# Patient Record
Sex: FEMALE | Race: WHITE | NOT HISPANIC OR LATINO | Employment: OTHER | URBAN - METROPOLITAN AREA
[De-identification: names, ages, dates, MRNs, and addresses within clinical notes are randomized per-mention and may not be internally consistent; named-entity substitution may affect disease eponyms.]

---

## 2017-10-03 ENCOUNTER — HISTORICAL (OUTPATIENT)
Dept: CARDIOLOGY | Facility: HOSPITAL | Age: 72
End: 2017-10-03

## 2017-10-31 ENCOUNTER — HISTORICAL (OUTPATIENT)
Dept: CARDIOLOGY | Facility: HOSPITAL | Age: 72
End: 2017-10-31

## 2017-11-16 ENCOUNTER — HISTORICAL (OUTPATIENT)
Dept: CARDIOLOGY | Facility: HOSPITAL | Age: 72
End: 2017-11-16

## 2017-11-30 ENCOUNTER — HISTORICAL (OUTPATIENT)
Dept: CARDIOLOGY | Facility: HOSPITAL | Age: 72
End: 2017-11-30

## 2018-12-05 LAB
INR PPP: 2.2
PROTHROMBIN TIME: 26.4 S

## 2018-12-10 ENCOUNTER — HISTORICAL (OUTPATIENT)
Dept: ADMINISTRATIVE | Facility: HOSPITAL | Age: 73
End: 2018-12-10

## 2018-12-10 LAB
ABS NEUT (OLG): 2.3 X10(3)/MCL (ref 2.1–9.2)
ALBUMIN SERPL-MCNC: 2.7 GM/DL (ref 3.4–5)
ALBUMIN SERPL-MCNC: 3 GM/DL (ref 3.4–5)
ALBUMIN/GLOB SERPL: 0.9 {RATIO}
ALBUMIN/GLOB SERPL: 0.9 {RATIO}
ALP SERPL-CCNC: 85 UNIT/L (ref 38–126)
ALP SERPL-CCNC: 94 UNIT/L (ref 38–126)
ALT SERPL-CCNC: 32 UNIT/L (ref 12–78)
ALT SERPL-CCNC: 34 UNIT/L (ref 12–78)
APPEARANCE, UA: CLEAR
AST SERPL-CCNC: 45 UNIT/L (ref 15–37)
AST SERPL-CCNC: 49 UNIT/L (ref 15–37)
BACTERIA SPEC CULT: ABNORMAL /HPF
BASOPHILS # BLD AUTO: 0 X10(3)/MCL (ref 0–0.2)
BASOPHILS NFR BLD AUTO: 1 %
BILIRUB SERPL-MCNC: 1.2 MG/DL (ref 0.2–1)
BILIRUB SERPL-MCNC: 1.3 MG/DL (ref 0.2–1)
BILIRUB UR QL STRIP: NEGATIVE
BILIRUBIN DIRECT+TOT PNL SERPL-MCNC: 0.3 MG/DL (ref 0–0.2)
BILIRUBIN DIRECT+TOT PNL SERPL-MCNC: 0.4 MG/DL (ref 0–0.2)
BILIRUBIN DIRECT+TOT PNL SERPL-MCNC: 0.9 MG/DL (ref 0–0.8)
BILIRUBIN DIRECT+TOT PNL SERPL-MCNC: 0.9 MG/DL (ref 0–0.8)
BUN SERPL-MCNC: 12 MG/DL (ref 7–18)
BUN SERPL-MCNC: 12 MG/DL (ref 7–18)
CALCIUM SERPL-MCNC: 8.4 MG/DL (ref 8.5–10.1)
CALCIUM SERPL-MCNC: 8.7 MG/DL (ref 8.5–10.1)
CHLORIDE SERPL-SCNC: 109 MMOL/L (ref 98–107)
CHLORIDE SERPL-SCNC: 110 MMOL/L (ref 98–107)
CO2 SERPL-SCNC: 26 MMOL/L (ref 21–32)
CO2 SERPL-SCNC: 27 MMOL/L (ref 21–32)
COLOR UR: ABNORMAL
CREAT SERPL-MCNC: 0.73 MG/DL (ref 0.55–1.02)
CREAT SERPL-MCNC: 0.77 MG/DL (ref 0.55–1.02)
EOSINOPHIL # BLD AUTO: 0.1 X10(3)/MCL (ref 0–0.9)
EOSINOPHIL NFR BLD AUTO: 2 %
ERYTHROCYTE [DISTWIDTH] IN BLOOD BY AUTOMATED COUNT: 15.8 % (ref 11.5–17)
GLOBULIN SER-MCNC: 3.1 GM/DL (ref 2.4–3.5)
GLOBULIN SER-MCNC: 3.2 GM/DL (ref 2.4–3.5)
GLUCOSE (UA): NEGATIVE
GLUCOSE SERPL-MCNC: 92 MG/DL (ref 74–106)
GLUCOSE SERPL-MCNC: 95 MG/DL (ref 74–106)
HCT VFR BLD AUTO: 39.2 % (ref 37–47)
HGB BLD-MCNC: 12.5 GM/DL (ref 12–16)
HGB UR QL STRIP: NEGATIVE
INR PPP: 2.3 (ref 0–1.3)
KETONES UR QL STRIP: ABNORMAL
LEUKOCYTE ESTERASE UR QL STRIP: ABNORMAL
LYMPHOCYTES # BLD AUTO: 2.5 X10(3)/MCL (ref 0.6–4.6)
LYMPHOCYTES NFR BLD AUTO: 46 %
MAGNESIUM SERPL-MCNC: 1.6 MG/DL (ref 1.8–2.4)
MAGNESIUM SERPL-MCNC: 1.6 MG/DL (ref 1.8–2.4)
MCH RBC QN AUTO: 30.1 PG (ref 27–31)
MCHC RBC AUTO-ENTMCNC: 31.9 GM/DL (ref 33–36)
MCV RBC AUTO: 94.5 FL (ref 80–94)
MONOCYTES # BLD AUTO: 0.5 X10(3)/MCL (ref 0.1–1.3)
MONOCYTES NFR BLD AUTO: 9 %
NEUTROPHILS # BLD AUTO: 2.3 X10(3)/MCL (ref 2.1–9.2)
NEUTROPHILS NFR BLD AUTO: 42 %
NITRITE UR QL STRIP: NEGATIVE
PH UR STRIP: 6 [PH] (ref 5–9)
PLATELET # BLD AUTO: 90 X10(3)/MCL (ref 130–400)
PMV BLD AUTO: 12.5 FL (ref 9.4–12.4)
POTASSIUM SERPL-SCNC: 2.8 MMOL/L (ref 3.5–5.1)
POTASSIUM SERPL-SCNC: 3.2 MMOL/L (ref 3.5–5.1)
PROT SERPL-MCNC: 5.8 GM/DL (ref 6.4–8.2)
PROT SERPL-MCNC: 6.2 GM/DL (ref 6.4–8.2)
PROT UR QL STRIP: NEGATIVE
PROTHROMBIN TIME: 25.4 SECOND(S) (ref 12.2–14.7)
RBC # BLD AUTO: 4.15 X10(6)/MCL (ref 4.2–5.4)
RBC #/AREA URNS HPF: ABNORMAL /[HPF]
SODIUM SERPL-SCNC: 145 MMOL/L (ref 136–145)
SODIUM SERPL-SCNC: 146 MMOL/L (ref 136–145)
SP GR UR STRIP: 1.02 (ref 1–1.03)
SQUAMOUS EPITHELIAL, UA: ABNORMAL
TROPONIN I SERPL-MCNC: <0.02 NG/ML (ref 0.02–0.49)
UROBILINOGEN UR STRIP-ACNC: 1
WBC # SPEC AUTO: 5.4 X10(3)/MCL (ref 4.5–11.5)
WBC #/AREA URNS HPF: ABNORMAL /[HPF]

## 2018-12-11 LAB
ABS NEUT (OLG): 1.83 X10(3)/MCL (ref 2.1–9.2)
ALBUMIN SERPL-MCNC: 2.2 GM/DL (ref 3.4–5)
ALBUMIN/GLOB SERPL: 0.8 RATIO (ref 1.1–2)
ALP SERPL-CCNC: 75 UNIT/L (ref 38–126)
ALT SERPL-CCNC: 29 UNIT/L (ref 12–78)
AST SERPL-CCNC: 41 UNIT/L (ref 15–37)
BASOPHILS # BLD AUTO: 0 X10(3)/MCL (ref 0–0.2)
BASOPHILS NFR BLD AUTO: 0 %
BILIRUB SERPL-MCNC: 0.8 MG/DL (ref 0.2–1)
BILIRUBIN DIRECT+TOT PNL SERPL-MCNC: 0.3 MG/DL (ref 0–0.5)
BILIRUBIN DIRECT+TOT PNL SERPL-MCNC: 0.5 MG/DL (ref 0–0.8)
BUN SERPL-MCNC: 10 MG/DL (ref 7–18)
CALCIUM SERPL-MCNC: 7.7 MG/DL (ref 8.5–10.1)
CHLORIDE SERPL-SCNC: 116 MMOL/L (ref 98–107)
CO2 SERPL-SCNC: 21 MMOL/L (ref 21–32)
CREAT SERPL-MCNC: 0.52 MG/DL (ref 0.55–1.02)
EOSINOPHIL # BLD AUTO: 0.1 X10(3)/MCL (ref 0–0.9)
EOSINOPHIL NFR BLD AUTO: 1 %
ERYTHROCYTE [DISTWIDTH] IN BLOOD BY AUTOMATED COUNT: 15.6 % (ref 11.5–17)
GLOBULIN SER-MCNC: 2.8 GM/DL (ref 2.4–3.5)
GLUCOSE SERPL-MCNC: 71 MG/DL (ref 74–106)
HCT VFR BLD AUTO: 32.9 % (ref 37–47)
HGB BLD-MCNC: 10.5 GM/DL (ref 12–16)
LYMPHOCYTES # BLD AUTO: 1.8 X10(3)/MCL (ref 0.6–4.6)
LYMPHOCYTES NFR BLD AUTO: 43 %
MAGNESIUM SERPL-MCNC: 2 MG/DL (ref 1.8–2.4)
MCH RBC QN AUTO: 30.6 PG (ref 27–31)
MCHC RBC AUTO-ENTMCNC: 31.9 GM/DL (ref 33–36)
MCV RBC AUTO: 95.9 FL (ref 80–94)
MONOCYTES # BLD AUTO: 0.5 X10(3)/MCL (ref 0.1–1.3)
MONOCYTES NFR BLD AUTO: 11 %
NEUTROPHILS # BLD AUTO: 1.83 X10(3)/MCL (ref 2.1–9.2)
NEUTROPHILS NFR BLD AUTO: 44 %
PLATELET # BLD AUTO: 101 X10(3)/MCL (ref 130–400)
PMV BLD AUTO: 11.4 FL (ref 9.4–12.4)
POTASSIUM SERPL-SCNC: 3.1 MMOL/L (ref 3.5–5.1)
PROT SERPL-MCNC: 5 GM/DL (ref 6.4–8.2)
RBC # BLD AUTO: 3.43 X10(6)/MCL (ref 4.2–5.4)
SODIUM SERPL-SCNC: 150 MMOL/L (ref 136–145)
TSH SERPL-ACNC: 0.97 MIU/L (ref 0.36–3.74)
WBC # SPEC AUTO: 4.2 X10(3)/MCL (ref 4.5–11.5)

## 2018-12-12 LAB
ABS NEUT (OLG): 1.51 X10(3)/MCL (ref 2.1–9.2)
ALBUMIN SERPL-MCNC: 2.6 GM/DL (ref 3.4–5)
ALBUMIN/GLOB SERPL: 0.8 {RATIO}
ALP SERPL-CCNC: 86 UNIT/L (ref 38–126)
ALT SERPL-CCNC: 32 UNIT/L (ref 12–78)
AST SERPL-CCNC: 49 UNIT/L (ref 15–37)
BASOPHILS # BLD AUTO: 0 X10(3)/MCL (ref 0–0.2)
BASOPHILS NFR BLD AUTO: 0 %
BILIRUB SERPL-MCNC: 1 MG/DL (ref 0.2–1)
BILIRUBIN DIRECT+TOT PNL SERPL-MCNC: 0.2 MG/DL (ref 0–0.2)
BILIRUBIN DIRECT+TOT PNL SERPL-MCNC: 0.8 MG/DL (ref 0–0.8)
BUN SERPL-MCNC: 5 MG/DL (ref 7–18)
CALCIUM SERPL-MCNC: 8.5 MG/DL (ref 8.5–10.1)
CHLORIDE SERPL-SCNC: 110 MMOL/L (ref 98–107)
CO2 SERPL-SCNC: 23 MMOL/L (ref 21–32)
CREAT SERPL-MCNC: 0.67 MG/DL (ref 0.55–1.02)
EOSINOPHIL # BLD AUTO: 0.1 X10(3)/MCL (ref 0–0.9)
EOSINOPHIL NFR BLD AUTO: 2 %
ERYTHROCYTE [DISTWIDTH] IN BLOOD BY AUTOMATED COUNT: 15.1 % (ref 11.5–17)
FINAL CULTURE: NORMAL
GLOBULIN SER-MCNC: 3.1 GM/DL (ref 2.4–3.5)
GLUCOSE SERPL-MCNC: 105 MG/DL (ref 74–106)
HCT VFR BLD AUTO: 38 % (ref 37–47)
HGB BLD-MCNC: 12.1 GM/DL (ref 12–16)
LYMPHOCYTES # BLD AUTO: 2.3 X10(3)/MCL (ref 0.6–4.6)
LYMPHOCYTES NFR BLD AUTO: 54 %
MAGNESIUM SERPL-MCNC: 1.8 MG/DL (ref 1.8–2.4)
MCH RBC QN AUTO: 30.6 PG (ref 27–31)
MCHC RBC AUTO-ENTMCNC: 31.8 GM/DL (ref 33–36)
MCV RBC AUTO: 96 FL (ref 80–94)
MONOCYTES # BLD AUTO: 0.4 X10(3)/MCL (ref 0.1–1.3)
MONOCYTES NFR BLD AUTO: 8 %
NEUTROPHILS # BLD AUTO: 1.51 X10(3)/MCL (ref 2.1–9.2)
NEUTROPHILS NFR BLD AUTO: 36 %
PHOSPHATE SERPL-MCNC: 1.6 MG/DL (ref 2.5–4.9)
PLATELET # BLD AUTO: 90 X10(3)/MCL (ref 130–400)
PMV BLD AUTO: 10.8 FL (ref 9.4–12.4)
POTASSIUM SERPL-SCNC: 3.7 MMOL/L (ref 3.5–5.1)
PROT SERPL-MCNC: 5.7 GM/DL (ref 6.4–8.2)
RBC # BLD AUTO: 3.96 X10(6)/MCL (ref 4.2–5.4)
SODIUM SERPL-SCNC: 143 MMOL/L (ref 136–145)
WBC # SPEC AUTO: 4.2 X10(3)/MCL (ref 4.5–11.5)

## 2018-12-13 LAB
BUN SERPL-MCNC: 4 MG/DL (ref 7–18)
CALCIUM SERPL-MCNC: 8.1 MG/DL (ref 8.5–10.1)
CHLORIDE SERPL-SCNC: 109 MMOL/L (ref 98–107)
CO2 SERPL-SCNC: 26 MMOL/L (ref 21–32)
CREAT SERPL-MCNC: 0.61 MG/DL (ref 0.55–1.02)
CREAT/UREA NIT SERPL: 6.6
GLUCOSE SERPL-MCNC: 115 MG/DL (ref 74–106)
MAGNESIUM SERPL-MCNC: 1.8 MG/DL (ref 1.8–2.4)
PHOSPHATE SERPL-MCNC: 2.6 MG/DL (ref 2.5–4.9)
POTASSIUM SERPL-SCNC: 4.3 MMOL/L (ref 3.5–5.1)
SODIUM SERPL-SCNC: 143 MMOL/L (ref 136–145)
TRIGL SERPL-MCNC: 80 MG/DL (ref 30–150)

## 2018-12-15 ENCOUNTER — HISTORICAL (OUTPATIENT)
Dept: ADMINISTRATIVE | Facility: HOSPITAL | Age: 73
End: 2018-12-15

## 2018-12-15 LAB
ABS NEUT (OLG): 3.51 X10(3)/MCL (ref 2.1–9.2)
ALBUMIN SERPL-MCNC: 2.3 GM/DL (ref 3.4–5)
ALBUMIN/GLOB SERPL: 0.7 {RATIO}
ALP SERPL-CCNC: 83 UNIT/L (ref 38–126)
ALT SERPL-CCNC: 28 UNIT/L (ref 12–78)
AST SERPL-CCNC: 41 UNIT/L (ref 15–37)
BASOPHILS # BLD AUTO: 0 X10(3)/MCL (ref 0–0.2)
BASOPHILS NFR BLD AUTO: 1 %
BILIRUB SERPL-MCNC: 1.1 MG/DL (ref 0.2–1)
BILIRUBIN DIRECT+TOT PNL SERPL-MCNC: 0.2 MG/DL (ref 0–0.5)
BILIRUBIN DIRECT+TOT PNL SERPL-MCNC: 0.9 MG/DL (ref 0–0.8)
BUN SERPL-MCNC: 17 MG/DL (ref 7–18)
CALCIUM SERPL-MCNC: 7.8 MG/DL (ref 8.5–10.1)
CHLORIDE SERPL-SCNC: 107 MMOL/L (ref 98–107)
CO2 SERPL-SCNC: 27 MMOL/L (ref 21–32)
CREAT SERPL-MCNC: 0.6 MG/DL (ref 0.55–1.02)
EOSINOPHIL # BLD AUTO: 0.3 X10(3)/MCL (ref 0–0.9)
EOSINOPHIL NFR BLD AUTO: 5 %
ERYTHROCYTE [DISTWIDTH] IN BLOOD BY AUTOMATED COUNT: 15.8 % (ref 11.5–17)
GLOBULIN SER-MCNC: 3.2 GM/DL (ref 2.4–3.5)
GLUCOSE SERPL-MCNC: 106 MG/DL (ref 74–106)
HCT VFR BLD AUTO: 37.3 % (ref 37–47)
HGB BLD-MCNC: 11.6 GM/DL (ref 12–16)
LYMPHOCYTES # BLD AUTO: 2.2 X10(3)/MCL (ref 0.6–4.6)
LYMPHOCYTES NFR BLD AUTO: 33 %
MAGNESIUM SERPL-MCNC: 2 MG/DL (ref 1.8–2.4)
MCH RBC QN AUTO: 30.3 PG (ref 27–31)
MCHC RBC AUTO-ENTMCNC: 31.1 GM/DL (ref 33–36)
MCV RBC AUTO: 97.4 FL (ref 80–94)
MONOCYTES # BLD AUTO: 0.7 X10(3)/MCL (ref 0.1–1.3)
MONOCYTES NFR BLD AUTO: 10 %
NEUTROPHILS # BLD AUTO: 3.51 X10(3)/MCL (ref 2.1–9.2)
NEUTROPHILS NFR BLD AUTO: 52 %
PHOSPHATE SERPL-MCNC: 2.5 MG/DL (ref 2.5–4.9)
PLATELET # BLD AUTO: 98 X10(3)/MCL (ref 130–400)
PMV BLD AUTO: 12.1 FL (ref 9.4–12.4)
POTASSIUM SERPL-SCNC: 4.2 MMOL/L (ref 3.5–5.1)
PROT SERPL-MCNC: 5.5 GM/DL (ref 6.4–8.2)
RBC # BLD AUTO: 3.83 X10(6)/MCL (ref 4.2–5.4)
SODIUM SERPL-SCNC: 139 MMOL/L (ref 136–145)
TRIGL SERPL-MCNC: 91 MG/DL (ref 30–150)
WBC # SPEC AUTO: 6.8 X10(3)/MCL (ref 4.5–11.5)

## 2018-12-18 ENCOUNTER — HISTORICAL (OUTPATIENT)
Dept: ADMINISTRATIVE | Facility: HOSPITAL | Age: 73
End: 2018-12-18

## 2018-12-18 LAB
ABS NEUT (OLG): 2.72 X10(3)/MCL (ref 2.1–9.2)
ABS NEUT (OLG): 3.51 X10(3)/MCL (ref 2.1–9.2)
ALBUMIN SERPL-MCNC: 2.1 GM/DL (ref 3.4–5)
ALBUMIN/GLOB SERPL: 0.6 RATIO (ref 1.1–2)
ALP SERPL-CCNC: 90 UNIT/L (ref 38–126)
ALT SERPL-CCNC: 25 UNIT/L (ref 12–78)
AST SERPL-CCNC: 36 UNIT/L (ref 15–37)
BASOPHILS # BLD AUTO: 0 X10(3)/MCL (ref 0–0.2)
BASOPHILS # BLD AUTO: 0 X10(3)/MCL (ref 0–0.2)
BASOPHILS NFR BLD AUTO: 1 %
BASOPHILS NFR BLD AUTO: 1 %
BILIRUB SERPL-MCNC: 0.9 MG/DL (ref 0.2–1)
BILIRUBIN DIRECT+TOT PNL SERPL-MCNC: 0.4 MG/DL (ref 0–0.5)
BILIRUBIN DIRECT+TOT PNL SERPL-MCNC: 0.5 MG/DL (ref 0–0.8)
BUN SERPL-MCNC: 18 MG/DL (ref 7–18)
CALCIUM SERPL-MCNC: 7.8 MG/DL (ref 8.5–10.1)
CHLORIDE SERPL-SCNC: 107 MMOL/L (ref 98–107)
CO2 SERPL-SCNC: 25 MMOL/L (ref 21–32)
CREAT SERPL-MCNC: 0.58 MG/DL (ref 0.55–1.02)
EOSINOPHIL # BLD AUTO: 0.3 X10(3)/MCL (ref 0–0.9)
EOSINOPHIL # BLD AUTO: 0.3 X10(3)/MCL (ref 0–0.9)
EOSINOPHIL NFR BLD AUTO: 4 %
EOSINOPHIL NFR BLD AUTO: 5 %
ERYTHROCYTE [DISTWIDTH] IN BLOOD BY AUTOMATED COUNT: 15.7 % (ref 11.5–17)
ERYTHROCYTE [DISTWIDTH] IN BLOOD BY AUTOMATED COUNT: 16.6 % (ref 11.5–17)
GLOBULIN SER-MCNC: 3.5 GM/DL (ref 2.4–3.5)
GLUCOSE SERPL-MCNC: 84 MG/DL (ref 74–106)
HCT VFR BLD AUTO: 36.7 % (ref 37–47)
HCT VFR BLD AUTO: 40.9 % (ref 37–47)
HGB BLD-MCNC: 11.5 GM/DL (ref 12–16)
HGB BLD-MCNC: 13.5 GM/DL (ref 12–16)
INR PPP: 1.2 (ref 0–1.3)
LYMPHOCYTES # BLD AUTO: 2.3 X10(3)/MCL (ref 0.6–4.6)
LYMPHOCYTES # BLD AUTO: 2.3 X10(3)/MCL (ref 0.6–4.6)
LYMPHOCYTES NFR BLD AUTO: 33 %
LYMPHOCYTES NFR BLD AUTO: 39 %
MAGNESIUM SERPL-MCNC: 2 MG/DL (ref 1.8–2.4)
MCH RBC QN AUTO: 30.3 PG (ref 27–31)
MCH RBC QN AUTO: 31.2 PG (ref 27–31)
MCHC RBC AUTO-ENTMCNC: 31.3 GM/DL (ref 33–36)
MCHC RBC AUTO-ENTMCNC: 33 GM/DL (ref 33–36)
MCV RBC AUTO: 94.5 FL (ref 80–94)
MCV RBC AUTO: 96.8 FL (ref 80–94)
MONOCYTES # BLD AUTO: 0.6 X10(3)/MCL (ref 0.1–1.3)
MONOCYTES # BLD AUTO: 0.8 X10(3)/MCL (ref 0.1–1.3)
MONOCYTES NFR BLD AUTO: 10 %
MONOCYTES NFR BLD AUTO: 11 %
NEUTROPHILS # BLD AUTO: 2.72 X10(3)/MCL (ref 2.1–9.2)
NEUTROPHILS # BLD AUTO: 3.51 X10(3)/MCL (ref 2.1–9.2)
NEUTROPHILS NFR BLD AUTO: 46 %
NEUTROPHILS NFR BLD AUTO: 50 %
PHOSPHATE SERPL-MCNC: 3 MG/DL (ref 2.5–4.9)
PLATELET # BLD AUTO: 76 X10(3)/MCL (ref 130–400)
PLATELET # BLD AUTO: 91 X10(3)/MCL (ref 130–400)
PMV BLD AUTO: 11.9 FL (ref 9.4–12.4)
PMV BLD AUTO: ABNORMAL FL (ref 7.4–10.4)
POTASSIUM SERPL-SCNC: 4 MMOL/L (ref 3.5–5.1)
PROT SERPL-MCNC: 5.6 GM/DL (ref 6.4–8.2)
PROTHROMBIN TIME: 15.6 SECOND(S) (ref 12.2–14.7)
RBC # BLD AUTO: 3.79 X10(6)/MCL (ref 4.2–5.4)
RBC # BLD AUTO: 4.33 X10(6)/MCL (ref 4.2–5.4)
SODIUM SERPL-SCNC: 138 MMOL/L (ref 136–145)
TRIGL SERPL-MCNC: 61 MG/DL (ref 30–150)
WBC # SPEC AUTO: 6 X10(3)/MCL (ref 4.5–11.5)
WBC # SPEC AUTO: 7 X10(3)/MCL (ref 4.5–11.5)

## 2018-12-24 ENCOUNTER — HISTORICAL (OUTPATIENT)
Dept: ADMINISTRATIVE | Facility: HOSPITAL | Age: 73
End: 2018-12-24

## 2018-12-24 LAB
ABS NEUT (OLG): 2.54 X10(3)/MCL (ref 2.1–9.2)
ALBUMIN SERPL-MCNC: 2 GM/DL (ref 3.4–5)
ALBUMIN/GLOB SERPL: 0.6 RATIO (ref 1.1–2)
ALP SERPL-CCNC: 92 UNIT/L (ref 38–126)
ALT SERPL-CCNC: 24 UNIT/L (ref 12–78)
AST SERPL-CCNC: 40 UNIT/L (ref 15–37)
BASOPHILS # BLD AUTO: 0 X10(3)/MCL (ref 0–0.2)
BASOPHILS NFR BLD AUTO: 1 %
BILIRUB SERPL-MCNC: 0.7 MG/DL (ref 0.2–1)
BILIRUBIN DIRECT+TOT PNL SERPL-MCNC: 0.3 MG/DL (ref 0–0.5)
BILIRUBIN DIRECT+TOT PNL SERPL-MCNC: 0.4 MG/DL (ref 0–0.8)
BUN SERPL-MCNC: 16 MG/DL (ref 7–18)
CALCIUM SERPL-MCNC: 7.5 MG/DL (ref 8.5–10.1)
CHLORIDE SERPL-SCNC: 110 MMOL/L (ref 98–107)
CO2 SERPL-SCNC: 25 MMOL/L (ref 21–32)
CREAT SERPL-MCNC: 0.41 MG/DL (ref 0.55–1.02)
EOSINOPHIL # BLD AUTO: 0.2 X10(3)/MCL (ref 0–0.9)
EOSINOPHIL NFR BLD AUTO: 4 %
ERYTHROCYTE [DISTWIDTH] IN BLOOD BY AUTOMATED COUNT: 16 % (ref 11.5–17)
GLOBULIN SER-MCNC: 3.5 GM/DL (ref 2.4–3.5)
GLUCOSE SERPL-MCNC: 86 MG/DL (ref 74–106)
HCT VFR BLD AUTO: 34.5 % (ref 37–47)
HGB BLD-MCNC: 11.1 GM/DL (ref 12–16)
INR PPP: 1.3 (ref 0–1.3)
LYMPHOCYTES # BLD AUTO: 1.7 X10(3)/MCL (ref 0.6–4.6)
LYMPHOCYTES NFR BLD AUTO: 34 %
MAGNESIUM SERPL-MCNC: 1.9 MG/DL (ref 1.8–2.4)
MCH RBC QN AUTO: 30.9 PG (ref 27–31)
MCHC RBC AUTO-ENTMCNC: 32.2 GM/DL (ref 33–36)
MCV RBC AUTO: 96.1 FL (ref 80–94)
MONOCYTES # BLD AUTO: 0.5 X10(3)/MCL (ref 0.1–1.3)
MONOCYTES NFR BLD AUTO: 10 %
NEUTROPHILS # BLD AUTO: 2.54 X10(3)/MCL (ref 2.1–9.2)
NEUTROPHILS NFR BLD AUTO: 51 %
PHOSPHATE SERPL-MCNC: 3 MG/DL (ref 2.5–4.9)
PLATELET # BLD AUTO: 109 X10(3)/MCL (ref 130–400)
PMV BLD AUTO: 11.9 FL (ref 9.4–12.4)
POTASSIUM SERPL-SCNC: 3.8 MMOL/L (ref 3.5–5.1)
PROT SERPL-MCNC: 5.5 GM/DL (ref 6.4–8.2)
PROTHROMBIN TIME: 16.8 SECOND(S) (ref 12.2–14.7)
RBC # BLD AUTO: 3.59 X10(6)/MCL (ref 4.2–5.4)
SODIUM SERPL-SCNC: 142 MMOL/L (ref 136–145)
TRIGL SERPL-MCNC: 70 MG/DL (ref 30–150)
WBC # SPEC AUTO: 5 X10(3)/MCL (ref 4.5–11.5)

## 2018-12-31 ENCOUNTER — HISTORICAL (OUTPATIENT)
Dept: ADMINISTRATIVE | Facility: HOSPITAL | Age: 73
End: 2018-12-31

## 2018-12-31 LAB
ABS NEUT (OLG): 3.33 X10(3)/MCL (ref 2.1–9.2)
ALBUMIN SERPL-MCNC: 2.2 GM/DL (ref 3.4–5)
ALBUMIN/GLOB SERPL: 0.6 RATIO (ref 1.1–2)
ALP SERPL-CCNC: 115 UNIT/L (ref 38–126)
ALT SERPL-CCNC: 28 UNIT/L (ref 12–78)
AST SERPL-CCNC: 44 UNIT/L (ref 15–37)
BASOPHILS # BLD AUTO: 0 X10(3)/MCL (ref 0–0.2)
BASOPHILS NFR BLD AUTO: 0 %
BILIRUB SERPL-MCNC: 1.4 MG/DL (ref 0.2–1)
BILIRUBIN DIRECT+TOT PNL SERPL-MCNC: 0.6 MG/DL (ref 0–0.5)
BILIRUBIN DIRECT+TOT PNL SERPL-MCNC: 0.8 MG/DL (ref 0–0.8)
BUN SERPL-MCNC: 16 MG/DL (ref 7–18)
CALCIUM SERPL-MCNC: 7.9 MG/DL (ref 8.5–10.1)
CHLORIDE SERPL-SCNC: 109 MMOL/L (ref 98–107)
CO2 SERPL-SCNC: 21 MMOL/L (ref 21–32)
CREAT SERPL-MCNC: 0.52 MG/DL (ref 0.55–1.02)
EOSINOPHIL # BLD AUTO: 0.2 X10(3)/MCL (ref 0–0.9)
EOSINOPHIL NFR BLD AUTO: 4 %
ERYTHROCYTE [DISTWIDTH] IN BLOOD BY AUTOMATED COUNT: 15.9 % (ref 11.5–17)
FERRITIN SERPL-MCNC: 193.5 NG/ML (ref 8–388)
FOLATE SERPL-MCNC: 37.8 NG/ML (ref 3.1–17.5)
GLOBULIN SER-MCNC: 3.7 GM/DL (ref 2.4–3.5)
GLUCOSE SERPL-MCNC: 71 MG/DL (ref 74–106)
HCT VFR BLD AUTO: 32.2 % (ref 37–47)
HGB BLD-MCNC: 10.1 GM/DL (ref 12–16)
INR PPP: 1.8 (ref 0–1.3)
IRON SATN MFR SERPL: 14.4 % (ref 20–50)
IRON SERPL-MCNC: 33 MCG/DL (ref 50–175)
LYMPHOCYTES # BLD AUTO: 1.6 X10(3)/MCL (ref 0.6–4.6)
LYMPHOCYTES NFR BLD AUTO: 28 %
MAGNESIUM SERPL-MCNC: 1.9 MG/DL (ref 1.8–2.4)
MCH RBC QN AUTO: 30.5 PG (ref 27–31)
MCHC RBC AUTO-ENTMCNC: 31.4 GM/DL (ref 33–36)
MCV RBC AUTO: 97.3 FL (ref 80–94)
MONOCYTES # BLD AUTO: 0.6 X10(3)/MCL (ref 0.1–1.3)
MONOCYTES NFR BLD AUTO: 10 %
NEUTROPHILS # BLD AUTO: 3.33 X10(3)/MCL (ref 2.1–9.2)
NEUTROPHILS NFR BLD AUTO: 58 %
PHOSPHATE SERPL-MCNC: 3.2 MG/DL (ref 2.5–4.9)
PLATELET # BLD AUTO: 127 X10(3)/MCL (ref 130–400)
PMV BLD AUTO: 12.2 FL (ref 9.4–12.4)
POTASSIUM SERPL-SCNC: 3.9 MMOL/L (ref 3.5–5.1)
PROT SERPL-MCNC: 5.9 GM/DL (ref 6.4–8.2)
PROTHROMBIN TIME: 21.3 SECOND(S) (ref 12.2–14.7)
RBC # BLD AUTO: 3.31 X10(6)/MCL (ref 4.2–5.4)
SODIUM SERPL-SCNC: 138 MMOL/L (ref 136–145)
TIBC SERPL-MCNC: 229 MCG/DL (ref 250–450)
TRANSFERRIN SERPL-MCNC: 171 MG/DL (ref 200–360)
TRIGL SERPL-MCNC: 77 MG/DL (ref 30–150)
VIT B12 SERPL-MCNC: 802 PG/ML (ref 193–986)
WBC # SPEC AUTO: 5.8 X10(3)/MCL (ref 4.5–11.5)

## 2019-01-07 ENCOUNTER — HISTORICAL (OUTPATIENT)
Dept: ADMINISTRATIVE | Facility: HOSPITAL | Age: 74
End: 2019-01-07

## 2019-01-07 LAB
ABS NEUT (OLG): 3.1 X10(3)/MCL (ref 2.1–9.2)
ALBUMIN SERPL-MCNC: 2.3 GM/DL (ref 3.4–5)
ALBUMIN/GLOB SERPL: 0.5 RATIO (ref 1.1–2)
ALP SERPL-CCNC: 114 UNIT/L (ref 38–126)
ALT SERPL-CCNC: 31 UNIT/L (ref 12–78)
AST SERPL-CCNC: 52 UNIT/L (ref 15–37)
BASOPHILS # BLD AUTO: 0 X10(3)/MCL (ref 0–0.2)
BASOPHILS NFR BLD AUTO: 0 %
BILIRUB SERPL-MCNC: 0.7 MG/DL (ref 0.2–1)
BILIRUBIN DIRECT+TOT PNL SERPL-MCNC: 0.3 MG/DL (ref 0–0.2)
BILIRUBIN DIRECT+TOT PNL SERPL-MCNC: 0.4 MG/DL (ref 0–0.8)
BUN SERPL-MCNC: 18 MG/DL (ref 7–18)
CALCIUM SERPL-MCNC: 7.4 MG/DL (ref 8.5–10.1)
CHLORIDE SERPL-SCNC: 107 MMOL/L (ref 98–107)
CO2 SERPL-SCNC: 25 MMOL/L (ref 21–32)
CREAT SERPL-MCNC: 0.25 MG/DL (ref 0.55–1.02)
EOSINOPHIL # BLD AUTO: 0.4 X10(3)/MCL (ref 0–0.9)
EOSINOPHIL NFR BLD AUTO: 7 %
ERYTHROCYTE [DISTWIDTH] IN BLOOD BY AUTOMATED COUNT: 15.6 % (ref 11.5–17)
GLOBULIN SER-MCNC: 5 GM/DL (ref 2.4–3.5)
GLUCOSE SERPL-MCNC: 99 MG/DL (ref 74–106)
HCT VFR BLD AUTO: 35.1 % (ref 37–47)
HGB BLD-MCNC: 11 GM/DL (ref 12–16)
LYMPHOCYTES # BLD AUTO: 1.8 X10(3)/MCL (ref 0.6–4.6)
LYMPHOCYTES NFR BLD AUTO: 31 %
MAGNESIUM SERPL-MCNC: 1.7 MG/DL (ref 1.8–2.4)
MCH RBC QN AUTO: 30.6 PG (ref 27–31)
MCHC RBC AUTO-ENTMCNC: 31.3 GM/DL (ref 33–36)
MCV RBC AUTO: 97.8 FL (ref 80–94)
MONOCYTES # BLD AUTO: 0.5 X10(3)/MCL (ref 0.1–1.3)
MONOCYTES NFR BLD AUTO: 9 %
NEUTROPHILS # BLD AUTO: 3.1 X10(3)/MCL (ref 2.1–9.2)
NEUTROPHILS NFR BLD AUTO: 52 %
PHOSPHATE SERPL-MCNC: 3 MG/DL (ref 2.5–4.9)
PLATELET # BLD AUTO: 176 X10(3)/MCL (ref 130–400)
PMV BLD AUTO: 12.8 FL (ref 9.4–12.4)
POTASSIUM SERPL-SCNC: 4.1 MMOL/L (ref 3.5–5.1)
PROT SERPL-MCNC: 7.3 GM/DL (ref 6.4–8.2)
RBC # BLD AUTO: 3.59 X10(6)/MCL (ref 4.2–5.4)
SODIUM SERPL-SCNC: 139 MMOL/L (ref 136–145)
TRIGL SERPL-MCNC: 92 MG/DL (ref 30–150)
WBC # SPEC AUTO: 5.9 X10(3)/MCL (ref 4.5–11.5)

## 2019-01-14 ENCOUNTER — HISTORICAL (OUTPATIENT)
Dept: ADMINISTRATIVE | Facility: HOSPITAL | Age: 74
End: 2019-01-14

## 2019-01-14 LAB
ABS NEUT (OLG): 2.87 X10(3)/MCL (ref 2.1–9.2)
ALBUMIN SERPL-MCNC: 2.3 GM/DL (ref 3.4–5)
ALBUMIN/GLOB SERPL: 0.6 {RATIO}
ALP SERPL-CCNC: 90 UNIT/L (ref 38–126)
ALT SERPL-CCNC: 27 UNIT/L (ref 12–78)
AST SERPL-CCNC: 43 UNIT/L (ref 15–37)
BASOPHILS # BLD AUTO: 0 X10(3)/MCL (ref 0–0.2)
BASOPHILS NFR BLD AUTO: 1 %
BILIRUB SERPL-MCNC: 0.5 MG/DL (ref 0.2–1)
BILIRUBIN DIRECT+TOT PNL SERPL-MCNC: 0.2 MG/DL (ref 0–0.8)
BILIRUBIN DIRECT+TOT PNL SERPL-MCNC: 0.3 MG/DL (ref 0–0.2)
BUN SERPL-MCNC: 21 MG/DL (ref 7–18)
CALCIUM SERPL-MCNC: 8.2 MG/DL (ref 8.5–10.1)
CHLORIDE SERPL-SCNC: 108 MMOL/L (ref 98–107)
CO2 SERPL-SCNC: 25 MMOL/L (ref 21–32)
CREAT SERPL-MCNC: 0.41 MG/DL (ref 0.55–1.02)
DEPRECATED CALCIDIOL+CALCIFEROL SERPL-MC: 77 NG/ML (ref 30–80)
EOSINOPHIL # BLD AUTO: 0.4 X10(3)/MCL (ref 0–0.9)
EOSINOPHIL NFR BLD AUTO: 6 %
ERYTHROCYTE [DISTWIDTH] IN BLOOD BY AUTOMATED COUNT: 15.4 % (ref 11.5–17)
GLOBULIN SER-MCNC: 4 GM/DL (ref 2.4–3.5)
GLUCOSE SERPL-MCNC: 92 MG/DL (ref 74–106)
HCT VFR BLD AUTO: 33.1 % (ref 37–47)
HGB BLD-MCNC: 10.4 GM/DL (ref 12–16)
LYMPHOCYTES # BLD AUTO: 2.1 X10(3)/MCL (ref 0.6–4.6)
LYMPHOCYTES NFR BLD AUTO: 35 %
MAGNESIUM SERPL-MCNC: 1.8 MG/DL (ref 1.8–2.4)
MCH RBC QN AUTO: 30.6 PG (ref 27–31)
MCHC RBC AUTO-ENTMCNC: 31.4 GM/DL (ref 33–36)
MCV RBC AUTO: 97.4 FL (ref 80–94)
MONOCYTES # BLD AUTO: 0.6 X10(3)/MCL (ref 0.1–1.3)
MONOCYTES NFR BLD AUTO: 10 %
NEUTROPHILS # BLD AUTO: 2.87 X10(3)/MCL (ref 2.1–9.2)
NEUTROPHILS NFR BLD AUTO: 48 %
PHOSPHATE SERPL-MCNC: 3.3 MG/DL (ref 2.5–4.9)
PLATELET # BLD AUTO: 115 X10(3)/MCL (ref 130–400)
PMV BLD AUTO: 13.4 FL (ref 9.4–12.4)
POTASSIUM SERPL-SCNC: 4.3 MMOL/L (ref 3.5–5.1)
PROT SERPL-MCNC: 6.3 GM/DL (ref 6.4–8.2)
RBC # BLD AUTO: 3.4 X10(6)/MCL (ref 4.2–5.4)
SODIUM SERPL-SCNC: 140 MMOL/L (ref 136–145)
TRIGL SERPL-MCNC: 67 MG/DL (ref 30–150)
WBC # SPEC AUTO: 6 X10(3)/MCL (ref 4.5–11.5)

## 2019-03-21 ENCOUNTER — HISTORICAL (OUTPATIENT)
Dept: ADMINISTRATIVE | Facility: HOSPITAL | Age: 74
End: 2019-03-21

## 2019-06-24 ENCOUNTER — HISTORICAL (OUTPATIENT)
Dept: LAB | Facility: HOSPITAL | Age: 74
End: 2019-06-24

## 2019-06-28 ENCOUNTER — HISTORICAL (OUTPATIENT)
Dept: RADIOLOGY | Facility: HOSPITAL | Age: 74
End: 2019-06-28

## 2019-07-08 ENCOUNTER — HISTORICAL (OUTPATIENT)
Dept: LAB | Facility: HOSPITAL | Age: 74
End: 2019-07-08

## 2019-07-15 ENCOUNTER — HISTORICAL (OUTPATIENT)
Dept: LAB | Facility: HOSPITAL | Age: 74
End: 2019-07-15

## 2019-07-22 ENCOUNTER — HISTORICAL (OUTPATIENT)
Dept: LAB | Facility: HOSPITAL | Age: 74
End: 2019-07-22

## 2019-07-29 ENCOUNTER — HISTORICAL (OUTPATIENT)
Dept: LAB | Facility: HOSPITAL | Age: 74
End: 2019-07-29

## 2019-08-05 ENCOUNTER — HISTORICAL (OUTPATIENT)
Dept: LAB | Facility: HOSPITAL | Age: 74
End: 2019-08-05

## 2019-08-19 ENCOUNTER — HISTORICAL (OUTPATIENT)
Dept: LAB | Facility: HOSPITAL | Age: 74
End: 2019-08-19

## 2019-09-03 ENCOUNTER — HISTORICAL (OUTPATIENT)
Dept: LAB | Facility: HOSPITAL | Age: 74
End: 2019-09-03

## 2019-09-16 ENCOUNTER — HISTORICAL (OUTPATIENT)
Dept: LAB | Facility: HOSPITAL | Age: 74
End: 2019-09-16

## 2020-02-14 ENCOUNTER — HISTORICAL (OUTPATIENT)
Dept: ADMINISTRATIVE | Facility: HOSPITAL | Age: 75
End: 2020-02-14

## 2020-05-20 ENCOUNTER — HISTORICAL (OUTPATIENT)
Dept: ADMINISTRATIVE | Facility: HOSPITAL | Age: 75
End: 2020-05-20

## 2020-07-20 ENCOUNTER — HISTORICAL (OUTPATIENT)
Dept: ADMINISTRATIVE | Facility: HOSPITAL | Age: 75
End: 2020-07-20

## 2020-11-29 ENCOUNTER — HISTORICAL (OUTPATIENT)
Dept: ADMINISTRATIVE | Facility: HOSPITAL | Age: 75
End: 2020-11-29

## 2020-11-29 LAB — FINAL CULTURE: NORMAL

## 2020-12-03 LAB
FINAL CULTURE: NORMAL
FINAL CULTURE: NORMAL

## 2020-12-04 LAB — FINAL CULTURE: NORMAL

## 2020-12-31 ENCOUNTER — HISTORICAL (OUTPATIENT)
Dept: ADMINISTRATIVE | Facility: HOSPITAL | Age: 75
End: 2020-12-31

## 2020-12-31 LAB — GRAM STN SPEC: NORMAL

## 2021-01-03 LAB — FINAL CULTURE: NORMAL

## 2021-01-04 LAB
FINAL CULTURE: NORMAL
FINAL CULTURE: NORMAL

## 2021-01-05 LAB — FINAL CULTURE: NORMAL

## 2021-02-01 LAB — FINAL CULTURE: NORMAL

## 2021-03-01 ENCOUNTER — HISTORICAL (OUTPATIENT)
Dept: ADMINISTRATIVE | Facility: HOSPITAL | Age: 76
End: 2021-03-01

## 2021-04-16 ENCOUNTER — HISTORICAL (OUTPATIENT)
Dept: SURGERY | Facility: HOSPITAL | Age: 76
End: 2021-04-16

## 2021-06-24 ENCOUNTER — HISTORICAL (OUTPATIENT)
Dept: ADMINISTRATIVE | Facility: HOSPITAL | Age: 76
End: 2021-06-24

## 2021-06-26 ENCOUNTER — HISTORICAL (OUTPATIENT)
Dept: ADMINISTRATIVE | Facility: HOSPITAL | Age: 76
End: 2021-06-26

## 2021-07-01 LAB
FINAL CULTURE: NORMAL
FINAL CULTURE: NORMAL

## 2021-07-13 ENCOUNTER — HISTORICAL (OUTPATIENT)
Dept: ADMINISTRATIVE | Facility: HOSPITAL | Age: 76
End: 2021-07-13

## 2021-07-13 LAB
ABS NEUT (OLG): 2.47 X10(3)/MCL (ref 2.1–9.2)
ABS NEUT (OLG): 2.51 X10(3)/MCL (ref 2.1–9.2)
ALBUMIN SERPL-MCNC: 3.2 GM/DL (ref 3.4–4.8)
ALBUMIN/GLOB SERPL: 0.8 RATIO (ref 1.1–2)
ALP SERPL-CCNC: 75 UNIT/L (ref 40–150)
ALT SERPL-CCNC: 31 UNIT/L (ref 0–55)
AST SERPL-CCNC: 43 UNIT/L (ref 5–34)
BASOPHILS # BLD AUTO: 0 X10(3)/MCL (ref 0–0.2)
BASOPHILS # BLD AUTO: 0 X10(3)/MCL (ref 0–0.2)
BASOPHILS NFR BLD AUTO: 0 %
BASOPHILS NFR BLD AUTO: 1 %
BILIRUB SERPL-MCNC: 0.6 MG/DL
BILIRUBIN DIRECT+TOT PNL SERPL-MCNC: 0.2 MG/DL (ref 0–0.5)
BILIRUBIN DIRECT+TOT PNL SERPL-MCNC: 0.4 MG/DL (ref 0–0.8)
BUN SERPL-MCNC: 20.8 MG/DL (ref 9.8–20.1)
CALCIUM SERPL-MCNC: 9.2 MG/DL (ref 8.4–10.2)
CHLORIDE SERPL-SCNC: 106 MMOL/L (ref 98–107)
CO2 SERPL-SCNC: 23 MMOL/L (ref 23–31)
CREAT SERPL-MCNC: 0.8 MG/DL (ref 0.55–1.02)
DEPRECATED CALCIDIOL+CALCIFEROL SERPL-MC: 27.1 NG/ML (ref 30–80)
DEPRECATED CALCIDIOL+CALCIFEROL SERPL-MC: 30.4 NG/ML (ref 30–80)
EOSINOPHIL # BLD AUTO: 0.2 X10(3)/MCL (ref 0–0.9)
EOSINOPHIL # BLD AUTO: 0.2 X10(3)/MCL (ref 0–0.9)
EOSINOPHIL NFR BLD AUTO: 3 %
EOSINOPHIL NFR BLD AUTO: 3 %
ERYTHROCYTE [DISTWIDTH] IN BLOOD BY AUTOMATED COUNT: 15.3 % (ref 11.5–17)
ERYTHROCYTE [DISTWIDTH] IN BLOOD BY AUTOMATED COUNT: 15.5 % (ref 11.5–17)
FOLATE SERPL-MCNC: 20 NG/ML (ref 7–31.4)
GLOBULIN SER-MCNC: 4.1 GM/DL (ref 2.4–3.5)
GLUCOSE SERPL-MCNC: 111 MG/DL (ref 82–115)
HCT VFR BLD AUTO: 38.2 % (ref 37–47)
HCT VFR BLD AUTO: 38.4 % (ref 37–47)
HGB BLD-MCNC: 11.9 GM/DL (ref 12–16)
HGB BLD-MCNC: 12.2 GM/DL (ref 12–16)
IRON SATN MFR SERPL: 26 % (ref 20–50)
IRON SATN MFR SERPL: 31 % (ref 20–50)
IRON SERPL-MCNC: 110 UG/DL (ref 50–170)
IRON SERPL-MCNC: 90 UG/DL (ref 50–170)
LYMPHOCYTES # BLD AUTO: 2.4 X10(3)/MCL (ref 0.6–4.6)
LYMPHOCYTES # BLD AUTO: 2.8 X10(3)/MCL (ref 0.6–4.6)
LYMPHOCYTES NFR BLD AUTO: 43 %
LYMPHOCYTES NFR BLD AUTO: 47 %
MAGNESIUM SERPL-MCNC: 2.2 MG/DL (ref 1.6–2.6)
MCH RBC QN AUTO: 27.5 PG (ref 27–31)
MCH RBC QN AUTO: 27.5 PG (ref 27–31)
MCHC RBC AUTO-ENTMCNC: 31.2 GM/DL (ref 33–36)
MCHC RBC AUTO-ENTMCNC: 31.8 GM/DL (ref 33–36)
MCV RBC AUTO: 86.7 FL (ref 80–94)
MCV RBC AUTO: 88.2 FL (ref 80–94)
MONOCYTES # BLD AUTO: 0.4 X10(3)/MCL (ref 0.1–1.3)
MONOCYTES # BLD AUTO: 0.5 X10(3)/MCL (ref 0.1–1.3)
MONOCYTES NFR BLD AUTO: 7 %
MONOCYTES NFR BLD AUTO: 8 %
NEUTROPHILS # BLD AUTO: 2.47 X10(3)/MCL (ref 2.1–9.2)
NEUTROPHILS # BLD AUTO: 2.51 X10(3)/MCL (ref 2.1–9.2)
NEUTROPHILS NFR BLD AUTO: 41 %
NEUTROPHILS NFR BLD AUTO: 45 %
PHOSPHATE SERPL-MCNC: 4.2 MG/DL (ref 2.3–4.7)
PLATELET # BLD AUTO: 172 X10(3)/MCL (ref 130–400)
PLATELET # BLD AUTO: 174 X10(3)/MCL (ref 130–400)
PMV BLD AUTO: 10.8 FL (ref 9.4–12.4)
PMV BLD AUTO: 11.3 FL (ref 9.4–12.4)
POTASSIUM SERPL-SCNC: 5.3 MMOL/L (ref 3.5–5.1)
PREALB SERPL-MCNC: 17.1 MG/DL (ref 14–37)
PREALB SERPL-MCNC: 17.9 MG/DL (ref 14–37)
PROT SERPL-MCNC: 7.3 GM/DL (ref 5.8–7.6)
RBC # BLD AUTO: 4.33 X10(6)/MCL (ref 4.2–5.4)
RBC # BLD AUTO: 4.43 X10(6)/MCL (ref 4.2–5.4)
SODIUM SERPL-SCNC: 139 MMOL/L (ref 136–145)
TIBC SERPL-MCNC: 245 UG/DL (ref 70–310)
TIBC SERPL-MCNC: 258 UG/DL (ref 70–310)
TIBC SERPL-MCNC: 348 UG/DL (ref 250–450)
TIBC SERPL-MCNC: 355 UG/DL (ref 250–450)
TRANSFERRIN SERPL-MCNC: 312 MG/DL (ref 173–360)
TRANSFERRIN SERPL-MCNC: 326 MG/DL (ref 173–360)
TRIGL SERPL-MCNC: 76 MG/DL (ref 37–140)
VIT B12 SERPL-MCNC: 691 PG/ML (ref 213–816)
VIT B12 SERPL-MCNC: 731 PG/ML (ref 213–816)
WBC # SPEC AUTO: 5.5 X10(3)/MCL (ref 4.5–11.5)
WBC # SPEC AUTO: 6 X10(3)/MCL (ref 4.5–11.5)

## 2021-07-26 ENCOUNTER — HISTORICAL (OUTPATIENT)
Dept: ADMINISTRATIVE | Facility: HOSPITAL | Age: 76
End: 2021-07-26

## 2021-07-26 LAB
ABS NEUT (OLG): 2.44 X10(3)/MCL (ref 2.1–9.2)
ALBUMIN SERPL-MCNC: 3 GM/DL (ref 3.4–4.8)
ALBUMIN/GLOB SERPL: 0.9 RATIO (ref 1.1–2)
ALP SERPL-CCNC: 72 UNIT/L (ref 40–150)
ALT SERPL-CCNC: 29 UNIT/L (ref 0–55)
AST SERPL-CCNC: 30 UNIT/L (ref 5–34)
BASOPHILS # BLD AUTO: 0 X10(3)/MCL (ref 0–0.2)
BASOPHILS NFR BLD AUTO: 1 %
BILIRUB SERPL-MCNC: 0.5 MG/DL
BILIRUBIN DIRECT+TOT PNL SERPL-MCNC: 0.2 MG/DL (ref 0–0.5)
BILIRUBIN DIRECT+TOT PNL SERPL-MCNC: 0.3 MG/DL (ref 0–0.8)
BUN SERPL-MCNC: 19.7 MG/DL (ref 9.8–20.1)
CALCIUM SERPL-MCNC: 8.5 MG/DL (ref 8.4–10.2)
CHLORIDE SERPL-SCNC: 106 MMOL/L (ref 98–107)
CO2 SERPL-SCNC: 25 MMOL/L (ref 23–31)
CREAT SERPL-MCNC: 0.63 MG/DL (ref 0.55–1.02)
EOSINOPHIL # BLD AUTO: 0.2 X10(3)/MCL (ref 0–0.9)
EOSINOPHIL NFR BLD AUTO: 3 %
ERYTHROCYTE [DISTWIDTH] IN BLOOD BY AUTOMATED COUNT: 15.7 % (ref 11.5–17)
GLOBULIN SER-MCNC: 3.5 GM/DL (ref 2.4–3.5)
GLUCOSE SERPL-MCNC: 124 MG/DL (ref 82–115)
HCT VFR BLD AUTO: 37.4 % (ref 37–47)
HGB BLD-MCNC: 11.7 GM/DL (ref 12–16)
INR PPP: 2.7 (ref 0–1.3)
LYMPHOCYTES # BLD AUTO: 2 X10(3)/MCL (ref 0.6–4.6)
LYMPHOCYTES NFR BLD AUTO: 39 %
MAGNESIUM SERPL-MCNC: 2.1 MG/DL (ref 1.6–2.6)
MCH RBC QN AUTO: 27.8 PG (ref 27–31)
MCHC RBC AUTO-ENTMCNC: 31.3 GM/DL (ref 33–36)
MCV RBC AUTO: 88.8 FL (ref 80–94)
MONOCYTES # BLD AUTO: 0.4 X10(3)/MCL (ref 0.1–1.3)
MONOCYTES NFR BLD AUTO: 8 %
NEUTROPHILS # BLD AUTO: 2.44 X10(3)/MCL (ref 2.1–9.2)
NEUTROPHILS NFR BLD AUTO: 49 %
PHOSPHATE SERPL-MCNC: 4.1 MG/DL (ref 2.3–4.7)
PLATELET # BLD AUTO: 136 X10(3)/MCL (ref 130–400)
PMV BLD AUTO: 10.8 FL (ref 9.4–12.4)
POTASSIUM SERPL-SCNC: 4 MMOL/L (ref 3.5–5.1)
PROT SERPL-MCNC: 6.5 GM/DL (ref 5.8–7.6)
PROTHROMBIN TIME: 27.9 SECOND(S) (ref 12.5–14.5)
RBC # BLD AUTO: 4.21 X10(6)/MCL (ref 4.2–5.4)
SODIUM SERPL-SCNC: 140 MMOL/L (ref 136–145)
TRIGL SERPL-MCNC: 58 MG/DL (ref 37–140)
WBC # SPEC AUTO: 5 X10(3)/MCL (ref 4.5–11.5)

## 2021-08-16 ENCOUNTER — HISTORICAL (OUTPATIENT)
Dept: ADMINISTRATIVE | Facility: HOSPITAL | Age: 76
End: 2021-08-16

## 2021-08-16 LAB
ABS NEUT (OLG): 2.3 X10(3)/MCL (ref 2.1–9.2)
ALBUMIN SERPL-MCNC: 3.4 GM/DL (ref 3.4–4.8)
ALBUMIN/GLOB SERPL: 1.1 RATIO (ref 1.1–2)
ALP SERPL-CCNC: 74 UNIT/L (ref 40–150)
ALT SERPL-CCNC: 30 UNIT/L (ref 0–55)
AST SERPL-CCNC: 35 UNIT/L (ref 5–34)
BASOPHILS # BLD AUTO: 0 X10(3)/MCL (ref 0–0.2)
BASOPHILS NFR BLD AUTO: 0 %
BILIRUB SERPL-MCNC: 0.6 MG/DL
BILIRUBIN DIRECT+TOT PNL SERPL-MCNC: 0.3 MG/DL (ref 0–0.5)
BILIRUBIN DIRECT+TOT PNL SERPL-MCNC: 0.3 MG/DL (ref 0–0.8)
BUN SERPL-MCNC: 20.6 MG/DL (ref 9.8–20.1)
CALCIUM SERPL-MCNC: 8.8 MG/DL (ref 8.4–10.2)
CHLORIDE SERPL-SCNC: 107 MMOL/L (ref 98–107)
CO2 SERPL-SCNC: 23 MMOL/L (ref 23–31)
CREAT SERPL-MCNC: 0.63 MG/DL (ref 0.55–1.02)
EOSINOPHIL # BLD AUTO: 0.2 X10(3)/MCL (ref 0–0.9)
EOSINOPHIL NFR BLD AUTO: 4 %
ERYTHROCYTE [DISTWIDTH] IN BLOOD BY AUTOMATED COUNT: 15.5 % (ref 11.5–17)
GLOBULIN SER-MCNC: 3.1 GM/DL (ref 2.4–3.5)
GLUCOSE SERPL-MCNC: 76 MG/DL (ref 82–115)
HCT VFR BLD AUTO: 37 % (ref 37–47)
HGB BLD-MCNC: 11.7 GM/DL (ref 12–16)
INR PPP: 1.9 (ref 0–1.3)
LYMPHOCYTES # BLD AUTO: 2.1 X10(3)/MCL (ref 0.6–4.6)
LYMPHOCYTES NFR BLD AUTO: 40 %
MAGNESIUM SERPL-MCNC: 1.9 MG/DL (ref 1.6–2.6)
MCH RBC QN AUTO: 28.3 PG (ref 27–31)
MCHC RBC AUTO-ENTMCNC: 31.6 GM/DL (ref 33–36)
MCV RBC AUTO: 89.4 FL (ref 80–94)
MONOCYTES # BLD AUTO: 0.5 X10(3)/MCL (ref 0.1–1.3)
MONOCYTES NFR BLD AUTO: 10 %
NEUTROPHILS # BLD AUTO: 2.3 X10(3)/MCL (ref 2.1–9.2)
NEUTROPHILS NFR BLD AUTO: 45 %
PHOSPHATE SERPL-MCNC: 4.2 MG/DL (ref 2.3–4.7)
PLATELET # BLD AUTO: 141 X10(3)/MCL (ref 130–400)
PMV BLD AUTO: 11.3 FL (ref 9.4–12.4)
POTASSIUM SERPL-SCNC: 4.3 MMOL/L (ref 3.5–5.1)
PROT SERPL-MCNC: 6.5 GM/DL (ref 5.8–7.6)
PROTHROMBIN TIME: 21.3 SECOND(S) (ref 12.5–14.5)
RBC # BLD AUTO: 4.14 X10(6)/MCL (ref 4.2–5.4)
SODIUM SERPL-SCNC: 139 MMOL/L (ref 136–145)
TRIGL SERPL-MCNC: 50 MG/DL (ref 37–140)
WBC # SPEC AUTO: 5.1 X10(3)/MCL (ref 4.5–11.5)

## 2021-09-06 ENCOUNTER — HISTORICAL (OUTPATIENT)
Dept: ADMINISTRATIVE | Facility: HOSPITAL | Age: 76
End: 2021-09-06

## 2021-09-06 LAB
ABS NEUT (OLG): 2.13 X10(3)/MCL (ref 2.1–9.2)
ALBUMIN SERPL-MCNC: 3.4 GM/DL (ref 3.4–4.8)
ALBUMIN/GLOB SERPL: 1.1 RATIO (ref 1.1–2)
ALP SERPL-CCNC: 73 UNIT/L (ref 40–150)
ALT SERPL-CCNC: 29 UNIT/L (ref 0–55)
AST SERPL-CCNC: 30 UNIT/L (ref 5–34)
BASOPHILS # BLD AUTO: 0 X10(3)/MCL (ref 0–0.2)
BASOPHILS NFR BLD AUTO: 0 %
BILIRUB SERPL-MCNC: 0.5 MG/DL
BILIRUBIN DIRECT+TOT PNL SERPL-MCNC: 0.2 MG/DL (ref 0–0.5)
BILIRUBIN DIRECT+TOT PNL SERPL-MCNC: 0.3 MG/DL (ref 0–0.8)
BUN SERPL-MCNC: 21 MG/DL (ref 9.8–20.1)
CALCIUM SERPL-MCNC: 9 MG/DL (ref 8.4–10.2)
CHLORIDE SERPL-SCNC: 109 MMOL/L (ref 98–107)
CO2 SERPL-SCNC: 25 MMOL/L (ref 23–31)
CREAT SERPL-MCNC: 0.64 MG/DL (ref 0.55–1.02)
EOSINOPHIL # BLD AUTO: 0.2 X10(3)/MCL (ref 0–0.9)
EOSINOPHIL NFR BLD AUTO: 4 %
ERYTHROCYTE [DISTWIDTH] IN BLOOD BY AUTOMATED COUNT: 14.8 % (ref 11.5–17)
GLOBULIN SER-MCNC: 3.2 GM/DL (ref 2.4–3.5)
GLUCOSE SERPL-MCNC: 123 MG/DL (ref 82–115)
HCT VFR BLD AUTO: 36.5 % (ref 37–47)
HGB BLD-MCNC: 11.7 GM/DL (ref 12–16)
INR PPP: 2.8 (ref 0–1.3)
LYMPHOCYTES # BLD AUTO: 2.1 X10(3)/MCL (ref 0.6–4.6)
LYMPHOCYTES NFR BLD AUTO: 42 %
MAGNESIUM SERPL-MCNC: 2 MG/DL (ref 1.6–2.6)
MCH RBC QN AUTO: 28.9 PG (ref 27–31)
MCHC RBC AUTO-ENTMCNC: 32.1 GM/DL (ref 33–36)
MCV RBC AUTO: 90.1 FL (ref 80–94)
MONOCYTES # BLD AUTO: 0.4 X10(3)/MCL (ref 0.1–1.3)
MONOCYTES NFR BLD AUTO: 9 %
NEUTROPHILS # BLD AUTO: 2.13 X10(3)/MCL (ref 2.1–9.2)
NEUTROPHILS NFR BLD AUTO: 44 %
PHOSPHATE SERPL-MCNC: 3.9 MG/DL (ref 2.3–4.7)
PLATELET # BLD AUTO: 147 X10(3)/MCL (ref 130–400)
PMV BLD AUTO: 11.1 FL (ref 9.4–12.4)
POTASSIUM SERPL-SCNC: 4.4 MMOL/L (ref 3.5–5.1)
PROT SERPL-MCNC: 6.6 GM/DL (ref 5.8–7.6)
PROTHROMBIN TIME: 28.7 SECOND(S) (ref 12.5–14.5)
RBC # BLD AUTO: 4.05 X10(6)/MCL (ref 4.2–5.4)
SODIUM SERPL-SCNC: 143 MMOL/L (ref 136–145)
TRIGL SERPL-MCNC: 57 MG/DL (ref 37–140)
WBC # SPEC AUTO: 4.8 X10(3)/MCL (ref 4.5–11.5)

## 2021-09-27 ENCOUNTER — HISTORICAL (OUTPATIENT)
Dept: ADMINISTRATIVE | Facility: HOSPITAL | Age: 76
End: 2021-09-27

## 2021-09-27 LAB
ABS NEUT (OLG): 2.52 X10(3)/MCL (ref 2.1–9.2)
ALBUMIN SERPL-MCNC: 3.5 GM/DL (ref 3.4–4.8)
ALBUMIN/GLOB SERPL: 1.2 RATIO (ref 1.1–2)
ALP SERPL-CCNC: 75 UNIT/L (ref 40–150)
ALT SERPL-CCNC: 31 UNIT/L (ref 0–55)
AST SERPL-CCNC: 34 UNIT/L (ref 5–34)
BASOPHILS # BLD AUTO: 0 X10(3)/MCL (ref 0–0.2)
BASOPHILS NFR BLD AUTO: 0 %
BILIRUB SERPL-MCNC: 0.7 MG/DL
BILIRUBIN DIRECT+TOT PNL SERPL-MCNC: 0.3 MG/DL (ref 0–0.5)
BILIRUBIN DIRECT+TOT PNL SERPL-MCNC: 0.4 MG/DL (ref 0–0.8)
BUN SERPL-MCNC: 20.4 MG/DL (ref 9.8–20.1)
CALCIUM SERPL-MCNC: 9 MG/DL (ref 8.4–10.2)
CHLORIDE SERPL-SCNC: 105 MMOL/L (ref 98–107)
CO2 SERPL-SCNC: 24 MMOL/L (ref 23–31)
CREAT SERPL-MCNC: 0.65 MG/DL (ref 0.55–1.02)
EOSINOPHIL # BLD AUTO: 0.2 X10(3)/MCL (ref 0–0.9)
EOSINOPHIL NFR BLD AUTO: 3 %
ERYTHROCYTE [DISTWIDTH] IN BLOOD BY AUTOMATED COUNT: 14.6 % (ref 11.5–17)
GLOBULIN SER-MCNC: 3 GM/DL (ref 2.4–3.5)
GLUCOSE SERPL-MCNC: 133 MG/DL (ref 82–115)
HCT VFR BLD AUTO: 37.2 % (ref 37–47)
HGB BLD-MCNC: 11.7 GM/DL (ref 12–16)
LYMPHOCYTES # BLD AUTO: 2 X10(3)/MCL (ref 0.6–4.6)
LYMPHOCYTES NFR BLD AUTO: 39 %
MAGNESIUM SERPL-MCNC: 2.1 MG/DL (ref 1.6–2.6)
MCH RBC QN AUTO: 28.7 PG (ref 27–31)
MCHC RBC AUTO-ENTMCNC: 31.5 GM/DL (ref 33–36)
MCV RBC AUTO: 91.4 FL (ref 80–94)
MONOCYTES # BLD AUTO: 0.4 X10(3)/MCL (ref 0.1–1.3)
MONOCYTES NFR BLD AUTO: 7 %
NEUTROPHILS # BLD AUTO: 2.52 X10(3)/MCL (ref 2.1–9.2)
NEUTROPHILS NFR BLD AUTO: 50 %
PHOSPHATE SERPL-MCNC: 3.6 MG/DL (ref 2.3–4.7)
PLATELET # BLD AUTO: 153 X10(3)/MCL (ref 130–400)
PMV BLD AUTO: 11.4 FL (ref 9.4–12.4)
POTASSIUM SERPL-SCNC: 4.6 MMOL/L (ref 3.5–5.1)
PREALB SERPL-MCNC: 19.2 MG/DL (ref 14–37)
PROT SERPL-MCNC: 6.5 GM/DL (ref 5.8–7.6)
RBC # BLD AUTO: 4.07 X10(6)/MCL (ref 4.2–5.4)
SODIUM SERPL-SCNC: 141 MMOL/L (ref 136–145)
TRIGL SERPL-MCNC: 60 MG/DL (ref 37–140)
WBC # SPEC AUTO: 5.1 X10(3)/MCL (ref 4.5–11.5)

## 2021-10-11 ENCOUNTER — HISTORICAL (OUTPATIENT)
Dept: ADMINISTRATIVE | Facility: HOSPITAL | Age: 76
End: 2021-10-11

## 2021-10-11 LAB
ABS NEUT (OLG): 2.15 X10(3)/MCL (ref 2.1–9.2)
ALBUMIN SERPL-MCNC: 3.4 GM/DL (ref 3.4–4.8)
ALBUMIN/GLOB SERPL: 1.2 RATIO (ref 1.1–2)
ALP SERPL-CCNC: 71 UNIT/L (ref 40–150)
ALT SERPL-CCNC: 29 UNIT/L (ref 0–55)
AST SERPL-CCNC: 29 UNIT/L (ref 5–34)
BASOPHILS # BLD AUTO: 0 X10(3)/MCL (ref 0–0.2)
BASOPHILS NFR BLD AUTO: 1 %
BILIRUB SERPL-MCNC: 0.5 MG/DL
BILIRUBIN DIRECT+TOT PNL SERPL-MCNC: 0.2 MG/DL (ref 0–0.5)
BILIRUBIN DIRECT+TOT PNL SERPL-MCNC: 0.3 MG/DL (ref 0–0.8)
BUN SERPL-MCNC: 19.2 MG/DL (ref 9.8–20.1)
CALCIUM SERPL-MCNC: 8.7 MG/DL (ref 8.4–10.2)
CHLORIDE SERPL-SCNC: 107 MMOL/L (ref 98–107)
CO2 SERPL-SCNC: 27 MMOL/L (ref 23–31)
CREAT SERPL-MCNC: 0.64 MG/DL (ref 0.55–1.02)
EOSINOPHIL # BLD AUTO: 0.2 X10(3)/MCL (ref 0–0.9)
EOSINOPHIL NFR BLD AUTO: 4 %
ERYTHROCYTE [DISTWIDTH] IN BLOOD BY AUTOMATED COUNT: 14.2 % (ref 11.5–17)
GLOBULIN SER-MCNC: 2.9 GM/DL (ref 2.4–3.5)
GLUCOSE SERPL-MCNC: 98 MG/DL (ref 82–115)
HCT VFR BLD AUTO: 37.8 % (ref 37–47)
HGB BLD-MCNC: 11.8 GM/DL (ref 12–16)
INR PPP: 2.5 (ref 0–1.3)
LYMPHOCYTES # BLD AUTO: 2.2 X10(3)/MCL (ref 0.6–4.6)
LYMPHOCYTES NFR BLD AUTO: 44 %
MAGNESIUM SERPL-MCNC: 2 MG/DL (ref 1.6–2.6)
MCH RBC QN AUTO: 28.7 PG (ref 27–31)
MCHC RBC AUTO-ENTMCNC: 31.2 GM/DL (ref 33–36)
MCV RBC AUTO: 92 FL (ref 80–94)
MONOCYTES # BLD AUTO: 0.4 X10(3)/MCL (ref 0.1–1.3)
MONOCYTES NFR BLD AUTO: 9 %
NEUTROPHILS # BLD AUTO: 2.15 X10(3)/MCL (ref 2.1–9.2)
NEUTROPHILS NFR BLD AUTO: 43 %
PHOSPHATE SERPL-MCNC: 3.5 MG/DL (ref 2.3–4.7)
PLATELET # BLD AUTO: 142 X10(3)/MCL (ref 130–400)
PMV BLD AUTO: 10.5 FL (ref 9.4–12.4)
POTASSIUM SERPL-SCNC: 4.4 MMOL/L (ref 3.5–5.1)
PREALB SERPL-MCNC: 18.7 MG/DL (ref 14–37)
PROT SERPL-MCNC: 6.3 GM/DL (ref 5.8–7.6)
PROTHROMBIN TIME: 26.7 SECOND(S) (ref 12.5–14.5)
RBC # BLD AUTO: 4.11 X10(6)/MCL (ref 4.2–5.4)
SODIUM SERPL-SCNC: 138 MMOL/L (ref 136–145)
TRIGL SERPL-MCNC: 69 MG/DL (ref 37–140)
WBC # SPEC AUTO: 5 X10(3)/MCL (ref 4.5–11.5)

## 2021-11-01 ENCOUNTER — HISTORICAL (OUTPATIENT)
Dept: ADMINISTRATIVE | Facility: HOSPITAL | Age: 76
End: 2021-11-01

## 2021-11-01 LAB
ABS NEUT (OLG): 2.34 X10(3)/MCL (ref 2.1–9.2)
ALBUMIN SERPL-MCNC: 3.4 GM/DL (ref 3.4–4.8)
ALBUMIN/GLOB SERPL: 1.1 RATIO (ref 1.1–2)
ALP SERPL-CCNC: 68 UNIT/L (ref 40–150)
ALT SERPL-CCNC: 29 UNIT/L (ref 0–55)
AST SERPL-CCNC: 31 UNIT/L (ref 5–34)
BASOPHILS # BLD AUTO: 0 X10(3)/MCL (ref 0–0.2)
BASOPHILS NFR BLD AUTO: 1 %
BILIRUB SERPL-MCNC: 0.5 MG/DL
BILIRUBIN DIRECT+TOT PNL SERPL-MCNC: 0.2 MG/DL (ref 0–0.5)
BILIRUBIN DIRECT+TOT PNL SERPL-MCNC: 0.3 MG/DL (ref 0–0.8)
BUN SERPL-MCNC: 21.9 MG/DL (ref 9.8–20.1)
CALCIUM SERPL-MCNC: 8.9 MG/DL (ref 8.7–10.5)
CHLORIDE SERPL-SCNC: 105 MMOL/L (ref 98–107)
CO2 SERPL-SCNC: 26 MMOL/L (ref 23–31)
CREAT SERPL-MCNC: 0.68 MG/DL (ref 0.55–1.02)
EOSINOPHIL # BLD AUTO: 0.2 X10(3)/MCL (ref 0–0.9)
EOSINOPHIL NFR BLD AUTO: 4 %
ERYTHROCYTE [DISTWIDTH] IN BLOOD BY AUTOMATED COUNT: 14.1 % (ref 11.5–17)
GLOBULIN SER-MCNC: 3 GM/DL (ref 2.4–3.5)
GLUCOSE SERPL-MCNC: 99 MG/DL (ref 82–115)
HCT VFR BLD AUTO: 37.2 % (ref 37–47)
HGB BLD-MCNC: 11.9 GM/DL (ref 12–16)
LYMPHOCYTES # BLD AUTO: 2.2 X10(3)/MCL (ref 0.6–4.6)
LYMPHOCYTES NFR BLD AUTO: 42 %
MAGNESIUM SERPL-MCNC: 2.1 MG/DL (ref 1.6–2.6)
MCH RBC QN AUTO: 29.2 PG (ref 27–31)
MCHC RBC AUTO-ENTMCNC: 32 GM/DL (ref 33–36)
MCV RBC AUTO: 91.4 FL (ref 80–94)
MONOCYTES # BLD AUTO: 0.4 X10(3)/MCL (ref 0.1–1.3)
MONOCYTES NFR BLD AUTO: 8 %
NEUTROPHILS # BLD AUTO: 2.34 X10(3)/MCL (ref 2.1–9.2)
NEUTROPHILS NFR BLD AUTO: 46 %
PHOSPHATE SERPL-MCNC: 4.4 MG/DL (ref 2.3–4.7)
PLATELET # BLD AUTO: 143 X10(3)/MCL (ref 130–400)
PMV BLD AUTO: 10.6 FL (ref 9.4–12.4)
POTASSIUM SERPL-SCNC: 4.7 MMOL/L (ref 3.5–5.1)
PROT SERPL-MCNC: 6.4 GM/DL (ref 5.8–7.6)
RBC # BLD AUTO: 4.07 X10(6)/MCL (ref 4.2–5.4)
SODIUM SERPL-SCNC: 141 MMOL/L (ref 136–145)
TRIGL SERPL-MCNC: 55 MG/DL (ref 37–140)
WBC # SPEC AUTO: 5.1 X10(3)/MCL (ref 4.5–11.5)

## 2021-11-22 ENCOUNTER — HISTORICAL (OUTPATIENT)
Dept: ADMINISTRATIVE | Facility: HOSPITAL | Age: 76
End: 2021-11-22

## 2021-11-22 LAB
ABS NEUT (OLG): 1.91 X10(3)/MCL (ref 2.1–9.2)
ALBUMIN SERPL-MCNC: 3.4 GM/DL (ref 3.4–4.8)
ALBUMIN/GLOB SERPL: 1.1 RATIO (ref 1.1–2)
ALP SERPL-CCNC: 73 UNIT/L (ref 40–150)
ALT SERPL-CCNC: 31 UNIT/L (ref 0–55)
AST SERPL-CCNC: 33 UNIT/L (ref 5–34)
BASOPHILS # BLD AUTO: 0 X10(3)/MCL (ref 0–0.2)
BASOPHILS NFR BLD AUTO: 1 %
BILIRUB SERPL-MCNC: 0.5 MG/DL
BILIRUBIN DIRECT+TOT PNL SERPL-MCNC: 0.2 MG/DL (ref 0–0.5)
BILIRUBIN DIRECT+TOT PNL SERPL-MCNC: 0.3 MG/DL (ref 0–0.8)
BUN SERPL-MCNC: 19 MG/DL (ref 9.8–20.1)
CALCIUM SERPL-MCNC: 8.9 MG/DL (ref 8.7–10.5)
CHLORIDE SERPL-SCNC: 106 MMOL/L (ref 98–107)
CO2 SERPL-SCNC: 27 MMOL/L (ref 23–31)
CREAT SERPL-MCNC: 0.66 MG/DL (ref 0.55–1.02)
EOSINOPHIL # BLD AUTO: 0.2 X10(3)/MCL (ref 0–0.9)
EOSINOPHIL NFR BLD AUTO: 4 %
ERYTHROCYTE [DISTWIDTH] IN BLOOD BY AUTOMATED COUNT: 13.7 % (ref 11.5–17)
GLOBULIN SER-MCNC: 3 GM/DL (ref 2.4–3.5)
GLUCOSE SERPL-MCNC: 94 MG/DL (ref 82–115)
HCT VFR BLD AUTO: 38.3 % (ref 37–47)
HGB BLD-MCNC: 11.9 GM/DL (ref 12–16)
INR PPP: 2.6 (ref 0–1.3)
LYMPHOCYTES # BLD AUTO: 1.8 X10(3)/MCL (ref 0.6–4.6)
LYMPHOCYTES NFR BLD AUTO: 42 %
MAGNESIUM SERPL-MCNC: 1.9 MG/DL (ref 1.6–2.6)
MCH RBC QN AUTO: 28.5 PG (ref 27–31)
MCHC RBC AUTO-ENTMCNC: 31.1 GM/DL (ref 33–36)
MCV RBC AUTO: 91.8 FL (ref 80–94)
MONOCYTES # BLD AUTO: 0.4 X10(3)/MCL (ref 0.1–1.3)
MONOCYTES NFR BLD AUTO: 10 %
NEUTROPHILS # BLD AUTO: 1.91 X10(3)/MCL (ref 2.1–9.2)
NEUTROPHILS NFR BLD AUTO: 43 %
PHOSPHATE SERPL-MCNC: 4.2 MG/DL (ref 2.3–4.7)
PLATELET # BLD AUTO: 148 X10(3)/MCL (ref 130–400)
PMV BLD AUTO: 10.6 FL (ref 9.4–12.4)
POTASSIUM SERPL-SCNC: 4.6 MMOL/L (ref 3.5–5.1)
PREALB SERPL-MCNC: 18.5 MG/DL (ref 14–37)
PROT SERPL-MCNC: 6.4 GM/DL (ref 5.8–7.6)
PROTHROMBIN TIME: 27.2 SECOND(S) (ref 12.5–14.5)
RBC # BLD AUTO: 4.17 X10(6)/MCL (ref 4.2–5.4)
SODIUM SERPL-SCNC: 140 MMOL/L (ref 136–145)
TRIGL SERPL-MCNC: 60 MG/DL (ref 37–140)
WBC # SPEC AUTO: 4.4 X10(3)/MCL (ref 4.5–11.5)

## 2021-12-13 ENCOUNTER — HISTORICAL (OUTPATIENT)
Dept: ADMINISTRATIVE | Facility: HOSPITAL | Age: 76
End: 2021-12-13

## 2021-12-13 LAB
ABS NEUT (OLG): 1.88 X10(3)/MCL (ref 2.1–9.2)
ALBUMIN SERPL-MCNC: 3.3 GM/DL (ref 3.4–4.8)
ALBUMIN/GLOB SERPL: 1.1 RATIO (ref 1.1–2)
ALP SERPL-CCNC: 69 UNIT/L (ref 40–150)
ALT SERPL-CCNC: 27 UNIT/L (ref 0–55)
AST SERPL-CCNC: 30 UNIT/L (ref 5–34)
BASOPHILS # BLD AUTO: 0 X10(3)/MCL (ref 0–0.2)
BASOPHILS NFR BLD AUTO: 1 %
BILIRUB SERPL-MCNC: 0.4 MG/DL
BILIRUBIN DIRECT+TOT PNL SERPL-MCNC: 0.2 MG/DL (ref 0–0.5)
BILIRUBIN DIRECT+TOT PNL SERPL-MCNC: 0.2 MG/DL (ref 0–0.8)
BUN SERPL-MCNC: 18.3 MG/DL (ref 9.8–20.1)
CALCIUM SERPL-MCNC: 8.5 MG/DL (ref 8.7–10.5)
CHLORIDE SERPL-SCNC: 107 MMOL/L (ref 98–107)
CO2 SERPL-SCNC: 29 MMOL/L (ref 23–31)
CREAT SERPL-MCNC: 0.65 MG/DL (ref 0.55–1.02)
EOSINOPHIL # BLD AUTO: 0.2 X10(3)/MCL (ref 0–0.9)
EOSINOPHIL NFR BLD AUTO: 5 %
ERYTHROCYTE [DISTWIDTH] IN BLOOD BY AUTOMATED COUNT: 13.8 % (ref 11.5–17)
GLOBULIN SER-MCNC: 3 GM/DL (ref 2.4–3.5)
GLUCOSE SERPL-MCNC: 99 MG/DL (ref 82–115)
HCT VFR BLD AUTO: 38.5 % (ref 37–47)
HGB BLD-MCNC: 12 GM/DL (ref 12–16)
INR PPP: 2 (ref 0–1.3)
LYMPHOCYTES # BLD AUTO: 1.8 X10(3)/MCL (ref 0.6–4.6)
LYMPHOCYTES NFR BLD AUTO: 42 %
MAGNESIUM SERPL-MCNC: 2 MG/DL (ref 1.6–2.6)
MCH RBC QN AUTO: 28.8 PG (ref 27–31)
MCHC RBC AUTO-ENTMCNC: 31.2 GM/DL (ref 33–36)
MCV RBC AUTO: 92.3 FL (ref 80–94)
MONOCYTES # BLD AUTO: 0.4 X10(3)/MCL (ref 0.1–1.3)
MONOCYTES NFR BLD AUTO: 8 %
NEUTROPHILS # BLD AUTO: 1.88 X10(3)/MCL (ref 2.1–9.2)
NEUTROPHILS NFR BLD AUTO: 44 %
PHOSPHATE SERPL-MCNC: 3.8 MG/DL (ref 2.3–4.7)
PLATELET # BLD AUTO: 135 X10(3)/MCL (ref 130–400)
PMV BLD AUTO: 10.3 FL (ref 9.4–12.4)
POTASSIUM SERPL-SCNC: 4.6 MMOL/L (ref 3.5–5.1)
PREALB SERPL-MCNC: 15.4 MG/DL (ref 14–37)
PROT SERPL-MCNC: 6.3 GM/DL (ref 5.8–7.6)
PROTHROMBIN TIME: 22 SECOND(S) (ref 12.5–14.5)
RBC # BLD AUTO: 4.17 X10(6)/MCL (ref 4.2–5.4)
SODIUM SERPL-SCNC: 142 MMOL/L (ref 136–145)
TRIGL SERPL-MCNC: 55 MG/DL (ref 37–140)
WBC # SPEC AUTO: 4.3 X10(3)/MCL (ref 4.5–11.5)

## 2022-01-03 ENCOUNTER — HISTORICAL (OUTPATIENT)
Dept: ADMINISTRATIVE | Facility: HOSPITAL | Age: 77
End: 2022-01-03

## 2022-01-03 LAB
ABS NEUT (OLG): 1.71 X10(3)/MCL (ref 2.1–9.2)
ALBUMIN SERPL-MCNC: 3.4 GM/DL (ref 3.4–4.8)
ALBUMIN/GLOB SERPL: 1.2 RATIO (ref 1.1–2)
ALP SERPL-CCNC: 69 UNIT/L (ref 40–150)
ALT SERPL-CCNC: 29 UNIT/L (ref 0–55)
AST SERPL-CCNC: 35 UNIT/L (ref 5–34)
BASOPHILS # BLD AUTO: 0 X10(3)/MCL (ref 0–0.2)
BASOPHILS NFR BLD AUTO: 1 %
BILIRUB SERPL-MCNC: 0.5 MG/DL
BILIRUBIN DIRECT+TOT PNL SERPL-MCNC: 0.2 MG/DL (ref 0–0.5)
BILIRUBIN DIRECT+TOT PNL SERPL-MCNC: 0.3 MG/DL (ref 0–0.8)
BUN SERPL-MCNC: 12.7 MG/DL (ref 9.8–20.1)
CALCIUM SERPL-MCNC: 8.5 MG/DL (ref 8.7–10.5)
CHLORIDE SERPL-SCNC: 109 MMOL/L (ref 98–107)
CO2 SERPL-SCNC: 24 MMOL/L (ref 23–31)
CREAT SERPL-MCNC: 0.65 MG/DL (ref 0.55–1.02)
EOSINOPHIL # BLD AUTO: 0.2 X10(3)/MCL (ref 0–0.9)
EOSINOPHIL NFR BLD AUTO: 6 %
ERYTHROCYTE [DISTWIDTH] IN BLOOD BY AUTOMATED COUNT: 13.8 % (ref 11.5–17)
GLOBULIN SER-MCNC: 2.9 GM/DL (ref 2.4–3.5)
GLUCOSE SERPL-MCNC: 97 MG/DL (ref 82–115)
HCT VFR BLD AUTO: 36.4 % (ref 37–47)
HGB BLD-MCNC: 11.8 GM/DL (ref 12–16)
LYMPHOCYTES # BLD AUTO: 1.7 X10(3)/MCL (ref 0.6–4.6)
LYMPHOCYTES NFR BLD AUTO: 42 %
MAGNESIUM SERPL-MCNC: 1.8 MG/DL (ref 1.6–2.6)
MCH RBC QN AUTO: 28.7 PG (ref 27–31)
MCHC RBC AUTO-ENTMCNC: 32.4 GM/DL (ref 33–36)
MCV RBC AUTO: 88.6 FL (ref 80–94)
MONOCYTES # BLD AUTO: 0.4 X10(3)/MCL (ref 0.1–1.3)
MONOCYTES NFR BLD AUTO: 9 %
NEUTROPHILS # BLD AUTO: 1.71 X10(3)/MCL (ref 2.1–9.2)
NEUTROPHILS NFR BLD AUTO: 42 %
PHOSPHATE SERPL-MCNC: 3.2 MG/DL (ref 2.3–4.7)
PLATELET # BLD AUTO: 138 X10(3)/MCL (ref 130–400)
PMV BLD AUTO: 10.8 FL (ref 9.4–12.4)
POTASSIUM SERPL-SCNC: 4.2 MMOL/L (ref 3.5–5.1)
PROT SERPL-MCNC: 6.3 GM/DL (ref 5.8–7.6)
RBC # BLD AUTO: 4.11 X10(6)/MCL (ref 4.2–5.4)
SODIUM SERPL-SCNC: 140 MMOL/L (ref 136–145)
TRIGL SERPL-MCNC: 80 MG/DL (ref 37–140)
WBC # SPEC AUTO: 4.1 X10(3)/MCL (ref 4.5–11.5)

## 2022-01-31 ENCOUNTER — HISTORICAL (OUTPATIENT)
Dept: ADMINISTRATIVE | Facility: HOSPITAL | Age: 77
End: 2022-01-31

## 2022-01-31 LAB — PREALB SERPL-MCNC: 18.2 (ref 14–37)

## 2022-02-07 ENCOUNTER — HISTORICAL (OUTPATIENT)
Dept: ADMINISTRATIVE | Facility: HOSPITAL | Age: 77
End: 2022-02-07

## 2022-02-07 LAB
INR PPP: 1.8 (ref 0–1.3)
PROTHROMBIN TIME: 20.7 S (ref 12.5–14.5)

## 2022-02-14 ENCOUNTER — HISTORICAL (OUTPATIENT)
Dept: ADMINISTRATIVE | Facility: HOSPITAL | Age: 77
End: 2022-02-14

## 2022-02-14 LAB
ABS NEUT (OLG): 1.83 (ref 2.1–9.2)
ALBUMIN SERPL-MCNC: 3.4 G/DL (ref 3.4–4.8)
ALBUMIN/GLOB SERPL: 1.1 {RATIO} (ref 1.1–2)
ALP SERPL-CCNC: 83 U/L (ref 40–150)
ALT SERPL-CCNC: 30 U/L (ref 0–55)
AST SERPL-CCNC: 37 U/L (ref 5–34)
BASOPHILS # BLD AUTO: 0 10*3/UL (ref 0–0.2)
BASOPHILS NFR BLD AUTO: 1 %
BILIRUB SERPL-MCNC: 0.6 MG/DL
BILIRUBIN DIRECT+TOT PNL SERPL-MCNC: 0.3 (ref 0–0.5)
BILIRUBIN DIRECT+TOT PNL SERPL-MCNC: 0.3 (ref 0–0.8)
BUN SERPL-MCNC: 12.8 MG/DL (ref 9.8–20.1)
CALCIUM SERPL-MCNC: 9 MG/DL (ref 8.7–10.5)
CHLORIDE SERPL-SCNC: 102 MMOL/L (ref 98–107)
CO2 SERPL-SCNC: 33 MMOL/L (ref 23–31)
CREAT SERPL-MCNC: 0.6 MG/DL (ref 0.55–1.02)
EOSINOPHIL # BLD AUTO: 0.2 10*3/UL (ref 0–0.9)
EOSINOPHIL NFR BLD AUTO: 4 %
ERYTHROCYTE [DISTWIDTH] IN BLOOD BY AUTOMATED COUNT: 14 % (ref 11.5–17)
GLOBULIN SER-MCNC: 3.1 G/DL (ref 2.4–3.5)
GLUCOSE SERPL-MCNC: 92 MG/DL (ref 82–115)
HCT VFR BLD AUTO: 36.2 % (ref 37–47)
HEMOLYSIS INTERF INDEX SERPL-ACNC: 6
HEMOLYSIS INTERF INDEX SERPL-ACNC: 6
HGB BLD-MCNC: 12.1 G/DL (ref 12–16)
ICTERIC INTERF INDEX SERPL-ACNC: 0
LIPEMIC INTERF INDEX SERPL-ACNC: <0
LYMPHOCYTES # BLD AUTO: 1.8 10*3/UL (ref 0.6–4.6)
LYMPHOCYTES NFR BLD AUTO: 42 %
MAGNESIUM SERPL-MCNC: 2.1 MG/DL (ref 1.6–2.6)
MANUAL DIFF? (OHS): NO
MCH RBC QN AUTO: 29.3 PG (ref 27–31)
MCHC RBC AUTO-ENTMCNC: 33.4 G/DL (ref 33–36)
MCV RBC AUTO: 87.7 FL (ref 80–94)
MONOCYTES # BLD AUTO: 0.4 10*3/UL (ref 0.1–1.3)
MONOCYTES NFR BLD AUTO: 10 %
NEUTROPHILS # BLD AUTO: 1.83 10*3/UL (ref 2.1–9.2)
NEUTROPHILS NFR BLD AUTO: 43 %
PHOSPHATE SERPL-MCNC: 3.2 MG/DL (ref 2.3–4.7)
PLATELET # BLD AUTO: 147 10*3/UL (ref 130–400)
PMV BLD AUTO: 11.2 FL (ref 9.4–12.4)
POTASSIUM SERPL-SCNC: 4.3 MMOL/L (ref 3.5–5.1)
PREALB SERPL-MCNC: 17.8 (ref 14–37)
PROT SERPL-MCNC: 6.5 G/DL (ref 5.8–7.6)
RBC # BLD AUTO: 4.13 10*6/UL (ref 4.2–5.4)
SODIUM SERPL-SCNC: 141 MMOL/L (ref 136–145)
TRIGL SERPL-MCNC: 51 MG/DL (ref 37–140)
WBC # SPEC AUTO: 4.3 10*3/UL (ref 4.5–11.5)

## 2022-02-15 ENCOUNTER — HISTORICAL (OUTPATIENT)
Dept: ENDOSCOPY | Facility: HOSPITAL | Age: 77
End: 2022-02-15

## 2022-02-18 ENCOUNTER — HISTORICAL (OUTPATIENT)
Dept: ADMINISTRATIVE | Facility: HOSPITAL | Age: 77
End: 2022-02-18

## 2022-02-18 LAB
INR PPP: 1.5 (ref 0–1.3)
PROTHROMBIN TIME: 17.5 S (ref 12.5–14.5)

## 2022-02-21 ENCOUNTER — HISTORICAL (OUTPATIENT)
Dept: ADMINISTRATIVE | Facility: HOSPITAL | Age: 77
End: 2022-02-21

## 2022-02-21 LAB
INR PPP: 1.7 (ref 0–1.3)
PROTHROMBIN TIME: 19.3 S (ref 12.5–14.5)

## 2022-02-23 ENCOUNTER — HISTORICAL (OUTPATIENT)
Dept: ADMINISTRATIVE | Facility: HOSPITAL | Age: 77
End: 2022-02-23

## 2022-02-23 LAB
INR PPP: 2.2 (ref 0–1.3)
PROTHROMBIN TIME: 24.4 S (ref 12.5–14.5)

## 2022-03-07 ENCOUNTER — HISTORICAL (OUTPATIENT)
Dept: ADMINISTRATIVE | Facility: HOSPITAL | Age: 77
End: 2022-03-07

## 2022-03-07 LAB
ABS NEUT (OLG): 2.42 (ref 2.1–9.2)
ALBUMIN SERPL-MCNC: 3.1 G/DL (ref 3.4–4.8)
ALBUMIN/GLOB SERPL: 0.9 {RATIO} (ref 1.1–2)
ALP SERPL-CCNC: 79 U/L (ref 40–150)
ALT SERPL-CCNC: 33 U/L (ref 0–55)
AST SERPL-CCNC: 32 U/L (ref 5–34)
BASOPHILS # BLD AUTO: 0 10*3/UL (ref 0–0.2)
BASOPHILS NFR BLD AUTO: 1 %
BILIRUB SERPL-MCNC: 0.5 MG/DL
BILIRUBIN DIRECT+TOT PNL SERPL-MCNC: 0.2 (ref 0–0.5)
BILIRUBIN DIRECT+TOT PNL SERPL-MCNC: 0.3 (ref 0–0.8)
BUN SERPL-MCNC: 18.7 MG/DL (ref 9.8–20.1)
CALCIUM SERPL-MCNC: 8.8 MG/DL (ref 8.7–10.5)
CHLORIDE SERPL-SCNC: 108 MMOL/L (ref 98–107)
CO2 SERPL-SCNC: 25 MMOL/L (ref 23–31)
CREAT SERPL-MCNC: 0.64 MG/DL (ref 0.55–1.02)
EOSINOPHIL # BLD AUTO: 0.2 10*3/UL (ref 0–0.9)
EOSINOPHIL NFR BLD AUTO: 5 %
ERYTHROCYTE [DISTWIDTH] IN BLOOD BY AUTOMATED COUNT: 13.9 % (ref 11.5–17)
GLOBULIN SER-MCNC: 3.4 G/DL (ref 2.4–3.5)
GLUCOSE SERPL-MCNC: 144 MG/DL (ref 82–115)
HCT VFR BLD AUTO: 35.1 % (ref 37–47)
HEMOLYSIS INTERF INDEX SERPL-ACNC: 6
HEMOLYSIS INTERF INDEX SERPL-ACNC: 6
HGB BLD-MCNC: 11.5 G/DL (ref 12–16)
ICTERIC INTERF INDEX SERPL-ACNC: 0
INR PPP: 3.1 (ref 0–1.3)
LIPEMIC INTERF INDEX SERPL-ACNC: <0
LYMPHOCYTES # BLD AUTO: 1.8 10*3/UL (ref 0.6–4.6)
LYMPHOCYTES NFR BLD AUTO: 36 %
MAGNESIUM SERPL-MCNC: 2 MG/DL (ref 1.6–2.6)
MANUAL DIFF? (OHS): NO
MCH RBC QN AUTO: 28.8 PG (ref 27–31)
MCHC RBC AUTO-ENTMCNC: 32.8 G/DL (ref 33–36)
MCV RBC AUTO: 88 FL (ref 80–94)
MONOCYTES # BLD AUTO: 0.5 10*3/UL (ref 0.1–1.3)
MONOCYTES NFR BLD AUTO: 9 %
NEUTROPHILS # BLD AUTO: 2.42 10*3/UL (ref 2.1–9.2)
NEUTROPHILS NFR BLD AUTO: 49 %
PHOSPHATE SERPL-MCNC: 3.2 MG/DL (ref 2.3–4.7)
PLATELET # BLD AUTO: 154 10*3/UL (ref 130–400)
PMV BLD AUTO: 10.4 FL (ref 9.4–12.4)
POTASSIUM SERPL-SCNC: 4.1 MMOL/L (ref 3.5–5.1)
PREALB SERPL-MCNC: 18.5 (ref 14–37)
PROT SERPL-MCNC: 6.5 G/DL (ref 5.8–7.6)
PROTHROMBIN TIME: 31.6 S (ref 12.5–14.5)
RBC # BLD AUTO: 3.99 10*6/UL (ref 4.2–5.4)
SODIUM SERPL-SCNC: 139 MMOL/L (ref 136–145)
TRIGL SERPL-MCNC: 79 MG/DL (ref 37–140)
WBC # SPEC AUTO: 5 10*3/UL (ref 4.5–11.5)

## 2022-03-26 ENCOUNTER — HISTORICAL (OUTPATIENT)
Dept: ADMINISTRATIVE | Facility: HOSPITAL | Age: 77
End: 2022-03-26

## 2022-03-28 ENCOUNTER — HISTORICAL (OUTPATIENT)
Dept: ADMINISTRATIVE | Facility: HOSPITAL | Age: 77
End: 2022-03-28

## 2022-03-28 LAB
ABS NEUT (OLG): 1.16 (ref 2.1–9.2)
ALBUMIN SERPL-MCNC: 3 G/DL (ref 3.4–4.8)
ALBUMIN/GLOB SERPL: 0.9 {RATIO} (ref 1.1–2)
ALP SERPL-CCNC: 84 U/L (ref 40–150)
ALT SERPL-CCNC: 37 U/L (ref 0–55)
AST SERPL-CCNC: 48 U/L (ref 5–34)
BILIRUB SERPL-MCNC: 0.7 MG/DL
BILIRUBIN DIRECT+TOT PNL SERPL-MCNC: 0.3 (ref 0–0.5)
BILIRUBIN DIRECT+TOT PNL SERPL-MCNC: 0.4 (ref 0–0.8)
BUN SERPL-MCNC: 8.5 MG/DL (ref 9.8–20.1)
CALCIUM SERPL-MCNC: 8 MG/DL (ref 8.7–10.5)
CHLORIDE SERPL-SCNC: 111 MMOL/L (ref 98–107)
CO2 SERPL-SCNC: 25 MMOL/L (ref 23–31)
CREAT SERPL-MCNC: 0.66 MG/DL (ref 0.55–1.02)
ERYTHROCYTE [DISTWIDTH] IN BLOOD BY AUTOMATED COUNT: 14.6 % (ref 11.5–17)
GLOBULIN SER-MCNC: 3.2 G/DL (ref 2.4–3.5)
GLUCOSE SERPL-MCNC: 86 MG/DL (ref 82–115)
HCT VFR BLD AUTO: 36.1 % (ref 37–47)
HEMOLYSIS INTERF INDEX SERPL-ACNC: 56
HEMOLYSIS INTERF INDEX SERPL-ACNC: 56
HGB BLD-MCNC: 12 G/DL (ref 12–16)
ICTERIC INTERF INDEX SERPL-ACNC: 1
LIPEMIC INTERF INDEX SERPL-ACNC: 2
MAGNESIUM SERPL-MCNC: 1.9 MG/DL (ref 1.6–2.6)
MANUAL DIFF? (OHS): YES
MCH RBC QN AUTO: 28.9 PG (ref 27–31)
MCHC RBC AUTO-ENTMCNC: 33.2 G/DL (ref 33–36)
MCV RBC AUTO: 87 FL (ref 80–94)
MO BLASTS SUSPECT FLAG (OHS): PRESENT
PHOSPHATE SERPL-MCNC: 2.3 MG/DL (ref 2.3–4.7)
PLATELET # BLD AUTO: 92 10*3/UL (ref 130–400)
PLATELET CLUMPS SUSPECT FLAG (OHS): PRESENT
PLATELETS.RETICULATED NFR BLD AUTO: 1.9 % (ref 0.9–11.2)
PMV BLD AUTO: 10.6 FL (ref 7.4–10.4)
POS ERR1 (OHS): NORMAL
POS ERR2 (OHS): NORMAL
POTASSIUM SERPL-SCNC: 4.1 MMOL/L (ref 3.5–5.1)
PREALB SERPL-MCNC: 11.9 (ref 14–37)
PROT SERPL-MCNC: 6.2 G/DL (ref 5.8–7.6)
RBC # BLD AUTO: 4.15 10*6/UL (ref 4.2–5.4)
SODIUM SERPL-SCNC: 144 MMOL/L (ref 136–145)
TRIGL SERPL-MCNC: 78 MG/DL (ref 37–140)
WBC # SPEC AUTO: 2.6 10*3/UL (ref 4.5–11.5)

## 2022-04-01 ENCOUNTER — HISTORICAL (OUTPATIENT)
Dept: ADMINISTRATIVE | Facility: HOSPITAL | Age: 77
End: 2022-04-01

## 2022-04-01 LAB
INR PPP: 4.4 (ref 0–1.3)
PROTHROMBIN TIME: 41.7 S (ref 12.5–14.5)

## 2022-04-04 ENCOUNTER — HISTORICAL (OUTPATIENT)
Dept: ADMINISTRATIVE | Facility: HOSPITAL | Age: 77
End: 2022-04-04

## 2022-04-04 LAB
INR PPP: 2 (ref 0–1.3)
PROTHROMBIN TIME: 22 S (ref 12.5–14.5)

## 2022-04-06 ENCOUNTER — HISTORICAL (OUTPATIENT)
Dept: ADMINISTRATIVE | Facility: HOSPITAL | Age: 77
End: 2022-04-06

## 2022-04-07 ENCOUNTER — HISTORICAL (OUTPATIENT)
Dept: ADMINISTRATIVE | Facility: HOSPITAL | Age: 77
End: 2022-04-07
Payer: MEDICARE

## 2022-04-18 ENCOUNTER — HISTORICAL (OUTPATIENT)
Dept: ADMINISTRATIVE | Facility: HOSPITAL | Age: 77
End: 2022-04-18
Payer: MEDICARE

## 2022-04-18 LAB
ABS NEUT (OLG): 1.31 (ref 2.1–9.2)
ALBUMIN SERPL-MCNC: 3.3 G/DL (ref 3.4–4.8)
ALBUMIN/GLOB SERPL: 1 {RATIO} (ref 1.1–2)
ALP SERPL-CCNC: 129 U/L (ref 40–150)
ALT SERPL-CCNC: 62 U/L (ref 0–55)
AST SERPL-CCNC: 56 U/L (ref 5–34)
BASOPHILS # BLD AUTO: 0 10*3/UL (ref 0–0.2)
BASOPHILS NFR BLD AUTO: 1 %
BILIRUB SERPL-MCNC: 0.6 MG/DL
BILIRUBIN DIRECT+TOT PNL SERPL-MCNC: 0.3 (ref 0–0.5)
BILIRUBIN DIRECT+TOT PNL SERPL-MCNC: 0.3 (ref 0–0.8)
BUN SERPL-MCNC: 9.4 MG/DL (ref 9.8–20.1)
CALCIUM SERPL-MCNC: 8.8 MG/DL (ref 8.7–10.5)
CHLORIDE SERPL-SCNC: 109 MMOL/L (ref 98–107)
CO2 SERPL-SCNC: 24 MMOL/L (ref 23–31)
CREAT SERPL-MCNC: 0.65 MG/DL (ref 0.55–1.02)
CRP SERPL-MCNC: 0.38 MG/L
EOSINOPHIL # BLD AUTO: 0.2 10*3/UL (ref 0–0.9)
EOSINOPHIL NFR BLD AUTO: 5 %
ERYTHROCYTE [DISTWIDTH] IN BLOOD BY AUTOMATED COUNT: 14.5 % (ref 11.5–17)
ERYTHROCYTE [SEDIMENTATION RATE] IN BLOOD: 13 MM/H (ref 0–20)
GLOBULIN SER-MCNC: 3.2 G/DL (ref 2.4–3.5)
GLUCOSE SERPL-MCNC: 89 MG/DL (ref 82–115)
HCT VFR BLD AUTO: 37.1 % (ref 37–47)
HEMOLYSIS INTERF INDEX SERPL-ACNC: 3
HEMOLYSIS INTERF INDEX SERPL-ACNC: 3
HGB BLD-MCNC: 12.2 G/DL (ref 12–16)
ICTERIC INTERF INDEX SERPL-ACNC: 1
INR PPP: 2 (ref 0–1.3)
LIPEMIC INTERF INDEX SERPL-ACNC: <0
LYMPHOCYTES # BLD AUTO: 2 10*3/UL (ref 0.6–4.6)
LYMPHOCYTES NFR BLD AUTO: 51 %
MAGNESIUM SERPL-MCNC: 1.9 MG/DL (ref 1.6–2.6)
MANUAL DIFF? (OHS): NO
MCH RBC QN AUTO: 29.1 PG (ref 27–31)
MCHC RBC AUTO-ENTMCNC: 32.9 G/DL (ref 33–36)
MCV RBC AUTO: 88.5 FL (ref 80–94)
MONOCYTES # BLD AUTO: 0.4 10*3/UL (ref 0.1–1.3)
MONOCYTES NFR BLD AUTO: 9 %
NEUTROPHILS # BLD AUTO: 1.31 10*3/UL (ref 2.1–9.2)
NEUTROPHILS NFR BLD AUTO: 34 %
PHOSPHATE SERPL-MCNC: 3.3 MG/DL (ref 2.3–4.7)
PLATELET # BLD AUTO: 159 10*3/UL (ref 130–400)
PMV BLD AUTO: 10.9 FL (ref 9.4–12.4)
POS ERR2 (OHS): NORMAL
POTASSIUM SERPL-SCNC: 4.1 MMOL/L (ref 3.5–5.1)
PREALB SERPL-MCNC: 19.7 (ref 14–37)
PROT SERPL-MCNC: 6.5 G/DL (ref 5.8–7.6)
PROTHROMBIN TIME: 22.1 S (ref 12.5–14.5)
RBC # BLD AUTO: 4.19 10*6/UL (ref 4.2–5.4)
SODIUM SERPL-SCNC: 144 MMOL/L (ref 136–145)
TRIGL SERPL-MCNC: 119 MG/DL (ref 37–140)
VANCOMYCIN TROUGH SERPL-MCNC: 21.2 UG/ML (ref 15–20)
WBC # SPEC AUTO: 3.9 10*3/UL (ref 4.5–11.5)

## 2022-04-23 VITALS
SYSTOLIC BLOOD PRESSURE: 120 MMHG | HEIGHT: 64 IN | BODY MASS INDEX: 27.63 KG/M2 | WEIGHT: 161.81 LBS | DIASTOLIC BLOOD PRESSURE: 60 MMHG

## 2022-04-30 NOTE — H&P
Patient:   Laura Acosta            MRN: [id number]            FIN: [id number]               Age:   73 years     Sex:  Female     :  1945   Associated Diagnoses:   Acute hypokalemia; Acute UTI; Diabetes; Encephalopathy; General weakness; Hyperglycemia; Hypoalbuminemia; Hypomagnesemia; Short bowel syndrome; Weakness or fatigue   Author:   Chacorta Sevilla MD      Basic Information   Admit information:  Weakness dehydration hypokalemia short bowel syndrome debility .    Source of history:  Self, Family member.    Present at bedside:  Family member.    Referral source:  Self.    History limitation:  Clinical condition.       Chief Complaint   12/10/2018 13:53 CST     progressively worsening weakness, PEG suppose to be placed for TNP tomororw. altered mental status. denies NV, +diarrhea.        History of Present Illness             The patient presents with A 73 year old female presented with c/o weakness and debility porgressively getting worse has a h/o short bowel syndrome was suppose to get a port placed in am for tpn and was sent for labs inview of her dehydration and hypokalemia with encephalopathy uti admitted to the [facility].        Review of Systems   Has per HPI otherwise all systems reviewed and negative.   Constitutional:  Weakness, Fatigue, Decreased activity.    Eye:  Negative.    Ear/Nose/Mouth/Throat:  Negative.    Respiratory:  Negative.    Cardiovascular:  Negative.    Gastrointestinal:  poor appetite.    Genitourinary:  Negative.    Hematology/Lymphatics:  Negative.    Endocrine:  Negative.    Immunologic:  Malaise.    Musculoskeletal:  Negative.    Integumentary:  Negative.    Neurologic:  Abnormal balance, Confusion.       Health Status   Allergies:    Allergic Reactions (All)  Severity Not Documented  Dilaudid- Itching.  Opium- Itching.,    Allergies (2) Active Reaction  dilaudid itching  opium itching   Current medications:  (Selected)   Inpatient Medications  Ordered  Ceftriaxone 1  gm/D5W 50 mL (Premix): 1 gm, form: Infusion, IV Piggyback, q24hr, Infuse over: 0.5 hr, first dose 12/10/18 19:41:00 CST, STAT  Celexa 20 mg oral tablet: 20 mg, form: Tab, Oral, Daily, first dose 12/11/18 9:00:00 CST  K-Dur 20 oral tablet, extended release: 20 mEq, form: Tab-ER, Oral, BID, first dose 12/10/18 21:00:00 CST  Normal Saline (0.9% NS) IV 1,000 mL: 1,000 mL, 1,000 mL, IV, 999 mL/hr, start date 12/10/18 13:57:00 CST  traZODONE 50 mg oral tablet ( Desyrel ): 50 mg, form: Tab, Oral, Once a day (at bedtime), first dose 12/10/18 21:00:00 CST  Pending Complete  Toradol: 30 mg, form: Injection, IV Push, [id number], Order duration: 4 dose(s), first dose 04/26/16 15:00:00 CDT, stop date 04/27/16 11:59:00 CDT, first dose in recovery room, 126,031  midazolam: 1 mg, form: Injection, IV Push, q5min, Order duration: 2 dose(s), first dose 12/27/16 10:30:00 CST, stop date 12/27/16 10:39:00 CST, 30,025  Prescriptions  Prescribed  Vitamin D 50,000 intl units oral capsule: 50,000 IntUnit = 1 cap(s), Oral, 2x/Wk, # 24 cap(s), 6 Refill(s), Pharmacy: [facility] [place]  Documented Medications  Documented  Celexa 20 mg oral tablet: 20 mg = 1 tab(s), Oral, Daily, # 30 tab(s), 0 Refill(s)  Crestor 10 mg oral tablet: 10 mg = 1 tab(s), Oral, Daily, # 30 tab(s), 0 Refill(s)  HYDROXYZINE NICOLE 25 MG CAP: 25 mg = 1 cap(s)  LISINOPRIL 10 MG TABLET: 10 mg = 1 tab(s), Oral, Daily  TRAZODONE 100 MG TABLET:   TRAZODONE 50 MG TABLET: 50 mg = 1 tab(s), Oral, Once a day (at bedtime)  Vitamin D 1,000 Units Tab: 4 TABS AM, Oral, Daily, # 30 tab(s), 0 Refill(s)  metoprolol tartrate 100 mg oral tab: 100 mg = 1 tab(s), Oral, BID, # 60 tab(s), 10 Refill(s)  tincture of opium 10mg/ml: = 0.6 mL, As Directed, 0 Refill(s)  warfarin 1 mg oral tablet: See Instructions, 1 tab(s) Oral As Directed  QOD, 0 Refill(s),    Medications (5) Active  Scheduled: (4)  cefTRIAXone  1 gm 50 mL, IV Piggyback, q24hr  citalopram 20 mg Tab UD  20 mg 1 tab(s),  Oral, Daily  potassium chloride 20 mEq  ER Tab UD  20 mEq 1 tab(s), Oral, BID  trazodone 50 mg Tab  50 mg 1 tab(s), Oral, Once a day (at bedtime)  Continuous: (1)  sodium chloride 0.9% 1,000 mL  1,000 mL, IV, 999 mL/hr  PRN: (0)   Problem list:    All Problems  Acute URI / SNOMED CT 73225027 / Confirmed  Anticoagulants causing adverse effect in therapeutic use / SNOMED CT 56414PJA-[id number] / Confirmed  Anxiety / SNOMED CT 99167485 / Confirmed  Arthritis / SNOMED CT 7263785 / Confirmed  Weakness / SNOMED CT 34924686 / Confirmed  Breast cancer / SNOMED CT 399908347 / Confirmed  25 yrs ago  Depression / SNOMED CT 28968293 / Confirmed  DVT - Deep vein thrombosis of lower limb / SNOMED CT 7479514380 / Confirmed  4 yrs ago-pt on coumadin  HTN (hypertension) / SNOMED CT [id number] / Confirmed  Problem automatically updated based on documentation on Capillary Refills, Brachial Pulses, Radial Pulses, Femoral Pulses, Dorsalis Pulses, Ulnar pulses, Popliteal Pulses, Postibial Pulses, Edemas, Affect/Behavior, Orientation Assessment, Arterial Line Site.  Hyperlipidemia / SNOMED CT [id number] / Confirmed  Laryngitis / SNOMED CT 26246801 / Confirmed  Malnutrition / SNOMED CT 474250770 / Confirmed  Needs flu shot / SNOMED CT 354952318 / Confirmed  Palpitations / SNOMED CT 891814641 / Confirmed  Post-resection short bowel syndrome / SNOMED CT 2364470264 / Confirmed  had resection 4 yrs ago  Tremor / SNOMED CT 75498410 / Confirmed  Weakness / SNOMED CT 51639188 / Confirmed,    Active Problems (17)  Acute URI   Anticoagulants causing adverse effect in therapeutic use   Anxiety   Arthritis   Breast cancer   Depression   DVT - Deep vein thrombosis of lower limb   HTN (hypertension)   Hyperlipidemia   Laryngitis   Malnutrition   Needs flu shot   Palpitations   Post-resection short bowel syndrome   Tremor   Weakness   Weakness       Histories   Past Medical History:     Active  Hyperlipidemia ([id number])  Resolved  Cataract ([id number]):  Resolved on 8/3/2015 at 69 years.  Comments:  5/2/2015 CDT 13:06 CDT - SYSTEM  Problem automatically updated based on documentation on Capillary Refills, Brachial Pulses, Radial Pulses, Femoral Pulses, Dorsalis Pulses, Ulnar pulses, Popliteal Pulses, Postibial Pulses, Edemas, Affect/Behavior, Orientation Assessment, Arterial Line Site.  Palpitations (145737548):  Resolved on 8/3/2015 at 69 years.  Comments:  5/2/2015 CDT 13:06 CDT - SYSTEM  Problem automatically updated based on documentation on Capillary Refills, Brachial Pulses, Radial Pulses, Femoral Pulses, Dorsalis Pulses, Ulnar pulses, Popliteal Pulses, Postibial Pulses, Edemas, Affect/Behavior, Orientation Assessment, Arterial Line Site.  Arthritis (4385061):  Resolved on 8/3/2015 at 69 years.  Comments:  5/2/2015 CDT 13:06 CDT - SYSTEM  Problem automatically updated based on documentation on Capillary Refills, Brachial Pulses, Radial Pulses, Femoral Pulses, Dorsalis Pulses, Ulnar pulses, Popliteal Pulses, Postibial Pulses, Edemas, Affect/Behavior, Orientation Assessment, Arterial Line Site.  Back injury ([id number]):  Resolved on 8/3/2015 at 69 years.  Comments:  5/2/2015 CDT 13:06 CDT - SYSTEM  Problem automatically updated based on documentation on Capillary Refills, Brachial Pulses, Radial Pulses, Femoral Pulses, Dorsalis Pulses, Ulnar pulses, Popliteal Pulses, Postibial Pulses, Edemas, Affect/Behavior, Orientation Assessment, Arterial Line Site.  Back pain ([id number]):  Resolved on 8/3/2015 at 69 years.  Comments:  5/2/2015 CDT 13:06 CDT - SYSTEM  Problem automatically updated based on documentation on Capillary Refills, Brachial Pulses, Radial Pulses, Femoral Pulses, Dorsalis Pulses, Ulnar pulses, Popliteal Pulses, Postibial Pulses, Edemas, Affect/Behavior, Orientation Assessment, Arterial Line Site.  Meningitis ([id number]):   Resolved on 8/3/2015 at 69 years.  Comments:  5/2/2015 CDT 13:06 CDT - SYSTEM  Problem automatically updated based on documentation on Capillary Refills, Brachial Pulses, Radial Pulses, Femoral Pulses, Dorsalis Pulses, Ulnar pulses, Popliteal Pulses, Postibial Pulses, Edemas, Affect/Behavior, Orientation Assessment, Arterial Line Site.  Anxiety and depression ([id number]):  Resolved on 8/3/2015 at 69 years.  Comments:  5/2/2015 CDT 13:06 CDT - SYSTEM  Problem automatically updated based on documentation on Capillary Refills, Brachial Pulses, Radial Pulses, Femoral Pulses, Dorsalis Pulses, Ulnar pulses, Popliteal Pulses, Postibial Pulses, Edemas, Affect/Behavior, Orientation Assessment, Arterial Line Site.  Breast cancer (421064339):  Resolved on 8/3/2015 at 69 years.  Comments:  8/13/2013 CDT 12:29 CDT - Jeansonne RN, Jamie L.  right    5/2/2015 CDT 13:06 CDT - SYSTEM  Problem automatically updated based on documentation on Capillary Refills, Brachial Pulses, Radial Pulses, Femoral Pulses, Dorsalis Pulses, Ulnar pulses, Popliteal Pulses, Postibial Pulses, Edemas, Affect/Behavior, Orientation Assessment, Arterial Line Site.  Staph infection ([id number]):  Resolved on 8/3/2015 at 69 years.  Comments:  8/13/2013 CDT 12:34 CDT - Jeansonne RN, Jamie L.  under right breast    5/2/2015 CDT 13:06 CDT - SYSTEM  Problem automatically updated based on documentation on Capillary Refills, Brachial Pulses, Radial Pulses, Femoral Pulses, Dorsalis Pulses, Ulnar pulses, Popliteal Pulses, Postibial Pulses, Edemas, Affect/Behavior, Orientation Assessment, Arterial Line Site.    11/1/2016 CDT 13:54 CDT - Eliud VALENZUELA, Jocelyn Martinez  no symptoms in last 2 years  Gallstone pancreatitis (147493354):  Resolved.  Glasses (0648404275):  Resolved.  Hypertension ([id number]):  Resolved.  Irregular heart rhythm ([id number]):  Resolved.  PVD  (peripheral vascular disease) ([id number]):  Resolved.  Able to lie down (360451139):  Resolved.  Activity tolerance (2075171499):  Resolved.  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  Hypercholesterolemia (25946934):  Resolved.  Insomnia (531715140):  Resolved.  Osteoarthritis (2245728473):  Resolved.  Cholelithiasis (745913189):  Resolved.  DVT - Deep vein thrombosis (1944756032):  Resolved.  Diabetes mellitus (025825642):  Resolved.   Family History:    Primary malignant neoplasm of breast  Mother ()  Sister  Osteoarthritis  Mother ()  Sister  Brain aneurysm....  Father ()  Uterine cancer....  Mother ()  Kidney cancer, primary, with metastasis from kidney to other site....  Sister  Acute myocardial infarction.  Father ()  Hypertension.  Father ()  Sister  Brother  Depression.  Mother ()  Hyperlipidemia.  Father ()  Sister  Brother     Procedure history:    46697 - LT CATARACT W/IOL 1 STA PHACO (Left) on 2016 at 71 Years.  Comments:  2016 11:46 - Margaret Leyva  auto-populated from documented surgical case  67749 - RT CATARACT W/IOL 1 STA PHACO (Right) on 2016 at 70 Years.  Comments:  2016 09:37 - Margaret Duran RN  auto-populated from documented surgical case  Hernia Repair Ventral (None) on 2016 at 70 Years.  Comments:  2016 14:17 - Lejeune RN, Doralis Marie  auto-populated from documented surgical case  Exploration Laparotomy (.) on 2015 at 69 Years.  Comments:  2015 10:22 - Alma Aguilar  auto-populated from documented surgical case  Cholecystectomy Laparoscopic (.) on 2015 at 69 Years.  Comments:  2015 14:59 - Brigitte Bruno RN  auto-populated from documented surgical case  Endoscopic Retrograde Cholangiopancreatography on 2015 at 69 Years.  Comments:  2015 11:57 - Gabriela Forde RN  auto-populated from documented surgical  case  Sphincterotomy on 4/25/2015 at 69 Years.  Comments:  4/25/2015 11:57 - Gabriela Forde RN  auto-populated from documented surgical case  Stone Manipulation on 4/25/2015 at 69 Years.  Comments:  4/25/2015 11:57 - Gabriela Forde RN  auto-populated from documented surgical case  Appendectomy ([id number]).  Hysterectomy (341169418).  Colonoscopy (891661217).  EGD.  lump removed from right breast.  right shoulder surgery.  cyst drained.  bowel resection.  Comments:  10/26/2016 11:41 - Ligia Keith RN  2015  peripheral vascular disease surgery.  Comments:  10/26/2016 11:42 - Ligia Keith RN  7/2016-left leg   Social History        Social & Psychosocial Habits    Alcohol  12/25/2012 Risk Assessment: Denies Alcohol Use      Comment: pt gila - 04/22/2016 11:32 Angela Coughlin RN    Employment/School  12/23/2013  Status: Retired    Home/Environment  12/25/2012  Lives with: Alone    Living situation: Home/Independent    Alcohol abuse in household: No    Substance abuse in household: No    Smoker in household: No    Injuries/Abuse/Neglect in household: No    Feels unsafe at home: No    Safe place to go: Yes    Agency(s)/Others notified: No    Family/Friends available to help: Yes    Concern for family members at home: No    Major illness in household: No    Financial concerns: No    Substance Abuse  12/25/2012 Risk Assessment: Denies Substance Abuse    10/26/2016  Use: Never    Tobacco  12/25/2012 Risk Assessment: Denies Tobacco Use      Comment: pt denmyesha - 04/22/2016 11:32 - Angela Slater RN.        Physical Examination   Vital Signs   12/10/2018 19:55 CST     Peripheral Pulse Rate     58 bpm  LOW                             Heart Rate Monitored      59 bpm  LOW                             Respiratory Rate          17 br/min                             SpO2                      99 %                             Oxygen Therapy            Room air                             Systolic Blood Pressure    108 mmHg                             Diastolic Blood Pressure  49 mmHg  LOW                             Mean Arterial Pressure, Cuff              69 mmHg    12/10/2018 18:59 CST     24 HR Intake Totals       200 mL                             24 HR Output Totals       0 mL                             24 HR I&O Balance         200 mL    12/10/2018 18:00 CST     Peripheral Pulse Rate     61 bpm                             Heart Rate Monitored      62 bpm                             Respiratory Rate          18 br/min                             SpO2                      98 %                             Oxygen Therapy            Room air                             Systolic Blood Pressure   130 mmHg                             Diastolic Blood Pressure  56 mmHg  LOW                             Mean Arterial Pressure, Cuff              81 mmHg    12/10/2018 17:30 CST     Peripheral Pulse Rate     64 bpm                             Heart Rate Monitored      63 bpm                             Respiratory Rate          15 br/min                             SpO2                      98 %                             Oxygen Therapy            Room air                             Systolic Blood Pressure   118 mmHg                             Diastolic Blood Pressure  59 mmHg  LOW                             Mean Arterial Pressure, Cuff              79 mmHg    12/10/2018 15:30 CST     Peripheral Pulse Rate     56 bpm  LOW                             Heart Rate Monitored      56 bpm  LOW                             Respiratory Rate          19 br/min                             SpO2                      98 %                             Oxygen Therapy            Room air                             Systolic Blood Pressure   118 mmHg                             Diastolic Blood Pressure  54 mmHg  LOW                             Mean Arterial Pressure, Cuff              75 mmHg    12/10/2018 15:00 CST     Peripheral Pulse Rate     53  bpm  LOW                             Heart Rate Monitored      53 bpm  LOW                             Respiratory Rate          51 br/min  HI                             SpO2                      95 %                             Oxygen Therapy            Room air    12/10/2018 13:53 CST     Temperature Oral          36.7 DegC                             Temperature Oral (calculated)             98.06 DegF                             Peripheral Pulse Rate     53 bpm  LOW                             Respiratory Rate          16 br/min                             SpO2                      99 %                             Oxygen Therapy            Room air                             Systolic Blood Pressure   108 mmHg                             Diastolic Blood Pressure  51 mmHg  LOW        Vital Signs (last 24 hrs)_____  Last Charted___________  Temp Oral     36.7 DegC  (DEC 10 13:53)  Heart Rate Peripheral   L 58bpm  (DEC 10 19:55)  Resp Rate         17 br/min  (DEC 10 19:55)  SBP      108 mmHg  (DEC 10 19:55)  DBP      L 49mmHg  (DEC 10 19:55)  SpO2      99 %  (DEC 10 19:55)   Intake and Output   Fluid Balance Primitives   12/27/2016 7:00 CST      Oral Intake               240 mL    11/1/2016 7:00 CDT       Oral Intake               240 mL        General:  No acute distress, Not alert and oriented.    Eye:  Pupils are equal, round and reactive to light, Normal conjunctiva.    HENT:  Normocephalic, Normal hearing, Oral mucosa is moist.    Neck:  Supple, Non-tender, No carotid bruit.    Respiratory:  Lungs are clear to auscultation, Respirations are non-labored, Breath sounds are equal.    Cardiovascular:  Normal rate, Regular rhythm, No murmur.    Gastrointestinal:  Soft, Non-tender, Non-distended, Normal bowel sounds.    Genitourinary:  No costovertebral angle tenderness.    Lymphatics:  No lymphadenopathy neck, axilla, groin.    Musculoskeletal:  Normal range of motion, Normal strength, No tenderness, No swelling.          Mobility/ gait: Unable to walk.    Integumentary:  Warm, Dry, Intact.    Neurologic:  Alert, Normal sensory, confused weak , Not oriented.    Cognition and Speech:       Orientation: confused .    Psychiatric:  Cooperative, Appropriate mood & affect, Normal judgment.         Thought process: Confused.       Review / Management   Laboratory Results   Today's Lab Results : PowerNote Discrete Results   12/10/2018 17:43 CST     UA Appear                 CLEAR                             UA Color                  DK YELLOW                             UA Spec Grav              1.018                             UA Bili                   Negative                             UA pH                     6.0                             UA Urobilinogen           1.0                             UA Blood                  Negative                             UA Glucose                Negative                             UA Ketones                1+                             UA Protein                Negative                             UA Nitrite                Negative                             UA Leuk Est               Trace                             UA WBC                    NONE SEEN                             UA RBC                    NONE SEEN                             UA Bacteria               4+ /HPF                             UA Squam Epithelial       NONE SEEN    12/10/2018 16:02 CST     Sodium Lvl                146 mmol/L  HI                             Potassium Lvl             2.8 mmol/L  LOW                             Chloride                  110 mmol/L  HI                             CO2                       26.0 mmol/L                             Calcium Lvl               8.4 mg/dL  LOW                             Magnesium Lvl             1.6 mg/dL  LOW                             Glucose Lvl               92 mg/dL                             BUN                       12.0 mg/dL                              Creatinine                0.73 mg/dL                             eGFR-AA                   >60 mL/min/1.73 m2  NA                             eGFR-DELVIS                  >60 mL/min/1.73 m2  NA                             Bili Total                1.3 mg/dL  HI                             Bili Direct               0.40 mg/dL  HI                             Bili Indirect             0.90 mg/dL  HI                             AST                       45 unit/L  HI                             ALT                       32 unit/L                             Alk Phos                  85 unit/L                             Total Protein             5.8 gm/dL  LOW                             Albumin Lvl               2.70 gm/dL  LOW                             Globulin                  3.10 gm/dL                             A/G Ratio                 0.9  NA                             Troponin-I                <0.02 ng/mL    12/10/2018 12:35 CST     WBC                       5.4 x10(3)/mcL                             RBC                       4.15 x10(6)/mcL  LOW                             Hgb                       12.5 gm/dL                             Hct                       39.2 %                             Platelet                  90 x10(3)/mcL  LOW                             MCV                       94.5 fL  HI                             MCH                       30.1 pg                             MCHC                      31.9 gm/dL  LOW                             RDW                       15.8 %                             MPV                       12.5 fL  HI                             Abs Neut                  2.30 x10(3)/mcL                             Neutro Auto               42 %  NA                             Lymph Auto                46 %  NA                             Mono Auto                 9 %  NA                             Eos Auto                  2 %  NA                              Abs Eos                   0.1 x10(3)/mcL                             Basophil Auto             1 %  NA                             Abs Neutro                2.30 x10(3)/mcL                             Abs Lymph                 2.5 x10(3)/mcL                             Abs Mono                  0.5 x10(3)/mcL                             Abs Baso                  0.0 x10(3)/mcL                             PT                        25.4 second(s)  HI                             INR                       2.3  HI                             Sodium Lvl                145 mmol/L                             Potassium Lvl             3.2 mmol/L  LOW                             Chloride                  109 mmol/L  HI                             CO2                       27.0 mmol/L                             Calcium Lvl               8.7 mg/dL                             Magnesium Lvl             1.6 mg/dL  LOW                             Glucose Lvl               95 mg/dL                             BUN                       12.0 mg/dL                             Creatinine                0.77 mg/dL                             eGFR-AA                   >60 mL/min/1.73 m2  NA                             eGFR-DELVIS                  >60 mL/min/1.73 m2  NA                             Bili Total                1.2 mg/dL  HI                             Bili Direct               0.30 mg/dL  HI                             Bili Indirect             0.90 mg/dL  HI                             AST                       49 unit/L  HI                             ALT                       34 unit/L                             Alk Phos                  94 unit/L                             Total Protein             6.2 gm/dL  LOW                             Albumin Lvl               3.00 gm/dL  LOW                             Globulin                  3.20 gm/dL                             A/G Ratio                 0.9  NA         Radiology results   Rad Results (ST)   Accession: [id number]  Order: CT Head W/O Contrast  Report Dt/Tm: 12/10/2018 17:33  Report:   CT HEAD WITHOUT CONTRAST:     HISTORY: CVA;Other (please specify)  weakness     PATIENT RADIATION DOSE: TOTAL DLP(mGycm)  852      As per PQRS measures, all CT scans at this facility used dose  modulation, iterative reconstruction, and/or weight based dose  adjustment when appropriate to reduce radiation dose to as low as  reasonably achievable.     COMPARISON: December 25, 2012          FINDINGS: There are new surgical changes in the calvarium at the left  vertex. There is partial opacification of the right mastoid air cells  similar to prior exam. Mild sphenoid sinusitis appears new. No acute  intracranial hemorrhage or mass effect. No change in ventricular  caliber, no hydrocephalus. Vascular calcifications noted.     IMPRESSION: No acute intracranial finding      Accession: [id number]  Order: XR Chest 2 Views  Report Dt/Tm: 12/10/2018 14:51  Report:   Indication: Chest pain, weakness     Findings: Compared to 4/22/2016. Heart size is normal and lungs are  clear bilaterally. Pulmonary vasculature is normal. Postsurgical  changes following right shoulder arthroplasty are again noted.     IMPRESSION: No evidence of acute disease.       Condition:  Guarded.       Impression and Plan   Diagnosis     Acute hypokalemia (EIU58-KU E87.6).     Acute UTI (FCS06-QB N39.0).     Diabetes (ELT35-UP E11.9).     Encephalopathy (QTN26-VU G93.40).     General weakness (UDO20-DV R53.1).     Hyperglycemia (GMY42-UP R73.9).     Hypoalbuminemia (MLJ93-LW E88.09).     Hypomagnesemia (DNS89-QF E83.42).     Short bowel syndrome (EBK28-XB K91.2).     Weakness or fatigue (PNED [id number]).     Course:  Progressing as expected.    Orders     Encephalopathy/ confusion  - admit to the [facility]sp  - iv fluids  - sec to dehydration and uti    Dehydration/ RF  - IV fluids  - repeat labs in  am    UTI  - IV rocephin   - ucx    Short bowel syndrome  - tpn in am with port placement  - h/o bowel resection    Hypokalemia  - supp K   - recheck in am .     Education and Follow-up:          Counseled: Patient, Family, Regarding diagnosis, Regarding treatment, Regarding medications.         Discharge Planning: Plan to discharge ( To home ), Deconditioning.

## 2022-04-30 NOTE — OP NOTE
Patient:   Laura Acosta            MRN: [id number]            FIN: [id number]               Age:   75 years     Sex:  Female     :  1945   Associated Diagnoses:   None   Author:   Jared Felix MD      Pre-operative Diagnosis: Left renal stone    Post-operative Diagnosis: Same    Procedures Perfromed: Left Extracorporeal shockwave lithotripsy  cysto left ureteral stent removal    Surgeon: Dr. Felix    Anesthesia: Genereal    Indications: Pt  who has a Left  renal stone and has failed spontaneous   passage and has elected to have ESWL. This is a possible staged procedure and may require  ureteroscopy and/or stent placement.    Findings: There was a 15 mm calculus present in the lower pole of the  Left kidney.  Procedure description: After induction of general anesthesia, the patient was placed on the  Dornier lithotripsy table in a supine position. The stones were localized by means of biplaner  fluoroscopy. SWL was then administered to the calculi at the F-2 focal point.    The patients stone was treated with a total of 2500 shocks with the total energy ranging between  2-7. The stone demonstrated good fragmentation at the end of the procedure.   After this her genital were prepped adn drapped in standard fashion. Then a flex cystoscope wasused to grasp and remove the left ureteral stent    The patient  tolerated the procedure well, there were no complications, and pt was sent to the recovery  room in stable condition.    Estimate Blood Loss: None    Drains: None    Total IV Fluids: 500ml    Specimens: None    Complications: None    Condition: Stable    Disposition: Discharge to home. Follow up in 2 weeks with a pre-clinic [facility].

## 2022-04-30 NOTE — DISCHARGE SUMMARY
Patient:   Laura Acosta            MRN: [id number]            FIN: [id number]               Age:   75 years     Sex:  Female     :  1945   Associated Diagnoses:   CLABSI (central line-associated bloodstream infection)   Author:   Juan Jose PORTER, Daniel HATCH      Brief Operative Note   Operative Information   Date/ Time:  2020 12:15:00.     Preoperative Diagnosis: CLABSI (central line-associated bloodstream infection) (LTF14-GZ T80.211A).     Postoperative Diagnosis: CLABSI (central line-associated bloodstream infection) (EWC06-XU T80.211A).     Procedures Performed: Procedure Code   Catheter Removal Vascular Access (Left) on 2020 at 75 Years.  Comments:  2020 10:20 MIC Hernandez RN, Katelynn  auto-populated from documented surgical case.     Indications: CLABSI.     Surgeon: Silvino Stallworth MD.     Assistant: Daniel Ingram MDan Removed: Mediport..     Esimated blood loss: No blood loss.     Description of Procedure/Findings/    Complications: Removal of mediport. Sent tip for culture. See op note for full details..

## 2022-04-30 NOTE — ED PROVIDER NOTES
Patient:   Laura Acosta            MRN: [id number]            FIN: [id number]               Age:   73 years     Sex:  Female     :  1945   Associated Diagnoses:   Hyperglycemia; Acute hypokalemia; Hypomagnesemia; Short bowel syndrome; Diabetes; Acute UTI; General weakness; Hypoalbuminemia; Encephalopathy   Author:   Harjit PORTER, Brad EDEN      Basic Information   Time seen: Date & time 12/10/2018 13:55:00.   History source: Patient, family.   Arrival mode: Private vehicle.   History limitation: None.   Additional information: Patient's physician(s): Alexander PORTER, Neno ROSE, Jose Francisco PORTER, Sammy MADRID, Chief Complaint from Nursing Triage Note : Chief Complaint   12/10/2018 13:53 CST     Chief Complaint           progressively worsening weakness, PEG suppose to be placed for TNP tomororw. altered mental status. denies NV, +diarrhea.  .      History of Present Illness   The patient presents with 74y/o F presents to the ED with weakness that is progressively getting worse within the past couple of weeks. Family reports patient is due to get a PEG tube place on tomorrow for feedings. Melvin fnp-c and     I, Dr. Lombardi, assumed care of this patient at 1509.  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 Patient was having blood work done this morning for surgery tomorrow for port placement for TPN by Dr. Munoz and family was advised to bring patient to the ED due to symptoms present. Family reports that patient was walking, coherent, and eating throughout the day yesterday. Today she became disoriented, weak, and unable to walk. Patient denies CP or abdominal pain when asked. Family reports patient has had chronic diarrhea that has been worsening for 3-4 months. Family denies fever or strong urine.  .  The onset was Today.  The course/duration of symptoms  is constant.  The character of symptoms is generalized.  The degree at present is moderate.  Risk factors consist of hypertension and diabetes mellitus.  Prior episodes: none.  Therapy today: none.  Associated symptoms: No strong urine, chronic diarrhea, denies chest pain, denies abdominal pain and denies fever.        Review of Systems   Constitutional symptoms:  Weakness, No fever,    Skin symptoms:  Negative except as documented in HPI.   Eye symptoms:  Negative except as documented in HPI.   ENMT symptoms:  Negative except as documented in HPI.   Respiratory symptoms:  Negative except as documented in HPI.   Cardiovascular symptoms:  No chest pain,    Gastrointestinal symptoms:  Diarrhea (chronic), No abdominal pain,    Genitourinary symptoms:  No strong urine.   Musculoskeletal symptoms:  Negative except as documented in HPI.   Neurologic symptoms:  Disoriented.      Health Status   Allergies:    Allergic Reactions (Selected)  Severity Not Documented  Dilaudid- Itching.  Opium- Itching..   Medications:  (Selected)   Inpatient Medications  Ordered  Normal Saline (0.9% NS) IV 1,000 mL: 1,000 mL, 1,000 mL, IV, 999 mL/hr, start date 12/10/18 13:57:00 CST  Prescriptions  Prescribed  Vitamin D 50,000 intl units oral capsule: 50,000 IntUnit = 1 cap(s), Oral, 2x/Wk, # 24 cap(s), 6 Refill(s), Pharmacy: [facility] [place], [place]  Documented Medications  Documented  Celexa 20 mg oral tablet: 20 mg = 1 tab(s), Oral, Daily, # 30 tab(s), 0 Refill(s)  Crestor 10 mg oral tablet: 10 mg = 1 tab(s), Oral, Daily, # 30 tab(s), 0 Refill(s)  HYDROXYZINE NICOLE 25 MG CAP: 25 mg = 1 cap(s)  LISINOPRIL 10 MG TABLET: 10 mg = 1 tab(s), Oral, Daily  TRAZODONE 50 MG TABLET: 50 mg = 1 tab(s), Oral, Once a day (at bedtime)  Vitamin D 1,000 Units Tab: 4 TABS AM, Oral, Daily, # 30 tab(s), 0 Refill(s)  metoprolol tartrate 100 mg oral tab: 100 mg = 1 tab(s), Oral, BID, # 60 tab(s), 10 Refill(s)  tincture of opium 10mg/ml: = 0.6 mL, As Directed,  0 Refill(s)  warfarin 1 mg oral tablet: See Instructions, 1 tab(s) Oral As Directed  QOD, 0 Refill(s).      Past Medical/ Family/ Social History   Medical history:    Active  Hyperlipidemia ([id number])  Resolved  Cataract ([id number]):  Resolved on 8/3/2015 at 69 years.  Comments:  5/2/2015 CDT 13:06 CDT - SYSTEM  Problem automatically updated based on documentation on Capillary Refills, Brachial Pulses, Radial Pulses, Femoral Pulses, Dorsalis Pulses, Ulnar pulses, Popliteal Pulses, Postibial Pulses, Edemas, Affect/Behavior, Orientation Assessment, Arterial Line Site.  Palpitations ([id number]):  Resolved on 8/3/2015 at 69 years.  Comments:  5/2/2015 CDT 13:06 CDT - SYSTEM  Problem automatically updated based on documentation on Capillary Refills, Brachial Pulses, Radial Pulses, Femoral Pulses, Dorsalis Pulses, Ulnar pulses, Popliteal Pulses, Postibial Pulses, Edemas, Affect/Behavior, Orientation Assessment, Arterial Line Site.  Arthritis ([id number]):  Resolved on 8/3/2015 at 69 years.  Comments:  5/2/2015 CDT 13:06 CDT - SYSTEM  Problem automatically updated based on documentation on Capillary Refills, Brachial Pulses, Radial Pulses, Femoral Pulses, Dorsalis Pulses, Ulnar pulses, Popliteal Pulses, Postibial Pulses, Edemas, Affect/Behavior, Orientation Assessment, Arterial Line Site.  Back injury ([id number]):  Resolved on 8/3/2015 at 69 years.  Comments:  5/2/2015 CDT 13:06 CDT - SYSTEM  Problem automatically updated based on documentation on Capillary Refills, Brachial Pulses, Radial Pulses, Femoral Pulses, Dorsalis Pulses, Ulnar pulses, Popliteal Pulses, Postibial Pulses, Edemas, Affect/Behavior, Orientation Assessment, Arterial Line Site.  Back pain ([id number]):  Resolved on 8/3/2015 at 69 years.  Comments:  5/2/2015 CDT 13:06 CDT - SYSTEM  Problem automatically updated based on documentation on Capillary Refills, Brachial Pulses, Radial Pulses, Femoral Pulses, Dorsalis Pulses, Ulnar  pulses, Popliteal Pulses, Postibial Pulses, Edemas, Affect/Behavior, Orientation Assessment, Arterial Line Site.  Meningitis ([id number]):  Resolved on 8/3/2015 at 69 years.  Comments:  5/2/2015 CDT 13:06 CDT - SYSTEM  Problem automatically updated based on documentation on Capillary Refills, Brachial Pulses, Radial Pulses, Femoral Pulses, Dorsalis Pulses, Ulnar pulses, Popliteal Pulses, Postibial Pulses, Edemas, Affect/Behavior, Orientation Assessment, Arterial Line Site.  Anxiety and depression ([id number]):  Resolved on 8/3/2015 at 69 years.  Comments:  5/2/2015 CDT 13:06 CDT - SYSTEM  Problem automatically updated based on documentation on Capillary Refills, Brachial Pulses, Radial Pulses, Femoral Pulses, Dorsalis Pulses, Ulnar pulses, Popliteal Pulses, Postibial Pulses, Edemas, Affect/Behavior, Orientation Assessment, Arterial Line Site.  Breast cancer ([id number]):  Resolved on 8/3/2015 at 69 years.  Comments:  8/13/2013 CDT 12:29 CDT - Jeansonne RN, Jamie L.  right    5/2/2015 CDT 13:06 CDT - SYSTEM  Problem automatically updated based on documentation on Capillary Refills, Brachial Pulses, Radial Pulses, Femoral Pulses, Dorsalis Pulses, Ulnar pulses, Popliteal Pulses, Postibial Pulses, Edemas, Affect/Behavior, Orientation Assessment, Arterial Line Site.  Staph infection ([id number]):  Resolved on 8/3/2015 at 69 years.  Comments:  8/13/2013 CDT 12:34 CDT - Jeansonne RN, Jamie L.  under right breast    5/2/2015 CDT 13:06 CDT - SYSTEM  Problem automatically updated based on documentation on Capillary Refills, Brachial Pulses, Radial Pulses, Femoral Pulses, Dorsalis Pulses, Ulnar pulses, Popliteal Pulses, Postibial Pulses, Edemas, Affect/Behavior, Orientation Assessment, Arterial Line Site.    11/1/2016 CDT 13:54 CDT - Jocelyn Kline RN  no symptoms in last 2 years  Gallstone pancreatitis ([id number]):  Resolved.  Glasses  ([id number]):  Resolved.  Hypertension ([id number]):  Resolved.  Irregular heart rhythm ([id number]):  Resolved.  PVD (peripheral vascular disease) ([id number]):  Resolved.  Able to lie down (099601050):  Resolved.  Activity tolerance (6725610296):  Resolved.  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  Hypercholesterolemia (69364486):  Resolved.  Insomnia (027238031):  Resolved.  Osteoarthritis (4658912088):  Resolved.  Cholelithiasis (571339018):  Resolved.  DVT - Deep vein thrombosis (4506439814):  Resolved.  Diabetes mellitus (928499379):  Resolved..   Surgical history:    33017 - LT CATARACT W/IOL 1 STA PHACO (Left) on 12/27/2016 at 71 Years.  Comments:  12/27/2016 11:46 - Margaret Leyva  auto-populated from documented surgical case  65930 - RT CATARACT W/IOL 1 STA PHACO (Right) on 11/1/2016 at 70 Years.  Comments:  11/1/2016 09:37 - Margaret Duran RN  auto-populated from documented surgical case  Hernia Repair Ventral (None) on 4/26/2016 at 70 Years.  Comments:  4/26/2016 14:17 - Lejeune RN, Doralis Marie  auto-populated from documented surgical case  Exploration Laparotomy (.) on 6/22/2015 at 69 Years.  Comments:  6/23/2015 10:22 - Alma Aguilar  auto-populated from documented surgical case  Cholecystectomy Laparoscopic (.) on 5/7/2015 at 69 Years.  Comments:  5/7/2015 14:59 - Brigitte Bruno RN  auto-populated from documented surgical case  Endoscopic Retrograde Cholangiopancreatography on 4/25/2015 at 69 Years.  Comments:  4/25/2015 11:57 - Gabriela Forde RN  auto-populated from documented surgical case  Sphincterotomy on 4/25/2015 at 69 Years.  Comments:  4/25/2015 11:57 - Gabriela Forde RN  auto-populated from documented surgical case  Stone Manipulation on 4/25/2015 at 69 Years.  Comments:  4/25/2015 11:57 - Gabriela Forde RN  auto-populated from documented surgical case  Appendectomy  ([id number]).  Hysterectomy (461646847).  Colonoscopy ([id number]).  EGD.  lump removed from right breast.  right shoulder surgery.  cyst drained.  bowel resection.  Comments:  10/26/2016 11:41 - Ligia Keith RN    peripheral vascular disease surgery.  Comments:  10/26/2016 11:42 - Ligia Keith RN  2016-left leg.   Family history:    Primary malignant neoplasm of breast  Mother ()  Sister  Osteoarthritis  Mother ()  Sister  Brain aneurysm....  Father ()  Uterine cancer....  Mother ()  Kidney cancer, primary, with metastasis from kidney to other site....  Sister  Acute myocardial infarction.  Father ()  Hypertension.  Father ()  Sister  Brother  Depression.  Mother ()  Hyperlipidemia.  Father ()  Sister  Brother  .   Social history:    Social & Psychosocial Habits    Alcohol  2012 Risk Assessment: Denies Alcohol Use      Comment: pt gila - 2016 11:32 - Angela Slater RN    Employment/School  2013  Status: Retired    Home/Environment  2012  Lives with: Alone    Living situation: Home/Independent    Alcohol abuse in household: No    Substance abuse in household: No    Smoker in household: No    Injuries/Abuse/Neglect in household: No    Feels unsafe at home: No    Safe place to go: Yes    Agency(s)/Others notified: No    Family/Friends available to help: Yes    Concern for family members at home: No    Major illness in household: No    Financial concerns: No    Substance Abuse  2012 Risk Assessment: Denies Substance Abuse    10/26/2016  Use: Never    Tobacco  2012 Risk Assessment: Denies Tobacco Use      Comment: pt denmyesha - 2016 11:32 - Angela Slater RN.      Physical Examination               Vital Signs   Vital Signs   12/10/2018 15:30 CST     Peripheral Pulse Rate     56 bpm  LOW                             Heart Rate Monitored      56 bpm  LOW                             Respiratory Rate           19 br/min                             SpO2                      98 %                             Oxygen Therapy            Room air                             Systolic Blood Pressure   118 mmHg                             Diastolic Blood Pressure  54 mmHg  LOW                             Mean Arterial Pressure, Cuff              75 mmHg    12/10/2018 15:00 CST     Peripheral Pulse Rate     53 bpm  LOW                             Heart Rate Monitored      53 bpm  LOW                             Respiratory Rate          51 br/min  HI                             SpO2                      95 %                             Oxygen Therapy            Room air  .   Measurements   12/10/2018 13:53 CST     Weight Dosing             59 kg                             Weight Measured and Calculated in Lbs     130.07 lb                             Height/Length Dosing      165 cm  .   Basic Oxygen Information   12/10/2018 15:30 CST     SpO2                      98 %                             Oxygen Therapy            Room air    12/10/2018 15:00 CST     SpO2                      95 %                             Oxygen Therapy            Room air  .   General:  Alert, no acute distress.    Skin:  Warm, dry, intact.    Head:  Normocephalic, atraumatic.    Eye   Cardiovascular:  Regular rate and rhythm, Normal peripheral perfusion, No edema.    Respiratory:  Lungs are clear to auscultation, respirations are non-labored, breath sounds are equal, Symmetrical chest wall expansion.    Gastrointestinal:  Soft, Nontender, Non distended, Normal bowel sounds, Old, midline abdominal incision, Guarding: Negative, Rebound: Negative.    Musculoskeletal:  No deformity, Severe generalized weakness.    Neurological:  Alert and oriented to person, place, time, and situation, CN II-XII intact, Bilateral arm drift, weak speech .    Psychiatric:  Cooperative, appropriate mood & affect.       Medical Decision Making   Rationale:  Presents with  generalized weakness and altered mental status as suspect this is related to her urinary tract infection.  She has frequent diarrhea and wears diapers which is likely the cause.  She does have protein deficiency low potassium and low magnesium we'll replace potassium and magnesium..   Documents reviewed:  Emergency department nurses' notes.   Orders  Launch Orders   Laboratory:  Troponin-I (Order): Stat collect, 12/10/2018 13:57 CST, Blood, Lab Collect, Print Label By Order Location, 12/10/2018 13:57 CST  POC iSTAT ER Troponin request (Order): Blood, Stat collect, 12/10/2018 13:57 CST by Liana Choudhury Lab Collect, Print Label By Order Location  UA Total a reflex to culture (Order): Stat collect, Urine, 12/10/2018 13:57 CST, Nurse collect, Print Label By Order Location  CMP (Order): Stat collect, 12/10/2018 13:57 CST, Blood, Lab Collect, Print Label By Order Location, 12/10/2018 13:57 CST  CBC w/ Auto Diff (Order): Now collect, 12/10/2018 13:57 CST, Blood, Lab Collect, Print Label By Order Location, 12/10/2018 13:57 CST  Pharmacy:  Normal Saline (0.9% NS) IV 1000 mL (Order): 1,000 mL, 1,000 mL, IV, 999 mL/hr, start date 12/10/2018 13:57 CST  Cardiology:  EKG (Order): 12/10/2018 13:57 CST, Stat, Once, 12/10/2018 13:57 CST, Stretcher, Patient Has IV, Standard Precautions, -1, -1, 12/10/2018 13:57 CST.   Results review:  Lab results : Lab View   12/10/2018 16:02 CST     Sodium Lvl                146 mmol/L  HI                             Potassium Lvl             2.8 mmol/L  LOW                             Chloride                  110 mmol/L  HI                             CO2                       26.0 mmol/L                             Calcium Lvl               8.4 mg/dL  LOW                             Magnesium Lvl             1.6 mg/dL  LOW                             Glucose Lvl               92 mg/dL                             BUN                       12.0 mg/dL                              Creatinine                0.73 mg/dL                             eGFR-AA                   >60 mL/min/1.73 m2  NA                             eGFR-DELVIS                  >60 mL/min/1.73 m2  NA                             Bili Total                1.3 mg/dL  HI                             Bili Direct               0.40 mg/dL  HI                             Bili Indirect             0.90 mg/dL  HI                             AST                       45 unit/L  HI                             ALT                       32 unit/L                             Alk Phos                  85 unit/L                             Total Protein             5.8 gm/dL  LOW                             Albumin Lvl               2.70 gm/dL  LOW                             Globulin                  3.10 gm/dL                             A/G Ratio                 0.9  NA                             Troponin-I                <0.02 ng/mL    12/10/2018 12:35 CST     Sodium Lvl                145 mmol/L                             Potassium Lvl             3.2 mmol/L  LOW                             Chloride                  109 mmol/L  HI                             CO2                       27.0 mmol/L                             Calcium Lvl               8.7 mg/dL                             Magnesium Lvl             1.6 mg/dL  LOW                             Glucose Lvl               95 mg/dL                             BUN                       12.0 mg/dL                             Creatinine                0.77 mg/dL                             eGFR-AA                   >60 mL/min/1.73 m2  NA                             eGFR-DELVIS                  >60 mL/min/1.73 m2  NA                             Bili Total                1.2 mg/dL  HI                             Bili Direct               0.30 mg/dL  HI                             Bili Indirect             0.90 mg/dL  HI                             AST                       49 unit/L  HI                              ALT                       34 unit/L                             Alk Phos                  94 unit/L                             Total Protein             6.2 gm/dL  LOW                             Albumin Lvl               3.00 gm/dL  LOW                             Globulin                  3.20 gm/dL                             A/G Ratio                 0.9  NA                             PT                        25.4 second(s)  HI                             INR                       2.3  HI                             WBC                       5.4 x10(3)/mcL                             RBC                       4.15 x10(6)/mcL  LOW                             Hgb                       12.5 gm/dL                             Hct                       39.2 %                             Platelet                  90 x10(3)/mcL  LOW                             MCV                       94.5 fL  HI                             MCH                       30.1 pg                             MCHC                      31.9 gm/dL  LOW                             RDW                       15.8 %                             MPV                       12.5 fL  HI                             Abs Neut                  2.30 x10(3)/mcL                             Neutro Auto               42 %  NA                             Lymph Auto                46 %  NA                             Mono Auto                 9 %  NA                             Eos Auto                  2 %  NA                             Abs Eos                   0.1 x10(3)/mcL                             Basophil Auto             1 %  NA                             Abs Neutro                2.30 x10(3)/mcL                             Abs Lymph                 2.5 x10(3)/mcL                             Abs Mono                  0.5 x10(3)/mcL                             Abs Baso                  0.0 x10(3)/mcL  .   Radiology results:   Rad Results (ST)  < 12 hrs   Accession: [id number]  Order: CT Head W/O Contrast  Report Dt/Tm: 12/10/2018 17:33  Report:   CT HEAD WITHOUT CONTRAST:     HISTORY: CVA;Other (please specify)  weakness     PATIENT RADIATION DOSE: TOTAL DLP(mGycm)  852      As per PQRS measures, all CT scans at this facility used dose  modulation, iterative reconstruction, and/or weight based dose  adjustment when appropriate to reduce radiation dose to as low as  reasonably achievable.     COMPARISON: December 25, 2012          FINDINGS: There are new surgical changes in the calvarium at the left  vertex. There is partial opacification of the right mastoid air cells  similar to prior exam. Mild sphenoid sinusitis appears new. No acute  intracranial hemorrhage or mass effect. No change in ventricular  caliber, no hydrocephalus. Vascular calcifications noted.     IMPRESSION: No acute intracranial finding      Accession: [id number]  Order: XR Chest 2 Views  Report Dt/Tm: 12/10/2018 14:51  Report:   Indication: Chest pain, weakness     Findings: Compared to 4/22/2016. Heart size is normal and lungs are  clear bilaterally. Pulmonary vasculature is normal. Postsurgical  changes following right shoulder arthroplasty are again noted.     IMPRESSION: No evidence of acute disease.    .      Impression and Plan   Diagnosis   Hyperglycemia (RCX44-PL R73.9)   Hypoalbuminemia (AOC76-LT E88.09)   Encephalopathy (LFU53-ZT G93.40)   Acute hypokalemia (NFT87-AF E87.6)   Hypomagnesemia (QSE31-AF E83.42)   Short bowel syndrome (WKN23-PP K91.2)   Diabetes (VGD71-AS E11.9)   Acute UTI (MZS86-BZ N39.0)   General weakness (NTU67-QE R53.1)      Calls-Consults   -  Scott Richards   Plan   Condition: Improved, Stable.    Disposition: Admit to Inpatient Unit.    Counseled: Patient, Family, Regarding diagnosis, Regarding diagnostic results, Regarding treatment plan, Patient indicated understanding of instructions, Family at bedside understood.     Notes: I, Genna Mora, acted solely as a scribe for and in the presence of Dr. Lombardi who performed the service., I, Dr Lombardi have read note from scribe and I agree with history and physical except as amended by me.  All information was dictated from my history and my examination of patient..

## 2022-04-30 NOTE — DISCHARGE SUMMARY
Patient:   Laura Acosta            MRN: [id number]            FIN: [id number]               Age:   73 years     Sex:  Female     :  1945   Associated Diagnoses:   None   Author:   Paola Vaughan MD      Physical Examination   General:  Alert and oriented, No acute distress.    Eye:  Pupils are equal, round and reactive to light, Normal conjunctiva.    HENT:  Normocephalic, Normal hearing, Oral mucosa is moist.    Neck:  Supple, No carotid bruit.    Respiratory:  Lungs are clear to auscultation, Respirations are non-labored, Breath sounds are equal.    Cardiovascular:  Normal rate, Regular rhythm, No murmur, No gallop.    Gastrointestinal:  Soft, Non-tender, Non-distended, Normal bowel sounds, No organomegaly.       Vital Signs (last 24 hrs)_____  Last Charted___________  Temp Oral     36.8 DegC  (DEC 13 07:29)  Heart Rate Peripheral   92 bpm  (DEC 13 07:29)  Resp Rate         18 br/min  (DEC 13 07:29)  SBP      122 mmHg  (DEC 13 07:29)  DBP      66 mmHg  (DEC 13 07:29)  SpO2      95 %  (DEC 13 07:29)   Lymphatics:  No lymphadenopathy neck, axilla, groin.    Musculoskeletal:  Normal strength, No tenderness, No swelling.    Integumentary:  Warm, Dry, Intact, No pallor, No rash.    Neurologic:  Alert, Oriented, Normal sensory, Normal motor function, No focal deficits.    Psychiatric:  Cooperative, Appropriate mood & affect.    Genitourinary:  No inguinal tenderness.       Hospital Course   Hospital Course   Admitted from:    The patient presents with A 73 year old  female presented with c/o weakness and debility porgressively getting worse has a h/o short bowel syndrome and chronic diarrhea was suppose to get a port placed for tpn but was admitted due to dehydration and hypokalemia with encephalopathy.  UTI was suspected.  Urine cultures were ordered and patient started empirically on Rocephin.  Urine cultures remain negative at 48 hours therefore Rocephin discontinued.  Surgery was consulted  by Dr. Miller who wasn't available till next week.  Dr. Miller recommends not initiate any anticoagulants on till Mediport placed next week on Tuesday, 12/18/2018.  PICC line placed instead and TPN initiated.  Dr. Carroll with GI has been following the patient in outpatient setting.  Patient also has a physician from [place] who follows her in outpatient setting for her short bowel syndrome.  TPN initiated per dietary recommendations.  Blood pressure remains in stable range without any BP medications.  Case findings consulted to help arrange home health and outpatient TPN.  Over the course of her stay she is now clinically improved and hemodynamically stable for discharge to home with home health    Discharge diagnosis:  Acute encephalopathy due to dehydration   Dehydration  Iatrogenic hypernatremia  Short bowel syndrome  Hypokalemia  Hypophosphatemia  Moderate protein calorie malnutrition  Physical deconditioning  Sacral and gluteal redness/right ankle ulcer        Discharge home with home health ([facility])  Diet = cardiac  Activity ad ingrid.  Follow-up with PCP, GI Dr. Carroll and Dr. Miller with surgery ×1 week  Total discharge time = 31 minutes.        Discharge Plan   Discharge Summary Plan   Discharge Status: stable.

## 2022-04-30 NOTE — CONSULTS
Patient:   Laura Lee            MRN: [id number]            FIN: [id number]               Age:   74 years     Sex:  Female     :  1945   Associated Diagnoses:   None   Author:   Kiko PORTER, VitalALEXANDALEXA      Basic Information   Source of history:  Self, Medical record, Nursing.    Referral source:  Deon Billings MD    History limitation:  None.        Infectious disease evaluation and antibiotic management of the patient with Staphylococcus epidermidis positive blood culture and urine culture      History of Present Illness   74-year-old female with past medical history of diabetes type 2, HTN, HLD, PVD, DVT, short gut syndrome from extensive bowel resection, malnutrition, is admitted to hospital, [facility] on 2020 presenting from urgent care where she had been seen with complaints of generalized weakness, fever, sore throat, mild cough and urinary incontinence.   She was extensively evaluated on presentation here and noted to have no fevers and no leukocytosis.  SARS-CoV-2 PCR was negative, influenza was negative.  She had abnormal urinalysis with 2+ leukocyte esterase, 1+ bacteria, positive nitrite, WBC of 31 and RBC of 697 with urine culture positive for 100,000 colonies of methicillin sensitive Staph epidermidis.  Blood culture was also positive for same methicillin sensitive Staphylococcus epidermidis in 1 of 2 sets.  She is noted to be anemic with thrombocytopenia and low albumin.  CT scan of the head showed no acute intracranial process and chest x-ray showed no acute cardiopulmonary disease.  She is currently on antibiotic coverage with vancomycin and Zosyn      Review of Systems   Constitutional:  Weakness, Fatigue.    Eye:  Negative.    Ear/Nose/Mouth/Throat:  Negative.    Respiratory:  Negative.    Cardiovascular:  Negative.    Gastrointestinal:  Negative.    Genitourinary:  Negative.    Hematology/Lymphatics:  Negative.    Endocrine:  Negative.    Musculoskeletal:   Negative.    Integumentary:  Negative.    Neurologic:  Negative.    Psychiatric:  Negative.       Health Status   Allergies:    Allergic Reactions (Selected)  Severity Not Documented  Dilaudid- Itching.  Opium- Itching.   Current medications:  (Selected)   Inpatient Medications  Ordered  Celexa 20 mg oral tablet: 20 mg, form: Tab, Oral, Daily, first dose 11/25/20 9:00:00 CST  Clinimix E Sulfite-Free 4.25% with 5% Dextrose and Electrolytes 1,000 mL + Multivitamin (MVI) addit...: 1,000 mL, 1,000 mL, IV, 70 mL/hr, start date 11/25/20 17:09:00 CST, 1.72, m2  Ferrlecit 125 mg/ mL: 125 mg, form: Injection, IV Piggyback, Daily, Infuse over: 1 hr, first dose 11/26/20 11:42:00 CST, stop date 11/30/20 11:43:00 CST, x 5 days  Intralipid 20% IVPB: 250 mL, 50 gm =, form: Infusion, IV Piggyback, M,W,F, Infuse over: 10 hr, first dose 11/27/20 9:00:00 CST  Sodium Chloride 0.9% intravenous solution 1,000 mL: 1,000 mL, 1,000 mL, IV, 100 mL/hr, start date 11/24/20 21:26:00 CST, 1.72, m2  Zofran: 4 mg, form: Injection, IV Push, q4hr PRN for nausea, first dose 11/24/20 21:26:00 CST  acetaminophen: 1,000 mg, form: Tab, Oral, q6hr PRN for pain, mild, first dose 11/24/20 21:26:00 CST  acetaminophen: 650 mg, form: Liquid, Oral, q6hr PRN for fever, first dose 11/24/20 21:26:00 CST,  > 38.1 degrees Celsius (100.6 degrees Fahrenheit)  lisinopril 10 mg oral tablet: 20 mg, form: Tab, Oral, Daily, first dose 11/28/20 9:00:00 CST  metoprolol tartrate 50 mg oral tab: 100 mg, form: Tab, Oral, BID, first dose 11/27/20 9:00:00 CST  traZODone: 75 mg, form: Tab, Oral, Once a day (at bedtime), first dose 11/25/20 1:44:00 CST, NOW  vancomycin: 1,000 mg, form: Injection, IV Piggyback, q12hr, Infuse over: 100 minute(s), first dose 11/28/20 0:00:00 CST  warfarin: 2 mg, form: Tab, Oral, Daily, first dose 11/25/20 17:00:00 CST  Pending Complete  Toradol: 30 mg, form: Injection, IV Push, [id number], Order duration: 4 dose(s), first dose 04/26/16 15:00:00  CDT, stop date 04/27/16 11:59:00 CDT, first dose in recovery room, 126,031  midazolam: 1 mg, form: Injection, IV Push, q5min, Order duration: 2 dose(s), first dose 12/27/16 10:30:00 CST, stop date 12/27/16 10:39:00 CST, 30,025  Prescriptions  Prescribed  Voltaren 1% topical gel: 1 manuela, TOP, QID, # 100 gm, 0 Refill(s), Pharmacy: [place], [place]  Documented Medications  Documented  Celexa 20 mg oral tablet: 20 mg = 1 tab(s), Oral, Daily, # 30 tab(s), 0 Refill(s)  Claritin 10 mg oral tablet: 10 mg = 1 tab(s), Oral, Daily, # 30 tab(s), 0 Refill(s)  FERATE 27 MG TABLET: 240 mg = 1 tab(s)  Gattex 5 mg subcutaneous kit: 0.05 mg/kg, Subcutaneous, Daily  Prolia: 60 mg, Subcutaneous, q6mo, 0 Refill(s)  WARFARIN SODIUM 1 MG TABLET: See Instructions  atropine-diphenoxylate 0.025 mg-2.5 mg oral tablet: 2 tab(s), Oral, QID  ergocalciferol 50,000 intl units (1.25 mg) oral capsule: 72596 IntUnit = 1 cap(s), Oral, qWeek  lisinopril 10 mg oral tablet: 10 mg = 1 tab(s), Oral, Daily, # 30 tab(s), 0 Refill(s)  metoprolol tartrate 100 mg oral tab: 100 mg = 1 tab(s), Oral, BID, 2 tabs in AM, 1 tabs PM, # 60 tab(s), 0 Refill(s)  traZODONE 50 mg oral tablet ( Desyrel ): 75 mg = 1.5 tab(s), Oral, Once a day (at bedtime)      Histories   Past Medical History:    Resolved  Cataract ([id number]):  Resolved on 8/3/2015 at 69 years.  Comments:  5/2/2015 CDT 13:06 CDT - SYSTEM  Problem automatically updated based on documentation on Capillary Refills, Brachial Pulses, Radial Pulses, Femoral Pulses, Dorsalis Pulses, Ulnar pulses, Popliteal Pulses, Postibial Pulses, Edemas, Affect/Behavior, Orientation Assessment, Arterial Line Site.  Palpitations ([id number]):  Resolved on 8/3/2015 at 69 years.  Comments:  5/2/2015 CDT 13:06 CDT - SYSTEM  Problem automatically updated based on documentation on Capillary Refills, Brachial Pulses, Radial Pulses, Femoral Pulses, Dorsalis Pulses, Ulnar pulses, Popliteal Pulses, Postibial Pulses, Edemas,  Affect/Behavior, Orientation Assessment, Arterial Line Site.  Arthritis ([id number]):  Resolved on 8/3/2015 at 69 years.  Comments:  5/2/2015 CDT 13:06 CDT - SYSTEM  Problem automatically updated based on documentation on Capillary Refills, Brachial Pulses, Radial Pulses, Femoral Pulses, Dorsalis Pulses, Ulnar pulses, Popliteal Pulses, Postibial Pulses, Edemas, Affect/Behavior, Orientation Assessment, Arterial Line Site.  Back injury ([id number]):  Resolved on 8/3/2015 at 69 years.  Comments:  5/2/2015 CDT 13:06 CDT - SYSTEM  Problem automatically updated based on documentation on Capillary Refills, Brachial Pulses, Radial Pulses, Femoral Pulses, Dorsalis Pulses, Ulnar pulses, Popliteal Pulses, Postibial Pulses, Edemas, Affect/Behavior, Orientation Assessment, Arterial Line Site.  Back pain ([id number]):  Resolved on 8/3/2015 at 69 years.  Comments:  5/2/2015 CDT 13:06 CDT - SYSTEM  Problem automatically updated based on documentation on Capillary Refills, Brachial Pulses, Radial Pulses, Femoral Pulses, Dorsalis Pulses, Ulnar pulses, Popliteal Pulses, Postibial Pulses, Edemas, Affect/Behavior, Orientation Assessment, Arterial Line Site.  Meningitis ([id number]):  Resolved on 8/3/2015 at 69 years.  Comments:  5/2/2015 CDT 13:06 CDT - SYSTEM  Problem automatically updated based on documentation on Capillary Refills, Brachial Pulses, Radial Pulses, Femoral Pulses, Dorsalis Pulses, Ulnar pulses, Popliteal Pulses, Postibial Pulses, Edemas, Affect/Behavior, Orientation Assessment, Arterial Line Site.  Anxiety and depression ([id number]):  Resolved on 8/3/2015 at 69 years.  Comments:  5/2/2015 CDT 13:06 CDT - SYSTEM  Problem automatically updated based on documentation on Capillary Refills, Brachial Pulses, Radial Pulses, Femoral Pulses, Dorsalis Pulses, Ulnar pulses, Popliteal Pulses, Postibial Pulses, Edemas, Affect/Behavior, Orientation Assessment, Arterial Line  Site.  Breast cancer ([id number]):  Resolved on 8/3/2015 at 69 years.  Comments:  8/13/2013 CDT 12:29 CDT - Jeansonne RN, Jamie L.  right    5/2/2015 CDT 13:06 CDT - SYSTEM  Problem automatically updated based on documentation on Capillary Refills, Brachial Pulses, Radial Pulses, Femoral Pulses, Dorsalis Pulses, Ulnar pulses, Popliteal Pulses, Postibial Pulses, Edemas, Affect/Behavior, Orientation Assessment, Arterial Line Site.  Hyperlipidemia ([id number]):  Resolved.  Staph infection ([id number]):  Resolved on 8/3/2015 at 69 years.  Comments:  8/13/2013 CDT 12:34 CDT - Jeansonne RN, Jamie L.  under right breast    5/2/2015 CDT 13:06 CDT - SYSTEM  Problem automatically updated based on documentation on Capillary Refills, Brachial Pulses, Radial Pulses, Femoral Pulses, Dorsalis Pulses, Ulnar pulses, Popliteal Pulses, Postibial Pulses, Edemas, Affect/Behavior, Orientation Assessment, Arterial Line Site.    11/1/2016 CDT 13:54 CDT - Eliud VALENZUELA, Jocelyn Martinez  no symptoms in last 2 years  Gallstone pancreatitis ([id number]):  Resolved.  Glasses ([id number]):  Resolved.  Hypertension ([id number]):  Resolved.  Irregular heart rhythm ([id number]):  Resolved.  PVD (peripheral vascular disease) ([id number]):  Resolved.  Able to lie down (858845507):  Resolved.  Activity tolerance (0823064172):  Resolved.  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  Hypercholesterolemia (41719188):  Resolved.  Insomnia (875995181):  Resolved.  Osteoarthritis (1734420133):  Resolved.  Cholelithiasis (837672306):  Resolved.  DVT - Deep vein thrombosis (4286923718):  Resolved.  Diabetes mellitus (904009448):  Resolved.  Needs flu shot (115962172):  Resolved.  Acute URI (91820421):  Resolved.  Laryngitis (72221266):  Resolved.  Electrolyte depletion (70044671):  Resolved.  Leg edema, left (481931593):  Resolved.    Family History:    Diabetes mellitus  Father ()  Primary malignant neoplasm of breast  Mother ()  Sister  Coronary artery bypass grafts x 3  Brother  Osteoarthritis  Mother ()  Sister  Brain aneurysm....  Father ()  Uterine cancer....  Mother ()  Kidney cancer, primary, with metastasis from kidney to other site....  Sister  Acute myocardial infarction.  Father ()  Hypertension.  Father ()  Sister  Brother  Brother  Depression.  Mother ()  Hyperlipidemia.  Father ()  Sister  Brother  Brother     Procedure history:    Mediport Removal on 2020 at 74 Years.  Comments:  2020 12:59 CDT - Lejeune RN, Doralis Marie  auto-populated from documented surgical case  Catheter Insertion Mediport (None) on 2020 at 74 Years.  Comments:  2020 12:59 CDT - Lejeune RN, Doralis Marie  auto-populated from documented surgical case  Colonoscopy (None) on 2019 at 74 Years.  Comments:  2019 9:16 Ashley Arredondo  auto-populated from documented surgical case  Biopsy Gastrointestinal (None) on 2019 at 74 Years.  Comments:  2019 9:16 Ashley Arredondo  auto-populated from documented surgical case  Polypectomy (None) on 2019 at 74 Years.  Comments:  2019 9:16 Ashley Arredondo  auto-populated from documented surgical case  Catheter Insertion Mediport (None) on 2018 at 73 Years.  Comments:  2018 14:06 MIC Carmona RN, Beatrice MALDONADO  auto-populated from documented surgical case  39814 - LT CATARACT W/IOL 1 STA PHACO (Left) on 2016 at 71 Years.  Comments:  2016 11:46 Margaret Aldridge  auto-populated from documented surgical case  65328 - RT CATARACT W/IOL 1 STA PHACO (Right) on 2016 at 70 Years.  Comments:  2016 9:37 Margaret Rai RN  auto-populated from documented surgical case  Hernia Repair Ventral (None) on 2016 at 70 Years.  Comments:  2016 14:17  CDT - Lejeune RN, Monique Mcdaniel  auto-populated from documented surgical case  Exploration Laparotomy (.) on 6/22/2015 at 69 Years.  Comments:  6/23/2015 10:22 Alma Medrano  auto-populated from documented surgical case  Cholecystectomy Laparoscopic (.) on 5/7/2015 at 69 Years.  Comments:  5/7/2015 14:59 Brigitte New RN  auto-populated from documented surgical case  Endoscopic Retrograde Cholangiopancreatography on 4/25/2015 at 69 Years.  Comments:  4/25/2015 11:57 Gabriela Craig RN  auto-populated from documented surgical case  Sphincterotomy on 4/25/2015 at 69 Years.  Comments:  4/25/2015 11:57 Gabriela Craig RN  auto-populated from documented surgical case  Stone Manipulation on 4/25/2015 at 69 Years.  Comments:  4/25/2015 11:57 Gabriela Craig RN  auto-populated from documented surgical case  Appendectomy ([id number]).  Hysterectomy (868423377).  Colonoscopy ([id number]).  EGD.  lump removed from right breast.  right shoulder surgery.  cyst drained.  bowel resection.  Comments:  10/26/2016 11:41 Ligia South RN  2015  peripheral vascular disease surgery.  Comments:  10/26/2016 11:42 Ligia South RN  7/2016-left leg   Social History        Social & Psychosocial Habits    Alcohol  12/25/2012 Risk Assessment: Denies Alcohol Use    12/18/2018  Use: Past    Comment: pt denies - 04/22/2016 11:32 - Angela Slater RN    Employment/School  12/23/2013  Status: Retired    Home/Environment  12/25/2012  Lives with: Alone    Living situation: Home/Independent    Alcohol abuse in household: No    Substance abuse in household: No    Smoker in household: No    Injuries/Abuse/Neglect in household: No    Feels unsafe at home: No    Safe place to go: Yes    Agency(s)/Others notified: No    Family/Friends available to help: Yes    Concern for family members at home: No    Major illness in household: No    Financial concerns: No    Substance Use  12/25/2012 Risk  Assessment: Denies Substance Abuse    10/26/2016  Use: Never    Tobacco  12/25/2012 Risk Assessment: Denies Tobacco Use    12/18/2018  Use: Never smoker    Patient Wants Consult For Cessation Counseling N/A    01/02/2019  Use: Never (less than 100 in l    Patient Wants Consult For Cessation Counseling N/A    02/06/2019  Use: Never (less than 100 in l    Patient Wants Consult For Cessation Counseling N/A    04/24/2019  Use: Never (less than 100 in l    Patient Wants Consult For Cessation Counseling N/A    05/06/2019  Use: Never (less than 100 in l    Patient Wants Consult For Cessation Counseling N/A    07/24/2019  Use: Never (less than 100 in l    Patient Wants Consult For Cessation Counseling N/A    11/06/2019  Use: Never (less than 100 in l    Patient Wants Consult For Cessation Counseling N/A    12/18/2019  Use: Never (less than 100 in l    Patient Wants Consult For Cessation Counseling N/A    02/14/2020  Use: Never (less than 100 in l    Patient Wants Consult For Cessation Counseling N/A    05/15/2020  Use: Never (less than 100 in l    Patient Wants Consult For Cessation Counseling N/A    07/21/2020  Use: Never (less than 100 in l    Patient Wants Consult For Cessation Counseling N/A    07/23/2020  Use: Never (less than 100 in l    Patient Wants Consult For Cessation Counseling N/A    08/05/2020  Use: Never (less than 100 in l    Patient Wants Consult For Cessation Counseling N/A    11/25/2020  Use: Never (less than 100 in l    Patient Wants Consult For Cessation Counseling N/A    Abuse/Neglect  07/24/2019  SHX Any signs of abuse or neglect No    11/06/2019  SHX Any signs of abuse or neglect No    12/18/2019  SHX Any signs of abuse or neglect No    02/14/2020  SHX Any signs of abuse or neglect No    05/15/2020  SHX Any signs of abuse or neglect No    07/21/2020  SHX Any signs of abuse or neglect No    Feels unsafe at home: No    Safe place to go: No    07/23/2020  SHX Any signs of abuse or neglect  No    08/05/2020  SHX Any signs of abuse or neglect No    11/25/2020  SHX Any signs of abuse or neglect No    Spiritual/Cultural  07/23/2020  Rastafarian Preference [employer]  .        Physical Examination      Vital Signs (last 24 hrs)_____  Last Charted___________  Temp Oral     36.6 DegC  (NOV 27 23:32)  Heart Rate Peripheral   65 bpm  (NOV 27 23:32)  Resp Rate         20 br/min  (NOV 27 19:00)  SBP      H 147mmHg  (NOV 27 23:32)  DBP      73 mmHg  (NOV 27 23:32)  SpO2      96 %  (NOV 27 23:32)     General:  Alert and oriented, No acute distress.    Eye:  Pupils are equal, round and reactive to light.    HENT:  Normocephalic.    Neck:  Supple, No jugular venous distention.    Respiratory:  Lungs are clear to auscultation, Respirations are non-labored, Breath sounds are equal, Symmetrical chest wall expansion.    Cardiovascular:  Normal rate, Regular rhythm, No gallop.    Gastrointestinal:  Soft, Non-tender, Non-distended, Normal bowel sounds.    Genitourinary:  No costovertebral angle tenderness.    Musculoskeletal:  No swelling, No deformity.    Integumentary:  No rash, Left chest Mediport site is intact with no erythema, swelling or tenderness.    Cognition and Speech:  Oriented, Speech clear and coherent.    Psychiatric:          Mood and affect: Calm.         Behavior: Cooperative.       Review / Management   Results review:     Labs (Last four charted values)  WBC                  L 2.6 (NOV 26) L 3.8 (NOV 25) L 4.2 (NOV 24)   Hgb                  L 10.1 (NOV 26) L 10.0 (NOV 25) L 11.5 (NOV 24)   Hct                  L 33.5 (NOV 26) L 33.4 (NOV 25) 37.7 (NOV 24)   Plt                  L 120 (NOV 26) L 106 (NOV 25) 136 (NOV 24)   Na                   141 (NOV 26) 140 (NOV 25) 139 (NOV 24)   K                    3.6 (NOV 26) 3.9 (NOV 25) 4.3 (NOV 24)   CO2                  L 22 (NOV 26) L 21 (NOV 25) 25 (NOV 24)   Cl                   H 111 (NOV 26) H 109 (NOV 25) 107 (NOV 24)   Cr                   0.55 (NOV  26) 0.55 (NOV 25) 0.61 (NOV 24)   BUN                  9.8 (NOV 26) 13.5 (NOV 25) 13.9 (NOV 24)   Glucose Random       106 (NOV 26) L 69 (NOV 25) L 81 (NOV 24)   PT                   H 29.1 (NOV 26) H 22.8 (NOV 24)   INR                  H 2.8 (NOV 26) H 2.0 (NOV 24) .          Reason For Exam  Chest Pain    Radiology Report  EXAMINATION  XR Chest 2 Views     INDICATION  Chest Pain     Comparison: 7/23/2020     FINDINGS  A left chest wall port catheter has its tip over the superior vena  cava. There is a rounded calcification in the anterior chest wall,  also likely present on the prior exam. The heart is normal in size.  There is no focal airspace consolidation. There is no pleural effusion  or pneumothorax. There are degenerative changes in the thoracic spine.  There are changes of prior right shoulder arthroplasty.     IMPRESSION  No acute abnormality of the chest.            Impression and Plan   1.  Staphylococcus epidermidis bacteremia  2.  Staphylococcus epidermidis bacteriuria/UTI  3.  Mediport associated bloodstream infection  4.  Diabetes type 2  5.  Anemia  6.  Short gut syndrome  7.  Protein calorie malnutrition    I agree with the management of this patient.  She is without any fevers or leukocytosis though reports subjective fevers and sweats.  The Staph epidermidis bacteremia is likely associated with the Mediport resulting to bacteriuria rather than a primary Staphylococcus epidermidis urinary tract infection especially in light of no urinary symptoms.  We will cover with cefazolin and discontinue vancomycin, get 2D echocardiogram to evaluate for vegetation, follow with a repeat blood culture and with a.m. labs.  Guarded prognosis.  Case is discussed with patient and nursing staff.  I would like to thank the team very much for opportunity to assist in the care of this patient

## 2022-04-30 NOTE — OP NOTE
DATE OF SURGERY:    12/31/2020    SURGEON:  Silvino Stallworth Jr., MD    RESIDENT SURGEON:  Dr. Crow Parada, PGY 4.    PREOPERATIVE DIAGNOSES:    1. Staph bacteremia.  2. Short gut syndrome.    POSTOPERATIVE DIAGNOSES:    1. Staph bacteremia.  2. Short gut syndrome.    PROCEDURE:  Explant of MediPort from the left subclavian vein.    ANESTHESIA:  LMA.    CONDITION:  Stable.    COMPLICATIONS:  None.    ESTIMATED BLOOD LOSS:  5 cc.    SPECIMEN:  Culture of catheter tip for Gram stain, anaerobic and aerobic, and fungal.    FINDINGS:  Had a little erythema at the stick site of the port, but overall no pus, no purulence around the port.    PROCEDURE IN DETAIL:  After appropriate consents were obtained, the patient was brought back to the operative suite, placed in supine position, placed under LMA anesthesia.  Next, the left chest was prepped and draped in the usual sterile fashion.  At this time, a time-out was done to make sure we had the appropriate patient, appropriate operation, appropriate preoperative medications.  Next, the MediPort was palpated and a local lidocaine 1% with epi was used to anesthetize the skin over the MediPort and then a 15 blade was used to make a transverse incision over the MediPort.  This was then carried down to the MediPort with electrocautery Bovie.  Once we were able to visualize it, we used piercing towel clamps to grasp the MediPort and pull it out.  Two sutures were then cut and then the catheter came out very easily and we cut the tip off and sent it off for cultures.  Next, we then held pressure where the insertion site over the subclavian, and as we were doing this, we closed down the catheter tract with a figure-eight 2-0 Vicryl and then closed down the subcu space with 2-0 Vicryls interrupted and then closed the skin incision with 4-0 Vicryl.  At case end, all counts were correct.  Patient tolerated the procedure well, was ultimately transferred back to the floor in  stable condition.        ______________________________  MD EZIO Beauchamp Jr.  DD:  12/31/2020  Time:  09:56AM  DT:  12/31/2020  Time:  10:34AM  Job #:  [id number]

## 2022-04-30 NOTE — DISCHARGE SUMMARY
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      HOSPITAL COURSE   75-year-old female with a history that includes mesenteric thrombosis resulting in extensive small bowel resection and short gut syndrome, currently dependent on TPN, h/o DVT for which she is on chronic anticoagulation with Coumadin, presents with erythema and swelling of her right upper extremity x 1 day.  History also includes admission back in January at which time she was treated for bacteremia from the Mediport infection.  The Mediport was removed during that admission, and she recently had it replaced 2 days ago.  In the meantime she was receiving TPN via RUE PICC line, which was also removed 2 days at the same time new Mediport was placed.  Patient notes she was off Coumadin the perioperative periods and reports there was no bridging anticoagulation.  Evaluation here in ED has noted RUE swollen and erythematous, however, patient afebrile with normal white count.  RUE US though has noted DVT from the right distal subclavian through the brachial veins.  INR noted to be subtherapeutic INR 1.4 patient was started on a heparin infusion and admitted to hospital medicine services.  Blood cultures were drawn, and she was also started on vancomycin.  blood cultures  remained negative.  Arm has improved drastically.  Antibiotics can be stopped.  She is otherwise stable for discharge.  She will continue on Lovenox Bridge until INR >2.0.  She already has home health who perform INRs, will need to monitor closely over next 1-2 weeks, with a repeat in Q2-3 days.  She resume TPN via Mediport on discharge.    STATUS  Improved    DISPOSITION  Discharge to home health    DIET  TPN + oral diet as previous    ACTIVITY  As tolerated    IMMUNIZATIONS  No immunizations recorded for this visit.    FOLLOW-UP  ATTENTION: Discharging Nurse: If patient is discharged back home, please notify her current HH: [facility] at [phone] at the time she is discharged.  LABS   Needs INR Q 2-3 days for next week, or until INR >2.0      DISCHARGE MEDICATION RECONCILIATION  Prescribed  acetaminophen-oxycodone (Percocet 5/325 oral tablet) 1 tab(s), Oral, q6hr, PRN pain  Continue  buspirone (BuSpar 5 mg oral tablet) 10 mg, Oral, BID  citalopram (CeleXA 40 mg oral tablet) 40 mg, Oral, Daily  denosumab (Prolia) 60 mg, Subcutaneous, q6mo  enoxaparin (Lovenox 80 mg/0.8 mL subcutaneous solution) 80 mg, Subcutaneous, BID  ergocalciferol (ergocalciferol 50,000 intl units (1.25 mg) oral capsule) 50,000 IntUnit, Oral  ferrous gluconate (Ferate) 27 mg, Oral, Daily  metoprolol (metoprolol tartrate 100 mg oral tab) 100 mg, Oral, BID  multivitamin with minerals (PreserVision AREDS 2 oral tablet, chewable) 1 tab(s), Chewed, BID  tramadol (Ultram 50 mg oral tablet) 50 mg, Oral, q6hr, PRN for pain  trazodone (trazodone 50 mg oral tablet ( Desyrel )) 75 mg, Oral, Once a day (at bedtime)  warfarin (Coumadin) 2 mg, Oral, Daily  warfarin (warfarin 1 mg oral tablet) 1 mg, Oral, Daily  Discontinue  multivitamin (Daily Multiple Vitamins), Oral, Daily      PHYSICAL EXAM  Temperature 36.6  (08:03)  Systolic Blood Pressure 170  (08:03)  Diastolic Blood Pressure 82  (08:03)  Pulse 67  (08:03)  SpO2 96  (08:03)  Respiratory Rate 18   (08:03)    GENERAL: awake and in no acute distress  LUNGS: Respirations non-labored  CVS: Normal rate  ABD: Soft, non-tender  EXTREMITIES: peripheral pulses 2+  NEURO: AAOx3  PSYCHIATRIC: Cooperative      Time spent on discharge 35 minutes

## 2022-04-30 NOTE — ED PROVIDER NOTES
Patient:   Laura Acosta            MRN: [id number]            FIN: [id number]               Age:   75 years     Sex:  Female     :  1945   Associated Diagnoses:   Hematuria; Obstruction of left ureteropelvic junction (UPJ) due to stone; Acute pyelonephritis; Fever   Author:   Radha Obrien MD      Basic Information   Additional information: Patient's physician(s): PCP  Dr Sammy Felton, Chief Complaint from Nursing Triage Note : Chief Complaint   2020 23:20 CST     Chief Complaint           Onset Monday with hematuria, saw PCP Dr. Curt Felton for same. lab work neg plan to do CT after the holidays. Onset with fever tonight and headache  .      History of Present Illness   The patient presents with hematuria, Ms. Acosta is a 75-year-old female complaining of hematuria which started on Monday, 6 days ago, and had a urine culture at that time showing Proteus mirabilis sensitive to Bactrim and Rocephin.  She was started on Bactrim.  This evening she started feeling bad and running fever so she presents to the emergency room for evaluation.  She states she had an episode of sepsis In November and did not want to risk getting very sick.  She also has a history of mesenteric ischemic with small bowel resection and is currently on Coumadin.  She has no abdominal pain.  She has some pain in the left upper buttock region which she thinks is unrelated.  She's had no nausea, vomiting, and has chronic diarrhea.  .  The onset was 6  days ago.  The course/duration of symptoms is fluctuating in intensity.  Character of hematuria grossly bloody.  The degree at onset was moderate.  The degree at present is moderate.  The exacerbating factor is none.  The relieving factor is none.  Prior episodes: none.  Therapy today: see med list.  Associated symptoms: dysuria and fever.        Review of Systems   Genitourinary symptoms:  Hematuria.             Additional review of systems information: All other systems  reviewed and otherwise negative.      Health Status   Allergies:    Allergic Reactions (Selected)  Severity Not Documented  Dilaudid- Itching.  Opium- Itching.,    Allergies (2) Active Reaction  dilaudid itching  opium itching  .   Medications:  (Selected)   Inpatient Medications  Pending Complete  Toradol: 30 mg, form: Injection, IV Push, [id number], Order duration: 4 dose(s), first dose 04/26/16 15:00:00 CDT, stop date 04/27/16 11:59:00 CDT, first dose in recovery room, 126,031  midazolam: 1 mg, form: Injection, IV Push, q5min, Order duration: 2 dose(s), first dose 12/27/16 10:30:00 CST, stop date 12/27/16 10:39:00 CST, 30,025  Prescriptions  Prescribed  Bactrim  mg-160 mg oral tablet: 1 tab(s), Oral, BID, X 7 day(s), # 14 tab(s), 0 Refill(s), Pharmacy: [facility], 162.5, cm, Height/Length Dosing, 12/21/20 13:39:00 CST, 70.1, kg, Weight Dosing, 12/21/20 13:39:00 CST  Voltaren 1% topical gel: 1 manuela, TOP, QID, # 100 gm, 0 Refill(s), Pharmacy: [facility] - [place], [place]  Documented Medications  Documented  Celexa 20 mg oral tablet: 20 mg = 1 tab(s), Oral, Daily, # 30 tab(s), 0 Refill(s)  FERATE 27 MG TABLET: 240 mg = 1 tab(s)  Gattex 5 mg subcutaneous kit: 0.05 mg/kg, Subcutaneous, Daily  Prolia: 60 mg, Subcutaneous, q6mo, 0 Refill(s)  WARFARIN SODIUM 1 MG TABLET: See Instructions  atropine-diphenoxylate 0.025 mg-2.5 mg oral tablet: 2 tab(s), Oral, QID  ergocalciferol 50,000 intl units (1.25 mg) oral capsule: 07082 IntUnit = 1 cap(s), Oral, qWeek  lisinopril 10 mg oral tablet: 10 mg = 1 tab(s), Oral, Daily, # 30 tab(s), 0 Refill(s)  metoprolol tartrate 100 mg oral tab: 100 mg = 1 tab(s), Oral, BID, 2 tabs in AM, 1 tabs PM, # 60 tab(s), 0 Refill(s)  traZODONE 50 mg oral tablet ( Desyrel ): 75 mg = 1.5 tab(s), Oral, Once a day (at bedtime).      Past Medical/ Family/ Social History   Medical history:    Resolved  Cataract ([id number]):  Resolved on 8/3/2015 at 69 years.  Comments:  5/2/2015 CDT 13:06 CDT -  SYSTEM  Problem automatically updated based on documentation on Capillary Refills, Brachial Pulses, Radial Pulses, Femoral Pulses, Dorsalis Pulses, Ulnar pulses, Popliteal Pulses, Postibial Pulses, Edemas, Affect/Behavior, Orientation Assessment, Arterial Line Site.  Palpitations ([id number]):  Resolved on 8/3/2015 at 69 years.  Comments:  5/2/2015 CDT 13:06 CDT - SYSTEM  Problem automatically updated based on documentation on Capillary Refills, Brachial Pulses, Radial Pulses, Femoral Pulses, Dorsalis Pulses, Ulnar pulses, Popliteal Pulses, Postibial Pulses, Edemas, Affect/Behavior, Orientation Assessment, Arterial Line Site.  Arthritis ([id number]):  Resolved on 8/3/2015 at 69 years.  Comments:  5/2/2015 CDT 13:06 CDT - SYSTEM  Problem automatically updated based on documentation on Capillary Refills, Brachial Pulses, Radial Pulses, Femoral Pulses, Dorsalis Pulses, Ulnar pulses, Popliteal Pulses, Postibial Pulses, Edemas, Affect/Behavior, Orientation Assessment, Arterial Line Site.  Back injury ([id number]):  Resolved on 8/3/2015 at 69 years.  Comments:  5/2/2015 CDT 13:06 CDT - SYSTEM  Problem automatically updated based on documentation on Capillary Refills, Brachial Pulses, Radial Pulses, Femoral Pulses, Dorsalis Pulses, Ulnar pulses, Popliteal Pulses, Postibial Pulses, Edemas, Affect/Behavior, Orientation Assessment, Arterial Line Site.  Back pain ([id number]):  Resolved on 8/3/2015 at 69 years.  Comments:  5/2/2015 CDT 13:06 CDT - SYSTEM  Problem automatically updated based on documentation on Capillary Refills, Brachial Pulses, Radial Pulses, Femoral Pulses, Dorsalis Pulses, Ulnar pulses, Popliteal Pulses, Postibial Pulses, Edemas, Affect/Behavior, Orientation Assessment, Arterial Line Site.  Meningitis ([id number]):  Resolved on 8/3/2015 at 69 years.  Comments:  5/2/2015 CDT 13:06 CDT - SYSTEM  Problem automatically updated based on documentation on Capillary Refills, Brachial Pulses,  Radial Pulses, Femoral Pulses, Dorsalis Pulses, Ulnar pulses, Popliteal Pulses, Postibial Pulses, Edemas, Affect/Behavior, Orientation Assessment, Arterial Line Site.  Anxiety and depression ([id number]):  Resolved on 8/3/2015 at 69 years.  Comments:  5/2/2015 CDT 13:06 CDT - SYSTEM  Problem automatically updated based on documentation on Capillary Refills, Brachial Pulses, Radial Pulses, Femoral Pulses, Dorsalis Pulses, Ulnar pulses, Popliteal Pulses, Postibial Pulses, Edemas, Affect/Behavior, Orientation Assessment, Arterial Line Site.  Breast cancer ([id number]):  Resolved on 8/3/2015 at 69 years.  Comments:  8/13/2013 CDT 12:29 CDT - Jeansonne RN, Jamie L.  right    5/2/2015 CDT 13:06 CDT - SYSTEM  Problem automatically updated based on documentation on Capillary Refills, Brachial Pulses, Radial Pulses, Femoral Pulses, Dorsalis Pulses, Ulnar pulses, Popliteal Pulses, Postibial Pulses, Edemas, Affect/Behavior, Orientation Assessment, Arterial Line Site.  Hyperlipidemia ([id number]):  Resolved.  Staph infection ([id number]):  Resolved on 8/3/2015 at 69 years.  Comments:  8/13/2013 CDT 12:34 CDT - Jeansonne RN, Jamie L.  under right breast    5/2/2015 CDT 13:06 CDT - SYSTEM  Problem automatically updated based on documentation on Capillary Refills, Brachial Pulses, Radial Pulses, Femoral Pulses, Dorsalis Pulses, Ulnar pulses, Popliteal Pulses, Postibial Pulses, Edemas, Affect/Behavior, Orientation Assessment, Arterial Line Site.    11/1/2016 CDT 13:54 CDT - Jocelyn Kline RN  no symptoms in last 2 years  Gallstone pancreatitis (213475065):  Resolved.  Glasses (4356973321):  Resolved.  Hypertension ([id number]):  Resolved.  Irregular heart rhythm ([id number]):  Resolved.  PVD (peripheral vascular disease) ([id number]):  Resolved.  Able to lie down  (272176175):  Resolved.  Activity tolerance (5250262024):  Resolved.  Ischemic disease of gut (482325576):  Resolved.  Hypercholesterolemia (96785145):  Resolved.  Insomnia (756085003):  Resolved.  Osteoarthritis (2136610632):  Resolved.  Cholelithiasis (230760883):  Resolved.  DVT - Deep vein thrombosis (9796811389):  Resolved.  Diabetes mellitus (328747338):  Resolved.  Needs flu shot (053036625):  Resolved.  Acute URI (10125290):  Resolved.  Laryngitis (81292310):  Resolved.  Electrolyte depletion (29206151):  Resolved.  Leg edema, left (123729580):  Resolved., Reviewed as documented in chart.   Surgical history:    Mediport Removal on 7/23/2020 at 74 Years.  Comments:  7/23/2020 12:59 CDT - Lejeune RN, Doralis Marie  auto-populated from documented surgical case  Catheter Insertion Mediport (None) on 7/23/2020 at 74 Years.  Comments:  7/23/2020 12:59 CDT - Lejeune RN, Doralis Marie  auto-populated from documented surgical case  Colonoscopy (None) on 12/18/2019 at 74 Years.  Comments:  12/18/2019 9:16 Ashley Arredondo  auto-populated from documented surgical case  Biopsy Gastrointestinal (None) on 12/18/2019 at 74 Years.  Comments:  12/18/2019 9:16 Ashley Arredondo  auto-populated from documented surgical case  Polypectomy (None) on 12/18/2019 at 74 Years.  Comments:  12/18/2019 9:16 Ashley Arredondo  auto-populated from documented surgical case  Catheter Insertion Mediport (None) on 12/18/2018 at 73 Years.  Comments:  12/18/2018 14:06 MIC Carmona RN, Beatrice MALDONADO  auto-populated from documented surgical case  85448 - LT CATARACT W/IOL 1 STA PHACO (Left) on 12/27/2016 at 71 Years.  Comments:  12/27/2016 11:46 Margaret Aldridge  auto-populated from documented surgical case  36737 - RT CATARACT W/IOL 1 STA PHACO (Right) on 11/1/2016 at 70 Years.  Comments:  11/1/2016 9:37 CDT - Renee VALENZUELA, Margaret GROVES  auto-populated from documented surgical case  Hernia Repair Ventral (None) on 4/26/2016 at 70  Years.  Comments:  2016 14:17 CDT - Lejeune RN, Monique Violeta  auto-populated from documented surgical case  Exploration Laparotomy (.) on 2015 at 69 Years.  Comments:  2015 10:22 Alma Medrano  auto-populated from documented surgical case  Cholecystectomy Laparoscopic (.) on 2015 at 69 Years.  Comments:  2015 14:59 BAR Bruno RN, Brigitte EDEN  auto-populated from documented surgical case  Endoscopic Retrograde Cholangiopancreatography on 2015 at 69 Years.  Comments:  2015 11:57 Gabriela Craig RN  auto-populated from documented surgical case  Sphincterotomy on 2015 at 69 Years.  Comments:  2015 11:57 Gabriela Craig RN  auto-populated from documented surgical case  Stone Manipulation on 2015 at 69 Years.  Comments:  2015 11:57 Gabriela Craig RN  auto-populated from documented surgical case  Appendectomy ([id number]).  Hysterectomy (949966594).  Colonoscopy (326893766).  EGD.  lump removed from right breast.  right shoulder surgery.  cyst drained.  bowel resection.  Comments:  10/26/2016 11:41 Ligia South RN    peripheral vascular disease surgery.  Comments:  10/26/2016 11:42 Ligia South RN  2016-left leg, Reviewed as documented in chart.   Family history:    Diabetes mellitus  Father ()  Primary malignant neoplasm of breast  Mother ()  Sister  Coronary artery bypass grafts x 3  Brother  Osteoarthritis  Mother ()  Sister  Brain aneurysm....  Father ()  Uterine cancer....  Mother ()  Kidney cancer, primary, with metastasis from kidney to other site....  Sister  Acute myocardial infarction.  Father ()  Hypertension.  Father ()  Sister  Brother  Brother  Depression.  Mother ()  Hyperlipidemia.  Father ()  Sister  Brother  Brother  , Reviewed as documented in chart.   Social history: Alcohol use: Denies, Tobacco use: Denies, Drug  use: Denies.   Problem list:    Active Problems (24)  Anticoagulants causing adverse effect in therapeutic use   Anxiety   Arthritis   Breast cancer   Depression   DVT - Deep vein thrombosis of lower limb   H/O dehydration   HTN (hypertension)   Low serum vitamin D   Malnutrition   Malnutrition   Malnutrition   Malnutrition   Obesity   Palpitations   Post-resection short bowel syndrome   Short bowel syndrome   Shoulder pain   Sinusitis   Staphylococcal infection   Tremor   Tremor of both hands   Weakness   Weakness   , per nurse's notes.      Physical Examination               Vital Signs   Vital Signs   12/27/2020 23:20 CST     Temperature Oral          37.3 DegC                             Temperature Oral (calculated)             99.14 DegF                             Peripheral Pulse Rate     67 bpm                             Respiratory Rate          18 br/min                             SpO2                      97 %                             Oxygen Therapy            Room air                             Systolic Blood Pressure   107 mmHg                             Diastolic Blood Pressure  52 mmHg  LOW  .   Measurements   12/27/2020 23:20 CST     Weight Dosing             67.13 kg                             Weight Measured and Calculated in Lbs     147.99 lb                             Weight Estimated          67.13 kg                             Height/Length Dosing      162.56 cm                             Height/Length Estimated   162.56 cm                             Body Mass Index Estimated 25.4 kg/m2  .   Oxygen saturation.   General:  Alert, no acute distress.    Skin:  Warm, dry.    Eye:  Pupils are equal, round and reactive to light.   Cardiovascular:  Regular rate and rhythm.   Respiratory:  Lungs are clear to auscultation, respirations are non-labored.    Gastrointestinal:  Soft, Nontender, Non distended, Normal bowel sounds, No organomegaly, Mass: Negative.    Musculoskeletal:  Ambulatory  without assistance.   Neurological:  Alert and oriented to person, place, time, and situation, No focal neurological deficit observed.    Psychiatric:  Cooperative, appropriate mood & affect.       Medical Decision Making   Documents reviewed:  Emergency department nurses' notes.   Results review:  Lab results : Lab [facility]   12/28/2020 0:21 CST      Est Creat Clearance Ser   59.96 mL/min    12/27/2020 23:50 CST     Sodium Lvl                134 mmol/L  LOW                             Potassium Lvl             4.1 mmol/L                             Chloride                  104 mmol/L                             CO2                       21 mmol/L  LOW                             Calcium Lvl               8.8 mg/dL                             Glucose Lvl               100 mg/dL                             BUN                       18.4 mg/dL                             Creatinine                0.70 mg/dL                             eGFR-AA                   >60  NA                             eGFR-DELVIS                  >60 mL/min/1.73 m2  NA                             Bili Total                0.8 mg/dL                             Bili Direct               0.4 mg/dL                             Bili Indirect             0.40 mg/dL                             AST                       59 unit/L  HI                             ALT                       42 unit/L                             Alk Phos                  88 unit/L                             Total Protein             7.1 gm/dL                             Albumin Lvl               3.3 gm/dL  LOW                             Globulin                  3.8 gm/dL  HI                             A/G Ratio                 0.9 ratio  LOW                             Lactic Acid Lvl           1.2 mmol/L                             PT                        24.0 sec  HI                             INR                       2.1                             PTT                        53.6 sec  HI                             WBC                       7.3 x10(3)/mcL                             RBC                       3.97 x10(6)/mcL  LOW                             Hgb                       11.6 gm/dL  LOW                             Hct                       35.9 %  LOW                             Platelet                  140 x10(3)/mcL                             MCV                       90.4 fL                             MCH                       29.2 pg                             MCHC                      32.3 gm/dL  LOW                             RDW                       15.7 %                             MPV                       9.9 fL                             Abs Neut                  5.20 x10(3)/mcL                             Neutro Auto               71.2 %                             Lymph Auto                19.6 %                             Mono Auto                 8.6 %                             Eos Auto                  0.1 %                             Abs Eos                   0.01 x10(3)/mcL                             Basophil Auto             0.4 %                             Abs Neutro                5.20 x10(3)/mcL                             Abs Lymph                 1.43 x10(3)/mcL                             Abs Mono                  0.63 x10(3)/mcL                             Abs Baso                  0.03 x10(3)/mcL                             NRBC%                     0.0 %                             IG%                       0.100 %                             IG#                       0.0100                             UA Appear                 TURBID                             UA Color                  RED                             UA Spec Grav              1.025                             UA Bili                   1+                             UA pH                     6.0                             UA Urobilinogen           Negative                              UA Blood                  3+                             UA Glucose                Negative                             UA Ketones                Negative                             UA Protein                2+                             UA Nitrite                Negative                             UA Leuk Est               Negative                             UA WBC                    2-5 /HPF                             UA RBC                    TNTC /HPF                             UA Bacteria               None Seen                             UA Squam Epithelial       None Seen /LPF  .   Radiology results:  CT scan was performed of the abdomen and pelvis with IV contrast which shows a 1.5 x 1.1 cm obstructing stone at the left UPJ junction.  There is surrounding fat stranding of the mucosal wall of the renal pelvis.  There is also diffuse mucosal thickening with enhancement of the left ureter.  There also appears to be multiple loops of small bowel with some wall thickening..      Reexamination/ Reevaluation   Vital signs   Basic Oxygen Information   12/27/2020 23:20 CST     SpO2                      97 %                             Oxygen Therapy            Room air        Impression and Plan   Diagnosis   Hematuria (UZY89-JM R31.9)   Obstruction of left ureteropelvic junction (UPJ) due to stone (CLF53-BP N20.1)   Acute pyelonephritis (YAE19-JU N10)   Fever (PIF40-GD R50.9)      Calls-Consults   -  12/28/2020 02:30:00 , Lurdes PORTER, [place], Case discussed with Dr Chatman.  If medically stable, she can be seen as an outtpatient.  However, pt reports fever and malaise concerning for pyelonephritis, and has a history of sepsis.  Therefore, we will admit to the hospitalist service with consult to Urology.  Hold coumadin and treat with antibiotics..    -  12/28/2020 02:54:00 , Hospitalist, Discussed with [place] nurse practitioner and we will admit this patient to Dr. Cuevas with consult to  Dr Chatman.    Plan   Condition: Stable.    Disposition: Admit time  12/28/2020 02:53:00, Admit to Inpatient Unit.    Counseled: Patient, Regarding diagnosis, Regarding treatment plan, Patient indicated understanding of instructions.

## 2022-04-30 NOTE — OP NOTE
Patient:   Laura Acosta            MRN: [id number]            FIN: [id number]               Age:   75 years     Sex:  Female     :  1945   Associated Diagnoses:   None   Author:   Jared Felix MD      PRE-PROCEDURE DIAGNOSIS:  Left renal stone  UTI  Sepsis    POST-PROCEDURE DIAGNOSIS:  same    PROCEDURE:  Cystourethroscopy with placement of Left double-J ureteral stent    SURGEON:  Jared Felix MD    Anelgsia:  General    COMPLICATIONS:  None.    SPECIMENS:  None    EBL:  miminal    DRAINS:  6-Icelandic x 24 cm right double-J ureteral stent.    INDICATIONS:  75 with history who presents for cystourethroscopy and ureteral  stent placement due to Left renal pelvis stone with pyelonephritis and UTI/sepsis.    SUMMARY:  After adequate general anesthetic, he was carefully positioned in  lithotomy. His arms were  tucked at his sides. All pressure points were carefully padded to prevent neuromuscular injury.  The genitalia were prepped and draped sterile fashion. A time-out was performed with two patient identifiers.    Cystoscopy was then performed. The 21 Icelandic sterile, well lubricated, rigid cystoscope was passed from the urethral meatus to within  the bladder and the lower urinary tract was evaluated in it's entirety using 30 and 70 degree  lenses. The 6-Icelandic open-ended ureteral catheter and Glidewire were advanced into the Left ureter and to the renal pelvis under fluoroscopic guidance. We removed the wire and  performed a retrograde pyelogram. We measured the ureter from the UPJ to the UO and it  measured 24 cm. We then replaced the wire and deployed a double-J stent in a standard  fashion with the proximal curl in the renal pelvis and the distal curl in the bladder. The bladder  was drained with the cystoscope and the cystoscope was removed. The patient tolerated the  procedure well, was extubated, and transported to the recovery room in stable condition. The  family was informed of the outcome  following the procedure.    FINDINGS:  PUrulent urine draining from the left ureteral stent.    CONDITION:  stable    Plan:  Will plan for definitive stone management at a later date typically 1-2 weeks once infection is cleared.

## 2022-04-30 NOTE — OP NOTE
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    PROCEDURE:  MediPort catheter and reservoir insertion, left subclavian approach.    PROCEDURE IN DETAIL:  The patient was placed on the operating table in supine position, administered MAC anesthesia.  The patient's chest wall and cervical regions were prepped and draped in the usual sterile fashion.  Using 0.25% Marcaine with epinephrine, an area along the left infraclavicular region was appropriately anesthetized and then percutaneous placement of a guidewire into the subclavian vein and directed into the right atrium was accomplished and confirmed by fluoroscopy to be in position.  We then injected more local to fashion our subcutaneous pocket for the port using a total of 17 cc.  Incision was then made around the guidewire and our port pocket dissected out in appropriate fashion.  A Vortex 6.6-Fijian MediPort catheter and reservoir system was taken, placed into the pocket, and the catheter cut to the appropriate length.  Using the introducer dilator threaded over the wire, the dilator was removed and the catheter threaded through the introducer sheath and left in position above the superior vena cava.  Good retrograde flow of blood was noted, so it was flushed with heparinized saline and adequately accessed with a Mo heparin lock system.  Once this was accomplished, the pocket was irrigated with antibiotic solution.  The catheter was secured to the fascia with a 3-0 silk suture and then the wound was closed, approximating subcutaneous tissues with interrupted 3-0 Vicryl and the skin with a running subcuticular 4-0 Vicryl, completing the procedure.  Steri-Strips were placed.  The patient  tolerated the procedure well, was awakened and returned to the recovery room in stable condition.        ______________________________  MD REGAN Shetty/GERMÁN  DD:  12/18/2018  Time:  02:15PM  DT:  12/18/2018  Time:  02:32PM  Job #:  [id number]

## 2022-05-01 NOTE — HISTORICAL [facility] CERNER
This is a historical note converted from Ingrid. Formatting and pictures may have been removed.  Please reference Cernga for original formatting and attached multimedia. Chief Complaint  Weakness x 2 days with fever; SBP in 80s at Urgent Care  History of Present Illness  Ms. 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 She initially presented to Urgent Care for complaints of weakness with fever over the last 2 days, tmax 101.0; associated with sore throat, mild cough,?and 3 episodes of urinary incontinence since yesterday.?Urgent Care recommended ER evaluation s/t SBP in the 80s.?Denies flank pain, suprapubic pain, dysuria, Denies chills, night sweats, dizziness, CP, SOB, palpitations, near syncope, syncope, falls, or any exposure to COVID contacts, no recent travel. Reports 3 episodes of urinary incontinence because she was unable to get up in time to use the restroom because of weakness. She lives alone, , two daughters live near by; independent in ADLs and does not use an ambulatory device. [facility]  Initial ER VS: /52, HR 53, respirations 18, temporal artery temperature 35.5, and SPO2 99% on RA.? MP unremarkable, lactic acid 0.6, WBCs 4.2, INR 2.0, ABGs unremarkable, UA: W BC 31, 1+ bacteria, 2+ leukocytes, positive nitrites, 697 RBCs, 3+ blood.? Influenza A/B negative, SARS-CoV-2 not detected. CXR and CT head negative for acute concerns.? She was given ceftriaxone 1 g, Tylenol 1 gm PO, and 1L NS while in the ER.? Shes been admitted to the hospitalist services for further evaluation and management of sepsis s/t UTI.  Review of Systems  Except as documented, all other systems reviewed and negative.  Physical  Exam  Vitals & Measurements  T:?35.5? ?C (Temporal Artery)? HR:?58(Peripheral)? HR:?58(Monitored)? RR:?20? BP:?124/52? SpO2:?98%? WT:?67?kg?  General: Appears comfortable, no acute distress.  Cognition and speech:?Oriented, speech clear and coherent.  HEENT:?Normocephalic, normal hearing, oral mucosa is moist.  Neck:?No JVD, trachea at?midline, supple.  Respiratory:?Lungs CTA.?No accessory muscle use. ?Breath sounds are equal.  Cardiovascular:?Regular rhythm. Normal S1/S2.? No pedal edema.  Gastrointestinal:?Normoactive bowel sound, soft and non-tender.  Genitourinary:No suprapubic tenderness; no CVA tenderness.  Integumentary:?Warm, dry, intact.  Musculoskeletal:?Normal strength, no tenderness, no swelling.  Skin:?Warm and dry, no rashes or lesions.  Neuro:?AAOx3, no focal neurological deficit, normal sensory. Follows commands.  Psych:?Cooperative. Appropriate mood and affect.  ?  Assessment:  Sepsis - s/t UTI  UTI with Hematuria, Urgency?- POA  Generalized Weakness  Hypotension - improved; HX: HTN  Sinus Bradycardia  HX: Short Bowel Syndrome/Malnutritions s/t vascular thrombosis 2015 s/p extensive small bowel resection on TPN and teduglutide  HX: DVT 2014 on Coumadin,?PVD, Venous Insufficiency/Varicose Veins, HLD, Anxiety/Depression  ?  Plan:?  - Influenza A/B negative, SARS-CoV-2 not detected  - CXR and CT head negative for acute concerns  - Urine Culture, Blood Cultures x 2?pending  - Rocephin 1gm IV daily - Day #1  - NS @? 100ml/hr; Coumadin diet as tolerated  ---- Patient unsure of her type of TPN; supplied through [employer]  - PT consulted to eval/treat  - REVIEW/RESUME HOME medications when med list obtained/med rec updated  - Repeat labs in am  ?   Source of history: Self. Medical record.?  Present at bedside: None.?  History limitation: None.  Provider information: PCP:?ROSE Felton MD? Cardiologist: [facility]: VADIM Carroll MD  ?  Code status: Full code  DVT prophylaxis: RESUME HOME Coumadin  for INR goal 2.0-3.0  ?  I, IRVING TaylorC, reviewed and discussed the case with Dr. Deb Lucas. ?  ?-----------------------  Deb Lucas MD  ?   I reviewed the NP documentation, treatment plan, and medical decision making; and I had face-to-face time with this patient.??Labs and imaging were reviewed and I agree with history, physical and medical decision making as detailed above.  ?  Physical exam: AOX3, dry skin and oral mucosa, no CVA or suprapubic tenderness, no pedal edema  ?   Assessment & Plan:  ?   Severe secondary UTI  Pancytopenia  ?   Continue IV ceftriaxone  Check B12?folate?and iron profile  Continue IV fluids  INR therapeutic, resume Coumadin  ?   Critical care time:?35 min was provided in order to assess and manage a high probability of imminent or life-threatening deterioration of cardiorespiratory status.  Critical care diagnosis:?Sepsis secondary to UTI?requiring urgent IV fluid resuscitation IV antibiotics   Problem List/Past Medical History  DVT 2014?-?on Coumadin  PVD  Venous Insufficiency/Varicose Veins  Hypertension  Hyperlipidemia ?  Short?Bowel Syndrome/Malnutrition - on TPN?&?teduglutide  --- s/t vascular thrombosis 2015 with extensive small bowel resection (115cm resected)  NIDDM2 - diet controlled  Depression/Anxiety  Fibromyalgia  Arthritis  Osteoarthritis  Procedure/Surgical History  Catheter Insertion Mediport (None) (07/23/2020)  Mediport Removal (07/23/2020)  Colonoscopy (None) (12/18/2019)  Polypectomy (None) (12/18/2019)  Catheter Insertion Mediport (None) (12/18/2018)  Hernia Repair Ventral (None) (04/26/2016)  Other partial resection of small intestine (06/22/2015)  Cholecystectomy Laparoscopic (.) (05/07/2015)  Sphincterotomy (04/25/2015)  Appendectomy  Hysterectomy  lump removed from right breast  peripheral vascular disease surgery  right shoulder surgery   Medications  Inpatient  No active inpatient medications  Home  atropine-diphenoxylate 0.025 mg-2.5 mg oral tablet, 2  tab(s), Oral, QID  Celexa 20 mg oral tablet, 20 mg= 1 tab(s), Oral, Daily  Claritin 10 mg oral tablet, 10 mg= 1 tab(s), Oral, Daily  ergocalciferol 50,000 intl units (1.25 mg) oral capsule, 73386 IntUnit= 1 cap(s), Oral, qWeek  FERATE 27 MG TABLET, 240 mg= 1 tab(s)  Gattex 5 mg subcutaneous kit, 0.05 mg/kg, Subcutaneous, Daily  lisinopril 10 mg oral tablet, 10 mg= 1 tab(s), Oral, Daily  metoprolol tartrate 100 mg oral tab, 100 mg= 1 tab(s), Oral, BID  Prolia, 60 mg, Subcutaneous, q6mo  traZODONE 50 mg oral tablet ( Desyrel ), 50 mg= 1 tab(s), Oral, BID  Voltaren 1% topical gel, 1 manuela, TOP, QID  WARFARIN SODIUM 1 MG TABLET  Allergies  dilaudid?(itching)  opium?(itching)  Social History  Denies EtOH, illicit drug use, or tobacco use.  Family History  Acute myocardial infarction.: Father.  Brain aneurysm....: Father.  Coronary artery bypass grafts x 3: Brother.  Depression.: Mother.  Diabetes mellitus: Father.  Hyperlipidemia.: Father, Sister, Brother and Brother.  Hypertension.: Father, Sister, Brother and Brother.  Kidney cancer, primary, with metastasis from kidney to other site....: Sister.  Osteoarthritis: Mother and Sister.  Primary malignant neoplasm of breast: Mother and Sister.  Uterine cancer....: Mother.  Lab Results  Labs Last 24 Hours?  ?Chemistry? Hematology/Coagulation? Blood Gases?   Sodium Lvl: 139 mmol/L (11/24/20 16:56:00) WBC:?4.2 x10(3)/mcL?Low (11/24/20 16:56:00) Sample ABG: art (11/24/20 18:13:00)   Potassium Lvl: 4.3 mmol/L (11/24/20 16:56:00) RBC:?4.03 x10(6)/mcL?Low (11/24/20 16:56:00) Treatment: ra (11/24/20 18:13:00)   Chloride: 107 mmol/L (11/24/20 16:56:00) Hgb:?11.5 gm/dL?Low (11/24/20 16:56:00) Site: Radial Lt (11/24/20 18:13:00)   CO2: 25 mmol/L (11/24/20 16:56:00) Hct: 37.7 % (11/24/20 16:56:00) pH Art: 7.42 (11/24/20 18:13:00)   Calcium Lvl:?7.9 mg/dL?Low (11/24/20 16:56:00) Platelet: 136 x10(3)/mcL (11/24/20 16:56:00) pCO2 Art: 37 mmHg (11/24/20 18:13:00)   Glucose Lvl:?81 mg/dL?Low  (11/24/20 16:56:00) MCV: 93.5 fL (11/24/20 16:56:00) pO2 Art: 82 mmHg (11/24/20 18:13:00)   BUN: 13.9 mg/dL (11/24/20 16:56:00) MCH: 28.5 pg (11/24/20 16:56:00) HCO3 Art: 24 mmol/L (11/24/20 18:13:00)   Creatinine: 0.61 mg/dL (11/24/20 16:56:00) MCHC:?30.5 gm/dL?Low (11/24/20 16:56:00) CO2 Totl Art: 25.1 mmol/L (11/24/20 18:13:00)   Est Creat Clearance Ser: 69.87 mL/min (11/24/20 17:41:25) RDW: 14.4 % (11/24/20 16:56:00) O2 Sat Art: 96.2 % (11/24/20 18:13:00)   eGFR-AA: >60 (11/24/20 16:56:00) MPV: 10.2 fL (11/24/20 16:56:00) D base: -0.3 (11/24/20 18:13:00)   eGFR-DELVIS: >60 (11/24/20 16:56:00) Abs Neut: 2.3 x10(3)/mcL (11/24/20 16:56:00) THB ABG:?11.4 gm/dL?Low (11/24/20 18:13:00)   Bili Total: 0.7 mg/dL (11/24/20 16:56:00) Neutro Auto: 55 % (11/24/20 16:56:00) CO Hgb: 000.3 % (11/24/20 18:13:00)   Bili Direct: 0.3 mg/dL (11/24/20 16:56:00) Lymph Auto: 32 % (11/24/20 16:56:00) Met Hgb Art:?0.3 %?Low (11/24/20 18:13:00)   Bili Indirect: 0.4 mg/dL (11/24/20 16:56:00) Mono Auto: 12 % (11/24/20 16:56:00) O2 Hgb: 95.2 % (11/24/20 18:13:00)   AST:?42 unit/L?High (11/24/20 16:56:00) Eos Auto: 1 % (11/24/20 16:56:00) CaO2:?15.3 mL/dL?Low (11/24/20 18:13:00)   ALT: 31 unit/L (11/24/20 16:56:00) Abs Eos: 0 x10(3)/mcL (11/24/20 16:56:00) Ionized Calcium:?1.11 mmol/L?Low (11/24/20 18:13:00)   Alk Phos: 78 unit/L (11/24/20 16:56:00) Basophil Auto: 0 % (11/24/20 16:56:00) Sodium RT:?133 mmol/L?Low (11/24/20 18:13:00)   Total Protein: 6.6 gm/dL (11/24/20 16:56:00) Abs Neutro: 2.3 x10(3)/mcL (11/24/20 16:56:00) Potassium RT: 3.7 mmol/L (11/24/20 18:13:00)   Albumin Lvl:?3.1 gm/dL?Low (11/24/20 16:56:00) Abs Lymph: 1.4 x10(3)/mcL (11/24/20 16:56:00) Allens: N/A (11/24/20 18:13:00)   Globulin: 3.5 gm/dL (11/24/20 16:56:00) Abs Mono: 0.5 x10(3)/mcL (11/24/20 16:56:00) Setting 2 ABG: ra (11/24/20 18:13:00)   A/G Ratio:?0.9 ratio?Low (11/24/20 16:56:00) Abs Baso: 0 x10(3)/mcL (11/24/20 16:56:00)    POC BNP iSTAT:?173 pg/mL?High  (11/24/20 19:39:00)     Lactic Acid Lvl: 0.6 mmol/L (11/24/20 18:53:00)     Troponin-I: <0.010 (11/24/20 16:56:00)     POC Troponin: 0.01 ng/mL (11/24/20 17:44:00)     Diagnostic Results  Order:?XR Chest 2 Views  Report Dt/Tm:?11/24/2020 16:54  IMPRESSION  No acute abnormality of the chest.  ?  Order:?CT Head W/O Contrast  Report Dt/Tm:?11/24/2020 16:14  IMPRESSION:?  1. No acute intracranial abnormalities are identified.

## 2022-05-01 NOTE — HISTORICAL [facility] CERNER
This is a historical note converted from Ingrid. Formatting and pictures may have been removed.  Please reference Cernga for original formatting and attached multimedia. Admit and Discharge Dates  Admit Date: 12/28/2020  Discharge Date: 01/06/2021  Physicians  Attending Physician - Mason PORTER, Natan DUNCAN  Admitting Physician - Mason PORTER, Natan DUNCAN  Consulting Physician - Elvira PORTER, Rafa JENKINS  Consulting Physician - Lurdes PORTER, Lamont  Consulting Physician - Anitha PORTER, Ariel ROSE  Consulting Physician - Kiko PORTER, Neha  Primary Care Physician - Jose Francisco PORTER, Curt MADRID  Discharge Diagnosis  Acute pyelonephritis?N10  CLABSI (central line-associated bloodstream infection)?T80.211A  Fever?R50.9  Hematuria?[id number]  Hematuria?R31.9  Moderate protein-calorie malnutrition?E44.0  Obstruction of left ureteropelvic junction (UPJ) due to stone?N20.1,?Right ureteral stone?N20.1  Surgical Procedures  12/31/2020 - [id number] - Catheter Removal Vascular Access  12/29/2020 - [id number] - Cystoscopy w/ Ureteral Stent  Immunizations  No immunizations recorded for this visit.  Hospital Course  75 year old female with a past medical history that includes DVT on Coumadin, PVD, Venous Insufficiency, HTN, HLD, Short Bowel Syndrome/Malnutritions s/t vascular thrombosis 2015 s/p extensive small bowel resection on TPN and teduglutide, NIDDM2, and Depression/Anxiety. The pt was seen by her PCP, Dr. Sammy Felton on Monday 12/21/20 for fever, bloody urine, and HAs; she was placed on Bactrim for a UTI, however her symptoms worsened therefore she presented to [facility] ED & was later transferred to [facility] for Urology services. The pt reports Tmax of 101.3; she has short bowel syndrome with malnutrition and receives cyclic TPN per left CW Mediport every night which is self-administered. CT of the abdomen & pelvis reveals moderate left hydronephrosis with a 1.5 x 1.1 cm obstructing calculus at the  ureteropelvic junction. ?UA reveals 3+ blood, 2+ protein, and 1+ bilirubin. ?Urine culture from 12/21/2020 reveals 25-50,000 colony counts of Proteus ?mirabilis which is sensitive to ceftriaxone. ?Patient had stent placed to the left ureter. ?She will need follow-up with urology 1 to 2 weeks after discharge. ?She had blood cultures positive for staph epidermidis during his hospital stay. ?She previously had positive blood cultures for staph epi and was treated with IV antibiotics outpatient by Dr. Brice. ?Mediport was felt to be source of her previous infection. ?Surgery was consulted Mediport was removed towards hospital stay. ?PICC line has been placed. ID consulted and appreciated recommends continued vancomycin until 1/10/2021.  ?   Patient is stable of discharge planning on IV vancomycin to complete course on 1/10/2021. May resume home feeds as ordered. To follow up with Dr. ROSE Felix in 1- weeks for L ureter stent placement.  ?   Physical Exam  Alert, cooperative, NAD  Neck, supple  S1S2 rrr no m/r/g  CTAB no w/r/r  BS(+), soft, NDNT, no megaly  No CN deficits, DTR 2+, no unilateral weakness  ?  ?   Diagnosis this admission  Methicillin-resistant Staphylococcus epidermidis bacteremia  Mediport associated bloodstream infection  Obstructive uropathy with left hydronephrosisProteus complicated UTI/bacteriuria  ?  ?   Dispo: Home with A  Condition:Stable  42 minutes spent on discharge  ?  ?  ?  Objective  Vitals & Measurements  T:?36.6? ?C (Oral)? TMIN:?36.6? ?C (Oral)? TMAX:?36.7? ?C (Oral)? HR:?59(Peripheral)? RR:?18? BP:?104/52? SpO2:?98%?   Discharge Medication Reconciliation  Prescribed  vancomycin (vancomycin 1.25 g intravenous injection)?1.25 gm, IV, q12hr  Continue  atropine-diphenoxylate (atropine-diphenoxylate 0.025 mg-2.5 mg oral tablet)?2 tab(s), Oral, QID  citalopram (Celexa 20 mg oral tablet)?20 mg, Oral, Daily  denosumab (Prolia)?60 mg, Subcutaneous, q6mo  diclofenac topical (Voltaren 1% topical  gel)?1 manuela, TOP, QID  ergocalciferol (ergocalciferol 50,000 intl units (1.25 mg) oral capsule)?80197 IntUnit, Oral, qWeek  ferrous gluconate (FERATE 27 MG TABLET)?240 mg  lisinopril (lisinopril 10 mg oral tablet)?10 mg, Oral, Daily  metoprolol (metoprolol tartrate 100 mg oral tab)?100 mg, Oral, BID  teduglutide (Gattex 5 mg subcutaneous kit)?0.05 mg/kg, Subcutaneous, Daily  traZODone (traZODONE 50 mg oral tablet ( Desyrel ))?75 mg, Oral, Once a day (at bedtime)  warfarin (WARFARIN SODIUM 1 MG TABLET)?See Instructions  Education and Orders Provided  Discharge - 01/06/21 11:31:00 CST, Nicolas, C/w Vanco until 1/10/2020HHA?  Follow up  ATTENTION: Discharging Nurse: If patient is discharged back home, please notify her current HH: [facility] at [phone] at time of discharge.  [facility]  ????DISCHARGE NURSE: PLEASE CALL [facility] [phone] WHEN PT IS DISCHARGE  Curt Felton  ????Office will call w/follow up appointment  Jared Felix, on 01/25/2021  ?  Dr. ROSE Felix in 1- weeks for L ureter stent placement.  [employer]  No Qualifying Data

## 2022-05-01 NOTE — HISTORICAL [facility]ER
This is a historical note converted from [employer]. Formatting and pictures may have been removed.  Please reference Ingrid for original formatting and attached multimedia. Chief Complaint  Onset Monday with hematuria, saw PCP Dr. Curt Felton for same. lab work neg plan to do CT after the holidays. Onset with fever tonight and headache  Reason for Consultation  Left renal stone; pyelnephritis  History of Present Illness  Pt was admitted for fever  She has never had a stone; no prior  history  She had a fever of 102 last night  no current pain;  Review of Systems  Constitutional:?no fever, fatigue, weakness  Eye:?no vision loss, eye redness, drainage, or pain  ENMT:?no sore throat, ear pain, sinus pain/congestion, nasal congestion/drainage  Respiratory:?no cough, no wheezing, no shortness of breath  Cardiovascular:?no chest pain, no palpitations, no edema  Gastrointestinal:?no nausea, vomiting, or diarrhea. No abdominal pain  Genitourinary:?no dysuria, no urinary frequency or urgency, no hematuria  Hema/Lymph:?no abnormal bruising or bleeding  Endocrine:?no heat or cold intolerance, no excessive thirst or excessive urination  Musculoskeletal:?no muscle or joint pain, no joint swelling  Integumentary:?no skin rash or abnormal lesion  Neurologic: no headache, no dizziness, no weakness or numbness  ?  Physical Exam  Vitals & Measurements  T:?36.9? ?C (Oral)? TMIN:?36.5? ?C (Oral)? TMAX:?37.3? ?C (Oral)? HR:?71(Peripheral)? RR:?18? BP:?126/69? SpO2:?95%? WT:?67.13?kg? BMI:?25.41?  General:?well-developed well-nourished in no acute distress  Eye: PERRLA, EOMI, clear conjunctiva, eyelids normal  HENT:?TMs/ear canals clear, oropharynx without erythema/exudate, oropharynx and nasal mucosal surfaces moist, no maxillary/frontal sinus tenderness to palpation  Neck: full range of motion, no thyromegaly or lymphadenopathy  Respiratory:?clear to auscultation bilaterally  Cardiovascular:?regular rate and rhythm without murmurs,  gallops or rubs  Gastrointestinal:?soft, non-tender, non-distended with normal bowel sounds, without masses to palpation  Genitourinary: no CVA tenderness to palpation  Musculoskeletal:?full range of motion of all extremities/spine without limitation or discomfort  Integumentary: no rashes or skin lesions present  Neurologic: cranial nerves intact, no signs of peripheral neurological deficit, motor/sensory function intact  ?  Assessment/Plan  Acute pyelonephritis?N10  Discussed with patient the CT findings and  She has had fever and CT shows pyelo  ?  Recommend for left ureteral stent.  Fever?R50.9  Hematuria?R31.9  Obstruction of left ureteropelvic junction (UPJ) due to stone?N20.1,?Right ureteral stone?N20.1  Orders:  NPO After Midnight, 12/29/20 0:01:00 CST, CM NPO  Regular Diet, 12/28/20 15:26:00 CST, Start Meal: Next Meal   Problem List/Past Medical History  Ongoing  Anticoagulants causing adverse effect in therapeutic use  Anxiety  Arthritis  Breast cancer  Depression  DVT - Deep vein thrombosis of lower limb  H/O dehydration  Low serum vitamin D  Malnutrition  Malnutrition  Obesity  Palpitations  Post-resection short bowel syndrome  Short bowel syndrome  Shoulder pain  Sinusitis  Staphylococcal infection  Tremor  Tremor of both hands  Weakness  Weakness  Historical  Able to lie down  Activity tolerance  Acute URI  Cholelithiasis  Diabetes mellitus  DVT - Deep vein thrombosis  Electrolyte depletion  Gallstone pancreatitis  Glasses  Hypercholesterolemia  Hyperlipidemia  Hypertension  Insomnia  Irregular heart rhythm  Ischemic disease of gut  Laryngitis  Leg edema, left  Needs flu shot  Osteoarthritis  PVD (peripheral vascular disease)  Procedure/Surgical History  Introduction of Nutritional Substance into Central Vein, Percutaneous Approach (11/24/2020)  Catheter Insertion Mediport (None) (07/23/2020)  Insertion of peripherally inserted central venous access device, with subcutaneous port; age 5 years or older  (07/23/2020)  Insertion of Totally Implantable Vascular Access Device into Chest Subcutaneous Tissue and Fascia, Open Approach (07/23/2020)  Mediport Removal (07/23/2020)  Port, indwelling (implantable) (07/23/2020)  Biopsy Gastrointestinal (None) (12/18/2019)  Colonoscopy (None) (12/18/2019)  Colonoscopy, flexible; with biopsy, single or multiple (12/18/2019)  Excision of Rectum, Via Natural or Artificial Opening Endoscopic, Diagnostic (12/18/2019)  Excision of Transverse Colon, Via Natural or Artificial Opening Endoscopic, Diagnostic (12/18/2019)  Polypectomy (None) (12/18/2019)  Catheter Insertion Mediport (None) (12/18/2018)  Fluoroscopy of Superior Vena Cava, Guidance (12/18/2018)  Insertion of Infusion Device into Superior Vena Cava, Percutaneous Approach (12/18/2018)  Insertion of Totally Implantable Vascular Access Device into Chest Subcutaneous Tissue and Fascia, Percutaneous Approach (12/18/2018)  Insertion of tunneled centrally inserted central venous access device, with subcutaneous port; age 5 years or older (12/18/2018)  Destruction of Right Lens, Percutaneous Approach (07/11/2017)  Discission of secondary membranous cataract (opacified posterior lens capsule and/or anterior hyaloid); laser surgery (eg, YAG laser) (1 or more stages) (07/11/2017)  Destruction of Left Lens, Percutaneous Approach (06/27/2017)  Discission of secondary membranous cataract (opacified posterior lens capsule and/or anterior hyaloid); laser surgery (eg, YAG laser) (1 or more stages) (06/27/2017)  74726 - LT CATARACT W/IOL 1 STA PHACO (Left) (12/27/2016)  Extracapsular cataract removal with insertion of intraocular lens prosthesis (1 stage procedure), manual or mechanical technique (eg, irrigation and aspiration or phacoemulsification) (12/27/2016)  Replacement of Left Lens with Synthetic Substitute, Percutaneous Approach (12/27/2016)  85614 - RT CATARACT W/IOL 1 STA PHACO (Right) (11/01/2016)  Extracapsular cataract removal  with insertion of intraocular lens prosthesis (1 stage procedure), manual or mechanical technique (eg, irrigation and aspiration or phacoemulsification) (11/01/2016)  Replacement of Right Lens with Synthetic Substitute, Percutaneous Approach (11/01/2016)  Hernia Repair Ventral (None) (04/26/2016)  Implantation of mesh or other prosthesis for open incisional or ventral hernia repair or mesh for closure of debridement for necrotizing soft tissue infection (List separately in addition to code for the incisional or ventral hernia repair) (04/26/2016)  Repair initial incisional or ventral hernia; reducible (04/26/2016)  Supplement Abdominal Wall with Synthetic Substitute, Open Approach (04/26/2016)  Central Venous Catheter Placement with Guidance (06/24/2015)  Exploration Laparotomy (.) (06/22/2015)  Other partial resection of small intestine (06/22/2015)  Other small-to-large intestinal anastomosis (06/22/2015)  Systemic arterial pressure monitoring (06/22/2015)  Cholecystectomy Laparoscopic (.) (05/07/2015)  Laparoscopic cholecystectomy (05/07/2015)  Central Venous Catheter Placement with Guidance (05/01/2015)  Endoscopic removal of stone(s) from biliary tract (04/25/2015)  Endoscopic Retrograde Cholangiopancreatography (04/25/2015)  Endoscopic sphincterotomy and papillotomy (04/25/2015)  Sphincterotomy (04/25/2015)  Stone Manipulation (04/25/2015)  HOSPITAL OBSERVATION SERVICE, PER HOUR (09/18/2014)  Biopsy of skull (08/20/2013)  Appendectomy  bowel resection  Colonoscopy  cyst drained  EGD  Hysterectomy  lump removed from right breast  peripheral vascular disease surgery  right shoulder surgery   Medications  Inpatient  Amino Acids 4.25% with 5% Dextrose and Electrolytes (Clinimix E Sulfite-Free) intravenous solution  cefTRIAXone  Celexa 20 mg oral tablet, 20 mg= 1 tab(s), Oral, Daily  Dextrose 50% and Water (50 mL vial/syringe), 12.5 gm= 25 mL, IV Push, Once, PRN  Dextrose 50% and Water (50 mL vial/syringe), 12.5  gm= 25 mL, IV Push, As Directed, PRN  Dextrose 50% and Water (50 mL vial/syringe), 25 gm= 50 mL, IV Push, As Directed, PRN  Dextrose 50% in Water intravenous solution, 12.5 gm= 25 mL, IV Push, As Directed, PRN  fat emulsion, intravenous, 50 gm= 250 mL, IV Piggyback, qMWF  glucagon, 1 mg= 1 EA, IM, q10min, PRN  glucagon, 1 mg= 1 EA, IM, q10min, PRN  insulin glargine, 10 units= 0.1 mL, Subcutaneous, At Bedtime, PRN  insulin lispro, 2-14 units, Subcutaneous, As Directed, PRN  lisinopril 10 mg oral tablet, 10 mg= 1 tab(s), Oral, Daily  metoprolol tartrate 50 mg oral tab, 50 mg= 1 tab(s), Oral, BID  traZODONE 50 mg oral tablet ( Desyrel ), 75 mg= 1.5 tab(s), Oral, Once a day (at bedtime)  Home  atropine-diphenoxylate 0.025 mg-2.5 mg oral tablet, 2 tab(s), Oral, QID,? ?Not taking  Celexa 20 mg oral tablet, 20 mg= 1 tab(s), Oral, Daily  ergocalciferol 50,000 intl units (1.25 mg) oral capsule, 84607 IntUnit= 1 cap(s), Oral, qWeek  FERATE 27 MG TABLET, 240 mg= 1 tab(s)  Gattex 5 mg subcutaneous kit, 0.05 mg/kg, Subcutaneous, Daily  lisinopril 10 mg oral tablet, 10 mg= 1 tab(s), Oral, Daily  metoprolol tartrate 100 mg oral tab, 100 mg= 1 tab(s), Oral, BID  Prolia, 60 mg, Subcutaneous, q6mo  traZODONE 50 mg oral tablet ( Desyrel ), 75 mg= 1.5 tab(s), Oral, Once a day (at bedtime)  Voltaren 1% topical gel, 1 manuela, TOP, QID,? ?Not taking  WARFARIN SODIUM 1 MG TABLET  Allergies  dilaudid?(itching)  opium?(itching)  Social History  Abuse/Neglect  No, 12/28/2020  No, 12/27/2020  No, 12/21/2020  No, 12/10/2020  No, 11/25/2020  No, 08/05/2020  No, 07/23/2020  No, No, No, 07/21/2020  No, 05/15/2020  No, 02/14/2020  No, 12/18/2019  No, 11/06/2019  No, 07/24/2019  Alcohol - Denies Alcohol Use, 12/25/2012  Past, 12/18/2018  Employment/School  Retired, 12/23/2013  Home/Environment  Lives with Alone. Living situation: Home/Independent. Alcohol abuse in household: No. Substance abuse in household: No. Smoker in household: No.  Injuries/Abuse/Neglect in household: No. Feels unsafe at home: No. Safe place to go: Yes. Agency(s)/Others notified: No. Family/Friends available for support: Yes. Concern for family members at home: No. Major illness in household: No. Financial concerns: No., 12/25/2012  Spiritual/Cultural  Amish, 07/23/2020  Substance Use - Denies Substance Abuse, 12/25/2012  Never, 10/26/2016  Tobacco - Denies Tobacco Use, 12/25/2012  Never (less than 100 in lifetime), N/A, 12/28/2020  Never (less than 100 in lifetime), No, 12/27/2020  Never (less than 100 in lifetime), N/A, 12/21/2020  Never (less than 100 in lifetime), N/A, 12/10/2020  Never (less than 100 in lifetime), N/A, 11/25/2020  Never (less than 100 in lifetime), N/A, 08/05/2020  Never (less than 100 in lifetime), N/A, 07/23/2020  Never (less than 100 in lifetime), N/A, 07/21/2020  Never (less than 100 in lifetime), N/A, 05/15/2020  Never (less than 100 in lifetime), N/A, 02/14/2020  Never (less than 100 in lifetime), N/A, 12/18/2019  Never (less than 100 in lifetime), N/A, 11/06/2019  Never (less than 100 in lifetime), N/A, 07/24/2019  Never (less than 100 in lifetime), N/A, 05/06/2019  Never (less than 100 in lifetime), N/A, 04/24/2019  Never (less than 100 in lifetime), N/A, 02/06/2019  Never (less than 100 in lifetime), N/A, 01/02/2019  Never smoker, N/A, 12/18/2018  Family History  Acute myocardial infarction: Negative: Father.  Acute myocardial infarction.: Father.  Brain aneurysm....: Father.  Coronary artery bypass grafts x 3: Brother.  Depression.: Mother.  Diabetes mellitus: Father.  Heart disease......: Negative: Father.  Hyperlipidemia.: Father, Sister, Brother and Brother.  Hypertension.: Father, Sister, Brother and Brother.  Kidney cancer, primary, with metastasis from kidney to other site....: Sister.  Osteoarthritis: Mother and Sister.  Primary malignant neoplasm of breast: Mother and Sister.  Uterine cancer....: Mother.  Immunizations  Vaccine Date  Status   influenza virus vaccine, inactivated 11/05/2020 Given   influenza virus vaccine, inactivated 11/06/2019 Given   influenza virus vaccine, inactivated 10/09/2018 Given   pneumococcal 23-polyvalent vaccine 02/02/2017 Recorded   pneumococcal 13-valent conjugate vaccine 01/27/2015 Recorded

## 2022-05-01 NOTE — HISTORICAL [facility] CERNER
This is a historical note converted from [employer]. Formatting and pictures may have been removed.  Please reference [employer] for original formatting and attached multimedia. Chief Complaint  Hematuria,?fever, headache  History of Present Illness  The pt is a 75 year old female with a past medical history that includes DVT on Coumadin, PVD, Venous Insufficiency, HTN, HLD, Short Bowel Syndrome/Malnutritions s/t vascular thrombosis 2015 s/p extensive small bowel resection on TPN and teduglutide, NIDDM2, and Depression/Anxiety. The pt was seen by her PCP, Dr. Sammy Felton on Monday 12/21/20 for fever, bloody urine, and HAs; she was placed on Bactrim for a UTI, however her symptoms worsened therefore she presented to [facility] ED & was later transferred to [facility] main campus for Urology services. The pt reports Tmax of 101.3;?she has short bowel syndrome with malnutrition and receives cyclic TPN per left CW Mediport every night which is self-administered.?CT of the abdomen & pelvis reveals moderate left hydronephrosis with a 1.5 x 1.1 cm obstructing calculus at the ureteropelvic junction.? UA reveals 3+?blood, 2+ protein, and 1+ bilirubin. ?Urine culture?from 12/21/2020 reveals 25-50,000 colony counts of Proteus? mirabilis which is sensitive to ceftriaxone.  ?  Review of Systems  Except as documented all other systems reviewed & are negative  Physical Exam  Vitals & Measurements  T:?36.7? ?C (Oral)? TMIN:?36.7? ?C (Oral)? TMAX:?37.3? ?C (Oral)? HR:?63(Peripheral)? RR:?17? BP:?127/60? SpO2:?97%? WT:?67.13?kg?  General:?Elderly  female lying in?bed, no acute distress  HEENT:?Normocephalic, atraumatic, sclerae nonicteric  Neck:?supple, no JVD  Chest: BBS with occ. crackles to lower lobes, otherwise CTA  Heart: RRR without murmur, S1, S2  Abdomen: soft, nontender with active bowel sounds  Extremities: No CCE  Integumentary: Intact without rashes or bruises  Neuro: Awake, alert, oriented x 3, speech is clear; follows all  simple commands; Nathen equally spont & command; no neuro deficits  Psychiatric:?Pleasant, cooperative  ?  Assessment/Plan  ?  Acute pyelonephritis  -Consult urologist Dr. Chatman  -Ceftriaxone IVPB daily  -gentle hydration with isotonic crystalloids  ?  moderate left hydronephrosis with a 1.5 x 1.1 cm obstructing calculus at the ureteropelvic junction  -Consult urologist ?Dr. Chatman  ?  ?   Short bowel syndrome with malnutrition / vascular thrombosis 2015 s/p extensive small bowel resection -?receives cyclic TPN? qhs at home  ?   HX: DVT 2014 on Coumadin  ?   PVD, Venous Insufficiency/Varicose Veins  ?   HLD, Anxiety/Depression  ?  ?  ?  ?  PCP: Dr. Sammy Felton  Code status: Full  ?  I, Radha Patton, NP have discussed this case with Dr. Dorantes.  ?  Patient seen/examined personally by me. ?Case discussed with NP. ?75-year-old female with?history of DVT, short bowel syndrome,?anxiety/depression, diabetes mellitus type 2,?chronic TPN therapy transferred from?[facility] for urology evaluation. ?Patient was noted to have?hematuria?and fever. ?CT scan at outside facility showed a?1.5 x 1.1 cm left?ureteral stone with obstruction at the UPJ.? She denies pain at present. ?She has been placed on IV antibiotic therapy.? Nutrition has been consulted for evening TPN therapy.  General:?Female in no acute distress  Respiratory:?clear to auscultation bilaterally  Cardiovascular:?regular rate and rhythm? no edema  Gastrointestinal:?soft, non-tender, non-distended with normal bowel sounds,  Neurologic: cranial nerves intact, no focal deficits,  ?   -Urology evaluation  -Continue IV Rocephin, follow-up?blood cultures, recent urine culture showed evidence of Proteus sensitive to Rocephin  -Continue TPN per nutrition recommendations  -Hold anticoagulation?pending?procedure  -Continue/scale insulin while n.p.o.  -IV fluids, pain control.  -Agree with exam/plan as documented by NP.   Problem List/Past Medical  History  Ongoing  Anticoagulants causing adverse effect in therapeutic use  Anxiety  Arthritis  Breast cancer  Depression  DVT - Deep vein thrombosis of lower limb  H/O dehydration  Low serum vitamin D  Malnutrition  Malnutrition  Obesity  Palpitations  Post-resection short bowel syndrome  Short bowel syndrome  Shoulder pain  Sinusitis  Staphylococcal infection  Tremor  Tremor of both hands  Weakness  Weakness  Historical  Able to lie down  Activity tolerance  Acute URI  Cholelithiasis  Diabetes mellitus  DVT - Deep vein thrombosis  Electrolyte depletion  Gallstone pancreatitis  Glasses  Hypercholesterolemia  Hyperlipidemia  Hypertension  Insomnia  Irregular heart rhythm  Ischemic disease of gut  Laryngitis  Leg edema, left  Needs flu shot  Osteoarthritis  PVD (peripheral vascular disease)  Procedure/Surgical History  Introduction of Nutritional Substance into Central Vein, Percutaneous Approach (11/24/2020)  Catheter Insertion Mediport (None) (07/23/2020)  Insertion of peripherally inserted central venous access device, with subcutaneous port; age 5 years or older (07/23/2020)  Insertion of Totally Implantable Vascular Access Device into Chest Subcutaneous Tissue and Fascia, Open Approach (07/23/2020)  Mediport Removal (07/23/2020)  Port, indwelling (implantable) (07/23/2020)  Biopsy Gastrointestinal (None) (12/18/2019)  Colonoscopy (None) (12/18/2019)  Colonoscopy, flexible; with biopsy, single or multiple (12/18/2019)  Excision of Rectum, Via Natural or Artificial Opening Endoscopic, Diagnostic (12/18/2019)  Excision of Transverse Colon, Via Natural or Artificial Opening Endoscopic, Diagnostic (12/18/2019)  Polypectomy (None) (12/18/2019)  Catheter Insertion Mediport (None) (12/18/2018)  Fluoroscopy of Superior Vena Cava, Guidance (12/18/2018)  Insertion of Infusion Device into Superior Vena Cava, Percutaneous Approach (12/18/2018)  Insertion of Totally Implantable Vascular Access Device into Chest Subcutaneous  Tissue and Fascia, Percutaneous Approach (12/18/2018)  Insertion of tunneled centrally inserted central venous access device, with subcutaneous port; age 5 years or older (12/18/2018)  Destruction of Right Lens, Percutaneous Approach (07/11/2017)  Discission of secondary membranous cataract (opacified posterior lens capsule and/or anterior hyaloid); laser surgery (eg, YAG laser) (1 or more stages) (07/11/2017)  Destruction of Left Lens, Percutaneous Approach (06/27/2017)  Discission of secondary membranous cataract (opacified posterior lens capsule and/or anterior hyaloid); laser surgery (eg, YAG laser) (1 or more stages) (06/27/2017)  42464 - LT CATARACT W/IOL 1 STA PHACO (Left) (12/27/2016)  Extracapsular cataract removal with insertion of intraocular lens prosthesis (1 stage procedure), manual or mechanical technique (eg, irrigation and aspiration or phacoemulsification) (12/27/2016)  Replacement of Left Lens with Synthetic Substitute, Percutaneous Approach (12/27/2016)  53126 - RT CATARACT W/IOL 1 STA PHACO (Right) (11/01/2016)  Extracapsular cataract removal with insertion of intraocular lens prosthesis (1 stage procedure), manual or mechanical technique (eg, irrigation and aspiration or phacoemulsification) (11/01/2016)  Replacement of Right Lens with Synthetic Substitute, Percutaneous Approach (11/01/2016)  Hernia Repair Ventral (None) (04/26/2016)  Implantation of mesh or other prosthesis for open incisional or ventral hernia repair or mesh for closure of debridement for necrotizing soft tissue infection (List separately in addition to code for the incisional or ventral hernia repair) (04/26/2016)  Repair initial incisional or ventral hernia; reducible (04/26/2016)  Supplement Abdominal Wall with Synthetic Substitute, Open Approach (04/26/2016)  Central Venous Catheter Placement with Guidance (06/24/2015)  Exploration Laparotomy (.) (06/22/2015)  Other partial resection of small intestine (06/22/2015)  Other  small-to-large intestinal anastomosis (06/22/2015)  Systemic arterial pressure monitoring (06/22/2015)  Cholecystectomy Laparoscopic (.) (05/07/2015)  Laparoscopic cholecystectomy (05/07/2015)  Central Venous Catheter Placement with Guidance (05/01/2015)  Endoscopic removal of stone(s) from biliary tract (04/25/2015)  Endoscopic Retrograde Cholangiopancreatography (04/25/2015)  Endoscopic sphincterotomy and papillotomy (04/25/2015)  Sphincterotomy (04/25/2015)  Stone Manipulation (04/25/2015)  HOSPITAL OBSERVATION SERVICE, PER HOUR (09/18/2014)  Biopsy of skull (08/20/2013)  Appendectomy  bowel resection  Colonoscopy  cyst drained  EGD  Hysterectomy  lump removed from right breast  peripheral vascular disease surgery  right shoulder surgery   Medications  Inpatient  No active inpatient medications  Home  atropine-diphenoxylate 0.025 mg-2.5 mg oral tablet, 2 tab(s), Oral, QID,? ?Not taking  Bactrim  mg-160 mg oral tablet, 1 tab(s), Oral, BID  Celexa 20 mg oral tablet, 20 mg= 1 tab(s), Oral, Daily  ergocalciferol 50,000 intl units (1.25 mg) oral capsule, 70764 IntUnit= 1 cap(s), Oral, qWeek  FERATE 27 MG TABLET, 240 mg= 1 tab(s)  Gattex 5 mg subcutaneous kit, 0.05 mg/kg, Subcutaneous, Daily  lisinopril 10 mg oral tablet, 10 mg= 1 tab(s), Oral, Daily  metoprolol tartrate 100 mg oral tab, 100 mg= 1 tab(s), Oral, BID  Prolia, 60 mg, Subcutaneous, q6mo  traZODONE 50 mg oral tablet ( Desyrel ), 75 mg= 1.5 tab(s), Oral, Once a day (at bedtime)  Voltaren 1% topical gel, 1 manuela, TOP, QID,? ?Not taking  WARFARIN SODIUM 1 MG TABLET  Allergies  dilaudid?(itching)  opium?(itching)  Social History  Abuse/Neglect  No, 12/28/2020  No, 12/27/2020  No, 12/21/2020  No, 12/10/2020  No, 11/25/2020  No, 08/05/2020  No, 07/23/2020  No, No, No, 07/21/2020  No, 05/15/2020  No, 02/14/2020  No, 12/18/2019  No, 11/06/2019  No, 07/24/2019  Alcohol - Denies Alcohol Use, 12/25/2012  Past, 12/18/2018  Employment/School  Retired,  12/23/2013  Home/Environment  Lives with Alone. Living situation: Home/Independent. Alcohol abuse in household: No. Substance abuse in household: No. Smoker in household: No. Injuries/Abuse/Neglect in household: No. Feels unsafe at home: No. Safe place to go: Yes. Agency(s)/Others notified: No. Family/Friends available for support: Yes. Concern for family members at home: No. Major illness in household: No. Financial concerns: No., 12/25/2012  Spiritual/Cultural  Lutheran, 07/23/2020  Substance Use - Denies Substance Abuse, 12/25/2012  Never, 10/26/2016  Tobacco - Denies Tobacco Use, 12/25/2012  Never (less than 100 in lifetime), N/A, 12/28/2020  Never (less than 100 in lifetime), No, 12/27/2020  Never (less than 100 in lifetime), N/A, 12/21/2020  Never (less than 100 in lifetime), N/A, 12/10/2020  Never (less than 100 in lifetime), N/A, 11/25/2020  Never (less than 100 in lifetime), N/A, 08/05/2020  Never (less than 100 in lifetime), N/A, 07/23/2020  Never (less than 100 in lifetime), N/A, 07/21/2020  Never (less than 100 in lifetime), N/A, 05/15/2020  Never (less than 100 in lifetime), N/A, 02/14/2020  Never (less than 100 in lifetime), N/A, 12/18/2019  Never (less than 100 in lifetime), N/A, 11/06/2019  Never (less than 100 in lifetime), N/A, 07/24/2019  Never (less than 100 in lifetime), N/A, 05/06/2019  Never (less than 100 in lifetime), N/A, 04/24/2019  Never (less than 100 in lifetime), N/A, 02/06/2019  Never (less than 100 in lifetime), N/A, 01/02/2019  Never smoker, N/A, 12/18/2018  Family History  Acute myocardial infarction: Negative: Father.  Acute myocardial infarction.: Father.  Brain aneurysm....: Father.  Coronary artery bypass grafts x 3: Brother.  Depression.: Mother.  Diabetes mellitus: Father.  Heart disease......: Negative: Father.  Hyperlipidemia.: Father, Sister, Brother and Brother.  Hypertension.: Father, Sister, Brother and Brother.  Kidney cancer, primary, with metastasis from kidney to  other site....: Sister.  Osteoarthritis: Mother and Sister.  Primary malignant neoplasm of breast: Mother and Sister.  Uterine cancer....: Mother.  Immunizations  Vaccine Date Status   influenza virus vaccine, inactivated 11/05/2020 Given   influenza virus vaccine, inactivated 11/06/2019 Given   influenza virus vaccine, inactivated 10/09/2018 Given   pneumococcal 23-polyvalent vaccine 02/02/2017 Recorded   pneumococcal 13-valent conjugate vaccine 01/27/2015 Recorded   Lab Results  Labs Last 24 Hours?  ?Chemistry? Hematology/Coagulation?   Sodium Lvl:?134 mmol/L?Low (12/27/20 23:50:00) PT:?24 sec?High (12/27/20 23:50:00)   Potassium Lvl: 4.1 mmol/L (12/27/20 23:50:00) INR: 2.1 (12/27/20 23:50:00)   Chloride: 104 mmol/L (12/27/20 23:50:00) PTT:?53.6 sec?High (12/27/20 23:50:00)   CO2:?21 mmol/L?Low (12/27/20 23:50:00) WBC: 7.3 x10(3)/mcL (12/27/20 23:50:00)   Calcium Lvl: 8.8 mg/dL (12/27/20 23:50:00) RBC:?3.97 x10(6)/mcL?Low (12/27/20 23:50:00)   Glucose Lvl: 100 mg/dL (12/27/20 23:50:00) Hgb:?11.6 gm/dL?Low (12/27/20 23:50:00)   BUN: 18.4 mg/dL (12/27/20 23:50:00) Hct:?35.9 %?Low (12/27/20 23:50:00)   Creatinine: 0.7 mg/dL (12/27/20 23:50:00) Platelet: 140 x10(3)/mcL (12/27/20 23:50:00)   Est Creat Clearance Ser: 59.96 mL/min (12/28/20 00:21:29) MCV: 90.4 fL (12/27/20 23:50:00)   eGFR-AA: >60 (12/27/20 23:50:00) MCH: 29.2 pg (12/27/20 23:50:00)   eGFR-DELVIS: >60 (12/27/20 23:50:00) MCHC:?32.3 gm/dL?Low (12/27/20 23:50:00)   Bili Total: 0.8 mg/dL (12/27/20 23:50:00) RDW: 15.7 % (12/27/20 23:50:00)   Bili Direct: 0.4 mg/dL (12/27/20 23:50:00) MPV: 9.9 fL (12/27/20 23:50:00)   Bili Indirect: 0.4 mg/dL (12/27/20 23:50:00) Abs Neut: 5.2 x10(3)/mcL (12/27/20 23:50:00)   AST:?59 unit/L?High (12/27/20 23:50:00) Neutro Auto: 71.2 % (12/27/20 23:50:00)   ALT: 42 unit/L (12/27/20 23:50:00) Lymph Auto: 19.6 % (12/27/20 23:50:00)   Alk Phos: 88 unit/L (12/27/20 23:50:00) Mono Auto: 8.6 % (12/27/20 23:50:00)   Total Protein: 7.1  gm/dL (12/27/20 23:50:00) Eos Auto: 0.1 % (12/27/20 23:50:00)   Albumin Lvl:?3.3 gm/dL?Low (12/27/20 23:50:00) Abs Eos: 0.01 x10(3)/mcL (12/27/20 23:50:00)   Globulin:?3.8 gm/dL?High (12/27/20 23:50:00) Basophil Auto: 0.4 % (12/27/20 23:50:00)   A/G Ratio:?0.9 ratio?Low (12/27/20 23:50:00) Abs Neutro: 5.2 x10(3)/mcL (12/27/20 23:50:00)   Lactic Acid Lvl: 1.2 mmol/L (12/27/20 23:50:00) Abs Lymph: 1.43 x10(3)/mcL (12/27/20 23:50:00)    Abs Mono: 0.63 x10(3)/mcL (12/27/20 23:50:00)    Abs Baso: 0.03 x10(3)/mcL (12/27/20 23:50:00)    NRBC%: 0.0 (12/27/20 23:50:00)    IG%: 0.1 % (12/27/20 23:50:00)    IG#: 0.01 (12/27/20 23:50:00)   Diagnostic Results  Accession:?[id number]  Order:?CT Abdomen and Pelvis W Contrast  Report Dt/Tm:?12/28/2020 08:25  Report:?  Technique:CT of the abdomen and pelvis was performed with axial images  as well as sagittal and coronal reconstruction images with intravenous  contrast.   ?  Clinical History:BLOOD IN URINE.  ?  Comparison: April 12, 2016  ?  Thorax:There is a 3 x 2.4 cm peripherally calcified hypodense nodule  in the medial aspect of the right breast, as previously described, and  incompletely imaged.  Lungs:There is mild nonspecific dependent change at the lung bases.  Pleura:No effusions or thickening.  ?  Abdomen:  Abdominal Wall:Surgical clips are noted along the midline ventral mid  abdominal wall, likely related to hernia repair. There is a small fat  containing infraumbilical hernia. The prior report mentioned a portion  of the transverse colon within the hernia sac without evidence of  obstruction.  Liver:Unremarkable appearing liver.  Biliary System:No intrahepatic or extrahepatic biliary duct dilatation  is seen.  Gallbladder:Surgical clips are seen in the gallbladder fossa  consistent with prior cholecystectomy.  Pancreas: There were prior small hypodensities within the head of the  pancreas, resembling pseudocysts or necrosis, not appreciated on the  current  examination.  Spleen:Unremarkable appearing spleen.  Adrenals:The adrenal glands appear unremarkable.  Kidneys:The right kidney appears unremarkable with no stones cysts  masses or hydronephrosis. There is moderate left hydronephrosis with a  1.5 x 1.1 cm obstructing calculus at the ureteropelvic junction  (series 2, image 30). There is surrounding fat stranding with mucosal  wall enhancement of the renal pelvis. No evidence of hydroureter.  There is diffuse mucosal thickening with enhancement of the left  ureter, predominantly in its proximal and mid segments, of concern for  pyeloureteritis. There is a 4 mm nonobstructing left renal calculus in  the upper pole.?  Bowel:There is partial right colectomy with ileocolic anastomosis in  the right lower abdomen. No evidence of bowel obstruction.  Esophagus:The visualized distal esophagus appears unremarkable.  Stomach:The stomach appears unremarkable.  Duodenum:Unremarkable appearing duodenum.  Small Bowel:Multiple loops of small bowel appear to demonstrate some  mural enhancement and element of enteritis is not excluded.  Colon:There are multiple colonic diverticula without evidence of  diverticulitis.  Peritoneum:No free air and no ascites.  ?  Pelvis:  Bladder:The bladder is nondistended but appears otherwise  unremarkable.  Phleboliths:Multiple bladder base.  Female:  Uterus:The uterus is not identified. Correlate with surgical history.  Ovaries:No adnexal masses are seen.  ?  Bony structures:  Dorsal Spine:There is spondylosis of the visualized dorsal spine.?  ??  Impression:  ?  1. There is a 3 x 2.4 cm peripherally calcified hypodense nodule in  the medial aspect of the right breast, unchanged.  2. There is moderate left hydronephrosis with a 1.5 x 1.1 cm  obstructing calculus at the ureteropelvic junction (series 2, image  30). There is surrounding fat stranding with mucosal wall enhancement  of the renal pelvis. No evidence of hydroureter. There is  diffuse  mucosal thickening with enhancement of the left ureter, predominantly  in its proximal and mid segments, of concern for pyeloureteritis.  Correlate with clinical and laboratory finding and followup.  3. Details as above.  ?  No significant discrepancy with overnight report.  ?

## 2022-05-01 NOTE — HISTORICAL [facility] CERNER
This is a historical note converted from [employer]. Formatting and pictures may have been removed.  Please reference [employer] for original formatting and attached multimedia. Chief Complaint  c/o right arm pain/swelling started yesterday, had PICC taken out on Thurs. 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 The Mediport was removed during that admission, and she recently had it replaced 2 days ago.? In the meantime she had a right upper extremity PICC line which was removed 2 days ago but has developed erythema and swelling of her right upper extremity symptoms and presented to the ER for further evaluation.? A venous ultrasound was obtained and showed a deep vein thrombosis in the right distal subclavian through brachial veins, and a superficial thrombosis in the right basilic and cephalic vein.? She was afebrile and hemodynamically stable on arrival without systemic signs of an infectious process, laboratory work only noted a subtherapeutic INR.? She was evaluated by surgery in the ER with recommendations for?admission?to hospitalist service for management of venous thrombosis in right upper extremity cellulitis, and well follow along and determine the need for Mediport removal based on blood culture results.  Review of Systems  Except as documented, all other ROS reviewed and negative  Physical Exam  Vitals & Measurements  T:?37.0? ?C (Oral)? HR:?55(Peripheral)? RR:?16? BP:?121/48? SpO2:?99%? WT:?71?kg?  GENERAL: awake, alert, oriented and in no acute distress  HEENT: normocephalic atraumatic?  NECK: supple?  LUNGS: CTA B/L, no rales or rhonchi, moving  air well without resp distress  CVS: Regular rate and rhythm, normal peripheral perfusion  ABD: Soft, non-tender, non-distended, bowel sounds present  EXTREMITIES: no clubbing or cyanosis?erythema on?the flexor surface of right upper extremity, no induration  SKIN: Warm, dry.?  NEURO: alert and oriented, grossly without focal deficits?  PSYCHIATRIC: Cooperative  ?  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   - BCX, vanc  - heparin gtt and hold Coumadin?for now?pending surgical disposition  - Resume home medications as appropriate  - General surgery following to determine need for Mediport removal pending blood culture results  ?   DVT prophylaxis: heparin gtt  CODE STATUS: Full code  PCP:?non-staff  ?   If patient has been admitted under observation status,?it is with my approval and the patient is admitted under my care.  At least 55 minutes have been spent?on above H&P. 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  History of DVT on chronic anticoagulation with Coumadin  Anxiety/depression  Procedure/Surgical History  Catheter Insertion Mediport (None) (06/24/2021)  PICC Line Removal (Right) (06/24/2021)  Port, indwelling (implantable) (06/24/2021)  Cystoscopy w/ Ureteral Stent Removal (Left) (04/16/2021)  Fragmentation in Left Kidney Pelvis, External Approach (04/16/2021)  Lithotripsy, extracorporeal shock wave (04/16/2021)  Fragmentation in Left Kidney Pelvis, External Approach (03/08/2021)  Lithotripsy (Left) (03/08/2021)  Lithotripsy, extracorporeal  shock wave (03/08/2021)  Catheter Removal Vascular Access (Left) (12/31/2020)  Insertion of Infusion Device into Superior Vena Cava, Percutaneous Approach (12/31/2020)  Removal of Infusion Device from Upper Vein, External Approach (12/31/2020)  Removal of Totally Implantable Vascular Access Device from Trunk Subcutaneous Tissue and Fascia, Open Approach (12/31/2020)  Insertion of Infusion Device into Left Basilic Vein, Percutaneous Approach (12/30/2020)  Cystoscopy w/ Ureteral Stent (Left) (12/29/2020)  Dilation of Left Kidney Pelvis with Intraluminal Device, Via Natural or Artificial Opening Endoscopic (12/29/2020)  Introduction of Nutritional Substance into Central Vein, Percutaneous Approach (11/24/2020)  Catheter Insertion Mediport (None) (07/23/2020)  Insertion of peripherally inserted central venous access device, with subcutaneous port; age 5 years or older (07/23/2020)  Insertion of Totally Implantable Vascular Access Device into Chest Subcutaneous Tissue and Fascia, Open Approach (07/23/2020)  Mediport Removal (07/23/2020)  Port, indwelling (implantable) (07/23/2020)  Biopsy Gastrointestinal (None) (12/18/2019)  Colonoscopy (None) (12/18/2019)  Colonoscopy, flexible; with biopsy, single or multiple (12/18/2019)  Excision of Rectum, Via Natural or Artificial Opening Endoscopic, Diagnostic (12/18/2019)  Excision of Transverse Colon, Via Natural or Artificial Opening Endoscopic, Diagnostic (12/18/2019)  Polypectomy (None) (12/18/2019)  Catheter Insertion Mediport (None) (12/18/2018)  Fluoroscopy of Superior Vena Cava, Guidance (12/18/2018)  Insertion of Infusion Device into Superior Vena Cava, Percutaneous Approach (12/18/2018)  Insertion of Totally Implantable Vascular Access Device into Chest Subcutaneous Tissue and Fascia, Percutaneous Approach (12/18/2018)  Insertion of tunneled centrally inserted central venous access device, with subcutaneous port; age 5 years or older (12/18/2018)  Destruction of Right  Lens, Percutaneous Approach (07/11/2017)  Discission of secondary membranous cataract (opacified posterior lens capsule and/or anterior hyaloid); laser surgery (eg, YAG laser) (1 or more stages) (07/11/2017)  Destruction of Left Lens, Percutaneous Approach (06/27/2017)  Discission of secondary membranous cataract (opacified posterior lens capsule and/or anterior hyaloid); laser surgery (eg, YAG laser) (1 or more stages) (06/27/2017)  01671 - LT CATARACT W/IOL 1 STA PHACO (Left) (12/27/2016)  Extracapsular cataract removal with insertion of intraocular lens prosthesis (1 stage procedure), manual or mechanical technique (eg, irrigation and aspiration or phacoemulsification) (12/27/2016)  Replacement of Left Lens with Synthetic Substitute, Percutaneous Approach (12/27/2016)  72994 - RT CATARACT W/IOL 1 STA PHACO (Right) (11/01/2016)  Extracapsular cataract removal with insertion of intraocular lens prosthesis (1 stage procedure), manual or mechanical technique (eg, irrigation and aspiration or phacoemulsification) (11/01/2016)  Replacement of Right Lens with Synthetic Substitute, Percutaneous Approach (11/01/2016)  Hernia Repair Ventral (None) (04/26/2016)  Implantation of mesh or other prosthesis for open incisional or ventral hernia repair or mesh for closure of debridement for necrotizing soft tissue infection (List separately in addition to code for the incisional or ventral hernia repair) (04/26/2016)  Repair initial incisional or ventral hernia; reducible (04/26/2016)  Supplement Abdominal Wall with Synthetic Substitute, Open Approach (04/26/2016)  Central Venous Catheter Placement with Guidance (06/24/2015)  Exploration Laparotomy (.) (06/22/2015)  Other partial resection of small intestine (06/22/2015)  Other small-to-large intestinal anastomosis (06/22/2015)  Systemic arterial pressure monitoring (06/22/2015)  Cholecystectomy Laparoscopic (.) (05/07/2015)  Laparoscopic cholecystectomy (05/07/2015)  Central  Venous Catheter Placement with Guidance (05/01/2015)  Endoscopic removal of stone(s) from biliary tract (04/25/2015)  Endoscopic Retrograde Cholangiopancreatography (04/25/2015)  Endoscopic sphincterotomy and papillotomy (04/25/2015)  Sphincterotomy (04/25/2015)  Stone Manipulation (04/25/2015)  HOSPITAL OBSERVATION SERVICE, PER HOUR (09/18/2014)  Biopsy of skull (08/20/2013)  Appendectomy  bowel resection  Colonoscopy  cyst drained  EGD  Hysterectomy  lump removed from right breast  peripheral vascular disease surgery  right shoulder surgery   Medications  Inpatient  heparin, 5680 units, 80 unit/kg, IV Push, Once  heparin, 5680 units, 80 unit/kg, IV Push, q6hr, PRN  heparin, 2840 units, 40 unit/kg, IV Push, q6hr, PRN  Heparin 25,000 units / D5W 250 mL PREMIX 25,000 units, 79385 units= 250 mL, IV  Toradol, 30 mg= 1 mL, IM, Once  Home  atropine-diphenoxylate 0.025 mg-2.5 mg oral tablet, 2 tab(s), Oral, QID  BuSpar 5 mg oral tablet, 10 mg= 2 tab(s), Oral, BID  CeleXA 40 mg oral tablet, 40 mg= 1 tab(s), Oral, Daily  ergocalciferol 50,000 intl units (1.25 mg) oral capsule, 82289 IntUnit= 1 cap(s), Oral  Ezfe (as elemental iron) 200 mg oral capsule, 200 mg= 1 cap(s), Oral, Every other day  Gattex 5 mg subcutaneous kit, 0.05 mg/kg, Subcutaneous, Daily  metoprolol tartrate 100 mg oral tab, 100 mg= 1 tab(s), Oral, BID  Prolia, 60 mg, Subcutaneous, q6mo  traZODONE 50 mg oral tablet ( Desyrel ), 75 mg= 1.5 tab(s), Oral, Once a day (at bedtime)  Ultram 50 mg oral tablet, 50 mg= 1 tab(s), Oral, q6hr, PRN  warfarin 2 mg oral tablet, 2 mg= 1 tab(s), Oral, As Directed  Allergies  dilaudid?(itching)  opium?(itching)  Social History  Abuse/Neglect  No, 06/24/2021  No, No, 06/20/2021  No, 05/06/2021  No, No, Yes, 04/16/2021  Alcohol - Denies Alcohol Use, 12/25/2012  Past, 06/20/2021  Past, 06/20/2021  Past, 04/15/2021  Employment/School  Retired, 06/20/2021  Retired, 12/23/2013  Exercise  Exercise type: Walking.,  06/20/2021  Home/Environment  Lives with Alone. Living situation: Home/Independent., 06/20/2021  Lives with Alone. Living situation: Home/Independent. Alcohol abuse in household: No. Substance abuse in household: No. Smoker in household: No. Injuries/Abuse/Neglect in household: No. Feels unsafe at home: No. Safe place to go: Yes. Agency(s)/Others notified: No. Family/Friends available for support: Yes. Concern for family members at home: No. Major illness in household: No. Financial concerns: No., 12/25/2012    Never in , 06/20/2021  Nutrition/Health  Regular, Good, 06/20/2021  Sexual  Sexually active: No. Gender Identity Identifies as female., 06/20/2021  Spiritual/Cultural  Taoist, 06/20/2021  Taoist, 07/23/2020  Substance Use - Denies Substance Abuse, 12/25/2012  Never, 06/20/2021  Never, 04/15/2021  Tobacco - Denies Tobacco Use, 12/25/2012  Never (less than 100 in lifetime), N/A, 06/24/2021  Never (less than 100 in lifetime), No, 06/20/2021  Never (less than 100 in lifetime), N/A, 05/06/2021  Never (less than 100 in lifetime), N/A, 04/16/2021  Never (less than 100 in lifetime), N/A, 02/06/2019  Family History  Acute myocardial infarction: Negative: Father.  Acute myocardial infarction.: Father.  Brain aneurysm....: Father.  Coronary artery bypass grafts x 3: Brother.  Depression.: Mother.  Diabetes mellitus: Father.  Heart disease......: Negative: Father.  Hyperlipidemia.: Father, Sister, Brother and Brother.  Hypertension.: Father, Sister, Brother and Brother.  Kidney cancer, primary, with metastasis from kidney to other site....: Sister.  Osteoarthritis: Mother and Sister.  Primary malignant neoplasm of breast: Mother and Sister.  Uterine cancer....: Mother.  Immunizations  Vaccine Date Status   influenza virus vaccine, inactivated 11/05/2020 Given   influenza virus vaccine, inactivated 11/06/2019 Given   influenza virus vaccine, inactivated 10/09/2018 Given   pneumococcal 23-polyvalent  vaccine 02/02/2017 Recorded   pneumococcal 13-valent conjugate vaccine 01/27/2015 Recorded   Lab Results  Labs Last 24 Hours?  ?Chemistry? Hematology/Coagulation?   Sodium Lvl: 139 mmol/L (06/26/21 15:51:00) PT:?16.3 second(s)?High (06/26/21 17:41:00)   Potassium Lvl: 4.2 mmol/L (06/26/21 15:51:00) INR:?1.4?High (06/26/21 17:41:00)   Chloride: 104 mmol/L (06/26/21 15:51:00) PTT:?37.5 second(s)?High (06/26/21 17:41:00)   CO2: 27 mmol/L (06/26/21 15:51:00) WBC: 7 x10(3)/mcL (06/26/21 15:51:00)   Calcium Lvl: 8.9 mg/dL (06/26/21 15:51:00) RBC: 4.34 x10(6)/mcL (06/26/21 15:51:00)   Glucose Lvl: 92 mg/dL (06/26/21 15:51:00) Hgb: 12 gm/dL (06/26/21 15:51:00)   BUN: 17 mg/dL (06/26/21 15:51:00) Hct: 37.8 % (06/26/21 15:51:00)   Creatinine: 0.65 mg/dL (06/26/21 15:51:00) Platelet:?126 x10(3)/mcL?Low (06/26/21 15:51:00)   Est Creat Clearance Ser: 64.58 mL/min (06/26/21 16:59:43) MCV: 87.1 fL (06/26/21 15:51:00)   eGFR-AA: >60 (06/26/21 15:51:00) MCH: 27.6 pg (06/26/21 15:51:00)   eGFR-DELVIS: >60 (06/26/21 15:51:00) MCHC:?31.7 gm/dL?Low (06/26/21 15:51:00)   Bili Total: 0.7 mg/dL (06/26/21 15:51:00) RDW: 16.1 % (06/26/21 15:51:00)   Bili Direct: 0.3 mg/dL (06/26/21 15:51:00) MPV: 10.6 fL (06/26/21 15:51:00)   Bili Indirect: 0.4 mg/dL (06/26/21 15:51:00) Abs Neut: 3.45 x10(3)/mcL (06/26/21 15:51:00)   AST: 26 unit/L (06/26/21 15:51:00) Neutro Auto: 49 % (06/26/21 15:51:00)   ALT: 22 unit/L (06/26/21 15:51:00) Lymph Auto: 37 % (06/26/21 15:51:00)   Alk Phos: 79 unit/L (06/26/21 15:51:00) Mono Auto: 10 % (06/26/21 15:51:00)   Total Protein: 6.8 gm/dL (06/26/21 15:51:00) Eos Auto: 3 % (06/26/21 15:51:00)   Albumin Lvl:?3.1 gm/dL?Low (06/26/21 15:51:00) Abs Eos: 0.2 x10(3)/mcL (06/26/21 15:51:00)   Globulin:?3.7 gm/dL?High (06/26/21 15:51:00) Basophil Auto: 0 % (06/26/21 15:51:00)   A/G Ratio:?0.8 ratio?Low (06/26/21 15:51:00) Abs Neutro: 3.45 x10(3)/mcL (06/26/21 15:51:00)   Lactic Acid Lvl: 0.9 mmol/L (06/26/21 16:27:00) Abs  Lymph: 2.6 x10(3)/mcL (06/26/21 15:51:00)    Abs Mono: 0.7 x10(3)/mcL (06/26/21 15:51:00)    Abs Baso: 0 x10(3)/mcL (06/26/21 15:51:00)   ?  Diagnostic Results  Accession:?[id number]  Order:?XR Humerus Right 2 Views  Report Dt/Tm:?06/26/2021 16:50  Report:?  XR Humerus Right 2 Views  ?  HISTORY: Inflammation  ?  COMPARISON:?  None.  ?  FINDINGS:  Diffuse demineralization limits fine osseous detail.  ?  Status post right shoulder arthroplasty. No radiographic evidence of  hardware loosening or failure. No prior studies available for direct  comparison.  ?  No acute displaced fractures or dislocations.  Moderately acromioclavicular degenerative changes.  No blastic or lytic lesions.  Soft tissues within normal limits.  Partially visualized right lung clear.  ?  IMPRESSION:  ?  No acute osseous abnormality.?  ?  ?  Accession:?[id number]  Order:?XR Chest 1 View  Report Dt/Tm:?06/26/2021 16:48  Report:?  PORTABLE CHEST X-RAY, ONE FRONTAL VIEW  ?  HISTORY: Other (please specify)  ?  COMPARISON:?  6/24/2021  ?  FINDINGS:  Patient is rotated to the right.  Right chest plate and port tip projects over the SVC.  ?  No focal consolidations, pleural effusions or pneumothoraces.  Enlarged cardiac silhouette without overt decompensation. Aortic knob  calcifications.  No acute bony pathology.  Partially visualized right arthroplasty hardware.  Right breast peripherally calcified lesion is unchanged.  Otherwise soft tissues within normal limits.  ?  IMPRESSION:  ?  No acute thoracic abnormality.  ?  ?

## 2022-05-01 NOTE — HISTORICAL [facility]ER
This is a historical note converted from [employer]. Formatting and pictures may have been removed.  Please reference [employer] for original formatting and attached multimedia. Chief Complaint  c/o right arm pain/swelling started yesterday, had PICC taken out on Thurs. denies fever  Reason for Consultation  Evaluate mediport removal  History of Present Illness  Patient is a 75 year old female with PMH of short gut, TPN dependent.? January of this year patient with noted infection of mediport and underwent surgery for removal.? Since that time patient been using PICC line for TPN transfusions.? 6/24 patient underwent mediport replacement with removal of PICC.? Since that time patient with worsening swelling, pain and erythema of right arm from previous PICC site and tracking down to forearm.? Also noted chord like firmness in forearm.? Additionally patient been having milder symptoms around recently place mediport.? Some redness and soreness in this area. Additionally patient with history of DVTs and takes coumadin for management.? Medication held for a week prior to recent surgery and resumed yesterday.? Patient otherwise without complaints, denies SOB or chest pain.? No subjective fevers or chills. Patient underwent RUE US and was noted to have DVT.  Review of Systems  Constitutional - no wt loss no fever  Eye - no vision changes no vision loss no dry eye  ENMT - no rhinorrhea, no post nasal drip  Respiratory - no shortness of breath, no difficulty breathing, no paroxysmal nocturnal dyspnea  Cardiovascular - no chest pain, no palpitations  Gastrointestinal - no nausea, no vomiting, no melena, no bright red blood per rectum  Genitourinary - no dysuria, no hematuria  Hema/Lymph - +DVT of LE  Musculoskeletal - only pain is L knee  Integumentary - swelling, pain and erythema of right arm  Neurologic - no syncope, no headache, no focal neurologic deficits  Physical Exam  Vitals & Measurements  T:?37.0? ?C (Oral)?  HR:?53(Peripheral)? RR:?16? BP:?141/58? SpO2:?99%? WT:?71?kg?  General_NAD  Eye_EOMI  HENT_Normocephalic  Neck_Normal ROM  Respiratory_no increased work of breathing on room air.  Cardiovascular_RRR  Gastrointestinal_soft, non-tender, non-distended  Musculoskeletal_normal ROM  Integumentary_warm and dry  Neurologic_No gross motor or sensory deficits  Assessment/Plan  75 year old female with history of short gut and TPN dependence presents with redness, pain and swelling of RUE following recent PICC removal and mediport placement.  ?  -No acute surgical intervention at this time.? Recommend medicine evaluation for management of infection and DVT.  -Given symptoms worse around PICC site and mediport changes may be normal post operative changes would hold off urgent removal of mediport given patients need for long term IV access.  -Will follow up blood cultures, if negative may not need mediport removed and can be managed with IV abx  -Will continue to monitor for signs and symptoms of local infection at mediport site.  -Recommend anti-coagulation with heparin given possible need for surgery and reversibility.  ?  Tashi Felton MD  [employer] General Surgery PGY-2  ?   Agree with above  ?   Dr. Stallworth   Problem List/Past Medical History  Ongoing  Anticoagulants causing adverse effect in therapeutic use  Anxiety  Arthritis  Breast cancer  Depression  DVT - Deep vein thrombosis of lower limb  H/O dehydration  History of insertion of stent into ureter  Kidney stone  Low serum vitamin D  Malnutrition  Malnutrition  Palpitations  Post-resection short bowel syndrome  Short bowel syndrome  Shoulder pain  Sinusitis  Staphylococcal infection  UTI - Urinary tract infection  Weakness  Historical  Able to lie down  Activity tolerance  Acute URI  Cholelithiasis  Diabetes mellitus  DVT - Deep vein thrombosis  Electrolyte depletion  Gallstone  pancreatitis  Glasses  Hypercholesterolemia  Hyperlipidemia  Hypertension  Insomnia  Irregular heart rhythm  Ischemic disease of gut  Laryngitis  Leg edema, left  Needs flu shot  Obesity  Osteoarthritis  PVD (peripheral vascular disease)  Tremor  Tremor of both hands  Weakness  Procedure/Surgical History  Catheter Insertion Mediport (None) (06/24/2021)  Fluoroscopy of Right Subclavian Vein using Low Osmolar Contrast, Guidance (06/24/2021)  Insertion of Infusion Device into Right Subclavian Vein, Percutaneous Approach (06/24/2021)  Insertion of tunneled centrally inserted central venous access device, with subcutaneous port; age 5 years or older (06/24/2021)  PICC Line Removal (Right) (06/24/2021)  Port, indwelling (implantable) (06/24/2021)  Cystoscopy w/ Ureteral Stent Removal (Left) (04/16/2021)  Cystourethroscopy, with removal of foreign body, calculus, or ureteral stent from urethra or bladder (separate procedure); simple (04/16/2021)  Fragmentation in Left Kidney Pelvis, External Approach (04/16/2021)  Lithotripsy (Left) (04/16/2021)  Lithotripsy, extracorporeal shock wave (04/16/2021)  Removal of Intraluminal Device from Ureter, Via Natural or Artificial Opening Endoscopic (04/16/2021)  Fragmentation in Left Kidney Pelvis, External Approach (03/08/2021)  Lithotripsy (Left) (03/08/2021)  Lithotripsy, extracorporeal shock wave (03/08/2021)  Catheter Removal Vascular Access (Left) (12/31/2020)  Insertion of Infusion Device into Superior Vena Cava, Percutaneous Approach (12/31/2020)  Removal of Infusion Device from Upper Vein, External Approach (12/31/2020)  Removal of Totally Implantable Vascular Access Device from Trunk Subcutaneous Tissue and Fascia, Open Approach (12/31/2020)  Insertion of Infusion Device into Left Basilic Vein, Percutaneous Approach (12/30/2020)  Cystoscopy w/ Ureteral Stent (Left) (12/29/2020)  Dilation of Left Kidney Pelvis with Intraluminal Device, Via Natural or Artificial Opening  Endoscopic (12/29/2020)  Introduction of Nutritional Substance into Central Vein, Percutaneous Approach (11/24/2020)  Catheter Insertion Mediport (None) (07/23/2020)  Insertion of peripherally inserted central venous access device, with subcutaneous port; age 5 years or older (07/23/2020)  Insertion of Totally Implantable Vascular Access Device into Chest Subcutaneous Tissue and Fascia, Open Approach (07/23/2020)  Mediport Removal (07/23/2020)  Port, indwelling (implantable) (07/23/2020)  Biopsy Gastrointestinal (None) (12/18/2019)  Colonoscopy (None) (12/18/2019)  Colonoscopy, flexible; with biopsy, single or multiple (12/18/2019)  Excision of Rectum, Via Natural or Artificial Opening Endoscopic, Diagnostic (12/18/2019)  Excision of Transverse Colon, Via Natural or Artificial Opening Endoscopic, Diagnostic (12/18/2019)  Polypectomy (None) (12/18/2019)  Catheter Insertion Mediport (None) (12/18/2018)  Fluoroscopy of Superior Vena Cava, Guidance (12/18/2018)  Insertion of Infusion Device into Superior Vena Cava, Percutaneous Approach (12/18/2018)  Insertion of Totally Implantable Vascular Access Device into Chest Subcutaneous Tissue and Fascia, Percutaneous Approach (12/18/2018)  Insertion of tunneled centrally inserted central venous access device, with subcutaneous port; age 5 years or older (12/18/2018)  Destruction of Right Lens, Percutaneous Approach (07/11/2017)  Discission of secondary membranous cataract (opacified posterior lens capsule and/or anterior hyaloid); laser surgery (eg, YAG laser) (1 or more stages) (07/11/2017)  Destruction of Left Lens, Percutaneous Approach (06/27/2017)  Discission of secondary membranous cataract (opacified posterior lens capsule and/or anterior hyaloid); laser surgery (eg, YAG laser) (1 or more stages) (06/27/2017)  73502 - LT CATARACT W/IOL 1 STA PHACO (Left) (12/27/2016)  Extracapsular cataract removal with insertion of intraocular lens prosthesis (1 stage procedure),  manual or mechanical technique (eg, irrigation and aspiration or phacoemulsification) (12/27/2016)  Replacement of Left Lens with Synthetic Substitute, Percutaneous Approach (12/27/2016)  74742 - RT CATARACT W/IOL 1 STA PHACO (Right) (11/01/2016)  Extracapsular cataract removal with insertion of intraocular lens prosthesis (1 stage procedure), manual or mechanical technique (eg, irrigation and aspiration or phacoemulsification) (11/01/2016)  Replacement of Right Lens with Synthetic Substitute, Percutaneous Approach (11/01/2016)  Hernia Repair Ventral (None) (04/26/2016)  Implantation of mesh or other prosthesis for open incisional or ventral hernia repair or mesh for closure of debridement for necrotizing soft tissue infection (List separately in addition to code for the incisional or ventral hernia repair) (04/26/2016)  Repair initial incisional or ventral hernia; reducible (04/26/2016)  Supplement Abdominal Wall with Synthetic Substitute, Open Approach (04/26/2016)  Central Venous Catheter Placement with Guidance (06/24/2015)  Exploration Laparotomy (.) (06/22/2015)  Other partial resection of small intestine (06/22/2015)  Other small-to-large intestinal anastomosis (06/22/2015)  Systemic arterial pressure monitoring (06/22/2015)  Cholecystectomy Laparoscopic (.) (05/07/2015)  Laparoscopic cholecystectomy (05/07/2015)  Central Venous Catheter Placement with Guidance (05/01/2015)  Endoscopic removal of stone(s) from biliary tract (04/25/2015)  Endoscopic Retrograde Cholangiopancreatography (04/25/2015)  Endoscopic sphincterotomy and papillotomy (04/25/2015)  Sphincterotomy (04/25/2015)  Stone Manipulation (04/25/2015)  HOSPITAL OBSERVATION SERVICE, PER HOUR (09/18/2014)  Biopsy of skull (08/20/2013)  Appendectomy  bowel resection  Colonoscopy  cyst drained  EGD  Hysterectomy  lump removed from right breast  peripheral vascular disease surgery  right shoulder surgery   Medications  Inpatient  Toradol, 30 mg= 1 mL,  IM, Once  Home  atropine-diphenoxylate 0.025 mg-2.5 mg oral tablet, 2 tab(s), Oral, QID  BuSpar 5 mg oral tablet, 10 mg= 2 tab(s), Oral, BID  CeleXA 40 mg oral tablet, 40 mg= 1 tab(s), Oral, Daily  ergocalciferol 50,000 intl units (1.25 mg) oral capsule, 06460 IntUnit= 1 cap(s), Oral  Ezfe (as elemental iron) 200 mg oral capsule, 200 mg= 1 cap(s), Oral, Every other day  Gattex 5 mg subcutaneous kit, 0.05 mg/kg, Subcutaneous, Daily  metoprolol tartrate 100 mg oral tab, 100 mg= 1 tab(s), Oral, BID  Prolia, 60 mg, Subcutaneous, q6mo  traZODONE 50 mg oral tablet ( Desyrel ), 75 mg= 1.5 tab(s), Oral, Once a day (at bedtime)  Ultram 50 mg oral tablet, 50 mg= 1 tab(s), Oral, q6hr, PRN  warfarin 2 mg oral tablet, 2 mg= 1 tab(s), Oral, As Directed  Allergies  dilaudid?(itching)  opium?(itching)  Social History  Abuse/Neglect  No, 06/24/2021  No, No, 06/20/2021  No, 05/06/2021  No, No, Yes, 04/16/2021  Alcohol - Denies Alcohol Use, 12/25/2012  Past, 06/20/2021  Past, 06/20/2021  Past, 04/15/2021  Employment/School  Retired, 06/20/2021  Retired, 12/23/2013  Exercise  Exercise type: Walking., 06/20/2021  Home/Environment  Lives with Alone. Living situation: Home/Independent., 06/20/2021  Lives with Alone. Living situation: Home/Independent. Alcohol abuse in household: No. Substance abuse in household: No. Smoker in household: No. Injuries/Abuse/Neglect in household: No. Feels unsafe at home: No. Safe place to go: Yes. Agency(s)/Others notified: No. Family/Friends available for support: Yes. Concern for family members at home: No. Major illness in household: No. Financial concerns: No., 12/25/2012    Never in , 06/20/2021  Nutrition/Health  Regular, Good, 06/20/2021  Sexual  Sexually active: No. Gender Identity Identifies as female., 06/20/2021  Spiritual/Cultural  Anglican, 06/20/2021  Anglican, 07/23/2020  Substance Use - Denies Substance Abuse, 12/25/2012  Never, 06/20/2021  Never, 04/15/2021  Tobacco - Denies  Tobacco Use, 12/25/2012  Never (less than 100 in lifetime), N/A, 06/24/2021  Never (less than 100 in lifetime), No, 06/20/2021  Never (less than 100 in lifetime), N/A, 05/06/2021  Never (less than 100 in lifetime), N/A, 04/16/2021  Never (less than 100 in lifetime), N/A, 02/06/2019  Family History  Acute myocardial infarction: Negative: Father.  Acute myocardial infarction.: Father.  Brain aneurysm....: Father.  Coronary artery bypass grafts x 3: Brother.  Depression.: Mother.  Diabetes mellitus: Father.  Heart disease......: Negative: Father.  Hyperlipidemia.: Father, Sister, Brother and Brother.  Hypertension.: Father, Sister, Brother and Brother.  Kidney cancer, primary, with metastasis from kidney to other site....: Sister.  Osteoarthritis: Mother and Sister.  Primary malignant neoplasm of breast: Mother and Sister.  Uterine cancer....: Mother.  Immunizations  Vaccine Date Status   influenza virus vaccine, inactivated 11/05/2020 Given   influenza virus vaccine, inactivated 11/06/2019 Given   influenza virus vaccine, inactivated 10/09/2018 Given   pneumococcal 23-polyvalent vaccine 02/02/2017 Recorded   pneumococcal 13-valent conjugate vaccine 01/27/2015 Recorded

## 2022-05-01 NOTE — HISTORICAL [facility] CERNER
This is a historical note converted from [employer]. Formatting and pictures may have been removed.  Please reference [employer] for original formatting and attached multimedia. Chief Complaint  Onset Monday with hematuria, saw PCP Dr. Curt Felton for same. lab work neg plan to do CT after the holidays. 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 She was found to have positive blood cultures with staph epidermidis in late november and again had positive blood cultures on 12/27 (staph). She was admitted for an obstructing L kidney stone and underwent cystoscopy with stent placement on 12/29. She denies fevers, chills, nausea, or vomiting.  ?  PMH: DVT (on coumadin), SMA thrombosis, HTN, Breast Cancer  Meds: see med rec; on coumadin at home.  All: Dilaudid  Surg Hx: Ex lap with SBR, R lumpectomy, Cholecystectomy, Hysterectomy, Appendectomy, R Shoulder replacement.  Review of Systems  A 12 point review of systems was obtained and is negative except as above.  Physical Exam  Vitals & Measurements  T:?36.5? ?C (Oral)? TMIN:?36.5? ?C (Oral)? TMAX:?36.7? ?C (Oral)? HR:?66(Peripheral)? RR:?18? BP:?118/62? SpO2:?95%?  General:?well-developed well-nourished in no acute distress  Eye: PERRLA, EOMI, clear conjunctiva, eyelids normal  Neck: full range of motion, no thyromegaly or lymphadenopathy. L subclavian mediport without erythema or induration.  Respiratory:?clear to auscultation bilaterally  Cardiovascular:?regular rate and rhythm  Gastrointestinal:?soft, non-tender, non-distended with normal bowel sounds, without masses to palpation  Musculoskeletal:?full range of motion of all extremities/spine without limitation or discomfort  Integumentary: no rashes or skin lesions  present  Neurologic: cranial nerves intact, no signs of peripheral neurological deficit, motor/sensory function intact  ?   Labs: White count 4.2, Hg 10.3  Assessment/Plan  76 yo F who is TPN dependent with short gut syndrome recently developed CLABSI. Multiple blood cultures positive with staph. Primary team holding coumadin.  ?  - INR this am 1.4 will recheck in am.  - OR 12/31 for L subclavian mediport removal.  - Consents obtained at bedside. NPO at midnight.  - Patient will need new access for TPN after mediport removal, currently with midline in L arm  - Continue IV antibiotics per primary.  ?  Daniel Ingram MD  General Surgery HO[facility]   Problem List/Past Medical History  Ongoing  Anticoagulants causing adverse effect in therapeutic use  Anxiety  Arthritis  Breast cancer  Depression  DVT - Deep vein thrombosis of lower limb  H/O dehydration  Low serum vitamin D  Malnutrition  Malnutrition  Obesity  Palpitations  Post-resection short bowel syndrome  Short bowel syndrome  Shoulder pain  Sinusitis  Staphylococcal infection  Tremor  Tremor of both hands  Weakness  Weakness  Historical  Able to lie down  Activity tolerance  Acute URI  Cholelithiasis  Diabetes mellitus  DVT - Deep vein thrombosis  Electrolyte depletion  Gallstone pancreatitis  Glasses  Hypercholesterolemia  Hyperlipidemia  Hypertension  Insomnia  Irregular heart rhythm  Ischemic disease of gut  Laryngitis  Leg edema, left  Needs flu shot  Osteoarthritis  PVD (peripheral vascular disease)  Procedure/Surgical History  Cystoscopy w/ Ureteral Stent (Left) (12/29/2020)  Introduction of Nutritional Substance into Central Vein, Percutaneous Approach (11/24/2020)  Catheter Insertion Mediport (None) (07/23/2020)  Insertion of peripherally inserted central venous access device, with subcutaneous port; age 5 years or older (07/23/2020)  Insertion of Totally Implantable Vascular Access Device into Chest Subcutaneous Tissue and Fascia, Open Approach  (07/23/2020)  Mediport Removal (07/23/2020)  Port, indwelling (implantable) (07/23/2020)  Biopsy Gastrointestinal (None) (12/18/2019)  Colonoscopy (None) (12/18/2019)  Colonoscopy, flexible; with biopsy, single or multiple (12/18/2019)  Excision of Rectum, Via Natural or Artificial Opening Endoscopic, Diagnostic (12/18/2019)  Excision of Transverse Colon, Via Natural or Artificial Opening Endoscopic, Diagnostic (12/18/2019)  Polypectomy (None) (12/18/2019)  Catheter Insertion Mediport (None) (12/18/2018)  Fluoroscopy of Superior Vena Cava, Guidance (12/18/2018)  Insertion of Infusion Device into Superior Vena Cava, Percutaneous Approach (12/18/2018)  Insertion of Totally Implantable Vascular Access Device into Chest Subcutaneous Tissue and Fascia, Percutaneous Approach (12/18/2018)  Insertion of tunneled centrally inserted central venous access device, with subcutaneous port; age 5 years or older (12/18/2018)  Destruction of Right Lens, Percutaneous Approach (07/11/2017)  Discission of secondary membranous cataract (opacified posterior lens capsule and/or anterior hyaloid); laser surgery (eg, YAG laser) (1 or more stages) (07/11/2017)  Destruction of Left Lens, Percutaneous Approach (06/27/2017)  Discission of secondary membranous cataract (opacified posterior lens capsule and/or anterior hyaloid); laser surgery (eg, YAG laser) (1 or more stages) (06/27/2017)  98228 - LT CATARACT W/IOL 1 STA PHACO (Left) (12/27/2016)  Extracapsular cataract removal with insertion of intraocular lens prosthesis (1 stage procedure), manual or mechanical technique (eg, irrigation and aspiration or phacoemulsification) (12/27/2016)  Replacement of Left Lens with Synthetic Substitute, Percutaneous Approach (12/27/2016)  78521 - RT CATARACT W/IOL 1 STA PHACO (Right) (11/01/2016)  Extracapsular cataract removal with insertion of intraocular lens prosthesis (1 stage procedure), manual or mechanical technique (eg, irrigation and aspiration  or phacoemulsification) (11/01/2016)  Replacement of Right Lens with Synthetic Substitute, Percutaneous Approach (11/01/2016)  Hernia Repair Ventral (None) (04/26/2016)  Implantation of mesh or other prosthesis for open incisional or ventral hernia repair or mesh for closure of debridement for necrotizing soft tissue infection (List separately in addition to code for the incisional or ventral hernia repair) (04/26/2016)  Repair initial incisional or ventral hernia; reducible (04/26/2016)  Supplement Abdominal Wall with Synthetic Substitute, Open Approach (04/26/2016)  Central Venous Catheter Placement with Guidance (06/24/2015)  Exploration Laparotomy (.) (06/22/2015)  Other partial resection of small intestine (06/22/2015)  Other small-to-large intestinal anastomosis (06/22/2015)  Systemic arterial pressure monitoring (06/22/2015)  Cholecystectomy Laparoscopic (.) (05/07/2015)  Laparoscopic cholecystectomy (05/07/2015)  Central Venous Catheter Placement with Guidance (05/01/2015)  Endoscopic removal of stone(s) from biliary tract (04/25/2015)  Endoscopic Retrograde Cholangiopancreatography (04/25/2015)  Endoscopic sphincterotomy and papillotomy (04/25/2015)  Sphincterotomy (04/25/2015)  Stone Manipulation (04/25/2015)  HOSPITAL OBSERVATION SERVICE, PER HOUR (09/18/2014)  Biopsy of skull (08/20/2013)  Appendectomy  bowel resection  Colonoscopy  cyst drained  EGD  Hysterectomy  lump removed from right breast  peripheral vascular disease surgery  right shoulder surgery   Medications  Inpatient  Amino Acids 4.25% with 5% Dextrose and Electrolytes (Clinimix E Sulfite-Free) intravenous solution  cefTRIAXone  Celexa 20 mg oral tablet, 20 mg= 1 tab(s), Oral, Daily  Dextrose 70% in Water intravenous solution 307 mL + sterile water 707 mL + Amino Acids 15% (Aminosy  fat emulsion, intravenous, 50 gm= 250 mL, IV Piggyback, qMWF  insulin glargine, 10 units= 0.1 mL, Subcutaneous, At Bedtime, PRN  lisinopril 10 mg oral tablet, 10  mg= 1 tab(s), Oral, Daily  metoprolol tartrate 50 mg oral tab, 50 mg= 1 tab(s), Oral, BID  Normal Saline Flush 0.9% injectable solution, 10 mL, IV Push, As Directed, PRN  Normal Saline Flush 0.9% injectable solution, 20 mL, IV Push, As Directed, PRN  Normal Saline Flush 0.9% injectable solution, 10 mL, IV Push, q12hr  traZODONE 50 mg oral tablet ( Desyrel ), 75 mg= 1.5 tab(s), Oral, Once a day (at bedtime)  Tylenol, 650 mg= 2 tab(s), Oral, q4hr, PRN  vancomycin  Home  atropine-diphenoxylate 0.025 mg-2.5 mg oral tablet, 2 tab(s), Oral, QID,? ?Not taking  Celexa 20 mg oral tablet, 20 mg= 1 tab(s), Oral, Daily  ergocalciferol 50,000 intl units (1.25 mg) oral capsule, 50412 IntUnit= 1 cap(s), Oral, qWeek  FERATE 27 MG TABLET, 240 mg= 1 tab(s)  Gattex 5 mg subcutaneous kit, 0.05 mg/kg, Subcutaneous, Daily  lisinopril 10 mg oral tablet, 10 mg= 1 tab(s), Oral, Daily  metoprolol tartrate 100 mg oral tab, 100 mg= 1 tab(s), Oral, BID  Prolia, 60 mg, Subcutaneous, q6mo  traZODONE 50 mg oral tablet ( Desyrel ), 75 mg= 1.5 tab(s), Oral, Once a day (at bedtime)  Voltaren 1% topical gel, 1 manuela, TOP, QID,? ?Not taking  WARFARIN SODIUM 1 MG TABLET  Allergies  dilaudid?(itching)  opium?(itching)  Social History  Abuse/Neglect  No, 12/28/2020  No, 12/27/2020  No, 12/21/2020  No, 12/10/2020  No, 11/25/2020  No, 08/05/2020  No, 07/23/2020  No, No, No, 07/21/2020  No, 05/15/2020  No, 02/14/2020  No, 12/18/2019  No, 11/06/2019  No, 07/24/2019  Alcohol - Denies Alcohol Use, 12/25/2012  Past, 12/18/2018  Employment/School  Retired, 12/23/2013  Home/Environment  Lives with Alone. Living situation: Home/Independent. Alcohol abuse in household: No. Substance abuse in household: No. Smoker in household: No. Injuries/Abuse/Neglect in household: No. Feels unsafe at home: No. Safe place to go: Yes. Agency(s)/Others notified: No. Family/Friends available for support: Yes. Concern for family members at home: No. Major illness in household: No.  Financial concerns: No., 12/25/2012  Spiritual/Cultural  Restorationism, 07/23/2020  Substance Use - Denies Substance Abuse, 12/25/2012  Never, 10/26/2016  Tobacco - Denies Tobacco Use, 12/25/2012  Never (less than 100 in lifetime), N/A, 12/28/2020  Never (less than 100 in lifetime), No, 12/27/2020  Never (less than 100 in lifetime), N/A, 12/21/2020  Never (less than 100 in lifetime), N/A, 12/10/2020  Never (less than 100 in lifetime), N/A, 11/25/2020  Never (less than 100 in lifetime), N/A, 08/05/2020  Never (less than 100 in lifetime), N/A, 07/23/2020  Never (less than 100 in lifetime), N/A, 07/21/2020  Never (less than 100 in lifetime), N/A, 05/15/2020  Never (less than 100 in lifetime), N/A, 02/14/2020  Never (less than 100 in lifetime), N/A, 12/18/2019  Never (less than 100 in lifetime), N/A, 11/06/2019  Never (less than 100 in lifetime), N/A, 07/24/2019  Never (less than 100 in lifetime), N/A, 05/06/2019  Never (less than 100 in lifetime), N/A, 04/24/2019  Never (less than 100 in lifetime), N/A, 02/06/2019  Never (less than 100 in lifetime), N/A, 01/02/2019  Never smoker, N/A, 12/18/2018  Family History  Acute myocardial infarction: Negative: Father.  Acute myocardial infarction.: Father.  Brain aneurysm....: Father.  Coronary artery bypass grafts x 3: Brother.  Depression.: Mother.  Diabetes mellitus: Father.  Heart disease......: Negative: Father.  Hyperlipidemia.: Father, Sister, Brother and Brother.  Hypertension.: Father, Sister, Brother and Brother.  Kidney cancer, primary, with metastasis from kidney to other site....: Sister.  Osteoarthritis: Mother and Sister.  Primary malignant neoplasm of breast: Mother and Sister.  Uterine cancer....: Mother.  Immunizations  Vaccine Date Status   influenza virus vaccine, inactivated 11/05/2020 Given   influenza virus vaccine, inactivated 11/06/2019 Given   influenza virus vaccine, inactivated 10/09/2018 Given   pneumococcal 23-polyvalent vaccine 02/02/2017 Recorded    pneumococcal 13-valent conjugate vaccine 01/27/2015 Recorded       Agree with above assessment and plan. Patient seen and evaluated with team.  Port removal today. Will need new long term access at some point once infectious situation cleared. Recommend midline or PICC for interim time.

## 2022-05-01 NOTE — HISTORICAL [facility] CERNER
This is a historical note converted from [employer]. Formatting and pictures may have been removed.  Please reference [employer] for original formatting and attached multimedia. Admit and Discharge Dates  Admit Date: 11/25/2020  Discharge Date: 12/01/2020  Physicians  Attending Physician -Manuelito PORTER, Deon LEON  Admitting Physician - Lance PORTER, Deb JACOBSON  Consulting Physician - Kiko PORTER, Vitalis  Primary Care Physician - Jose Francisco PORTER, Curt MADRID  Discharge Diagnosis  UTI (poa and asymptomatic) with sepsis? - staph epidermidis >100,000 colonies  blood cx 1/2 staph epidermidis from [facility]?-ancef to complete?14 days of tx ?from?negative blood cultures  echo neg for vegetations/has DD w Pef  Mediport, hx of  ?htn- controlled. continue metoprolol?,? lisinopril  Short Bowel Syndrome On TPN  moderate protein calorie Malnutrition  s/t vascular thrombosis 2015 s/p extensive small bowel resection on TPN and teduglutide  ?HX: DVT 2014 on Coumadin, PVD, Venous Insufficiency/Varicose Veins, HLD, Anxiety/Depression  deconditioning/PVD  ?  ?  ?  Surgical Procedures  No procedures recorded for this visit.  Immunizations  No immunizations recorded for this visit.  Admission Information  Ms. 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 She initially presented to Urgent Care for complaints of weakness with fever over the last 2 days, t-max 101.0; associated with sore throat, mild cough, and 3 episodes of urinary incontinence since yesterday. Urgent Care recommended ER evaluation s/t SBP in the 80s. Denies flank pain, suprapubic pain, dysuria, Denies chills, night sweats, dizziness, CP, SOB, palpitations, near syncope, syncope, falls, or any exposure to COVID contacts, no recent travel. Reports 3 episodes of urinary  incontinence because she was unable to get up in time to use the restroom because of weakness. She lives alone, , two daughters live near by; independent in ADLs and does not use an ambulatory device. She receives HH through [facility] and her TPN through [facility].  ?Initial ER VS: /52, HR 53, respirations 18, temporal artery temperature 35.5, and SPO2 99% on RA. ?MP unremarkable, lactic acid 0.6, WBCs 4.2, INR 2.0, ABGs unremarkable, UA: W BC 31, 1+ bacteria, 2+ leukocytes, positive nitrites, 697 RBCs, 3+ blood. ?Influenza A/B negative, SARS-CoV-2 not detected. CXR and CT head negative for acute concerns. ?She was given ceftriaxone 1 g, Tylenol 1 gm PO, and 1L NS while in the ER. ?Shes been admitted to the hospitalist services for further evaluation and management of sepsis s/t UTI.Started on Ceftriaxone while awaiting C&S.?  ?  ?  Hospital Course  Pts blood culures 1/2 positve for staph epidermidis, mediport. ID consulted , seen by dr damon . Vancomycin discontinued and pt started on ancef. In attempts to save mediport pt will complete 14 days of ancef.? Echocardiofram showed Pef with DD. Blood cultures repeated and negative at 24 hours. Pt feeling well and voices no complaints. Pt will continue iv abxs ancef?with home health,  consulted  Patient already gets?TPN with [facility] and has?[facility] care for home health.??Pt will be discharge with follow up with infectious disease Dr damon and 1ry MD dr romy jaime for further management. Needs further eval to determine if mediport needs to be removed.  ?  ?  ?  ?  ?  ?  ?  Significant Findings  + b/c from mediport- staph epidermidis  Time Spent on discharge  Discharge summary greater than 35 minutes  Objective  Physical Exam  Alert, cooperative, NAD  ?Neck, supple  ?S1S2 rrr no m/r/g? + mediport  ?CTAB no w/r/r  ?BS(+), soft, NDNT, no organomegaly  ?No CN deficits, DTR 2+, no unilateral weakness  ?NO CVA tenderness [1]  Patient  Discharge Condition  stable  Discharge Disposition  Home with [facility] with iv ancef to complete 14 days of tx   Discharge Medication Reconciliation  Continue  atropine-diphenoxylate (atropine-diphenoxylate 0.025 mg-2.5 mg oral tablet)?2 tab(s), Oral, QID  citalopram (Celexa 20 mg oral tablet)?20 mg, Oral, Daily  denosumab (Prolia)?60 mg, Subcutaneous, q6mo  diclofenac topical (Voltaren 1% topical gel)?1 manuela, TOP, QID  ergocalciferol (ergocalciferol 50,000 intl units (1.25 mg) oral capsule)?13684 IntUnit, Oral, qWeek  ferrous gluconate (FERATE 27 MG TABLET)?240 mg  lisinopril (lisinopril 10 mg oral tablet)?10 mg, Oral, Daily  metoprolol (metoprolol tartrate 100 mg oral tab)?100 mg, Oral, BID  teduglutide (Gattex 5 mg subcutaneous kit)?0.05 mg/kg, Subcutaneous, Daily  traZODone (traZODONE 50 mg oral tablet ( Desyrel ))?75 mg, Oral, Once a day (at bedtime)  warfarin (WARFARIN SODIUM 1 MG TABLET)?See Instructions  Discontinue  loratadine (Claritin 10 mg oral tablet)?10 mg, Oral, Daily  Education and Orders Provided  Sepsis, Adult  Urinary Tract Infection, Adult  Discharge - 12/01/20 11:55:00 CST, Home?  Discharge - 12/01/20 12:00:00 CST, Home, [facility] /bioscript?  Follow up  [facility]  Neha Hoover, within 2 days, on  ????We will call w/follow up appointment-LEFT MSG MARKED IN BOOK  Curt Felton, on 12/10/2020  Car Seat Challenge  No Qualifying Data     [1]?Progress/SOAP Note; Deon Billings MD 11/30/2020 16:10 CST

## 2022-05-09 ENCOUNTER — LAB REQUISITION (OUTPATIENT)
Dept: LAB | Facility: HOSPITAL | Age: 77
End: 2022-05-09
Payer: MEDICARE

## 2022-05-09 DIAGNOSIS — E46 UNSPECIFIED PROTEIN-CALORIE MALNUTRITION: ICD-10-CM

## 2022-05-09 DIAGNOSIS — E86.0 DEHYDRATION: ICD-10-CM

## 2022-05-09 DIAGNOSIS — K91.2 POSTSURGICAL MALABSORPTION, NOT ELSEWHERE CLASSIFIED: ICD-10-CM

## 2022-05-09 LAB
ALBUMIN SERPL-MCNC: 3.5 GM/DL (ref 3.4–4.8)
ALBUMIN/GLOB SERPL: 1.1 RATIO (ref 1.1–2)
ALP SERPL-CCNC: 93 UNIT/L (ref 40–150)
ALT SERPL-CCNC: 40 UNIT/L (ref 0–55)
AST SERPL-CCNC: 47 UNIT/L (ref 5–34)
BASOPHILS # BLD AUTO: 0.03 X10(3)/MCL (ref 0–0.2)
BASOPHILS NFR BLD AUTO: 0.6 %
BILIRUBIN DIRECT+TOT PNL SERPL-MCNC: 0.4 MG/DL (ref 0–0.5)
BILIRUBIN DIRECT+TOT PNL SERPL-MCNC: 0.4 MG/DL (ref 0–0.8)
BILIRUBIN DIRECT+TOT PNL SERPL-MCNC: 0.8 MG/DL
BUN SERPL-MCNC: 10.5 MG/DL (ref 9.8–20.1)
CALCIUM SERPL-MCNC: 8.9 MG/DL (ref 8.4–10.2)
CHLORIDE SERPL-SCNC: 108 MMOL/L (ref 98–107)
CO2 SERPL-SCNC: 28 MMOL/L (ref 23–31)
CREAT SERPL-MCNC: 0.66 MG/DL (ref 0.55–1.02)
EOSINOPHIL # BLD AUTO: 0.29 X10(3)/MCL (ref 0–0.9)
EOSINOPHIL NFR BLD AUTO: 5.6 %
ERYTHROCYTE [DISTWIDTH] IN BLOOD BY AUTOMATED COUNT: 14.6 % (ref 11.5–17)
ERYTHROCYTE [SEDIMENTATION RATE] IN BLOOD: 23 MM/HR (ref 0–20)
GLOBULIN SER-MCNC: 3.1 GM/DL (ref 2.4–3.5)
GLUCOSE SERPL-MCNC: 103 MG/DL (ref 82–115)
HCT VFR BLD AUTO: 39.7 % (ref 37–47)
HGB BLD-MCNC: 12.7 GM/DL (ref 12–16)
IMM GRANULOCYTES # BLD AUTO: 0.01 X10(3)/MCL (ref 0–0.02)
IMM GRANULOCYTES NFR BLD AUTO: 0.2 % (ref 0–0.43)
LYMPHOCYTES # BLD AUTO: 1.97 X10(3)/MCL (ref 0.6–4.6)
LYMPHOCYTES NFR BLD AUTO: 38.3 %
MAGNESIUM SERPL-MCNC: 2 MG/DL (ref 1.6–2.6)
MCH RBC QN AUTO: 28.8 PG (ref 27–31)
MCHC RBC AUTO-ENTMCNC: 32 MG/DL (ref 33–36)
MCV RBC AUTO: 90 FL (ref 80–94)
MONOCYTES # BLD AUTO: 0.38 X10(3)/MCL (ref 0.1–1.3)
MONOCYTES NFR BLD AUTO: 7.4 %
NEUTROPHILS # BLD AUTO: 2.5 X10(3)/MCL (ref 2.1–9.2)
NEUTROPHILS NFR BLD AUTO: 47.9 %
NRBC BLD AUTO-RTO: 0 %
PHOSPHATE SERPL-MCNC: 2.7 MG/DL (ref 2.3–4.7)
PLATELET # BLD AUTO: 134 X10(3)/MCL (ref 130–400)
PMV BLD AUTO: 10.7 FL (ref 9.4–12.4)
POTASSIUM SERPL-SCNC: 4.2 MMOL/L (ref 3.5–5.1)
PROT SERPL-MCNC: 6.6 GM/DL (ref 5.8–7.6)
RBC # BLD AUTO: 4.41 X10(6)/MCL (ref 4.2–5.4)
SODIUM SERPL-SCNC: 143 MMOL/L (ref 136–145)
TRIGL SERPL-MCNC: 79 MG/DL (ref 37–140)
VANCOMYCIN TROUGH SERPL-MCNC: <1.4 UG/ML (ref 15–20)
WBC # SPEC AUTO: 5.2 X10(3)/MCL (ref 4.5–11.5)

## 2022-05-09 PROCEDURE — 80053 COMPREHEN METABOLIC PANEL: CPT | Performed by: EMERGENCY MEDICINE

## 2022-05-09 PROCEDURE — 85025 COMPLETE CBC W/AUTO DIFF WBC: CPT | Performed by: EMERGENCY MEDICINE

## 2022-05-09 PROCEDURE — 86140 C-REACTIVE PROTEIN: CPT | Performed by: EMERGENCY MEDICINE

## 2022-05-09 PROCEDURE — 85651 RBC SED RATE NONAUTOMATED: CPT | Performed by: EMERGENCY MEDICINE

## 2022-05-09 PROCEDURE — 83735 ASSAY OF MAGNESIUM: CPT | Performed by: EMERGENCY MEDICINE

## 2022-05-09 PROCEDURE — 80202 ASSAY OF VANCOMYCIN: CPT | Performed by: EMERGENCY MEDICINE

## 2022-05-09 PROCEDURE — 84100 ASSAY OF PHOSPHORUS: CPT | Performed by: EMERGENCY MEDICINE

## 2022-05-09 PROCEDURE — 84478 ASSAY OF TRIGLYCERIDES: CPT | Performed by: EMERGENCY MEDICINE

## 2022-05-11 LAB — CRP SERPL-MCNC: 2.9 MG/L

## 2022-05-14 NOTE — H&P
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 She has been maintained on Gattex with good results keep around the hospital and she is on chronic TPN has had 1 line infections had had her port changed multiple times secondary to it being clotted also is here today for colonoscopy surveillance because of the Gattex she always has baseline diarrhea was recently denied the Gattex for unknown reasoning. We will check back again and see what type      Health Status   Allergies:    Allergic Reactions (Selected)  Severity Not Documented  Dilaudid- Itching.  Opium- Itching.,    Allergies (2) Active Reaction  dilaudid itching  opium itching     Current medications:  (Selected)   Inpatient Medications  Pending Complete  Toradol: 30 mg, form: Injection, IV Push, [id number], Order duration: 4 dose(s), first dose 16 15:00:00 CDT, stop date 16 11:59:00 CDT, first dose in recovery room, 126,031  midazolam: 1 mg, form: Injection, IV Push, q5min, Order duration: 2 dose(s), first dose 16 10:30:00 CST, stop date 16 10:39:00 CST, 30,025  Documented Medications  Documented  CeleXA 40 mg oral tablet: 40 mg = 1 tab(s), Oral, Daily, # 30 tab(s), 0 Refill(s)  Ferate: 27 mg, Oral, Daily, 0 Refill(s)  Gattex 5 mg subcutaneous kit: 0.05 mg/kg, Subcutaneous, Daily  PreserVision AREDS 2 oral tablet, chewable: 1 tab(s), Chewed, BID, with meals, # 60 tab(s), 0 Refill(s)  Prolia: 60 mg, Subcutaneous, q6mo, 0 Refill(s)  atropine-diphenoxylate 0.025 mg-2.5 mg oral tablet: 2 tab(s), Oral, QID  busPIRone 10 mg oral tablet: 10 mg = 1 tab(s), Oral, BID  cholestyramine 4 g/9 g  oral powder for reconstitution: 4 gm =, Oral, BID  ergocalciferol 50,000 intl units (1.25 mg) oral capsule: 50,000 IntUnit = 1 cap(s), Oral, Twice weekly  metoprolol tartrate 100 mg oral tab: 100 mg = 1 tab(s), Oral, BID, 1 tab in AM , 2 tabs PM, # 60 tab(s), 0 Refill(s)  traZODONE 50 mg oral tablet ( Desyrel ): 75 mg = 1.5 tab(s), Oral, Once a day (at bedtime)  warfarin 5 mg oral tablet: 5 mg = 1 tab(s), Oral, Daily   Problem list:    All Problems  Anticoagulants causing adverse effect in therapeutic use / SNOMED CT [id number] / Confirmed  Anxiety / SNOMED CT 94552257 / Confirmed  Arthritis / SNOMED CT 7019271 / Confirmed  Breast cancer / SNOMED CT 852012504 / Confirmed  25 yrs ago  Low serum vitamin D / SNOMED CT 307416642 / Confirmed  Depression / SNOMED CT 47096489 / Confirmed  DVT - Deep vein thrombosis of lower limb / SNOMED CT 0456028190 / Confirmed  4 yrs ago-pt on coumadin  H/O dehydration / SNOMED CT 196426322 / Confirmed  History of insertion of stent into ureter / SNOMED CT 2047455603 / Confirmed  HTN (hypertension) / SNOMED CT [id number] / Confirmed  Problem automatically updated based on documentation on Capillary Refills, Brachial Pulses, Radial Pulses, Femoral Pulses, Dorsalis Pulses, Ulnar pulses, Popliteal Pulses, Postibial Pulses, Edemas, Affect/Behavior, Orientation Assessment, Arterial Line Site.  Kidney stone / SNOMED CT 295487627 / Confirmed  Malnutrition / SNOMED CT 961936513 / Confirmed  Malnutrition / SNOMED CT 525326215 / Confirmed  Malnutrition / SNOMED CT 623357789 / Confirmed  Malnutrition / SNOMED CT 666692858 / Confirmed  Malnutrition / SNOMED CT 250529297 / Confirmed  Palpitations / SNOMED CT 803315470 / Confirmed  Post-resection short bowel syndrome / SNOMED CT 8674881021 / Confirmed  had resection 4 yrs ago  Short bowel syndrome / SNOMED CT 93026316 / Confirmed  Shoulder pain / SNOMED CT 85116489 / Confirmed  Sinusitis / SNOMED CT  34841186 / Confirmed  Staphylococcal infection / SNOMED CT 24976984 / Confirmed  2011  most recent 12/2020  Malnutrition / SNOMED CT 768469700 / Confirmed  receives TPN at home daily- per PICC line  UTI - Urinary tract infection / SNOMED CT 3087617576 / Confirmed  was in hospital in Dec 2020 due to UTI/sepsis  Weakness / SNOMED CT 35126920 / Confirmed,    Active Problems (25)  Anticoagulants causing adverse effect in therapeutic use   Anxiety   Arthritis   Breast cancer   Depression   DVT - Deep vein thrombosis of lower limb   H/O dehydration   History of insertion of stent into ureter   HTN (hypertension)   Kidney stone   Low serum vitamin D   Malnutrition   Malnutrition   Malnutrition   Malnutrition   Malnutrition   Malnutrition   Palpitations   Post-resection short bowel syndrome   Short bowel syndrome   Shoulder pain   Sinusitis   Staphylococcal infection   UTI - Urinary tract infection   Weakness         Histories   Past Medical History:    Resolved  Cataract (200470922):  Resolved on 8/3/2015 at 69 years.  Comments:  5/2/2015 CDT 13:06 CDT - SYSTEM  Problem automatically updated based on documentation on Capillary Refills, Brachial Pulses, Radial Pulses, Femoral Pulses, Dorsalis Pulses, Ulnar pulses, Popliteal Pulses, Postibial Pulses, Edemas, Affect/Behavior, Orientation Assessment, Arterial Line Site.  Palpitations (291002573):  Resolved on 8/3/2015 at 69 years.  Comments:  5/2/2015 CDT 13:06 CDT - SYSTEM  Problem automatically updated based on documentation on Capillary Refills, Brachial Pulses, Radial Pulses, Femoral Pulses, Dorsalis Pulses, Ulnar pulses, Popliteal Pulses, Postibial Pulses, Edemas, Affect/Behavior, Orientation Assessment, Arterial Line Site.  Arthritis (2154383):  Resolved on 8/3/2015 at 69 years.  Comments:  5/2/2015 CDT 13:06 CDT - SYSTEM  Problem automatically updated based on documentation on Capillary Refills, Brachial Pulses, Radial Pulses, Femoral Pulses, Dorsalis Pulses, Ulnar  pulses, Popliteal Pulses, Postibial Pulses, Edemas, Affect/Behavior, Orientation Assessment, Arterial Line Site.  Back injury ([id number]):  Resolved on 8/3/2015 at 69 years.  Comments:  5/2/2015 CDT 13:06 CDT - SYSTEM  Problem automatically updated based on documentation on Capillary Refills, Brachial Pulses, Radial Pulses, Femoral Pulses, Dorsalis Pulses, Ulnar pulses, Popliteal Pulses, Postibial Pulses, Edemas, Affect/Behavior, Orientation Assessment, Arterial Line Site.  Back pain (0093379088):  Resolved on 8/3/2015 at 69 years.  Comments:  5/2/2015 CDT 13:06 CDT - SYSTEM  Problem automatically updated based on documentation on Capillary Refills, Brachial Pulses, Radial Pulses, Femoral Pulses, Dorsalis Pulses, Ulnar pulses, Popliteal Pulses, Postibial Pulses, Edemas, Affect/Behavior, Orientation Assessment, Arterial Line Site.  Meningitis ([id number]):  Resolved on 8/3/2015 at 69 years.  Comments:  5/2/2015 CDT 13:06 CDT - SYSTEM  Problem automatically updated based on documentation on Capillary Refills, Brachial Pulses, Radial Pulses, Femoral Pulses, Dorsalis Pulses, Ulnar pulses, Popliteal Pulses, Postibial Pulses, Edemas, Affect/Behavior, Orientation Assessment, Arterial Line Site.  Anxiety and depression ([id number]):  Resolved on 8/3/2015 at 69 years.  Comments:  5/2/2015 CDT 13:06 CDT - SYSTEM  Problem automatically updated based on documentation on Capillary Refills, Brachial Pulses, Radial Pulses, Femoral Pulses, Dorsalis Pulses, Ulnar pulses, Popliteal Pulses, Postibial Pulses, Edemas, Affect/Behavior, Orientation Assessment, Arterial Line Site.  Breast cancer (446246600):  Resolved on 8/3/2015 at 69 years.  Comments:  8/13/2013 CDT 12:29 CDT - Jeansonne RN, Jasbir garcia    5/2/2015 CDT 13:06 CDT - SYSTEM  Problem automatically updated based on documentation on Capillary Refills, Brachial Pulses, Radial Pulses, Femoral Pulses, Dorsalis Pulses, Ulnar  pulses, Popliteal Pulses, Postibial Pulses, Edemas, Affect/Behavior, Orientation Assessment, Arterial Line Site.  Hyperlipidemia ([id number]):  Resolved.  Staph infection ([id number]):  Resolved on 8/3/2015 at 69 years.  Comments:  2013 CDT 12:34 CDT - Jeansonne RN, Jasbir MALDONADO  under right breast    2015 CDT 13:06 CDT - SYSTEM  Problem automatically updated based on documentation on Capillary Refills, Brachial Pulses, Radial Pulses, Femoral Pulses, Dorsalis Pulses, Ulnar pulses, Popliteal Pulses, Postibial Pulses, Edemas, Affect/Behavior, Orientation Assessment, Arterial Line Site.    2016 CDT 13:54 CDT - Eliud VALENZUELA, Jocleyn Martinez  no symptoms in last 2 years  Gallstone pancreatitis (955334331):  Resolved.  Glasses (1301432649):  Resolved.  Hypertension ([id number]):  Resolved.  Irregular heart rhythm ([id number]):  Resolved.  PVD (peripheral vascular disease) ([id number]):  Resolved.  Able to lie down (997489984):  Resolved.  Activity tolerance (6831443282):  Resolved.  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  Hypercholesterolemia (34502437):  Resolved.  Insomnia (324958052):  Resolved.  Osteoarthritis (2270317679):  Resolved.  Cholelithiasis (594809354):  Resolved.  DVT - Deep vein thrombosis (3584878337):  Resolved.  Diabetes mellitus (907123652):  Resolved.  Weakness (53225103):  Resolved.  Needs flu shot (941196906):  Resolved.  Acute URI (09825431):  Resolved.  Laryngitis (61895090):  Resolved.  Tremor (09505993):  Resolved.  Electrolyte depletion (76633893):  Resolved.  Tremor of both hands (349609119):  Resolved.  Obesity (5633762588):  Resolved.  Leg edema, left (601856666):  Resolved.   Family History:    Diabetes mellitus  Father ()  Primary malignant neoplasm of breast  Mother ()  Sister  Coronary artery bypass grafts x  3  Brother  Osteoarthritis  Mother ()  Sister  Brain aneurysm....  Father ()  Uterine cancer....  Mother ()  Kidney cancer, primary, with metastasis from kidney to other site....  Sister  Acute myocardial infarction.  Father ()  Hypertension.  Father ()  Sister  Brother  Brother  Depression.  Mother ()  Hyperlipidemia.  Father ()  Sister  Brother  Brother     Procedure history:    Catheter Insertion Mediport (None) on 2021 at 75 Years.  Comments:  2021 11:00 Shirlene Evangelista RN  auto-populated from documented surgical case  PICC Line Removal (Right) on 2021 at 75 Years.  Comments:  2021 11:00 Shirlene Evangelista RN  auto-populated from documented surgical case  Lithotripsy (Left) on 2021 at 75 Years.  Comments:  2021 14:28 Beatrice Miner RN  auto-populated from documented surgical case  Cystoscopy w/ Ureteral Stent Removal (Left) on 2021 at 75 Years.  Comments:  2021 14:28 Beatrice Miner RN  auto-populated from documented surgical case  Lithotripsy (Left) on 3/8/2021 at 75 Years.  Comments:  3/8/2021 13:45 CST - Lejeune RN, Doralis Marie  auto-populated from documented surgical case  Catheter Removal Vascular Access (Left) on 2020 at 75 Years.  Comments:  2020 10:20 Katelynn Chavez RN  auto-populated from documented surgical case  Cystoscopy w/ Ureteral Stent (Left) on 2020 at 75 Years.  Comments:  2020 10:10 Katelynn Chavez RN  auto-populated from documented surgical case  Mediport Removal on 2020 at 74 Years.  Comments:  2020 12:59 CDT - Lejeune RN, Doralis Marie  auto-populated from documented surgical case  Catheter Insertion Mediport (None) on 2020 at 74 Years.  Comments:  2020 12:59 CDT - Lejeune RN, Doralis Marie  auto-populated from documented surgical case  Colonoscopy (None) on 2019 at 74 Years.  Comments:  2019 9:16 CST  Ashley Bailey  auto-populated from documented surgical case  Biopsy Gastrointestinal (None) on 12/18/2019 at 74 Years.  Comments:  12/18/2019 9:16 Ashley Arredondo  auto-populated from documented surgical case  Polypectomy (None) on 12/18/2019 at 74 Years.  Comments:  12/18/2019 9:16 Ashley Arredondo  auto-populated from documented surgical case  Catheter Insertion Mediport (None) on 12/18/2018 at 73 Years.  Comments:  12/18/2018 14:06 Beatrice Knapp RN  auto-populated from documented surgical case  76708 - LT CATARACT W/IOL 1 STA PHACO (Left) on 12/27/2016 at 71 Years.  Comments:  12/27/2016 11:46 Margaret Aldridge  auto-populated from documented surgical case  18085 - RT CATARACT W/IOL 1 STA PHACO (Right) on 11/1/2016 at 70 Years.  Comments:  11/1/2016 9:37 Margaret Rai RN  auto-populated from documented surgical case  Hernia Repair Ventral (None) on 4/26/2016 at 70 Years.  Comments:  4/26/2016 14:17 CDT - Lejeune RN, Doralis Marie  auto-populated from documented surgical case  Exploration Laparotomy (.) on 6/22/2015 at 69 Years.  Comments:  6/23/2015 10:22 Alma Medrano  auto-populated from documented surgical case  Cholecystectomy Laparoscopic (.) on 5/7/2015 at 69 Years.  Comments:  5/7/2015 14:59 Brigitte New RN  auto-populated from documented surgical case  Endoscopic Retrograde Cholangiopancreatography on 4/25/2015 at 69 Years.  Comments:  4/25/2015 11:57 Gabriela Craig RN  auto-populated from documented surgical case  Sphincterotomy on 4/25/2015 at 69 Years.  Comments:  4/25/2015 11:57 Gabriela Craig RN  auto-populated from documented surgical case  Stone Manipulation on 4/25/2015 at 69 Years.  Comments:  4/25/2015 11:57 Gabriela Craig RN  auto-populated from documented surgical case  Appendectomy ([id number]).  Hysterectomy (872322759).  Colonoscopy (086712593).  EGD.  lump removed from right breast.  right  shoulder surgery.  cyst drained.  bowel resection.  Comments:  10/26/2016 11:41 Ligia South RN  2015  peripheral vascular disease surgery.  Comments:  10/26/2016 11:42 Ligia South RN  7/2016-left leg   Social History        Social & Psychosocial Habits    Alcohol  12/25/2012 Risk Assessment: Denies Alcohol Use    04/15/2021  Use: Past    06/20/2021  Use: Past    06/20/2021  Use: Past    06/27/2021  Use: Never    Employment/School  12/23/2013  Status: Retired    06/20/2021  Status: Retired    Exercise  06/20/2021  Exercise type: Walking    Home/Environment  12/25/2012  Lives with: Alone    Living situation: Home/Independent    Alcohol abuse in household: No    Substance abuse in household: No    Smoker in household: No    Injuries/Abuse/Neglect in household: No    Feels unsafe at home: No    Safe place to go: Yes    Agency(s)/Others notified: No    Family/Friends available to help: Yes    Concern for family members at home: No    Major illness in household: No    Financial concerns: No    06/20/2021  Lives with: Alone    Living situation: Home/Independent    Nutrition/Health  06/20/2021  Home Diet Regular    Appetite Good    Sexual  06/20/2021  Sexually active: No    What is your current gender identity? (Check all that apply) Identifies as female    Substance Use  12/25/2012 Risk Assessment: Denies Substance Abuse    04/15/2021  Use: Never    06/20/2021  Use: Never    Tobacco  12/25/2012 Risk Assessment: Denies Tobacco Use    02/06/2019  Use: Never (less than 100 in l    Patient Wants Consult For Cessation Counseling N/A    04/16/2021  Use: Never (less than 100 in l    Patient Wants Consult For Cessation Counseling N/A    05/06/2021  Use: Never (less than 100 in l    Patient Wants Consult For Cessation Counseling N/A    06/20/2021  Use: Never (less than 100 in l    Patient Wants Consult For Cessation Counseling No    06/24/2021  Use: Never (less than 100 in l    Patient Wants Consult For  Cessation Counseling N/A    06/27/2021  Use: Never (less than 100 in l    Patient Wants Consult For Cessation Counseling No    07/12/2021  Use: Never (less than 100 in l    Patient Wants Consult For Cessation Counseling No    09/07/2021  Use: Never (less than 100 in l    Patient Wants Consult For Cessation Counseling No    12/10/2021  Use: Never (less than 100 in l    Patient Wants Consult For Cessation Counseling No    02/14/2022  Use: Never (less than 100 in l    Patient Wants Consult For Cessation Counseling N/A    02/15/2022  Use: Never (less than 100 in l    Patient Wants Consult For Cessation Counseling N/A    Abuse/Neglect  04/16/2021  SHX Any signs of abuse or neglect No    Feels unsafe at home: No    Safe place to go: Yes    05/06/2021  SHX Any signs of abuse or neglect No    06/20/2021  SHX Any signs of abuse or neglect No    Feels unsafe at home: No    06/24/2021  SHX Any signs of abuse or neglect No    06/27/2021  SHX Any signs of abuse or neglect No    07/12/2021  SHX Any signs of abuse or neglect No    09/07/2021  SHX Any signs of abuse or neglect No    12/10/2021  SHX Any signs of abuse or neglect No    02/15/2022  SHX Any signs of abuse or neglect No    Spiritual/Cultural  07/23/2020  Moravian Preference Mandaen    06/20/2021  Moravian Preference Mandaen      06/20/2021  Branch of  Never in   .        Physical Examination      Vital Signs (last 24 hrs)_____  Last Charted___________  Resp Rate         15 br/min  (FEB 15 07:34)  SBP      122 mmHg  (FEB 15 07:34)  DBP      L 54mmHg  (FEB 15 07:34)  SpO2      99 %  (FEB 15 07:34)  Weight      71.68 kg  (FEB 15 07:26)  Height      163 cm  (FEB 15 07:26)  BMI      26.98  (FEB 15 07:26)     Measurements from flowsheet : Measurements   2/15/2022 7:26 CST       Weight Dosing             71.68 kg                             Weight Measured           71.68 kg                             Weight Measured and Calculated in Lbs     158.03  lb                             Height/Length Dosing      163 cm                             Height/Length Measured    163 cm                             Body Mass Index Measured  26.98 kg/m2        Health Maintenance      Health Maintenance     Pending (in the next year)        OverDue           ADL Screening due  12/18/19  and every 1  year(s)           Aspirin Therapy for CVD Prevention due  05/14/21  and every 1  year(s)           Medicare Annual Wellness Exam due  05/15/21  and every 1  year(s)           Bone Density Screening due  07/03/21  and every 2  year(s)           Cognitive Screening due  01/02/22  and every 1  year(s)           Fall Risk Assessment due  01/02/22  and every 1  year(s)        Due            Tetanus Vaccine due  02/15/22  and every 10  year(s)           Zoster Vaccine due  02/15/22  Unknown Frequency        Due In Future            Influenza Vaccine not due until  10/01/22  and every 1  day(s)           Blood Pressure Screening not due until  12/10/22  and every 1  year(s)           Body Mass Index Check not due until  12/10/22  and every 1  year(s)           Hypertension Management-Blood Pressure not due until  12/10/22  and every 1  year(s)           Obesity Screening not due until  01/01/23  and every 1  year(s)           Advance Directive not due until  01/02/23  and every 1  year(s)           Functional Assessment not due until  01/02/23  and every 1  year(s)           Hypertension Management-BMP not due until  02/14/23  and every 1  year(s)     Satisfied (in the past 1 year)        Satisfied            Advance Directive on  02/15/22.  Satisfied by Yessenia Morrow           Blood Pressure Screening on  02/15/22.  Satisfied by Yessenia Morrow           Body Mass Index Check on  02/15/22.  Satisfied by Yessenia Morrow           Diabetes Screening on  02/14/22.  Satisfied by Tamika Martin           Functional Assessment on  02/15/22.  Satisfied by Yessenia Morrow  Linda           Hypertension Management-Blood Pressure on  02/15/22.  Satisfied by Yessenia Morrow           Influenza Vaccine on  12/10/21.  Satisfied by eTmi Yost CMA           Lipid Screening on  02/14/22.  Satisfied by Tamika Martin           Obesity Screening on  02/15/22.  Satisfied by Yessenia Morrow          Review / Management   Results review:     No qualifying data available.

## 2022-05-30 ENCOUNTER — LAB REQUISITION (OUTPATIENT)
Dept: LAB | Facility: HOSPITAL | Age: 77
End: 2022-05-30
Payer: MEDICARE

## 2022-05-30 DIAGNOSIS — K91.2 POSTSURGICAL MALABSORPTION, NOT ELSEWHERE CLASSIFIED: ICD-10-CM

## 2022-05-30 DIAGNOSIS — E86.0 DEHYDRATION: ICD-10-CM

## 2022-05-30 DIAGNOSIS — E46 UNSPECIFIED PROTEIN-CALORIE MALNUTRITION: ICD-10-CM

## 2022-05-30 LAB
ALBUMIN SERPL-MCNC: 3.5 GM/DL (ref 3.4–4.8)
ALBUMIN/GLOB SERPL: 1.1 RATIO (ref 1.1–2)
ALP SERPL-CCNC: 84 UNIT/L (ref 40–150)
ALT SERPL-CCNC: 26 UNIT/L (ref 0–55)
AST SERPL-CCNC: 33 UNIT/L (ref 5–34)
BASOPHILS # BLD AUTO: 0.04 X10(3)/MCL (ref 0–0.2)
BASOPHILS NFR BLD AUTO: 0.8 %
BILIRUBIN DIRECT+TOT PNL SERPL-MCNC: 1 MG/DL
BUN SERPL-MCNC: 11.7 MG/DL (ref 9.8–20.1)
CALCIUM SERPL-MCNC: 9 MG/DL (ref 8.4–10.2)
CHLORIDE SERPL-SCNC: 108 MMOL/L (ref 98–107)
CO2 SERPL-SCNC: 26 MMOL/L (ref 23–31)
CREAT SERPL-MCNC: 0.66 MG/DL (ref 0.55–1.02)
CRP SERPL-MCNC: 6.6 MG/L
EOSINOPHIL # BLD AUTO: 0.7 X10(3)/MCL (ref 0–0.9)
EOSINOPHIL NFR BLD AUTO: 14.6 %
ERYTHROCYTE [DISTWIDTH] IN BLOOD BY AUTOMATED COUNT: 14.2 % (ref 11.5–17)
ERYTHROCYTE [SEDIMENTATION RATE] IN BLOOD: 10 MM/HR (ref 0–20)
GLOBULIN SER-MCNC: 3.1 GM/DL (ref 2.4–3.5)
GLUCOSE SERPL-MCNC: 133 MG/DL (ref 82–115)
HCT VFR BLD AUTO: 40.1 % (ref 37–47)
HGB BLD-MCNC: 12.8 GM/DL (ref 12–16)
IMM GRANULOCYTES # BLD AUTO: 0 X10(3)/MCL (ref 0–0.02)
IMM GRANULOCYTES NFR BLD AUTO: 0 % (ref 0–0.43)
LYMPHOCYTES # BLD AUTO: 1.93 X10(3)/MCL (ref 0.6–4.6)
LYMPHOCYTES NFR BLD AUTO: 40.2 %
MAGNESIUM SERPL-MCNC: 1.8 MG/DL (ref 1.6–2.6)
MCH RBC QN AUTO: 28.7 PG (ref 27–31)
MCHC RBC AUTO-ENTMCNC: 31.9 MG/DL (ref 33–36)
MCV RBC AUTO: 89.9 FL (ref 80–94)
MONOCYTES # BLD AUTO: 0.35 X10(3)/MCL (ref 0.1–1.3)
MONOCYTES NFR BLD AUTO: 7.3 %
NEUTROPHILS # BLD AUTO: 1.8 X10(3)/MCL (ref 2.1–9.2)
NEUTROPHILS NFR BLD AUTO: 37.1 %
NRBC BLD AUTO-RTO: 0 %
PHOSPHATE SERPL-MCNC: 3.3 MG/DL (ref 2.3–4.7)
PLATELET # BLD AUTO: 131 X10(3)/MCL (ref 130–400)
PMV BLD AUTO: 10.3 FL (ref 9.4–12.4)
POTASSIUM SERPL-SCNC: 3.7 MMOL/L (ref 3.5–5.1)
PROT SERPL-MCNC: 6.6 GM/DL (ref 5.8–7.6)
RBC # BLD AUTO: 4.46 X10(6)/MCL (ref 4.2–5.4)
SODIUM SERPL-SCNC: 141 MMOL/L (ref 136–145)
TRIGL SERPL-MCNC: 80 MG/DL (ref 37–140)
WBC # SPEC AUTO: 4.8 X10(3)/MCL (ref 4.5–11.5)

## 2022-05-30 PROCEDURE — 83735 ASSAY OF MAGNESIUM: CPT | Performed by: INTERNAL MEDICINE

## 2022-05-30 PROCEDURE — 86140 C-REACTIVE PROTEIN: CPT | Performed by: INTERNAL MEDICINE

## 2022-05-30 PROCEDURE — 80053 COMPREHEN METABOLIC PANEL: CPT | Performed by: INTERNAL MEDICINE

## 2022-05-30 PROCEDURE — 85025 COMPLETE CBC W/AUTO DIFF WBC: CPT | Performed by: INTERNAL MEDICINE

## 2022-05-30 PROCEDURE — 84100 ASSAY OF PHOSPHORUS: CPT | Performed by: INTERNAL MEDICINE

## 2022-05-30 PROCEDURE — 84478 ASSAY OF TRIGLYCERIDES: CPT | Performed by: INTERNAL MEDICINE

## 2022-05-30 PROCEDURE — 85651 RBC SED RATE NONAUTOMATED: CPT | Performed by: INTERNAL MEDICINE

## 2022-06-06 ENCOUNTER — LAB REQUISITION (OUTPATIENT)
Dept: LAB | Facility: HOSPITAL | Age: 77
End: 2022-06-06
Payer: MEDICARE

## 2022-06-06 DIAGNOSIS — K91.2 POSTSURGICAL MALABSORPTION, NOT ELSEWHERE CLASSIFIED: ICD-10-CM

## 2022-06-06 LAB
BASOPHILS # BLD AUTO: 0.03 X10(3)/MCL (ref 0–0.2)
BASOPHILS NFR BLD AUTO: 0.7 %
EOSINOPHIL # BLD AUTO: 0.35 X10(3)/MCL (ref 0–0.9)
EOSINOPHIL NFR BLD AUTO: 8 %
ERYTHROCYTE [DISTWIDTH] IN BLOOD BY AUTOMATED COUNT: 14.1 % (ref 11.5–17)
HCT VFR BLD AUTO: 39.5 % (ref 37–47)
HGB BLD-MCNC: 12.7 GM/DL (ref 12–16)
IMM GRANULOCYTES # BLD AUTO: 0 X10(3)/MCL (ref 0–0.02)
IMM GRANULOCYTES NFR BLD AUTO: 0 % (ref 0–0.43)
LYMPHOCYTES # BLD AUTO: 2.07 X10(3)/MCL (ref 0.6–4.6)
LYMPHOCYTES NFR BLD AUTO: 47.5 %
MAGNESIUM SERPL-MCNC: 2 MG/DL (ref 1.6–2.6)
MCH RBC QN AUTO: 29.2 PG (ref 27–31)
MCHC RBC AUTO-ENTMCNC: 32.2 MG/DL (ref 33–36)
MCV RBC AUTO: 90.8 FL (ref 80–94)
MONOCYTES # BLD AUTO: 0.33 X10(3)/MCL (ref 0.1–1.3)
MONOCYTES NFR BLD AUTO: 7.6 %
NEUTROPHILS # BLD AUTO: 1.6 X10(3)/MCL (ref 2.1–9.2)
NEUTROPHILS NFR BLD AUTO: 36.2 %
NRBC BLD AUTO-RTO: 0 %
PHOSPHATE SERPL-MCNC: 3.3 MG/DL (ref 2.3–4.7)
PLATELET # BLD AUTO: 130 X10(3)/MCL (ref 130–400)
PMV BLD AUTO: 10.7 FL (ref 9.4–12.4)
RBC # BLD AUTO: 4.35 X10(6)/MCL (ref 4.2–5.4)
TRIGL SERPL-MCNC: 127 MG/DL (ref 37–140)
WBC # SPEC AUTO: 4.4 X10(3)/MCL (ref 4.5–11.5)

## 2022-06-06 PROCEDURE — 85025 COMPLETE CBC W/AUTO DIFF WBC: CPT | Performed by: INTERNAL MEDICINE

## 2022-06-06 PROCEDURE — 84100 ASSAY OF PHOSPHORUS: CPT | Performed by: INTERNAL MEDICINE

## 2022-06-06 PROCEDURE — 84478 ASSAY OF TRIGLYCERIDES: CPT | Performed by: INTERNAL MEDICINE

## 2022-06-06 PROCEDURE — 83735 ASSAY OF MAGNESIUM: CPT | Performed by: INTERNAL MEDICINE

## 2022-06-13 ENCOUNTER — LAB REQUISITION (OUTPATIENT)
Dept: LAB | Facility: HOSPITAL | Age: 77
End: 2022-06-13
Payer: MEDICARE

## 2022-06-13 DIAGNOSIS — K91.2 POSTSURGICAL MALABSORPTION, NOT ELSEWHERE CLASSIFIED: ICD-10-CM

## 2022-06-13 LAB
BASOPHILS # BLD AUTO: 0.02 X10(3)/MCL (ref 0–0.2)
BASOPHILS NFR BLD AUTO: 0.4 %
EOSINOPHIL # BLD AUTO: 0.06 X10(3)/MCL (ref 0–0.9)
EOSINOPHIL NFR BLD AUTO: 1.2 %
ERYTHROCYTE [DISTWIDTH] IN BLOOD BY AUTOMATED COUNT: 14.2 % (ref 11.5–17)
HCT VFR BLD AUTO: 38.2 % (ref 37–47)
HGB BLD-MCNC: 12.2 GM/DL (ref 12–16)
IMM GRANULOCYTES # BLD AUTO: 0.02 X10(3)/MCL (ref 0–0.02)
IMM GRANULOCYTES NFR BLD AUTO: 0.4 % (ref 0–0.43)
LYMPHOCYTES # BLD AUTO: 1.19 X10(3)/MCL (ref 0.6–4.6)
LYMPHOCYTES NFR BLD AUTO: 24.6 %
MAGNESIUM SERPL-MCNC: 2.1 MG/DL (ref 1.6–2.6)
MCH RBC QN AUTO: 29 PG (ref 27–31)
MCHC RBC AUTO-ENTMCNC: 31.9 MG/DL (ref 33–36)
MCV RBC AUTO: 91 FL (ref 80–94)
MONOCYTES # BLD AUTO: 0.63 X10(3)/MCL (ref 0.1–1.3)
MONOCYTES NFR BLD AUTO: 13 %
NEUTROPHILS # BLD AUTO: 2.9 X10(3)/MCL (ref 2.1–9.2)
NEUTROPHILS NFR BLD AUTO: 60.4 %
NRBC BLD AUTO-RTO: 0 %
PHOSPHATE SERPL-MCNC: 2.2 MG/DL (ref 2.3–4.7)
PLATELET # BLD AUTO: 110 X10(3)/MCL (ref 130–400)
PMV BLD AUTO: 10.7 FL (ref 9.4–12.4)
RBC # BLD AUTO: 4.2 X10(6)/MCL (ref 4.2–5.4)
TRIGL SERPL-MCNC: 55 MG/DL (ref 37–140)
WBC # SPEC AUTO: 4.8 X10(3)/MCL (ref 4.5–11.5)

## 2022-06-13 PROCEDURE — 84100 ASSAY OF PHOSPHORUS: CPT | Performed by: INTERNAL MEDICINE

## 2022-06-13 PROCEDURE — 84478 ASSAY OF TRIGLYCERIDES: CPT | Performed by: INTERNAL MEDICINE

## 2022-06-13 PROCEDURE — 83735 ASSAY OF MAGNESIUM: CPT | Performed by: INTERNAL MEDICINE

## 2022-06-13 PROCEDURE — 85025 COMPLETE CBC W/AUTO DIFF WBC: CPT | Performed by: INTERNAL MEDICINE

## 2022-06-20 ENCOUNTER — LAB REQUISITION (OUTPATIENT)
Dept: LAB | Facility: HOSPITAL | Age: 77
End: 2022-06-20
Payer: MEDICARE

## 2022-06-20 DIAGNOSIS — K91.2 POSTSURGICAL MALABSORPTION, NOT ELSEWHERE CLASSIFIED: ICD-10-CM

## 2022-06-20 LAB
BASOPHILS # BLD AUTO: 0.01 X10(3)/MCL (ref 0–0.2)
BASOPHILS NFR BLD AUTO: 0.3 %
EOSINOPHIL # BLD AUTO: 0.27 X10(3)/MCL (ref 0–0.9)
EOSINOPHIL NFR BLD AUTO: 7.2 %
ERYTHROCYTE [DISTWIDTH] IN BLOOD BY AUTOMATED COUNT: 14.1 % (ref 11.5–17)
HCT VFR BLD AUTO: 38.3 % (ref 37–47)
HGB BLD-MCNC: 12.2 GM/DL (ref 12–16)
IMM GRANULOCYTES # BLD AUTO: 0 X10(3)/MCL (ref 0–0.02)
IMM GRANULOCYTES NFR BLD AUTO: 0 % (ref 0–0.43)
LYMPHOCYTES # BLD AUTO: 1.81 X10(3)/MCL (ref 0.6–4.6)
LYMPHOCYTES NFR BLD AUTO: 48.3 %
MAGNESIUM SERPL-MCNC: 1.9 MG/DL (ref 1.6–2.6)
MCH RBC QN AUTO: 29 PG (ref 27–31)
MCHC RBC AUTO-ENTMCNC: 31.9 MG/DL (ref 33–36)
MCV RBC AUTO: 91.2 FL (ref 80–94)
MONOCYTES # BLD AUTO: 0.31 X10(3)/MCL (ref 0.1–1.3)
MONOCYTES NFR BLD AUTO: 8.3 %
NEUTROPHILS # BLD AUTO: 1.4 X10(3)/MCL (ref 2.1–9.2)
NEUTROPHILS NFR BLD AUTO: 35.9 %
NRBC BLD AUTO-RTO: 0 %
PHOSPHATE SERPL-MCNC: 3.2 MG/DL (ref 2.3–4.7)
PLATELET # BLD AUTO: 134 X10(3)/MCL (ref 130–400)
PMV BLD AUTO: 10.3 FL (ref 9.4–12.4)
RBC # BLD AUTO: 4.2 X10(6)/MCL (ref 4.2–5.4)
TRIGL SERPL-MCNC: 161 MG/DL (ref 37–140)
WBC # SPEC AUTO: 3.8 X10(3)/MCL (ref 4.5–11.5)

## 2022-06-20 PROCEDURE — 85025 COMPLETE CBC W/AUTO DIFF WBC: CPT | Performed by: INTERNAL MEDICINE

## 2022-06-20 PROCEDURE — 83735 ASSAY OF MAGNESIUM: CPT | Performed by: INTERNAL MEDICINE

## 2022-06-20 PROCEDURE — 84478 ASSAY OF TRIGLYCERIDES: CPT | Performed by: INTERNAL MEDICINE

## 2022-06-20 PROCEDURE — 84100 ASSAY OF PHOSPHORUS: CPT | Performed by: INTERNAL MEDICINE

## 2022-06-27 ENCOUNTER — LAB REQUISITION (OUTPATIENT)
Dept: LAB | Facility: HOSPITAL | Age: 77
End: 2022-06-27
Payer: MEDICARE

## 2022-06-27 DIAGNOSIS — K91.2 POSTSURGICAL MALABSORPTION, NOT ELSEWHERE CLASSIFIED: ICD-10-CM

## 2022-06-27 LAB
BASOPHILS # BLD AUTO: 0.03 X10(3)/MCL (ref 0–0.2)
BASOPHILS NFR BLD AUTO: 0.7 %
EOSINOPHIL # BLD AUTO: 0.21 X10(3)/MCL (ref 0–0.9)
EOSINOPHIL NFR BLD AUTO: 4.6 %
ERYTHROCYTE [DISTWIDTH] IN BLOOD BY AUTOMATED COUNT: 13.9 % (ref 11.5–17)
HCT VFR BLD AUTO: 39.1 % (ref 37–47)
HGB BLD-MCNC: 12.6 GM/DL (ref 12–16)
IMM GRANULOCYTES # BLD AUTO: 0 X10(3)/MCL (ref 0–0.02)
IMM GRANULOCYTES NFR BLD AUTO: 0 % (ref 0–0.43)
LYMPHOCYTES # BLD AUTO: 1.86 X10(3)/MCL (ref 0.6–4.6)
LYMPHOCYTES NFR BLD AUTO: 40.3 %
MAGNESIUM SERPL-MCNC: 2 MG/DL (ref 1.6–2.6)
MCH RBC QN AUTO: 29.4 PG (ref 27–31)
MCHC RBC AUTO-ENTMCNC: 32.2 MG/DL (ref 33–36)
MCV RBC AUTO: 91.1 FL (ref 80–94)
MONOCYTES # BLD AUTO: 0.46 X10(3)/MCL (ref 0.1–1.3)
MONOCYTES NFR BLD AUTO: 10 %
NEUTROPHILS # BLD AUTO: 2.1 X10(3)/MCL (ref 2.1–9.2)
NEUTROPHILS NFR BLD AUTO: 44.4 %
NRBC BLD AUTO-RTO: 0 %
PHOSPHATE SERPL-MCNC: 3.5 MG/DL (ref 2.3–4.7)
PLATELET # BLD AUTO: 187 X10(3)/MCL (ref 130–400)
PMV BLD AUTO: 10.2 FL (ref 9.4–12.4)
RBC # BLD AUTO: 4.29 X10(6)/MCL (ref 4.2–5.4)
TRIGL SERPL-MCNC: 88 MG/DL (ref 37–140)
WBC # SPEC AUTO: 4.6 X10(3)/MCL (ref 4.5–11.5)

## 2022-06-27 PROCEDURE — 84100 ASSAY OF PHOSPHORUS: CPT | Performed by: INTERNAL MEDICINE

## 2022-06-27 PROCEDURE — 83735 ASSAY OF MAGNESIUM: CPT | Performed by: INTERNAL MEDICINE

## 2022-06-27 PROCEDURE — 85025 COMPLETE CBC W/AUTO DIFF WBC: CPT | Performed by: INTERNAL MEDICINE

## 2022-06-27 PROCEDURE — 84478 ASSAY OF TRIGLYCERIDES: CPT | Performed by: INTERNAL MEDICINE

## 2022-07-03 PROBLEM — F41.9 ANXIETY: Status: ACTIVE | Noted: 2022-07-03

## 2022-07-03 PROBLEM — R00.2 PALPITATIONS: Status: ACTIVE | Noted: 2022-07-03

## 2022-07-03 PROBLEM — F32.A DEPRESSIVE DISORDER: Status: ACTIVE | Noted: 2022-07-03

## 2022-07-03 PROBLEM — M19.90 ARTHRITIS: Status: ACTIVE | Noted: 2022-07-03

## 2022-07-03 PROBLEM — N20.0 CALCULUS OF KIDNEY: Status: ACTIVE | Noted: 2022-07-03

## 2022-07-03 PROBLEM — R79.89 LOW VITAMIN D LEVEL: Status: ACTIVE | Noted: 2022-07-03

## 2022-07-03 PROBLEM — B95.8 STAPHYLOCOCCUS INFECTION: Status: ACTIVE | Noted: 2022-07-03

## 2022-07-03 PROBLEM — I10 HYPERTENSION: Status: ACTIVE | Noted: 2022-07-03

## 2022-07-03 PROBLEM — K90.829 ACQUIRED SHORT BOWEL SYNDROME: Status: ACTIVE | Noted: 2022-07-03

## 2022-07-03 PROBLEM — C50.919 MALIGNANT NEOPLASM OF BREAST: Status: ACTIVE | Noted: 2022-07-03

## 2022-07-03 PROBLEM — I82.409 DEEP VEIN THROMBOSIS (DVT) OF LOWER EXTREMITY: Status: ACTIVE | Noted: 2022-07-03

## 2022-07-03 PROBLEM — E63.9 UNDERNUTRITION: Status: ACTIVE | Noted: 2022-07-03

## 2022-07-03 PROBLEM — Z23 IMMUNIZATION DUE: Status: ACTIVE | Noted: 2022-07-03

## 2022-07-04 ENCOUNTER — LAB REQUISITION (OUTPATIENT)
Dept: LAB | Facility: HOSPITAL | Age: 77
End: 2022-07-04
Payer: MEDICARE

## 2022-07-04 DIAGNOSIS — E86.0 DEHYDRATION: ICD-10-CM

## 2022-07-04 DIAGNOSIS — E46 UNSPECIFIED PROTEIN-CALORIE MALNUTRITION: ICD-10-CM

## 2022-07-04 DIAGNOSIS — K91.2 POSTSURGICAL MALABSORPTION, NOT ELSEWHERE CLASSIFIED: ICD-10-CM

## 2022-07-04 LAB
BASOPHILS # BLD AUTO: 0.02 X10(3)/MCL (ref 0–0.2)
BASOPHILS NFR BLD AUTO: 0.5 %
DEPRECATED CALCIDIOL+CALCIFEROL SERPL-MC: 21.2 NG/ML (ref 30–80)
EOSINOPHIL # BLD AUTO: 0.18 X10(3)/MCL (ref 0–0.9)
EOSINOPHIL NFR BLD AUTO: 4.4 %
ERYTHROCYTE [DISTWIDTH] IN BLOOD BY AUTOMATED COUNT: 14.1 % (ref 11.5–17)
HCT VFR BLD AUTO: 39.5 % (ref 37–47)
HGB BLD-MCNC: 12.6 GM/DL (ref 12–16)
IMM GRANULOCYTES # BLD AUTO: 0.01 X10(3)/MCL (ref 0–0.04)
IMM GRANULOCYTES NFR BLD AUTO: 0.2 %
LYMPHOCYTES # BLD AUTO: 1.89 X10(3)/MCL (ref 0.6–4.6)
LYMPHOCYTES NFR BLD AUTO: 46.2 %
MAGNESIUM SERPL-MCNC: 1.8 MG/DL (ref 1.6–2.6)
MCH RBC QN AUTO: 29 PG (ref 27–31)
MCHC RBC AUTO-ENTMCNC: 31.9 MG/DL (ref 33–36)
MCV RBC AUTO: 90.8 FL (ref 80–94)
MONOCYTES # BLD AUTO: 0.38 X10(3)/MCL (ref 0.1–1.3)
MONOCYTES NFR BLD AUTO: 9.3 %
NEUTROPHILS # BLD AUTO: 1.6 X10(3)/MCL (ref 2.1–9.2)
NEUTROPHILS NFR BLD AUTO: 39.4 %
NRBC BLD AUTO-RTO: 0 %
PHOSPHATE SERPL-MCNC: 3.7 MG/DL (ref 2.3–4.7)
PLATELET # BLD AUTO: 139 X10(3)/MCL (ref 130–400)
PMV BLD AUTO: 10.6 FL (ref 7.4–10.4)
RBC # BLD AUTO: 4.35 X10(6)/MCL (ref 4.2–5.4)
TRIGL SERPL-MCNC: 89 MG/DL (ref 37–140)
VIT B12 SERPL-MCNC: 855 PG/ML (ref 213–816)
WBC # SPEC AUTO: 4.1 X10(3)/MCL (ref 4.5–11.5)

## 2022-07-04 PROCEDURE — 84478 ASSAY OF TRIGLYCERIDES: CPT | Performed by: INTERNAL MEDICINE

## 2022-07-04 PROCEDURE — 82607 VITAMIN B-12: CPT | Performed by: INTERNAL MEDICINE

## 2022-07-04 PROCEDURE — 82306 VITAMIN D 25 HYDROXY: CPT | Performed by: INTERNAL MEDICINE

## 2022-07-04 PROCEDURE — 84446 ASSAY OF VITAMIN E: CPT | Performed by: INTERNAL MEDICINE

## 2022-07-04 PROCEDURE — 85025 COMPLETE CBC W/AUTO DIFF WBC: CPT | Performed by: INTERNAL MEDICINE

## 2022-07-04 PROCEDURE — 84100 ASSAY OF PHOSPHORUS: CPT | Performed by: INTERNAL MEDICINE

## 2022-07-04 PROCEDURE — 83735 ASSAY OF MAGNESIUM: CPT | Performed by: INTERNAL MEDICINE

## 2022-07-04 PROCEDURE — 84590 ASSAY OF VITAMIN A: CPT | Performed by: INTERNAL MEDICINE

## 2022-07-07 LAB
A-TOCOPHEROL VIT E SERPL-MCNC: 12.5 MG/L (ref 5.5–17)
VIT A SERPL-MCNC: 34.9 MCG/DL (ref 32.5–78)

## 2022-07-11 ENCOUNTER — LAB REQUISITION (OUTPATIENT)
Dept: LAB | Facility: HOSPITAL | Age: 77
End: 2022-07-11
Payer: MEDICARE

## 2022-07-11 DIAGNOSIS — K91.2 POSTSURGICAL MALABSORPTION, NOT ELSEWHERE CLASSIFIED: ICD-10-CM

## 2022-07-11 LAB
BASOPHILS # BLD AUTO: 0.02 X10(3)/MCL (ref 0–0.2)
BASOPHILS NFR BLD AUTO: 0.4 %
EOSINOPHIL # BLD AUTO: 0.25 X10(3)/MCL (ref 0–0.9)
EOSINOPHIL NFR BLD AUTO: 5.4 %
ERYTHROCYTE [DISTWIDTH] IN BLOOD BY AUTOMATED COUNT: 14.2 % (ref 11.5–17)
HCT VFR BLD AUTO: 36.5 % (ref 37–47)
HGB BLD-MCNC: 11.4 GM/DL (ref 12–16)
IMM GRANULOCYTES # BLD AUTO: 0.02 X10(3)/MCL (ref 0–0.04)
IMM GRANULOCYTES NFR BLD AUTO: 0.4 %
LYMPHOCYTES # BLD AUTO: 1.59 X10(3)/MCL (ref 0.6–4.6)
LYMPHOCYTES NFR BLD AUTO: 34.4 %
MAGNESIUM SERPL-MCNC: 2 MG/DL (ref 1.6–2.6)
MCH RBC QN AUTO: 28.9 PG (ref 27–31)
MCHC RBC AUTO-ENTMCNC: 31.2 MG/DL (ref 33–36)
MCV RBC AUTO: 92.6 FL (ref 80–94)
MONOCYTES # BLD AUTO: 0.49 X10(3)/MCL (ref 0.1–1.3)
MONOCYTES NFR BLD AUTO: 10.6 %
NEUTROPHILS # BLD AUTO: 2.3 X10(3)/MCL (ref 2.1–9.2)
NEUTROPHILS NFR BLD AUTO: 48.8 %
NRBC BLD AUTO-RTO: 0 %
PHOSPHATE SERPL-MCNC: 3 MG/DL (ref 2.3–4.7)
PLATELET # BLD AUTO: 126 X10(3)/MCL (ref 130–400)
PMV BLD AUTO: 10.8 FL (ref 7.4–10.4)
RBC # BLD AUTO: 3.94 X10(6)/MCL (ref 4.2–5.4)
TRIGL SERPL-MCNC: 70 MG/DL (ref 37–140)
WBC # SPEC AUTO: 4.6 X10(3)/MCL (ref 4.5–11.5)

## 2022-07-11 PROCEDURE — 84100 ASSAY OF PHOSPHORUS: CPT | Performed by: INTERNAL MEDICINE

## 2022-07-11 PROCEDURE — 85025 COMPLETE CBC W/AUTO DIFF WBC: CPT | Performed by: INTERNAL MEDICINE

## 2022-07-11 PROCEDURE — 83735 ASSAY OF MAGNESIUM: CPT | Performed by: INTERNAL MEDICINE

## 2022-07-11 PROCEDURE — 84478 ASSAY OF TRIGLYCERIDES: CPT | Performed by: INTERNAL MEDICINE

## 2022-07-25 ENCOUNTER — HOSPITAL ENCOUNTER (OUTPATIENT)
Dept: RADIOLOGY | Facility: HOSPITAL | Age: 77
Discharge: HOME | End: 2022-07-25
Attending: INTERNAL MEDICINE
Payer: MEDICARE

## 2022-07-25 DIAGNOSIS — M81.0 OSTEOPOROSIS: ICD-10-CM

## 2022-07-25 PROCEDURE — 77080 DXA BONE DENSITY AXIAL: CPT | Mod: 26,,, | Performed by: STUDENT IN AN ORGANIZED HEALTH CARE EDUCATION/TRAINING PROGRAM

## 2022-07-25 PROCEDURE — 77080 DXA BONE DENSITY AXIAL: CPT | Mod: TC

## 2022-07-25 PROCEDURE — 77080 DEXA BONE DENSITY SPINE HIP: ICD-10-PCS | Mod: 26,,, | Performed by: STUDENT IN AN ORGANIZED HEALTH CARE EDUCATION/TRAINING PROGRAM

## 2022-08-01 ENCOUNTER — LAB REQUISITION (OUTPATIENT)
Dept: LAB | Facility: HOSPITAL | Age: 77
End: 2022-08-01
Payer: MEDICARE

## 2022-08-01 DIAGNOSIS — K92.1 MELENA: ICD-10-CM

## 2022-08-01 LAB
BASOPHILS # BLD AUTO: 0.03 X10(3)/MCL (ref 0–0.2)
BASOPHILS NFR BLD AUTO: 0.7 %
EOSINOPHIL # BLD AUTO: 0.17 X10(3)/MCL (ref 0–0.9)
EOSINOPHIL NFR BLD AUTO: 3.9 %
ERYTHROCYTE [DISTWIDTH] IN BLOOD BY AUTOMATED COUNT: 14 % (ref 11.5–17)
HCT VFR BLD AUTO: 39.7 % (ref 37–47)
HGB BLD-MCNC: 12.5 GM/DL (ref 12–16)
IMM GRANULOCYTES # BLD AUTO: 0.01 X10(3)/MCL (ref 0–0.04)
IMM GRANULOCYTES NFR BLD AUTO: 0.2 %
LYMPHOCYTES # BLD AUTO: 1.83 X10(3)/MCL (ref 0.6–4.6)
LYMPHOCYTES NFR BLD AUTO: 42 %
MAGNESIUM SERPL-MCNC: 2 MG/DL (ref 1.6–2.6)
MCH RBC QN AUTO: 29.3 PG (ref 27–31)
MCHC RBC AUTO-ENTMCNC: 31.5 MG/DL (ref 33–36)
MCV RBC AUTO: 93.2 FL (ref 80–94)
MONOCYTES # BLD AUTO: 0.45 X10(3)/MCL (ref 0.1–1.3)
MONOCYTES NFR BLD AUTO: 10.3 %
NEUTROPHILS # BLD AUTO: 1.9 X10(3)/MCL (ref 2.1–9.2)
NEUTROPHILS NFR BLD AUTO: 42.9 %
NRBC BLD AUTO-RTO: 0 %
PHOSPHATE SERPL-MCNC: 3.5 MG/DL (ref 2.3–4.7)
PLATELET # BLD AUTO: 149 X10(3)/MCL (ref 130–400)
PMV BLD AUTO: 10.9 FL (ref 7.4–10.4)
RBC # BLD AUTO: 4.26 X10(6)/MCL (ref 4.2–5.4)
TRIGL SERPL-MCNC: 82 MG/DL (ref 37–140)
WBC # SPEC AUTO: 4.4 X10(3)/MCL (ref 4.5–11.5)

## 2022-08-01 PROCEDURE — 84478 ASSAY OF TRIGLYCERIDES: CPT | Performed by: INTERNAL MEDICINE

## 2022-08-01 PROCEDURE — 83735 ASSAY OF MAGNESIUM: CPT | Performed by: INTERNAL MEDICINE

## 2022-08-01 PROCEDURE — 84100 ASSAY OF PHOSPHORUS: CPT | Performed by: INTERNAL MEDICINE

## 2022-08-01 PROCEDURE — 85025 COMPLETE CBC W/AUTO DIFF WBC: CPT | Performed by: INTERNAL MEDICINE

## 2022-08-08 ENCOUNTER — LAB REQUISITION (OUTPATIENT)
Dept: LAB | Facility: HOSPITAL | Age: 77
End: 2022-08-08
Payer: MEDICARE

## 2022-08-08 DIAGNOSIS — I87.2 VENOUS INSUFFICIENCY (CHRONIC) (PERIPHERAL): ICD-10-CM

## 2022-08-08 DIAGNOSIS — Z79.01 LONG TERM (CURRENT) USE OF ANTICOAGULANTS: ICD-10-CM

## 2022-08-08 LAB
INR BLD: 2.04 (ref 0–1.3)
PROTHROMBIN TIME: 22.7 SECONDS (ref 12.5–14.5)

## 2022-08-08 PROCEDURE — 85610 PROTHROMBIN TIME: CPT | Performed by: INTERNAL MEDICINE

## 2022-08-11 ENCOUNTER — DOCUMENT SCAN (OUTPATIENT)
Dept: HOME HEALTH SERVICES | Facility: HOSPITAL | Age: 77
End: 2022-08-11
Payer: MEDICARE

## 2022-08-13 ENCOUNTER — DOCUMENT SCAN (OUTPATIENT)
Dept: HOME HEALTH SERVICES | Facility: HOSPITAL | Age: 77
End: 2022-08-13
Payer: MEDICARE

## 2022-08-15 ENCOUNTER — DOCUMENT SCAN (OUTPATIENT)
Dept: HOME HEALTH SERVICES | Facility: HOSPITAL | Age: 77
End: 2022-08-15
Payer: MEDICARE

## 2022-08-18 ENCOUNTER — DOCUMENT SCAN (OUTPATIENT)
Dept: HOME HEALTH SERVICES | Facility: HOSPITAL | Age: 77
End: 2022-08-18
Payer: MEDICARE

## 2022-08-22 ENCOUNTER — LAB REQUISITION (OUTPATIENT)
Dept: LAB | Facility: HOSPITAL | Age: 77
End: 2022-08-22
Payer: MEDICARE

## 2022-08-22 DIAGNOSIS — K91.2 POSTSURGICAL MALABSORPTION, NOT ELSEWHERE CLASSIFIED: ICD-10-CM

## 2022-08-22 DIAGNOSIS — E86.0 DEHYDRATION: ICD-10-CM

## 2022-08-22 DIAGNOSIS — E46 UNSPECIFIED PROTEIN-CALORIE MALNUTRITION: ICD-10-CM

## 2022-08-22 LAB
ALBUMIN SERPL-MCNC: 3.5 GM/DL (ref 3.4–4.8)
ALBUMIN/GLOB SERPL: 1.1 RATIO (ref 1.1–2)
ALP SERPL-CCNC: 86 UNIT/L (ref 40–150)
ALT SERPL-CCNC: 27 UNIT/L (ref 0–55)
AST SERPL-CCNC: 30 UNIT/L (ref 5–34)
BASOPHILS # BLD AUTO: 0.02 X10(3)/MCL (ref 0–0.2)
BASOPHILS NFR BLD AUTO: 0.4 %
BILIRUBIN DIRECT+TOT PNL SERPL-MCNC: 0.6 MG/DL
BUN SERPL-MCNC: 17.9 MG/DL (ref 9.8–20.1)
CALCIUM SERPL-MCNC: 9.2 MG/DL (ref 8.4–10.2)
CHLORIDE SERPL-SCNC: 103 MMOL/L (ref 98–107)
CO2 SERPL-SCNC: 24 MMOL/L (ref 23–31)
CREAT SERPL-MCNC: 0.64 MG/DL (ref 0.55–1.02)
EOSINOPHIL # BLD AUTO: 0.15 X10(3)/MCL (ref 0–0.9)
EOSINOPHIL NFR BLD AUTO: 2.8 %
ERYTHROCYTE [DISTWIDTH] IN BLOOD BY AUTOMATED COUNT: 13.5 % (ref 11.5–17)
GFR SERPLBLD CREATININE-BSD FMLA CKD-EPI: >60 MLS/MIN/1.73/M2
GLOBULIN SER-MCNC: 3.1 GM/DL (ref 2.4–3.5)
GLUCOSE SERPL-MCNC: 98 MG/DL (ref 82–115)
HCT VFR BLD AUTO: 37.8 % (ref 37–47)
HGB BLD-MCNC: 12 GM/DL (ref 12–16)
IMM GRANULOCYTES # BLD AUTO: 0.01 X10(3)/MCL (ref 0–0.04)
IMM GRANULOCYTES NFR BLD AUTO: 0.2 %
LYMPHOCYTES # BLD AUTO: 2.28 X10(3)/MCL (ref 0.6–4.6)
LYMPHOCYTES NFR BLD AUTO: 43.1 %
MAGNESIUM SERPL-MCNC: 2 MG/DL (ref 1.6–2.6)
MCH RBC QN AUTO: 28.7 PG (ref 27–31)
MCHC RBC AUTO-ENTMCNC: 31.7 MG/DL (ref 33–36)
MCV RBC AUTO: 90.4 FL (ref 80–94)
MONOCYTES # BLD AUTO: 0.47 X10(3)/MCL (ref 0.1–1.3)
MONOCYTES NFR BLD AUTO: 8.9 %
NEUTROPHILS # BLD AUTO: 2.4 X10(3)/MCL (ref 2.1–9.2)
NEUTROPHILS NFR BLD AUTO: 44.6 %
NRBC BLD AUTO-RTO: 0 %
PHOSPHATE SERPL-MCNC: 3.7 MG/DL (ref 2.3–4.7)
PLATELET # BLD AUTO: 150 X10(3)/MCL (ref 130–400)
PMV BLD AUTO: 10.8 FL (ref 7.4–10.4)
POTASSIUM SERPL-SCNC: 4.6 MMOL/L (ref 3.5–5.1)
PROT SERPL-MCNC: 6.6 GM/DL (ref 5.8–7.6)
RBC # BLD AUTO: 4.18 X10(6)/MCL (ref 4.2–5.4)
SODIUM SERPL-SCNC: 136 MMOL/L (ref 136–145)
TRIGL SERPL-MCNC: 57 MG/DL (ref 37–140)
WBC # SPEC AUTO: 5.3 X10(3)/MCL (ref 4.5–11.5)

## 2022-08-22 PROCEDURE — 84478 ASSAY OF TRIGLYCERIDES: CPT | Performed by: INTERNAL MEDICINE

## 2022-08-22 PROCEDURE — 84100 ASSAY OF PHOSPHORUS: CPT | Performed by: INTERNAL MEDICINE

## 2022-08-22 PROCEDURE — 85025 COMPLETE CBC W/AUTO DIFF WBC: CPT | Performed by: INTERNAL MEDICINE

## 2022-08-22 PROCEDURE — 83735 ASSAY OF MAGNESIUM: CPT | Performed by: INTERNAL MEDICINE

## 2022-08-22 PROCEDURE — 80053 COMPREHEN METABOLIC PANEL: CPT | Performed by: INTERNAL MEDICINE

## 2022-08-24 ENCOUNTER — DOCUMENT SCAN (OUTPATIENT)
Dept: HOME HEALTH SERVICES | Facility: HOSPITAL | Age: 77
End: 2022-08-24
Payer: MEDICARE

## 2022-08-25 ENCOUNTER — DOCUMENT SCAN (OUTPATIENT)
Dept: HOME HEALTH SERVICES | Facility: HOSPITAL | Age: 77
End: 2022-08-25
Payer: MEDICARE

## 2022-08-31 ENCOUNTER — DOCUMENT SCAN (OUTPATIENT)
Dept: HOME HEALTH SERVICES | Facility: HOSPITAL | Age: 77
End: 2022-08-31
Payer: MEDICARE

## 2022-09-12 ENCOUNTER — LAB REQUISITION (OUTPATIENT)
Dept: LAB | Facility: HOSPITAL | Age: 77
End: 2022-09-12
Payer: MEDICARE

## 2022-09-12 DIAGNOSIS — E46 UNSPECIFIED PROTEIN-CALORIE MALNUTRITION: ICD-10-CM

## 2022-09-12 DIAGNOSIS — E86.0 DEHYDRATION: ICD-10-CM

## 2022-09-12 DIAGNOSIS — K91.2 POSTSURGICAL MALABSORPTION, NOT ELSEWHERE CLASSIFIED: ICD-10-CM

## 2022-09-12 LAB
BASOPHILS # BLD AUTO: 0.04 X10(3)/MCL (ref 0–0.2)
BASOPHILS NFR BLD AUTO: 0.8 %
EOSINOPHIL # BLD AUTO: 0.21 X10(3)/MCL (ref 0–0.9)
EOSINOPHIL NFR BLD AUTO: 4.2 %
ERYTHROCYTE [DISTWIDTH] IN BLOOD BY AUTOMATED COUNT: 14 % (ref 11.5–17)
HCT VFR BLD AUTO: 38 % (ref 37–47)
HGB BLD-MCNC: 12.1 GM/DL (ref 12–16)
IMM GRANULOCYTES # BLD AUTO: 0.01 X10(3)/MCL (ref 0–0.04)
IMM GRANULOCYTES NFR BLD AUTO: 0.2 %
LYMPHOCYTES # BLD AUTO: 1.92 X10(3)/MCL (ref 0.6–4.6)
LYMPHOCYTES NFR BLD AUTO: 37.9 %
MAGNESIUM SERPL-MCNC: 2 MG/DL (ref 1.6–2.6)
MCH RBC QN AUTO: 29 PG (ref 27–31)
MCHC RBC AUTO-ENTMCNC: 31.8 MG/DL (ref 33–36)
MCV RBC AUTO: 91.1 FL (ref 80–94)
MONOCYTES # BLD AUTO: 0.41 X10(3)/MCL (ref 0.1–1.3)
MONOCYTES NFR BLD AUTO: 8.1 %
NEUTROPHILS # BLD AUTO: 2.5 X10(3)/MCL (ref 2.1–9.2)
NEUTROPHILS NFR BLD AUTO: 48.8 %
NRBC BLD AUTO-RTO: 0 %
PHOSPHATE SERPL-MCNC: 3.3 MG/DL (ref 2.3–4.7)
PLATELET # BLD AUTO: 165 X10(3)/MCL (ref 130–400)
PMV BLD AUTO: 10.5 FL (ref 7.4–10.4)
RBC # BLD AUTO: 4.17 X10(6)/MCL (ref 4.2–5.4)
TRIGL SERPL-MCNC: 106 MG/DL (ref 37–140)
WBC # SPEC AUTO: 5.1 X10(3)/MCL (ref 4.5–11.5)

## 2022-09-12 PROCEDURE — 83735 ASSAY OF MAGNESIUM: CPT | Performed by: INTERNAL MEDICINE

## 2022-09-12 PROCEDURE — 84478 ASSAY OF TRIGLYCERIDES: CPT | Performed by: INTERNAL MEDICINE

## 2022-09-12 PROCEDURE — 84100 ASSAY OF PHOSPHORUS: CPT | Performed by: INTERNAL MEDICINE

## 2022-09-12 PROCEDURE — 85025 COMPLETE CBC W/AUTO DIFF WBC: CPT | Performed by: INTERNAL MEDICINE

## 2022-09-15 ENCOUNTER — HISTORICAL (OUTPATIENT)
Dept: ADMINISTRATIVE | Facility: HOSPITAL | Age: 77
End: 2022-09-15
Payer: MEDICARE

## 2022-09-16 ENCOUNTER — DOCUMENT SCAN (OUTPATIENT)
Dept: HOME HEALTH SERVICES | Facility: HOSPITAL | Age: 77
End: 2022-09-16
Payer: MEDICARE

## 2022-09-26 ENCOUNTER — DOCUMENT SCAN (OUTPATIENT)
Dept: HOME HEALTH SERVICES | Facility: HOSPITAL | Age: 77
End: 2022-09-26
Payer: MEDICARE

## 2022-09-26 NOTE — ED PROVIDER NOTES
Patient:   Laura Lee            MRN: [id number]            FIN: [id number]               Age:   74 years     Sex:  Female     :  1945   Associated Diagnoses:   None   Author:   Demetria Still MD      I had face to face time with the patient. I performed a history and physical exam and discussed plan of care with NP Gian  Pt is a 73 yo F on TPN with mediport in place presenting from urgent care for evaluation.  She was hypotensive at the urgent care, had been experiencing some uri symptoms along iwth fatigue, malaise, nausea.  labs notable for uti, leukopenia, was hypotensive, not acidotic. hypotension was fluid responsive.  cultures obtained from peripheral site and mediport, started on iv rocephin for uti and will be admitted for urosepsis.   chronically ill appearing pleasant female who is aaox3  normocephalic, atraumatic  slightly bradycardic, regular rate  lungs clear to auscultation bilaterally  abdomen soft and nontender, no cva tenderness to percussion bilaterally  no le edema  chest wall on left with mediport in place, no tenderness or surrounding skin changes      Addendum      Teaching-Supervisory Addendum-Brief   I participated in the following activities of this patients care: the medical history, the physical exam, medical decision making.   I personally performed: supervision of the patient's care, the medical history, the physical exam, the medical decision making.   Evaluation and management service: I agree with the evaluation and management decisions made in this patient's care.   Results interpretation: I agree with the study interpretation in this patient's care.   The case was discussed with.    Area H Indication Text: Tumors in this location are included in Area H (eyelids, eyebrows, nose, lips, chin, ear, pre-auricular, post-auricular, temple, genitalia, hands, feet, ankles and areola).  Tissue conservation is critical in these anatomic locations.

## 2022-10-03 ENCOUNTER — LAB REQUISITION (OUTPATIENT)
Dept: LAB | Facility: HOSPITAL | Age: 77
End: 2022-10-03
Payer: MEDICARE

## 2022-10-03 DIAGNOSIS — K91.2 POSTSURGICAL MALABSORPTION, NOT ELSEWHERE CLASSIFIED: ICD-10-CM

## 2022-10-03 DIAGNOSIS — E46 UNSPECIFIED PROTEIN-CALORIE MALNUTRITION: ICD-10-CM

## 2022-10-03 DIAGNOSIS — E86.0 DEHYDRATION: ICD-10-CM

## 2022-10-03 LAB
ALBUMIN SERPL-MCNC: 3.7 GM/DL (ref 3.4–4.8)
ALBUMIN/GLOB SERPL: 1.2 RATIO (ref 1.1–2)
ALP SERPL-CCNC: 64 UNIT/L (ref 40–150)
ALT SERPL-CCNC: 33 UNIT/L (ref 0–55)
AST SERPL-CCNC: 37 UNIT/L (ref 5–34)
BASOPHILS # BLD AUTO: 0.02 X10(3)/MCL (ref 0–0.2)
BASOPHILS NFR BLD AUTO: 0.5 %
BILIRUBIN DIRECT+TOT PNL SERPL-MCNC: 0.8 MG/DL
BUN SERPL-MCNC: 13.1 MG/DL (ref 9.8–20.1)
CALCIUM SERPL-MCNC: 9.4 MG/DL (ref 8.4–10.2)
CHLORIDE SERPL-SCNC: 107 MMOL/L (ref 98–107)
CO2 SERPL-SCNC: 28 MMOL/L (ref 23–31)
CREAT SERPL-MCNC: 0.71 MG/DL (ref 0.55–1.02)
EOSINOPHIL # BLD AUTO: 0.16 X10(3)/MCL (ref 0–0.9)
EOSINOPHIL NFR BLD AUTO: 4.1 %
ERYTHROCYTE [DISTWIDTH] IN BLOOD BY AUTOMATED COUNT: 13.9 % (ref 11.5–17)
GFR SERPLBLD CREATININE-BSD FMLA CKD-EPI: >60 MLS/MIN/1.73/M2
GLOBULIN SER-MCNC: 3 GM/DL (ref 2.4–3.5)
GLUCOSE SERPL-MCNC: 133 MG/DL (ref 82–115)
HCT VFR BLD AUTO: 40.1 % (ref 37–47)
HGB BLD-MCNC: 12.7 GM/DL (ref 12–16)
IMM GRANULOCYTES # BLD AUTO: 0.01 X10(3)/MCL (ref 0–0.04)
IMM GRANULOCYTES NFR BLD AUTO: 0.3 %
LYMPHOCYTES # BLD AUTO: 1.61 X10(3)/MCL (ref 0.6–4.6)
LYMPHOCYTES NFR BLD AUTO: 41.2 %
MAGNESIUM SERPL-MCNC: 1.7 MG/DL (ref 1.6–2.6)
MCH RBC QN AUTO: 29.3 PG (ref 27–31)
MCHC RBC AUTO-ENTMCNC: 31.7 MG/DL (ref 33–36)
MCV RBC AUTO: 92.6 FL (ref 80–94)
MONOCYTES # BLD AUTO: 0.37 X10(3)/MCL (ref 0.1–1.3)
MONOCYTES NFR BLD AUTO: 9.5 %
NEUTROPHILS # BLD AUTO: 1.7 X10(3)/MCL (ref 2.1–9.2)
NEUTROPHILS NFR BLD AUTO: 44.4 %
NRBC BLD AUTO-RTO: 0 %
PHOSPHATE SERPL-MCNC: 4.4 MG/DL (ref 2.3–4.7)
PLATELET # BLD AUTO: 157 X10(3)/MCL (ref 130–400)
PMV BLD AUTO: 11.2 FL (ref 7.4–10.4)
POTASSIUM SERPL-SCNC: 4.2 MMOL/L (ref 3.5–5.1)
PROT SERPL-MCNC: 6.7 GM/DL (ref 5.8–7.6)
RBC # BLD AUTO: 4.33 X10(6)/MCL (ref 4.2–5.4)
SODIUM SERPL-SCNC: 142 MMOL/L (ref 136–145)
TRIGL SERPL-MCNC: 71 MG/DL (ref 37–140)
WBC # SPEC AUTO: 3.9 X10(3)/MCL (ref 4.5–11.5)

## 2022-10-03 PROCEDURE — 84100 ASSAY OF PHOSPHORUS: CPT | Performed by: INTERNAL MEDICINE

## 2022-10-03 PROCEDURE — 80053 COMPREHEN METABOLIC PANEL: CPT | Performed by: INTERNAL MEDICINE

## 2022-10-03 PROCEDURE — 85025 COMPLETE CBC W/AUTO DIFF WBC: CPT | Performed by: INTERNAL MEDICINE

## 2022-10-03 PROCEDURE — 83735 ASSAY OF MAGNESIUM: CPT | Performed by: INTERNAL MEDICINE

## 2022-10-03 PROCEDURE — 84478 ASSAY OF TRIGLYCERIDES: CPT | Performed by: INTERNAL MEDICINE

## 2022-10-05 ENCOUNTER — DOCUMENT SCAN (OUTPATIENT)
Dept: HOME HEALTH SERVICES | Facility: HOSPITAL | Age: 77
End: 2022-10-05
Payer: MEDICARE

## 2022-10-07 ENCOUNTER — DOCUMENT SCAN (OUTPATIENT)
Dept: HOME HEALTH SERVICES | Facility: HOSPITAL | Age: 77
End: 2022-10-07
Payer: MEDICARE

## 2022-10-12 ENCOUNTER — DOCUMENT SCAN (OUTPATIENT)
Dept: HOME HEALTH SERVICES | Facility: HOSPITAL | Age: 77
End: 2022-10-12
Payer: MEDICARE

## 2022-10-24 ENCOUNTER — LAB REQUISITION (OUTPATIENT)
Dept: LAB | Facility: HOSPITAL | Age: 77
End: 2022-10-24
Payer: MEDICARE

## 2022-10-24 DIAGNOSIS — K91.2 POSTSURGICAL MALABSORPTION, NOT ELSEWHERE CLASSIFIED: ICD-10-CM

## 2022-10-24 LAB
ALBUMIN SERPL-MCNC: 3.7 GM/DL (ref 3.4–4.8)
ALBUMIN/GLOB SERPL: 1.2 RATIO (ref 1.1–2)
ALP SERPL-CCNC: 64 UNIT/L (ref 40–150)
ALT SERPL-CCNC: 27 UNIT/L (ref 0–55)
AST SERPL-CCNC: 32 UNIT/L (ref 5–34)
BASOPHILS # BLD AUTO: 0.03 X10(3)/MCL (ref 0–0.2)
BASOPHILS NFR BLD AUTO: 0.6 %
BILIRUBIN DIRECT+TOT PNL SERPL-MCNC: 0.6 MG/DL
BUN SERPL-MCNC: 19.3 MG/DL (ref 9.8–20.1)
CALCIUM SERPL-MCNC: 9.4 MG/DL (ref 8.4–10.2)
CHLORIDE SERPL-SCNC: 105 MMOL/L (ref 98–107)
CO2 SERPL-SCNC: 25 MMOL/L (ref 23–31)
CREAT SERPL-MCNC: 0.68 MG/DL (ref 0.55–1.02)
EOSINOPHIL # BLD AUTO: 0.16 X10(3)/MCL (ref 0–0.9)
EOSINOPHIL NFR BLD AUTO: 3 %
ERYTHROCYTE [DISTWIDTH] IN BLOOD BY AUTOMATED COUNT: 13.6 % (ref 11.5–17)
GFR SERPLBLD CREATININE-BSD FMLA CKD-EPI: >60 MLS/MIN/1.73/M2
GLOBULIN SER-MCNC: 3 GM/DL (ref 2.4–3.5)
GLUCOSE SERPL-MCNC: 87 MG/DL (ref 82–115)
HCT VFR BLD AUTO: 39.9 % (ref 37–47)
HGB BLD-MCNC: 12.8 GM/DL (ref 12–16)
IMM GRANULOCYTES # BLD AUTO: 0.01 X10(3)/MCL (ref 0–0.04)
IMM GRANULOCYTES NFR BLD AUTO: 0.2 %
LYMPHOCYTES # BLD AUTO: 2.3 X10(3)/MCL (ref 0.6–4.6)
LYMPHOCYTES NFR BLD AUTO: 42.7 %
MAGNESIUM SERPL-MCNC: 2 MG/DL (ref 1.6–2.6)
MCH RBC QN AUTO: 29 PG (ref 27–31)
MCHC RBC AUTO-ENTMCNC: 32.1 MG/DL (ref 33–36)
MCV RBC AUTO: 90.3 FL (ref 80–94)
MONOCYTES # BLD AUTO: 0.5 X10(3)/MCL (ref 0.1–1.3)
MONOCYTES NFR BLD AUTO: 9.3 %
NEUTROPHILS # BLD AUTO: 2.4 X10(3)/MCL (ref 2.1–9.2)
NEUTROPHILS NFR BLD AUTO: 44.2 %
NRBC BLD AUTO-RTO: 0 %
PHOSPHATE SERPL-MCNC: 3.7 MG/DL (ref 2.3–4.7)
PLATELET # BLD AUTO: 176 X10(3)/MCL (ref 130–400)
PMV BLD AUTO: 10.4 FL (ref 7.4–10.4)
POTASSIUM SERPL-SCNC: 4.7 MMOL/L (ref 3.5–5.1)
PROT SERPL-MCNC: 6.7 GM/DL (ref 5.8–7.6)
RBC # BLD AUTO: 4.42 X10(6)/MCL (ref 4.2–5.4)
SODIUM SERPL-SCNC: 140 MMOL/L (ref 136–145)
TRIGL SERPL-MCNC: 68 MG/DL (ref 37–140)
WBC # SPEC AUTO: 5.4 X10(3)/MCL (ref 4.5–11.5)

## 2022-10-24 PROCEDURE — 80053 COMPREHEN METABOLIC PANEL: CPT | Performed by: INTERNAL MEDICINE

## 2022-10-24 PROCEDURE — 84478 ASSAY OF TRIGLYCERIDES: CPT | Performed by: INTERNAL MEDICINE

## 2022-10-24 PROCEDURE — 85025 COMPLETE CBC W/AUTO DIFF WBC: CPT | Performed by: INTERNAL MEDICINE

## 2022-10-24 PROCEDURE — 84100 ASSAY OF PHOSPHORUS: CPT | Performed by: INTERNAL MEDICINE

## 2022-10-24 PROCEDURE — 83735 ASSAY OF MAGNESIUM: CPT | Performed by: INTERNAL MEDICINE

## 2022-10-25 ENCOUNTER — DOCUMENT SCAN (OUTPATIENT)
Dept: HOME HEALTH SERVICES | Facility: HOSPITAL | Age: 77
End: 2022-10-25
Payer: MEDICARE

## 2022-11-01 PROBLEM — R79.89 LOW VITAMIN D LEVEL: Status: RESOLVED | Noted: 2022-07-03 | Resolved: 2022-11-01

## 2022-11-01 PROBLEM — D64.9 ANEMIA: Status: ACTIVE | Noted: 2022-11-01

## 2022-11-01 PROBLEM — E55.9 VITAMIN D DEFICIENCY: Status: ACTIVE | Noted: 2022-11-01

## 2022-11-21 ENCOUNTER — LAB REQUISITION (OUTPATIENT)
Dept: LAB | Facility: HOSPITAL | Age: 77
End: 2022-11-21
Payer: MEDICARE

## 2022-11-21 DIAGNOSIS — K91.2 POSTSURGICAL MALABSORPTION, NOT ELSEWHERE CLASSIFIED: ICD-10-CM

## 2022-11-21 DIAGNOSIS — E86.0 DEHYDRATION: ICD-10-CM

## 2022-11-21 DIAGNOSIS — E46 UNSPECIFIED PROTEIN-CALORIE MALNUTRITION: ICD-10-CM

## 2022-11-21 LAB
ALBUMIN SERPL-MCNC: 3.6 GM/DL (ref 3.4–4.8)
ALBUMIN/GLOB SERPL: 1.2 RATIO (ref 1.1–2)
ALP SERPL-CCNC: 65 UNIT/L (ref 40–150)
ALT SERPL-CCNC: 25 UNIT/L (ref 0–55)
AST SERPL-CCNC: 28 UNIT/L (ref 5–34)
BASOPHILS # BLD AUTO: 0.04 X10(3)/MCL (ref 0–0.2)
BASOPHILS NFR BLD AUTO: 0.6 %
BILIRUBIN DIRECT+TOT PNL SERPL-MCNC: 0.6 MG/DL
BUN SERPL-MCNC: 19.2 MG/DL (ref 9.8–20.1)
CALCIUM SERPL-MCNC: 9.2 MG/DL (ref 8.4–10.2)
CHLORIDE SERPL-SCNC: 103 MMOL/L (ref 98–107)
CO2 SERPL-SCNC: 27 MMOL/L (ref 23–31)
CREAT SERPL-MCNC: 0.74 MG/DL (ref 0.55–1.02)
EOSINOPHIL # BLD AUTO: 0.16 X10(3)/MCL (ref 0–0.9)
EOSINOPHIL NFR BLD AUTO: 2.5 %
ERYTHROCYTE [DISTWIDTH] IN BLOOD BY AUTOMATED COUNT: 13.4 % (ref 11.5–17)
GFR SERPLBLD CREATININE-BSD FMLA CKD-EPI: >60 MLS/MIN/1.73/M2
GLOBULIN SER-MCNC: 3 GM/DL (ref 2.4–3.5)
GLUCOSE SERPL-MCNC: 113 MG/DL (ref 82–115)
HCT VFR BLD AUTO: 40.3 % (ref 37–47)
HGB BLD-MCNC: 12.9 GM/DL (ref 12–16)
IMM GRANULOCYTES # BLD AUTO: 0.01 X10(3)/MCL (ref 0–0.04)
IMM GRANULOCYTES NFR BLD AUTO: 0.2 %
LYMPHOCYTES # BLD AUTO: 2.61 X10(3)/MCL (ref 0.6–4.6)
LYMPHOCYTES NFR BLD AUTO: 41.3 %
MAGNESIUM SERPL-MCNC: 1.9 MG/DL (ref 1.6–2.6)
MCH RBC QN AUTO: 29.1 PG (ref 27–31)
MCHC RBC AUTO-ENTMCNC: 32 MG/DL (ref 33–36)
MCV RBC AUTO: 90.8 FL (ref 80–94)
MONOCYTES # BLD AUTO: 0.57 X10(3)/MCL (ref 0.1–1.3)
MONOCYTES NFR BLD AUTO: 9 %
NEUTROPHILS # BLD AUTO: 2.9 X10(3)/MCL (ref 2.1–9.2)
NEUTROPHILS NFR BLD AUTO: 46.4 %
NRBC BLD AUTO-RTO: 0 %
PHOSPHATE SERPL-MCNC: 4.2 MG/DL (ref 2.3–4.7)
PLATELET # BLD AUTO: 165 X10(3)/MCL (ref 130–400)
PMV BLD AUTO: 10.4 FL (ref 7.4–10.4)
POTASSIUM SERPL-SCNC: 4.3 MMOL/L (ref 3.5–5.1)
PROT SERPL-MCNC: 6.6 GM/DL (ref 5.8–7.6)
RBC # BLD AUTO: 4.44 X10(6)/MCL (ref 4.2–5.4)
SODIUM SERPL-SCNC: 138 MMOL/L (ref 136–145)
TRIGL SERPL-MCNC: 62 MG/DL (ref 37–140)
WBC # SPEC AUTO: 6.3 X10(3)/MCL (ref 4.5–11.5)

## 2022-11-21 PROCEDURE — 84478 ASSAY OF TRIGLYCERIDES: CPT | Performed by: INTERNAL MEDICINE

## 2022-11-21 PROCEDURE — 84100 ASSAY OF PHOSPHORUS: CPT | Performed by: INTERNAL MEDICINE

## 2022-11-21 PROCEDURE — 85025 COMPLETE CBC W/AUTO DIFF WBC: CPT | Performed by: INTERNAL MEDICINE

## 2022-11-21 PROCEDURE — 80053 COMPREHEN METABOLIC PANEL: CPT | Performed by: INTERNAL MEDICINE

## 2022-11-21 PROCEDURE — 83735 ASSAY OF MAGNESIUM: CPT | Performed by: INTERNAL MEDICINE

## 2022-11-25 ENCOUNTER — DOCUMENT SCAN (OUTPATIENT)
Dept: HOME HEALTH SERVICES | Facility: HOSPITAL | Age: 77
End: 2022-11-25
Payer: MEDICARE

## 2022-11-28 ENCOUNTER — LAB REQUISITION (OUTPATIENT)
Dept: LAB | Facility: HOSPITAL | Age: 77
End: 2022-11-28
Payer: MEDICARE

## 2022-11-28 DIAGNOSIS — E86.0 DEHYDRATION: ICD-10-CM

## 2022-11-28 DIAGNOSIS — E11.51 TYPE 2 DIABETES MELLITUS WITH DIABETIC PERIPHERAL ANGIOPATHY WITHOUT GANGRENE: ICD-10-CM

## 2022-11-28 DIAGNOSIS — K91.2 POSTSURGICAL MALABSORPTION, NOT ELSEWHERE CLASSIFIED: ICD-10-CM

## 2022-11-28 DIAGNOSIS — E46 UNSPECIFIED PROTEIN-CALORIE MALNUTRITION: ICD-10-CM

## 2022-11-28 LAB
DEPRECATED CALCIDIOL+CALCIFEROL SERPL-MC: 28.4 NG/ML (ref 30–80)
FERRITIN SERPL-MCNC: 33.51 NG/ML (ref 4.63–204)
INR BLD: 2.47 (ref 0–1.3)
IRON SATN MFR SERPL: 19 % (ref 20–50)
IRON SERPL-MCNC: 56 UG/DL (ref 50–170)
MAGNESIUM SERPL-MCNC: 1.8 MG/DL (ref 1.6–2.6)
PROTHROMBIN TIME: 26.3 SECONDS (ref 12.5–14.5)
TIBC SERPL-MCNC: 243 UG/DL (ref 70–310)
TIBC SERPL-MCNC: 299 UG/DL (ref 250–450)
TRANSFERRIN SERPL-MCNC: 259 MG/DL (ref 173–360)
VIT B12 SERPL-MCNC: 529 PG/ML (ref 213–816)

## 2022-11-28 PROCEDURE — 85610 PROTHROMBIN TIME: CPT | Performed by: INTERNAL MEDICINE

## 2022-11-28 PROCEDURE — 83735 ASSAY OF MAGNESIUM: CPT | Performed by: INTERNAL MEDICINE

## 2022-11-28 PROCEDURE — 82306 VITAMIN D 25 HYDROXY: CPT | Performed by: INTERNAL MEDICINE

## 2022-11-28 PROCEDURE — 82728 ASSAY OF FERRITIN: CPT | Performed by: INTERNAL MEDICINE

## 2022-11-28 PROCEDURE — 82607 VITAMIN B-12: CPT | Performed by: INTERNAL MEDICINE

## 2022-11-28 PROCEDURE — 84630 ASSAY OF ZINC: CPT | Performed by: INTERNAL MEDICINE

## 2022-11-28 PROCEDURE — 83540 ASSAY OF IRON: CPT | Performed by: INTERNAL MEDICINE

## 2022-11-28 PROCEDURE — 82495 ASSAY OF CHROMIUM: CPT | Performed by: INTERNAL MEDICINE

## 2022-11-28 PROCEDURE — 83785 ASSAY OF MANGANESE: CPT | Performed by: INTERNAL MEDICINE

## 2022-11-28 PROCEDURE — 84255 ASSAY OF SELENIUM: CPT | Performed by: INTERNAL MEDICINE

## 2022-11-30 LAB
CR SERPL-MCNC: 1.1 NG/ML
MAYO GENERIC ORDERABLE RESULT: NORMAL
ZINC SERPL-MCNC: 69 MCG/DL (ref 60–106)

## 2022-12-05 ENCOUNTER — EXTERNAL HOME HEALTH (OUTPATIENT)
Dept: HOME HEALTH SERVICES | Facility: HOSPITAL | Age: 77
End: 2022-12-05
Payer: MEDICARE

## 2022-12-06 LAB — MAYO GENERIC ORDERABLE RESULT: NORMAL

## 2022-12-19 ENCOUNTER — LAB REQUISITION (OUTPATIENT)
Dept: LAB | Facility: HOSPITAL | Age: 77
End: 2022-12-19
Payer: MEDICARE

## 2022-12-19 DIAGNOSIS — E46 UNSPECIFIED PROTEIN-CALORIE MALNUTRITION: ICD-10-CM

## 2022-12-19 DIAGNOSIS — Z79.899 OTHER LONG TERM (CURRENT) DRUG THERAPY: ICD-10-CM

## 2022-12-19 DIAGNOSIS — K91.2 POSTSURGICAL MALABSORPTION, NOT ELSEWHERE CLASSIFIED: ICD-10-CM

## 2022-12-19 LAB
ALBUMIN SERPL-MCNC: 3.7 G/DL (ref 3.4–4.8)
ALBUMIN/GLOB SERPL: 1.3 RATIO (ref 1.1–2)
ALP SERPL-CCNC: 66 UNIT/L (ref 40–150)
ALT SERPL-CCNC: 32 UNIT/L (ref 0–55)
AST SERPL-CCNC: 37 UNIT/L (ref 5–34)
BASOPHILS # BLD AUTO: 0.03 X10(3)/MCL (ref 0–0.2)
BASOPHILS NFR BLD AUTO: 0.6 %
BILIRUBIN DIRECT+TOT PNL SERPL-MCNC: 0.5 MG/DL
BUN SERPL-MCNC: 17.4 MG/DL (ref 9.8–20.1)
CALCIUM SERPL-MCNC: 9.6 MG/DL (ref 8.4–10.2)
CHLORIDE SERPL-SCNC: 105 MMOL/L (ref 98–107)
CO2 SERPL-SCNC: 26 MMOL/L (ref 23–31)
CREAT SERPL-MCNC: 0.69 MG/DL (ref 0.55–1.02)
EOSINOPHIL # BLD AUTO: 0.05 X10(3)/MCL (ref 0–0.9)
EOSINOPHIL NFR BLD AUTO: 0.9 %
ERYTHROCYTE [DISTWIDTH] IN BLOOD BY AUTOMATED COUNT: 13.8 % (ref 11–14.5)
GFR SERPLBLD CREATININE-BSD FMLA CKD-EPI: >60 MLS/MIN/1.73/M2
GLOBULIN SER-MCNC: 2.8 GM/DL (ref 2.4–3.5)
GLUCOSE SERPL-MCNC: 95 MG/DL (ref 82–115)
HCT VFR BLD AUTO: 40 % (ref 37–47)
HGB BLD-MCNC: 12.8 GM/DL (ref 12–16)
IMM GRANULOCYTES # BLD AUTO: 0.01 X10(3)/MCL (ref 0–0.04)
IMM GRANULOCYTES NFR BLD AUTO: 0.2 %
INR BLD: 2.26 (ref 0–1.3)
LYMPHOCYTES # BLD AUTO: 1.98 X10(3)/MCL (ref 0.6–4.6)
LYMPHOCYTES NFR BLD AUTO: 36.7 %
MAGNESIUM SERPL-MCNC: 1.9 MG/DL (ref 1.6–2.6)
MCH RBC QN AUTO: 29.1 PG
MCHC RBC AUTO-ENTMCNC: 32 MG/DL (ref 33–36)
MCV RBC AUTO: 90.9 FL (ref 80–94)
MONOCYTES # BLD AUTO: 0.45 X10(3)/MCL (ref 0.1–1.3)
MONOCYTES NFR BLD AUTO: 8.3 %
NEUTROPHILS # BLD AUTO: 2.88 X10(3)/MCL (ref 2.1–9.2)
NEUTROPHILS NFR BLD AUTO: 53.3 %
NRBC BLD AUTO-RTO: 0 % (ref 0–1)
PHOSPHATE SERPL-MCNC: 4 MG/DL (ref 2.3–4.7)
PLATELET # BLD AUTO: 156 X10(3)/MCL (ref 140–371)
PMV BLD AUTO: 10.6 FL (ref 9.4–12.4)
POTASSIUM SERPL-SCNC: 4.4 MMOL/L (ref 3.5–5.1)
PROT SERPL-MCNC: 6.5 GM/DL (ref 5.8–7.6)
PROTHROMBIN TIME: 24.5 SECONDS (ref 12.5–14.5)
RBC # BLD AUTO: 4.4 X10(6)/MCL (ref 4.2–5.4)
SODIUM SERPL-SCNC: 139 MMOL/L (ref 136–145)
TRIGL SERPL-MCNC: 70 MG/DL (ref 37–140)
WBC # SPEC AUTO: 5.4 X10(3)/MCL (ref 4.5–11.5)

## 2022-12-19 PROCEDURE — 85025 COMPLETE CBC W/AUTO DIFF WBC: CPT | Performed by: INTERNAL MEDICINE

## 2022-12-19 PROCEDURE — 83735 ASSAY OF MAGNESIUM: CPT | Performed by: INTERNAL MEDICINE

## 2022-12-19 PROCEDURE — 80053 COMPREHEN METABOLIC PANEL: CPT | Performed by: INTERNAL MEDICINE

## 2022-12-19 PROCEDURE — 85610 PROTHROMBIN TIME: CPT | Performed by: INTERNAL MEDICINE

## 2022-12-19 PROCEDURE — 84100 ASSAY OF PHOSPHORUS: CPT | Performed by: INTERNAL MEDICINE

## 2022-12-19 PROCEDURE — 84478 ASSAY OF TRIGLYCERIDES: CPT | Performed by: INTERNAL MEDICINE

## 2022-12-31 ENCOUNTER — OFFICE VISIT (OUTPATIENT)
Dept: URGENT CARE | Facility: CLINIC | Age: 77
End: 2022-12-31
Payer: MEDICARE

## 2022-12-31 VITALS
OXYGEN SATURATION: 98 % | BODY MASS INDEX: 28.88 KG/M2 | TEMPERATURE: 98 F | WEIGHT: 163 LBS | DIASTOLIC BLOOD PRESSURE: 74 MMHG | SYSTOLIC BLOOD PRESSURE: 143 MMHG | HEART RATE: 69 BPM | RESPIRATION RATE: 18 BRPM | HEIGHT: 63 IN

## 2022-12-31 DIAGNOSIS — B00.1 COLD SORE: Primary | ICD-10-CM

## 2022-12-31 PROCEDURE — 99213 PR OFFICE/OUTPT VISIT, EST, LEVL III, 20-29 MIN: ICD-10-PCS | Mod: ,,, | Performed by: FAMILY MEDICINE

## 2022-12-31 PROCEDURE — 99213 OFFICE O/P EST LOW 20 MIN: CPT | Mod: ,,, | Performed by: FAMILY MEDICINE

## 2022-12-31 RX ORDER — VALACYCLOVIR HYDROCHLORIDE 1 G/1
1000 TABLET, FILM COATED ORAL 3 TIMES DAILY
Qty: 21 TABLET | Refills: 0 | Status: SHIPPED | OUTPATIENT
Start: 2022-12-31 | End: 2022-12-31

## 2022-12-31 RX ORDER — VALACYCLOVIR HYDROCHLORIDE 1 G/1
1000 TABLET, FILM COATED ORAL 3 TIMES DAILY
Qty: 21 TABLET | Refills: 0 | Status: SHIPPED | OUTPATIENT
Start: 2022-12-31 | End: 2023-03-02

## 2022-12-31 NOTE — PROGRESS NOTES
"Subjective:       Patient ID: Laura Acosta is a 77 y.o. female.    Vitals:  height is 5' 3" (1.6 m) and weight is 73.9 kg (163 lb). Her temperature is 98.1 °F (36.7 °C). Her blood pressure is 143/74 (abnormal) and her pulse is 69. Her respiration is 18 and oxygen saturation is 98%.     Chief Complaint: No chief complaint on file.    77-year-old female presents to clinic with cold sore symptoms.  Began 2 days ago.  Patient states she has a new boyfriend.  States she kissed him while he had an open cold sore on his lip.  Fairly confident this is where she contracted it from.  Otherwise patient has no history of cold sores  .  States the lesions or all along the lower left lip and inside of the mouth as well.  Daughter is present and states she is immune compromised    Constitution: Negative.   HENT:  Positive for mouth sores.    Cardiovascular: Negative.    Eyes: Negative.    Respiratory: Negative.     Gastrointestinal: Negative.    Genitourinary: Negative.    Musculoskeletal: Negative.    Skin: Negative.    Allergic/Immunologic: Negative.    Neurological: Negative.    Hematologic/Lymphatic: Negative.      Objective:      Physical Exam   Constitutional: She is oriented to person, place, and time.  Non-toxic appearance. She does not appear ill. No distress.   HENT:   Head: Normocephalic and atraumatic.   Mouth/Throat: No posterior oropharyngeal erythema (there are vesicular lesions on the lower lip and orally under the tongue in along the gums).   Abdominal: Normal appearance.   Neurological: She is alert and oriented to person, place, and time. No cranial nerve deficit.   Skin: Skin is not diaphoretic.   Psychiatric: Her behavior is normal. Mood, judgment and thought content normal.   Nursing note and vitals reviewed.         Previous History      Review of patient's allergies indicates:   Allergen Reactions    Hydromorphone Itching     Other reaction(s): itching    Opium      Other reaction(s): itching  has itching " with larger doses. Smaller doses do not cause a reaction.       Past Medical History:   Diagnosis Date    Acute URI     Anxiety and depression     Back pain     Breast cancer     Cholelithiasis     DM (diabetes mellitus)     DVT (deep venous thrombosis)     Edema, lower extremity     Gallstone pancreatitis     HTN (hypertension)     Hypercholesterolemia     Insomnia     Irregular heart beat     Ischemic disease of gut     Laryngitis     Meningitis, unspecified     Mixed hyperlipidemia     OA (osteoarthritis)     Obesity, unspecified     PVD (peripheral vascular disease)     Rheumatoid arthritis, unspecified     Staph infection     infected mediport -- on doxycycline daily for prevention    Tremor     Unspecified cataract      Current Outpatient Medications   Medication Instructions    busPIRone (BUSPAR) 10 mg, Oral, 2 times daily    CeleXA 40 mg, Oral, Daily    cholestyramine (QUESTRAN) 4 gram packet 4 g, Oral    diphenoxylate-atropine 2.5-0.025 mg (LOMOTIL) 2.5-0.025 mg per tablet TAKE TWO TABLETS BY MOUTH FOUR TIMES DAILY MAX OF 20MG/ DAY    doxycycline 50 mg, Oral, 2 times daily    GATTEX 30-VIAL 5 mg Kit No dose, route, or frequency recorded.    L.acidoph,plant-B.animal,long 2 billion cell Cap Oral    metoprolol tartrate (LOPRESSOR) 100 MG tablet No dose, route, or frequency recorded.    traZODone (DESYREL) 75 mg, Oral, Nightly    valACYclovir (VALTREX) 1,000 mg, Oral, 3 times daily    VITAMIN D2 1,250 mcg (50,000 unit) capsule TAKE ONE CAPSULE BY MOUTH three times PER WEEK    warfarin (COUMADIN) 5 MG tablet No dose, route, or frequency recorded.     Past Surgical History:   Procedure Laterality Date    APPENDECTOMY      BOWEL RESECTION      BREAST LUMPECTOMY Right     CHOLECYSTECTOMY  05/2015    COLONOSCOPY  02/2022    repeat 3 years    CYST REMOVAL      CYSTOSCOPY W/ STONE MANIPULATION      CYSTOSCOPY W/ URETERAL STENT PLACEMENT  12/2020    CYSTOSCOPY W/ URETERAL STENT REMOVAL  04/2021    ENDOSCOPIC RETROGRADE  "CHOLANGIOPANCREATOGRAPHY W/ SPHINCTEROTOMY AND STONE REMOVAL  04/2015    ESOPHAGOGASTRODUODENOSCOPY      GI Biopsy  02/15/2022    GI Biopsy  12/2018    HYSTERECTOMY      LAPAROTOMY, EXPLORATORY  06/2015    LITHOTRIPSY Left 04/2021    LITHOTRIPSY Left 03/2021    MEDIPORT INSERTION, SINGLE  06/2021    MEDIPORT INSERTION, SINGLE  07/2020    MEDIPORT INSERTION, SINGLE  12/2018    MEDIPORT REMOVAL  07/2020    PHACOEMULSIFICATION OF CATARACT Left 12/2016    PHACOEMULSIFICATION OF CATARACT Right 11/2016    PICC line Removal Right 06/2021    POLYPECTOMY  02/2022    PVD surgery      REMOVAL OF CATHETER  12/2020    SHOULDER SURGERY Right     SPHINCTEROTOMY OF URETHRA      VENTRAL HERNIA REPAIR  04/2016     Family History   Problem Relation Age of Onset    Pneumonia Mother     Depression Mother     Osteoarthritis Mother     Breast cancer Mother     Uterine cancer Mother     Heart attack Father     Aneurysm Father         brain    Diabetes Father     Hyperlipidemia Father     Hypertension Father     Hyperlipidemia Sister     Hypertension Sister     Kidney cancer Sister     Osteoarthritis Sister     Breast cancer Sister     Hyperlipidemia Brother     Hypertension Brother     Coronary artery disease Brother         CABG x3    Hyperlipidemia Brother     Hypertension Brother        Social History     Tobacco Use    Smoking status: Never    Smokeless tobacco: Never   Substance Use Topics    Alcohol use: Not Currently    Drug use: Never        Physical Exam      Vital Signs Reviewed   BP (!) 143/74   Pulse 69   Temp 98.1 °F (36.7 °C)   Resp 18   Ht 5' 3" (1.6 m)   Wt 73.9 kg (163 lb)   SpO2 98%   BMI 28.87 kg/m²        Procedures    Procedures     Labs     Results for orders placed or performed in visit on 12/19/22   Protime-INR   Result Value Ref Range    PT 24.5 (H) 12.5 - 14.5 seconds    INR 2.26 (H) 0.00 - 1.30   Phosphorus   Result Value Ref Range    Phosphorus Level 4.0 2.3 - 4.7 mg/dL   Triglycerides   Result Value Ref " Range    Triglyceride 70 37 - 140 mg/dL   Magnesium   Result Value Ref Range    Magnesium Level 1.90 1.60 - 2.60 mg/dL   Comprehensive Metabolic Panel   Result Value Ref Range    Sodium Level 139 136 - 145 mmol/L    Potassium Level 4.4 3.5 - 5.1 mmol/L    Chloride 105 98 - 107 mmol/L    Carbon Dioxide 26 23 - 31 mmol/L    Glucose Level 95 82 - 115 mg/dL    Blood Urea Nitrogen 17.4 9.8 - 20.1 mg/dL    Creatinine 0.69 0.55 - 1.02 mg/dL    Calcium Level Total 9.6 8.4 - 10.2 mg/dL    Protein Total 6.5 5.8 - 7.6 gm/dL    Albumin Level 3.7 3.4 - 4.8 g/dL    Globulin 2.8 2.4 - 3.5 gm/dL    Albumin/Globulin Ratio 1.3 1.1 - 2.0 ratio    Bilirubin Total 0.5 <=1.5 mg/dL    Alkaline Phosphatase 66 40 - 150 unit/L    Alanine Aminotransferase 32 0 - 55 unit/L    Aspartate Aminotransferase 37 (H) 5 - 34 unit/L    eGFR >60 mls/min/1.73/m2   CBC with Differential   Result Value Ref Range    WBC 5.4 4.5 - 11.5 x10(3)/mcL    RBC 4.40 4.20 - 5.40 x10(6)/mcL    Hgb 12.8 12.0 - 16.0 gm/dL    Hct 40.0 37.0 - 47.0 %    MCV 90.9 80.0 - 94.0 fL    MCH 29.1 pg    MCHC 32.0 (L) 33.0 - 36.0 mg/dL    RDW 13.8 11.0 - 14.5 %    Platelet 156 140 - 371 x10(3)/mcL    MPV 10.6 9.4 - 12.4 fL    Neut % 53.3 %    Lymph % 36.7 %    Mono % 8.3 %    Eos % 0.9 %    Basophil % 0.6 %    Lymph # 1.98 0.6 - 4.6 x10(3)/mcL    Neut # 2.88 2.1 - 9.2 x10(3)/mcL    Mono # 0.45 0.1 - 1.3 x10(3)/mcL    Eos # 0.05 0 - 0.9 x10(3)/mcL    Baso # 0.03 0 - 0.2 x10(3)/mcL    IG# 0.01 0 - 0.04 x10(3)/mcL    IG% 0.2 %    NRBC% 0.0 0 - 1 %       Assessment:       1. Cold sore          Plan:       Medications sent to pharmacy.  Do not share any food cups drinks or utensils with anyone.  Would recommend avoiding temperature hot foods and liquids, acidic foods and liquids, and spicy foods.    Cold sore    Other orders  -     valACYclovir (VALTREX) 1000 MG tablet; Take 1 tablet (1,000 mg total) by mouth 3 (three) times daily. for 7 days  Dispense: 21 tablet; Refill: 0

## 2022-12-31 NOTE — PATIENT INSTRUCTIONS
Medications sent to pharmacy.  Do not share any food cups drinks or utensils with anyone.  Would recommend avoiding temperature hot foods and liquids, acidic foods and liquids, and spicy foods.

## 2023-01-09 ENCOUNTER — LAB REQUISITION (OUTPATIENT)
Dept: LAB | Facility: HOSPITAL | Age: 78
End: 2023-01-09
Payer: MEDICARE

## 2023-01-09 DIAGNOSIS — E86.0 DEHYDRATION: ICD-10-CM

## 2023-01-09 DIAGNOSIS — Z79.01 LONG TERM (CURRENT) USE OF ANTICOAGULANTS: ICD-10-CM

## 2023-01-09 DIAGNOSIS — K91.2 POSTSURGICAL MALABSORPTION, NOT ELSEWHERE CLASSIFIED: ICD-10-CM

## 2023-01-09 LAB
ALBUMIN SERPL-MCNC: 3.3 G/DL (ref 3.4–4.8)
ALBUMIN/GLOB SERPL: 1.2 RATIO (ref 1.1–2)
ALP SERPL-CCNC: 68 UNIT/L (ref 40–150)
ALT SERPL-CCNC: 27 UNIT/L (ref 0–55)
AST SERPL-CCNC: 36 UNIT/L (ref 5–34)
BASOPHILS # BLD AUTO: 0.03 X10(3)/MCL (ref 0–0.2)
BASOPHILS NFR BLD AUTO: 0.4 %
BILIRUBIN DIRECT+TOT PNL SERPL-MCNC: 0.6 MG/DL
BUN SERPL-MCNC: 12 MG/DL (ref 9.8–20.1)
CALCIUM SERPL-MCNC: 8.8 MG/DL (ref 8.4–10.2)
CHLORIDE SERPL-SCNC: 106 MMOL/L (ref 98–107)
CO2 SERPL-SCNC: 22 MMOL/L (ref 23–31)
CREAT SERPL-MCNC: 0.71 MG/DL (ref 0.55–1.02)
EOSINOPHIL # BLD AUTO: 0.09 X10(3)/MCL (ref 0–0.9)
EOSINOPHIL NFR BLD AUTO: 1.3 %
ERYTHROCYTE [DISTWIDTH] IN BLOOD BY AUTOMATED COUNT: 14.6 % (ref 11.5–17)
GFR SERPLBLD CREATININE-BSD FMLA CKD-EPI: >60 MLS/MIN/1.73/M2
GLOBULIN SER-MCNC: 2.8 GM/DL (ref 2.4–3.5)
GLUCOSE SERPL-MCNC: 108 MG/DL (ref 82–115)
HCT VFR BLD AUTO: 37.8 % (ref 37–47)
HGB BLD-MCNC: 12.1 GM/DL (ref 12–16)
IMM GRANULOCYTES # BLD AUTO: 0.02 X10(3)/MCL (ref 0–0.04)
IMM GRANULOCYTES NFR BLD AUTO: 0.3 %
INR BLD: 2.1 (ref 0–1.3)
LYMPHOCYTES # BLD AUTO: 2.66 X10(3)/MCL (ref 0.6–4.6)
LYMPHOCYTES NFR BLD AUTO: 39.1 %
MAGNESIUM SERPL-MCNC: 1.7 MG/DL (ref 1.6–2.6)
MCH RBC QN AUTO: 28.9 PG
MCHC RBC AUTO-ENTMCNC: 32 MG/DL (ref 33–36)
MCV RBC AUTO: 90.2 FL (ref 80–94)
MONOCYTES # BLD AUTO: 0.54 X10(3)/MCL (ref 0.1–1.3)
MONOCYTES NFR BLD AUTO: 7.9 %
NEUTROPHILS # BLD AUTO: 3.46 X10(3)/MCL (ref 2.1–9.2)
NEUTROPHILS NFR BLD AUTO: 51 %
NRBC BLD AUTO-RTO: 0 %
PHOSPHATE SERPL-MCNC: 3.7 MG/DL (ref 2.3–4.7)
PLATELET # BLD AUTO: 197 X10(3)/MCL (ref 130–400)
PMV BLD AUTO: 10.7 FL (ref 7.4–10.4)
POTASSIUM SERPL-SCNC: 3.7 MMOL/L (ref 3.5–5.1)
PROT SERPL-MCNC: 6.1 GM/DL (ref 5.8–7.6)
PROTHROMBIN TIME: 23.2 SECONDS (ref 12.5–14.5)
RBC # BLD AUTO: 4.19 X10(6)/MCL (ref 4.2–5.4)
SODIUM SERPL-SCNC: 138 MMOL/L (ref 136–145)
TRIGL SERPL-MCNC: 80 MG/DL (ref 37–140)
WBC # SPEC AUTO: 6.8 X10(3)/MCL (ref 4.5–11.5)

## 2023-01-09 PROCEDURE — 84478 ASSAY OF TRIGLYCERIDES: CPT | Performed by: INTERNAL MEDICINE

## 2023-01-09 PROCEDURE — 80053 COMPREHEN METABOLIC PANEL: CPT | Performed by: INTERNAL MEDICINE

## 2023-01-09 PROCEDURE — 85610 PROTHROMBIN TIME: CPT | Performed by: INTERNAL MEDICINE

## 2023-01-09 PROCEDURE — 85025 COMPLETE CBC W/AUTO DIFF WBC: CPT | Performed by: INTERNAL MEDICINE

## 2023-01-09 PROCEDURE — 83735 ASSAY OF MAGNESIUM: CPT | Performed by: INTERNAL MEDICINE

## 2023-01-09 PROCEDURE — 84100 ASSAY OF PHOSPHORUS: CPT | Performed by: INTERNAL MEDICINE

## 2023-01-18 ENCOUNTER — LAB REQUISITION (OUTPATIENT)
Dept: LAB | Facility: HOSPITAL | Age: 78
End: 2023-01-18
Payer: MEDICARE

## 2023-01-18 DIAGNOSIS — K91.2 POSTSURGICAL MALABSORPTION, NOT ELSEWHERE CLASSIFIED: ICD-10-CM

## 2023-01-18 DIAGNOSIS — E46 UNSPECIFIED PROTEIN-CALORIE MALNUTRITION: ICD-10-CM

## 2023-01-18 DIAGNOSIS — E86.0 DEHYDRATION: ICD-10-CM

## 2023-01-18 LAB
ALBUMIN SERPL-MCNC: 3.1 G/DL (ref 3.4–4.8)
ALBUMIN/GLOB SERPL: 1 RATIO (ref 1.1–2)
ALP SERPL-CCNC: 65 UNIT/L (ref 40–150)
ALT SERPL-CCNC: 29 UNIT/L (ref 0–55)
AST SERPL-CCNC: 33 UNIT/L (ref 5–34)
BASOPHILS # BLD AUTO: 0.03 X10(3)/MCL (ref 0–0.2)
BASOPHILS NFR BLD AUTO: 0.6 %
BILIRUBIN DIRECT+TOT PNL SERPL-MCNC: 0.6 MG/DL
BUN SERPL-MCNC: 14.1 MG/DL (ref 9.8–20.1)
CALCIUM SERPL-MCNC: 9.5 MG/DL (ref 8.4–10.2)
CHLORIDE SERPL-SCNC: 105 MMOL/L (ref 98–107)
CO2 SERPL-SCNC: 25 MMOL/L (ref 23–31)
CREAT SERPL-MCNC: 0.73 MG/DL (ref 0.55–1.02)
CRP SERPL-MCNC: 36.9 MG/L
EOSINOPHIL # BLD AUTO: 0.15 X10(3)/MCL (ref 0–0.9)
EOSINOPHIL NFR BLD AUTO: 3 %
ERYTHROCYTE [DISTWIDTH] IN BLOOD BY AUTOMATED COUNT: 15.7 % (ref 11.5–17)
ERYTHROCYTE [SEDIMENTATION RATE] IN BLOOD: 29 MM/HR (ref 0–20)
GFR SERPLBLD CREATININE-BSD FMLA CKD-EPI: >60 MLS/MIN/1.73/M2
GLOBULIN SER-MCNC: 3 GM/DL (ref 2.4–3.5)
GLUCOSE SERPL-MCNC: 146 MG/DL (ref 82–115)
HCT VFR BLD AUTO: 38.8 % (ref 37–47)
HGB BLD-MCNC: 12.1 GM/DL (ref 12–16)
IMM GRANULOCYTES # BLD AUTO: 0.01 X10(3)/MCL (ref 0–0.04)
IMM GRANULOCYTES NFR BLD AUTO: 0.2 %
LYMPHOCYTES # BLD AUTO: 1.85 X10(3)/MCL (ref 0.6–4.6)
LYMPHOCYTES NFR BLD AUTO: 37.6 %
MCH RBC QN AUTO: 28.8 PG
MCHC RBC AUTO-ENTMCNC: 31.2 MG/DL (ref 33–36)
MCV RBC AUTO: 92.4 FL (ref 80–94)
MONOCYTES # BLD AUTO: 0.45 X10(3)/MCL (ref 0.1–1.3)
MONOCYTES NFR BLD AUTO: 9.1 %
NEUTROPHILS # BLD AUTO: 2.43 X10(3)/MCL (ref 2.1–9.2)
NEUTROPHILS NFR BLD AUTO: 49.5 %
NRBC BLD AUTO-RTO: 0 %
PLATELET # BLD AUTO: 180 X10(3)/MCL (ref 130–400)
PMV BLD AUTO: 11.4 FL (ref 7.4–10.4)
POTASSIUM SERPL-SCNC: 4 MMOL/L (ref 3.5–5.1)
PROT SERPL-MCNC: 6.1 GM/DL (ref 5.8–7.6)
RBC # BLD AUTO: 4.2 X10(6)/MCL (ref 4.2–5.4)
SODIUM SERPL-SCNC: 139 MMOL/L (ref 136–145)
WBC # SPEC AUTO: 4.9 X10(3)/MCL (ref 4.5–11.5)

## 2023-01-18 PROCEDURE — 86140 C-REACTIVE PROTEIN: CPT | Performed by: INTERNAL MEDICINE

## 2023-01-18 PROCEDURE — 85651 RBC SED RATE NONAUTOMATED: CPT | Performed by: INTERNAL MEDICINE

## 2023-01-18 PROCEDURE — 80053 COMPREHEN METABOLIC PANEL: CPT | Performed by: INTERNAL MEDICINE

## 2023-01-18 PROCEDURE — 85025 COMPLETE CBC W/AUTO DIFF WBC: CPT | Performed by: INTERNAL MEDICINE

## 2023-01-26 ENCOUNTER — DOCUMENT SCAN (OUTPATIENT)
Dept: HOME HEALTH SERVICES | Facility: HOSPITAL | Age: 78
End: 2023-01-26
Payer: MEDICARE

## 2023-02-06 ENCOUNTER — LAB REQUISITION (OUTPATIENT)
Dept: LAB | Facility: HOSPITAL | Age: 78
End: 2023-02-06
Payer: MEDICARE

## 2023-02-06 DIAGNOSIS — K91.2 POSTSURGICAL MALABSORPTION, NOT ELSEWHERE CLASSIFIED: ICD-10-CM

## 2023-02-06 LAB
ALBUMIN SERPL-MCNC: 3.2 G/DL (ref 3.4–4.8)
ALBUMIN/GLOB SERPL: 1.1 RATIO (ref 1.1–2)
ALP SERPL-CCNC: 71 UNIT/L (ref 40–150)
ALT SERPL-CCNC: 28 UNIT/L (ref 0–55)
AST SERPL-CCNC: 31 UNIT/L (ref 5–34)
BASOPHILS # BLD AUTO: 0.02 X10(3)/MCL (ref 0–0.2)
BASOPHILS NFR BLD AUTO: 0.3 %
BILIRUBIN DIRECT+TOT PNL SERPL-MCNC: 0.6 MG/DL
BUN SERPL-MCNC: 15.7 MG/DL (ref 9.8–20.1)
CALCIUM SERPL-MCNC: 8.6 MG/DL (ref 8.4–10.2)
CHLORIDE SERPL-SCNC: 109 MMOL/L (ref 98–107)
CO2 SERPL-SCNC: 24 MMOL/L (ref 23–31)
CREAT SERPL-MCNC: 0.66 MG/DL (ref 0.55–1.02)
EOSINOPHIL # BLD AUTO: 0.11 X10(3)/MCL (ref 0–0.9)
EOSINOPHIL NFR BLD AUTO: 1.7 %
ERYTHROCYTE [DISTWIDTH] IN BLOOD BY AUTOMATED COUNT: 15.6 % (ref 11.5–17)
GFR SERPLBLD CREATININE-BSD FMLA CKD-EPI: >60 MLS/MIN/1.73/M2
GLOBULIN SER-MCNC: 2.9 GM/DL (ref 2.4–3.5)
GLUCOSE SERPL-MCNC: 108 MG/DL (ref 82–115)
HCT VFR BLD AUTO: 37.1 % (ref 37–47)
HGB BLD-MCNC: 11.7 GM/DL (ref 12–16)
IMM GRANULOCYTES # BLD AUTO: 0.01 X10(3)/MCL (ref 0–0.04)
IMM GRANULOCYTES NFR BLD AUTO: 0.2 %
INR BLD: 1.81 (ref 0–1.3)
LYMPHOCYTES # BLD AUTO: 2.02 X10(3)/MCL (ref 0.6–4.6)
LYMPHOCYTES NFR BLD AUTO: 32 %
MAGNESIUM SERPL-MCNC: 2 MG/DL (ref 1.6–2.6)
MCH RBC QN AUTO: 29.3 PG
MCHC RBC AUTO-ENTMCNC: 31.5 MG/DL (ref 33–36)
MCV RBC AUTO: 93 FL (ref 80–94)
MONOCYTES # BLD AUTO: 0.56 X10(3)/MCL (ref 0.1–1.3)
MONOCYTES NFR BLD AUTO: 8.9 %
NEUTROPHILS # BLD AUTO: 3.59 X10(3)/MCL (ref 2.1–9.2)
NEUTROPHILS NFR BLD AUTO: 56.9 %
NRBC BLD AUTO-RTO: 0 %
PHOSPHATE SERPL-MCNC: 2.6 MG/DL (ref 2.3–4.7)
PLATELET # BLD AUTO: 135 X10(3)/MCL (ref 130–400)
PMV BLD AUTO: 10.5 FL (ref 7.4–10.4)
POTASSIUM SERPL-SCNC: 3.9 MMOL/L (ref 3.5–5.1)
PROT SERPL-MCNC: 6.1 GM/DL (ref 5.8–7.6)
PROTHROMBIN TIME: 20.7 SECONDS (ref 12.5–14.5)
RBC # BLD AUTO: 3.99 X10(6)/MCL (ref 4.2–5.4)
SODIUM SERPL-SCNC: 141 MMOL/L (ref 136–145)
TRIGL SERPL-MCNC: 76 MG/DL (ref 37–140)
WBC # SPEC AUTO: 6.3 X10(3)/MCL (ref 4.5–11.5)

## 2023-02-06 PROCEDURE — 85610 PROTHROMBIN TIME: CPT | Performed by: INTERNAL MEDICINE

## 2023-02-06 PROCEDURE — 84100 ASSAY OF PHOSPHORUS: CPT | Performed by: INTERNAL MEDICINE

## 2023-02-06 PROCEDURE — 83735 ASSAY OF MAGNESIUM: CPT | Performed by: INTERNAL MEDICINE

## 2023-02-06 PROCEDURE — 80053 COMPREHEN METABOLIC PANEL: CPT | Performed by: INTERNAL MEDICINE

## 2023-02-06 PROCEDURE — 84478 ASSAY OF TRIGLYCERIDES: CPT | Performed by: INTERNAL MEDICINE

## 2023-02-06 PROCEDURE — 85025 COMPLETE CBC W/AUTO DIFF WBC: CPT | Performed by: INTERNAL MEDICINE

## 2023-02-27 ENCOUNTER — DOCUMENT SCAN (OUTPATIENT)
Dept: HOME HEALTH SERVICES | Facility: HOSPITAL | Age: 78
End: 2023-02-27
Payer: MEDICARE

## 2023-02-27 ENCOUNTER — LAB REQUISITION (OUTPATIENT)
Dept: LAB | Facility: HOSPITAL | Age: 78
End: 2023-02-27
Payer: MEDICARE

## 2023-02-27 DIAGNOSIS — K91.2 POSTSURGICAL MALABSORPTION, NOT ELSEWHERE CLASSIFIED: ICD-10-CM

## 2023-02-27 DIAGNOSIS — E46 UNSPECIFIED PROTEIN-CALORIE MALNUTRITION: ICD-10-CM

## 2023-02-27 LAB
ALBUMIN SERPL-MCNC: 3.5 G/DL (ref 3.4–4.8)
ALBUMIN/GLOB SERPL: 1.3 RATIO (ref 1.1–2)
ALP SERPL-CCNC: 78 UNIT/L (ref 40–150)
ALT SERPL-CCNC: 36 UNIT/L (ref 0–55)
AST SERPL-CCNC: 39 UNIT/L (ref 5–34)
BASOPHILS # BLD AUTO: 0.01 X10(3)/MCL (ref 0–0.2)
BASOPHILS NFR BLD AUTO: 0.2 %
BILIRUBIN DIRECT+TOT PNL SERPL-MCNC: 0.6 MG/DL
BUN SERPL-MCNC: 14.5 MG/DL (ref 9.8–20.1)
CALCIUM SERPL-MCNC: 8.5 MG/DL (ref 8.4–10.2)
CHLORIDE SERPL-SCNC: 110 MMOL/L (ref 98–107)
CO2 SERPL-SCNC: 25 MMOL/L (ref 23–31)
CREAT SERPL-MCNC: 0.63 MG/DL (ref 0.55–1.02)
EOSINOPHIL # BLD AUTO: 0.12 X10(3)/MCL (ref 0–0.9)
EOSINOPHIL NFR BLD AUTO: 2.6 %
ERYTHROCYTE [DISTWIDTH] IN BLOOD BY AUTOMATED COUNT: 15.3 % (ref 11.5–17)
GFR SERPLBLD CREATININE-BSD FMLA CKD-EPI: >60 MLS/MIN/1.73/M2
GLOBULIN SER-MCNC: 2.8 GM/DL (ref 2.4–3.5)
GLUCOSE SERPL-MCNC: 66 MG/DL (ref 82–115)
HCT VFR BLD AUTO: 37.3 % (ref 37–47)
HGB BLD-MCNC: 11.9 G/DL (ref 12–16)
IMM GRANULOCYTES # BLD AUTO: 0.01 X10(3)/MCL (ref 0–0.04)
IMM GRANULOCYTES NFR BLD AUTO: 0.2 %
LYMPHOCYTES # BLD AUTO: 1.85 X10(3)/MCL (ref 0.6–4.6)
LYMPHOCYTES NFR BLD AUTO: 39.5 %
MAGNESIUM SERPL-MCNC: 2 MG/DL (ref 1.6–2.6)
MCH RBC QN AUTO: 29 PG
MCHC RBC AUTO-ENTMCNC: 31.9 G/DL (ref 33–36)
MCV RBC AUTO: 91 FL (ref 80–94)
MONOCYTES # BLD AUTO: 0.5 X10(3)/MCL (ref 0.1–1.3)
MONOCYTES NFR BLD AUTO: 10.7 %
NEUTROPHILS # BLD AUTO: 2.19 X10(3)/MCL (ref 2.1–9.2)
NEUTROPHILS NFR BLD AUTO: 46.8 %
NRBC BLD AUTO-RTO: 0 %
PHOSPHATE SERPL-MCNC: 2.6 MG/DL (ref 2.3–4.7)
PLATELET # BLD AUTO: 160 X10(3)/MCL (ref 130–400)
PMV BLD AUTO: 10.9 FL (ref 7.4–10.4)
POTASSIUM SERPL-SCNC: 4 MMOL/L (ref 3.5–5.1)
PROT SERPL-MCNC: 6.3 GM/DL (ref 5.8–7.6)
RBC # BLD AUTO: 4.1 X10(6)/MCL (ref 4.2–5.4)
SODIUM SERPL-SCNC: 141 MMOL/L (ref 136–145)
TRIGL SERPL-MCNC: 77 MG/DL (ref 37–140)
WBC # SPEC AUTO: 4.7 X10(3)/MCL (ref 4.5–11.5)

## 2023-02-27 PROCEDURE — 80053 COMPREHEN METABOLIC PANEL: CPT | Performed by: INTERNAL MEDICINE

## 2023-02-27 PROCEDURE — 84478 ASSAY OF TRIGLYCERIDES: CPT | Performed by: INTERNAL MEDICINE

## 2023-02-27 PROCEDURE — 85025 COMPLETE CBC W/AUTO DIFF WBC: CPT | Performed by: INTERNAL MEDICINE

## 2023-02-27 PROCEDURE — 83735 ASSAY OF MAGNESIUM: CPT | Performed by: INTERNAL MEDICINE

## 2023-02-27 PROCEDURE — 84100 ASSAY OF PHOSPHORUS: CPT | Performed by: INTERNAL MEDICINE

## 2023-02-27 PROCEDURE — 83520 IMMUNOASSAY QUANT NOS NONAB: CPT | Performed by: INTERNAL MEDICINE

## 2023-03-01 PROBLEM — Z00.00 ENCOUNTER FOR WELLNESS EXAMINATION IN ADULT: Status: ACTIVE | Noted: 2023-03-01

## 2023-03-01 LAB — TSH RECEP AB SER-ACNC: <1.1 IU/L (ref 0–1.75)

## 2023-03-02 PROBLEM — M81.0 OSTEOPOROSIS: Status: ACTIVE | Noted: 2023-03-02

## 2023-03-16 PROBLEM — Z87.442 HISTORY OF KIDNEY STONES: Status: ACTIVE | Noted: 2023-03-16

## 2023-03-16 PROBLEM — R31.9 HEMATURIA: Status: ACTIVE | Noted: 2023-03-16

## 2023-03-16 PROCEDURE — 87088 URINE BACTERIA CULTURE: CPT | Performed by: NURSE PRACTITIONER

## 2023-03-20 ENCOUNTER — LAB REQUISITION (OUTPATIENT)
Dept: LAB | Facility: HOSPITAL | Age: 78
End: 2023-03-20
Payer: MEDICARE

## 2023-03-20 DIAGNOSIS — K91.2 POSTSURGICAL MALABSORPTION, NOT ELSEWHERE CLASSIFIED: ICD-10-CM

## 2023-03-20 DIAGNOSIS — Z79.01 LONG TERM (CURRENT) USE OF ANTICOAGULANTS: ICD-10-CM

## 2023-03-20 DIAGNOSIS — E46 UNSPECIFIED PROTEIN-CALORIE MALNUTRITION: ICD-10-CM

## 2023-03-20 LAB
ALBUMIN SERPL-MCNC: 3.5 G/DL (ref 3.4–4.8)
ALBUMIN/GLOB SERPL: 1.3 RATIO (ref 1.1–2)
ALP SERPL-CCNC: 86 UNIT/L (ref 40–150)
ALT SERPL-CCNC: 32 UNIT/L (ref 0–55)
AST SERPL-CCNC: 38 UNIT/L (ref 5–34)
BASOPHILS # BLD AUTO: 0.04 X10(3)/MCL (ref 0–0.2)
BASOPHILS NFR BLD AUTO: 0.9 %
BILIRUBIN DIRECT+TOT PNL SERPL-MCNC: 0.5 MG/DL
BUN SERPL-MCNC: 19.5 MG/DL (ref 9.8–20.1)
CALCIUM SERPL-MCNC: 8.7 MG/DL (ref 8.4–10.2)
CHLORIDE SERPL-SCNC: 108 MMOL/L (ref 98–107)
CO2 SERPL-SCNC: 25 MMOL/L (ref 23–31)
CREAT SERPL-MCNC: 0.69 MG/DL (ref 0.55–1.02)
EOSINOPHIL # BLD AUTO: 0.19 X10(3)/MCL (ref 0–0.9)
EOSINOPHIL NFR BLD AUTO: 4.1 %
ERYTHROCYTE [DISTWIDTH] IN BLOOD BY AUTOMATED COUNT: 15.4 % (ref 11.5–17)
GFR SERPLBLD CREATININE-BSD FMLA CKD-EPI: >60 MLS/MIN/1.73/M2
GLOBULIN SER-MCNC: 2.8 GM/DL (ref 2.4–3.5)
GLUCOSE SERPL-MCNC: 86 MG/DL (ref 82–115)
HCT VFR BLD AUTO: 39 % (ref 37–47)
HGB BLD-MCNC: 12.5 G/DL (ref 12–16)
IMM GRANULOCYTES # BLD AUTO: 0.01 X10(3)/MCL (ref 0–0.04)
IMM GRANULOCYTES NFR BLD AUTO: 0.2 %
INR BLD: 4.1 (ref 0–1.3)
LYMPHOCYTES # BLD AUTO: 2.14 X10(3)/MCL (ref 0.6–4.6)
LYMPHOCYTES NFR BLD AUTO: 46.4 %
MAGNESIUM SERPL-MCNC: 1.9 MG/DL (ref 1.6–2.6)
MCH RBC QN AUTO: 29.4 PG
MCHC RBC AUTO-ENTMCNC: 32.1 G/DL (ref 33–36)
MCV RBC AUTO: 91.8 FL (ref 80–94)
MONOCYTES # BLD AUTO: 0.48 X10(3)/MCL (ref 0.1–1.3)
MONOCYTES NFR BLD AUTO: 10.4 %
NEUTROPHILS # BLD AUTO: 1.75 X10(3)/MCL (ref 2.1–9.2)
NEUTROPHILS NFR BLD AUTO: 38 %
NRBC BLD AUTO-RTO: 0 %
PHOSPHATE SERPL-MCNC: 3 MG/DL (ref 2.3–4.7)
PLATELET # BLD AUTO: 159 X10(3)/MCL (ref 130–400)
PMV BLD AUTO: 10.6 FL (ref 7.4–10.4)
POTASSIUM SERPL-SCNC: 4.8 MMOL/L (ref 3.5–5.1)
PROT SERPL-MCNC: 6.3 GM/DL (ref 5.8–7.6)
PROTHROMBIN TIME: 38.8 SECONDS (ref 12.5–14.5)
RBC # BLD AUTO: 4.25 X10(6)/MCL (ref 4.2–5.4)
SODIUM SERPL-SCNC: 139 MMOL/L (ref 136–145)
TRIGL SERPL-MCNC: 59 MG/DL (ref 37–140)
WBC # SPEC AUTO: 4.6 X10(3)/MCL (ref 4.5–11.5)

## 2023-03-20 PROCEDURE — 80053 COMPREHEN METABOLIC PANEL: CPT | Performed by: INTERNAL MEDICINE

## 2023-03-20 PROCEDURE — 84100 ASSAY OF PHOSPHORUS: CPT | Performed by: INTERNAL MEDICINE

## 2023-03-20 PROCEDURE — 85610 PROTHROMBIN TIME: CPT | Performed by: INTERNAL MEDICINE

## 2023-03-20 PROCEDURE — 83735 ASSAY OF MAGNESIUM: CPT | Performed by: INTERNAL MEDICINE

## 2023-03-20 PROCEDURE — 85025 COMPLETE CBC W/AUTO DIFF WBC: CPT | Performed by: INTERNAL MEDICINE

## 2023-03-20 PROCEDURE — 84478 ASSAY OF TRIGLYCERIDES: CPT | Performed by: INTERNAL MEDICINE

## 2023-04-10 ENCOUNTER — LAB REQUISITION (OUTPATIENT)
Dept: LAB | Facility: HOSPITAL | Age: 78
End: 2023-04-10
Payer: MEDICARE

## 2023-04-10 DIAGNOSIS — K91.2 POSTSURGICAL MALABSORPTION, NOT ELSEWHERE CLASSIFIED: ICD-10-CM

## 2023-04-10 DIAGNOSIS — E46 UNSPECIFIED PROTEIN-CALORIE MALNUTRITION: ICD-10-CM

## 2023-04-10 LAB
ALBUMIN SERPL-MCNC: 3.5 G/DL (ref 3.4–4.8)
ALBUMIN/GLOB SERPL: 1.2 RATIO (ref 1.1–2)
ALP SERPL-CCNC: 71 UNIT/L (ref 40–150)
ALT SERPL-CCNC: 30 UNIT/L (ref 0–55)
AST SERPL-CCNC: 35 UNIT/L (ref 5–34)
BASOPHILS # BLD AUTO: 0.03 X10(3)/MCL (ref 0–0.2)
BASOPHILS NFR BLD AUTO: 0.7 %
BILIRUBIN DIRECT+TOT PNL SERPL-MCNC: 0.6 MG/DL
BUN SERPL-MCNC: 20.3 MG/DL (ref 9.8–20.1)
CALCIUM SERPL-MCNC: 8.7 MG/DL (ref 8.4–10.2)
CHLORIDE SERPL-SCNC: 108 MMOL/L (ref 98–107)
CO2 SERPL-SCNC: 26 MMOL/L (ref 23–31)
CREAT SERPL-MCNC: 0.67 MG/DL (ref 0.55–1.02)
EOSINOPHIL # BLD AUTO: 0.21 X10(3)/MCL (ref 0–0.9)
EOSINOPHIL NFR BLD AUTO: 4.6 %
ERYTHROCYTE [DISTWIDTH] IN BLOOD BY AUTOMATED COUNT: 14.2 % (ref 11.5–17)
GFR SERPLBLD CREATININE-BSD FMLA CKD-EPI: >60 MLS/MIN/1.73/M2
GLOBULIN SER-MCNC: 3 GM/DL (ref 2.4–3.5)
GLUCOSE SERPL-MCNC: 79 MG/DL (ref 82–115)
HCT VFR BLD AUTO: 37.5 % (ref 37–47)
HGB BLD-MCNC: 12.1 G/DL (ref 12–16)
IMM GRANULOCYTES # BLD AUTO: 0 X10(3)/MCL (ref 0–0.04)
IMM GRANULOCYTES NFR BLD AUTO: 0 %
LYMPHOCYTES # BLD AUTO: 2.03 X10(3)/MCL (ref 0.6–4.6)
LYMPHOCYTES NFR BLD AUTO: 44.5 %
MAGNESIUM SERPL-MCNC: 1.9 MG/DL (ref 1.6–2.6)
MCH RBC QN AUTO: 29.4 PG (ref 27–31)
MCHC RBC AUTO-ENTMCNC: 32.3 G/DL (ref 33–36)
MCV RBC AUTO: 91 FL (ref 80–94)
MONOCYTES # BLD AUTO: 0.5 X10(3)/MCL (ref 0.1–1.3)
MONOCYTES NFR BLD AUTO: 11 %
NEUTROPHILS # BLD AUTO: 1.79 X10(3)/MCL (ref 2.1–9.2)
NEUTROPHILS NFR BLD AUTO: 39.2 %
NRBC BLD AUTO-RTO: 0 %
PHOSPHATE SERPL-MCNC: 3.5 MG/DL (ref 2.3–4.7)
PLATELET # BLD AUTO: 143 X10(3)/MCL (ref 130–400)
PMV BLD AUTO: 10.6 FL (ref 7.4–10.4)
POTASSIUM SERPL-SCNC: 4.5 MMOL/L (ref 3.5–5.1)
PROT SERPL-MCNC: 6.5 GM/DL (ref 5.8–7.6)
RBC # BLD AUTO: 4.12 X10(6)/MCL (ref 4.2–5.4)
SODIUM SERPL-SCNC: 140 MMOL/L (ref 136–145)
TRIGL SERPL-MCNC: 81 MG/DL (ref 37–140)
WBC # SPEC AUTO: 4.6 X10(3)/MCL (ref 4.5–11.5)

## 2023-04-10 PROCEDURE — 84478 ASSAY OF TRIGLYCERIDES: CPT | Performed by: INTERNAL MEDICINE

## 2023-04-10 PROCEDURE — 85025 COMPLETE CBC W/AUTO DIFF WBC: CPT | Performed by: INTERNAL MEDICINE

## 2023-04-10 PROCEDURE — 84100 ASSAY OF PHOSPHORUS: CPT | Performed by: INTERNAL MEDICINE

## 2023-04-10 PROCEDURE — 83735 ASSAY OF MAGNESIUM: CPT | Performed by: INTERNAL MEDICINE

## 2023-04-10 PROCEDURE — 80053 COMPREHEN METABOLIC PANEL: CPT | Performed by: INTERNAL MEDICINE

## 2023-04-24 ENCOUNTER — HOSPITAL ENCOUNTER (OUTPATIENT)
Dept: RADIOLOGY | Facility: HOSPITAL | Age: 78
Discharge: HOME | End: 2023-04-24
Attending: UROLOGY
Payer: MEDICARE

## 2023-04-24 DIAGNOSIS — Z01.818 OTHER SPECIFIED PRE-OPERATIVE EXAMINATION: ICD-10-CM

## 2023-04-24 DIAGNOSIS — Z01.818 OTHER SPECIFIED PRE-OPERATIVE EXAMINATION: Primary | ICD-10-CM

## 2023-04-24 PROCEDURE — 71046 X-RAY EXAM CHEST 2 VIEWS: CPT | Mod: TC

## 2023-04-25 NOTE — DISCHARGE INSTRUCTIONS
Patient Education       Cystoscopy Discharge Instructions        What care is needed at home?   Take a warm bath or use a warm wet washcloth over the opening to the urethra. This will help to ease any pain. Do this as needed.  Drink 6 to 8 glasses of water a day and 3 to 4 glasses in the first few hours after the procedure to flush out your bladder and reduce irritation.  You may see some blood in your urine for a few days. This is normal.  Empty your bladder as soon as you feel the need to. Don't delay going to the bathroom. It stretches and weakens the bladder.    What follow-up care is needed?   Be sure to keep your follow up visit.  If you had a biopsy, talk with your doctor about the results.    Will physical activity be limited?   Talk to your doctor about when you may go back to your normal activities like work, driving, or sex.    What problems could happen?   Bleeding  Infection  Injury to the bladder and urethra  Discomfort in the urethra area  Burning sensation for a short time  Upset stomach    When do I need to call the doctor?   Signs of infection. These include a fever of 100.4°F (38°C) or higher, chills, pain with passing urine.  Pain that does not go away even with drugs or that lasts longer than 2 days  Too much blood in your urine  Passing large dime-sized clots  Cloudy urine  Little or no urine or not able to pass urine  Abdominal pain and nausea     Patient Education       Lithotripsy for Kidney Stones Discharge Instructions   About this topic   Laser lithotripsy is a procedure to break up kidney stones without cutting the skin. The stone is broken down into tiny sand-like pieces and flushed out of the urinary tract.  The urinary tract is made up of the kidney, ureters, bladder, and urethra. The kidneys make urine and it drains down into tubes called ureters. These ureters are connected to the bladder. The bladder then squeezes out the urine and it exits the body through the urethra.  Sometimes,  salts and minerals in your urine build up and form stones. The stones are hard and can get stuck on their way out of the body. Some stones are too large and block the flow of urine. Others cause bleeding and pain. They may damage the kidney. These stones need a procedure like laser lithotripsy to break them up.         What care is needed at home?   This is what you will need to know:  Strain your urine by using a filter. This will hold the stone pieces. Save the pieces for your doctor in a zip lock bag. Bring the collected stones on your next visit. Pieces of the kidney stone may pass in the urine for a few days and cause mild pain. Your doctor may give you medication for the pain. Take the medications as ordered by your doctor. You may take over the counter Tylenol for pain.  Drink 8 to 10 glasses of water each day. This will help flush the broken kidney stones out. This will keep your hydrated.   What follow-up care is needed?   Be sure to keep your follow up appointment.  If you have a stent, your doctor may want to take it out or may teach you how to take it out.  Will physical activity be limited?   You may have to limit your activity for a while. Talk to your doctor about the right amount of activity for you.  What changes to diet are needed?   Talk to your doctor or dietitian about your personal diet plan to prevent more stones. Ask if there are foods you should avoid.  What problems could happen?   Pain while pieces of the kidney stone pass  Pain or irritation from the stent, especially when urinating  Blocked urine flow if stone fragments are too big to pass  Kidney or ureter injury  High blood pressure  Fevers or urinary tract infection  Blood in the urine  Not able to pass urine  Bruising  Discomfort in the back or belly  What can be done to prevent this health problem?   Prevent or treat urinary tract infections.  Drink lots of water during the day and evening. When you have less fluid in your body,  urine becomes concentrated. This increases your chance of kidney stones.  Follow the diet plan your dietitian gives you to prevent kidney stones.  Limit foods or drugs that may cause kidney stones.  When do I need to call the doctor?   Signs of infection. These include a fever of 100.4°F (38°C) or higher, chills, pain or burning with passing urine.  Very bad pain in your back or side that will not go away  Throwing up  Urine smells bad, looks cloudy, or has blood in it  No urine for more than 6 hours  Very bad pain in your chest, shoulder, or belly  More swelling of your ankles, legs, and hands or tightness with your shoes or rings

## 2023-04-26 RX ORDER — MIRTAZAPINE 15 MG/1
15 TABLET, FILM COATED ORAL NIGHTLY
COMMUNITY

## 2023-04-26 RX ORDER — DIPHENOXYLATE HYDROCHLORIDE AND ATROPINE SULFATE 2.5; .025 MG/1; MG/1
2 TABLET ORAL 2 TIMES DAILY
COMMUNITY

## 2023-05-01 ENCOUNTER — ANESTHESIA EVENT (OUTPATIENT)
Dept: SURGERY | Facility: HOSPITAL | Age: 78
End: 2023-05-01
Payer: MEDICARE

## 2023-05-02 ENCOUNTER — ANESTHESIA (OUTPATIENT)
Dept: SURGERY | Facility: HOSPITAL | Age: 78
End: 2023-05-02
Payer: MEDICARE

## 2023-05-02 ENCOUNTER — HOSPITAL ENCOUNTER (OUTPATIENT)
Facility: HOSPITAL | Age: 78
Discharge: HOME | End: 2023-05-02
Attending: UROLOGY | Admitting: UROLOGY
Payer: MEDICARE

## 2023-05-02 DIAGNOSIS — R31.0 GROSS HEMATURIA: ICD-10-CM

## 2023-05-02 DIAGNOSIS — N20.0 URIC ACID NEPHROLITHIASIS: ICD-10-CM

## 2023-05-02 DIAGNOSIS — N20.0 CALCULUS OF KIDNEY: Primary | ICD-10-CM

## 2023-05-02 LAB
CTP QC/QA: YES
INR PPP: 1.1 (ref 2–3)
PROTHROMBIN TIME, POC: 13.5 (ref 2–3)

## 2023-05-02 PROCEDURE — D9220A PRA ANESTHESIA: Mod: CRNA,,, | Performed by: NURSE ANESTHETIST, CERTIFIED REGISTERED

## 2023-05-02 PROCEDURE — 71000016 HC POSTOP RECOV ADDL HR: Performed by: UROLOGY

## 2023-05-02 PROCEDURE — D9220A PRA ANESTHESIA: ICD-10-PCS | Mod: ANES,,, | Performed by: ANESTHESIOLOGY

## 2023-05-02 PROCEDURE — 25000003 PHARM REV CODE 250: Performed by: NURSE ANESTHETIST, CERTIFIED REGISTERED

## 2023-05-02 PROCEDURE — 37000009 HC ANESTHESIA EA ADD 15 MINS: Performed by: UROLOGY

## 2023-05-02 PROCEDURE — C1894 INTRO/SHEATH, NON-LASER: HCPCS | Performed by: UROLOGY

## 2023-05-02 PROCEDURE — 37000008 HC ANESTHESIA 1ST 15 MINUTES: Performed by: UROLOGY

## 2023-05-02 PROCEDURE — D9220A PRA ANESTHESIA: Mod: ANES,,, | Performed by: ANESTHESIOLOGY

## 2023-05-02 PROCEDURE — 27201423 OPTIME MED/SURG SUP & DEVICES STERILE SUPPLY: Performed by: UROLOGY

## 2023-05-02 PROCEDURE — C1769 GUIDE WIRE: HCPCS | Performed by: UROLOGY

## 2023-05-02 PROCEDURE — 36000707: Performed by: UROLOGY

## 2023-05-02 PROCEDURE — C1758 CATHETER, URETERAL: HCPCS | Performed by: UROLOGY

## 2023-05-02 PROCEDURE — 36000706: Performed by: UROLOGY

## 2023-05-02 PROCEDURE — 63600175 PHARM REV CODE 636 W HCPCS: Performed by: NURSE ANESTHETIST, CERTIFIED REGISTERED

## 2023-05-02 PROCEDURE — 63600175 PHARM REV CODE 636 W HCPCS: Performed by: ANESTHESIOLOGY

## 2023-05-02 PROCEDURE — C2617 STENT, NON-COR, TEM W/O DEL: HCPCS | Performed by: UROLOGY

## 2023-05-02 PROCEDURE — 88300 SURGICAL PATH GROSS: CPT | Performed by: UROLOGY

## 2023-05-02 PROCEDURE — 25000003 PHARM REV CODE 250: Performed by: ANESTHESIOLOGY

## 2023-05-02 PROCEDURE — D9220A PRA ANESTHESIA: ICD-10-PCS | Mod: CRNA,,, | Performed by: NURSE ANESTHETIST, CERTIFIED REGISTERED

## 2023-05-02 PROCEDURE — 71000015 HC POSTOP RECOV 1ST HR: Performed by: UROLOGY

## 2023-05-02 PROCEDURE — 71000033 HC RECOVERY, INTIAL HOUR: Performed by: UROLOGY

## 2023-05-02 DEVICE — STENT URT DBL PGTL FLX AQUA 6F: Type: IMPLANTABLE DEVICE | Site: URETER | Status: FUNCTIONAL

## 2023-05-02 RX ORDER — OXYBUTYNIN CHLORIDE 10 MG/1
10 TABLET, EXTENDED RELEASE ORAL DAILY PRN
COMMUNITY
Start: 2023-04-24 | End: 2023-09-05

## 2023-05-02 RX ORDER — MEPERIDINE HYDROCHLORIDE 25 MG/ML
12.5 INJECTION INTRAMUSCULAR; INTRAVENOUS; SUBCUTANEOUS EVERY 10 MIN PRN
Status: DISCONTINUED | OUTPATIENT
Start: 2023-05-02 | End: 2023-05-02 | Stop reason: HOSPADM

## 2023-05-02 RX ORDER — PROPOFOL 10 MG/ML
VIAL (ML) INTRAVENOUS
Status: DISCONTINUED | OUTPATIENT
Start: 2023-05-02 | End: 2023-05-02

## 2023-05-02 RX ORDER — ONDANSETRON 2 MG/ML
4 INJECTION INTRAMUSCULAR; INTRAVENOUS DAILY PRN
Status: DISCONTINUED | OUTPATIENT
Start: 2023-05-02 | End: 2023-05-02 | Stop reason: HOSPADM

## 2023-05-02 RX ORDER — ENOXAPARIN SODIUM 100 MG/ML
INJECTION SUBCUTANEOUS
COMMUNITY
Start: 2023-04-24 | End: 2023-09-05

## 2023-05-02 RX ORDER — ACETAMINOPHEN 10 MG/ML
1000 INJECTION, SOLUTION INTRAVENOUS ONCE
Status: COMPLETED | OUTPATIENT
Start: 2023-05-02 | End: 2023-05-02

## 2023-05-02 RX ORDER — IPRATROPIUM BROMIDE AND ALBUTEROL SULFATE 2.5; .5 MG/3ML; MG/3ML
3 SOLUTION RESPIRATORY (INHALATION)
Status: DISCONTINUED | OUTPATIENT
Start: 2023-05-02 | End: 2023-05-02 | Stop reason: HOSPADM

## 2023-05-02 RX ORDER — CELECOXIB 200 MG/1
200 CAPSULE ORAL ONCE
Status: COMPLETED | OUTPATIENT
Start: 2023-05-02 | End: 2023-05-02

## 2023-05-02 RX ORDER — ONDANSETRON 4 MG/1
8 TABLET, ORALLY DISINTEGRATING ORAL EVERY 6 HOURS PRN
Status: DISCONTINUED | OUTPATIENT
Start: 2023-05-02 | End: 2023-05-02 | Stop reason: HOSPADM

## 2023-05-02 RX ORDER — CEFAZOLIN 2 G/1
INJECTION, POWDER, FOR SOLUTION INTRAMUSCULAR; INTRAVENOUS
Status: DISCONTINUED
Start: 2023-05-02 | End: 2023-05-02 | Stop reason: HOSPADM

## 2023-05-02 RX ORDER — POVIDONE-IODINE 5 %
SOLUTION, NON-ORAL OPHTHALMIC (EYE)
Status: DISCONTINUED | OUTPATIENT
Start: 2023-05-02 | End: 2023-05-02 | Stop reason: HOSPADM

## 2023-05-02 RX ORDER — MIDAZOLAM HYDROCHLORIDE 1 MG/ML
INJECTION INTRAMUSCULAR; INTRAVENOUS
Status: DISCONTINUED
Start: 2023-05-02 | End: 2023-05-02 | Stop reason: HOSPADM

## 2023-05-02 RX ORDER — ONDANSETRON HYDROCHLORIDE 2 MG/ML
INJECTION, SOLUTION INTRAMUSCULAR; INTRAVENOUS
Status: DISCONTINUED | OUTPATIENT
Start: 2023-05-02 | End: 2023-05-02

## 2023-05-02 RX ORDER — CEFAZOLIN SODIUM 2 G/50ML
2 SOLUTION INTRAVENOUS
Status: DISCONTINUED | OUTPATIENT
Start: 2023-05-02 | End: 2023-05-02 | Stop reason: HOSPADM

## 2023-05-02 RX ORDER — LORAZEPAM 2 MG/ML
0.25 INJECTION INTRAMUSCULAR ONCE AS NEEDED
Status: DISCONTINUED | OUTPATIENT
Start: 2023-05-02 | End: 2023-05-02 | Stop reason: HOSPADM

## 2023-05-02 RX ORDER — MIDAZOLAM HYDROCHLORIDE 1 MG/ML
0.5 INJECTION INTRAMUSCULAR; INTRAVENOUS ONCE AS NEEDED
Status: DISCONTINUED | OUTPATIENT
Start: 2023-05-02 | End: 2023-05-02 | Stop reason: HOSPADM

## 2023-05-02 RX ORDER — CIPROFLOXACIN 500 MG/1
500 TABLET ORAL 2 TIMES DAILY
COMMUNITY
Start: 2023-04-24 | End: 2023-09-05

## 2023-05-02 RX ORDER — LIDOCAINE HYDROCHLORIDE 10 MG/ML
1 INJECTION, SOLUTION EPIDURAL; INFILTRATION; INTRACAUDAL; PERINEURAL ONCE
Status: DISCONTINUED | OUTPATIENT
Start: 2023-05-02 | End: 2023-05-02 | Stop reason: HOSPADM

## 2023-05-02 RX ORDER — PROCHLORPERAZINE EDISYLATE 5 MG/ML
5 INJECTION INTRAMUSCULAR; INTRAVENOUS EVERY 30 MIN PRN
Status: DISCONTINUED | OUTPATIENT
Start: 2023-05-02 | End: 2023-05-02 | Stop reason: HOSPADM

## 2023-05-02 RX ORDER — SODIUM CHLORIDE, SODIUM GLUCONATE, SODIUM ACETATE, POTASSIUM CHLORIDE AND MAGNESIUM CHLORIDE 30; 37; 368; 526; 502 MG/100ML; MG/100ML; MG/100ML; MG/100ML; MG/100ML
INJECTION, SOLUTION INTRAVENOUS CONTINUOUS
Status: DISCONTINUED | OUTPATIENT
Start: 2023-05-02 | End: 2023-05-02 | Stop reason: HOSPADM

## 2023-05-02 RX ORDER — LIDOCAINE HYDROCHLORIDE 10 MG/ML
INJECTION, SOLUTION EPIDURAL; INFILTRATION; INTRACAUDAL; PERINEURAL
Status: DISCONTINUED | OUTPATIENT
Start: 2023-05-02 | End: 2023-05-02

## 2023-05-02 RX ORDER — MORPHINE SULFATE 4 MG/ML
2 INJECTION, SOLUTION INTRAMUSCULAR; INTRAVENOUS EVERY 5 MIN PRN
Status: DISCONTINUED | OUTPATIENT
Start: 2023-05-02 | End: 2023-05-02 | Stop reason: HOSPADM

## 2023-05-02 RX ADMIN — SODIUM CHLORIDE, POTASSIUM CHLORIDE, SODIUM LACTATE AND CALCIUM CHLORIDE: 600; 310; 30; 20 INJECTION, SOLUTION INTRAVENOUS at 12:05

## 2023-05-02 RX ADMIN — CELECOXIB 200 MG: 200 CAPSULE ORAL at 10:05

## 2023-05-02 RX ADMIN — ONDANSETRON 4 MG: 2 INJECTION INTRAMUSCULAR; INTRAVENOUS at 01:05

## 2023-05-02 RX ADMIN — CEFAZOLIN 2 G: 1 INJECTION, POWDER, FOR SOLUTION INTRAMUSCULAR; INTRAVENOUS at 01:05

## 2023-05-02 RX ADMIN — ACETAMINOPHEN 1000 MG: 10 INJECTION, SOLUTION INTRAVENOUS at 10:05

## 2023-05-02 RX ADMIN — PROPOFOL 100 MG: 10 INJECTION, EMULSION INTRAVENOUS at 12:05

## 2023-05-02 RX ADMIN — LIDOCAINE HYDROCHLORIDE 20 MG: 10 INJECTION, SOLUTION EPIDURAL; INFILTRATION; INTRACAUDAL; PERINEURAL at 12:05

## 2023-05-02 NOTE — TRANSFER OF CARE
Anesthesia Transfer of Care Note    Patient: Laura TORRES Use    Procedure(s) Performed: Procedure(s) (LRB):  CYSTOSCOPY, WITH URETERAL STENT INSERTION Left (Left)    Patient location: PACU    Anesthesia Type: general    Transport from OR: Transported from OR on room air with adequate spontaneous ventilation    Post pain: adequate analgesia    Post assessment: no apparent anesthetic complications and tolerated procedure well    Post vital signs: stable    Level of consciousness: sedated    Nausea/Vomiting: no nausea/vomiting    Complications: none    Transfer of care protocol was followed

## 2023-05-02 NOTE — ANESTHESIA POSTPROCEDURE EVALUATION
Anesthesia Post Evaluation    Patient: Laura Acosta    Procedure(s) Performed: Procedure(s) (LRB):  CYSTOSCOPY, WITH URETERAL STENT INSERTION Left (Left)    Final Anesthesia Type: general      Patient location during evaluation: floor  Patient participation: Yes- Able to Participate  Level of consciousness: awake and alert  Post-procedure vital signs: reviewed and stable  Pain management: adequate  Airway patency: patent    PONV status at discharge: No PONV  Anesthetic complications: no      Cardiovascular status: blood pressure returned to baseline  Respiratory status: spontaneous ventilation and room air  Hydration status: euvolemic  Follow-up not needed.          Vitals Value Taken Time   /67 05/02/23 1542   Temp 36.7 °C (98.1 °F) 05/02/23 1450   Pulse 47 05/02/23 1542   Resp 15 05/02/23 1450   SpO2 96 % 05/02/23 1542   Vitals shown include unvalidated device data.      Event Time   Out of Recovery 14:54:00         Pain/Chuy Score: Pain Rating Prior to Med Admin: 0 (5/2/2023 10:10 AM)  Chuy Score: 10 (5/2/2023  3:00 PM)

## 2023-05-02 NOTE — INTERVAL H&P NOTE
The patient has been examined and the H&P has been reviewed:    I concur with the findings and no changes have occurred since H&P was written.    Surgery risks, benefits and alternative options discussed and understood by patient/family.          Active Hospital Problems    Diagnosis  POA    *Calculus of kidney [N20.0]  Yes      Resolved Hospital Problems   No resolved problems to display.

## 2023-05-02 NOTE — ANESTHESIA PREPROCEDURE EVALUATION
05/02/2023  Laura D Use is a 77 y.o., female with history of DVT/abdominal venous thrombosis requiring bowel resection previously on Coumadin presents as an outpatient for cysto ureteroscopy with ureteral stent placement (nephrolithiasis).  Patient has been off Coumadin for 6 days and bridged with Lovenox.  INR today was 1.1 .  Patient notes pruritus with Dilaudid but has tolerated other narcotics without difficulty.    Transthoracic echo April 2022   EF 60% with grade 1 diastolic dysfunction  Mild TR   RVSP less than 35      Vitals - 1 value per visit 3/16/2023 4/26/2023 5/2/2023   SYSTOLIC 130 - 127   DIASTOLIC 60 - 65   Pulse 70 - 50   Temp 98.1 - 98.1   Resp - - -   SPO2 - - 99   Weight (lb) 155 148 -   Weight (kg) 70.308 67.132 -   Height 63 64 -   BMI (Calculated) 27.5 25.4 -   VISIT REPORT - - -         Lab Results   Component Value Date    WBC 5.2 04/24/2023    HGB 12.6 04/24/2023    HCT 40.9 04/24/2023    MCV 94.9 (H) 04/24/2023     04/24/2023     CMP  Sodium Level   Date Value Ref Range Status   04/24/2023 141 136 - 145 mmol/L Final     Potassium Level   Date Value Ref Range Status   04/24/2023 4.5 3.5 - 5.1 mmol/L Final     Carbon Dioxide   Date Value Ref Range Status   04/24/2023 29 23 - 31 mmol/L Final     Blood Urea Nitrogen   Date Value Ref Range Status   04/24/2023 17.6 9.8 - 20.1 mg/dL Final     Creatinine   Date Value Ref Range Status   04/24/2023 0.70 0.55 - 1.02 mg/dL Final     Calcium Level Total   Date Value Ref Range Status   04/24/2023 8.8 8.4 - 10.2 mg/dL Final     Albumin Level   Date Value Ref Range Status   04/10/2023 3.5 3.4 - 4.8 g/dL Final     Bilirubin Total   Date Value Ref Range Status   04/10/2023 0.6 <=1.5 mg/dL Final     Alkaline Phosphatase   Date Value Ref Range Status   04/10/2023 71 40 - 150 unit/L Final     Aspartate Aminotransferase   Date Value Ref Range  Status   04/10/2023 35 (H) 5 - 34 unit/L Final     Alanine Aminotransferase   Date Value Ref Range Status   04/10/2023 30 0 - 55 unit/L Final     Estimated GFR-Non    Date Value Ref Range Status   07/25/2022 >60 mls/min/1.73/m2 Final      Lab Results   Component Value Date    INR 4.10 (H) 03/20/2023    INR 1.81 (H) 02/06/2023    INR 2.10 (H) 01/09/2023    PROTIME 38.8 (H) 03/20/2023    PROTIME 20.7 (H) 02/06/2023    PROTIME 23.2 (H) 01/09/2023         Pre-op Assessment    I have reviewed the Patient Summary Reports.    I have reviewed the NPO Status.   I have reviewed the Medications.     Review of Systems  Anesthesia Hx:   Denies Personal Hx of Anesthesia complications.   Social:  Non-Smoker    Hematology/Oncology:        Current/Recent Cancer. Breast   Cardiovascular:   Hypertension  Functional Capacity 3 METS  Deep Venous Thrombosis (DVT)    Renal/:   renal calculi        Physical Exam  General: Well nourished, Cooperative, Alert and Oriented    Airway:  Mallampati: II   Mouth Opening: Normal  TM Distance: Normal  Tongue: Normal  Neck ROM: Normal ROM    Dental:  Dentures    Chest/Lungs:  Clear to auscultation, Normal Respiratory Rate    Heart:  Rate: Normal  Rhythm: Regular Rhythm        Anesthesia Plan  Type of Anesthesia, risks & benefits discussed:    Anesthesia Type: Gen Supraglottic Airway  Intra-op Monitoring Plan: Standard ASA Monitors  Post Op Pain Control Plan: multimodal analgesia and IV/PO Opioids PRN  Induction:  IV  Airway Plan: Direct  Informed Consent: Informed consent signed with the Patient and all parties understand the risks and agree with anesthesia plan.  All questions answered.   ASA Score: 3  Day of Surgery Review of History & Physical: H&P Update referred to the surgeon/provider.    Ready For Surgery From Anesthesia Perspective.     .

## 2023-05-02 NOTE — DISCHARGE SUMMARY
[facility] Surgical - Periop Services  Discharge Note  Short Stay    Procedure(s) (LRB):  CYSTOSCOPY, WITH URETERAL STENT INSERTION Left (Left)      OUTCOME: Patient tolerated treatment/procedure well without complication and is now ready for discharge.    DISPOSITION: Home or Self Care    FINAL DIAGNOSIS:  Calculus of kidney    FOLLOWUP: In clinic    DISCHARGE INSTRUCTIONS:    Discharge Procedure Orders   Diet Adult Regular     Lifting restrictions     Notify your health care provider if you experience any of the following:  temperature >100.4     Notify your health care provider if you experience any of the following:  persistent nausea and vomiting or diarrhea     Notify your health care provider if you experience any of the following:  severe uncontrolled pain        TIME SPENT ON DISCHARGE: 15 minutes

## 2023-05-02 NOTE — OP NOTE
SURGEON:  Jared Felix MD     PREOPERATIVE DIAGNOSIS(ES):  left calculus.    POSTOPERATIVE DIAGNOSIS(ES):  left calculus.    PROCEDURE(S)/OPERATION(S) PERFORMED:  1. left ureteroscopy with Holmium laser lithotripsy.  2. left double-J ureteral stent placement.    ANESTHESIA:  General.    FLUIDS ADMINISTERED:  1000 mL crystalloid.    ESTIMATED BLOOD LOSS:  5 mL.    SPECIMENS:  left renal stone sent for stone analysis.    FINDINGS:  Large 2cm left renal pelvis stone which was laser to dust and small fragments.    COMPLICATIONS:  None.    DRAINS:  6-[place] x 24 cm left double-J ureteral stent.    SUMMARY:  After informed consent was obtained, the patient was brought to the operating room and placed  in the supine position. After adequate general anesthetic, the patient was positioned in dorsal  lithotomy and genitalia prepped and draped in a sterile manner. A time-out was performed with  the patient identified using 2 identifiers and the procedure site verified. The 21-[place] rigid  cystoscope was advanced into the bladder and the bladder was drained. The bladder was  thoroughly examined with 30 and 70-degree lenses. The ureteral orifices were in the normal  position. The indwelling double-J stent was visualized and brought outside the urethral meatus  with a grasper. We advanced an angle-tip Glidewire up the lumen of the left ureter  and renal pelvis under fluoroscopic guidance. We then removed the stent.       Next we passed a 12/14 [place] Cook Flexor ureteral access sheath over the working wire  and into the ureter under fluoroscopic guidance. It passed easily to the level of the upper ureter. The inner dilator and working wire were then removed.    We then advanced a digital flexible ureteroscope into the access sheath and examined  the ureter, renal pelvis, and calyces. We visualized stone in the renal pelvis. We used a 273 micron Holmium laser fiber to fragment the stone.    Total laser energy utilized was 31  Jimy. The stone was large 2cm and fragmented into small fragments  and dust.   We injected 5 mL of contrast to opacify the renal pelvis, and  then removed the ureteroscope and access sheath. The ureter was carefully examined as we  removed the scope and there was no evidence of injury, perforation, or retained calculus.    We elected to leave an indwelling double-J stent to prevent obstruction due to edema or spasm.  The cystoscope was replaced into the bladder and the 6-[place] open-ended ureteral catheter  and Glidewire were advanced back into the ureter. We measured the ureter from the UPJ to the  UO and it measured 24 cm. We then deployed a double-J stent in a standard fashion with the  proximal curl in the renal pelvis and the distal curl in the bladder.  The bladder was drained with the cystoscope and the cystoscope was removed. The  patient tolerated the procedure well, was extubated, and transported to the recovery room in  stable condition. The family was informed of the outcome following the procedure.    Plan:  KUB in 1-2 weeks  Plan for 2nd look Left URS    05/02/2023  2:57 PM

## 2023-05-02 NOTE — ANESTHESIA PROCEDURE NOTES
Intubation    Date/Time: 5/2/2023 12:57 PM  Performed by: Gali Sullivan CRNA  Authorized by: Toni Singh Jr., MD     Intubation:     Induction:  Intravenous    Intubated:  Postinduction    Mask Ventilation:  Easy mask    Attempts:  1    Attempted By:  CRNA    Difficult Airway Encountered?: No      Airway Device:  Supraglottic airway/LMA    Airway Device Size:  3.0    Style/Cuff Inflation:  Cuffed (inflated to minimal occlusive pressure)    Inflation Amount (mL):  18    Placement Verified By:  Capnometry    Complicating Factors:  None    Findings Post-Intubation:  BS equal bilateral

## 2023-05-02 NOTE — CARE UPDATE
Received patient from the OR-she is awake upon arrival,sabillon,denied c/o, respirations full-regular-deep-clear,hob up 30 degrees.

## 2023-05-03 VITALS
HEART RATE: 47 BPM | OXYGEN SATURATION: 96 % | BODY MASS INDEX: 26.49 KG/M2 | SYSTOLIC BLOOD PRESSURE: 137 MMHG | DIASTOLIC BLOOD PRESSURE: 67 MMHG | TEMPERATURE: 98 F | RESPIRATION RATE: 18 BRPM | WEIGHT: 155.19 LBS | HEIGHT: 64 IN

## 2023-05-03 DIAGNOSIS — Z80.3 FAMILY HISTORY OF BREAST CANCER: ICD-10-CM

## 2023-05-03 DIAGNOSIS — C50.011 MALIGNANT NEOPLASM OF NIPPLE OF RIGHT BREAST IN FEMALE, UNSPECIFIED ESTROGEN RECEPTOR STATUS: Primary | ICD-10-CM

## 2023-05-05 ENCOUNTER — LAB REQUISITION (OUTPATIENT)
Dept: LAB | Facility: HOSPITAL | Age: 78
End: 2023-05-05
Payer: MEDICARE

## 2023-05-05 DIAGNOSIS — K91.2 POSTSURGICAL MALABSORPTION, NOT ELSEWHERE CLASSIFIED: ICD-10-CM

## 2023-05-05 DIAGNOSIS — Z51.81 ENCOUNTER FOR THERAPEUTIC DRUG LEVEL MONITORING: ICD-10-CM

## 2023-05-05 LAB
INR BLD: 1.05 (ref 0–1.3)
PROTHROMBIN TIME: 13.6 SECONDS (ref 12.5–14.5)

## 2023-05-05 PROCEDURE — 85610 PROTHROMBIN TIME: CPT | Performed by: INTERNAL MEDICINE

## 2023-05-08 ENCOUNTER — LAB REQUISITION (OUTPATIENT)
Dept: LAB | Facility: HOSPITAL | Age: 78
End: 2023-05-08
Payer: MEDICARE

## 2023-05-08 DIAGNOSIS — K91.2 POSTSURGICAL MALABSORPTION, NOT ELSEWHERE CLASSIFIED: ICD-10-CM

## 2023-05-08 DIAGNOSIS — E46 UNSPECIFIED PROTEIN-CALORIE MALNUTRITION: ICD-10-CM

## 2023-05-08 LAB
ALBUMIN SERPL-MCNC: 3.3 G/DL (ref 3.4–4.8)
ALBUMIN/GLOB SERPL: 1 RATIO (ref 1.1–2)
ALP SERPL-CCNC: 81 UNIT/L (ref 40–150)
ALT SERPL-CCNC: 25 UNIT/L (ref 0–55)
AST SERPL-CCNC: 37 UNIT/L (ref 5–34)
BASOPHILS # BLD AUTO: 0.02 X10(3)/MCL
BASOPHILS NFR BLD AUTO: 0.5 %
BILIRUBIN DIRECT+TOT PNL SERPL-MCNC: 0.5 MG/DL
BUN SERPL-MCNC: 14.8 MG/DL (ref 9.8–20.1)
CALCIUM SERPL-MCNC: 8.9 MG/DL (ref 8.4–10.2)
CHLORIDE SERPL-SCNC: 112 MMOL/L (ref 98–107)
CO2 SERPL-SCNC: 24 MMOL/L (ref 23–31)
CREAT SERPL-MCNC: 0.68 MG/DL (ref 0.55–1.02)
EOSINOPHIL # BLD AUTO: 0.25 X10(3)/MCL (ref 0–0.9)
EOSINOPHIL NFR BLD AUTO: 5.9 %
ERYTHROCYTE [DISTWIDTH] IN BLOOD BY AUTOMATED COUNT: 14.2 % (ref 11.5–17)
GFR SERPLBLD CREATININE-BSD FMLA CKD-EPI: >60 MLS/MIN/1.73/M2
GLOBULIN SER-MCNC: 3.2 GM/DL (ref 2.4–3.5)
GLUCOSE SERPL-MCNC: 74 MG/DL (ref 82–115)
HCT VFR BLD AUTO: 38.3 % (ref 37–47)
HGB BLD-MCNC: 12.4 G/DL (ref 12–16)
IMM GRANULOCYTES # BLD AUTO: 0.01 X10(3)/MCL (ref 0–0.04)
IMM GRANULOCYTES NFR BLD AUTO: 0.2 %
LYMPHOCYTES # BLD AUTO: 2.22 X10(3)/MCL (ref 0.6–4.6)
LYMPHOCYTES NFR BLD AUTO: 52.2 %
MAGNESIUM SERPL-MCNC: 1.9 MG/DL (ref 1.6–2.6)
MCH RBC QN AUTO: 29.5 PG (ref 27–31)
MCHC RBC AUTO-ENTMCNC: 32.4 G/DL (ref 33–36)
MCV RBC AUTO: 91 FL (ref 80–94)
MONOCYTES # BLD AUTO: 0.6 X10(3)/MCL (ref 0.1–1.3)
MONOCYTES NFR BLD AUTO: 14.1 %
NEUTROPHILS # BLD AUTO: 1.15 X10(3)/MCL (ref 2.1–9.2)
NEUTROPHILS NFR BLD AUTO: 27.1 %
NRBC BLD AUTO-RTO: 0 %
PHOSPHATE SERPL-MCNC: 3.9 MG/DL (ref 2.3–4.7)
PLATELET # BLD AUTO: 127 X10(3)/MCL (ref 130–400)
PMV BLD AUTO: 10.5 FL (ref 7.4–10.4)
POTASSIUM SERPL-SCNC: 4.3 MMOL/L (ref 3.5–5.1)
PROT SERPL-MCNC: 6.5 GM/DL (ref 5.8–7.6)
RBC # BLD AUTO: 4.21 X10(6)/MCL (ref 4.2–5.4)
SODIUM SERPL-SCNC: 144 MMOL/L (ref 136–145)
TRIGL SERPL-MCNC: 92 MG/DL (ref 37–140)
WBC # SPEC AUTO: 4.25 X10(3)/MCL (ref 4.5–11.5)

## 2023-05-08 PROCEDURE — 83735 ASSAY OF MAGNESIUM: CPT | Performed by: INTERNAL MEDICINE

## 2023-05-08 PROCEDURE — 85025 COMPLETE CBC W/AUTO DIFF WBC: CPT | Performed by: INTERNAL MEDICINE

## 2023-05-08 PROCEDURE — 84100 ASSAY OF PHOSPHORUS: CPT | Performed by: INTERNAL MEDICINE

## 2023-05-08 PROCEDURE — 84478 ASSAY OF TRIGLYCERIDES: CPT | Performed by: INTERNAL MEDICINE

## 2023-05-08 PROCEDURE — 80053 COMPREHEN METABOLIC PANEL: CPT | Performed by: INTERNAL MEDICINE

## 2023-05-11 LAB — PSYCHE PATHOLOGY RESULT: NORMAL

## 2023-05-22 ENCOUNTER — LAB REQUISITION (OUTPATIENT)
Dept: LAB | Facility: HOSPITAL | Age: 78
End: 2023-05-22
Payer: MEDICARE

## 2023-05-22 DIAGNOSIS — K71.50: ICD-10-CM

## 2023-05-22 DIAGNOSIS — K91.2 POSTSURGICAL MALABSORPTION, NOT ELSEWHERE CLASSIFIED: ICD-10-CM

## 2023-05-22 DIAGNOSIS — K74.60 UNSPECIFIED CIRRHOSIS OF LIVER: ICD-10-CM

## 2023-05-22 LAB
ABS NEUT CALC (OHS): 2.26 X10(3)/MCL (ref 2.1–9.2)
ALBUMIN SERPL-MCNC: 3.5 G/DL (ref 3.4–4.8)
ALBUMIN/GLOB SERPL: 1.2 RATIO (ref 1.1–2)
ALP SERPL-CCNC: 83 UNIT/L (ref 40–150)
ALT SERPL-CCNC: 31 UNIT/L (ref 0–55)
AST SERPL-CCNC: 38 UNIT/L (ref 5–34)
BASOPHILS # BLD AUTO: 0.03 X10(3)/MCL
BASOPHILS NFR BLD AUTO: 0.6 %
BASOPHILS NFR BLD MANUAL: 0.05 X10(3)/MCL (ref 0–0.2)
BASOPHILS NFR BLD MANUAL: 1 % (ref 0–2)
BILIRUBIN DIRECT+TOT PNL SERPL-MCNC: 0.5 MG/DL
BUN SERPL-MCNC: 24.2 MG/DL (ref 9.8–20.1)
CALCIUM SERPL-MCNC: 8.7 MG/DL (ref 8.4–10.2)
CHLORIDE SERPL-SCNC: 106 MMOL/L (ref 98–107)
CK SERPL-CCNC: 64 U/L (ref 29–168)
CO2 SERPL-SCNC: 25 MMOL/L (ref 23–31)
CREAT SERPL-MCNC: 0.68 MG/DL (ref 0.55–1.02)
EOSINOPHIL # BLD AUTO: 0.23 X10(3)/MCL (ref 0–0.9)
EOSINOPHIL NFR BLD AUTO: 4.6 %
EOSINOPHIL NFR BLD MANUAL: 0.25 X10(3)/MCL (ref 0–0.9)
EOSINOPHIL NFR BLD MANUAL: 5 % (ref 0–8)
ERYTHROCYTE [DISTWIDTH] IN BLOOD BY AUTOMATED COUNT: 13.8 % (ref 11.5–17)
FERRITIN SERPL-MCNC: 44.54 NG/ML (ref 4.63–204)
GFR SERPLBLD CREATININE-BSD FMLA CKD-EPI: >60 MLS/MIN/1.73/M2
GLOBULIN SER-MCNC: 3 GM/DL (ref 2.4–3.5)
GLUCOSE SERPL-MCNC: 81 MG/DL (ref 82–115)
HCT VFR BLD AUTO: 38.3 % (ref 37–47)
HGB BLD-MCNC: 12.3 G/DL (ref 12–16)
IGA SERPL-MCNC: 402 MG/DL (ref 69–517)
IMM GRANULOCYTES # BLD AUTO: 0.01 X10(3)/MCL (ref 0–0.04)
IMM GRANULOCYTES NFR BLD AUTO: 0.2 %
INR BLD: 2.42 (ref 0–1.3)
IRON SATN MFR SERPL: 21 % (ref 20–50)
IRON SERPL-MCNC: 69 UG/DL (ref 50–170)
LYMPHOCYTES # BLD AUTO: 2.56 X10(3)/MCL (ref 0.6–4.6)
LYMPHOCYTES NFR BLD AUTO: 51 %
LYMPHOCYTES NFR BLD MANUAL: 1.91 X10(3)/MCL
LYMPHOCYTES NFR BLD MANUAL: 38 % (ref 13–40)
MCH RBC QN AUTO: 28.8 PG (ref 27–31)
MCHC RBC AUTO-ENTMCNC: 32.1 G/DL (ref 33–36)
MCV RBC AUTO: 89.7 FL (ref 80–94)
MONOCYTES # BLD AUTO: 0.63 X10(3)/MCL (ref 0.1–1.3)
MONOCYTES NFR BLD AUTO: 12.5 %
MONOCYTES NFR BLD MANUAL: 0.5 X10(3)/MCL (ref 0.1–1.3)
MONOCYTES NFR BLD MANUAL: 10 % (ref 2–11)
NEUTROPHILS # BLD AUTO: 1.56 X10(3)/MCL (ref 2.1–9.2)
NEUTROPHILS NFR BLD AUTO: 31.1 %
NEUTROPHILS NFR BLD MANUAL: 45 % (ref 47–80)
NRBC BLD AUTO-RTO: 0 %
PLATELET # BLD AUTO: 152 X10(3)/MCL (ref 130–400)
PLATELET # BLD EST: ADEQUATE 10*3/UL
PMV BLD AUTO: 10.7 FL (ref 7.4–10.4)
POTASSIUM SERPL-SCNC: 4.3 MMOL/L (ref 3.5–5.1)
PROT SERPL-MCNC: 6.5 GM/DL (ref 5.8–7.6)
PROTHROMBIN TIME: 25.8 SECONDS (ref 12.5–14.5)
RBC # BLD AUTO: 4.27 X10(6)/MCL (ref 4.2–5.4)
RBC MORPH BLD: NORMAL
SODIUM SERPL-SCNC: 138 MMOL/L (ref 136–145)
TIBC SERPL-MCNC: 264 UG/DL (ref 70–310)
TIBC SERPL-MCNC: 333 UG/DL (ref 250–450)
TRANSFERRIN SERPL-MCNC: 316 MG/DL (ref 173–360)
TSH SERPL-ACNC: 0.79 UIU/ML (ref 0.35–4.94)
WBC # SPEC AUTO: 5.02 X10(3)/MCL (ref 4.5–11.5)

## 2023-05-22 PROCEDURE — 82728 ASSAY OF FERRITIN: CPT | Performed by: INTERNAL MEDICINE

## 2023-05-22 PROCEDURE — 86381 MITOCHONDRIAL ANTIBODY EACH: CPT | Performed by: INTERNAL MEDICINE

## 2023-05-22 PROCEDURE — 80361 OPIATES 1 OR MORE: CPT

## 2023-05-22 PROCEDURE — 85610 PROTHROMBIN TIME: CPT | Performed by: INTERNAL MEDICINE

## 2023-05-22 PROCEDURE — 86704 HEP B CORE ANTIBODY TOTAL: CPT | Performed by: INTERNAL MEDICINE

## 2023-05-22 PROCEDURE — 82390 ASSAY OF CERULOPLASMIN: CPT | Mod: 90 | Performed by: INTERNAL MEDICINE

## 2023-05-22 PROCEDURE — 83516 IMMUNOASSAY NONANTIBODY: CPT | Performed by: INTERNAL MEDICINE

## 2023-05-22 PROCEDURE — 82104 ALPHA-1-ANTITRYPSIN PHENO: CPT | Mod: 90 | Performed by: INTERNAL MEDICINE

## 2023-05-22 PROCEDURE — 82784 ASSAY IGA/IGD/IGG/IGM EACH: CPT | Performed by: INTERNAL MEDICINE

## 2023-05-22 PROCEDURE — 87340 HEPATITIS B SURFACE AG IA: CPT | Performed by: INTERNAL MEDICINE

## 2023-05-22 PROCEDURE — 84443 ASSAY THYROID STIM HORMONE: CPT | Performed by: INTERNAL MEDICINE

## 2023-05-22 PROCEDURE — 86709 HEPATITIS A IGM ANTIBODY: CPT | Performed by: INTERNAL MEDICINE

## 2023-05-22 PROCEDURE — 83520 IMMUNOASSAY QUANT NOS NONAB: CPT | Mod: 90 | Performed by: INTERNAL MEDICINE

## 2023-05-22 PROCEDURE — 85027 COMPLETE CBC AUTOMATED: CPT | Performed by: INTERNAL MEDICINE

## 2023-05-22 PROCEDURE — 82550 ASSAY OF CK (CPK): CPT | Performed by: INTERNAL MEDICINE

## 2023-05-22 PROCEDURE — 86706 HEP B SURFACE ANTIBODY: CPT | Performed by: INTERNAL MEDICINE

## 2023-05-22 PROCEDURE — 86709 HEPATITIS A IGM ANTIBODY: CPT | Mod: 91 | Performed by: INTERNAL MEDICINE

## 2023-05-22 PROCEDURE — 86258 DGP ANTIBODY EACH IG CLASS: CPT | Performed by: INTERNAL MEDICINE

## 2023-05-22 PROCEDURE — 80053 COMPREHEN METABOLIC PANEL: CPT | Performed by: INTERNAL MEDICINE

## 2023-05-22 PROCEDURE — 86803 HEPATITIS C AB TEST: CPT | Performed by: INTERNAL MEDICINE

## 2023-05-22 PROCEDURE — 86225 DNA ANTIBODY NATIVE: CPT | Performed by: INTERNAL MEDICINE

## 2023-05-22 PROCEDURE — 86708 HEPATITIS A ANTIBODY: CPT | Performed by: INTERNAL MEDICINE

## 2023-05-22 PROCEDURE — 83550 IRON BINDING TEST: CPT | Performed by: INTERNAL MEDICINE

## 2023-05-22 PROCEDURE — 82103 ALPHA-1-ANTITRYPSIN TOTAL: CPT | Mod: 90 | Performed by: INTERNAL MEDICINE

## 2023-05-22 PROCEDURE — 80365 DRUG SCREENING OXYCODONE: CPT | Performed by: INTERNAL MEDICINE

## 2023-05-22 PROCEDURE — 86015 ACTIN ANTIBODY EACH: CPT | Mod: 90 | Performed by: INTERNAL MEDICINE

## 2023-05-23 LAB
ANTINUCLEAR ANTIBODY SCREEN (OHS): NEGATIVE
DSDNA AB QUANT (OHS): 1.4 IU/ML
ELIA CELIKEY IGA (TTG IGA) QUANTITATIVE: 0.9 U/ML
ELIA GLIADINDP IGA QUANTITATIVE: 3.3 U/ML
HAV AB SER QL IA: NONREACTIVE
HAV IGM SERPL QL IA: NONREACTIVE
HBV CORE AB SERPL QL IA: NONREACTIVE
HBV CORE IGM SERPL QL IA: NONREACTIVE
HBV SURFACE AB SER-ACNC: 0.06 MIU/ML
HBV SURFACE AB SERPL IA-ACNC: NONREACTIVE M[IU]/ML
HBV SURFACE AG SERPL QL IA: NONREACTIVE
HCV AB SERPL QL IA: NONREACTIVE

## 2023-05-24 LAB
CERULOPLASMIN SERPL-MCNC: 26.7 MG/DL (ref 20–51)
MITOCHONDRIA M2 AB SER IA-ACNC: <0.1 U

## 2023-05-25 LAB
MAYO GENERIC ORDERABLE RESULT: NORMAL
SMOOTH MUSCLE AB SER QL IF: NEGATIVE

## 2023-05-26 LAB
A1AT PHENOTYP SERPL-IMP: NORMAL BANDS
A1AT SERPL NEPH-MCNC: 172 MG/DL (ref 100–190)

## 2023-05-27 LAB — MAYO GENERIC ORDERABLE RESULT: NORMAL

## 2023-05-31 LAB — SOLUBLE LIVER AB SER IA-ACNC: <20.1 U

## 2023-06-05 ENCOUNTER — LAB REQUISITION (OUTPATIENT)
Dept: LAB | Facility: HOSPITAL | Age: 78
End: 2023-06-05
Payer: MEDICARE

## 2023-06-05 DIAGNOSIS — K91.2 POSTSURGICAL MALABSORPTION, NOT ELSEWHERE CLASSIFIED: ICD-10-CM

## 2023-06-05 DIAGNOSIS — E86.0 DEHYDRATION: ICD-10-CM

## 2023-06-05 DIAGNOSIS — E46 UNSPECIFIED PROTEIN-CALORIE MALNUTRITION: ICD-10-CM

## 2023-06-05 PROBLEM — Z00.00 ENCOUNTER FOR WELLNESS EXAMINATION IN ADULT: Status: RESOLVED | Noted: 2023-03-01 | Resolved: 2023-06-05

## 2023-06-05 LAB
ALBUMIN SERPL-MCNC: 3.6 G/DL (ref 3.4–4.8)
ALBUMIN/GLOB SERPL: 1.1 RATIO (ref 1.1–2)
ALP SERPL-CCNC: 96 UNIT/L (ref 40–150)
ALT SERPL-CCNC: 32 UNIT/L (ref 0–55)
AST SERPL-CCNC: 42 UNIT/L (ref 5–34)
BASOPHILS # BLD AUTO: 0.03 X10(3)/MCL
BASOPHILS NFR BLD AUTO: 0.3 %
BILIRUBIN DIRECT+TOT PNL SERPL-MCNC: 0.8 MG/DL
BUN SERPL-MCNC: 28.4 MG/DL (ref 9.8–20.1)
CALCIUM SERPL-MCNC: 8.9 MG/DL (ref 8.4–10.2)
CHLORIDE SERPL-SCNC: 104 MMOL/L (ref 98–107)
CO2 SERPL-SCNC: 26 MMOL/L (ref 23–31)
CREAT SERPL-MCNC: 0.81 MG/DL (ref 0.55–1.02)
EOSINOPHIL # BLD AUTO: 0.08 X10(3)/MCL (ref 0–0.9)
EOSINOPHIL NFR BLD AUTO: 0.9 %
ERYTHROCYTE [DISTWIDTH] IN BLOOD BY AUTOMATED COUNT: 14.2 % (ref 11.5–17)
GFR SERPLBLD CREATININE-BSD FMLA CKD-EPI: >60 MLS/MIN/1.73/M2
GLOBULIN SER-MCNC: 3.2 GM/DL (ref 2.4–3.5)
GLUCOSE SERPL-MCNC: 122 MG/DL (ref 82–115)
HCT VFR BLD AUTO: 40 % (ref 37–47)
HGB BLD-MCNC: 13 G/DL (ref 12–16)
IMM GRANULOCYTES # BLD AUTO: 0.03 X10(3)/MCL (ref 0–0.04)
IMM GRANULOCYTES NFR BLD AUTO: 0.3 %
INR BLD: 2.49 (ref 0–1.3)
LYMPHOCYTES # BLD AUTO: 1.56 X10(3)/MCL (ref 0.6–4.6)
LYMPHOCYTES NFR BLD AUTO: 17.7 %
MAGNESIUM SERPL-MCNC: 2.2 MG/DL (ref 1.6–2.6)
MCH RBC QN AUTO: 28.9 PG (ref 27–31)
MCHC RBC AUTO-ENTMCNC: 32.5 G/DL (ref 33–36)
MCV RBC AUTO: 88.9 FL (ref 80–94)
MONOCYTES # BLD AUTO: 0.64 X10(3)/MCL (ref 0.1–1.3)
MONOCYTES NFR BLD AUTO: 7.3 %
NEUTROPHILS # BLD AUTO: 6.45 X10(3)/MCL (ref 2.1–9.2)
NEUTROPHILS NFR BLD AUTO: 73.5 %
NRBC BLD AUTO-RTO: 0 %
PHOSPHATE SERPL-MCNC: 5.1 MG/DL (ref 2.3–4.7)
PLATELET # BLD AUTO: 149 X10(3)/MCL (ref 130–400)
PMV BLD AUTO: 10.6 FL (ref 7.4–10.4)
POTASSIUM SERPL-SCNC: 4 MMOL/L (ref 3.5–5.1)
PROT SERPL-MCNC: 6.8 GM/DL (ref 5.8–7.6)
PROTHROMBIN TIME: 26.6 SECONDS (ref 12.5–14.5)
RBC # BLD AUTO: 4.5 X10(6)/MCL (ref 4.2–5.4)
SODIUM SERPL-SCNC: 142 MMOL/L (ref 136–145)
TRIGL SERPL-MCNC: 73 MG/DL (ref 37–140)
WBC # SPEC AUTO: 8.79 X10(3)/MCL (ref 4.5–11.5)

## 2023-06-05 PROCEDURE — 84100 ASSAY OF PHOSPHORUS: CPT | Performed by: INTERNAL MEDICINE

## 2023-06-05 PROCEDURE — 80053 COMPREHEN METABOLIC PANEL: CPT | Performed by: INTERNAL MEDICINE

## 2023-06-05 PROCEDURE — 83735 ASSAY OF MAGNESIUM: CPT | Performed by: INTERNAL MEDICINE

## 2023-06-05 PROCEDURE — 85610 PROTHROMBIN TIME: CPT | Performed by: INTERNAL MEDICINE

## 2023-06-05 PROCEDURE — 84478 ASSAY OF TRIGLYCERIDES: CPT | Performed by: INTERNAL MEDICINE

## 2023-06-05 PROCEDURE — 85025 COMPLETE CBC W/AUTO DIFF WBC: CPT | Performed by: INTERNAL MEDICINE

## 2023-06-19 ENCOUNTER — ANESTHESIA EVENT (OUTPATIENT)
Dept: ENDOSCOPY | Facility: HOSPITAL | Age: 78
End: 2023-06-19
Payer: MEDICARE

## 2023-06-19 RX ORDER — SODIUM CHLORIDE, SODIUM GLUCONATE, SODIUM ACETATE, POTASSIUM CHLORIDE AND MAGNESIUM CHLORIDE 30; 37; 368; 526; 502 MG/100ML; MG/100ML; MG/100ML; MG/100ML; MG/100ML
INJECTION, SOLUTION INTRAVENOUS CONTINUOUS
Status: CANCELLED | OUTPATIENT
Start: 2023-06-19 | End: 2023-07-19

## 2023-06-19 RX ORDER — SODIUM CHLORIDE, SODIUM LACTATE, POTASSIUM CHLORIDE, CALCIUM CHLORIDE 600; 310; 30; 20 MG/100ML; MG/100ML; MG/100ML; MG/100ML
INJECTION, SOLUTION INTRAVENOUS CONTINUOUS
Status: CANCELLED | OUTPATIENT
Start: 2023-06-19

## 2023-06-19 NOTE — ANESTHESIA PREPROCEDURE EVALUATION
06/19/2023  Laura D Use is a 77 y.o., female with ----------------------------  Acute URI  Anxiety and depression  Back pain  Breast cancer  Cholelithiasis  Contaminated small bowel syndrome  DVT (deep venous thrombosis)  Edema, lower extremity  Gallstone pancreatitis  HTN (hypertension)  Hypercholesterolemia  Insomnia  Irregular heart beat  Ischemic disease of gut  Laryngitis  Meningitis, unspecified  OA (osteoarthritis)  PVD (peripheral vascular disease)  Rheumatoid arthritis, unspecified  Staph infection      Comment:  infected mediport -- on doxycycline daily for prevention  Tremor  Unspecified cataract    And ----------------------------  Appendectomy  Bowel resection  Breast lumpectomy  Cholecystectomy  Colonoscopy      Comment:  repeat 3 years  Cyst removal      Comment:  breast  Cystoscopy w/ stone manipulation  Cystoscopy w/ ureteral stent placement  Cystoscopy w/ ureteral stent removal  Cystoureteroscopy, with holmium laser lithotripsy of ureteral   calculus and stent insertion      Comment:  Procedure: CYSTOSCOPY, WITH URETERAL STENT INSERTION                Left;  Surgeon: Jared Felix MD;  Location: [facility];                Service: Urology;  Laterality: Left;  Endoscopic retrograde cholangiopancreatography w/ sphincterotomy and   stone removal  Esophagogastroduodenoscopy  Gi biopsy  Gi biopsy  Hysterectomy  Laparotomy, exploratory  Lithotripsy  Lithotripsy  Mediport insertion, single  Mediport insertion, single  Mediport insertion, single  Mediport removal  Phacoemulsification of cataract  Phacoemulsification of cataract  Picc line removal  Polypectomy  Pvd surgery  Removal of catheter  Shoulder surgery      Comment:  shoulder replacement  Sphincterotomy of urethra  Ventral hernia repair    Presents for EGD.  Previous anesthetic cysto with LMA 3 in May 2023  Pt has elevated liver enzymes and  cirrhosis from unknown cause - no previous EGD       Pre-op Assessment    I have reviewed the NPO Status.      Review of Systems      Physical Exam  General: Well nourished, Cooperative, Alert and Oriented  Pleasant, good historian   Airway:  Mallampati: II   Mouth Opening: Normal  TM Distance: Normal  Tongue: Normal  Neck ROM: Normal ROM    Dental:  Dentures  Top to be removed  Chest/Lungs:  Clear to auscultation, Normal Respiratory Rate    Heart:  Rate: Normal  Rhythm: Regular Rhythm        Anesthesia Plan  Type of Anesthesia, risks & benefits discussed:    Anesthesia Type: Gen Natural Airway  Intra-op Monitoring Plan: Standard ASA Monitors  Post Op Pain Control Plan: IV/PO Opioids PRN  Induction:  IV  Airway Plan: Direct  Informed Consent: Informed consent signed with the Patient and all parties understand the risks and agree with anesthesia plan.  All questions answered. Patient consented to blood products? No  ASA Score: 3  Day of Surgery Review of History & Physical: H&P Update referred to the surgeon/provider.  Anesthesia Plan Notes: Nasal cannula vs facemask supplemental oxygenation   For patients with TATYANA/obesity, may consider SuperNoval Nasal CPAP      Ready For Surgery From Anesthesia Perspective.     .

## 2023-06-20 ENCOUNTER — ANESTHESIA (OUTPATIENT)
Dept: ENDOSCOPY | Facility: HOSPITAL | Age: 78
End: 2023-06-20
Payer: MEDICARE

## 2023-06-20 ENCOUNTER — HOSPITAL ENCOUNTER (OUTPATIENT)
Facility: HOSPITAL | Age: 78
Discharge: HOME | End: 2023-06-20
Attending: INTERNAL MEDICINE | Admitting: INTERNAL MEDICINE
Payer: MEDICARE

## 2023-06-20 VITALS
DIASTOLIC BLOOD PRESSURE: 62 MMHG | SYSTOLIC BLOOD PRESSURE: 106 MMHG | WEIGHT: 153 LBS | OXYGEN SATURATION: 96 % | HEART RATE: 58 BPM | HEIGHT: 63 IN | TEMPERATURE: 97 F | BODY MASS INDEX: 27.11 KG/M2 | RESPIRATION RATE: 20 BRPM

## 2023-06-20 DIAGNOSIS — K74.60 HEPATIC CIRRHOSIS, UNSPECIFIED HEPATIC CIRRHOSIS TYPE, UNSPECIFIED WHETHER ASCITES PRESENT: ICD-10-CM

## 2023-06-20 DIAGNOSIS — R74.8 ELEVATED LIVER ENZYMES: ICD-10-CM

## 2023-06-20 PROCEDURE — 63600175 PHARM REV CODE 636 W HCPCS: Performed by: NURSE ANESTHETIST, CERTIFIED REGISTERED

## 2023-06-20 PROCEDURE — 37000009 HC ANESTHESIA EA ADD 15 MINS: Performed by: INTERNAL MEDICINE

## 2023-06-20 PROCEDURE — 25000003 PHARM REV CODE 250: Performed by: INTERNAL MEDICINE

## 2023-06-20 PROCEDURE — 43239 EGD BIOPSY SINGLE/MULTIPLE: CPT | Performed by: INTERNAL MEDICINE

## 2023-06-20 PROCEDURE — D9220A PRA ANESTHESIA: Mod: ANES,,, | Performed by: ANESTHESIOLOGY

## 2023-06-20 PROCEDURE — D9220A PRA ANESTHESIA: ICD-10-PCS | Mod: CRNA,,, | Performed by: NURSE ANESTHETIST, CERTIFIED REGISTERED

## 2023-06-20 PROCEDURE — D9220A PRA ANESTHESIA: ICD-10-PCS | Mod: ANES,,, | Performed by: ANESTHESIOLOGY

## 2023-06-20 PROCEDURE — 37000008 HC ANESTHESIA 1ST 15 MINUTES: Performed by: INTERNAL MEDICINE

## 2023-06-20 PROCEDURE — 27201423 OPTIME MED/SURG SUP & DEVICES STERILE SUPPLY: Performed by: INTERNAL MEDICINE

## 2023-06-20 PROCEDURE — 88313 SPECIAL STAINS GROUP 2: CPT

## 2023-06-20 PROCEDURE — 88305 TISSUE EXAM BY PATHOLOGIST: CPT | Performed by: INTERNAL MEDICINE

## 2023-06-20 PROCEDURE — 88312 SPECIAL STAINS GROUP 1: CPT | Mod: 59

## 2023-06-20 PROCEDURE — D9220A PRA ANESTHESIA: Mod: CRNA,,, | Performed by: NURSE ANESTHETIST, CERTIFIED REGISTERED

## 2023-06-20 PROCEDURE — 25000003 PHARM REV CODE 250: Performed by: NURSE ANESTHETIST, CERTIFIED REGISTERED

## 2023-06-20 RX ORDER — PROPOFOL 10 MG/ML
VIAL (ML) INTRAVENOUS
Status: DISCONTINUED | OUTPATIENT
Start: 2023-06-20 | End: 2023-06-20

## 2023-06-20 RX ORDER — GLYCOPYRROLATE 0.2 MG/ML
INJECTION INTRAMUSCULAR; INTRAVENOUS
Status: DISCONTINUED | OUTPATIENT
Start: 2023-06-20 | End: 2023-06-20

## 2023-06-20 RX ORDER — SODIUM CHLORIDE, SODIUM GLUCONATE, SODIUM ACETATE, POTASSIUM CHLORIDE AND MAGNESIUM CHLORIDE 30; 37; 368; 526; 502 MG/100ML; MG/100ML; MG/100ML; MG/100ML; MG/100ML
INJECTION, SOLUTION INTRAVENOUS CONTINUOUS
Status: DISCONTINUED | OUTPATIENT
Start: 2023-06-20 | End: 2023-06-20 | Stop reason: HOSPADM

## 2023-06-20 RX ORDER — PROPOFOL 10 MG/ML
VIAL (ML) INTRAVENOUS
Status: COMPLETED
Start: 2023-06-20 | End: 2023-06-20

## 2023-06-20 RX ORDER — LIDOCAINE HYDROCHLORIDE 20 MG/ML
INJECTION, SOLUTION EPIDURAL; INFILTRATION; INTRACAUDAL; PERINEURAL
Status: DISCONTINUED
Start: 2023-06-20 | End: 2023-06-20 | Stop reason: HOSPADM

## 2023-06-20 RX ORDER — GLYCOPYRROLATE 0.2 MG/ML
INJECTION INTRAMUSCULAR; INTRAVENOUS
Status: DISCONTINUED
Start: 2023-06-20 | End: 2023-06-20 | Stop reason: HOSPADM

## 2023-06-20 RX ORDER — ONDANSETRON 2 MG/ML
4 INJECTION INTRAMUSCULAR; INTRAVENOUS DAILY PRN
Status: DISCONTINUED | OUTPATIENT
Start: 2023-06-20 | End: 2023-06-20 | Stop reason: HOSPADM

## 2023-06-20 RX ORDER — LIDOCAINE HYDROCHLORIDE 20 MG/ML
INJECTION INTRAVENOUS
Status: DISCONTINUED | OUTPATIENT
Start: 2023-06-20 | End: 2023-06-20

## 2023-06-20 RX ADMIN — PROPOFOL 50 MG: 10 INJECTION, EMULSION INTRAVENOUS at 07:06

## 2023-06-20 RX ADMIN — PROPOFOL 20 MG: 10 INJECTION, EMULSION INTRAVENOUS at 07:06

## 2023-06-20 RX ADMIN — PROPOFOL 10 MG: 10 INJECTION, EMULSION INTRAVENOUS at 07:06

## 2023-06-20 RX ADMIN — GLYCOPYRROLATE 0.1 MG: 0.2 INJECTION INTRAMUSCULAR; INTRAVENOUS at 07:06

## 2023-06-20 RX ADMIN — SODIUM CHLORIDE, SODIUM GLUCONATE, SODIUM ACETATE, POTASSIUM CHLORIDE AND MAGNESIUM CHLORIDE: 526; 502; 368; 37; 30 INJECTION, SOLUTION INTRAVENOUS at 06:06

## 2023-06-20 RX ADMIN — SODIUM CHLORIDE, SODIUM GLUCONATE, SODIUM ACETATE, POTASSIUM CHLORIDE AND MAGNESIUM CHLORIDE: 526; 502; 368; 37; 30 INJECTION, SOLUTION INTRAVENOUS at 07:06

## 2023-06-20 RX ADMIN — LIDOCAINE HYDROCHLORIDE 100 MG: 20 INJECTION INTRAVENOUS at 07:06

## 2023-06-20 NOTE — ANESTHESIA POSTPROCEDURE EVALUATION
Anesthesia Post Evaluation    Patient: Laura Acosta    Procedure(s) Performed: Procedure(s) (LRB):  EGD (N/A)    Final Anesthesia Type: general      Patient location during evaluation: PACU  Patient participation: Yes- Able to Participate  Level of consciousness: awake and alert  Post-procedure vital signs: reviewed and stable  Pain management: adequate  Airway patency: patent    PONV status at discharge: No PONV  Anesthetic complications: no      Cardiovascular status: hemodynamically stable  Respiratory status: unassisted  Hydration status: euvolemic  Follow-up not needed.          Vitals Value Taken Time   /62 06/20/23 0733   Temp 36 °C (96.8 °F) 06/20/23 0723   Pulse 58 06/20/23 0733   Resp 20 06/20/23 0733   SpO2 96 % 06/20/23 0733         No case tracking events are documented in the log.      Pain/Chuy Score: Chuy Score: 10 (6/20/2023  7:33 AM)

## 2023-06-20 NOTE — TRANSFER OF CARE
"Anesthesia Transfer of Care Note    Patient: Laura Acosta    Procedure(s) Performed: Procedure(s) (LRB):  EGD (N/A)    Patient location: GI    Anesthesia Type: general    Transport from OR: Transported from OR on room air with adequate spontaneous ventilation    Post pain: adequate analgesia    Post assessment: no apparent anesthetic complications and tolerated procedure well    Post vital signs: stable    Level of consciousness: awake and alert    Nausea/Vomiting: no nausea/vomiting    Complications: none    Transfer of care protocol was followed      Last vitals:   Visit Vitals  /62 (BP Location: Left arm, Patient Position: Lying)   Pulse (!) 58   Resp 17   Ht 5' 3" (1.6 m)   Wt 69.4 kg (153 lb)   SpO2 97%   BMI 27.10 kg/m²     "

## 2023-06-20 NOTE — PROVATION PATIENT INSTRUCTIONS
Discharge Summary/Instructions after an Endoscopic Procedure  Patient Name: Laura Acosta  Patient MRN: [id number]  Patient YOB: 1945 Tuesday, June 20, 2023  Aashish Carroll MD  Dear patient,  As a result of recent federal legislation (The Federal Cures Act), you may   receive lab or pathology results from your procedure in your [facility]   account before your physician is able to contact you. Your physician or   their representative will relay the results to you with their   recommendations at their soonest availability.  Thank you,  RESTRICTIONS:  During your procedure today, you received medications for sedation.  These   medications may affect your judgment, balance and coordination.  Therefore,   for 24 hours, you have the following restrictions:   - DO NOT drive a car, operate machinery, make legal/financial decisions,   sign important papers or drink alcohol.    ACTIVITY:  Today: no heavy lifting, straining or running due to procedural   sedation/anesthesia.  The following day: return to full activity including work.  DIET:  Eat and drink normally unless instructed otherwise.     TREATMENT FOR COMMON SIDE EFFECTS:  - Mild abdominal pain, nausea, belching, bloating or excessive gas:  rest,   eat lightly and use a heating pad.  - Sore Throat: treat with throat lozenges and/or gargle with warm salt   water.  - Because air was used during the procedure, expelling large amounts of air   from your rectum or belching is normal.  - If a bowel prep was taken, you may not have a bowel movement for 1-3 days.    This is normal.  SYMPTOMS TO WATCH FOR AND REPORT TO YOUR PHYSICIAN:  1. Abdominal pain or bloating, other than gas cramps.  2. Chest pain.  3. Back pain.  4. Signs of infection such as: chills or fever occurring within 24 hours   after the procedure.  5. Rectal bleeding, which would show as bright red, maroon, or black stools.   (A tablespoon of blood from the rectum is not serious, especially if    hemorrhoids are present.)  6. Vomiting.  7. Weakness or dizziness.  GO DIRECTLY TO THE NEAREST EMERGENCY ROOM IF YOU HAVE ANY OF THE FOLLOWING:      Difficulty breathing              Chills and/or fever over 101 F   Persistent vomiting and/or vomiting blood   Severe abdominal pain   Severe chest pain   Black, tarry stools   Bleeding- more than one tablespoon   Any other symptom or condition that you feel may need urgent attention  Your doctor recommends these additional instructions:  If any biopsies were taken, your doctors clinic will contact you in 1 to 2   weeks with any results.  Follow up pathology  Repeat egd in three years unless pathology of the duodenal biopsies dictate   something different  - Discharge patient to home (ambulatory).  For questions, problems or results please call your physician - Aashish Carroll MD at Work:  [phone].  [facility] [place] EMERGENCY ROOM PHONE NUMBER: [phone]  IF A COMPLICATION OR EMERGENCY SITUATION ARISES AND YOU ARE UNABLE TO REACH   YOUR PHYSICIAN - GO DIRECTLY TO THE EMERGENCY ROOM.  MD Aashish Romero MD  6/20/2023 7:30:52 AM  This report has been verified and signed electronically.  Dear patient,  As a result of recent federal legislation (The Federal Cures Act), you may   receive lab or pathology results from your procedure in your [facility]   account before your physician is able to contact you. Your physician or   their representative will relay the results to you with their   recommendations at their soonest availability.  Thank you,  PROVATION

## 2023-06-20 NOTE — H&P
Gastroenterology     CC:  77-year-old female whom I follow for short gut syndrome was found to have cirrhosis on imaging this was confirmed with fibrous scan of unknown etiology.  She does require TPN for short gut syndrome    HPI 77 y.o. female here for variceal screening    Past Medical History:   Diagnosis Date    Acute URI     Anxiety and depression     Back pain     Breast cancer     Cholelithiasis     Contaminated small bowel syndrome     DVT (deep venous thrombosis)     Edema, lower extremity     Gallstone pancreatitis     HTN (hypertension)     Hypercholesterolemia     Insomnia     Irregular heart beat     Ischemic disease of gut     Laryngitis     Malabsorption     Meningitis, unspecified     OA (osteoarthritis)     PVD (peripheral vascular disease)     Rheumatoid arthritis, unspecified     Staph infection     infected mediport -- on doxycycline daily for prevention    Tremor     Unspecified cataract     Venous insufficiency        Past Surgical History:   Procedure Laterality Date    APPENDECTOMY      BOWEL RESECTION      BREAST LUMPECTOMY Right     CHOLECYSTECTOMY  05/2015    COLONOSCOPY  02/2022    repeat 3 years    CYST REMOVAL Right     breast    CYSTOSCOPY W/ STONE MANIPULATION      CYSTOSCOPY W/ URETERAL STENT PLACEMENT  12/2020    CYSTOSCOPY W/ URETERAL STENT REMOVAL  04/2021    CYSTOURETEROSCOPY, WITH HOLMIUM LASER LITHOTRIPSY OF URETERAL CALCULUS AND STENT INSERTION Left 5/2/2023    Procedure: CYSTOSCOPY, WITH URETERAL STENT INSERTION Left;  Surgeon: Jared Felix MD;  Location: [facility];  Service: Urology;  Laterality: Left;    ENDOSCOPIC RETROGRADE CHOLANGIOPANCREATOGRAPHY W/ SPHINCTEROTOMY AND STONE REMOVAL  04/2015    ESOPHAGOGASTRODUODENOSCOPY      GI Biopsy  02/15/2022    GI Biopsy  12/2018    HYSTERECTOMY      LAPAROTOMY, EXPLORATORY  06/2015    LITHOTRIPSY Left 04/2021    LITHOTRIPSY Left 03/2021    MEDIPORT INSERTION, SINGLE  06/2021    MEDIPORT INSERTION, SINGLE  07/2020    MEDIPORT  "INSERTION, SINGLE  12/2018    MEDIPORT REMOVAL  07/2020    PHACOEMULSIFICATION OF CATARACT Left 12/2016    PHACOEMULSIFICATION OF CATARACT Right 11/2016    PICC line Removal Right 06/2021    POLYPECTOMY  02/2022    PVD surgery      REMOVAL OF CATHETER  12/2020    SHOULDER SURGERY Right     shoulder replacement    SPHINCTEROTOMY OF URETHRA      VENTRAL HERNIA REPAIR  04/2016       Social History     Tobacco Use    Smoking status: Never    Smokeless tobacco: Never   Substance Use Topics    Alcohol use: Not Currently    Drug use: Never       Family History   Problem Relation Age of Onset    Pneumonia Mother     Depression Mother     Osteoarthritis Mother     Breast cancer Mother     Uterine cancer Mother     Heart attack Father     Aneurysm Father         brain    Diabetes Father     Hyperlipidemia Father     Hypertension Father     Hyperlipidemia Sister     Hypertension Sister     Kidney cancer Sister     Osteoarthritis Sister     Breast cancer Sister     Hyperlipidemia Brother     Hypertension Brother     Coronary artery disease Brother         CABG x3    Hyperlipidemia Brother     Hypertension Brother        Review of Systems  General ROS: negative for - chills, fever or weight loss  Psychological ROS: negative for - hallucination, depression or suicidal ideation  Ophthalmic ROS: negative for - blurry vision, photophobia or eye pain  ENT ROS: negative for - epistaxis, sore throat or rhinorrhea  Respiratory ROS: no cough, shortness of breath, or wheezing  Cardiovascular ROS: no chest pain or dyspnea on exertion  Gastrointestinal ROS: nl  Genito-Urinary ROS: no dysuria, trouble voiding, or hematuria  Musculoskeletal ROS: negative for - gait disturbance or muscular weakness  Neurological ROS: no syncope or seizures; no ataxia  Dermatological ROS: negative for pruritis, rash and jaundice    Physical Examination  /60   Pulse (!) 54   Resp 12   Ht 5' 3" (1.6 m)   Wt 69.4 kg (153 lb)   SpO2 97%   BMI 27.10 " kg/m²   General appearance: alert, cooperative, no distress  HENT: Normocephalic, atraumatic, neck symmetrical, no nasal discharge   Eyes: conjunctivae/corneas clear, PERRL, EOM's intact  Lungs: clear to auscultation bilaterally, no dullness to percussion bilaterally  Heart: regular rate and rhythm without rub; no displacement of the PMI   Abdomen: soft  Extremities: extremities symmetric; no clubbing, cyanosis, or edema  Integument: Skin color, texture, turgor normal; no rashes; hair distrubution normal  Neurologic: Alert and oriented X 3, normal strength, normal coordination and gait  Psychiatric: no pressured speech; normal affect; no evidence of impaired cognition     Labs:  none    Imaging:     I have personally reviewed these images    Assessment:   cirrhosis    Plan:  Egd for variceal screening

## 2023-06-20 NOTE — DISCHARGE SUMMARY
[facility]  Discharge Note  Short Stay    Procedure(s) (LRB):  EGD (N/A)      OUTCOME: Patient tolerated treatment/procedure well without complication and is now ready for discharge.    DISPOSITION: Home or Self Care    FINAL DIAGNOSIS:  <principal problem not specified>    FOLLOWUP: In clinic    DISCHARGE INSTRUCTIONS:  No discharge procedures on file.     TIME SPENT ON DISCHARGE: 33 minutes

## 2023-06-21 LAB — PSYCHE PATHOLOGY RESULT: NORMAL

## 2023-06-23 ENCOUNTER — LAB REQUISITION (OUTPATIENT)
Dept: LAB | Facility: HOSPITAL | Age: 78
End: 2023-06-23
Payer: MEDICARE

## 2023-06-23 DIAGNOSIS — Z86.718 PERSONAL HISTORY OF OTHER VENOUS THROMBOSIS AND EMBOLISM: ICD-10-CM

## 2023-06-23 DIAGNOSIS — Z86.73 PERSONAL HISTORY OF TRANSIENT ISCHEMIC ATTACK (TIA), AND CEREBRAL INFARCTION WITHOUT RESIDUAL DEFICITS: ICD-10-CM

## 2023-06-23 DIAGNOSIS — Z79.01 LONG TERM (CURRENT) USE OF ANTICOAGULANTS: ICD-10-CM

## 2023-06-23 LAB
INR BLD: 1.22 (ref 0–1.3)
PROTHROMBIN TIME: 15.2 SECONDS (ref 12.5–14.5)

## 2023-06-23 PROCEDURE — 85610 PROTHROMBIN TIME: CPT | Performed by: INTERNAL MEDICINE

## 2023-07-03 ENCOUNTER — LAB REQUISITION (OUTPATIENT)
Dept: LAB | Facility: HOSPITAL | Age: 78
End: 2023-07-03
Payer: MEDICARE

## 2023-07-03 DIAGNOSIS — E46 UNSPECIFIED PROTEIN-CALORIE MALNUTRITION: ICD-10-CM

## 2023-07-03 DIAGNOSIS — K91.2 POSTSURGICAL MALABSORPTION, NOT ELSEWHERE CLASSIFIED: ICD-10-CM

## 2023-07-03 LAB
ALBUMIN SERPL-MCNC: 3.5 G/DL (ref 3.4–4.8)
ALBUMIN/GLOB SERPL: 1.1 RATIO (ref 1.1–2)
ALP SERPL-CCNC: 99 UNIT/L (ref 40–150)
ALT SERPL-CCNC: 34 UNIT/L (ref 0–55)
AST SERPL-CCNC: 36 UNIT/L (ref 5–34)
BASOPHILS # BLD AUTO: 0.02 X10(3)/MCL
BASOPHILS NFR BLD AUTO: 0.4 %
BILIRUBIN DIRECT+TOT PNL SERPL-MCNC: 0.6 MG/DL
BUN SERPL-MCNC: 19.6 MG/DL (ref 9.8–20.1)
CALCIUM SERPL-MCNC: 8.6 MG/DL (ref 8.4–10.2)
CHLORIDE SERPL-SCNC: 110 MMOL/L (ref 98–107)
CO2 SERPL-SCNC: 25 MMOL/L (ref 23–31)
CREAT SERPL-MCNC: 0.72 MG/DL (ref 0.55–1.02)
EOSINOPHIL # BLD AUTO: 0.18 X10(3)/MCL (ref 0–0.9)
EOSINOPHIL NFR BLD AUTO: 3.8 %
ERYTHROCYTE [DISTWIDTH] IN BLOOD BY AUTOMATED COUNT: 14.3 % (ref 11.5–17)
GFR SERPLBLD CREATININE-BSD FMLA CKD-EPI: >60 MLS/MIN/1.73/M2
GLOBULIN SER-MCNC: 3.1 GM/DL (ref 2.4–3.5)
GLUCOSE SERPL-MCNC: 103 MG/DL (ref 82–115)
HCT VFR BLD AUTO: 37.7 % (ref 37–47)
HGB BLD-MCNC: 12.9 G/DL (ref 12–16)
IMM GRANULOCYTES # BLD AUTO: 0.01 X10(3)/MCL (ref 0–0.04)
IMM GRANULOCYTES NFR BLD AUTO: 0.2 %
LYMPHOCYTES # BLD AUTO: 2.56 X10(3)/MCL (ref 0.6–4.6)
LYMPHOCYTES NFR BLD AUTO: 54.4 %
MAGNESIUM SERPL-MCNC: 1.9 MG/DL (ref 1.6–2.6)
MCH RBC QN AUTO: 30.5 PG (ref 27–31)
MCHC RBC AUTO-ENTMCNC: 34.2 G/DL (ref 33–36)
MCV RBC AUTO: 89.1 FL (ref 80–94)
MONOCYTES # BLD AUTO: 0.44 X10(3)/MCL (ref 0.1–1.3)
MONOCYTES NFR BLD AUTO: 9.3 %
NEUTROPHILS # BLD AUTO: 1.5 X10(3)/MCL (ref 2.1–9.2)
NEUTROPHILS NFR BLD AUTO: 31.9 %
NRBC BLD AUTO-RTO: 0 %
PHOSPHATE SERPL-MCNC: 3.6 MG/DL (ref 2.3–4.7)
PLATELET # BLD AUTO: 168 X10(3)/MCL (ref 130–400)
PMV BLD AUTO: 10.7 FL (ref 7.4–10.4)
POTASSIUM SERPL-SCNC: 4.1 MMOL/L (ref 3.5–5.1)
PROT SERPL-MCNC: 6.6 GM/DL (ref 5.8–7.6)
RBC # BLD AUTO: 4.23 X10(6)/MCL (ref 4.2–5.4)
SODIUM SERPL-SCNC: 144 MMOL/L (ref 136–145)
TRIGL SERPL-MCNC: 86 MG/DL (ref 37–140)
WBC # SPEC AUTO: 4.71 X10(3)/MCL (ref 4.5–11.5)

## 2023-07-03 PROCEDURE — 84100 ASSAY OF PHOSPHORUS: CPT | Performed by: INTERNAL MEDICINE

## 2023-07-03 PROCEDURE — 85025 COMPLETE CBC W/AUTO DIFF WBC: CPT | Performed by: INTERNAL MEDICINE

## 2023-07-03 PROCEDURE — 83735 ASSAY OF MAGNESIUM: CPT | Performed by: INTERNAL MEDICINE

## 2023-07-03 PROCEDURE — 84478 ASSAY OF TRIGLYCERIDES: CPT | Performed by: INTERNAL MEDICINE

## 2023-07-03 PROCEDURE — 80053 COMPREHEN METABOLIC PANEL: CPT | Performed by: INTERNAL MEDICINE

## 2023-07-17 ENCOUNTER — LAB REQUISITION (OUTPATIENT)
Dept: LAB | Facility: HOSPITAL | Age: 78
End: 2023-07-17
Payer: MEDICARE

## 2023-07-17 DIAGNOSIS — K91.2 POSTSURGICAL MALABSORPTION, NOT ELSEWHERE CLASSIFIED: ICD-10-CM

## 2023-07-17 LAB
ALBUMIN SERPL-MCNC: 3.6 G/DL (ref 3.4–4.8)
ALBUMIN/GLOB SERPL: 1.1 RATIO (ref 1.1–2)
ALP SERPL-CCNC: 93 UNIT/L (ref 40–150)
ALT SERPL-CCNC: 29 UNIT/L (ref 0–55)
AST SERPL-CCNC: 33 UNIT/L (ref 5–34)
BASOPHILS # BLD AUTO: 0.02 X10(3)/MCL
BASOPHILS NFR BLD AUTO: 0.4 %
BILIRUBIN DIRECT+TOT PNL SERPL-MCNC: 0.6 MG/DL
BUN SERPL-MCNC: 23.3 MG/DL (ref 9.8–20.1)
CALCIUM SERPL-MCNC: 8.8 MG/DL (ref 8.4–10.2)
CHLORIDE SERPL-SCNC: 106 MMOL/L (ref 98–107)
CO2 SERPL-SCNC: 24 MMOL/L (ref 23–31)
CREAT SERPL-MCNC: 0.71 MG/DL (ref 0.55–1.02)
CRP SERPL-MCNC: 5.5 MG/L
EOSINOPHIL # BLD AUTO: 0.21 X10(3)/MCL (ref 0–0.9)
EOSINOPHIL NFR BLD AUTO: 4.7 %
ERYTHROCYTE [DISTWIDTH] IN BLOOD BY AUTOMATED COUNT: 14.2 % (ref 11.5–17)
ERYTHROCYTE [SEDIMENTATION RATE] IN BLOOD: 24 MM/HR (ref 0–20)
GFR SERPLBLD CREATININE-BSD FMLA CKD-EPI: >60 MLS/MIN/1.73/M2
GLOBULIN SER-MCNC: 3.3 GM/DL (ref 2.4–3.5)
GLUCOSE SERPL-MCNC: 99 MG/DL (ref 82–115)
HCT VFR BLD AUTO: 37.9 % (ref 37–47)
HGB BLD-MCNC: 12.6 G/DL (ref 12–16)
IMM GRANULOCYTES # BLD AUTO: 0.01 X10(3)/MCL (ref 0–0.04)
IMM GRANULOCYTES NFR BLD AUTO: 0.2 %
LYMPHOCYTES # BLD AUTO: 2.18 X10(3)/MCL (ref 0.6–4.6)
LYMPHOCYTES NFR BLD AUTO: 48.8 %
MCH RBC QN AUTO: 29.2 PG (ref 27–31)
MCHC RBC AUTO-ENTMCNC: 33.2 G/DL (ref 33–36)
MCV RBC AUTO: 87.9 FL (ref 80–94)
MONOCYTES # BLD AUTO: 0.46 X10(3)/MCL (ref 0.1–1.3)
MONOCYTES NFR BLD AUTO: 10.3 %
NEUTROPHILS # BLD AUTO: 1.59 X10(3)/MCL (ref 2.1–9.2)
NEUTROPHILS NFR BLD AUTO: 35.6 %
NRBC BLD AUTO-RTO: 0 %
PLATELET # BLD AUTO: 149 X10(3)/MCL (ref 130–400)
PMV BLD AUTO: 10.3 FL (ref 7.4–10.4)
POTASSIUM SERPL-SCNC: 4 MMOL/L (ref 3.5–5.1)
PROT SERPL-MCNC: 6.9 GM/DL (ref 5.8–7.6)
RBC # BLD AUTO: 4.31 X10(6)/MCL (ref 4.2–5.4)
SODIUM SERPL-SCNC: 141 MMOL/L (ref 136–145)
WBC # SPEC AUTO: 4.47 X10(3)/MCL (ref 4.5–11.5)

## 2023-07-17 PROCEDURE — 85025 COMPLETE CBC W/AUTO DIFF WBC: CPT | Performed by: INTERNAL MEDICINE

## 2023-07-17 PROCEDURE — 80053 COMPREHEN METABOLIC PANEL: CPT | Performed by: INTERNAL MEDICINE

## 2023-07-17 PROCEDURE — 86140 C-REACTIVE PROTEIN: CPT | Performed by: INTERNAL MEDICINE

## 2023-07-17 PROCEDURE — 85652 RBC SED RATE AUTOMATED: CPT | Performed by: INTERNAL MEDICINE

## 2023-07-24 ENCOUNTER — LAB REQUISITION (OUTPATIENT)
Dept: LAB | Facility: HOSPITAL | Age: 78
End: 2023-07-24
Payer: MEDICARE

## 2023-07-24 DIAGNOSIS — K91.2 POSTSURGICAL MALABSORPTION, NOT ELSEWHERE CLASSIFIED: ICD-10-CM

## 2023-07-24 LAB
ALBUMIN SERPL-MCNC: 3.4 G/DL (ref 3.4–4.8)
ALBUMIN/GLOB SERPL: 1.1 RATIO (ref 1.1–2)
ALP SERPL-CCNC: 90 UNIT/L (ref 40–150)
ALT SERPL-CCNC: 29 UNIT/L (ref 0–55)
AST SERPL-CCNC: 35 UNIT/L (ref 5–34)
BASOPHILS # BLD AUTO: 0.03 X10(3)/MCL
BASOPHILS NFR BLD AUTO: 0.6 %
BILIRUBIN DIRECT+TOT PNL SERPL-MCNC: 0.6 MG/DL
BUN SERPL-MCNC: 26.3 MG/DL (ref 9.8–20.1)
CALCIUM SERPL-MCNC: 8.7 MG/DL (ref 8.4–10.2)
CHLORIDE SERPL-SCNC: 112 MMOL/L (ref 98–107)
CO2 SERPL-SCNC: 25 MMOL/L (ref 23–31)
CREAT SERPL-MCNC: 0.74 MG/DL (ref 0.55–1.02)
EOSINOPHIL # BLD AUTO: 0.19 X10(3)/MCL (ref 0–0.9)
EOSINOPHIL NFR BLD AUTO: 3.7 %
ERYTHROCYTE [DISTWIDTH] IN BLOOD BY AUTOMATED COUNT: 14.6 % (ref 11.5–17)
GFR SERPLBLD CREATININE-BSD FMLA CKD-EPI: >60 MLS/MIN/1.73/M2
GLOBULIN SER-MCNC: 3.1 GM/DL (ref 2.4–3.5)
GLUCOSE SERPL-MCNC: 95 MG/DL (ref 82–115)
HCT VFR BLD AUTO: 38.7 % (ref 37–47)
HGB BLD-MCNC: 12.5 G/DL (ref 12–16)
IMM GRANULOCYTES # BLD AUTO: 0.01 X10(3)/MCL (ref 0–0.04)
IMM GRANULOCYTES NFR BLD AUTO: 0.2 %
LYMPHOCYTES # BLD AUTO: 2.59 X10(3)/MCL (ref 0.6–4.6)
LYMPHOCYTES NFR BLD AUTO: 50.6 %
MAGNESIUM SERPL-MCNC: 1.9 MG/DL (ref 1.6–2.6)
MCH RBC QN AUTO: 29.1 PG (ref 27–31)
MCHC RBC AUTO-ENTMCNC: 32.3 G/DL (ref 33–36)
MCV RBC AUTO: 90 FL (ref 80–94)
MONOCYTES # BLD AUTO: 0.53 X10(3)/MCL (ref 0.1–1.3)
MONOCYTES NFR BLD AUTO: 10.4 %
NEUTROPHILS # BLD AUTO: 1.77 X10(3)/MCL (ref 2.1–9.2)
NEUTROPHILS NFR BLD AUTO: 34.5 %
NRBC BLD AUTO-RTO: 0 %
PHOSPHATE SERPL-MCNC: 3.8 MG/DL (ref 2.3–4.7)
PLATELET # BLD AUTO: 160 X10(3)/MCL (ref 130–400)
PMV BLD AUTO: 10.2 FL (ref 7.4–10.4)
POTASSIUM SERPL-SCNC: 4.3 MMOL/L (ref 3.5–5.1)
PROT SERPL-MCNC: 6.5 GM/DL (ref 5.8–7.6)
RBC # BLD AUTO: 4.3 X10(6)/MCL (ref 4.2–5.4)
SODIUM SERPL-SCNC: 146 MMOL/L (ref 136–145)
TRIGL SERPL-MCNC: 70 MG/DL (ref 37–140)
WBC # SPEC AUTO: 5.12 X10(3)/MCL (ref 4.5–11.5)

## 2023-07-24 PROCEDURE — 85025 COMPLETE CBC W/AUTO DIFF WBC: CPT | Performed by: INTERNAL MEDICINE

## 2023-07-24 PROCEDURE — 80053 COMPREHEN METABOLIC PANEL: CPT | Performed by: INTERNAL MEDICINE

## 2023-07-24 PROCEDURE — 83735 ASSAY OF MAGNESIUM: CPT | Performed by: INTERNAL MEDICINE

## 2023-07-24 PROCEDURE — 84478 ASSAY OF TRIGLYCERIDES: CPT | Performed by: INTERNAL MEDICINE

## 2023-07-24 PROCEDURE — 84100 ASSAY OF PHOSPHORUS: CPT | Performed by: INTERNAL MEDICINE

## 2023-08-14 ENCOUNTER — LAB REQUISITION (OUTPATIENT)
Dept: LAB | Facility: HOSPITAL | Age: 78
End: 2023-08-14
Payer: MEDICARE

## 2023-08-14 DIAGNOSIS — K91.2 POSTSURGICAL MALABSORPTION, NOT ELSEWHERE CLASSIFIED: ICD-10-CM

## 2023-08-14 DIAGNOSIS — Z86.718 PERSONAL HISTORY OF OTHER VENOUS THROMBOSIS AND EMBOLISM: ICD-10-CM

## 2023-08-14 LAB
ALBUMIN SERPL-MCNC: 3.5 G/DL (ref 3.4–4.8)
ALBUMIN/GLOB SERPL: 1.2 RATIO (ref 1.1–2)
ALP SERPL-CCNC: 80 UNIT/L (ref 40–150)
ALT SERPL-CCNC: 30 UNIT/L (ref 0–55)
AST SERPL-CCNC: 35 UNIT/L (ref 5–34)
BASOPHILS # BLD AUTO: 0.03 X10(3)/MCL
BASOPHILS NFR BLD AUTO: 0.6 %
BILIRUB SERPL-MCNC: 0.6 MG/DL
BUN SERPL-MCNC: 16.6 MG/DL (ref 9.8–20.1)
CALCIUM SERPL-MCNC: 8.7 MG/DL (ref 8.4–10.2)
CHLORIDE SERPL-SCNC: 111 MMOL/L (ref 98–107)
CO2 SERPL-SCNC: 22 MMOL/L (ref 23–31)
CREAT SERPL-MCNC: 0.74 MG/DL (ref 0.55–1.02)
EOSINOPHIL # BLD AUTO: 0.17 X10(3)/MCL (ref 0–0.9)
EOSINOPHIL NFR BLD AUTO: 3.3 %
ERYTHROCYTE [DISTWIDTH] IN BLOOD BY AUTOMATED COUNT: 14.8 % (ref 11.5–17)
GFR SERPLBLD CREATININE-BSD FMLA CKD-EPI: >60 MLS/MIN/1.73/M2
GLOBULIN SER-MCNC: 3 GM/DL (ref 2.4–3.5)
GLUCOSE SERPL-MCNC: 95 MG/DL (ref 82–115)
HCT VFR BLD AUTO: 37 % (ref 37–47)
HGB BLD-MCNC: 11.9 G/DL (ref 12–16)
IMM GRANULOCYTES # BLD AUTO: 0.01 X10(3)/MCL (ref 0–0.04)
IMM GRANULOCYTES NFR BLD AUTO: 0.2 %
INR PPP: 2.1
LYMPHOCYTES # BLD AUTO: 2.43 X10(3)/MCL (ref 0.6–4.6)
LYMPHOCYTES NFR BLD AUTO: 47.5 %
MAGNESIUM SERPL-MCNC: 1.8 MG/DL (ref 1.6–2.6)
MCH RBC QN AUTO: 28.6 PG (ref 27–31)
MCHC RBC AUTO-ENTMCNC: 32.2 G/DL (ref 33–36)
MCV RBC AUTO: 88.9 FL (ref 80–94)
MONOCYTES # BLD AUTO: 0.54 X10(3)/MCL (ref 0.1–1.3)
MONOCYTES NFR BLD AUTO: 10.5 %
NEUTROPHILS # BLD AUTO: 1.94 X10(3)/MCL (ref 2.1–9.2)
NEUTROPHILS NFR BLD AUTO: 37.9 %
NRBC BLD AUTO-RTO: 0 %
PHOSPHATE SERPL-MCNC: 4.3 MG/DL (ref 2.3–4.7)
PLATELET # BLD AUTO: 141 X10(3)/MCL (ref 130–400)
PMV BLD AUTO: 10.5 FL (ref 7.4–10.4)
POTASSIUM SERPL-SCNC: 4.3 MMOL/L (ref 3.5–5.1)
PROT SERPL-MCNC: 6.5 GM/DL (ref 5.8–7.6)
PROTHROMBIN TIME: 23.5 SECONDS (ref 12.5–14.5)
RBC # BLD AUTO: 4.16 X10(6)/MCL (ref 4.2–5.4)
SODIUM SERPL-SCNC: 141 MMOL/L (ref 136–145)
TRIGL SERPL-MCNC: 94 MG/DL (ref 37–140)
WBC # SPEC AUTO: 5.12 X10(3)/MCL (ref 4.5–11.5)

## 2023-08-14 PROCEDURE — 80053 COMPREHEN METABOLIC PANEL: CPT | Performed by: INTERNAL MEDICINE

## 2023-08-14 PROCEDURE — 84478 ASSAY OF TRIGLYCERIDES: CPT | Performed by: INTERNAL MEDICINE

## 2023-08-14 PROCEDURE — 85025 COMPLETE CBC W/AUTO DIFF WBC: CPT | Performed by: INTERNAL MEDICINE

## 2023-08-14 PROCEDURE — 83735 ASSAY OF MAGNESIUM: CPT | Performed by: INTERNAL MEDICINE

## 2023-08-14 PROCEDURE — 85610 PROTHROMBIN TIME: CPT | Performed by: INTERNAL MEDICINE

## 2023-08-14 PROCEDURE — 84100 ASSAY OF PHOSPHORUS: CPT | Performed by: INTERNAL MEDICINE

## 2023-09-01 ENCOUNTER — DOCUMENT SCAN (OUTPATIENT)
Dept: HOME HEALTH SERVICES | Facility: HOSPITAL | Age: 78
End: 2023-09-01
Payer: MEDICARE

## 2023-09-03 PROBLEM — N32.81 OVERACTIVE BLADDER: Status: ACTIVE | Noted: 2023-09-03

## 2023-09-05 ENCOUNTER — LAB REQUISITION (OUTPATIENT)
Dept: LAB | Facility: HOSPITAL | Age: 78
End: 2023-09-05
Payer: MEDICARE

## 2023-09-05 DIAGNOSIS — K91.2 POSTSURGICAL MALABSORPTION, NOT ELSEWHERE CLASSIFIED: ICD-10-CM

## 2023-09-05 DIAGNOSIS — I87.2 VENOUS INSUFFICIENCY (CHRONIC) (PERIPHERAL): ICD-10-CM

## 2023-09-05 PROBLEM — K74.60 HEPATIC CIRRHOSIS: Status: ACTIVE | Noted: 2023-09-05

## 2023-09-05 LAB
ALBUMIN SERPL-MCNC: 3.6 G/DL (ref 3.4–4.8)
ALBUMIN/GLOB SERPL: 1.1 RATIO (ref 1.1–2)
ALP SERPL-CCNC: 78 UNIT/L (ref 40–150)
ALT SERPL-CCNC: 39 UNIT/L (ref 0–55)
AST SERPL-CCNC: 39 UNIT/L (ref 5–34)
BASOPHILS # BLD AUTO: 0.04 X10(3)/MCL
BASOPHILS NFR BLD AUTO: 0.7 %
BILIRUB SERPL-MCNC: 0.6 MG/DL
BUN SERPL-MCNC: 25.3 MG/DL (ref 9.8–20.1)
CALCIUM SERPL-MCNC: 9.2 MG/DL (ref 8.4–10.2)
CHLORIDE SERPL-SCNC: 106 MMOL/L (ref 98–107)
CO2 SERPL-SCNC: 24 MMOL/L (ref 23–31)
CREAT SERPL-MCNC: 0.67 MG/DL (ref 0.55–1.02)
EOSINOPHIL # BLD AUTO: 0.2 X10(3)/MCL (ref 0–0.9)
EOSINOPHIL NFR BLD AUTO: 3.6 %
ERYTHROCYTE [DISTWIDTH] IN BLOOD BY AUTOMATED COUNT: 13.8 % (ref 11.5–17)
GFR SERPLBLD CREATININE-BSD FMLA CKD-EPI: >60 MLS/MIN/1.73/M2
GLOBULIN SER-MCNC: 3.4 GM/DL (ref 2.4–3.5)
GLUCOSE SERPL-MCNC: 115 MG/DL (ref 82–115)
HCT VFR BLD AUTO: 41 % (ref 37–47)
HGB BLD-MCNC: 13.1 G/DL (ref 12–16)
IMM GRANULOCYTES # BLD AUTO: 0.01 X10(3)/MCL (ref 0–0.04)
IMM GRANULOCYTES NFR BLD AUTO: 0.2 %
INR PPP: 2.5
LYMPHOCYTES # BLD AUTO: 2.04 X10(3)/MCL (ref 0.6–4.6)
LYMPHOCYTES NFR BLD AUTO: 36.7 %
MAGNESIUM SERPL-MCNC: 1.9 MG/DL (ref 1.6–2.6)
MCH RBC QN AUTO: 28.8 PG (ref 27–31)
MCHC RBC AUTO-ENTMCNC: 32 G/DL (ref 33–36)
MCV RBC AUTO: 90.1 FL (ref 80–94)
MONOCYTES # BLD AUTO: 0.57 X10(3)/MCL (ref 0.1–1.3)
MONOCYTES NFR BLD AUTO: 10.3 %
NEUTROPHILS # BLD AUTO: 2.7 X10(3)/MCL (ref 2.1–9.2)
NEUTROPHILS NFR BLD AUTO: 48.5 %
NRBC BLD AUTO-RTO: 0 %
PHOSPHATE SERPL-MCNC: 4.8 MG/DL (ref 2.3–4.7)
PLATELET # BLD AUTO: 175 X10(3)/MCL (ref 130–400)
PMV BLD AUTO: 10.2 FL (ref 7.4–10.4)
POTASSIUM SERPL-SCNC: 4.5 MMOL/L (ref 3.5–5.1)
PROT SERPL-MCNC: 7 GM/DL (ref 5.8–7.6)
PROTHROMBIN TIME: 26.5 SECONDS (ref 12.5–14.5)
RBC # BLD AUTO: 4.55 X10(6)/MCL (ref 4.2–5.4)
SODIUM SERPL-SCNC: 138 MMOL/L (ref 136–145)
TRIGL SERPL-MCNC: 70 MG/DL (ref 37–140)
WBC # SPEC AUTO: 5.56 X10(3)/MCL (ref 4.5–11.5)

## 2023-09-05 PROCEDURE — 80053 COMPREHEN METABOLIC PANEL: CPT | Performed by: INTERNAL MEDICINE

## 2023-09-05 PROCEDURE — 84100 ASSAY OF PHOSPHORUS: CPT | Performed by: INTERNAL MEDICINE

## 2023-09-05 PROCEDURE — 83735 ASSAY OF MAGNESIUM: CPT | Performed by: INTERNAL MEDICINE

## 2023-09-05 PROCEDURE — 85025 COMPLETE CBC W/AUTO DIFF WBC: CPT | Performed by: INTERNAL MEDICINE

## 2023-09-05 PROCEDURE — 85610 PROTHROMBIN TIME: CPT | Performed by: INTERNAL MEDICINE

## 2023-09-05 PROCEDURE — 84478 ASSAY OF TRIGLYCERIDES: CPT | Performed by: INTERNAL MEDICINE

## 2023-09-22 ENCOUNTER — LAB REQUISITION (OUTPATIENT)
Dept: LAB | Facility: HOSPITAL | Age: 78
End: 2023-09-22
Payer: MEDICARE

## 2023-09-22 DIAGNOSIS — I87.2 VENOUS INSUFFICIENCY (CHRONIC) (PERIPHERAL): ICD-10-CM

## 2023-09-22 DIAGNOSIS — K91.2 POSTSURGICAL MALABSORPTION, NOT ELSEWHERE CLASSIFIED: ICD-10-CM

## 2023-09-22 PROCEDURE — 82495 ASSAY OF CHROMIUM: CPT | Performed by: INTERNAL MEDICINE

## 2023-09-22 PROCEDURE — 84630 ASSAY OF ZINC: CPT | Performed by: INTERNAL MEDICINE

## 2023-09-22 PROCEDURE — 82525 ASSAY OF COPPER: CPT | Performed by: INTERNAL MEDICINE

## 2023-09-22 PROCEDURE — 82725 ASSAY OF BLOOD FATTY ACIDS: CPT | Performed by: INTERNAL MEDICINE

## 2023-09-25 ENCOUNTER — LAB REQUISITION (OUTPATIENT)
Dept: LAB | Facility: HOSPITAL | Age: 78
End: 2023-09-25
Payer: MEDICARE

## 2023-09-25 DIAGNOSIS — K91.2 POSTSURGICAL MALABSORPTION, NOT ELSEWHERE CLASSIFIED: ICD-10-CM

## 2023-09-25 DIAGNOSIS — I87.2 VENOUS INSUFFICIENCY (CHRONIC) (PERIPHERAL): ICD-10-CM

## 2023-09-25 LAB
DEPRECATED CALCIDIOL+CALCIFEROL SERPL-MC: 19.9 NG/ML (ref 30–80)
VIT B12 SERPL-MCNC: 614 PG/ML (ref 213–816)

## 2023-09-25 PROCEDURE — 84590 ASSAY OF VITAMIN A: CPT | Performed by: INTERNAL MEDICINE

## 2023-09-25 PROCEDURE — 82525 ASSAY OF COPPER: CPT | Performed by: INTERNAL MEDICINE

## 2023-09-25 PROCEDURE — 83921 ORGANIC ACID SINGLE QUANT: CPT | Performed by: INTERNAL MEDICINE

## 2023-09-25 PROCEDURE — 82306 VITAMIN D 25 HYDROXY: CPT | Performed by: INTERNAL MEDICINE

## 2023-09-25 PROCEDURE — 84446 ASSAY OF VITAMIN E: CPT | Performed by: INTERNAL MEDICINE

## 2023-09-25 PROCEDURE — 82607 VITAMIN B-12: CPT | Performed by: INTERNAL MEDICINE

## 2023-09-25 PROCEDURE — 82495 ASSAY OF CHROMIUM: CPT | Performed by: INTERNAL MEDICINE

## 2023-09-26 ENCOUNTER — DOCUMENT SCAN (OUTPATIENT)
Dept: HOME HEALTH SERVICES | Facility: HOSPITAL | Age: 78
End: 2023-09-26
Payer: MEDICARE

## 2023-09-27 LAB
CR SERPL-MCNC: 1.3 NG/ML
MAYO GENERIC ORDERABLE RESULT: ABNORMAL

## 2023-09-28 LAB
A-TOCOPHEROL VIT E SERPL-MCNC: 9.6 MG/L (ref 5.5–17)
COPPER SERPL-MCNC: 111 MCG/DL (ref 77–206)

## 2023-09-29 ENCOUNTER — LAB VISIT (OUTPATIENT)
Dept: LAB | Facility: HOSPITAL | Age: 78
End: 2023-09-29
Attending: INTERNAL MEDICINE
Payer: MEDICARE

## 2023-09-29 DIAGNOSIS — K91.2 HYPOGLYCEMIA FOLLOWING GASTROINTESTINAL SURGERY: Primary | ICD-10-CM

## 2023-09-29 DIAGNOSIS — K74.60 HEPATIC CIRRHOSIS, UNSPECIFIED HEPATIC CIRRHOSIS TYPE, UNSPECIFIED WHETHER ASCITES PRESENT: ICD-10-CM

## 2023-09-29 LAB
ALBUMIN SERPL-MCNC: 3.5 G/DL (ref 3.4–4.8)
ALBUMIN/GLOB SERPL: 1.1 RATIO (ref 1.1–2)
ALP SERPL-CCNC: 70 UNIT/L (ref 40–150)
ALT SERPL-CCNC: 34 UNIT/L (ref 0–55)
AST SERPL-CCNC: 37 UNIT/L (ref 5–34)
BASOPHILS # BLD AUTO: 0.03 X10(3)/MCL
BASOPHILS NFR BLD AUTO: 0.6 %
BILIRUB SERPL-MCNC: 0.5 MG/DL
BUN SERPL-MCNC: 20.5 MG/DL (ref 9.8–20.1)
CALCIUM SERPL-MCNC: 8.9 MG/DL (ref 8.4–10.2)
CHLORIDE SERPL-SCNC: 110 MMOL/L (ref 98–107)
CO2 SERPL-SCNC: 23 MMOL/L (ref 23–31)
CREAT SERPL-MCNC: 0.64 MG/DL (ref 0.55–1.02)
EOSINOPHIL # BLD AUTO: 0.25 X10(3)/MCL (ref 0–0.9)
EOSINOPHIL NFR BLD AUTO: 5 %
ERYTHROCYTE [DISTWIDTH] IN BLOOD BY AUTOMATED COUNT: 14.2 % (ref 11.5–17)
GFR SERPLBLD CREATININE-BSD FMLA CKD-EPI: >60 MLS/MIN/1.73/M2
GLOBULIN SER-MCNC: 3.2 GM/DL (ref 2.4–3.5)
GLUCOSE SERPL-MCNC: 90 MG/DL (ref 82–115)
HCT VFR BLD AUTO: 38.8 % (ref 37–47)
HGB BLD-MCNC: 12.2 G/DL (ref 12–16)
IMM GRANULOCYTES # BLD AUTO: 0.01 X10(3)/MCL (ref 0–0.04)
IMM GRANULOCYTES NFR BLD AUTO: 0.2 %
INR PPP: 2.2
LYMPHOCYTES # BLD AUTO: 1.66 X10(3)/MCL (ref 0.6–4.6)
LYMPHOCYTES NFR BLD AUTO: 32.9 %
MAGNESIUM SERPL-MCNC: 2.2 MG/DL (ref 1.6–2.6)
MCH RBC QN AUTO: 28.8 PG (ref 27–31)
MCHC RBC AUTO-ENTMCNC: 31.4 G/DL (ref 33–36)
MCV RBC AUTO: 91.5 FL (ref 80–94)
METHYLMALONATE SERPL-SCNC: 0.93 NMOL/ML
MONOCYTES # BLD AUTO: 0.55 X10(3)/MCL (ref 0.1–1.3)
MONOCYTES NFR BLD AUTO: 10.9 %
NEUTROPHILS # BLD AUTO: 2.55 X10(3)/MCL (ref 2.1–9.2)
NEUTROPHILS NFR BLD AUTO: 50.4 %
NRBC BLD AUTO-RTO: 0 %
PHOSPHATE SERPL-MCNC: 3.3 MG/DL (ref 2.3–4.7)
PLATELET # BLD AUTO: 154 X10(3)/MCL (ref 130–400)
PMV BLD AUTO: 9.8 FL (ref 7.4–10.4)
POTASSIUM SERPL-SCNC: 4.7 MMOL/L (ref 3.5–5.1)
PROT SERPL-MCNC: 6.7 GM/DL (ref 5.8–7.6)
PROTHROMBIN TIME: 23.9 SECONDS (ref 12.5–14.5)
RBC # BLD AUTO: 4.24 X10(6)/MCL (ref 4.2–5.4)
SODIUM SERPL-SCNC: 139 MMOL/L (ref 136–145)
TRIGL SERPL-MCNC: 88 MG/DL (ref 37–140)
VIT A SERPL-MCNC: 33.2 MCG/DL (ref 32.5–78)
WBC # SPEC AUTO: 5.05 X10(3)/MCL (ref 4.5–11.5)

## 2023-09-29 PROCEDURE — 85610 PROTHROMBIN TIME: CPT

## 2023-09-29 PROCEDURE — 84100 ASSAY OF PHOSPHORUS: CPT

## 2023-09-29 PROCEDURE — 36415 COLL VENOUS BLD VENIPUNCTURE: CPT

## 2023-09-29 PROCEDURE — 85025 COMPLETE CBC W/AUTO DIFF WBC: CPT

## 2023-09-29 PROCEDURE — 80053 COMPREHEN METABOLIC PANEL: CPT

## 2023-09-29 PROCEDURE — 83735 ASSAY OF MAGNESIUM: CPT

## 2023-09-29 PROCEDURE — 84478 ASSAY OF TRIGLYCERIDES: CPT

## 2023-10-09 ENCOUNTER — DOCUMENT SCAN (OUTPATIENT)
Dept: HOME HEALTH SERVICES | Facility: HOSPITAL | Age: 78
End: 2023-10-09
Payer: MEDICARE

## 2023-10-23 ENCOUNTER — LAB REQUISITION (OUTPATIENT)
Dept: LAB | Facility: HOSPITAL | Age: 78
End: 2023-10-23
Payer: MEDICARE

## 2023-10-23 DIAGNOSIS — I87.2 VENOUS INSUFFICIENCY (CHRONIC) (PERIPHERAL): ICD-10-CM

## 2023-10-23 DIAGNOSIS — K91.2 POSTSURGICAL MALABSORPTION, NOT ELSEWHERE CLASSIFIED: ICD-10-CM

## 2023-10-23 LAB
ALBUMIN SERPL-MCNC: 3.5 G/DL (ref 3.4–4.8)
ALBUMIN/GLOB SERPL: 1.1 RATIO (ref 1.1–2)
ALP SERPL-CCNC: 65 UNIT/L (ref 40–150)
ALT SERPL-CCNC: 30 UNIT/L (ref 0–55)
AST SERPL-CCNC: 32 UNIT/L (ref 5–34)
BASOPHILS # BLD AUTO: 0.03 X10(3)/MCL
BASOPHILS NFR BLD AUTO: 0.6 %
BILIRUB SERPL-MCNC: 0.6 MG/DL
BUN SERPL-MCNC: 22.9 MG/DL (ref 9.8–20.1)
CALCIUM SERPL-MCNC: 8.8 MG/DL (ref 8.4–10.2)
CHLORIDE SERPL-SCNC: 110 MMOL/L (ref 98–107)
CO2 SERPL-SCNC: 24 MMOL/L (ref 23–31)
CREAT SERPL-MCNC: 0.74 MG/DL (ref 0.55–1.02)
EOSINOPHIL # BLD AUTO: 0.22 X10(3)/MCL (ref 0–0.9)
EOSINOPHIL NFR BLD AUTO: 4.3 %
ERYTHROCYTE [DISTWIDTH] IN BLOOD BY AUTOMATED COUNT: 14.3 % (ref 11.5–17)
GFR SERPLBLD CREATININE-BSD FMLA CKD-EPI: >60 MLS/MIN/1.73/M2
GLOBULIN SER-MCNC: 3.1 GM/DL (ref 2.4–3.5)
GLUCOSE SERPL-MCNC: 114 MG/DL (ref 82–115)
HCT VFR BLD AUTO: 37 % (ref 37–47)
HGB BLD-MCNC: 11.8 G/DL (ref 12–16)
IMM GRANULOCYTES # BLD AUTO: 0.01 X10(3)/MCL (ref 0–0.04)
IMM GRANULOCYTES NFR BLD AUTO: 0.2 %
LYMPHOCYTES # BLD AUTO: 2.15 X10(3)/MCL (ref 0.6–4.6)
LYMPHOCYTES NFR BLD AUTO: 42.2 %
MAGNESIUM SERPL-MCNC: 1.9 MG/DL (ref 1.6–2.6)
MCH RBC QN AUTO: 28.9 PG (ref 27–31)
MCHC RBC AUTO-ENTMCNC: 31.9 G/DL (ref 33–36)
MCV RBC AUTO: 90.5 FL (ref 80–94)
MONOCYTES # BLD AUTO: 0.5 X10(3)/MCL (ref 0.1–1.3)
MONOCYTES NFR BLD AUTO: 9.8 %
NEUTROPHILS # BLD AUTO: 2.19 X10(3)/MCL (ref 2.1–9.2)
NEUTROPHILS NFR BLD AUTO: 42.9 %
NRBC BLD AUTO-RTO: 0 %
PHOSPHATE SERPL-MCNC: 3.9 MG/DL (ref 2.3–4.7)
PLATELET # BLD AUTO: 138 X10(3)/MCL (ref 130–400)
PMV BLD AUTO: 10.8 FL (ref 7.4–10.4)
POTASSIUM SERPL-SCNC: 4.3 MMOL/L (ref 3.5–5.1)
PROT SERPL-MCNC: 6.6 GM/DL (ref 5.8–7.6)
RBC # BLD AUTO: 4.09 X10(6)/MCL (ref 4.2–5.4)
SODIUM SERPL-SCNC: 142 MMOL/L (ref 136–145)
TRIGL SERPL-MCNC: 90 MG/DL (ref 37–140)
WBC # SPEC AUTO: 5.1 X10(3)/MCL (ref 4.5–11.5)

## 2023-10-23 PROCEDURE — 85025 COMPLETE CBC W/AUTO DIFF WBC: CPT | Performed by: INTERNAL MEDICINE

## 2023-10-23 PROCEDURE — 83735 ASSAY OF MAGNESIUM: CPT | Performed by: INTERNAL MEDICINE

## 2023-10-23 PROCEDURE — 80053 COMPREHEN METABOLIC PANEL: CPT | Performed by: INTERNAL MEDICINE

## 2023-10-23 PROCEDURE — 84100 ASSAY OF PHOSPHORUS: CPT | Performed by: INTERNAL MEDICINE

## 2023-10-23 PROCEDURE — 84478 ASSAY OF TRIGLYCERIDES: CPT | Performed by: INTERNAL MEDICINE

## 2023-11-01 ENCOUNTER — EXTERNAL HOME HEALTH (OUTPATIENT)
Dept: HOME HEALTH SERVICES | Facility: HOSPITAL | Age: 78
End: 2023-11-01
Payer: MEDICARE

## 2023-11-27 ENCOUNTER — LAB REQUISITION (OUTPATIENT)
Dept: LAB | Facility: HOSPITAL | Age: 78
End: 2023-11-27
Payer: MEDICARE

## 2023-11-27 DIAGNOSIS — E46 UNSPECIFIED PROTEIN-CALORIE MALNUTRITION: ICD-10-CM

## 2023-11-27 DIAGNOSIS — K91.2 POSTSURGICAL MALABSORPTION, NOT ELSEWHERE CLASSIFIED: ICD-10-CM

## 2023-11-27 LAB
ALBUMIN SERPL-MCNC: 3.4 G/DL (ref 3.4–4.8)
ALBUMIN/GLOB SERPL: 1.1 RATIO (ref 1.1–2)
ALP SERPL-CCNC: 67 UNIT/L (ref 40–150)
ALT SERPL-CCNC: 31 UNIT/L (ref 0–55)
AST SERPL-CCNC: 36 UNIT/L (ref 5–34)
BASOPHILS # BLD AUTO: 0.03 X10(3)/MCL
BASOPHILS NFR BLD AUTO: 0.5 %
BILIRUB SERPL-MCNC: 0.5 MG/DL
BUN SERPL-MCNC: 20.9 MG/DL (ref 9.8–20.1)
CALCIUM SERPL-MCNC: 8.6 MG/DL (ref 8.4–10.2)
CHLORIDE SERPL-SCNC: 109 MMOL/L (ref 98–107)
CO2 SERPL-SCNC: 25 MMOL/L (ref 23–31)
CREAT SERPL-MCNC: 0.68 MG/DL (ref 0.55–1.02)
EOSINOPHIL # BLD AUTO: 0.15 X10(3)/MCL (ref 0–0.9)
EOSINOPHIL NFR BLD AUTO: 2.6 %
ERYTHROCYTE [DISTWIDTH] IN BLOOD BY AUTOMATED COUNT: 14.2 % (ref 11.5–17)
GFR SERPLBLD CREATININE-BSD FMLA CKD-EPI: >60 MLS/MIN/1.73/M2
GLOBULIN SER-MCNC: 3.1 GM/DL (ref 2.4–3.5)
GLUCOSE SERPL-MCNC: 85 MG/DL (ref 82–115)
HCT VFR BLD AUTO: 38.4 % (ref 37–47)
HGB BLD-MCNC: 12.3 G/DL (ref 12–16)
IMM GRANULOCYTES # BLD AUTO: 0.01 X10(3)/MCL (ref 0–0.04)
IMM GRANULOCYTES NFR BLD AUTO: 0.2 %
LYMPHOCYTES # BLD AUTO: 2.42 X10(3)/MCL (ref 0.6–4.6)
LYMPHOCYTES NFR BLD AUTO: 41.9 %
MAGNESIUM SERPL-MCNC: 1.9 MG/DL (ref 1.6–2.6)
MCH RBC QN AUTO: 29 PG (ref 27–31)
MCHC RBC AUTO-ENTMCNC: 32 G/DL (ref 33–36)
MCV RBC AUTO: 90.6 FL (ref 80–94)
MONOCYTES # BLD AUTO: 0.46 X10(3)/MCL (ref 0.1–1.3)
MONOCYTES NFR BLD AUTO: 8 %
NEUTROPHILS # BLD AUTO: 2.7 X10(3)/MCL (ref 2.1–9.2)
NEUTROPHILS NFR BLD AUTO: 46.8 %
NRBC BLD AUTO-RTO: 0 %
PHOSPHATE SERPL-MCNC: 3.4 MG/DL (ref 2.3–4.7)
PLATELET # BLD AUTO: 163 X10(3)/MCL (ref 130–400)
PMV BLD AUTO: 10.2 FL (ref 7.4–10.4)
POTASSIUM SERPL-SCNC: 4.6 MMOL/L (ref 3.5–5.1)
PROT SERPL-MCNC: 6.5 GM/DL (ref 5.8–7.6)
RBC # BLD AUTO: 4.24 X10(6)/MCL (ref 4.2–5.4)
SODIUM SERPL-SCNC: 142 MMOL/L (ref 136–145)
TRIGL SERPL-MCNC: 88 MG/DL (ref 37–140)
WBC # SPEC AUTO: 5.77 X10(3)/MCL (ref 4.5–11.5)

## 2023-11-27 PROCEDURE — 80053 COMPREHEN METABOLIC PANEL: CPT | Performed by: INTERNAL MEDICINE

## 2023-11-27 PROCEDURE — 84100 ASSAY OF PHOSPHORUS: CPT | Performed by: INTERNAL MEDICINE

## 2023-11-27 PROCEDURE — 85025 COMPLETE CBC W/AUTO DIFF WBC: CPT | Performed by: INTERNAL MEDICINE

## 2023-11-27 PROCEDURE — 84478 ASSAY OF TRIGLYCERIDES: CPT | Performed by: INTERNAL MEDICINE

## 2023-11-27 PROCEDURE — 83735 ASSAY OF MAGNESIUM: CPT | Performed by: INTERNAL MEDICINE

## 2023-12-24 ENCOUNTER — EXTERNAL HOME HEALTH (OUTPATIENT)
Dept: HOME HEALTH SERVICES | Facility: HOSPITAL | Age: 78
End: 2023-12-24
Payer: MEDICARE

## 2023-12-26 ENCOUNTER — LAB REQUISITION (OUTPATIENT)
Dept: LAB | Facility: HOSPITAL | Age: 78
End: 2023-12-26
Payer: MEDICARE

## 2023-12-26 DIAGNOSIS — E46 UNSPECIFIED PROTEIN-CALORIE MALNUTRITION: ICD-10-CM

## 2023-12-26 DIAGNOSIS — K91.2 POSTSURGICAL MALABSORPTION, NOT ELSEWHERE CLASSIFIED: ICD-10-CM

## 2023-12-26 LAB
ALBUMIN SERPL-MCNC: 3.3 G/DL (ref 3.4–4.8)
ALBUMIN/GLOB SERPL: 1 RATIO (ref 1.1–2)
ALP SERPL-CCNC: 102 UNIT/L (ref 40–150)
ALT SERPL-CCNC: 41 UNIT/L (ref 0–55)
AST SERPL-CCNC: 39 UNIT/L (ref 5–34)
BASOPHILS # BLD AUTO: 0.03 X10(3)/MCL
BASOPHILS NFR BLD AUTO: 0.4 %
BILIRUB SERPL-MCNC: 0.7 MG/DL
BUN SERPL-MCNC: 17.9 MG/DL (ref 9.8–20.1)
CALCIUM SERPL-MCNC: 8.7 MG/DL (ref 8.4–10.2)
CHLORIDE SERPL-SCNC: 107 MMOL/L (ref 98–107)
CO2 SERPL-SCNC: 24 MMOL/L (ref 23–31)
CREAT SERPL-MCNC: 0.66 MG/DL (ref 0.55–1.02)
EOSINOPHIL # BLD AUTO: 0.09 X10(3)/MCL (ref 0–0.9)
EOSINOPHIL NFR BLD AUTO: 1.1 %
ERYTHROCYTE [DISTWIDTH] IN BLOOD BY AUTOMATED COUNT: 14 % (ref 11.5–17)
GFR SERPLBLD CREATININE-BSD FMLA CKD-EPI: >60 MLS/MIN/1.73/M2
GLOBULIN SER-MCNC: 3.4 GM/DL (ref 2.4–3.5)
GLUCOSE SERPL-MCNC: 73 MG/DL (ref 82–115)
HCT VFR BLD AUTO: 39.1 % (ref 37–47)
HGB BLD-MCNC: 12.3 G/DL (ref 12–16)
IMM GRANULOCYTES # BLD AUTO: 0.03 X10(3)/MCL (ref 0–0.04)
IMM GRANULOCYTES NFR BLD AUTO: 0.4 %
LYMPHOCYTES # BLD AUTO: 1.68 X10(3)/MCL (ref 0.6–4.6)
LYMPHOCYTES NFR BLD AUTO: 20.1 %
MAGNESIUM SERPL-MCNC: 2.1 MG/DL (ref 1.6–2.6)
MCH RBC QN AUTO: 28.5 PG (ref 27–31)
MCHC RBC AUTO-ENTMCNC: 31.5 G/DL (ref 33–36)
MCV RBC AUTO: 90.7 FL (ref 80–94)
MONOCYTES # BLD AUTO: 0.67 X10(3)/MCL (ref 0.1–1.3)
MONOCYTES NFR BLD AUTO: 8 %
NEUTROPHILS # BLD AUTO: 5.85 X10(3)/MCL (ref 2.1–9.2)
NEUTROPHILS NFR BLD AUTO: 70 %
NRBC BLD AUTO-RTO: 0 %
PHOSPHATE SERPL-MCNC: 3.4 MG/DL (ref 2.3–4.7)
PLATELET # BLD AUTO: 165 X10(3)/MCL (ref 130–400)
PMV BLD AUTO: 9.7 FL (ref 7.4–10.4)
POTASSIUM SERPL-SCNC: 3.7 MMOL/L (ref 3.5–5.1)
PROT SERPL-MCNC: 6.7 GM/DL (ref 5.8–7.6)
RBC # BLD AUTO: 4.31 X10(6)/MCL (ref 4.2–5.4)
SODIUM SERPL-SCNC: 139 MMOL/L (ref 136–145)
TRIGL SERPL-MCNC: 88 MG/DL (ref 37–140)
WBC # SPEC AUTO: 8.35 X10(3)/MCL (ref 4.5–11.5)

## 2023-12-26 PROCEDURE — 84100 ASSAY OF PHOSPHORUS: CPT | Performed by: INTERNAL MEDICINE

## 2023-12-26 PROCEDURE — 80053 COMPREHEN METABOLIC PANEL: CPT | Performed by: INTERNAL MEDICINE

## 2023-12-26 PROCEDURE — 84478 ASSAY OF TRIGLYCERIDES: CPT | Performed by: INTERNAL MEDICINE

## 2023-12-26 PROCEDURE — 83735 ASSAY OF MAGNESIUM: CPT | Performed by: INTERNAL MEDICINE

## 2023-12-26 PROCEDURE — 85025 COMPLETE CBC W/AUTO DIFF WBC: CPT | Performed by: INTERNAL MEDICINE

## 2024-01-16 ENCOUNTER — LAB REQUISITION (OUTPATIENT)
Dept: LAB | Facility: HOSPITAL | Age: 79
End: 2024-01-16
Payer: MEDICARE

## 2024-01-16 DIAGNOSIS — K91.2 POSTSURGICAL MALABSORPTION, NOT ELSEWHERE CLASSIFIED: ICD-10-CM

## 2024-01-16 LAB — DEPRECATED CALCIDIOL+CALCIFEROL SERPL-MC: 26.2 NG/ML (ref 30–80)

## 2024-01-16 PROCEDURE — 82306 VITAMIN D 25 HYDROXY: CPT | Performed by: INTERNAL MEDICINE

## 2024-01-29 ENCOUNTER — LAB REQUISITION (OUTPATIENT)
Dept: LAB | Facility: HOSPITAL | Age: 79
End: 2024-01-29
Payer: MEDICARE

## 2024-01-29 DIAGNOSIS — K91.2 POSTSURGICAL MALABSORPTION, NOT ELSEWHERE CLASSIFIED: ICD-10-CM

## 2024-01-29 LAB
ALBUMIN SERPL-MCNC: 3 G/DL (ref 3.4–4.8)
ALBUMIN/GLOB SERPL: 0.9 RATIO (ref 1.1–2)
ALP SERPL-CCNC: 82 UNIT/L (ref 40–150)
ALT SERPL-CCNC: 25 UNIT/L (ref 0–55)
AST SERPL-CCNC: 30 UNIT/L (ref 5–34)
BASOPHILS # BLD AUTO: 0.02 X10(3)/MCL
BASOPHILS NFR BLD AUTO: 0.2 %
BILIRUB SERPL-MCNC: 0.7 MG/DL
BUN SERPL-MCNC: 21.3 MG/DL (ref 9.8–20.1)
CALCIUM SERPL-MCNC: 8.6 MG/DL (ref 8.4–10.2)
CHLORIDE SERPL-SCNC: 105 MMOL/L (ref 98–107)
CO2 SERPL-SCNC: 29 MMOL/L (ref 23–31)
CREAT SERPL-MCNC: 0.74 MG/DL (ref 0.55–1.02)
EOSINOPHIL # BLD AUTO: 0.08 X10(3)/MCL (ref 0–0.9)
EOSINOPHIL NFR BLD AUTO: 0.9 %
ERYTHROCYTE [DISTWIDTH] IN BLOOD BY AUTOMATED COUNT: 14.2 % (ref 11.5–17)
GFR SERPLBLD CREATININE-BSD FMLA CKD-EPI: >60 MLS/MIN/1.73/M2
GLOBULIN SER-MCNC: 3.3 GM/DL (ref 2.4–3.5)
GLUCOSE SERPL-MCNC: 137 MG/DL (ref 82–115)
HCT VFR BLD AUTO: 35.5 % (ref 37–47)
HGB BLD-MCNC: 11.4 G/DL (ref 12–16)
IMM GRANULOCYTES # BLD AUTO: 0.02 X10(3)/MCL (ref 0–0.04)
IMM GRANULOCYTES NFR BLD AUTO: 0.2 %
LYMPHOCYTES # BLD AUTO: 1.37 X10(3)/MCL (ref 0.6–4.6)
LYMPHOCYTES NFR BLD AUTO: 16.1 %
MAGNESIUM SERPL-MCNC: 2.2 MG/DL (ref 1.6–2.6)
MCH RBC QN AUTO: 28.1 PG (ref 27–31)
MCHC RBC AUTO-ENTMCNC: 32.1 G/DL (ref 33–36)
MCV RBC AUTO: 87.4 FL (ref 80–94)
MONOCYTES # BLD AUTO: 0.55 X10(3)/MCL (ref 0.1–1.3)
MONOCYTES NFR BLD AUTO: 6.5 %
NEUTROPHILS # BLD AUTO: 6.47 X10(3)/MCL (ref 2.1–9.2)
NEUTROPHILS NFR BLD AUTO: 76.1 %
NRBC BLD AUTO-RTO: 0 %
PHOSPHATE SERPL-MCNC: 3.2 MG/DL (ref 2.3–4.7)
PLATELET # BLD AUTO: 137 X10(3)/MCL (ref 130–400)
PMV BLD AUTO: 9.9 FL (ref 7.4–10.4)
POTASSIUM SERPL-SCNC: 3.8 MMOL/L (ref 3.5–5.1)
PROT SERPL-MCNC: 6.3 GM/DL (ref 5.8–7.6)
RBC # BLD AUTO: 4.06 X10(6)/MCL (ref 4.2–5.4)
SODIUM SERPL-SCNC: 141 MMOL/L (ref 136–145)
TRIGL SERPL-MCNC: 61 MG/DL (ref 37–140)
WBC # SPEC AUTO: 8.51 X10(3)/MCL (ref 4.5–11.5)

## 2024-01-29 PROCEDURE — 83735 ASSAY OF MAGNESIUM: CPT

## 2024-01-29 PROCEDURE — 85025 COMPLETE CBC W/AUTO DIFF WBC: CPT

## 2024-01-29 PROCEDURE — 84100 ASSAY OF PHOSPHORUS: CPT

## 2024-01-29 PROCEDURE — 80053 COMPREHEN METABOLIC PANEL: CPT

## 2024-01-29 PROCEDURE — 84478 ASSAY OF TRIGLYCERIDES: CPT

## 2024-02-02 ENCOUNTER — HOSPITAL ENCOUNTER (EMERGENCY)
Facility: HOSPITAL | Age: 79
Discharge: HOME | End: 2024-02-02
Attending: EMERGENCY MEDICINE
Payer: MEDICARE

## 2024-02-02 VITALS
BODY MASS INDEX: 27.31 KG/M2 | OXYGEN SATURATION: 96 % | HEART RATE: 68 BPM | RESPIRATION RATE: 19 BRPM | DIASTOLIC BLOOD PRESSURE: 64 MMHG | TEMPERATURE: 98 F | HEIGHT: 64 IN | WEIGHT: 160 LBS | SYSTOLIC BLOOD PRESSURE: 129 MMHG

## 2024-02-02 DIAGNOSIS — J06.9 VIRAL URI: ICD-10-CM

## 2024-02-02 DIAGNOSIS — R50.9 FEVER, UNSPECIFIED FEVER CAUSE: Primary | ICD-10-CM

## 2024-02-02 LAB
ALBUMIN SERPL-MCNC: 3.4 G/DL (ref 3.4–4.8)
ALBUMIN/GLOB SERPL: 0.9 RATIO (ref 1.1–2)
ALP SERPL-CCNC: 92 UNIT/L (ref 40–150)
ALT SERPL-CCNC: 25 UNIT/L (ref 0–55)
APPEARANCE UR: CLEAR
AST SERPL-CCNC: 30 UNIT/L (ref 5–34)
B PERT.PT PRMT NPH QL NAA+NON-PROBE: NOT DETECTED
BACTERIA #/AREA URNS AUTO: ABNORMAL /HPF
BASOPHILS # BLD AUTO: 0.03 X10(3)/MCL
BASOPHILS NFR BLD AUTO: 0.4 %
BILIRUB SERPL-MCNC: 0.8 MG/DL
BILIRUB UR QL STRIP.AUTO: NEGATIVE
BUN SERPL-MCNC: 20.3 MG/DL (ref 9.8–20.1)
C PNEUM DNA NPH QL NAA+NON-PROBE: NOT DETECTED
CALCIUM SERPL-MCNC: 9.3 MG/DL (ref 8.4–10.2)
CHLORIDE SERPL-SCNC: 106 MMOL/L (ref 98–107)
CO2 SERPL-SCNC: 27 MMOL/L (ref 23–31)
COLOR UR AUTO: YELLOW
CREAT SERPL-MCNC: 0.75 MG/DL (ref 0.55–1.02)
EOSINOPHIL # BLD AUTO: 0.09 X10(3)/MCL (ref 0–0.9)
EOSINOPHIL NFR BLD AUTO: 1.2 %
ERYTHROCYTE [DISTWIDTH] IN BLOOD BY AUTOMATED COUNT: 14.3 % (ref 11.5–17)
FLUAV AG UPPER RESP QL IA.RAPID: NOT DETECTED
FLUBV AG UPPER RESP QL IA.RAPID: NOT DETECTED
GFR SERPLBLD CREATININE-BSD FMLA CKD-EPI: >60 MLS/MIN/1.73/M2
GLOBULIN SER-MCNC: 3.9 GM/DL (ref 2.4–3.5)
GLUCOSE SERPL-MCNC: 89 MG/DL (ref 82–115)
GLUCOSE UR QL STRIP.AUTO: NORMAL
HADV DNA NPH QL NAA+NON-PROBE: NOT DETECTED
HCOV 229E RNA NPH QL NAA+NON-PROBE: NOT DETECTED
HCOV HKU1 RNA NPH QL NAA+NON-PROBE: NOT DETECTED
HCOV NL63 RNA NPH QL NAA+NON-PROBE: NOT DETECTED
HCOV OC43 RNA NPH QL NAA+NON-PROBE: NOT DETECTED
HCT VFR BLD AUTO: 38.6 % (ref 37–47)
HGB BLD-MCNC: 12.6 G/DL (ref 12–16)
HMPV RNA NPH QL NAA+NON-PROBE: NOT DETECTED
HPIV1 RNA NPH QL NAA+NON-PROBE: NOT DETECTED
HPIV2 RNA NPH QL NAA+NON-PROBE: NOT DETECTED
HPIV3 RNA NPH QL NAA+NON-PROBE: NOT DETECTED
HPIV4 RNA NPH QL NAA+NON-PROBE: NOT DETECTED
HYALINE CASTS #/AREA URNS LPF: ABNORMAL /LPF
IMM GRANULOCYTES # BLD AUTO: 0.02 X10(3)/MCL (ref 0–0.04)
IMM GRANULOCYTES NFR BLD AUTO: 0.3 %
INR PPP: 1.7
KETONES UR QL STRIP.AUTO: NEGATIVE
LACTATE SERPL-SCNC: 0.8 MMOL/L (ref 0.5–2.2)
LEUKOCYTE ESTERASE UR QL STRIP.AUTO: 25
LYMPHOCYTES # BLD AUTO: 1.75 X10(3)/MCL (ref 0.6–4.6)
LYMPHOCYTES NFR BLD AUTO: 23.1 %
M PNEUMO DNA NPH QL NAA+NON-PROBE: NOT DETECTED
MCH RBC QN AUTO: 28.4 PG (ref 27–31)
MCHC RBC AUTO-ENTMCNC: 32.6 G/DL (ref 33–36)
MCV RBC AUTO: 87.1 FL (ref 80–94)
MONOCYTES # BLD AUTO: 0.56 X10(3)/MCL (ref 0.1–1.3)
MONOCYTES NFR BLD AUTO: 7.4 %
MUCOUS THREADS URNS QL MICRO: ABNORMAL /LPF
NEUTROPHILS # BLD AUTO: 5.13 X10(3)/MCL (ref 2.1–9.2)
NEUTROPHILS NFR BLD AUTO: 67.6 %
NITRITE UR QL STRIP.AUTO: NEGATIVE
NRBC BLD AUTO-RTO: 0 %
PH UR STRIP.AUTO: 6 [PH]
PLATELET # BLD AUTO: 163 X10(3)/MCL (ref 130–400)
PMV BLD AUTO: 9.3 FL (ref 7.4–10.4)
POTASSIUM SERPL-SCNC: 3.6 MMOL/L (ref 3.5–5.1)
PROT SERPL-MCNC: 7.3 GM/DL (ref 5.8–7.6)
PROT UR QL STRIP.AUTO: NEGATIVE
PROTHROMBIN TIME: 19.4 SECONDS (ref 12.5–14.5)
RBC # BLD AUTO: 4.43 X10(6)/MCL (ref 4.2–5.4)
RBC #/AREA URNS AUTO: ABNORMAL /HPF
RBC UR QL AUTO: NEGATIVE
RSV RNA NPH QL NAA+NON-PROBE: NOT DETECTED
RV+EV RNA NPH QL NAA+NON-PROBE: NOT DETECTED
SARS-COV-2 RNA RESP QL NAA+PROBE: NOT DETECTED
SODIUM SERPL-SCNC: 140 MMOL/L (ref 136–145)
SP GR UR STRIP.AUTO: 1.02 (ref 1–1.03)
SQUAMOUS #/AREA URNS LPF: ABNORMAL /HPF
UROBILINOGEN UR STRIP-ACNC: NORMAL
WBC # SPEC AUTO: 7.58 X10(3)/MCL (ref 4.5–11.5)
WBC #/AREA URNS AUTO: ABNORMAL /HPF

## 2024-02-02 PROCEDURE — 85025 COMPLETE CBC W/AUTO DIFF WBC: CPT | Performed by: NURSE PRACTITIONER

## 2024-02-02 PROCEDURE — 85610 PROTHROMBIN TIME: CPT | Performed by: EMERGENCY MEDICINE

## 2024-02-02 PROCEDURE — 83605 ASSAY OF LACTIC ACID: CPT | Performed by: EMERGENCY MEDICINE

## 2024-02-02 PROCEDURE — 99285 EMERGENCY DEPT VISIT HI MDM: CPT | Mod: 25

## 2024-02-02 PROCEDURE — 80053 COMPREHEN METABOLIC PANEL: CPT | Performed by: NURSE PRACTITIONER

## 2024-02-02 PROCEDURE — 87798 DETECT AGENT NOS DNA AMP: CPT | Mod: 59 | Performed by: EMERGENCY MEDICINE

## 2024-02-02 PROCEDURE — 0240U COVID/FLU A&B PCR: CPT | Performed by: NURSE PRACTITIONER

## 2024-02-02 PROCEDURE — 96361 HYDRATE IV INFUSION ADD-ON: CPT

## 2024-02-02 PROCEDURE — 25000003 PHARM REV CODE 250: Performed by: EMERGENCY MEDICINE

## 2024-02-02 PROCEDURE — 96374 THER/PROPH/DIAG INJ IV PUSH: CPT

## 2024-02-02 PROCEDURE — 81001 URINALYSIS AUTO W/SCOPE: CPT | Performed by: NURSE PRACTITIONER

## 2024-02-02 PROCEDURE — 63600175 PHARM REV CODE 636 W HCPCS: Performed by: EMERGENCY MEDICINE

## 2024-02-02 PROCEDURE — 87040 BLOOD CULTURE FOR BACTERIA: CPT | Performed by: EMERGENCY MEDICINE

## 2024-02-02 RX ORDER — ACETAMINOPHEN 10 MG/ML
1000 INJECTION, SOLUTION INTRAVENOUS ONCE
Status: COMPLETED | OUTPATIENT
Start: 2024-02-02 | End: 2024-02-02

## 2024-02-02 RX ADMIN — SODIUM CHLORIDE 1000 ML: 9 INJECTION, SOLUTION INTRAVENOUS at 04:02

## 2024-02-02 RX ADMIN — ACETAMINOPHEN 1000 MG: 10 INJECTION, SOLUTION INTRAVENOUS at 04:02

## 2024-02-02 NOTE — FIRST PROVIDER EVALUATION
Medical screening examination initiated.  I have conducted a focused provider triage encounter, findings are as follows:    Brief history of present illness:  Patient states fever and chills x 4-5 days.     There were no vitals filed for this visit.    Pertinent physical exam:  Awake, alert, ambulatory    Brief workup plan:  Labs    Preliminary workup initiated; this workup will be continued and followed by the physician or advanced practice provider that is assigned to the patient when roomed.

## 2024-02-02 NOTE — ED PROVIDER NOTES
Encounter Date: 2/2/2024    SCRIBE #1 NOTE: I, Yefri Toro, am scribing for, and in the presence of,  Dr. Still. I have scribed the following portions of the note - Other sections scribed: HPI, ROS, Physical Exam, MDM, Attending.       History     Chief Complaint   Patient presents with    Fever     Fever since Monday. C/o generalized weakness, dry cough, headache, and fatigue. Went to walk in clinic for swabs and a UA and everything came back negative. Max temp of 101.      77 y/o female with history of HTN, DVT, PVD, RA and cirrhosis presents to ED for fever onset 4 days ago with intermittent chills.  Pt also complains of frontal HA, fatigue and an episode of vomiting 4 days ago.  She is on TPN via MediPort since 2019 following bowel resection and has had sepsis from MediPort infection, so she is now on doxycycline daily.  She states that with her previous infections, she was more confused.  She states that her Home Health nurse has been unable to draw blood from her MediPort for the past month, so she has been trying to get in touch with Dr. Welsh, who placed the MediPort.  Her last BM was today, and it was normal for her.  She has been sleeping more than her usual.  She does not have history of headaches.  Pt denies cough, photophobia, skin changes, decreased appetite, abdominal pain, dysuria, rash or pain at MediPort site.    The history is provided by the patient.     Review of patient's allergies indicates:   Allergen Reactions    Hydromorphone Itching     Other reaction(s): itching    Opium      Other reaction(s): itching  has itching with larger doses. Smaller doses do not cause a reaction.     Past Medical History:   Diagnosis Date    Acute URI     Anxiety and depression     Back pain     Breast cancer     Cholelithiasis     Contaminated small bowel syndrome     DVT (deep venous thrombosis)     Edema, lower extremity     Gallstone pancreatitis     HTN (hypertension)     Hypercholesterolemia      Insomnia     Irregular heart beat     Ischemic disease of gut     Laryngitis     Malabsorption     Meningitis, unspecified     OA (osteoarthritis)     PVD (peripheral vascular disease)     Rheumatoid arthritis, unspecified     Staph infection     infected mediport -- on doxycycline daily for prevention    Tremor     Unspecified cataract     Unspecified cirrhosis of liver 06/20/2023    Dr Carroll    Venous insufficiency      Past Surgical History:   Procedure Laterality Date    APPENDECTOMY      BOWEL RESECTION      BREAST LUMPECTOMY Right     CHOLECYSTECTOMY  05/2015    COLONOSCOPY  02/2022    repeat 3 years    CYST REMOVAL Right     breast    CYSTOSCOPY W/ STONE MANIPULATION      CYSTOSCOPY W/ URETERAL STENT PLACEMENT  12/2020    CYSTOSCOPY W/ URETERAL STENT REMOVAL  04/2021    CYSTOURETEROSCOPY, WITH HOLMIUM LASER LITHOTRIPSY OF URETERAL CALCULUS AND STENT INSERTION Left 05/02/2023    Procedure: CYSTOSCOPY, WITH URETERAL STENT INSERTION Left;  Surgeon: Jared Felix MD;  Location: [facility] OR;  Service: Urology;  Laterality: Left;    EGD, WITH CLOSED BIOPSY N/A 6/20/2023    Procedure: EGD;  Surgeon: Aashish Carroll MD;  Location: [facility] ENDOSCOPY;  Service: Gastroenterology;  Laterality: N/A;    ENDOSCOPIC RETROGRADE CHOLANGIOPANCREATOGRAPHY W/ SPHINCTEROTOMY AND STONE REMOVAL  04/2015    ESOPHAGOGASTRODUODENOSCOPY  06/20/2023    Dr Carroll - no signs esophageal varicies --repeat 3 yrs    GI Biopsy  02/15/2022    GI Biopsy  12/2018    HYSTERECTOMY      LAPAROTOMY, EXPLORATORY  06/2015    LITHOTRIPSY Left 04/2021    LITHOTRIPSY Left 03/2021    MEDIPORT INSERTION, SINGLE  06/2021    MEDIPORT INSERTION, SINGLE  07/2020    MEDIPORT INSERTION, SINGLE  12/2018    MEDIPORT REMOVAL  07/2020    PHACOEMULSIFICATION OF CATARACT Left 12/2016    PHACOEMULSIFICATION OF CATARACT Right 11/2016    PICC line Removal Right 06/2021    POLYPECTOMY  02/2022    PVD surgery      REMOVAL OF CATHETER  12/2020    SHOULDER  SURGERY Right     shoulder replacement    SPHINCTEROTOMY OF URETHRA      VENTRAL HERNIA REPAIR  04/2016     Family History   Problem Relation Age of Onset    Pneumonia Mother     Depression Mother     Osteoarthritis Mother     Breast cancer Mother     Uterine cancer Mother     Heart attack Father     Aneurysm Father         brain    Diabetes Father     Hyperlipidemia Father     Hypertension Father     Hyperlipidemia Sister     Hypertension Sister     Kidney cancer Sister     Osteoarthritis Sister     Breast cancer Sister     Hyperlipidemia Brother     Hypertension Brother     Coronary artery disease Brother         CABG x3    Hyperlipidemia Brother     Hypertension Brother      Social History     Tobacco Use    Smoking status: Never    Smokeless tobacco: Never   Substance Use Topics    Alcohol use: Not Currently    Drug use: Never     Review of Systems   Constitutional:  Positive for activity change, chills (intermittent), fatigue and fever. Negative for appetite change.   Eyes:  Negative for photophobia.   Respiratory:  Negative for cough.    Gastrointestinal:  Positive for vomiting. Negative for abdominal pain.   Skin:  Negative for color change and rash.   Neurological:  Positive for headaches (frontal).       Physical Exam     Initial Vitals [02/02/24 1504]   BP Pulse Resp Temp SpO2   132/79 70 19 97.4 °F (36.3 °C) 98 %      MAP       --         Physical Exam    Nursing note and vitals reviewed.  Constitutional: No distress.   HENT:   Head: Normocephalic and atraumatic.   Eyes: Conjunctivae and EOM are normal. Pupils are equal, round, and reactive to light.   Neck: Trachea normal. Neck supple.   Normal range of motion.  Cardiovascular:  Normal rate and regular rhythm.           No murmur heard.  Pulmonary/Chest: Breath sounds normal. No respiratory distress. She exhibits no tenderness.   Abdominal: Abdomen is soft. Bowel sounds are normal. There is no abdominal tenderness.   Musculoskeletal:         General:  Normal range of motion.      Cervical back: Normal range of motion and neck supple.      Lumbar back: Normal. Normal range of motion.     Neurological: She is alert and oriented to person, place, and time. She has normal strength. No cranial nerve deficit or sensory deficit.   Skin: Skin is warm and dry.   Psychiatric: She has a normal mood and affect.         ED Course   Procedures  Labs Reviewed   COMPREHENSIVE METABOLIC PANEL - Abnormal; Notable for the following components:       Result Value    Blood Urea Nitrogen 20.3 (*)     Globulin 3.9 (*)     Albumin/Globulin Ratio 0.9 (*)     All other components within normal limits   URINALYSIS, REFLEX TO URINE CULTURE - Abnormal; Notable for the following components:    Leukocyte Esterase, UA 25 (*)     WBC, UA 6-10 (*)     Mucous, UA Trace (*)     Hyaline Casts, UA 3-5 (*)     All other components within normal limits   CBC WITH DIFFERENTIAL - Abnormal; Notable for the following components:    MCHC 32.6 (*)     All other components within normal limits   PROTIME-INR - Abnormal; Notable for the following components:    PT 19.4 (*)     INR 1.7 (*)     All other components within normal limits   COVID/FLU A&B PCR - Normal    Narrative:     The Xpert Xpress SARS-CoV-2/FLU/RSV plus is a rapid, multiplexed real-time PCR test intended for the simultaneous qualitative detection and differentiation of SARS-CoV-2, Influenza A, Influenza B, and respiratory syncytial virus (RSV) viral RNA in either nasopharyngeal swab or nasal swab specimens.         LACTIC ACID, PLASMA - Normal   RESPIRATORY PANEL - Normal    Narrative:     The BioFire Respiratory Panel 2.1 (RP2.1) is a PCR-based multiplexed nucleic acid test intended for use with the BioFire® 2.0 for simultaneous qualitative detection and identification of multiple respiratory viral and bacterial nucleic acids in nasopharyngeal swabs (NPS) obtained from individuals suspected of respiratory tract infections.   BLOOD CULTURE [facility]    BLOOD CULTURE [facility]   CBC W/ AUTO DIFFERENTIAL    Narrative:     The following orders were created for panel order CBC Auto Differential.  Procedure                               Abnormality         Status                     ---------                               -----------         ------                     CBC with Differential[[id number]]       Abnormal            Final result                 Please view results for these tests on the individual orders.          Imaging Results              CT Head Without Contrast (Final result)  Result time 02/02/24 16:47:41      Final result by Adria Dooley MD (02/02/24 16:47:41)                   Impression:      No acute intracranial process identified.      Electronically signed by: Adria Dooley  Date:    02/02/2024  Time:    16:47               Narrative:    EXAMINATION:  CT HEAD WITHOUT CONTRAST    CLINICAL HISTORY:  Headache, new or worsening (Age >= 50y);    TECHNIQUE:  CT images of the head without IV contrast. Axial, coronal and sagittal images reviewed. Dose length product 976 mGycm. Automatic exposure control, adjustment of mA/kV or iterative reconstruction technique used to limit radiation dose.    COMPARISON:  CT 11/24/2020    FINDINGS:  Extra-axial spaces/ventricular system: Normal for age.    Intracranial hemorrhage: None identified.    Cerebral parenchyma: No acute large vessel territory infarct or mass effect identified. Mild chronic small vessel ischemic change.    Vascular system: No hyperdense vessel appreciated.    Cerebellum: Normal.    Sella: Normal.    Included paranasal sinuses and mastoid air cells: Well-aerated.    Visualized orbits: Prior bilateral lens surgery.    Scalp/Calvarium: No depressed skull fracture.  Prior left posterior craniotomy.                                       X-Ray Chest PA And Lateral (Final result)  Result time 02/02/24 15:25:30      Final result by Isaac Richardson MD (02/02/24 15:25:30)                    Impression:      No acute cardiopulmonary process identified.      Electronically signed by: Isaac Richardson  Date:    02/02/2024  Time:    15:25               Narrative:    EXAMINATION:  XR CHEST PA AND LATERAL    CLINICAL HISTORY:  Fever;    TECHNIQUE:  Two views    COMPARISON:  November 1, 2023.    FINDINGS:  Cardiopericardial silhouette is within normal limits.  Right lower chest rim of calcification is with similar appearance.  MediPort terminates within the superior vena cava.  Obscured hilar and the mediastinal structures.  No acute dense focal or segmental consolidation, congestion, pleural effusion or pneumothorax.  Right shoulder arthroplasty.                                    X-Rays:   Independently Interpreted Readings:   Chest X-Ray: Normal heart size.  No infiltrates.  No acute abnormalities.     Medications   sodium chloride 0.9% bolus 1,000 mL 1,000 mL (0 mLs Intravenous Stopped 2/2/24 1711)   acetaminophen 1,000 mg/100 mL (10 mg/mL) injection 1,000 mg (0 mg Intravenous Stopped 2/2/24 1627)     Medical Decision Making  Differential diagnoses include, but are not limited to:  Viral URI, UTI, sepsis, bacteremia, pneumonia  CBC, CMP, INR, lactic acid, urinalysis, respiratory panel, flu/COVID, blood cultures, chest x-ray ordered and reviewed  All labs were negative.  She does not have a leukocytosis or lactic acidosis no chest x-ray evidence of pneumonia in his not septic.  Obtain blood cultures due to history however at this time she was not meet criteria to be admitted    Problems Addressed:  Fever, unspecified fever cause: acute illness or injury that poses a threat to life or bodily functions  Viral URI: acute illness or injury that poses a threat to life or bodily functions    Amount and/or Complexity of Data Reviewed  External Data Reviewed: notes.  Labs: ordered.     Details: Outpatient visits with Infectious Disease  Radiology: ordered and independent interpretation performed.    Risk  OTC  drugs.  Prescription drug management.            Scribe Attestation:   Scribe #1: I performed the above scribed service and the documentation accurately describes the services I performed. I attest to the accuracy of the note.  Comments: Attending:   Physician Attestation Statement for Scribe #1: I, Demetria Still MD, personally performed the services described in this documentation. All medical record entries made by the scribe were at my direction and in my presence.  I have reviewed the chart and agree that the record reflects my personal performance and is accurate and complete.        Attending Attestation:           Physician Attestation for Scribe:  Physician Attestation Statement for Scribe #1: I, reviewed documentation, as scribed by Yefri Toro in my presence, and it is both accurate and complete.             ED Course as of 02/02/24 1852 Fri Feb 02, 2024 1726 I have updated rhythm on her current results.  She does not meet any sepsis criteria and does not have any evidence of an infection however it do still suspect a viral URI.  Will obtain cultures to be safe however at this time she does not require admission [BS]      ED Course User Index  [BS] Demetria Still MD                           Clinical Impression:  Final diagnoses:  [R50.9] Fever, unspecified fever cause (Primary)  [J06.9] Viral URI          ED Disposition Condition    Discharge Stable          ED Prescriptions    None       Follow-up Information       Follow up With Specialties Details Why Contact Info    Curt Felton MD Family Medicine Schedule an appointment as soon as possible for a visit   [address] [facility] [address]  [phone]      [facility] - Emergency Dept Emergency Medicine  As needed, If symptoms worsen [address] [facility] [id number] [phone]             Demetria Still MD  02/02/24 1852

## 2024-02-03 NOTE — DISCHARGE INSTRUCTIONS
Patient discharged with prescriptions and instructions.   While you do not have any evidence of sepsis or any definite diagnosis I suspect a viral infection.  Continue to treat your symptoms and you will be notified of your blood cultures grow anything.  Return to the ED as needed for any worsening otherwise he can follow up with your primary care doctor

## 2024-02-07 LAB
BACTERIA BLD CULT: NORMAL
BACTERIA BLD CULT: NORMAL

## 2024-02-14 PROCEDURE — 87086 URINE CULTURE/COLONY COUNT: CPT | Performed by: FAMILY MEDICINE

## 2024-02-19 ENCOUNTER — HOSPITAL ENCOUNTER (EMERGENCY)
Facility: HOSPITAL | Age: 79
Discharge: HOME | End: 2024-02-19
Attending: STUDENT IN AN ORGANIZED HEALTH CARE EDUCATION/TRAINING PROGRAM
Payer: MEDICARE

## 2024-02-19 VITALS
OXYGEN SATURATION: 96 % | DIASTOLIC BLOOD PRESSURE: 62 MMHG | WEIGHT: 160 LBS | RESPIRATION RATE: 19 BRPM | HEART RATE: 56 BPM | TEMPERATURE: 98 F | SYSTOLIC BLOOD PRESSURE: 130 MMHG | HEIGHT: 64 IN | BODY MASS INDEX: 27.31 KG/M2

## 2024-02-19 DIAGNOSIS — R50.9 FEVER, UNSPECIFIED FEVER CAUSE: Primary | ICD-10-CM

## 2024-02-19 LAB
ALBUMIN SERPL-MCNC: 3.4 G/DL (ref 3.4–4.8)
ALBUMIN/GLOB SERPL: 0.9 RATIO (ref 1.1–2)
ALP SERPL-CCNC: 105 UNIT/L (ref 40–150)
ALT SERPL-CCNC: 43 UNIT/L (ref 0–55)
APPEARANCE UR: CLEAR
AST SERPL-CCNC: 56 UNIT/L (ref 5–34)
BACTERIA #/AREA URNS AUTO: ABNORMAL /HPF
BASOPHILS # BLD AUTO: 0.03 X10(3)/MCL
BASOPHILS NFR BLD AUTO: 0.4 %
BILIRUB SERPL-MCNC: 0.6 MG/DL
BILIRUB UR QL STRIP.AUTO: NEGATIVE
BUN SERPL-MCNC: 15.6 MG/DL (ref 9.8–20.1)
CALCIUM SERPL-MCNC: 9.1 MG/DL (ref 8.4–10.2)
CHLORIDE SERPL-SCNC: 107 MMOL/L (ref 98–107)
CO2 SERPL-SCNC: 25 MMOL/L (ref 23–31)
COLOR UR AUTO: ABNORMAL
CREAT SERPL-MCNC: 0.76 MG/DL (ref 0.55–1.02)
EOSINOPHIL # BLD AUTO: 0.09 X10(3)/MCL (ref 0–0.9)
EOSINOPHIL NFR BLD AUTO: 1.2 %
EPI CELLS #/AREA URNS HPF: ABNORMAL /HPF
ERYTHROCYTE [DISTWIDTH] IN BLOOD BY AUTOMATED COUNT: 14.9 % (ref 11.5–17)
FLUAV AG UPPER RESP QL IA.RAPID: NOT DETECTED
FLUBV AG UPPER RESP QL IA.RAPID: NOT DETECTED
GFR SERPLBLD CREATININE-BSD FMLA CKD-EPI: >60 MLS/MIN/1.73/M2
GLOBULIN SER-MCNC: 3.9 GM/DL (ref 2.4–3.5)
GLUCOSE SERPL-MCNC: 111 MG/DL (ref 82–115)
GLUCOSE UR QL STRIP.AUTO: NORMAL
HCT VFR BLD AUTO: 37.4 % (ref 37–47)
HGB BLD-MCNC: 12 G/DL (ref 12–16)
IMM GRANULOCYTES # BLD AUTO: 0.02 X10(3)/MCL (ref 0–0.04)
IMM GRANULOCYTES NFR BLD AUTO: 0.3 %
KETONES UR QL STRIP.AUTO: NEGATIVE
LACTATE SERPL-SCNC: 1.3 MMOL/L (ref 0.5–2.2)
LEUKOCYTE ESTERASE UR QL STRIP.AUTO: 75
LYMPHOCYTES # BLD AUTO: 1.73 X10(3)/MCL (ref 0.6–4.6)
LYMPHOCYTES NFR BLD AUTO: 23.5 %
MCH RBC QN AUTO: 27.9 PG (ref 27–31)
MCHC RBC AUTO-ENTMCNC: 32.1 G/DL (ref 33–36)
MCV RBC AUTO: 87 FL (ref 80–94)
MONOCYTES # BLD AUTO: 0.69 X10(3)/MCL (ref 0.1–1.3)
MONOCYTES NFR BLD AUTO: 9.4 %
NEUTROPHILS # BLD AUTO: 4.8 X10(3)/MCL (ref 2.1–9.2)
NEUTROPHILS NFR BLD AUTO: 65.2 %
NITRITE UR QL STRIP.AUTO: NEGATIVE
NRBC BLD AUTO-RTO: 0 %
PH UR STRIP.AUTO: 6.5 [PH]
PLATELET # BLD AUTO: 160 X10(3)/MCL (ref 130–400)
PMV BLD AUTO: 10 FL (ref 7.4–10.4)
POTASSIUM SERPL-SCNC: 4.3 MMOL/L (ref 3.5–5.1)
PROT SERPL-MCNC: 7.3 GM/DL (ref 5.8–7.6)
PROT UR QL STRIP.AUTO: NEGATIVE
RBC # BLD AUTO: 4.3 X10(6)/MCL (ref 4.2–5.4)
RBC #/AREA URNS AUTO: ABNORMAL /HPF
RBC UR QL AUTO: NEGATIVE
RENAL EPI CELLS #/AREA UR COMP ASSIST: ABNORMAL /HPF
SARS-COV-2 RNA RESP QL NAA+PROBE: NOT DETECTED
SODIUM SERPL-SCNC: 140 MMOL/L (ref 136–145)
SP GR UR STRIP.AUTO: 1.01 (ref 1–1.03)
SQUAMOUS #/AREA URNS LPF: ABNORMAL /HPF
UROBILINOGEN UR STRIP-ACNC: NORMAL
WBC # SPEC AUTO: 7.36 X10(3)/MCL (ref 4.5–11.5)
WBC #/AREA URNS AUTO: ABNORMAL /HPF

## 2024-02-19 PROCEDURE — 80053 COMPREHEN METABOLIC PANEL: CPT | Performed by: PHYSICIAN ASSISTANT

## 2024-02-19 PROCEDURE — 81015 MICROSCOPIC EXAM OF URINE: CPT | Performed by: PHYSICIAN ASSISTANT

## 2024-02-19 PROCEDURE — 85025 COMPLETE CBC W/AUTO DIFF WBC: CPT | Performed by: PHYSICIAN ASSISTANT

## 2024-02-19 PROCEDURE — 83605 ASSAY OF LACTIC ACID: CPT | Performed by: PHYSICIAN ASSISTANT

## 2024-02-19 PROCEDURE — 0240U COVID/FLU A&B PCR: CPT | Performed by: PHYSICIAN ASSISTANT

## 2024-02-19 PROCEDURE — 87040 BLOOD CULTURE FOR BACTERIA: CPT | Performed by: PHYSICIAN ASSISTANT

## 2024-02-19 PROCEDURE — 99283 EMERGENCY DEPT VISIT LOW MDM: CPT

## 2024-02-19 NOTE — FIRST PROVIDER EVALUATION
"Medical screening examination initiated.  I have conducted a focused provider triage encounter, findings are as follows:    Chief Complaint   Patient presents with    Fever     Fever, chills, headache x 1 week. Dr Felton visit on Thursday, blood cultures were positive, dr felton said to come to er for any temperature over 100.2 Today fever 101.2. pt has mediport for TPN     Brief history of present illness:  78 y.o. female presents to the ED with fever, chills, headache for the last week. Had blood cultures done with Dr Felton on Thursday where they came back positive and was told to come to the ED for any fever. Has Mediport for short gut syndrome, fed TPN. Denies cough, chest pain or SOB, n/v.     Vitals:    02/19/24 1559   BP: (!) 130/59   Pulse: 66   Resp: 20   Temp: 98.6 °F (37 °C)   TempSrc: Oral   SpO2: 96%   Weight: 72.6 kg (160 lb)   Height: 5' 4" (1.626 m)       Pertinent physical exam:  Awake, alert, ambulatory, non-labored respirations    Brief workup plan:  labs, UA, swab    Preliminary workup initiated; this workup will be continued and followed by the physician or advanced practice provider that is assigned to the patient when roomed.  "

## 2024-02-20 ENCOUNTER — EXTERNAL HOME HEALTH (OUTPATIENT)
Dept: HOME HEALTH SERVICES | Facility: HOSPITAL | Age: 79
End: 2024-02-20
Payer: MEDICARE

## 2024-02-20 NOTE — DISCHARGE INSTRUCTIONS
Thanks for letting us take care of you today!  It is our goal to give you courteous care and to keep you comfortable and informed, if you have any questions before you leave I will be happy to try and answer them.    Here is some advice after your visit:    Your visit in the emergency department is NOT definitive care - please follow-up with your primary care doctor and/or specialist within 1-2 days. Please return to the emergency department if you develop worsening symptoms including: fever, chills, chest pain, shortness of breath, weakness, numbness, tingling, nausea, vomiting, inability to eat, drink, or take your medication. Please return if you have any worsening in your condition or if you have any other concerns.    If you had radiology exams like an XRAY or CT in the emergency Department the interpreation on them may be preliminary - there may be less time sensitive findings on the reports please obtain these reports within 24 hours from the hospital or by using your out on your mobile phone to access records.  Bring these to your primary care doctor and/or specialist for further review of incidental findings.    Please review any LAB WORK from your visit today with your primary care physician.    You are welcome to return emergency department any time for any worsening symptoms.      Please continue to take all your antibiotics as prescribed.      Please be sure to follow up with the primary care physician, keep your follow-up appointment this coming Wednesday 2/21

## 2024-02-20 NOTE — ED PROVIDER NOTES
Encounter Date: 2/19/2024    SCRIBE #1 NOTE: I, Leticia Lr, am scribing for, and in the presence of,  Sai Amador MD. I have scribed the following portions of the note - Other sections scribed: HPI, ROS, PE.       History     Chief Complaint   Patient presents with    Fever     Fever, chills, headache x 1 week. Dr Felton visit on Thursday, blood cultures were positive, dr felton said to come to er for any temperature over 100.2 Today fever 101.2. pt has mediport for TPN saw dr grewal (urologist) today, denies dysuria. Pt did not take tylenol or motrin today.      78 year old female with extensive medical history including breast cancer, DVT, HTN, gallstone pancreatitis, HTN, PVD, appendectomy, bowel resection, appendectomy, lumpectomy, and cholecystectomy presents to the ED for positive blood cultures. Pt reports that she had cultures done by her PCP on 2/14. She states that a pathologist called her on 2/16 and told her they were positive, and to come to the ED if fever got over 100.4 F. Pt notes that her PCP's nurse called her this morning and told her both cultures came back positive, and pt came to ED after fever of 101.2 F at home. Pt states that she did not take anything for the fever PTA. She notes that she saw her urologist today and all of her tests came back negative. Pt complains of a mild headache and denies cough and dysuria. Her family member at bedside notes that pt has had sepsis twice in the past.     The history is provided by the patient, a relative and medical records. No  was used.     Review of patient's allergies indicates:   Allergen Reactions    Hydromorphone Itching     Other reaction(s): itching    Opium      Other reaction(s): itching  has itching with larger doses. Smaller doses do not cause a reaction.     Past Medical History:   Diagnosis Date    Acute URI     Anxiety and depression     Back pain     Breast cancer     Cholelithiasis     Contaminated small  bowel syndrome     DVT (deep venous thrombosis)     Edema, lower extremity     Gallstone pancreatitis     HTN (hypertension)     Hypercholesterolemia     Insomnia     Irregular heart beat     Ischemic disease of gut     Laryngitis     Malabsorption     Meningitis, unspecified     OA (osteoarthritis)     PVD (peripheral vascular disease)     Rheumatoid arthritis, unspecified     Staph infection     infected mediport -- on doxycycline daily for prevention    Tremor     Unspecified cataract     Unspecified cirrhosis of liver 06/20/2023    Dr Carroll    Venous insufficiency      Past Surgical History:   Procedure Laterality Date    APPENDECTOMY      BOWEL RESECTION      BREAST LUMPECTOMY Right     CHOLECYSTECTOMY  05/2015    COLONOSCOPY  02/2022    repeat 3 years    CYST REMOVAL Right     breast    CYSTOSCOPY W/ STONE MANIPULATION      CYSTOSCOPY W/ URETERAL STENT PLACEMENT  12/2020    CYSTOSCOPY W/ URETERAL STENT REMOVAL  04/2021    CYSTOURETEROSCOPY, WITH HOLMIUM LASER LITHOTRIPSY OF URETERAL CALCULUS AND STENT INSERTION Left 05/02/2023    Procedure: CYSTOSCOPY, WITH URETERAL STENT INSERTION Left;  Surgeon: Jared Felix MD;  Location: [facility] OR;  Service: Urology;  Laterality: Left;    EGD, WITH CLOSED BIOPSY N/A 6/20/2023    Procedure: EGD;  Surgeon: Aashish Carroll MD;  Location: [facility] ENDOSCOPY;  Service: Gastroenterology;  Laterality: N/A;    ENDOSCOPIC RETROGRADE CHOLANGIOPANCREATOGRAPHY W/ SPHINCTEROTOMY AND STONE REMOVAL  04/2015    ESOPHAGOGASTRODUODENOSCOPY  06/20/2023    Dr Carroll - no signs esophageal varicies --repeat 3 yrs    GI Biopsy  02/15/2022    GI Biopsy  12/2018    HYSTERECTOMY      LAPAROTOMY, EXPLORATORY  06/2015    LITHOTRIPSY Left 04/2021    LITHOTRIPSY Left 03/2021    MEDIPORT INSERTION, SINGLE  06/2021    MEDIPORT INSERTION, SINGLE  07/2020    MEDIPORT INSERTION, SINGLE  12/2018    MEDIPORT REMOVAL  07/2020    PHACOEMULSIFICATION OF CATARACT Left 12/2016     PHACOEMULSIFICATION OF CATARACT Right 11/2016    PICC line Removal Right 06/2021    POLYPECTOMY  02/2022    PVD surgery      REMOVAL OF CATHETER  12/2020    SHOULDER SURGERY Right     shoulder replacement    SPHINCTEROTOMY OF URETHRA      VENTRAL HERNIA REPAIR  04/2016     Family History   Problem Relation Age of Onset    Pneumonia Mother     Depression Mother     Osteoarthritis Mother     Breast cancer Mother     Uterine cancer Mother     Heart attack Father     Aneurysm Father         brain    Diabetes Father     Hyperlipidemia Father     Hypertension Father     Hyperlipidemia Sister     Hypertension Sister     Kidney cancer Sister     Osteoarthritis Sister     Breast cancer Sister     Hyperlipidemia Brother     Hypertension Brother     Coronary artery disease Brother         CABG x3    Hyperlipidemia Brother     Hypertension Brother      Social History     Tobacco Use    Smoking status: Never    Smokeless tobacco: Never   Substance Use Topics    Alcohol use: Not Currently    Drug use: Never     Review of Systems   Constitutional:  Positive for fever.   Respiratory:  Negative for cough.    Genitourinary:  Negative for dysuria.   Neurological:  Positive for headaches.   All other systems reviewed and are negative.      Physical Exam     Initial Vitals [02/19/24 1559]   BP Pulse Resp Temp SpO2   (!) 130/59 66 20 98.6 °F (37 °C) 96 %      MAP       --         Physical Exam    Nursing note and vitals reviewed.  Constitutional: She appears well-developed and well-nourished. She is not diaphoretic. No distress.   HENT:   Head: Normocephalic and atraumatic.   Right Ear: External ear normal.   Left Ear: External ear normal.   Nose: Nose normal.   Eyes: Conjunctivae and EOM are normal. Pupils are equal, round, and reactive to light. Right eye exhibits no discharge. Left eye exhibits no discharge.   Cardiovascular:  Normal rate, regular rhythm, normal heart sounds and intact distal pulses.     Exam reveals no gallop and no  friction rub.       No murmur heard.  Pulmonary/Chest: Breath sounds normal. No respiratory distress. She has no wheezes. She has no rhonchi. She has no rales. She exhibits no tenderness.   Abdominal: Abdomen is soft. Bowel sounds are normal. She exhibits no distension and no mass. There is no abdominal tenderness. There is no rebound and no guarding.   Musculoskeletal:         General: No edema. Normal range of motion.     Neurological: She is alert and oriented to person, place, and time. No cranial nerve deficit or sensory deficit.   Skin: Skin is warm and dry. Capillary refill takes less than 2 seconds. No erythema. No pallor.         ED Course   Procedures  Labs Reviewed   COMPREHENSIVE METABOLIC PANEL - Abnormal; Notable for the following components:       Result Value    Globulin 3.9 (*)     Albumin/Globulin Ratio 0.9 (*)     Aspartate Aminotransferase 56 (*)     All other components within normal limits   URINALYSIS, REFLEX TO URINE CULTURE - Abnormal; Notable for the following components:    Leukocyte Esterase, UA 75 (*)     WBC, UA 6-10 (*)     Transitional Epithelial Cells, UA Trace (*)     Renal Epithelial Cells, UA Trace (*)     All other components within normal limits   CBC WITH DIFFERENTIAL - Abnormal; Notable for the following components:    MCHC 32.1 (*)     All other components within normal limits   COVID/FLU A&B PCR - Normal    Narrative:     The Xpert Xpress SARS-CoV-2/FLU/RSV plus is a rapid, multiplexed real-time PCR test intended for the simultaneous qualitative detection and differentiation of SARS-CoV-2, Influenza A, Influenza B, and respiratory syncytial virus (RSV) viral RNA in either nasopharyngeal swab or nasal swab specimens.         LACTIC ACID, PLASMA - Normal   BLOOD CULTURE [facility]   BLOOD CULTURE [facility]   CBC W/ AUTO DIFFERENTIAL    Narrative:     The following orders were created for panel order CBC auto differential.  Procedure                               Abnormality         Status                      ---------                               -----------         ------                     CBC with Differential[[id number]]       Abnormal            Final result                 Please view results for these tests on the individual orders.          Imaging Results    None          Medications - No data to display  Medical Decision Making  Differential diagnosis includes but is not limited to false-positive, bacteremia, sepsis, UTI, pneumonia    See ED course for MDM    Amount and/or Complexity of Data Reviewed  Independent Historian:      Details: Her family member at bedside notes that pt has had sepsis twice in the past.     External Data Reviewed: notes.     Details: Chart review reveals note from PCP 2/14/24 evidently diarrhea and headaches. Fever 102 night before. Headache chills. History of MediPort that she receives TPN with. Problems with a MediPort infectious in the past. On prophylactic doxycycline b.i.d.   Labs: ordered. Decision-making details documented in ED Course.    Risk  OTC drugs.  Prescription drug management.              Attending Attestation:           Physician Attestation for Scribe:  Physician Attestation Statement for Scribe #1: I, Sai Amador MD, reviewed documentation, as scribed by Leticia Lr in my presence, and it is both accurate and complete.             ED Course as of 02/20/24 0336   Mon Feb 19, 2024 2120 Chart review reveals note from PCP 2/14/24 evidently diarrhea and headaches.  Fever 102 night before.  Headache chills.  History of MediPort that she receives TPN with.  Problems with a MediPort infectious in the past.  On prophylactic doxycycline b.i.d. [MM]   2120 Comprehensive metabolic panel(!)  Significant electrolyte abnormality or renal dysfunction. [MM]   2120 Urinalysis, Reflex to Urine Culture(!)  White blood cells no bacteria seen. [MM]   2120 CBC auto differential(!)  No leukocytosis no anemia [MM]   2120 Influenza A, Molecular: Not Detected  [MM]   2120 Influenza B, Molecular: Not Detected [MM]   2120 SARS-CoV2 (COVID-19) Qualitative PCR: Not Detected [MM]   2120 Lactic Acid Level: 1.3 [MM]   2231 Chart review reveals note from 02/19/2024.  From patient's PCP.  Single blood culture return positive, only wanted to sample was grew staph hominis likely contamination.  She was on doxycycline indefinitely, cefdinir as well currently. [MM]   2232 There are 2 blood cultures from the 14th of this month.  One has no growth at 5 days.  The other 1 shows Gram-positive cocci that was shown to be Staphylococcus hominis. [MM]   2300 Patient awake alert well-appearing.  Afebrile here in the emergency department.  No leukocytosis.  No stigmata of infection.  Chart review reveals single positive blood culture from the 14th that grew Gram-positive cocci Staph hominis, likely contaminant.  On cefdinir, as well as doxycycline.  Awake alert well-appearing.  Discussed findings with the patient.  Believe that she was okay for discharge.  She has close follow up with her primary care physician this coming Wednesday.  Strongly encouraged to keep this appointment.  Otherwise she was welcome to return emergency department any time for any worsening symptoms.  Both patient and fiancee in room voiced understanding. Return precautions given.  Questions invited, questions answered to the best my ability.  Patient discharged home condition stable.   [MM]      ED Course User Index  [MM] Sai Amador MD                           Clinical Impression:  Final diagnoses:  [R50.9] Fever, unspecified fever cause (Primary)          ED Disposition Condition    Discharge Stable          ED Prescriptions    None       Follow-up Information       Follow up With Specialties Details Why Contact Info    Curt Felton MD Family Medicine Call   [address] [place] [address] [id number] [phone]               Sai Amador MD  02/20/24 9271

## 2024-02-20 NOTE — ED NOTES
Assuming care of pt at this time upon arrival to ED10. Pt presents w/ daughter @ bedside. Pt states she was called on Friday by her PCP reporting positive blood cx that were drawn last week. Pt states she is normally on doxycycline for frequent UTIs and after notified of positive cx, her abx were switched to Omnicef on Friday 2/16. Pt reports taking Omnicef as prescribed however this AM began having fever @ home (Tmax 103). Denies any dysuria at this time - only c/o headache w/ photophobia - denies other complaints. Hx of short bowel and DVTs, on coumadin. Currently awaiting MD araiza.

## 2024-02-24 LAB
BACTERIA BLD CULT: NORMAL
BACTERIA BLD CULT: NORMAL

## 2024-03-12 ENCOUNTER — LAB REQUISITION (OUTPATIENT)
Dept: LAB | Facility: HOSPITAL | Age: 79
End: 2024-03-12
Payer: MEDICARE

## 2024-03-12 DIAGNOSIS — E11.51 TYPE 2 DIABETES MELLITUS WITH DIABETIC PERIPHERAL ANGIOPATHY WITHOUT GANGRENE: ICD-10-CM

## 2024-03-12 DIAGNOSIS — E46 UNSPECIFIED PROTEIN-CALORIE MALNUTRITION: ICD-10-CM

## 2024-03-12 DIAGNOSIS — K91.2 POSTSURGICAL MALABSORPTION, NOT ELSEWHERE CLASSIFIED: ICD-10-CM

## 2024-03-12 LAB
ALBUMIN SERPL-MCNC: 3.4 G/DL (ref 3.4–4.8)
ALBUMIN/GLOB SERPL: 1 RATIO (ref 1.1–2)
ALP SERPL-CCNC: 94 UNIT/L (ref 40–150)
ALT SERPL-CCNC: 31 UNIT/L (ref 0–55)
AST SERPL-CCNC: 32 UNIT/L (ref 5–34)
BASOPHILS # BLD AUTO: 0.03 X10(3)/MCL
BASOPHILS NFR BLD AUTO: 0.4 %
BILIRUB SERPL-MCNC: 0.5 MG/DL
BUN SERPL-MCNC: 15.6 MG/DL (ref 9.8–20.1)
CALCIUM SERPL-MCNC: 8.3 MG/DL (ref 8.4–10.2)
CHLORIDE SERPL-SCNC: 107 MMOL/L (ref 98–107)
CO2 SERPL-SCNC: 24 MMOL/L (ref 23–31)
CREAT SERPL-MCNC: 0.74 MG/DL (ref 0.55–1.02)
EOSINOPHIL # BLD AUTO: 0.07 X10(3)/MCL (ref 0–0.9)
EOSINOPHIL NFR BLD AUTO: 1 %
ERYTHROCYTE [DISTWIDTH] IN BLOOD BY AUTOMATED COUNT: 15.2 % (ref 11.5–17)
GFR SERPLBLD CREATININE-BSD FMLA CKD-EPI: >60 MLS/MIN/1.73/M2
GLOBULIN SER-MCNC: 3.5 GM/DL (ref 2.4–3.5)
GLUCOSE SERPL-MCNC: 88 MG/DL (ref 82–115)
HCT VFR BLD AUTO: 36.1 % (ref 37–47)
HGB BLD-MCNC: 11.4 G/DL (ref 12–16)
IMM GRANULOCYTES # BLD AUTO: 0.02 X10(3)/MCL (ref 0–0.04)
IMM GRANULOCYTES NFR BLD AUTO: 0.3 %
LYMPHOCYTES # BLD AUTO: 1.6 X10(3)/MCL (ref 0.6–4.6)
LYMPHOCYTES NFR BLD AUTO: 23.7 %
MAGNESIUM SERPL-MCNC: 1.93 MG/DL (ref 1.6–2.6)
MCH RBC QN AUTO: 27.9 PG (ref 27–31)
MCHC RBC AUTO-ENTMCNC: 31.6 G/DL (ref 33–36)
MCV RBC AUTO: 88.3 FL (ref 80–94)
MONOCYTES # BLD AUTO: 0.59 X10(3)/MCL (ref 0.1–1.3)
MONOCYTES NFR BLD AUTO: 8.7 %
NEUTROPHILS # BLD AUTO: 4.45 X10(3)/MCL (ref 2.1–9.2)
NEUTROPHILS NFR BLD AUTO: 65.9 %
NRBC BLD AUTO-RTO: 0 %
PHOSPHATE SERPL-MCNC: 2.9 MG/DL (ref 2.3–4.7)
PLATELET # BLD AUTO: 141 X10(3)/MCL (ref 130–400)
PMV BLD AUTO: 10 FL (ref 7.4–10.4)
POTASSIUM SERPL-SCNC: 4.4 MMOL/L (ref 3.5–5.1)
PROT SERPL-MCNC: 6.9 GM/DL (ref 5.8–7.6)
RBC # BLD AUTO: 4.09 X10(6)/MCL (ref 4.2–5.4)
SODIUM SERPL-SCNC: 140 MMOL/L (ref 136–145)
TRIGL SERPL-MCNC: 91 MG/DL (ref 37–140)
WBC # SPEC AUTO: 6.76 X10(3)/MCL (ref 4.5–11.5)

## 2024-03-12 PROCEDURE — 84100 ASSAY OF PHOSPHORUS: CPT | Performed by: INTERNAL MEDICINE

## 2024-03-12 PROCEDURE — 85025 COMPLETE CBC W/AUTO DIFF WBC: CPT | Performed by: INTERNAL MEDICINE

## 2024-03-12 PROCEDURE — 83735 ASSAY OF MAGNESIUM: CPT | Performed by: INTERNAL MEDICINE

## 2024-03-12 PROCEDURE — 84478 ASSAY OF TRIGLYCERIDES: CPT | Performed by: INTERNAL MEDICINE

## 2024-03-12 PROCEDURE — 80053 COMPREHEN METABOLIC PANEL: CPT | Performed by: INTERNAL MEDICINE

## 2024-03-25 ENCOUNTER — LAB REQUISITION (OUTPATIENT)
Dept: LAB | Facility: HOSPITAL | Age: 79
End: 2024-03-25
Payer: MEDICARE

## 2024-03-25 DIAGNOSIS — I10 ESSENTIAL (PRIMARY) HYPERTENSION: ICD-10-CM

## 2024-03-25 DIAGNOSIS — K91.2 POSTSURGICAL MALABSORPTION, NOT ELSEWHERE CLASSIFIED: ICD-10-CM

## 2024-03-25 DIAGNOSIS — E46 UNSPECIFIED PROTEIN-CALORIE MALNUTRITION: ICD-10-CM

## 2024-03-25 LAB
DEPRECATED CALCIDIOL+CALCIFEROL SERPL-MC: 26.3 NG/ML (ref 30–80)
FERRITIN SERPL-MCNC: 62.8 NG/ML (ref 4.63–204)
IRON SATN MFR SERPL: 15 % (ref 20–50)
IRON SERPL-MCNC: 47 UG/DL (ref 50–170)
TIBC SERPL-MCNC: 267 UG/DL (ref 70–310)
TIBC SERPL-MCNC: 314 UG/DL (ref 250–450)
TRANSFERRIN SERPL-MCNC: 299 MG/DL (ref 173–360)
VIT B12 SERPL-MCNC: 807 PG/ML (ref 213–816)

## 2024-03-25 PROCEDURE — 82728 ASSAY OF FERRITIN: CPT | Performed by: INTERNAL MEDICINE

## 2024-03-25 PROCEDURE — 82607 VITAMIN B-12: CPT | Performed by: INTERNAL MEDICINE

## 2024-03-25 PROCEDURE — 82306 VITAMIN D 25 HYDROXY: CPT | Performed by: INTERNAL MEDICINE

## 2024-03-25 PROCEDURE — 84590 ASSAY OF VITAMIN A: CPT | Performed by: INTERNAL MEDICINE

## 2024-03-25 PROCEDURE — 83540 ASSAY OF IRON: CPT | Performed by: INTERNAL MEDICINE

## 2024-03-27 ENCOUNTER — LAB REQUISITION (OUTPATIENT)
Dept: LAB | Facility: HOSPITAL | Age: 79
End: 2024-03-27
Payer: MEDICARE

## 2024-03-27 DIAGNOSIS — K91.2 POSTSURGICAL MALABSORPTION, NOT ELSEWHERE CLASSIFIED: ICD-10-CM

## 2024-03-27 DIAGNOSIS — E46 UNSPECIFIED PROTEIN-CALORIE MALNUTRITION: ICD-10-CM

## 2024-03-27 DIAGNOSIS — E11.51 TYPE 2 DIABETES MELLITUS WITH DIABETIC PERIPHERAL ANGIOPATHY WITHOUT GANGRENE: ICD-10-CM

## 2024-03-27 LAB
INR PPP: 2.3
PROTHROMBIN TIME: 24.6 SECONDS (ref 12.5–14.5)
VIT A SERPL-MCNC: 35.2 MCG/DL (ref 32.5–78)

## 2024-03-27 PROCEDURE — 85610 PROTHROMBIN TIME: CPT | Performed by: INTERNAL MEDICINE

## 2024-03-27 PROCEDURE — 84446 ASSAY OF VITAMIN E: CPT | Performed by: INTERNAL MEDICINE

## 2024-03-27 PROCEDURE — 82180 ASSAY OF ASCORBIC ACID: CPT | Performed by: INTERNAL MEDICINE

## 2024-03-28 ENCOUNTER — ANESTHESIA (OUTPATIENT)
Dept: CARDIOLOGY | Facility: HOSPITAL | Age: 79
End: 2024-03-28
Payer: MEDICARE

## 2024-03-28 ENCOUNTER — ANESTHESIA EVENT (OUTPATIENT)
Dept: CARDIOLOGY | Facility: HOSPITAL | Age: 79
End: 2024-03-28
Payer: MEDICARE

## 2024-03-28 ENCOUNTER — HOSPITAL ENCOUNTER (OUTPATIENT)
Dept: CARDIOLOGY | Facility: HOSPITAL | Age: 79
Discharge: HOME | End: 2024-03-28
Attending: INTERNAL MEDICINE | Admitting: INTERNAL MEDICINE
Payer: MEDICARE

## 2024-03-28 VITALS
HEART RATE: 48 BPM | OXYGEN SATURATION: 99 % | TEMPERATURE: 99 F | RESPIRATION RATE: 22 BRPM | BODY MASS INDEX: 28.27 KG/M2 | SYSTOLIC BLOOD PRESSURE: 150 MMHG | DIASTOLIC BLOOD PRESSURE: 60 MMHG | HEIGHT: 64 IN | WEIGHT: 165.56 LBS

## 2024-03-28 DIAGNOSIS — I38 VALVULAR ENDOCARDITIS: ICD-10-CM

## 2024-03-28 DIAGNOSIS — I38 VALVULAR ENDOCARDITIS: Primary | ICD-10-CM

## 2024-03-28 DIAGNOSIS — I38 ENDOCARDITIS: ICD-10-CM

## 2024-03-28 LAB
BSA FOR ECHO PROCEDURE: 1.84 M2
INR PPP: 2.5
PROTHROMBIN TIME: 27 SECONDS (ref 12.5–14.5)

## 2024-03-28 PROCEDURE — 85610 PROTHROMBIN TIME: CPT | Performed by: INTERNAL MEDICINE

## 2024-03-28 PROCEDURE — 37000008 HC ANESTHESIA 1ST 15 MINUTES

## 2024-03-28 PROCEDURE — 63600175 PHARM REV CODE 636 W HCPCS

## 2024-03-28 PROCEDURE — 93010 ELECTROCARDIOGRAM REPORT: CPT | Mod: ,,, | Performed by: STUDENT IN AN ORGANIZED HEALTH CARE EDUCATION/TRAINING PROGRAM

## 2024-03-28 PROCEDURE — D9220A PRA ANESTHESIA: Mod: CRNA,,,

## 2024-03-28 PROCEDURE — D9220A PRA ANESTHESIA: Mod: ANES,,, | Performed by: ANESTHESIOLOGY

## 2024-03-28 PROCEDURE — 93312 ECHO TRANSESOPHAGEAL: CPT

## 2024-03-28 PROCEDURE — 25000003 PHARM REV CODE 250

## 2024-03-28 PROCEDURE — 93005 ELECTROCARDIOGRAM TRACING: CPT | Mod: 59

## 2024-03-28 RX ORDER — SODIUM CHLORIDE, SODIUM LACTATE, POTASSIUM CHLORIDE, CALCIUM CHLORIDE 600; 310; 30; 20 MG/100ML; MG/100ML; MG/100ML; MG/100ML
INJECTION, SOLUTION INTRAVENOUS CONTINUOUS PRN
Status: DISCONTINUED | OUTPATIENT
Start: 2024-03-28 | End: 2024-03-28

## 2024-03-28 RX ORDER — LIDOCAINE HYDROCHLORIDE 20 MG/ML
INJECTION INTRAVENOUS
Status: DISCONTINUED | OUTPATIENT
Start: 2024-03-28 | End: 2024-03-28

## 2024-03-28 RX ORDER — PROPOFOL 10 MG/ML
INJECTION, EMULSION INTRAVENOUS
Status: DISCONTINUED | OUTPATIENT
Start: 2024-03-28 | End: 2024-03-28

## 2024-03-28 RX ADMIN — PROPOFOL 40 MG: 10 INJECTION, EMULSION INTRAVENOUS at 09:03

## 2024-03-28 RX ADMIN — LIDOCAINE HYDROCHLORIDE 80 MG: 20 INJECTION INTRAVENOUS at 09:03

## 2024-03-28 RX ADMIN — SODIUM CHLORIDE, POTASSIUM CHLORIDE, SODIUM LACTATE AND CALCIUM CHLORIDE: 600; 310; 30; 20 INJECTION, SOLUTION INTRAVENOUS at 09:03

## 2024-03-28 RX ADMIN — PROPOFOL 20 MG: 10 INJECTION, EMULSION INTRAVENOUS at 09:03

## 2024-03-28 NOTE — DISCHARGE SUMMARY
[facility] - Cath Lab Pre/Post  Discharge Note  Short Stay    Transesophageal echo (DAEL)      OUTCOME: Patient tolerated treatment/procedure well without complication and is now ready for discharge.    DISPOSITION: Home or Self Care    FINAL DIAGNOSIS:  Fever    FOLLOWUP: In clinic    DISCHARGE INSTRUCTIONS:  No discharge procedures on file.     TIME SPENT ON DISCHARGE: 31 minutes

## 2024-03-28 NOTE — ANESTHESIA PREPROCEDURE EVALUATION
03/28/2024  Laura D Use is a 78 y.o., female with ----------------------------  Acute URI  Anxiety and depression  Back pain  Breast cancer  Cholelithiasis  Contaminated small bowel syndrome  DVT (deep venous thrombosis)  Edema, lower extremity  Gallstone pancreatitis  HTN (hypertension)  Hypercholesterolemia  Insomnia  Irregular heart beat  Ischemic disease of gut  Laryngitis  Malabsorption  Meningitis, unspecified  OA (osteoarthritis)  PVD (peripheral vascular disease)  Rheumatoid arthritis, unspecified  Staph infection      Comment:  infected mediport -- on doxycycline daily for prevention  Tremor  Unspecified cataract  Unspecified cirrhosis of liver      Comment:  Dr Carroll  Venous insufficiency    And ----------------------------  Appendectomy  Bowel resection  Breast lumpectomy  Cholecystectomy  Colonoscopy      Comment:  repeat 3 years  Cyst removal      Comment:  breast  Cystoscopy w/ stone manipulation  Cystoscopy w/ ureteral stent placement  Cystoscopy w/ ureteral stent removal  Cystoureteroscopy, with holmium laser lithotripsy of ureteral   calculus and stent insertion      Comment:  Procedure: CYSTOSCOPY, WITH URETERAL STENT INSERTION                Left;  Surgeon: Jared Felix MD;  Location: [facility] OR;                Service: Urology;  Laterality: Left;  Egd, with closed biopsy      Comment:  Procedure: EGD;  Surgeon: Aashish Carroll MD;                 Location: [facility] ENDOSCOPY;  Service:                Gastroenterology;  Laterality: N/A;  Endoscopic retrograde cholangiopancreatography w/ sphincterotomy and   stone removal  Esophagogastroduodenoscopy      Comment:  Dr Carroll - no signs esophageal varicies --repeat 3                yrs  Gi biopsy  Gi biopsy  Hysterectomy  Laparotomy, exploratory  Lithotripsy  Lithotripsy  Mediport insertion, single  Mediport insertion,  single  Mediport insertion, single  Mediport removal  Phacoemulsification of cataract  Phacoemulsification of cataract  Picc line removal  Polypectomy  Pvd surgery  Removal of catheter  Shoulder surgery      Comment:  shoulder replacement  Sphincterotomy of urethra  Ventral hernia repair    Presents for EGD.  Previous anesthetic cysto with LMA 3 in May 2023  Pt has elevated liver enzymes and cirrhosis from unknown cause - no previous EGD       Pre-op Assessment    I have reviewed the Patient Summary Reports.    I have reviewed the NPO Status.      Review of Systems  Cardiovascular:     Hypertension                                  Hypertension         Renal/:  Chronic Renal Disease        Kidney Function/Disease             Hepatic/GI:      Liver Disease,         Liver Disease        Musculoskeletal:  Arthritis        Arthritis          Neurological:      Arthritis                           Psych:  Psychiatric History                Physical Exam  General: Well nourished, Cooperative, Alert and Oriented  Pleasant, good historian   Airway:  Mallampati: II   Mouth Opening: Normal  TM Distance: Normal  Tongue: Normal  Neck ROM: Normal ROM    Dental:  Dentures  Top to be removed  Chest/Lungs:  Clear to auscultation, Normal Respiratory Rate    Heart:  Rate: Normal  Rhythm: Regular Rhythm      Anesthesia Plan  Type of Anesthesia, risks & benefits discussed:    Anesthesia Type: Gen Natural Airway  Intra-op Monitoring Plan: Standard ASA Monitors  Post Op Pain Control Plan: IV/PO Opioids PRN  Induction:  IV  Airway Plan: Direct  Informed Consent: Informed consent signed with the Patient and all parties understand the risks and agree with anesthesia plan.  All questions answered. Patient consented to blood products? No  ASA Score: 3  Day of Surgery Review of History & Physical: H&P Update referred to the surgeon/provider.  Anesthesia Plan Notes: Nasal cannula vs facemask supplemental oxygenation   For patients with  TATYANA/obesity, may consider SuperNoval Nasal CPAP      Ready For Surgery From Anesthesia Perspective.     .

## 2024-03-28 NOTE — TRANSFER OF CARE
"Anesthesia Transfer of Care Note    Patient: Laura Acosta    Procedure(s) Performed: * No procedures listed *    Patient location: Other: cvss    Anesthesia Type: MAC    Transport from OR: Transported from OR on 6-10 L/min O2 by face mask with adequate spontaneous ventilation    Post pain: adequate analgesia    Post assessment: no apparent anesthetic complications and tolerated procedure well    Post vital signs: stable    Level of consciousness: awake    Nausea/Vomiting: no nausea/vomiting    Complications: none    Transfer of care protocol was followed      Last vitals: Visit Vitals  BP (!) 125/56 (BP Location: Left arm, Patient Position: Lying)   Pulse (!) 50   Temp 37 °C (98.6 °F)   Resp 15   Ht 5' 4" (1.626 m)   Wt 75.1 kg (165 lb 9.1 oz)   SpO2 100%   Breastfeeding No   BMI 28.42 kg/m²     "

## 2024-03-28 NOTE — ANESTHESIA POSTPROCEDURE EVALUATION
Anesthesia Post Evaluation    Patient: Laura Acosta    Procedure(s) Performed: * No procedures listed *    Final Anesthesia Type: general      Patient location during evaluation: PACU  Patient participation: Yes- Able to Participate  Level of consciousness: awake  Post-procedure vital signs: reviewed and stable  Pain management: adequate  Airway patency: patent  TATYANA mitigation strategies: Postoperative administration of CPAP, nasopharyngeal airway, or oral appliance in the postanesthesia care unit (PACU)  PONV status at discharge: No PONV  Anesthetic complications: no      Cardiovascular status: hemodynamically stable  Respiratory status: unassisted and spontaneous ventilation  Hydration status: euvolemic  Follow-up not needed.            Vitals Value Taken Time   /71 03/28/24 1031   Temp 37 03/28/24 1045   Pulse 52 03/28/24 1044   Resp 18 03/28/24 1044   SpO2 100 % 03/28/24 1044   Vitals shown include unvalidated device data.      No case tracking events are documented in the log.      Pain/Chuy Score: No data recorded

## 2024-03-29 LAB
A-TOCOPHEROL VIT E SERPL-MCNC: 13 MG/L (ref 5.5–17)
OHS QRS DURATION: 90 MS
OHS QTC CALCULATION: 411 MS

## 2024-04-04 DIAGNOSIS — B95.7 CHRONIC GRANULOMATOUS INFECTION DUE MOSTLY TO STAPHYLOCOCCUS AUREUS: Primary | ICD-10-CM

## 2024-04-05 LAB
ACYLCARNITINE SERPL-SCNC: 3 NMOL/ML
ACYLCARNITINE/C0 SERPL-SRTO: 0.1 {RATIO}
CARNITINE FREE SERPL-SCNC: 22 NMOL/ML
CARNITINE SERPL-SCNC: NORMAL UMOL/L
CLINICAL BIOCHEMIST REVIEW: NORMAL

## 2024-04-16 ENCOUNTER — HOSPITAL ENCOUNTER (OUTPATIENT)
Dept: RADIOLOGY | Facility: HOSPITAL | Age: 79
Discharge: HOME | DRG: 315 | End: 2024-04-16
Attending: INTERNAL MEDICINE
Payer: MEDICARE

## 2024-04-16 DIAGNOSIS — B95.7 CHRONIC GRANULOMATOUS INFECTION DUE MOSTLY TO STAPHYLOCOCCUS AUREUS: ICD-10-CM

## 2024-04-16 PROCEDURE — A9521 TC99M EXAMETAZIME: HCPCS | Performed by: INTERNAL MEDICINE

## 2024-04-16 PROCEDURE — 78802 RP LOCLZJ TUM WHBDY 1 D IMG: CPT | Mod: TC

## 2024-04-16 RX ADMIN — TECHNETIUM TC-99M EXAMETAZIME AND COBALTOUS CHLORIDE 21.8 MILLICURIE: KIT at 11:04

## 2024-04-18 ENCOUNTER — HOSPITAL ENCOUNTER (INPATIENT)
Facility: HOSPITAL | Age: 79
LOS: 11 days | Discharge: HOME HEALTH | DRG: 315 | End: 2024-04-29
Attending: EMERGENCY MEDICINE | Admitting: INTERNAL MEDICINE
Payer: MEDICARE

## 2024-04-18 DIAGNOSIS — R53.1 WEAKNESS: ICD-10-CM

## 2024-04-18 DIAGNOSIS — R50.9 FEVER, UNSPECIFIED FEVER CAUSE: Primary | ICD-10-CM

## 2024-04-18 LAB
ALBUMIN SERPL-MCNC: 3.3 G/DL (ref 3.4–4.8)
ALBUMIN/GLOB SERPL: 0.8 RATIO (ref 1.1–2)
ALLENS TEST BLOOD GAS (OHS): YES
ALP SERPL-CCNC: 92 UNIT/L (ref 40–150)
ALT SERPL-CCNC: 26 UNIT/L (ref 0–55)
AST SERPL-CCNC: 37 UNIT/L (ref 5–34)
BASE EXCESS BLD CALC-SCNC: 2 MMOL/L (ref -2–2)
BASOPHILS # BLD AUTO: 0.03 X10(3)/MCL
BASOPHILS NFR BLD AUTO: 0.3 %
BILIRUB SERPL-MCNC: 0.9 MG/DL
BLOOD GAS SAMPLE TYPE (OHS): ABNORMAL
BUN SERPL-MCNC: 12.7 MG/DL (ref 9.8–20.1)
CA-I BLD-SCNC: 1.1 MMOL/L (ref 1.12–1.23)
CALCIUM SERPL-MCNC: 9 MG/DL (ref 8.4–10.2)
CHLORIDE SERPL-SCNC: 102 MMOL/L (ref 98–107)
CO2 BLDA-SCNC: 27.7 MMOL/L
CO2 SERPL-SCNC: 21 MMOL/L (ref 23–31)
COHGB MFR BLDA: 0.2 % (ref 0.5–1.5)
CREAT SERPL-MCNC: 0.63 MG/DL (ref 0.55–1.02)
CRP SERPL-MCNC: 49.9 MG/L
DRAWN BY BLOOD GAS (OHS): ABNORMAL
EOSINOPHIL # BLD AUTO: 0.03 X10(3)/MCL (ref 0–0.9)
EOSINOPHIL NFR BLD AUTO: 0.3 %
ERYTHROCYTE [DISTWIDTH] IN BLOOD BY AUTOMATED COUNT: 15.1 % (ref 11.5–17)
ERYTHROCYTE [SEDIMENTATION RATE] IN BLOOD: 20 MM/HR (ref 0–20)
FLUAV AG UPPER RESP QL IA.RAPID: NOT DETECTED
FLUBV AG UPPER RESP QL IA.RAPID: NOT DETECTED
GFR SERPLBLD CREATININE-BSD FMLA CKD-EPI: >60 MLS/MIN/1.73/M2
GLOBULIN SER-MCNC: 4.3 GM/DL (ref 2.4–3.5)
GLUCOSE SERPL-MCNC: 105 MG/DL (ref 82–115)
HCO3 BLDA-SCNC: 26.5 MMOL/L (ref 22–26)
HCT VFR BLD AUTO: 37.5 % (ref 37–47)
HGB BLD-MCNC: 12.3 G/DL (ref 12–16)
IMM GRANULOCYTES # BLD AUTO: 0.03 X10(3)/MCL (ref 0–0.04)
IMM GRANULOCYTES NFR BLD AUTO: 0.3 %
INR PPP: 1.6
LACTATE SERPL-SCNC: 0.7 MMOL/L (ref 0.5–2.2)
LPM (OHS): 2
LYMPHOCYTES # BLD AUTO: 1.13 X10(3)/MCL (ref 0.6–4.6)
LYMPHOCYTES NFR BLD AUTO: 13 %
MCH RBC QN AUTO: 28 PG (ref 27–31)
MCHC RBC AUTO-ENTMCNC: 32.8 G/DL (ref 33–36)
MCV RBC AUTO: 85.2 FL (ref 80–94)
METHGB MFR BLDA: 0 % (ref 0.4–1.5)
MONOCYTES # BLD AUTO: 0.71 X10(3)/MCL (ref 0.1–1.3)
MONOCYTES NFR BLD AUTO: 8.2 %
NEUTROPHILS # BLD AUTO: 6.77 X10(3)/MCL (ref 2.1–9.2)
NEUTROPHILS NFR BLD AUTO: 77.9 %
NRBC BLD AUTO-RTO: 0 %
O2 HB BLOOD GAS (OHS): 95.7 % (ref 94–97)
OHS QRS DURATION: 90 MS
OHS QTC CALCULATION: 412 MS
OXYGEN DEVICE BLOOD GAS (OHS): ABNORMAL
OXYHGB MFR BLDA: 11.4 G/DL (ref 12–16)
PCO2 BLDA: 40 MMHG (ref 35–45)
PH BLDA: 7.43 [PH] (ref 7.35–7.45)
PLATELET # BLD AUTO: 160 X10(3)/MCL (ref 130–400)
PMV BLD AUTO: 10 FL (ref 7.4–10.4)
PO2 BLDA: 79 MMHG (ref 80–100)
POTASSIUM BLOOD GAS (OHS): 3.1 MMOL/L (ref 3.5–5)
POTASSIUM SERPL-SCNC: 3.9 MMOL/L (ref 3.5–5.1)
PROT SERPL-MCNC: 7.6 GM/DL (ref 5.8–7.6)
PROTHROMBIN TIME: 19 SECONDS (ref 12.5–14.5)
RBC # BLD AUTO: 4.4 X10(6)/MCL (ref 4.2–5.4)
RSV A 5' UTR RNA NPH QL NAA+PROBE: NOT DETECTED
SAMPLE SITE BLOOD GAS (OHS): ABNORMAL
SAO2 % BLDA: 95.9 %
SARS-COV-2 RNA RESP QL NAA+PROBE: NOT DETECTED
SODIUM BLOOD GAS (OHS): 132 MMOL/L (ref 137–145)
SODIUM SERPL-SCNC: 134 MMOL/L (ref 136–145)
WBC # SPEC AUTO: 8.7 X10(3)/MCL (ref 4.5–11.5)

## 2024-04-18 PROCEDURE — 85610 PROTHROMBIN TIME: CPT | Performed by: INTERNAL MEDICINE

## 2024-04-18 PROCEDURE — 63600175 PHARM REV CODE 636 W HCPCS: Performed by: EMERGENCY MEDICINE

## 2024-04-18 PROCEDURE — 87040 BLOOD CULTURE FOR BACTERIA: CPT | Performed by: EMERGENCY MEDICINE

## 2024-04-18 PROCEDURE — 86140 C-REACTIVE PROTEIN: CPT | Performed by: INTERNAL MEDICINE

## 2024-04-18 PROCEDURE — 83605 ASSAY OF LACTIC ACID: CPT | Performed by: EMERGENCY MEDICINE

## 2024-04-18 PROCEDURE — 0241U COVID/RSV/FLU A&B PCR: CPT | Performed by: EMERGENCY MEDICINE

## 2024-04-18 PROCEDURE — 87077 CULTURE AEROBIC IDENTIFY: CPT | Performed by: EMERGENCY MEDICINE

## 2024-04-18 PROCEDURE — 94760 N-INVAS EAR/PLS OXIMETRY 1: CPT | Mod: XB

## 2024-04-18 PROCEDURE — 87207 SMEAR SPECIAL STAIN: CPT | Performed by: EMERGENCY MEDICINE

## 2024-04-18 PROCEDURE — 99285 EMERGENCY DEPT VISIT HI MDM: CPT | Mod: 25

## 2024-04-18 PROCEDURE — 27000221 HC OXYGEN, UP TO 24 HOURS

## 2024-04-18 PROCEDURE — 82803 BLOOD GASES ANY COMBINATION: CPT

## 2024-04-18 PROCEDURE — 63600175 PHARM REV CODE 636 W HCPCS: Performed by: PHYSICIAN ASSISTANT

## 2024-04-18 PROCEDURE — 85652 RBC SED RATE AUTOMATED: CPT | Performed by: INTERNAL MEDICINE

## 2024-04-18 PROCEDURE — 36600 WITHDRAWAL OF ARTERIAL BLOOD: CPT

## 2024-04-18 PROCEDURE — 99900035 HC TECH TIME PER 15 MIN (STAT)

## 2024-04-18 PROCEDURE — 25500020 PHARM REV CODE 255: Performed by: EMERGENCY MEDICINE

## 2024-04-18 PROCEDURE — 93010 ELECTROCARDIOGRAM REPORT: CPT | Mod: ,,, | Performed by: INTERNAL MEDICINE

## 2024-04-18 PROCEDURE — 87154 CUL TYP ID BLD PTHGN 6+ TRGT: CPT | Performed by: EMERGENCY MEDICINE

## 2024-04-18 PROCEDURE — 85025 COMPLETE CBC W/AUTO DIFF WBC: CPT | Performed by: EMERGENCY MEDICINE

## 2024-04-18 PROCEDURE — 25000003 PHARM REV CODE 250: Performed by: PHYSICIAN ASSISTANT

## 2024-04-18 PROCEDURE — 11000001 HC ACUTE MED/SURG PRIVATE ROOM

## 2024-04-18 PROCEDURE — 93005 ELECTROCARDIOGRAM TRACING: CPT

## 2024-04-18 PROCEDURE — 25000003 PHARM REV CODE 250: Performed by: EMERGENCY MEDICINE

## 2024-04-18 PROCEDURE — 25000003 PHARM REV CODE 250: Performed by: INTERNAL MEDICINE

## 2024-04-18 PROCEDURE — 27000207 HC ISOLATION

## 2024-04-18 PROCEDURE — 80053 COMPREHEN METABOLIC PANEL: CPT | Performed by: EMERGENCY MEDICINE

## 2024-04-18 RX ORDER — WARFARIN SODIUM 5 MG/1
5 TABLET ORAL DAILY
Status: DISCONTINUED | OUTPATIENT
Start: 2024-04-18 | End: 2024-04-18

## 2024-04-18 RX ORDER — SOLIFENACIN SUCCINATE 5 MG/1
5 TABLET, FILM COATED ORAL DAILY
COMMUNITY
Start: 2024-03-28

## 2024-04-18 RX ORDER — SODIUM CHLORIDE 9 MG/ML
INJECTION, SOLUTION INTRAVENOUS CONTINUOUS
Status: DISCONTINUED | OUTPATIENT
Start: 2024-04-18 | End: 2024-04-20

## 2024-04-18 RX ORDER — ONDANSETRON HYDROCHLORIDE 2 MG/ML
4 INJECTION, SOLUTION INTRAVENOUS EVERY 8 HOURS PRN
Status: DISCONTINUED | OUTPATIENT
Start: 2024-04-18 | End: 2024-04-29 | Stop reason: HOSPADM

## 2024-04-18 RX ORDER — GLUCAGON 1 MG
1 KIT INJECTION
Status: DISCONTINUED | OUTPATIENT
Start: 2024-04-18 | End: 2024-04-29 | Stop reason: HOSPADM

## 2024-04-18 RX ORDER — MUPIROCIN 20 MG/G
OINTMENT TOPICAL 2 TIMES DAILY
Status: DISPENSED | OUTPATIENT
Start: 2024-04-18 | End: 2024-04-23

## 2024-04-18 RX ORDER — ACETAMINOPHEN 500 MG
1000 TABLET ORAL
Status: COMPLETED | OUTPATIENT
Start: 2024-04-18 | End: 2024-04-18

## 2024-04-18 RX ORDER — DOXYCYCLINE HYCLATE 100 MG
100 TABLET ORAL 2 TIMES DAILY
Status: DISCONTINUED | OUTPATIENT
Start: 2024-04-18 | End: 2024-04-19

## 2024-04-18 RX ORDER — ACETAMINOPHEN 325 MG/1
650 TABLET ORAL EVERY 4 HOURS PRN
Status: DISCONTINUED | OUTPATIENT
Start: 2024-04-18 | End: 2024-04-29 | Stop reason: HOSPADM

## 2024-04-18 RX ORDER — CITALOPRAM 20 MG/1
20 TABLET, FILM COATED ORAL DAILY
Status: DISCONTINUED | OUTPATIENT
Start: 2024-04-19 | End: 2024-04-29 | Stop reason: HOSPADM

## 2024-04-18 RX ORDER — WARFARIN 2.5 MG/1
2.5 TABLET ORAL DAILY
Status: DISCONTINUED | OUTPATIENT
Start: 2024-04-19 | End: 2024-04-19

## 2024-04-18 RX ORDER — WARFARIN SODIUM 5 MG/1
5 TABLET ORAL DAILY
Status: COMPLETED | OUTPATIENT
Start: 2024-04-18 | End: 2024-04-18

## 2024-04-18 RX ORDER — IBUPROFEN 200 MG
16 TABLET ORAL
Status: DISCONTINUED | OUTPATIENT
Start: 2024-04-18 | End: 2024-04-29 | Stop reason: HOSPADM

## 2024-04-18 RX ORDER — WARFARIN 2.5 MG/1
2.5 TABLET ORAL ONCE
COMMUNITY
End: 2024-05-29

## 2024-04-18 RX ORDER — ACETAMINOPHEN 325 MG/1
650 TABLET ORAL EVERY 8 HOURS PRN
Status: DISCONTINUED | OUTPATIENT
Start: 2024-04-18 | End: 2024-04-29 | Stop reason: HOSPADM

## 2024-04-18 RX ORDER — DIPHENOXYLATE HYDROCHLORIDE AND ATROPINE SULFATE 2.5; .025 MG/1; MG/1
2 TABLET ORAL 2 TIMES DAILY
Status: DISCONTINUED | OUTPATIENT
Start: 2024-04-18 | End: 2024-04-29 | Stop reason: HOSPADM

## 2024-04-18 RX ORDER — BUSPIRONE HYDROCHLORIDE 5 MG/1
15 TABLET ORAL DAILY
Status: DISCONTINUED | OUTPATIENT
Start: 2024-04-19 | End: 2024-04-29 | Stop reason: HOSPADM

## 2024-04-18 RX ORDER — IBUPROFEN 200 MG
24 TABLET ORAL
Status: DISCONTINUED | OUTPATIENT
Start: 2024-04-18 | End: 2024-04-29 | Stop reason: HOSPADM

## 2024-04-18 RX ORDER — MIRTAZAPINE 15 MG/1
15 TABLET, FILM COATED ORAL NIGHTLY
Status: DISCONTINUED | OUTPATIENT
Start: 2024-04-18 | End: 2024-04-29 | Stop reason: HOSPADM

## 2024-04-18 RX ADMIN — ACETAMINOPHEN 1000 MG: 500 TABLET ORAL at 11:04

## 2024-04-18 RX ADMIN — WARFARIN SODIUM 5 MG: 5 TABLET ORAL at 09:04

## 2024-04-18 RX ADMIN — DIPHENOXYLATE HYDROCHLORIDE AND ATROPINE SULFATE 2 TABLET: .025; 2.5 TABLET ORAL at 09:04

## 2024-04-18 RX ADMIN — MIRTAZAPINE 15 MG: 15 TABLET, FILM COATED ORAL at 09:04

## 2024-04-18 RX ADMIN — PIPERACILLIN AND TAZOBACTAM 4.5 G: 4; .5 INJECTION, POWDER, LYOPHILIZED, FOR SOLUTION INTRAVENOUS; PARENTERAL at 11:04

## 2024-04-18 RX ADMIN — DOXYCYCLINE HYCLATE 100 MG: 100 TABLET, COATED ORAL at 09:04

## 2024-04-18 RX ADMIN — IOHEXOL 100 ML: 350 INJECTION, SOLUTION INTRAVENOUS at 01:04

## 2024-04-18 RX ADMIN — PIPERACILLIN AND TAZOBACTAM 4.5 G: 4; .5 INJECTION, POWDER, LYOPHILIZED, FOR SOLUTION INTRAVENOUS; PARENTERAL at 04:04

## 2024-04-18 RX ADMIN — SODIUM CHLORIDE: 9 INJECTION, SOLUTION INTRAVENOUS at 07:04

## 2024-04-18 RX ADMIN — ACETAMINOPHEN 650 MG: 325 TABLET, FILM COATED ORAL at 06:04

## 2024-04-18 RX ADMIN — MUPIROCIN: 20 OINTMENT TOPICAL at 09:04

## 2024-04-18 NOTE — H&P
"[facility] Hospital Medicine History & Physical Examination       Patient Name: Laura Acosta  MRN: [id number]  Patient Class: IP- Inpatient   Admission Date: 04/18/2024   Admitting Service: Hospital Medicine   Length of Stay: 0  Attending Physician: Shannan Helton MD  Primary Care Provider: Curt Felton MD  Face-to-Face encounter date: 04/18/2024  Code Status: Full code   Chief Complaint: Fever (Pt has fever and body aches that started this am. Pt reports she was recently seen for the fever and dx with "fever of unknown origin". Denies cp/sob or n/v/d. Pt has an accessed mediport that she gets home infusions 4x a week. Febrile in triage. Took 500mg tylenol at 4am)      Present at Bedside:    Patient information was obtained from patient, patient's family, past medical records and ER records.    MD addendum-  79 yo female with h/o fever of unknown origin since 1/2024  followed by Dr. Duenas presented to the ED for evaluation of fever at home today. Other history significant for ischemic small bowel s/p resection, subsequent short gut syndrome, chronic diarrhea, malnutrition , on TPN 4 days /week via mediport, prior MRSA bacteremia on suppressive Doxycyline and DVT on coumadin followed by Dr. Waite. Temp was 102 on arrival and was tachypnic , mild hypoxia requiring low dose oxygen, Labs with normal WBC, elevated CRP. CTA chest showed no PE, new 11 m solid nodule in the RLL infection vs inflammatory lesion. Blood cultures were obtained and pt started on Zosyn. Home Doxycyline continued . ID consulted for further eval. UA was ordered but was pending during this dictation. INR was subtherapeutic with 1.6. Coumadin 5 mg x 1 dose given tonight then resume home dose 2.5 mg daily .     Critical care note:  Critical care diagnosis: sepsis due to unspecified infection requiring IV antibiotic     Critical care interventions: Hands-on evaluation, review of labs/radiographs/records and " discussion with patient and family if present  Critical care time spent: 35 minutes            HISTORY OF PRESENT ILLNESS:   Laura Acosta is a 78 y.o. female with a past medical history of hypertension, hyperlipidemia, PVD, DVT, short gut syndrome on TPN, chronic granulomatous infection, MRSA bacteremia, rheumatoid arthritis, cirrhosis, gallstone pancreatitis, breast cancer, anxiety, and depression who presented to [facility] on 4/18/2024 with c/o fever.  Patient reported fever and body aches began this morning. Patient is followed by Dr. Hoover and has had recurrent fevers for the past 2 months.  Patient is on suppressive Doxycycline secondary to previous MRSA bacteremia.  Patient receives infusions 4 times weekly via MediPort.  Patient reported her last TPN infusion was last night.  Patient reported her MediPort has been in place since 2019.  Patient had NM inflammatory whole-body scan on 04/16/2024 which revealed no scintigraphic evidence of localized infectious process.  On exam patient endorsed chronic diarrhea.  Patient was denied nausea, vomiting, dysuria, hematuria, cough, congestion, chest pain, and shortness of breath.   Initial vital signs in ED were /76, pulse 70, respirations 30, temperature 39.1° C, and SpO2 96% on 3 L nasal cannula.  Labs revealed WBC 8.70, sodium 134, CO2 21, and lactic acid 0.7.  Flu/RSV/COVID testing were negative.  Blood cultures were obtained.  Chest x-ray revealed no active pulmonary disease.  CTA chest revealed no pulmonary embolism identified with new 11 mm solid nodule in the right lower lobe likely infectious versus inflammatory.  Patient was placed on 3 L supplemental oxygen in ED and given Tylenol 1000 mg and IV Zosyn 4.5 g. Patient was admitted to hospital medicine service for further medical management.     PAST MEDICAL HISTORY:   Hypertension  Hyperlipidemia  PVD  DVT  Short gut syndrome on TPN   Chronic granulomatous infection   MRSA bacteremia  Rheumatoid  arthritis  Cirrhosis  Gallstone pancreatitis   Breast cancer   Anxiety  Depression    PAST SURGICAL HISTORY:     Past Surgical History:   Procedure Laterality Date    APPENDECTOMY      BOWEL RESECTION      BREAST LUMPECTOMY Right     CHOLECYSTECTOMY  05/2015    COLONOSCOPY  02/2022    repeat 3 years    CYST REMOVAL Right     breast    CYSTOSCOPY W/ STONE MANIPULATION      CYSTOSCOPY W/ URETERAL STENT PLACEMENT  12/2020    CYSTOSCOPY W/ URETERAL STENT REMOVAL  04/2021    CYSTOURETEROSCOPY, WITH HOLMIUM LASER LITHOTRIPSY OF URETERAL CALCULUS AND STENT INSERTION Left 05/02/2023    Procedure: CYSTOSCOPY, WITH URETERAL STENT INSERTION Left;  Surgeon: Jared Felix MD;  Location: [facility] OR;  Service: Urology;  Laterality: Left;    EGD, WITH CLOSED BIOPSY N/A 6/20/2023    Procedure: EGD;  Surgeon: Aashish Carroll MD;  Location: [facility] ENDOSCOPY;  Service: Gastroenterology;  Laterality: N/A;    ENDOSCOPIC RETROGRADE CHOLANGIOPANCREATOGRAPHY W/ SPHINCTEROTOMY AND STONE REMOVAL  04/2015    ESOPHAGOGASTRODUODENOSCOPY  06/20/2023    Dr Carroll - no signs esophageal varicies --repeat 3 yrs    GI Biopsy  02/15/2022    GI Biopsy  12/2018    HYSTERECTOMY      LAPAROTOMY, EXPLORATORY  06/2015    LITHOTRIPSY Left 04/2021    LITHOTRIPSY Left 03/2021    MEDIPORT INSERTION, SINGLE  06/2021    MEDIPORT INSERTION, SINGLE  07/2020    MEDIPORT INSERTION, SINGLE  12/2018    MEDIPORT REMOVAL  07/2020    PHACOEMULSIFICATION OF CATARACT Left 12/2016    PHACOEMULSIFICATION OF CATARACT Right 11/2016    PICC line Removal Right 06/2021    POLYPECTOMY  02/2022    PVD surgery      REMOVAL OF CATHETER  12/2020    SHOULDER SURGERY Right     shoulder replacement    SPHINCTEROTOMY OF URETHRA      VENTRAL HERNIA REPAIR  04/2016       FAMILY HISTORY:   Mother:  Breast and uterine cancer  Father:  CAD and brain aneurysm    SOCIAL HISTORY:   Denied alcohol, tobacco or illicit drug use.     ALLERGIES:   Hydromorphone and Opium    HOME  MEDICATIONS:     Prior to Admission medications    Medication Sig Start Date End Date Taking? Authorizing Provider   busPIRone (BUSPAR) 15 MG tablet Take 15 mg by mouth once daily. 7/13/23   Provider, Historical   CELEXA 40 mg tablet Take 40 mg by mouth once daily. 5/23/22   Provider, Historical   diphenoxylate-atropine 2.5-0.025 mg (LOMOTIL) 2.5-0.025 mg per tablet Take 2 tablets by mouth 2 (two) times a day.    Provider, Historical   doxycycline (VIBRA-TABS) 100 MG tablet Take 100 mg by mouth 2 (two) times daily.    Provider, Historical   metoprolol tartrate (LOPRESSOR) 100 MG tablet Take 100 mg by mouth 2 (two) times daily. 6/11/22   Provider, Historical   mirtazapine (REMERON) 15 MG tablet Take 15 mg by mouth every evening.    Provider, Historical   VITAMIN D2 1,250 mcg (50,000 unit) capsule TAKE ONE CAPSULE BY MOUTH three times PER WEEK 5/23/22   Provider, Historical   warfarin (COUMADIN) 2.5 MG tablet TAKE ONE TABLET BY MOUTH ONCE DAILY IN THE EVENING OR AS DIRECTED BY CIS PROVIDER 8/16/23   Provider, Historical     ________________________________________________________________________  INPATIENT LIST OF MEDICATIONS     Current Facility-Administered Medications:     acetaminophen tablet 650 mg, 650 mg, Oral, Q8H PRN, Fito Haskins PA-C    acetaminophen tablet 650 mg, 650 mg, Oral, Q4H PRN, Fito Haskins PA-C    dextrose 10% bolus 125 mL 125 mL, 12.5 g, Intravenous, PRNJozef Morgan A, PA-C    dextrose 10% bolus 250 mL 250 mL, 25 g, Intravenous, PRJozef POPE Morgan A, PA-C    glucagon (human recombinant) injection 1 mg, 1 mg, Intramuscular, PRN, Fito Haskins PA-C    glucose chewable tablet 16 g, 16 g, Oral, PRNJozef Morgan A, PA-C    glucose chewable tablet 24 g, 24 g, Oral, PRN, Fito Haskins PA-C    ondansetron injection 4 mg, 4 mg, Intravenous, Q8H PRN, Fito Haskins PA-C    piperacillin-tazobactam (ZOSYN) 4.5 g in dextrose 5 % in water (D5W) 100 mL IVPB (MB+), 4.5 g, Intravenous, ED 1  Time, Akshat Paulino III, MD    Current Outpatient Medications:     busPIRone (BUSPAR) 15 MG tablet, Take 15 mg by mouth once daily., Disp: , Rfl:     CELEXA 40 mg tablet, Take 40 mg by mouth once daily., Disp: , Rfl:     diphenoxylate-atropine 2.5-0.025 mg (LOMOTIL) 2.5-0.025 mg per tablet, Take 2 tablets by mouth 2 (two) times a day., Disp: , Rfl:     doxycycline (VIBRA-TABS) 100 MG tablet, Take 100 mg by mouth 2 (two) times daily., Disp: , Rfl:     metoprolol tartrate (LOPRESSOR) 100 MG tablet, Take 100 mg by mouth 2 (two) times daily., Disp: , Rfl:     mirtazapine (REMERON) 15 MG tablet, Take 15 mg by mouth every evening., Disp: , Rfl:     VITAMIN D2 1,250 mcg (50,000 unit) capsule, TAKE ONE CAPSULE BY MOUTH three times PER WEEK, Disp: , Rfl:     warfarin (COUMADIN) 2.5 MG tablet, TAKE ONE TABLET BY MOUTH ONCE DAILY IN THE EVENING OR AS DIRECTED BY CIS PROVIDER, Disp: , Rfl:     Scheduled Meds:  Current Facility-Administered Medications   Medication Dose Route Frequency    piperacillin-tazobactam (Zosyn) IV (PEDS and ADULTS) (extended infusion is not appropriate)  4.5 g Intravenous ED 1 Time     Continuous Infusions:  Current Facility-Administered Medications   Medication Dose Route Frequency Last Rate Last Admin     PRN Meds:.  Current Facility-Administered Medications:     acetaminophen, 650 mg, Oral, Q8H PRN    acetaminophen, 650 mg, Oral, Q4H PRN    dextrose 10%, 12.5 g, Intravenous, PRN    dextrose 10%, 25 g, Intravenous, PRN    glucagon (human recombinant), 1 mg, Intramuscular, PRN    glucose, 16 g, Oral, PRN    glucose, 24 g, Oral, PRN    ondansetron, 4 mg, Intravenous, Q8H PRN      REVIEW OF SYSTEMS:   Except as documented, all other systems reviewed and negative.    PHYSICAL EXAM:     VITAL SIGNS: 24 HRS MIN & MAX LAST   Temp  Min: 97.6 °F (36.4 °C)  Max: 102.4 °F (39.1 °C) 97.6 °F (36.4 °C)   BP  Min: 101/48  Max: 153/76 (!) 101/48   Pulse  Min: 52  Max: 70  (!) 54   Resp  Min: 13  Max: 30 13   SpO2   Min: 93 %  Max: 96 % (!) 93 %       General appearance: Female in no apparent distress.  HENT: Atraumatic head.   Eyes: Normal extraocular movements.   Neck: Supple.   Lungs: Clear to auscultation bilaterally.   Heart: Regular rate and rhythm.  Abdomen: Soft, non-distended, non-tender.  Extremities: No cyanosis, clubbing, or deformities.   Skin: Mediport to right chest wall   Neuro: Awake, alert, and oriented. Motor and sensory exams grossly intact.   Psych/mental status: Appropriate mood and affect. Responds appropriately to questions.     LABS AND IMAGING:     Recent Labs   Lab 04/15/24  0830 04/18/24  1051   WBC 11.95* 8.70   RBC 4.13* 4.40   HGB 11.3* 12.3   HCT 35.9* 37.5   MCV 86.9 85.2   MCH 27.4 28.0   MCHC 31.5* 32.8*   RDW 14.7 15.1    160   MPV 9.5 10.0       Recent Labs   Lab 04/15/24  0830 04/18/24  1051 04/18/24  1228    134*  --    K 3.9 3.9  --    CO2 25 21*  --    BUN 20.1 12.7  --    CREATININE 0.66 0.63  --    CALCIUM 8.6 9.0  --    PH  --   --  7.430   MG 2.30  --   --    ALBUMIN 3.2* 3.3*  --    ALKPHOS 95 92  --    ALT 33 26  --    AST 42* 37*  --    BILITOT 0.5 0.9  --        Microbiology Results (last 7 days)       Procedure Component Value Units Date/Time    Clostridium Diff Toxin, A & B, EIA [[id number]]     Order Status: Sent Specimen: Stool     Malaria Smear [[id number]]     Order Status: Sent Specimen: Blood     Blood Culture [[id number]]     Order Status: Sent Specimen: Blood     Blood culture x two cultures. Draw prior to antibiotics. [[id number]] Collected: 04/18/24 1106    Order Status: Resulted Specimen: Blood Updated: 04/18/24 1131    Blood culture x two cultures. Draw prior to antibiotics. [[id number]] Collected: 04/18/24 1106    Order Status: Resulted Specimen: Blood Updated: 04/18/24 1131             CTA Chest Non-Coronary (PE Studies)  Narrative: EXAMINATION:  CTA CHEST NON CORONARY (PE STUDIES)    CLINICAL HISTORY:  Pulmonary embolism (PE) suspected, unknown  D-dimer;    TECHNIQUE:  CTA imaging of the chest after IV contrast. Axial, coronal and sagittal reconstructions, including MIP images, are reviewed. Dose length product 426 mGycm. Automatic exposure control, adjustment of mA/kV or iterative reconstruction technique used to limit radiation dose.    COMPARISON:  CT 03/01/2024    FINDINGS:  Diagnostic quality: Adequate    Pulmonary embolism: None identified.    Lung parenchyma: New 11 mm mean diameter solid nodule within the right lower lobe (series 7, image 66).  8 mm solid nodule medially in the right lower lobe unchanged from prior exam.  Stable site of pleuroparenchymal scarring in the superior right lung.    Pleural effusion: None.    Mediastinum/ellen: Right-sided MediPort catheter tip in the SVC.  Mild coronary artery calcification.  No pathologically enlarged lymph node.    Chest wall/axilla: Stable appearance.    Upper abdomen: No significant findings.    Bones: No acute osseous process.  Right shoulder arthroplasty.  Impression: No pulmonary embolism identified.    New 11 mm solid nodule in the right lower lobe, likely infectious or inflammatory.  Three month follow-up chest CT recommended to ensure resolution.    Electronically signed by: Adria Dooley  Date:    04/18/2024  Time:    13:52  X-Ray Chest AP Portable  Narrative: EXAMINATION:  XR CHEST AP PORTABLE    CLINICAL HISTORY:  Sepsis;    TECHNIQUE:  Single frontal view of the chest was performed.    COMPARISON:  February 2, 2024    FINDINGS:  Examination reveals mediastinal cardiac silhouette to be within normal limits lung fields are clear and free of gross infiltrates atelectasis or effusions a peripherally calcified lesion is identified projecting at the right base similar to previous exams and likely related to breast  Impression: No active pulmonary disease    Electronically signed by: Domingo Hughes  Date:    04/18/2024  Time:    10:44        ASSESSMENT & PLAN:   Assessment:   Acute hypoxemic  respiratory failure  Recurrent fever of unknown origin  11 mm solid nodule in the right upper lobe  Chronic diarrhea  History of hypertension, hyperlipidemia, PVD, DVT, short gut syndrome on TPN, chronic granulomatous infection, MRSA bacteremia, rheumatoid arthritis, cirrhosis, gallstone pancreatitis, breast cancer, anxiety, and depression       Plan:  Continue supplemental oxygen, wean as tolerated   Continue IV Zosyn  Blood cultures pending, follow-up results  ID consulted, appreciate recommendations   MRSA PCR  GI panel   Stool culture, C diff, fecal leukocytes ordered   Resume home medications as deemed appropriate once medication reconciliation is updated  Labs in AM    VTE Prophylaxis:  Home Coumadin    Discharge Planning and Disposition: TBD    LEIGHTON, Fito Haskins PA-C have reviewed and discussed the case with Shannan Helton MD  Please see the attending MD's addendum for further assessment and plan.    Fito Haskins PA-C  Department of Hospital Medicine   [facility]   04/18/2024    This note was created with the assistance of [employer] voice recognition software. There may be transcription errors as a result of using this technology, however minimal. Effort has been made to assure accuracy of transcription, but any obvious errors or omissions should be clarified with the author of the document.    _______________________________________________________________________________  MD Addendum:  Dr. LEIGHTON , assumed care of this patient today at --am/pm  For the patient encounter, I performed the substantive portion of the visit, I reviewed the NP/PA documentation, treatment plan, and medical decision making.  I had face to face time with this patient     A. History:    B. Physical exam:    C. Medical decision making:      All diagnosis and differential diagnosis have been reviewed; assessment and plan has been documented; I have personally reviewed the labs and test results that are  presently available; I have reviewed the patients medication list; I have reviewed the consulting providers response and recommendations. I have reviewed or attempted to review medical records based upon their availability.    All of the patient and family questions have been addressed and answered. Patient's is agreeable to the above stated plan. I will continue to monitor closely and make adjustments to medical management as needed.      04/18/2024

## 2024-04-18 NOTE — ED PROVIDER NOTES
"Encounter Date: 4/18/2024    SCRIBE #1 NOTE: I, Ebony Aviles, am scribing for, and in the presence of,  Akshat Paulino III, MD. I have scribed the following portions of the note - Other sections scribed: HPI, ROS, PE, MDM.       History     Chief Complaint   Patient presents with    Fever     Pt has fever and body aches that started this am. Pt reports she was recently seen for the fever and dx with "fever of unknown origin". Denies cp/sob or n/v/d. Pt has an accessed mediport that she gets home infusions 4x a week. Febrile in triage. Took 500mg tylenol at 4am     78 Y.O. female with a history of chronic granulomatous infection, HTN, DM II, breast CA, hepatic cirrhosis, and gallstone pancreatitis presents to the ED via EMS for a fever onset this morning. Pt also complains of myalgias, headache, mild neck pain, diarrhea (chronic), and difficulty urinating. Pt reports following with infectious disease (Neha Hoover MD) for recurrent fevers since February 2024 s/t chronic granulomatous infection due mostly to Staphylococcus aureus from her Mediport. Pt currently has a Mediport that she has received TPN through since 2019 following extensive bowel resection d/t blood clots. She receives infusions 4 times weekly (last access 3 days ago). Pt has had sepsis twice prior. Pt denies chest pain, SOB, nausea, vomiting, rhinorrhea, abdominal pain, cough, congestion, rash, and dysuria. Pt's PCP is Curt Felton MD.    The history is provided by the patient.   Fever  Primary symptoms of the febrile illness include fever, headaches, diarrhea and myalgias. Primary symptoms do not include cough, shortness of breath, abdominal pain, nausea, vomiting, dysuria or rash.   Risk factors for febrile illness include implanted device, history of cancer and diabetes mellitus.    Review of patient's allergies indicates:   Allergen Reactions    Hydromorphone Itching     Other reaction(s): itching    Opium      Other reaction(s): " itching  has itching with larger doses. Smaller doses do not cause a reaction.     Past Medical History:   Diagnosis Date    Acute URI     Anxiety and depression     Back pain     Breast cancer     Cholelithiasis     Contaminated small bowel syndrome     DVT (deep venous thrombosis)     Edema, lower extremity     Gallstone pancreatitis     HTN (hypertension)     Hypercholesterolemia     Insomnia     Irregular heart beat     Ischemic disease of gut     Laryngitis     Malabsorption     Meningitis, unspecified     OA (osteoarthritis)     PVD (peripheral vascular disease)     Rheumatoid arthritis, unspecified     Staph infection     infected mediport -- on doxycycline daily for prevention    Tremor     Unspecified cataract     Unspecified cirrhosis of liver 06/20/2023    Dr Carroll    Venous insufficiency      Past Surgical History:   Procedure Laterality Date    APPENDECTOMY      BOWEL RESECTION      BREAST LUMPECTOMY Right     CHOLECYSTECTOMY  05/2015    COLONOSCOPY  02/2022    repeat 3 years    CYST REMOVAL Right     breast    CYSTOSCOPY W/ STONE MANIPULATION      CYSTOSCOPY W/ URETERAL STENT PLACEMENT  12/2020    CYSTOSCOPY W/ URETERAL STENT REMOVAL  04/2021    CYSTOURETEROSCOPY, WITH HOLMIUM LASER LITHOTRIPSY OF URETERAL CALCULUS AND STENT INSERTION Left 05/02/2023    Procedure: CYSTOSCOPY, WITH URETERAL STENT INSERTION Left;  Surgeon: Jared Felix MD;  Location: [facility] OR;  Service: Urology;  Laterality: Left;    EGD, WITH CLOSED BIOPSY N/A 6/20/2023    Procedure: EGD;  Surgeon: Aashish Carroll MD;  Location: [facility] ENDOSCOPY;  Service: Gastroenterology;  Laterality: N/A;    ENDOSCOPIC RETROGRADE CHOLANGIOPANCREATOGRAPHY W/ SPHINCTEROTOMY AND STONE REMOVAL  04/2015    ESOPHAGOGASTRODUODENOSCOPY  06/20/2023    Dr Carroll - no signs esophageal varicies --repeat 3 yrs    GI Biopsy  02/15/2022    GI Biopsy  12/2018    HYSTERECTOMY      LAPAROTOMY, EXPLORATORY  06/2015    LITHOTRIPSY Left 04/2021     LITHOTRIPSY Left 03/2021    MEDIPORT INSERTION, SINGLE  06/2021    MEDIPORT INSERTION, SINGLE  07/2020    MEDIPORT INSERTION, SINGLE  12/2018    MEDIPORT REMOVAL  07/2020    PHACOEMULSIFICATION OF CATARACT Left 12/2016    PHACOEMULSIFICATION OF CATARACT Right 11/2016    PICC line Removal Right 06/2021    POLYPECTOMY  02/2022    PVD surgery      REMOVAL OF CATHETER  12/2020    SHOULDER SURGERY Right     shoulder replacement    SPHINCTEROTOMY OF URETHRA      VENTRAL HERNIA REPAIR  04/2016     Family History   Problem Relation Name Age of Onset    Pneumonia Mother      Depression Mother      Osteoarthritis Mother      Breast cancer Mother      Uterine cancer Mother      Heart attack Father      Aneurysm Father          brain    Diabetes Father      Hyperlipidemia Father      Hypertension Father      Hyperlipidemia Sister      Hypertension Sister      Kidney cancer Sister      Osteoarthritis Sister      Breast cancer Sister      Hyperlipidemia Brother      Hypertension Brother      Coronary artery disease Brother          CABG x3    Hyperlipidemia Brother      Hypertension Brother       Social History     Tobacco Use    Smoking status: Never    Smokeless tobacco: Never   Substance Use Topics    Alcohol use: Not Currently    Drug use: Never     Review of Systems   Constitutional:  Positive for fever.   HENT:  Negative for congestion and rhinorrhea.    Respiratory:  Negative for cough and shortness of breath.    Cardiovascular:  Negative for chest pain.   Gastrointestinal:  Positive for diarrhea. Negative for abdominal pain, nausea and vomiting.   Genitourinary:  Positive for difficulty urinating. Negative for dysuria.   Musculoskeletal:  Positive for myalgias and neck pain.   Skin:  Negative for rash.   Neurological:  Positive for headaches.       Physical Exam     Initial Vitals [04/18/24 0957]   BP Pulse Resp Temp SpO2   (!) 153/76 70 (!) 30 (!) 102.4 °F (39.1 °C) 96 %      MAP       --         Physical Exam    Nursing  note and vitals reviewed.  Constitutional: She appears well-developed and well-nourished.   Mild distress.   HENT:   Head: Normocephalic and atraumatic.   Mouth/Throat: Oropharynx is clear and moist.   Eyes: Pupils are equal, round, and reactive to light.   Neck: Neck supple.   Normal range of motion.  Cardiovascular:  Normal rate, regular rhythm, normal heart sounds and intact distal pulses.           Pulmonary/Chest: Breath sounds normal.   Mediport to right chest wall.   Abdominal: Abdomen is soft. Bowel sounds are normal. There is no abdominal tenderness. There is no rebound.   Musculoskeletal:         General: No tenderness. Normal range of motion.      Cervical back: Normal range of motion and neck supple.     Neurological: She is alert and oriented to person, place, and time. She has normal strength. No sensory deficit. GCS score is 15. GCS eye subscore is 4. GCS verbal subscore is 5. GCS motor subscore is 6.   Skin: Skin is warm and dry.   Psychiatric: She has a normal mood and affect.         ED Course   Procedures  Labs Reviewed   COMPREHENSIVE METABOLIC PANEL - Abnormal; Notable for the following components:       Result Value    Sodium Level 134 (*)     Carbon Dioxide 21 (*)     Albumin Level 3.3 (*)     Globulin 4.3 (*)     Albumin/Globulin Ratio 0.8 (*)     Aspartate Aminotransferase 37 (*)     All other components within normal limits   CBC WITH DIFFERENTIAL - Abnormal; Notable for the following components:    MCHC 32.8 (*)     All other components within normal limits   BLOOD GAS - Abnormal; Notable for the following components:    pO2, Blood gas 79.0 (*)     Sodium, Blood Gas 132 (*)     Potassium, Blood Gas 3.1 (*)     Calcium Level Ionized 1.10 (*)     HCO3, Blood gas 26.5 (*)     THb, Blood gas 11.4 (*)     CO Hgb 0.2 (*)     Met Hgb 0.0 (*)     All other components within normal limits   LACTIC ACID, PLASMA - Normal   COVID/RSV/FLU A&B PCR - Normal    Narrative:     The T1 [employer] Xpress  SARS-CoV-2/FLU/RSV plus is a rapid, multiplexed real-time PCR test intended for the simultaneous qualitative detection and differentiation of SARS-CoV-2, Influenza A, Influenza B, and respiratory syncytial virus (RSV) viral RNA in either nasopharyngeal swab or nasal swab specimens.         BLOOD CULTURE [facility]   BLOOD CULTURE [facility]   BLOOD CULTURE [facility]   MALARIA SMEAR [facility]   CLOSTRIDIUM DIFFICILE TOXIN A AND B, EIA   CBC W/ AUTO DIFFERENTIAL    Narrative:     The following orders were created for panel order CBC auto differential.  Procedure                               Abnormality         Status                     ---------                               -----------         ------                     CBC with Differential[[id number]]       Abnormal            Final result                 Please view results for these tests on the individual orders.   URINALYSIS, REFLEX TO URINE CULTURE   GI PANEL        ECG Results              EKG 12-lead (Final result)        Collection Time Result Time QRS Duration OHS QTC Calculation    04/18/24 10:00:44 04/18/24 12:05:14 90 412                     Final result by Interface, Lab In [facility] (04/18/24 12:05:24)                   Narrative:    Test Reason : R53.1,    Vent. Rate : 068 BPM     Atrial Rate : 068 BPM     P-R Int : 164 ms          QRS Dur : 090 ms      QT Int : 388 ms       P-R-T Axes : 062 -35 057 degrees     QTc Int : 412 ms    Normal sinus rhythm  Left axis deviation  Minimal voltage criteria for LVH, may be normal variant ( [place] product )  Abnormal ECG  Confirmed by Wilson Antunez MD ([id number]) on 4/18/2024 12:05:11 PM    Referred By:             Confirmed By:Wilson Antunez MD                                  Imaging Results              CTA Chest Non-Coronary (PE Studies) (Final result)  Result time 04/18/24 13:52:14      Final result by Adria Dooley MD (04/18/24 13:52:14)                   Impression:      No pulmonary embolism identified.    New 11 mm solid  nodule in the right lower lobe, likely infectious or inflammatory.  Three month follow-up chest CT recommended to ensure resolution.      Electronically signed by: Adria Dooley  Date:    04/18/2024  Time:    13:52               Narrative:    EXAMINATION:  CTA CHEST NON CORONARY (PE STUDIES)    CLINICAL HISTORY:  Pulmonary embolism (PE) suspected, unknown D-dimer;    TECHNIQUE:  CTA imaging of the chest after IV contrast. Axial, coronal and sagittal reconstructions, including MIP images, are reviewed. Dose length product 426 mGycm. Automatic exposure control, adjustment of mA/kV or iterative reconstruction technique used to limit radiation dose.    COMPARISON:  CT 03/01/2024    FINDINGS:  Diagnostic quality: Adequate    Pulmonary embolism: None identified.    Lung parenchyma: New 11 mm mean diameter solid nodule within the right lower lobe (series 7, image 66).  8 mm solid nodule medially in the right lower lobe unchanged from prior exam.  Stable site of pleuroparenchymal scarring in the superior right lung.    Pleural effusion: None.    Mediastinum/ellen: Right-sided MediPort catheter tip in the SVC.  Mild coronary artery calcification.  No pathologically enlarged lymph node.    Chest wall/axilla: Stable appearance.    Upper abdomen: No significant findings.    Bones: No acute osseous process.  Right shoulder arthroplasty.                                       X-Ray Chest AP Portable (Final result)  Result time 04/18/24 10:44:03      Final result by Domingo Hughes MD (04/18/24 10:44:03)                   Impression:      No active pulmonary disease      Electronically signed by: Domingo Hughes  Date:    04/18/2024  Time:    10:44               Narrative:    EXAMINATION:  XR CHEST AP PORTABLE    CLINICAL HISTORY:  Sepsis;    TECHNIQUE:  Single frontal view of the chest was performed.    COMPARISON:  February 2, 2024    FINDINGS:  Examination reveals mediastinal cardiac silhouette to be within normal limits  lung fields are clear and free of gross infiltrates atelectasis or effusions a peripherally calcified lesion is identified projecting at the right base similar to previous exams and likely related to breast                                       Medications   piperacillin-tazobactam (ZOSYN) 4.5 g in dextrose 5 % in water (D5W) 100 mL IVPB (MB+) (has no administration in time range)   acetaminophen tablet 1,000 mg (1,000 mg Oral Given 4/18/24 1106)   iohexoL (OMNIPAQUE 350) injection 100 mL (100 mLs Intravenous Given 4/18/24 1336)     Medical Decision Making  The differential diagnosis includes, but is not limited to: bacteremia, PNA, sepsis, fever with unknown origin, and UTI.     Patient given small dose of IV fluids she has been having fever per her off and on for 4 months.  So acute sepsis was felt to be unlikely although patient was tachycardic.  Antibiotics were held initially given chronic nature symptoms patient tachypneic oxygen saturation was 94 so a CT scan of the chest was done it did show a new nodule with inflammation that was not there a month ago.  On suppressive antibiotic therapy for MediPort with chronic fever will give 1 dose of IV Zosyn.  Will admit to hospital medicine    Problems Addressed:  Fever, unspecified fever cause: complicated acute illness or injury that poses a threat to life or bodily functions    Amount and/or Complexity of Data Reviewed  Labs: ordered.  Radiology: ordered.    Risk  OTC drugs.  Prescription drug management.      Additional MDM:   Sepsis:   This patient does have evidence of infective focus  My overall impression is febrile stay.  Source: Unknown  Antibiotics given- Antibiotics     Patient Encounter Information Not Found      Latest lactate reviewed- normal  Organ dysfunction indicated by none found    Fluid challenge Not needed - patient is not hypotensive      Post- resuscitation assessment Yes Perfusion exam was performed within 6 hours of septic shock presentation  after bolus shows Adequate tissue perfusion assessed by non-invasive monitoring       Will Not start Pressors- Levophed for MAP of 65  Source control achieved by:              Scribe Attestation:   Scribe #1: I performed the above scribed service and the documentation accurately describes the services I performed. I attest to the accuracy of the note.    Attending Attestation:           Physician Attestation for Scribe:  Physician Attestation Statement for Scribe #1: I, Akshat Paulino III, MD, reviewed documentation, as scribed by Ebony Aviles in my presence, and it is both accurate and complete.                                    Clinical Impression:  Final diagnoses:  [R53.1] Weakness  [R50.9] Fever, unspecified fever cause (Primary)          ED Disposition Condition    Admit Stable                Akshat Paulino III, MD  04/18/24 1483

## 2024-04-19 LAB
ACINETOBACTER CALCOACETICUS-BAUMANNII COMPLEX (OHS): NOT DETECTED
ADV 40+41 DNA STL QL NAA+NON-PROBE: NOT DETECTED
ALBUMIN SERPL-MCNC: 2.9 G/DL (ref 3.4–4.8)
ALBUMIN/GLOB SERPL: 0.9 RATIO (ref 1.1–2)
ALP SERPL-CCNC: 79 UNIT/L (ref 40–150)
ALT SERPL-CCNC: 23 UNIT/L (ref 0–55)
APPEARANCE UR: CLEAR
AST SERPL-CCNC: 27 UNIT/L (ref 5–34)
ASTRO TYP 1-8 RNA STL QL NAA+NON-PROBE: NOT DETECTED
BACTERIA #/AREA URNS AUTO: NORMAL /HPF
BACTEROIDES FRAGILIS (OHS): NOT DETECTED
BASOPHILS # BLD AUTO: 0.02 X10(3)/MCL
BASOPHILS NFR BLD AUTO: 0.3 %
BILIRUB SERPL-MCNC: 1 MG/DL
BILIRUB UR QL STRIP.AUTO: NEGATIVE
BUN SERPL-MCNC: 13.6 MG/DL (ref 9.8–20.1)
C AURIS DNA BLD POS QL NAA+NON-PROBE: NOT DETECTED
C CAYETANENSIS DNA STL QL NAA+NON-PROBE: NOT DETECTED
C COLI+JEJ+UPSA DNA STL QL NAA+NON-PROBE: NOT DETECTED
C GATTII+NEOFOR DNA CSF QL NAA+NON-PROBE: NOT DETECTED
CALCIUM SERPL-MCNC: 8 MG/DL (ref 8.4–10.2)
CANDIDA ALBICANS (OHS): NOT DETECTED
CANDIDA GLABRATA (OHS): NOT DETECTED
CANDIDA KRUSEI (OHS): NOT DETECTED
CANDIDA PARAPSILOSIS (OHS): NOT DETECTED
CANDIDA TROPICALIS (OHS): NOT DETECTED
CHLORIDE SERPL-SCNC: 107 MMOL/L (ref 98–107)
CO2 SERPL-SCNC: 22 MMOL/L (ref 23–31)
COLOR STL: NORMAL
COLOR UR AUTO: NORMAL
CONSISTENCY STL: NORMAL
CREAT SERPL-MCNC: 0.71 MG/DL (ref 0.55–1.02)
CRYPTOSP DNA STL QL NAA+NON-PROBE: NOT DETECTED
CTX-M (OHS): ABNORMAL
E HISTOLYT DNA STL QL NAA+NON-PROBE: NOT DETECTED
EAEC PAA PLAS AGGR+AATA ST NAA+NON-PRB: NOT DETECTED
EC STX1+STX2 GENES STL QL NAA+NON-PROBE: NOT DETECTED
ENTEROBACTER CLOACAE COMPLEX (OHS): NOT DETECTED
ENTEROBACTERALES (OHS): NOT DETECTED
ENTEROCOCCUS FAECALIS (OHS): NOT DETECTED
ENTEROCOCCUS FAECIUM (OHS): NOT DETECTED
EOSINOPHIL # BLD AUTO: 0.11 X10(3)/MCL (ref 0–0.9)
EOSINOPHIL NFR BLD AUTO: 1.6 %
EPEC EAE GENE STL QL NAA+NON-PROBE: NOT DETECTED
ERYTHROCYTE [DISTWIDTH] IN BLOOD BY AUTOMATED COUNT: 15.7 % (ref 11.5–17)
ESCHERICHIA COLI (OHS): NOT DETECTED
ETEC LTA+ST1A+ST1B TOX ST NAA+NON-PROBE: NOT DETECTED
FECAL LEUKOCYTE (OHS): NEGATIVE
G LAMBLIA DNA STL QL NAA+NON-PROBE: NOT DETECTED
GFR SERPLBLD CREATININE-BSD FMLA CKD-EPI: >60 MLS/MIN/1.73/M2
GLOBULIN SER-MCNC: 3.2 GM/DL (ref 2.4–3.5)
GLUCOSE SERPL-MCNC: 132 MG/DL (ref 82–115)
GLUCOSE UR QL STRIP.AUTO: NORMAL
GP B STREP DNA CSF QL NAA+NON-PROBE: NOT DETECTED
HAEM INFLU DNA CSF QL NAA+NON-PROBE: NOT DETECTED
HCT VFR BLD AUTO: 35.1 % (ref 37–47)
HEMOCCULT SP1 STL QL: NEGATIVE
HGB BLD-MCNC: 10.9 G/DL (ref 12–16)
IMM GRANULOCYTES # BLD AUTO: 0.01 X10(3)/MCL (ref 0–0.04)
IMM GRANULOCYTES NFR BLD AUTO: 0.1 %
IMP (OHS): ABNORMAL
INR PPP: 1.6
KETONES UR QL STRIP.AUTO: NEGATIVE
KLEBSIELLA AEROGENES (OHS): NOT DETECTED
KLEBSIELLA OXYTOCA (OHS): NOT DETECTED
KLEBSIELLA PNEUMONIAE GROUP (OHS): NOT DETECTED
KPC (OHS): ABNORMAL
L MONOCYTOG DNA CSF QL NAA+NON-PROBE: NOT DETECTED
LEUKOCYTE ESTERASE UR QL STRIP.AUTO: NEGATIVE
LYMPHOCYTES # BLD AUTO: 1.19 X10(3)/MCL (ref 0.6–4.6)
LYMPHOCYTES NFR BLD AUTO: 17.4 %
MCH RBC QN AUTO: 27.5 PG (ref 27–31)
MCHC RBC AUTO-ENTMCNC: 31.1 G/DL (ref 33–36)
MCR-1 (OHS): ABNORMAL
MCV RBC AUTO: 88.4 FL (ref 80–94)
MECA/C (OHS): DETECTED
MECA/C AND MREJ (MRSA)(OHS): ABNORMAL
MONOCYTES # BLD AUTO: 0.71 X10(3)/MCL (ref 0.1–1.3)
MONOCYTES NFR BLD AUTO: 10.4 %
N MEN DNA CSF QL NAA+NON-PROBE: NOT DETECTED
NDM (OHS): ABNORMAL
NEUTROPHILS # BLD AUTO: 4.79 X10(3)/MCL (ref 2.1–9.2)
NEUTROPHILS NFR BLD AUTO: 70.2 %
NITRITE UR QL STRIP.AUTO: NEGATIVE
NOROVIRUS GI+II RNA STL QL NAA+NON-PROBE: NOT DETECTED
NRBC BLD AUTO-RTO: 0 %
OXA-48-LIKE (OHS): ABNORMAL
P SHIGELLOIDES DNA STL QL NAA+NON-PROBE: NOT DETECTED
PH UR STRIP.AUTO: 6 [PH]
PLATELET # BLD AUTO: 128 X10(3)/MCL (ref 130–400)
PMV BLD AUTO: 9.4 FL (ref 7.4–10.4)
POTASSIUM SERPL-SCNC: 3.7 MMOL/L (ref 3.5–5.1)
PROT SERPL-MCNC: 6.1 GM/DL (ref 5.8–7.6)
PROT UR QL STRIP.AUTO: NEGATIVE
PROTEUS SPP. (OHS): NOT DETECTED
PROTHROMBIN TIME: 19 SECONDS (ref 12.5–14.5)
PSEUDOMONAS AERUGINOSA (OHS): NOT DETECTED
RBC # BLD AUTO: 3.97 X10(6)/MCL (ref 4.2–5.4)
RBC #/AREA URNS AUTO: NORMAL /HPF
RBC UR QL AUTO: NEGATIVE
RVA RNA STL QL NAA+NON-PROBE: NOT DETECTED
S ENT+BONG DNA STL QL NAA+NON-PROBE: NOT DETECTED
S ENT+BONG DNA STL QL NAA+NON-PROBE: NOT DETECTED
S PNEUM DNA CSF QL NAA+NON-PROBE: NOT DETECTED
SAPO I+II+IV+V RNA STL QL NAA+NON-PROBE: NOT DETECTED
SERRATIA MARCESCENS (OHS): NOT DETECTED
SHIGELLA SP+EIEC IPAH ST NAA+NON-PROBE: NOT DETECTED
SODIUM SERPL-SCNC: 138 MMOL/L (ref 136–145)
SP GR UR STRIP.AUTO: 1.02 (ref 1–1.03)
SQUAMOUS #/AREA URNS LPF: NORMAL /HPF
STAPHYLOCOCCUS AUREUS (OHS): NOT DETECTED
STAPHYLOCOCCUS EPIDERMIDIS (OHS): DETECTED
STAPHYLOCOCCUS LUGDUNENSIS (OHS): NOT DETECTED
STAPHYLOCOCCUS SPP. (OHS): DETECTED
STENOTROPHOMONAS MALTOPHILIA (OHS): NOT DETECTED
STREPTOCOCCUS PYOGENES (GROUP A)(OHS): NOT DETECTED
STREPTOCOCCUS SPP. (OHS): NOT DETECTED
UROBILINOGEN UR STRIP-ACNC: NORMAL
V CHOL+PARA+VUL DNA STL QL NAA+NON-PROBE: NOT DETECTED
V CHOLERAE DNA STL QL NAA+NON-PROBE: NOT DETECTED
VANA/B (OHS): ABNORMAL
VIM (OHS): ABNORMAL
WBC # SPEC AUTO: 6.83 X10(3)/MCL (ref 4.5–11.5)
WBC #/AREA URNS AUTO: NORMAL /HPF
Y ENTEROCOL DNA STL QL NAA+NON-PROBE: NOT DETECTED

## 2024-04-19 PROCEDURE — 87040 BLOOD CULTURE FOR BACTERIA: CPT | Performed by: NURSE PRACTITIONER

## 2024-04-19 PROCEDURE — 11000001 HC ACUTE MED/SURG PRIVATE ROOM

## 2024-04-19 PROCEDURE — C1751 CATH, INF, PER/CENT/MIDLINE: HCPCS

## 2024-04-19 PROCEDURE — 25000003 PHARM REV CODE 250: Performed by: INTERNAL MEDICINE

## 2024-04-19 PROCEDURE — 25000003 PHARM REV CODE 250: Performed by: PHYSICIAN ASSISTANT

## 2024-04-19 PROCEDURE — 85610 PROTHROMBIN TIME: CPT | Performed by: INTERNAL MEDICINE

## 2024-04-19 PROCEDURE — 27000207 HC ISOLATION

## 2024-04-19 PROCEDURE — 85025 COMPLETE CBC W/AUTO DIFF WBC: CPT | Performed by: PHYSICIAN ASSISTANT

## 2024-04-19 PROCEDURE — 82272 OCCULT BLD FECES 1-3 TESTS: CPT | Performed by: PHYSICIAN ASSISTANT

## 2024-04-19 PROCEDURE — 36415 COLL VENOUS BLD VENIPUNCTURE: CPT | Performed by: NURSE PRACTITIONER

## 2024-04-19 PROCEDURE — 80053 COMPREHEN METABOLIC PANEL: CPT | Performed by: PHYSICIAN ASSISTANT

## 2024-04-19 PROCEDURE — 81001 URINALYSIS AUTO W/SCOPE: CPT | Performed by: INTERNAL MEDICINE

## 2024-04-19 PROCEDURE — 21400001 HC TELEMETRY ROOM

## 2024-04-19 PROCEDURE — 63600175 PHARM REV CODE 636 W HCPCS: Performed by: PHYSICIAN ASSISTANT

## 2024-04-19 PROCEDURE — 87077 CULTURE AEROBIC IDENTIFY: CPT | Performed by: PHYSICIAN ASSISTANT

## 2024-04-19 PROCEDURE — 36410 VNPNXR 3YR/> PHY/QHP DX/THER: CPT

## 2024-04-19 PROCEDURE — 25000003 PHARM REV CODE 250: Performed by: NURSE PRACTITIONER

## 2024-04-19 PROCEDURE — 76937 US GUIDE VASCULAR ACCESS: CPT

## 2024-04-19 PROCEDURE — 87507 IADNA-DNA/RNA PROBE TQ 12-25: CPT | Performed by: EMERGENCY MEDICINE

## 2024-04-19 PROCEDURE — 63600175 PHARM REV CODE 636 W HCPCS: Performed by: NURSE PRACTITIONER

## 2024-04-19 PROCEDURE — 89055 LEUKOCYTE ASSESSMENT FECAL: CPT | Performed by: PHYSICIAN ASSISTANT

## 2024-04-19 RX ORDER — SODIUM CHLORIDE 0.9 % (FLUSH) 0.9 %
10 SYRINGE (ML) INJECTION
Status: DISCONTINUED | OUTPATIENT
Start: 2024-04-19 | End: 2024-04-29 | Stop reason: HOSPADM

## 2024-04-19 RX ORDER — WARFARIN SODIUM 5 MG/1
5 TABLET ORAL DAILY
Status: DISCONTINUED | OUTPATIENT
Start: 2024-04-19 | End: 2024-04-20

## 2024-04-19 RX ORDER — SODIUM CHLORIDE 0.9 % (FLUSH) 0.9 %
10 SYRINGE (ML) INJECTION EVERY 6 HOURS
Status: DISCONTINUED | OUTPATIENT
Start: 2024-04-20 | End: 2024-04-29 | Stop reason: HOSPADM

## 2024-04-19 RX ORDER — ERGOCALCIFEROL 1.25 MG/1
50000 CAPSULE ORAL
Status: DISCONTINUED | OUTPATIENT
Start: 2024-04-20 | End: 2024-04-29 | Stop reason: HOSPADM

## 2024-04-19 RX ORDER — VANCOMYCIN HCL IN 5 % DEXTROSE 1G/250ML
1000 PLASTIC BAG, INJECTION (ML) INTRAVENOUS
Status: DISCONTINUED | OUTPATIENT
Start: 2024-04-19 | End: 2024-04-20

## 2024-04-19 RX ORDER — LANOLIN ALCOHOL/MO/W.PET/CERES
1 CREAM (GRAM) TOPICAL 2 TIMES DAILY
Status: DISCONTINUED | OUTPATIENT
Start: 2024-04-19 | End: 2024-04-29 | Stop reason: HOSPADM

## 2024-04-19 RX ADMIN — DIPHENOXYLATE HYDROCHLORIDE AND ATROPINE SULFATE 2 TABLET: .025; 2.5 TABLET ORAL at 08:04

## 2024-04-19 RX ADMIN — CITALOPRAM HYDROBROMIDE 20 MG: 20 TABLET ORAL at 08:04

## 2024-04-19 RX ADMIN — VANCOMYCIN HYDROCHLORIDE 1750 MG: 500 INJECTION, POWDER, LYOPHILIZED, FOR SOLUTION INTRAVENOUS at 04:04

## 2024-04-19 RX ADMIN — DOXYCYCLINE HYCLATE 100 MG: 100 TABLET, COATED ORAL at 08:04

## 2024-04-19 RX ADMIN — DIPHENOXYLATE HYDROCHLORIDE AND ATROPINE SULFATE 2 TABLET: .025; 2.5 TABLET ORAL at 09:04

## 2024-04-19 RX ADMIN — BUSPIRONE HYDROCHLORIDE 15 MG: 5 TABLET ORAL at 08:04

## 2024-04-19 RX ADMIN — MUPIROCIN: 20 OINTMENT TOPICAL at 09:04

## 2024-04-19 RX ADMIN — FERROUS SULFATE TAB 325 MG (65 MG ELEMENTAL FE) 1 EACH: 325 (65 FE) TAB at 09:04

## 2024-04-19 RX ADMIN — VANCOMYCIN HYDROCHLORIDE 1000 MG: 1 INJECTION, POWDER, LYOPHILIZED, FOR SOLUTION INTRAVENOUS at 09:04

## 2024-04-19 RX ADMIN — PIPERACILLIN AND TAZOBACTAM 4.5 G: 4; .5 INJECTION, POWDER, LYOPHILIZED, FOR SOLUTION INTRAVENOUS; PARENTERAL at 05:04

## 2024-04-19 RX ADMIN — MUPIROCIN: 20 OINTMENT TOPICAL at 08:04

## 2024-04-19 RX ADMIN — MIRTAZAPINE 15 MG: 15 TABLET, FILM COATED ORAL at 09:04

## 2024-04-19 RX ADMIN — WARFARIN SODIUM 5 MG: 5 TABLET ORAL at 06:04

## 2024-04-19 NOTE — PLAN OF CARE
04/19/24 1259   Discharge Assessment   Assessment Type Discharge Planning Assessment   Confirmed/corrected address, phone number and insurance Yes   Confirmed Demographics Correct on Facesheet   Source of Information patient   Communicated VALERY with patient/caregiver Date not available/Unable to determine   Reason For Admission weakness, fever   People in Home significant other   Do you expect to return to your current living situation? Yes   Do you have help at home or someone to help you manage your care at home? Yes   Who are your caregiver(s) and their phone number(s)? rubin kee HOA, [phone]   Prior to hospitilization cognitive status: Alert/Oriented   Current cognitive status: Alert/Oriented   Walking or Climbing Stairs Difficulty no   Dressing/Bathing Difficulty no   Home Accessibility wheelchair accessible   Home Layout Able to live on 1st floor   Equipment Currently Used at Home cane, straight;walker, rolling;shower chair   Readmission within 30 days? No   Patient currently being followed by outpatient case management? No   Do you currently have service(s) that help you manage your care at home? Yes   Name and Contact number of agency [facility]   Is the pt/caregiver preference to resume services with current agency Yes   Do you take prescription medications? Yes   Do you have prescription coverage? Yes   Coverage mcr a&b, [facility] mcr   Do you have any problems affording any of your prescribed medications? No   Is the patient taking medications as prescribed? yes   Who is going to help you get home at discharge? family   How do you get to doctors appointments? car, drives self;family or friend will provide   Are you on dialysis? No   Do you take coumadin? No   Discharge Plan A Home Health   Discharge Plan B Home Health   DME Needed Upon Discharge  none   Discharge Plan discussed with: Patient   Transition of Care Barriers None   Social Connections   Are you , , , ,  never , or living with a partner? Living with   OTHER   Name(s) of People in Home rubin villarrealon, SO     Completed assessment with pt at bedside, SO present. Introduced self and explained role as SW. Pt verb understanding to all questions asked. Pt is current with [facility]; updates sent via [facility]. PCP is Dr. Curt Felton and pharmacy is [facility] in [place].     Sheyla Cody LCSW

## 2024-04-19 NOTE — PROGRESS NOTES
"Pharmacokinetic Initial Assessment: IV Vancomycin    Assessment/Plan:    Initiate intravenous vancomycin with loading dose of 1750 mg once followed by a maintenance dose of vancomycin 1000 mg IV every 12 hours  Desired empiric serum trough concentration is 15 to 20 mcg/mL  Draw vancomycin trough level 60 min prior to fourth dose on 04/20 at approximately 1500  Pharmacy will continue to follow and monitor vancomycin.      Please contact pharmacy at extension [phone] with any questions regarding this assessment.     Thank you for the consult,   Hair Mejiaslly       Patient brief summary:  Laura Acosta is a 78 y.o. female initiated on antimicrobial therapy with IV Vancomycin for treatment of suspected bacteremia    Drug Allergies:   Review of patient's allergies indicates:   Allergen Reactions    Hydromorphone Itching     Other reaction(s): itching    Opium      Other reaction(s): itching  has itching with larger doses. Smaller doses do not cause a reaction.       Actual Body Weight:   73kg    Renal Function:   Estimated Creatinine Clearance: 72 mL/min (based on SCr of 0.63 mg/dL).,     Dialysis Method (if applicable):  N/A    CBC (last 72 hours):  Recent Labs   Lab Result Units 04/18/24  1051   WBC x10(3)/mcL 8.70   Hgb g/dL 12.3   Hct % 37.5   Platelet x10(3)/mcL 160   Mono % % 8.2   Eos % % 0.3   Basophil % % 0.3       Metabolic Panel (last 72 hours):  Recent Labs   Lab Result Units 04/18/24  1051   Sodium Level mmol/L 134*   Potassium Level mmol/L 3.9   Chloride mmol/L 102   Carbon Dioxide mmol/L 21*   Glucose Level mg/dL 105   Blood Urea Nitrogen mg/dL 12.7   Creatinine mg/dL 0.63   Albumin Level g/dL 3.3*   Bilirubin Total mg/dL 0.9   Alkaline Phosphatase unit/L 92   Aspartate Aminotransferase unit/L 37*   Alanine Aminotransferase unit/L 26       Drug levels (last 3 results):  No results for input(s): "VANCOMYCINRA", "VANCORANDOM", "VANCOMYCINPE", "VANCOPEAK", "VANCOMYCINTR", "VANCOTROUGH" in the last 72 " hours.    Microbiologic Results:  Microbiology Results (last 7 days)       Procedure Component Value Units Date/Time    BCID2 Panel [[id number]]  (Abnormal) Collected: 04/18/24 1603    Order Status: Completed Specimen: Blood Updated: 04/19/24 0240     CTX-M (ESBL ) N/A     IMP (Cabapenemase ) N/A     KPC resistance gene (Carbapenemase ) N/A     mcr-1 N/A     mecA ID Detected     Comment: Note: Antimicrobial resistance can occur via multiple mechanisms. A Not Detected result for antimicrobial resistance gene(s) does not indicate antimicrobial susceptibility. Subculturing is required for species identification and susceptibility testing of   isolates.        mecA/C and MREJ (MRSA) gene N/A     NDM (Carbapenemase ) N/A     OXA-48-like (Carbapenemase ) N/A     Maria Luisa/B (VRE gene) N/A     VIM (Carbapenemase ) N/A     Enterococcus faecalis Not Detected     Enterococcus faecium Not Detected     Listeria monocytogenes Not Detected     Staphylococcus spp. Detected     Staphylococcus aureus Not Detected     Staphylococcus epidermidis Detected     Staphylococcus lugdunensis Not Detected     Streptococcus spp. Not Detected     Streptococcus agalactiae (Group B) Not Detected     Streptococcus pneumoniae Not Detected     Streptococcus pyogenes (Group A) Not Detected     Acinetobacter calcoaceticus/baumannii complex Not Detected     Bacteroides fragilis Not Detected     Enterobacterales Not Detected     Enterobacter cloacae complex Not Detected     Escherichia coli Not Detected     Klebsiella aerogenes Not Detected     Klebsiella oxytoca Not Detected     Klebsiella pneumoniae group Not Detected     Proteus spp. Not Detected     Salmonella spp. Not Detected     Serratia marcescens Not Detected     Haemophilus influenzae Not Detected     Neisseria meningitidis Not Detected     Pseudomonas aeruginosa Not Detected     Stenotrophomonas maltophilia Not Detected     Candida albicans Not  Detected     Candida auris Not Detected     Candida glabrata Not Detected     Candida krusei Not Detected     Candida parapsilosis Not Detected     Candida tropicalis Not Detected     Cryptococcus neoformans/gattii Not Detected    Narrative:      The [employer] BCID2 Panel is a multiplexed nucleic acid test intended for the use with [employer]® 2.0 or Lumenis® [employer] Systems for the simultaneous qualitative detection and identification of multiple bacterial and yeast nucleic acids and select genetic determinants associated with antimicrobial resistance.  The [employer] BCID2 Panel test is performed directly on blood culture samples identified as positive by a continuous monitoring blood culture system.  Results are intended to be interpreted in conjunction with Gram stain results.    Blood Culture [[id number]]  (Abnormal) Collected: 04/18/24 1603    Order Status: Completed Specimen: Blood Updated: 04/19/24 0211     GRAM STAIN Gram Positive Cocci, probable Staphylococcus      Seen in gram stain of broth only      2 of 2 bottles positive    Malaria Smear [[id number]] Collected: 04/18/24 1756    Order Status: Resulted Specimen: Blood Updated: 04/18/24 1803    Stool Culture [[id number]]     Order Status: Sent Specimen: Stool     Clostridium Diff Toxin, A & B, EIA [[id number]]     Order Status: Sent Specimen: Stool     Blood culture x two cultures. Draw prior to antibiotics. [[id number]] Collected: 04/18/24 1106    Order Status: Resulted Specimen: Blood Updated: 04/18/24 1131    Blood culture x two cultures. Draw prior to antibiotics. [[id number]] Collected: 04/18/24 1106    Order Status: Resulted Specimen: Blood Updated: 04/18/24 1131

## 2024-04-19 NOTE — PROGRESS NOTES
"[facility] Hospital Medicine Progress Note      Chief Complaint: Fever (Pt has fever and body aches that started this am. Pt reports she was recently seen for the fever and dx with "fever of unknown origin". Denies cp/sob or n/v/d. Pt has an accessed mediport that she gets home infusions 4x a week. Febrile in triage. Took 500mg tylenol at 4am       HISTORY OF PRESENT ILLNESS:   Laura Acosta is a 78 y.o. female with a past medical history of hypertension, hyperlipidemia, PVD, DVT, short gut syndrome on TPN, chronic granulomatous infection, MRSA bacteremia, rheumatoid arthritis, cirrhosis, gallstone pancreatitis, breast cancer, anxiety, and depression who presented to [facility] on 4/18/2024 with c/o fever.  Patient reported fever and body aches began this morning. Patient is followed by Dr. Hoover and has had recurrent fevers for the past 2 months.  Patient is on suppressive Doxycycline secondary to previous MRSA bacteremia.  Patient receives infusions 4 times weekly via MediPort.  Patient reported her last TPN infusion was last night.  Patient reported her MediPort has been in place since 2019.  Patient had NM inflammatory whole-body scan on 04/16/2024 which revealed no scintigraphic evidence of localized infectious process.  On exam patient endorsed chronic diarrhea.  Patient was denied nausea, vomiting, dysuria, hematuria, cough, congestion, chest pain, and shortness of breath.   Initial vital signs in ED were /76, pulse 70, respirations 30, temperature 39.1° C, and SpO2 96% on 3 L nasal cannula.  Labs revealed WBC 8.70, sodium 134, CO2 21, and lactic acid 0.7.  Flu/RSV/COVID testing were negative.  Blood cultures were obtained.  Chest x-ray revealed no active pulmonary disease.  CTA chest revealed no pulmonary embolism identified with new 11 mm solid nodule in the right lower lobe likely infectious versus inflammatory.  Patient was placed on 3 L supplemental oxygen in ED and " given Tylenol 1000 mg and IV Zosyn 4.5 g. Patient was admitted to hospital medicine service for further medical management.      Todays Information   Patient seen and examined at bedside, family member at bedside   Vitals reviewed and stable on 2 L of oxygen , no fever in last 24 hrs   Labs reviewed with INR 1.6   2/2 blood cultures growing staph epidermidis patient currently has a MediPort       Exam  General appearance: Female in no apparent distress.  HENT: Atraumatic head.   Eyes: Normal extraocular movements.   Neck: Supple.   Lungs: Clear to auscultation bilaterally.   Heart: Regular rate and rhythm.  Abdomen: Soft, non-distended, non-tender.  Extremities: No cyanosis, clubbing, or deformities.   Skin: Mediport to right chest wall   Neuro: Awake, alert, and oriented. Motor and sensory exams grossly intact.   Psych/mental status: Appropriate mood and affect. Responds appropriately to questions.        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, chronic granulomatous infection, MRSA bacteremia, rheumatoid arthritis, cirrhosis, gallstone pancreatitis, breast cancer, anxiety, and depression         Plan:  Vitals reviewed and stable on 2 L of oxygen , no fever in last 24 hrs   2/2 blood cultures growing staph epidermidis patient currently has a MediPort   Continue vancomycin, Zosyn and doxycycline  Follow up with ID recs can likely discontinue Zosyn and doxycycline   Will likely need removal of MediPort   MediPort getting infected due to use of TPN she gets for her short gut syndrome  wean as tolerated   INR is subtherapeutic continue with 5 mg q.day of warfarin, with daily INR checks  Avoid PICC line till cleared by ID  Can hold off on TPN till safe to put  in a picc line   Resume home medications as deemed appropriate once medication reconciliation is updated  Labs in AM     VTE Prophylaxis:  Home Coumadin     Discharge Planning and Disposition: TBD     Critical Care diagnoses bacteremia on IV antibiotics  Critical care time greater than 35 minutes     VITAL SIGNS: 24 HRS MIN & MAX LAST   Temp  Min: 97.8 °F (36.6 °C)  Max: 98.6 °F (37 °C) 98.6 °F (37 °C)   BP  Min: 91/51  Max: 131/52 123/60   Pulse  Min: 55  Max: 68  61   Resp  Min: 18  Max: 20 18   SpO2  Min: 96 %  Max: 98 % 97 %     I have reviewed the following labs:  Recent Labs   Lab 04/15/24  0830 04/18/24  1051 04/19/24  0431   WBC 11.95* 8.70 6.83   RBC 4.13* 4.40 3.97*   HGB 11.3* 12.3 10.9*   HCT 35.9* 37.5 35.1*   MCV 86.9 85.2 88.4   MCH 27.4 28.0 27.5   MCHC 31.5* 32.8* 31.1*   RDW 14.7 15.1 15.7    160 128*   MPV 9.5 10.0 9.4     Recent Labs   Lab 04/15/24  0830 04/18/24  1051 04/18/24  1228 04/19/24  0431    134*  --  138   K 3.9 3.9  --  3.7   CO2 25 21*  --  22*   BUN 20.1 12.7  --  13.6   CREATININE 0.66 0.63  --  0.71   CALCIUM 8.6 9.0  --  8.0*   PH  --   --  7.430  --    MG 2.30  --   --   --    ALBUMIN 3.2* 3.3*  --  2.9*   ALKPHOS 95 92  --  79   ALT 33 26  --  23   AST 42* 37*  --  27   BILITOT 0.5 0.9  --  1.0     Microbiology Results (last 7 days)       Procedure Component Value Units Date/Time    Stool Culture [[id number]] Collected: 04/19/24 1601    Order Status: Sent Specimen: Stool Updated: 04/19/24 1601    Blood culture x two cultures. Draw prior to antibiotics. [[id number]]  (Abnormal) Collected: 04/18/24 1106    Order Status: Completed Specimen: Blood Updated: 04/19/24 1543     CULTURE, BLOOD ([facility]) No Growth At 24 Hours     GRAM STAIN Gram Positive Cocci, probable Staphylococcus      Seen in gram stain of broth only      1 of 2 Aerobic bottles positive    Malaria Smear [[id number]] Collected: 04/18/24 1751    Order Status: Completed Specimen: Blood Updated: 04/19/24 1412      MALARIA SMEAR ([facility]) No Malarial or other blood borne parasites seen     Malaria Kit Negative    Blood culture x two cultures. Draw prior to antibiotics. [[id number]]  (Normal) Collected: 04/18/24 1106    Order Status: Completed Specimen: Blood Updated: 04/19/24 1300     CULTURE, BLOOD ([facility]) No Growth At 24 Hours    BCID2 Panel [[id number]]  (Abnormal) Collected: 04/18/24 1603    Order Status: Completed Specimen: Blood Updated: 04/19/24 0240     CTX-M (ESBL ) N/A     IMP (Cabapenemase ) N/A     KPC resistance gene (Carbapenemase ) N/A     mcr-1 N/A     mecA ID Detected     Comment: Note: Antimicrobial resistance can occur via multiple mechanisms. A Not Detected result for antimicrobial resistance gene(s) does not indicate antimicrobial susceptibility. Subculturing is required for species identification and susceptibility testing of   isolates.        mecA/C and MREJ (MRSA) gene N/A     NDM (Carbapenemase ) N/A     OXA-48-like (Carbapenemase ) N/A     Maria Luisa/B (VRE gene) N/A     VIM (Carbapenemase ) N/A     Enterococcus faecalis Not Detected     Enterococcus faecium Not Detected     Listeria monocytogenes Not Detected     Staphylococcus spp. Detected     Staphylococcus aureus Not Detected     Staphylococcus epidermidis Detected     Staphylococcus lugdunensis Not Detected     Streptococcus spp. Not Detected     Streptococcus agalactiae (Group B) Not Detected     Streptococcus pneumoniae Not Detected     Streptococcus pyogenes (Group A) Not Detected     Acinetobacter calcoaceticus/baumannii complex Not Detected     Bacteroides fragilis Not Detected     Enterobacterales Not Detected     Enterobacter cloacae complex Not Detected     Escherichia coli Not Detected     Klebsiella aerogenes Not Detected     Klebsiella oxytoca Not Detected     Klebsiella pneumoniae group Not Detected     Proteus spp. Not Detected     Salmonella spp. Not Detected     Serratia marcescens Not  Detected     Haemophilus influenzae Not Detected     Neisseria meningitidis Not Detected     Pseudomonas aeruginosa Not Detected     Stenotrophomonas maltophilia Not Detected     Candida albicans Not Detected     Candida auris Not Detected     Candida glabrata Not Detected     Candida krusei Not Detected     Candida parapsilosis Not Detected     Candida tropicalis Not Detected     Cryptococcus neoformans/gattii Not Detected    Narrative:      The [employer] BCID2 Panel is a multiplexed nucleic acid test intended for the use with [employer]® 2.0 or [employer]® [employer] for the simultaneous qualitative detection and identification of multiple bacterial and yeast nucleic acids and select genetic determinants associated with antimicrobial resistance.  The [employer] BCID2 Panel test is performed directly on blood culture samples identified as positive by a continuous monitoring blood culture system.  Results are intended to be interpreted in conjunction with Gram stain results.    Blood Culture [[id number]]  (Abnormal) Collected: 04/18/24 1603    Order Status: Completed Specimen: Blood Updated: 04/19/24 0211     GRAM STAIN Gram Positive Cocci, probable Staphylococcus      Seen in gram stain of broth only      2 of 2 bottles positive    Clostridium Diff Toxin, A & B, EIA [[id number]]     Order Status: Canceled Specimen: Stool              See below for Radiology    Scheduled Med:  Current Facility-Administered Medications   Medication Dose Route Frequency    busPIRone  15 mg Oral Daily    citalopram  20 mg Oral Daily    diphenoxylate-atropine 2.5-0.025 mg  2 tablet Oral BID    fat emulsion 20%  250 mL Intravenous Twice Weekly    mirtazapine  15 mg Oral QHS    mupirocin   Nasal BID    piperacillin-tazobactam (Zosyn) IV (PEDS and ADULTS) (extended infusion is not appropriate)  4.5 g Intravenous Q8H    vancomycin (VANCOCIN) IV (PEDS and ADULTS)  1,000 mg Intravenous Q12H    warfarin  5 mg Oral Daily       Continuous Infusions:  Current Facility-Administered Medications   Medication Dose Route Frequency Last Rate Last Admin    sodium chloride 0.9%   Intravenous Continuous 100 mL/hr at 04/18/24 1920 New Bag at 04/18/24 1920    Amino acid 4.25% - dextrose 5% (CLINIMIX-E) solution (1L provides 42.5 gm AA, 50 gm CHO (170 kcal/L dextrose), Na 35, K 30, Mg 5, Ca 4.5, Acetate 70, Cl 39, Phos 15)   Intravenous Continuous        [START ON 4/20/2024] Amino acid 4.25% - dextrose 5% (CLINIMIX-E) solution (1L provides 42.5 gm AA, 50 gm CHO (170 kcal/L dextrose), Na 35, K 30, Mg 5, Ca 4.5, Acetate 70, Cl 39, Phos 15)   Intravenous Continuous          PRN Meds:    Current Facility-Administered Medications:     acetaminophen, 650 mg, Oral, Q8H PRN    acetaminophen, 650 mg, Oral, Q4H PRN    dextrose 10%, 12.5 g, Intravenous, PRN    dextrose 10%, 25 g, Intravenous, PRN    glucagon (human recombinant), 1 mg, Intramuscular, PRN    glucose, 16 g, Oral, PRN    glucose, 24 g, Oral, PRN    ondansetron, 4 mg, Intravenous, Q8H PRN    vancomycin - pharmacy to dose, , Intravenous, pharmacy to manage frequency     Assessment/Plan:      VTE prophylaxis:     Patient condition:  Stable/Fair/Guarded/ Serious/ Critical    Anticipated discharge and Disposition:         All diagnosis and differential diagnosis have been reviewed; assessment and plan has been documented; I have personally reviewed the labs and test results that are presently available; I have reviewed the patients medication list; I have reviewed the consulting providers response and recommendations. I have reviewed or attempted to review medical records based upon their availability    All of the patient's questions have been  addressed and answered. Patient's is agreeable to the above stated plan. I will continue to monitor closely and make adjustments to medical management as needed.  _____________________________________________________________________    Nutrition  Status:    Radiology:  I have personally reviewed the following imaging and agree with the radiologist.     CTA Chest Non-Coronary (PE Studies)  Narrative: EXAMINATION:  CTA CHEST NON CORONARY (PE STUDIES)    CLINICAL HISTORY:  Pulmonary embolism (PE) suspected, unknown D-dimer;    TECHNIQUE:  CTA imaging of the chest after IV contrast. Axial, coronal and sagittal reconstructions, including MIP images, are reviewed. Dose length product 426 mGycm. Automatic exposure control, adjustment of mA/kV or iterative reconstruction technique used to limit radiation dose.    COMPARISON:  CT 03/01/2024    FINDINGS:  Diagnostic quality: Adequate    Pulmonary embolism: None identified.    Lung parenchyma: New 11 mm mean diameter solid nodule within the right lower lobe (series 7, image 66).  8 mm solid nodule medially in the right lower lobe unchanged from prior exam.  Stable site of pleuroparenchymal scarring in the superior right lung.    Pleural effusion: None.    Mediastinum/ellen: Right-sided MediPort catheter tip in the SVC.  Mild coronary artery calcification.  No pathologically enlarged lymph node.    Chest wall/axilla: Stable appearance.    Upper abdomen: No significant findings.    Bones: No acute osseous process.  Right shoulder arthroplasty.  Impression: No pulmonary embolism identified.    New 11 mm solid nodule in the right lower lobe, likely infectious or inflammatory.  Three month follow-up chest CT recommended to ensure resolution.    Electronically signed by: Ardia Dooley  Date:    04/18/2024  Time:    13:52  X-Ray Chest AP Portable  Narrative: EXAMINATION:  XR CHEST AP PORTABLE    CLINICAL HISTORY:  Sepsis;    TECHNIQUE:  Single frontal view of the chest was performed.    COMPARISON:  February 2, 2024    FINDINGS:  Examination reveals mediastinal cardiac silhouette to be within normal limits lung fields are clear and free of gross infiltrates atelectasis or effusions a peripherally calcified lesion is identified  projecting at the right base similar to previous exams and likely related to breast  Impression: No active pulmonary disease    Electronically signed by: Domingo Hughes  Date:    04/18/2024  Time:    10:44      Adis Haas MD  Department of Hospital Medicine   [facility]   04/19/2024

## 2024-04-20 LAB
INR PPP: 2.6
PROTHROMBIN TIME: 27.2 SECONDS (ref 12.5–14.5)
VANCOMYCIN TROUGH SERPL-MCNC: 5.7 UG/ML (ref 15–20)

## 2024-04-20 PROCEDURE — 11000001 HC ACUTE MED/SURG PRIVATE ROOM

## 2024-04-20 PROCEDURE — A4216 STERILE WATER/SALINE, 10 ML: HCPCS | Performed by: INTERNAL MEDICINE

## 2024-04-20 PROCEDURE — 25000003 PHARM REV CODE 250: Performed by: INTERNAL MEDICINE

## 2024-04-20 PROCEDURE — 80202 ASSAY OF VANCOMYCIN: CPT | Performed by: INTERNAL MEDICINE

## 2024-04-20 PROCEDURE — 21400001 HC TELEMETRY ROOM

## 2024-04-20 PROCEDURE — 63600175 PHARM REV CODE 636 W HCPCS: Performed by: INTERNAL MEDICINE

## 2024-04-20 PROCEDURE — 25000003 PHARM REV CODE 250: Performed by: PHYSICIAN ASSISTANT

## 2024-04-20 PROCEDURE — 27000207 HC ISOLATION

## 2024-04-20 PROCEDURE — 25000003 PHARM REV CODE 250: Performed by: NURSE PRACTITIONER

## 2024-04-20 PROCEDURE — 85610 PROTHROMBIN TIME: CPT | Performed by: INTERNAL MEDICINE

## 2024-04-20 PROCEDURE — 63600175 PHARM REV CODE 636 W HCPCS: Performed by: NURSE PRACTITIONER

## 2024-04-20 RX ORDER — ENOXAPARIN SODIUM 100 MG/ML
1 INJECTION SUBCUTANEOUS EVERY 12 HOURS
Status: DISCONTINUED | OUTPATIENT
Start: 2024-04-20 | End: 2024-04-29 | Stop reason: HOSPADM

## 2024-04-20 RX ORDER — VANCOMYCIN HCL IN 5 % DEXTROSE 1G/250ML
1000 PLASTIC BAG, INJECTION (ML) INTRAVENOUS
Status: DISCONTINUED | OUTPATIENT
Start: 2024-04-21 | End: 2024-04-21

## 2024-04-20 RX ADMIN — MUPIROCIN: 20 OINTMENT TOPICAL at 08:04

## 2024-04-20 RX ADMIN — MIRTAZAPINE 15 MG: 15 TABLET, FILM COATED ORAL at 08:04

## 2024-04-20 RX ADMIN — VANCOMYCIN HYDROCHLORIDE 1000 MG: 1 INJECTION, POWDER, LYOPHILIZED, FOR SOLUTION INTRAVENOUS at 10:04

## 2024-04-20 RX ADMIN — VANCOMYCIN HYDROCHLORIDE 1000 MG: 1 INJECTION, POWDER, LYOPHILIZED, FOR SOLUTION INTRAVENOUS at 08:04

## 2024-04-20 RX ADMIN — FERROUS SULFATE TAB 325 MG (65 MG ELEMENTAL FE) 1 EACH: 325 (65 FE) TAB at 08:04

## 2024-04-20 RX ADMIN — DIPHENOXYLATE HYDROCHLORIDE AND ATROPINE SULFATE 2 TABLET: .025; 2.5 TABLET ORAL at 09:04

## 2024-04-20 RX ADMIN — DIPHENOXYLATE HYDROCHLORIDE AND ATROPINE SULFATE 2 TABLET: .025; 2.5 TABLET ORAL at 08:04

## 2024-04-20 RX ADMIN — LEUCINE, PHENYLALANINE, LYSINE, METHIONINE, ISOLEUCINE, VALINE, HISTIDINE, THREONINE, TRYPTOPHAN, ALANINE, GLYCINE, ARGININE, PROLINE, SERINE, TYROSINE, SODIUM ACETATE, DIBASIC POTASSIUM PHOSPHATE, MAGNESIUM CHLORIDE, SODIUM CHLORIDE, CALCIUM CHLORIDE, DEXTROSE
311; 238; 247; 170; 255; 247; 204; 179; 77; 880; 438; 489; 289; 213; 17; 297; 261; 51; 77; 33; 5 INJECTION INTRAVENOUS at 05:04

## 2024-04-20 RX ADMIN — SODIUM CHLORIDE, PRESERVATIVE FREE 10 ML: 5 INJECTION INTRAVENOUS at 12:04

## 2024-04-20 RX ADMIN — SODIUM CHLORIDE, PRESERVATIVE FREE 10 ML: 5 INJECTION INTRAVENOUS at 11:04

## 2024-04-20 RX ADMIN — BUSPIRONE HYDROCHLORIDE 15 MG: 5 TABLET ORAL at 09:04

## 2024-04-20 RX ADMIN — SODIUM CHLORIDE, PRESERVATIVE FREE 10 ML: 5 INJECTION INTRAVENOUS at 05:04

## 2024-04-20 RX ADMIN — ASCORBIC ACID, VITAMIN A PALMITATE, CHOLECALCIFEROL, THIAMINE HYDROCHLORIDE, RIBOFLAVIN-5 PHOSPHATE SODIUM, PYRIDOXINE HYDROCHLORIDE, NIACINAMIDE, DEXPANTHENOL, ALPHA-TOCOPHEROL ACETATE, VITAMIN K1, FOLIC ACID, BIOTIN, CYANOCOBALAMIN: 200; 3300; 200; 6; 3.6; 6; 40; 15; 10; 150; 600; 60; 5 INJECTION, SOLUTION INTRAVENOUS at 05:04

## 2024-04-20 RX ADMIN — CITALOPRAM HYDROBROMIDE 20 MG: 20 TABLET ORAL at 09:04

## 2024-04-20 RX ADMIN — FERROUS SULFATE TAB 325 MG (65 MG ELEMENTAL FE) 1 EACH: 325 (65 FE) TAB at 09:04

## 2024-04-20 RX ADMIN — MUPIROCIN: 20 OINTMENT TOPICAL at 09:04

## 2024-04-20 RX ADMIN — ASCORBIC ACID, VITAMIN A PALMITATE, CHOLECALCIFEROL, THIAMINE HYDROCHLORIDE, RIBOFLAVIN-5 PHOSPHATE SODIUM, PYRIDOXINE HYDROCHLORIDE, NIACINAMIDE, DEXPANTHENOL, ALPHA-TOCOPHEROL ACETATE, VITAMIN K1, FOLIC ACID, BIOTIN, CYANOCOBALAMIN: 200; 3300; 200; 6; 3.6; 6; 40; 15; 10; 150; 600; 60; 5 INJECTION, SOLUTION INTRAVENOUS at 12:04

## 2024-04-20 RX ADMIN — ERGOCALCIFEROL 50000 UNITS: 1.25 CAPSULE ORAL at 09:04

## 2024-04-20 RX ADMIN — ENOXAPARIN SODIUM 70 MG: 80 INJECTION SUBCUTANEOUS at 05:04

## 2024-04-20 RX ADMIN — ACETAMINOPHEN 650 MG: 325 TABLET, FILM COATED ORAL at 05:04

## 2024-04-20 NOTE — PROGRESS NOTES
See full detailed consult to follow    Impression  Recurrent Staph Epi bacteremia  Infected mediport  RLL pulmonary nodule  DM Type II  RA  Hx of short gut syndrome  Hepatic cirrhosis  Anemia  Thrombocytosis  Elevated CRP    Case discussed with Dr Brice. See orders

## 2024-04-20 NOTE — PROGRESS NOTES
Acute Care Surgery   History and Physical Note    Patient Name: Laura Acosta  YOB: 1945  Date: 04/20/2024 4:43 PM  Date of Admission: 4/18/2024  HD#2  POD#* No surgery found *    PRESENTING HISTORY   Chief Complaint/Reason for Admission: mediport removal    History of Present Illness:  Patient is a 77 y/o F with PMHx HTN, HLD, PVD, multiple previous DVT on Coumadin (last dose today), short gut syndrome on TPN, chronic granulomatous infection, MRSA bacteremia, rheumatoid arthritis, cirrhosis, gallstone pancreatitis, breast cancer in remission who presented on 4/18 with weakness and fever. Blood cx positive for MRSA bacteremia. Pt has a R sided mediport in place that she states was placed about 2 years ago, and she has had 3 mediports placed since 2019 for TPN infusions but removed for bacteremia. General surgery consulted for mediport removal in setting of bacteremia. Patient also has a right sided breast mass needing surgical evaluation. She states it was a previously draining cyst but is now hardened and painful.    Review of Systems:  12 point ROS negative except as stated in HPI    PAST HISTORY:   Past medical history:  Past Medical History:   Diagnosis Date    Acute URI     Anxiety and depression     Back pain     Breast cancer     Cholelithiasis     Contaminated small bowel syndrome     DVT (deep venous thrombosis)     Edema, lower extremity     Gallstone pancreatitis     HTN (hypertension)     Hypercholesterolemia     Insomnia     Irregular heart beat     Ischemic disease of gut     Laryngitis     Malabsorption     Meningitis, unspecified     OA (osteoarthritis)     PVD (peripheral vascular disease)     Rheumatoid arthritis, unspecified     Staph infection     infected mediport -- on doxycycline daily for prevention    Tremor     Unspecified cataract     Unspecified cirrhosis of liver 06/20/2023    Dr Carroll    Venous insufficiency        Past surgical history:  Past Surgical History:    Procedure Laterality Date    APPENDECTOMY      BOWEL RESECTION      BREAST LUMPECTOMY Right     CHOLECYSTECTOMY  05/2015    COLONOSCOPY  02/2022    repeat 3 years    CYST REMOVAL Right     breast    CYSTOSCOPY W/ STONE MANIPULATION      CYSTOSCOPY W/ URETERAL STENT PLACEMENT  12/2020    CYSTOSCOPY W/ URETERAL STENT REMOVAL  04/2021    CYSTOURETEROSCOPY, WITH HOLMIUM LASER LITHOTRIPSY OF URETERAL CALCULUS AND STENT INSERTION Left 05/02/2023    Procedure: CYSTOSCOPY, WITH URETERAL STENT INSERTION Left;  Surgeon: Jared Felix MD;  Location: [facility] OR;  Service: Urology;  Laterality: Left;    EGD, WITH CLOSED BIOPSY N/A 6/20/2023    Procedure: EGD;  Surgeon: Aashish Carroll MD;  Location: [facility] ENDOSCOPY;  Service: Gastroenterology;  Laterality: N/A;    ENDOSCOPIC RETROGRADE CHOLANGIOPANCREATOGRAPHY W/ SPHINCTEROTOMY AND STONE REMOVAL  04/2015    ESOPHAGOGASTRODUODENOSCOPY  06/20/2023    Dr Carroll - no signs esophageal varicies --repeat 3 yrs    GI Biopsy  02/15/2022    GI Biopsy  12/2018    HYSTERECTOMY      LAPAROTOMY, EXPLORATORY  06/2015    LITHOTRIPSY Left 04/2021    LITHOTRIPSY Left 03/2021    MEDIPORT INSERTION, SINGLE  06/2021    MEDIPORT INSERTION, SINGLE  07/2020    MEDIPORT INSERTION, SINGLE  12/2018    MEDIPORT REMOVAL  07/2020    PHACOEMULSIFICATION OF CATARACT Left 12/2016    PHACOEMULSIFICATION OF CATARACT Right 11/2016    PICC line Removal Right 06/2021    POLYPECTOMY  02/2022    PVD surgery      REMOVAL OF CATHETER  12/2020    SHOULDER SURGERY Right     shoulder replacement    SPHINCTEROTOMY OF URETHRA      VENTRAL HERNIA REPAIR  04/2016       Family history:  Family History   Problem Relation Name Age of Onset    Pneumonia Mother      Depression Mother      Osteoarthritis Mother      Breast cancer Mother      Uterine cancer Mother      Heart attack Father      Aneurysm Father          brain    Diabetes Father      Hyperlipidemia Father      Hypertension Father      Hyperlipidemia  Sister      Hypertension Sister      Kidney cancer Sister      Osteoarthritis Sister      Breast cancer Sister      Hyperlipidemia Brother      Hypertension Brother      Coronary artery disease Brother          CABG x3    Hyperlipidemia Brother      Hypertension Brother         Social history:  Social History     Socioeconomic History    Marital status:     Number of children: 2   Occupational History    Occupation: Retired   Tobacco Use    Smoking status: Never    Smokeless tobacco: Never   Substance and Sexual Activity    Alcohol use: Not Currently    Drug use: Never    Sexual activity: Not Currently     Social Determinants of Health     Financial Resource Strain: Low Risk  (4/20/2024)    Overall Financial Resource Strain (CARDIA)     Difficulty of Paying Living Expenses: Not hard at all   Food Insecurity: No Food Insecurity (4/20/2024)    Hunger Vital Sign     Worried About Running Out of Food in the Last Year: Never true     Ran Out of Food in the Last Year: Never true   Transportation Needs: No Transportation Needs (4/20/2024)    PRAPARE - Transportation     Lack of Transportation (Medical): No     Lack of Transportation (Non-Medical): No   Stress: No Stress Concern Present (4/20/2024)    [employer] - Occupational Stress Questionnaire     Feeling of Stress : Not at all   Social Connections: Unknown (4/19/2024)    Social Connection and Isolation Panel [NHANES]     Marital Status: Living with partner   Housing Stability: Low Risk  (4/20/2024)    Housing Stability Vital Sign     Unable to Pay for Housing in the Last Year: No     Number of Times Moved in the Last Year: 0     Homeless in the Last Year: No     Social History     Tobacco Use   Smoking Status Never   Smokeless Tobacco Never      Social History     Substance and Sexual Activity   Alcohol Use Not Currently        MEDICATIONS & ALLERGIES:     Current Facility-Administered Medications   Medication Dose Route Frequency  Provider Last Rate Last Admin    acetaminophen tablet 650 mg  650 mg Oral Q8H PRN Fito Haskins PA-C        acetaminophen tablet 650 mg  650 mg Oral Q4H PRN Fito Haskins PA-C   650 mg at 04/18/24 1804    Amino acid 4.25% - dextrose 5% (CLINIMIX-E) solution (1L provides 42.5 gm AA, 50 gm CHO (170 kcal/L dextrose), Na 35, K 30, Mg 5, Ca 4.5, Acetate 70, Cl 39, Phos 15)   Intravenous Continuous Adis Haas MD        busPIRone tablet 15 mg  15 mg Oral Daily Shannan Helton MD   15 mg at 04/20/24 0954    citalopram tablet 20 mg  20 mg Oral Daily Shannan Helton MD   20 mg at 04/20/24 0954    dextrose 10% bolus 125 mL 125 mL  12.5 g Intravenous PRN Fito Haskins PA-C        dextrose 10% bolus 250 mL 250 mL  25 g Intravenous PRN Fito Haskins PA-C        diphenoxylate-atropine 2.5-0.025 mg per tablet 2 tablet  2 tablet Oral BID Shannan Helton MD   2 tablet at 04/20/24 0954    ergocalciferol capsule 50,000 Units  50,000 Units Oral Q7 Days Adis Haas MD   50,000 Units at 04/20/24 0954    fat emulsion 20% infusion 250 mL  250 mL Intravenous Twice Weekly Adis Haas MD        ferrous sulfate tablet 1 each  1 tablet Oral BID Adis Haas MD   1 each at 04/20/24 0954    glucagon (human recombinant) injection 1 mg  1 mg Intramuscular PRN Fito Haskins PA-C        glucose chewable tablet 16 g  16 g Oral PRN Fito Haskins PA-C        glucose chewable tablet 24 g  24 g Oral PRN Fito Haskins PA-C        mirtazapine tablet 15 mg  15 mg Oral QHS Shannan Helton MD   15 mg at 04/19/24 2113    mupirocin 2 % ointment   Nasal BID Shannan Helton MD   Given at 04/20/24 0954    ondansetron injection 4 mg  4 mg Intravenous Q8H PRN Fito Haskins PA-C        sodium chloride 0.9% flush 10 mL  10 mL Intravenous Q6H Adis Haas MD   10 mL at 04/20/24 0519    And    sodium chloride 0.9% flush 10 mL  10 mL Intravenous PRN Adis Haas MD        vancomycin - pharmacy to dose   Intravenous  pharmacy to manage frequency Diana Fowler FNP        vancomycin in dextrose 5 % 1 gram/250 mL IVPB 1,000 mg  1,000 mg Intravenous Q12H Diana Fowler FNP   Stopped at 04/20/24 1138    warfarin (COUMADIN) tablet 5 mg  5 mg Oral Daily Adis Haas MD   5 mg at 04/19/24 1820       Allergies:   Review of patient's allergies indicates:   Allergen Reactions    Hydromorphone Itching     Other reaction(s): itching    Opium      Other reaction(s): itching  has itching with larger doses. Smaller doses do not cause a reaction.       Scheduled Meds:  Current Facility-Administered Medications   Medication Dose Route Frequency    busPIRone  15 mg Oral Daily    citalopram  20 mg Oral Daily    diphenoxylate-atropine 2.5-0.025 mg  2 tablet Oral BID    ergocalciferol  50,000 Units Oral Q7 Days    fat emulsion 20%  250 mL Intravenous Twice Weekly    ferrous sulfate  1 tablet Oral BID    mirtazapine  15 mg Oral QHS    mupirocin   Nasal BID    sodium chloride 0.9%  10 mL Intravenous Q6H    vancomycin (VANCOCIN) IV (PEDS and ADULTS)  1,000 mg Intravenous Q12H    warfarin  5 mg Oral Daily       Continuous Infusions:  Current Facility-Administered Medications   Medication Dose Route Frequency Last Rate Last Admin    Amino acid 4.25% - dextrose 5% (CLINIMIX-E) solution (1L provides 42.5 gm AA, 50 gm CHO (170 kcal/L dextrose), Na 35, K 30, Mg 5, Ca 4.5, Acetate 70, Cl 39, Phos 15)   Intravenous Continuous           PRN Meds:  Current Facility-Administered Medications:     acetaminophen, 650 mg, Oral, Q8H PRN    acetaminophen, 650 mg, Oral, Q4H PRN    dextrose 10%, 12.5 g, Intravenous, PRN    dextrose 10%, 25 g, Intravenous, PRN    glucagon (human recombinant), 1 mg, Intramuscular, PRN    glucose, 16 g, Oral, PRN    glucose, 24 g, Oral, PRN    ondansetron, 4 mg, Intravenous, Q8H PRN    Flushing PICC/Midline Protocol, , , Until Discontinued **AND** sodium chloride 0.9%, 10 mL, Intravenous, Q6H **AND** sodium chloride 0.9%, 10 mL,  "Intravenous, PRN    vancomycin - pharmacy to dose, , Intravenous, pharmacy to manage frequency    OBJECTIVE:   Vital Signs:  VITAL SIGNS: 24 HR MIN & MAX LAST   Temp  Min: 98.3 °F (36.8 °C)  Max: 99.7 °F (37.6 °C)  99.7 °F (37.6 °C)   BP  Min: 120/57  Max: 152/76  123/67    Pulse  Min: 61  Max: 77  77    Resp  Min: 18  Max: 18  18    SpO2  Min: 92 %  Max: 97 %  95 %      HT: 5' 4" (162.6 cm)  WT: 73 kg (160 lb 15 oz)  BMI: 27.6     Intake/output:  Intake/Output - Last 3 Shifts         04/18 0700  04/19 0659 04/19 0700 04/20 0659 04/20 0700 04/21 0659    Urine (mL/kg/hr)  1600 (0.9)     Total Output  1600     Net  -1600            Urine Occurrence   2 x            Intake/Output Summary (Last 24 hours) at 4/20/2024 1643  Last data filed at 4/20/2024 0511  Gross per 24 hour   Intake --   Output 300 ml   Net -300 ml         Physical Exam:  General: Well developed, well nourished, no acute distress  HEENT: Normocephalic, atraumatic, PERRL  CV: RRR  Resp: NWOB on room air  GI:  Abdomen soft, non-tender, non-distended, no guarding, no rebound  :  Deferred  MSK: No muscle atrophy, cyanosis, peripheral edema, moving all extremities spontaneously  Skin/wounds:  Mediport in place to right chest wall, some tenderness and erythema to surrounding skin  Neuro:  CNII-XII grossly intact, alert and oriented to person, place, and time    Labs:  Troponin:  No results for input(s): "TROPONINI" in the last 72 hours.  CBC:  Recent Labs     04/18/24  1051 04/19/24  0431   WBC 8.70 6.83   RBC 4.40 3.97*   HGB 12.3 10.9*   HCT 37.5 35.1*    128*   MCV 85.2 88.4   MCH 28.0 27.5   MCHC 32.8* 31.1*     CMP:  Recent Labs     04/18/24  1051 04/19/24  0431   CALCIUM 9.0 8.0*   ALBUMIN 3.3* 2.9*   * 138   K 3.9 3.7   CO2 21* 22*   BUN 12.7 13.6   CREATININE 0.63 0.71   ALKPHOS 92 79   ALT 26 23   AST 37* 27   BILITOT 0.9 1.0     Lactic Acid:  No results for input(s): "LACTATE" in the last 72 hours.  ETOH:  No results for input(s): " ""ETHANOL" in the last 72 hours.   Urine Drug Screen:  No results for input(s): "COCAINE", "OPIATE", "BARBITURATE", "AMPHETAMINE", "FENTANYL", "CANNABINOIDS", "MDMA" in the last 72 hours.    Invalid input(s): "BENZODIAZEPINE", "PHENCYCLIDINE"   ABG:  Recent Labs     04/18/24  1228   PH 7.430   PCO2 40.0   PO2 79.0*   HCO3 26.5*       Diagnostic Results:  CTA Chest Non-Coronary (PE Studies)   Final Result      No pulmonary embolism identified.      New 11 mm solid nodule in the right lower lobe, likely infectious or inflammatory.  Three month follow-up chest CT recommended to ensure resolution.         Electronically signed by: Adria Dooley   Date:    04/18/2024   Time:    13:52      X-Ray Chest AP Portable   Final Result      No active pulmonary disease         Electronically signed by: Domingo Hughes   Date:    04/18/2024   Time:    10:44          ASSESSMENT & PLAN:    77 y/o F with PMHx HTN, HLD, PVD, multiple previous DVT on Coumadin (last dose today), short gut syndrome on TPN, chronic granulomatous infection, MRSA bacteremia, rheumatoid arthritis, cirrhosis, gallstone pancreatitis, breast cancer in remission who presented on 4/18 with weakness and fever. Found to have positive blood cultures in setting of Mediport.    -Will plan for OR early next week for mediport removal  -Please hold Coumadin  -Will obtain surgical consents  -Recommend referral to breast surgeon for right sided breast mass   -Remainder of care per primary  -Surgery will continue to follow    Odette Jackson MD   [employer] General Surgery, PGY-2   "

## 2024-04-20 NOTE — PROCEDURES
"Laura Acosta is a 78 y.o. female patient.    Temp: 98.6 °F (37 °C) (04/19/24 1548)  Pulse: 61 (04/19/24 1548)  Resp: 18 (04/19/24 1113)  BP: 123/60 (04/19/24 1548)  SpO2: 97 % (04/19/24 1548)  Weight: 73 kg (160 lb 15 oz) (04/18/24 0957)  Height: 5' 4" (162.6 cm) (04/18/24 0957)    PICC  Date/Time: 4/19/2024 8:12 PM  Performed by: Semaj Hdez RN  Consent Done: Yes  Time out: Immediately prior to procedure a time out was called to verify the correct patient, procedure, equipment, support staff and site/side marked as required  Indications: med administration and vascular access  Anesthesia: local infiltration  Local anesthetic: lidocaine 1% without epinephrine  Anesthetic Total (mL): 5  Preparation: skin prepped with ChloraPrep  Skin prep agent dried: skin prep agent completely dried prior to procedure  Sterile barriers: all five maximum sterile barriers used - cap, mask, sterile gown, sterile gloves, and large sterile sheet  Hand hygiene: hand hygiene performed prior to central venous catheter insertion  Location details: left basilic  Catheter type: single lumen  Catheter size: 4 Fr  Catheter Length: 15cm    Ultrasound guidance: yes  Vessel Caliber: medium and patent, compressibility normal  Needle advanced into vessel with real time Ultrasound guidance.  Guidewire confirmed in vessel.  Sterile sheath used.  Number of attempts: 1  Post-procedure: blood return through all ports, chlorhexidine patch and sterile dressing applied            Name Semaj Hdez RN  4/19/2024    "

## 2024-04-20 NOTE — PROGRESS NOTES
"[facility] Hospital Medicine Progress Note        Chief Complaint: Fever (Pt has fever and body aches that started this am. Pt reports she was recently seen for the fever and dx with "fever of unknown origin". Denies cp/sob or n/v/d. Pt has an accessed mediport that she gets home infusions 4x a week. Febrile in triage. Took 500mg tylenol at 4am        HISTORY OF PRESENT ILLNESS:   Laura Acosta is a 78 y.o. female with a past medical history of hypertension, hyperlipidemia, PVD, DVT, short gut syndrome on TPN, chronic granulomatous infection, MRSA bacteremia, rheumatoid arthritis, cirrhosis, gallstone pancreatitis, breast cancer, anxiety, and depression who presented to [facility] on 4/18/2024 with c/o fever.  Patient reported fever and body aches began this morning. Patient is followed by Dr. Hoover and has had recurrent fevers for the past 2 months.  Patient is on suppressive Doxycycline secondary to previous MRSA bacteremia.  Patient receives infusions 4 times weekly via MediPort.  Patient reported her last TPN infusion was last night.  Patient reported her MediPort has been in place since 2019.  Patient had NM inflammatory whole-body scan on 04/16/2024 which revealed no scintigraphic evidence of localized infectious process.  On exam patient endorsed chronic diarrhea.  Patient was denied nausea, vomiting, dysuria, hematuria, cough, congestion, chest pain, and shortness of breath.   Initial vital signs in ED were /76, pulse 70, respirations 30, temperature 39.1° C, and SpO2 96% on 3 L nasal cannula.  Labs revealed WBC 8.70, sodium 134, CO2 21, and lactic acid 0.7.  Flu/RSV/COVID testing were negative.  Blood cultures were obtained.  Chest x-ray revealed no active pulmonary disease.  CTA chest revealed no pulmonary embolism identified with new 11 mm solid nodule in the right lower lobe likely infectious versus inflammatory.  Patient was placed on 3 L supplemental oxygen in ED and " given Tylenol 1000 mg and IV Zosyn 4.5 g. Patient was admitted to hospital medicine service for further medical management. 2/2 blood cultures growing staph epidermidis patient currently has a MediPort - infected.  Id recommends to remove MediPort, continue on IV vancomycin.        Todays Information   Patient seen and examined at bedside, family member at bedside   Patient has no new complaints however reports a right-sided had breast lump that she would like General surgery to take a look well there moving her MediPort   Vitals reviewed and stable on room air   INR is 2.6, within goal   Repeat blood cultures of 4/19 pending     Exam  General appearance: Female in no apparent distress.  HENT: Atraumatic head.   Right breast- distorted, hard mass, attached to skin and underlying muscles   Eyes: Normal extraocular movements.   Neck: Supple.   Lungs: Clear to auscultation bilaterally.   Heart: Regular rate and rhythm.  Abdomen: Soft, non-distended, non-tender.  Extremities: No cyanosis, clubbing, or deformities.   Skin: Mediport to right chest wall   Neuro: Awake, alert, and oriented. Motor and sensory exams grossly intact.   Psych/mental status: Appropriate mood and affect. Responds appropriately to questions.         ASSESSMENT & PLAN:   2/2 Staph epidermidis bacteremia likely due to MediPort infection/colonized  Recurrent fever of unknown origin likely d/t above   11 mm solid nodule in the right upper lobe  Chronic diarrhea  Right breast- distorted, hard mass, attached to skin and underlying muscles   Hx of breast cancer s/p lumpectomy   History of hypertension, hyperlipidemia, PVD, DVT, short gut syndrome on TPN, chronic granulomatous infection, MRSA bacteremia, rheumatoid arthritis, cirrhosis, gallstone pancreatitis, breast cancer, anxiety, and depression      Plan:  Vitals reviewed and stable on 2 L of oxygen , no fever in last 24 hrs   Id recommends removal of MediPort, continue IV vancomycin  Consult General  surgery for MediPort removal , also for evaluation of a right breast had lump per patient request  Patient was currently on Coumadin with INR of 2.6   Avoid PICC line till cleared by ID  Can hold off on TPN till safe to put in a picc line   Resume home medications as deemed appropriate once medication reconciliation is updated  Labs in AM     VTE Prophylaxis:  Home Coumadin     Discharge Planning and Disposition: TBD     Critical Care diagnoses bacteremia on IV antibiotics  Critical care time greater than 35 minutes    VITAL SIGNS: 24 HRS MIN & MAX LAST   Temp  Min: 98.3 °F (36.8 °C)  Max: 98.8 °F (37.1 °C) 98.8 °F (37.1 °C)   BP  Min: 123/60  Max: 152/76 (!) 147/73   Pulse  Min: 61  Max: 74  74   Resp  Min: 18  Max: 18 18   SpO2  Min: 92 %  Max: 97 % (!) 94 %     I have reviewed the following labs:  Recent Labs   Lab 04/15/24  0830 04/18/24  1051 04/19/24  0431   WBC 11.95* 8.70 6.83   RBC 4.13* 4.40 3.97*   HGB 11.3* 12.3 10.9*   HCT 35.9* 37.5 35.1*   MCV 86.9 85.2 88.4   MCH 27.4 28.0 27.5   MCHC 31.5* 32.8* 31.1*   RDW 14.7 15.1 15.7    160 128*   MPV 9.5 10.0 9.4     Recent Labs   Lab 04/15/24  0830 04/18/24  1051 04/18/24  1228 04/19/24  0431    134*  --  138   K 3.9 3.9  --  3.7   CO2 25 21*  --  22*   BUN 20.1 12.7  --  13.6   CREATININE 0.66 0.63  --  0.71   CALCIUM 8.6 9.0  --  8.0*   PH  --   --  7.430  --    MG 2.30  --   --   --    ALBUMIN 3.2* 3.3*  --  2.9*   ALKPHOS 95 92  --  79   ALT 33 26  --  23   AST 42* 37*  --  27   BILITOT 0.5 0.9  --  1.0     Microbiology Results (last 7 days)       Procedure Component Value Units Date/Time    Stool Culture [[id number]]  (Normal) Collected: 04/19/24 1601    Order Status: Completed Specimen: Stool Updated: 04/20/24 1010     Stool Culture Negative for Salmonella, Shigella, Campylobacter, Vibrio, Aeromonas, Pleisiomonas,Yersinia, or Shiga Toxin 1 and 2.    Blood Culture [[id number]] Collected: 04/19/24 2208    Order Status: Resulted Specimen:  Venous Blood Line Updated: 04/20/24 0942    Blood Culture [[id number]] Collected: 04/19/24 1925    Order Status: Resulted Specimen: Blood, Venous Updated: 04/19/24 2008    Blood culture x two cultures. Draw prior to antibiotics. [[id number]]  (Abnormal) Collected: 04/18/24 1106    Order Status: Completed Specimen: Blood Updated: 04/19/24 1543     CULTURE, BLOOD ([facility]) No Growth At 24 Hours     GRAM STAIN Gram Positive Cocci, probable Staphylococcus      Seen in gram stain of broth only      1 of 2 Aerobic bottles positive    Malaria Smear [[id number]] Collected: 04/18/24 1756    Order Status: Completed Specimen: Blood Updated: 04/19/24 1415     MALARIA SMEAR ([facility]) No Malarial or other blood borne parasites seen     Malaria Kit Negative    Blood culture x two cultures. Draw prior to antibiotics. [[id number]]  (Normal) Collected: 04/18/24 1106    Order Status: Completed Specimen: Blood Updated: 04/19/24 1300     CULTURE, BLOOD ([facility]) No Growth At 24 Hours    BCID2 Panel [[id number]]  (Abnormal) Collected: 04/18/24 1603    Order Status: Completed Specimen: Blood Updated: 04/19/24 0240     CTX-M (ESBL ) N/A     IMP (Cabapenemase ) N/A     KPC resistance gene (Carbapenemase ) N/A     mcr-1 N/A     mecA ID Detected     Comment: Note: Antimicrobial resistance can occur via multiple mechanisms. A Not Detected result for antimicrobial resistance gene(s) does not indicate antimicrobial susceptibility. Subculturing is required for species identification and susceptibility testing of   isolates.        mecA/C and MREJ (MRSA) gene N/A     NDM (Carbapenemase ) N/A     OXA-48-like (Carbapenemase ) N/A     Maria Luisa/B (VRE gene) N/A     VIM (Carbapenemase ) N/A     Enterococcus faecalis Not Detected     Enterococcus faecium Not Detected     Listeria monocytogenes Not Detected     Staphylococcus spp. Detected     Staphylococcus aureus Not Detected     Staphylococcus epidermidis Detected      Staphylococcus lugdunensis Not Detected     Streptococcus spp. Not Detected     Streptococcus agalactiae (Group B) Not Detected     Streptococcus pneumoniae Not Detected     Streptococcus pyogenes (Group A) Not Detected     Acinetobacter calcoaceticus/baumannii complex Not Detected     Bacteroides fragilis Not Detected     Enterobacterales Not Detected     Enterobacter cloacae complex Not Detected     Escherichia coli Not Detected     Klebsiella aerogenes Not Detected     Klebsiella oxytoca Not Detected     Klebsiella pneumoniae group Not Detected     Proteus spp. Not Detected     Salmonella spp. Not Detected     Serratia marcescens Not Detected     Haemophilus influenzae Not Detected     Neisseria meningitidis Not Detected     Pseudomonas aeruginosa Not Detected     Stenotrophomonas maltophilia Not Detected     Candida albicans Not Detected     Candida auris Not Detected     Candida glabrata Not Detected     Candida krusei Not Detected     Candida parapsilosis Not Detected     Candida tropicalis Not Detected     Cryptococcus neoformans/gattii Not Detected    Narrative:      The [employer] BCID2 Panel is a multiplexed nucleic acid test intended for the use with [employer]® 2.0 or [employer]® [employer] for the simultaneous qualitative detection and identification of multiple bacterial and yeast nucleic acids and select genetic determinants associated with antimicrobial resistance.  The [employer] BCID2 Panel test is performed directly on blood culture samples identified as positive by a continuous monitoring blood culture system.  Results are intended to be interpreted in conjunction with Gram stain results.    Blood Culture [[id number]]  (Abnormal) Collected: 04/18/24 1603    Order Status: Completed Specimen: Blood Updated: 04/19/24 0211     GRAM STAIN Gram Positive Cocci, probable Staphylococcus      Seen in gram stain of broth only      2 of 2 bottles positive    Clostridium Diff Toxin, A & B, EIA  [[id number]]     Order Status: Canceled Specimen: Stool              See below for Radiology    Scheduled Med:  Current Facility-Administered Medications   Medication Dose Route Frequency    busPIRone  15 mg Oral Daily    citalopram  20 mg Oral Daily    diphenoxylate-atropine 2.5-0.025 mg  2 tablet Oral BID    ergocalciferol  50,000 Units Oral Q7 Days    fat emulsion 20%  250 mL Intravenous Twice Weekly    ferrous sulfate  1 tablet Oral BID    mirtazapine  15 mg Oral QHS    mupirocin   Nasal BID    sodium chloride 0.9%  10 mL Intravenous Q6H    vancomycin (VANCOCIN) IV (PEDS and ADULTS)  1,000 mg Intravenous Q12H    warfarin  5 mg Oral Daily      Continuous Infusions:  Current Facility-Administered Medications   Medication Dose Route Frequency Last Rate Last Admin    Amino acid 4.25% - dextrose 5% (CLINIMIX-E) solution (1L provides 42.5 gm AA, 50 gm CHO (170 kcal/L dextrose), Na 35, K 30, Mg 5, Ca 4.5, Acetate 70, Cl 39, Phos 15)   Intravenous Continuous 75 mL/hr at 04/20/24 0521 New Bag at 04/20/24 0521    Amino acid 4.25% - dextrose 5% (CLINIMIX-E) solution (1L provides 42.5 gm AA, 50 gm CHO (170 kcal/L dextrose), Na 35, K 30, Mg 5, Ca 4.5, Acetate 70, Cl 39, Phos 15)   Intravenous Continuous          PRN Meds:    Current Facility-Administered Medications:     acetaminophen, 650 mg, Oral, Q8H PRN    acetaminophen, 650 mg, Oral, Q4H PRN    dextrose 10%, 12.5 g, Intravenous, PRN    dextrose 10%, 25 g, Intravenous, PRN    glucagon (human recombinant), 1 mg, Intramuscular, PRN    glucose, 16 g, Oral, PRN    glucose, 24 g, Oral, PRN    ondansetron, 4 mg, Intravenous, Q8H PRN    Flushing PICC/Midline Protocol, , , Until Discontinued **AND** sodium chloride 0.9%, 10 mL, Intravenous, Q6H **AND** sodium chloride 0.9%, 10 mL, Intravenous, PRN    vancomycin - pharmacy to dose, , Intravenous, pharmacy to manage frequency     Assessment/Plan:      VTE prophylaxis:     Patient condition:  Stable/Fair/Guarded/ Serious/  Critical    Anticipated discharge and Disposition:         All diagnosis and differential diagnosis have been reviewed; assessment and plan has been documented; I have personally reviewed the labs and test results that are presently available; I have reviewed the patients medication list; I have reviewed the consulting providers response and recommendations. I have reviewed or attempted to review medical records based upon their availability    All of the patient's questions have been  addressed and answered. Patient's is agreeable to the above stated plan. I will continue to monitor closely and make adjustments to medical management as needed.  _____________________________________________________________________    Nutrition Status:    Radiology:  I have personally reviewed the following imaging and agree with the radiologist.     CTA Chest Non-Coronary (PE Studies)  Narrative: EXAMINATION:  CTA CHEST NON CORONARY (PE STUDIES)    CLINICAL HISTORY:  Pulmonary embolism (PE) suspected, unknown D-dimer;    TECHNIQUE:  CTA imaging of the chest after IV contrast. Axial, coronal and sagittal reconstructions, including MIP images, are reviewed. Dose length product 426 mGycm. Automatic exposure control, adjustment of mA/kV or iterative reconstruction technique used to limit radiation dose.    COMPARISON:  CT 03/01/2024    FINDINGS:  Diagnostic quality: Adequate    Pulmonary embolism: None identified.    Lung parenchyma: New 11 mm mean diameter solid nodule within the right lower lobe (series 7, image 66).  8 mm solid nodule medially in the right lower lobe unchanged from prior exam.  Stable site of pleuroparenchymal scarring in the superior right lung.    Pleural effusion: None.    Mediastinum/ellen: Right-sided MediPort catheter tip in the SVC.  Mild coronary artery calcification.  No pathologically enlarged lymph node.    Chest wall/axilla: Stable appearance.    Upper abdomen: No significant findings.    Bones: No acute  osseous process.  Right shoulder arthroplasty.  Impression: No pulmonary embolism identified.    New 11 mm solid nodule in the right lower lobe, likely infectious or inflammatory.  Three month follow-up chest CT recommended to ensure resolution.    Electronically signed by: Adria Dooley  Date:    04/18/2024  Time:    13:52  X-Ray Chest AP Portable  Narrative: EXAMINATION:  XR CHEST AP PORTABLE    CLINICAL HISTORY:  Sepsis;    TECHNIQUE:  Single frontal view of the chest was performed.    COMPARISON:  February 2, 2024    FINDINGS:  Examination reveals mediastinal cardiac silhouette to be within normal limits lung fields are clear and free of gross infiltrates atelectasis or effusions a peripherally calcified lesion is identified projecting at the right base similar to previous exams and likely related to breast  Impression: No active pulmonary disease    Electronically signed by: Domingo Hughes  Date:    04/18/2024  Time:    10:44      Adis Haas MD  Department of Hospital Medicine   [facility]   04/20/2024

## 2024-04-21 LAB
ALBUMIN SERPL-MCNC: 2.9 G/DL (ref 3.4–4.8)
ALBUMIN/GLOB SERPL: 0.8 RATIO (ref 1.1–2)
ALP SERPL-CCNC: 77 UNIT/L (ref 40–150)
ALT SERPL-CCNC: 20 UNIT/L (ref 0–55)
AST SERPL-CCNC: 25 UNIT/L (ref 5–34)
BACTERIA BLD CULT: ABNORMAL
BILIRUB SERPL-MCNC: 0.4 MG/DL
BUN SERPL-MCNC: 16.3 MG/DL (ref 9.8–20.1)
CALCIUM SERPL-MCNC: 8.5 MG/DL (ref 8.4–10.2)
CHLORIDE SERPL-SCNC: 110 MMOL/L (ref 98–107)
CO2 SERPL-SCNC: 21 MMOL/L (ref 23–31)
CREAT SERPL-MCNC: 0.59 MG/DL (ref 0.55–1.02)
GFR SERPLBLD CREATININE-BSD FMLA CKD-EPI: >60 MLS/MIN/1.73/M2
GLOBULIN SER-MCNC: 3.5 GM/DL (ref 2.4–3.5)
GLUCOSE SERPL-MCNC: 104 MG/DL (ref 82–115)
GRAM STN SPEC: ABNORMAL
INR PPP: 2.6
MALARIA SMEAR BLD: NEGATIVE
MALARIA SMEAR BLD: NORMAL
POCT GLUCOSE: 133 MG/DL (ref 70–110)
POTASSIUM SERPL-SCNC: 4.6 MMOL/L (ref 3.5–5.1)
PROT SERPL-MCNC: 6.4 GM/DL (ref 5.8–7.6)
PROTHROMBIN TIME: 27.3 SECONDS (ref 12.5–14.5)
SODIUM SERPL-SCNC: 140 MMOL/L (ref 136–145)
VANCOMYCIN TROUGH SERPL-MCNC: 23.6 UG/ML (ref 15–20)

## 2024-04-21 PROCEDURE — 11000001 HC ACUTE MED/SURG PRIVATE ROOM

## 2024-04-21 PROCEDURE — 80202 ASSAY OF VANCOMYCIN: CPT | Performed by: INTERNAL MEDICINE

## 2024-04-21 PROCEDURE — A4216 STERILE WATER/SALINE, 10 ML: HCPCS | Performed by: INTERNAL MEDICINE

## 2024-04-21 PROCEDURE — 25000003 PHARM REV CODE 250: Performed by: INTERNAL MEDICINE

## 2024-04-21 PROCEDURE — 63600175 PHARM REV CODE 636 W HCPCS: Performed by: INTERNAL MEDICINE

## 2024-04-21 PROCEDURE — 21400001 HC TELEMETRY ROOM

## 2024-04-21 PROCEDURE — 27000207 HC ISOLATION

## 2024-04-21 PROCEDURE — 80053 COMPREHEN METABOLIC PANEL: CPT | Performed by: INTERNAL MEDICINE

## 2024-04-21 PROCEDURE — 85610 PROTHROMBIN TIME: CPT | Performed by: INTERNAL MEDICINE

## 2024-04-21 RX ADMIN — FERROUS SULFATE TAB 325 MG (65 MG ELEMENTAL FE) 1 EACH: 325 (65 FE) TAB at 09:04

## 2024-04-21 RX ADMIN — BUSPIRONE HYDROCHLORIDE 15 MG: 5 TABLET ORAL at 09:04

## 2024-04-21 RX ADMIN — DIPHENOXYLATE HYDROCHLORIDE AND ATROPINE SULFATE 2 TABLET: .025; 2.5 TABLET ORAL at 08:04

## 2024-04-21 RX ADMIN — SODIUM CHLORIDE, PRESERVATIVE FREE 10 ML: 5 INJECTION INTRAVENOUS at 12:04

## 2024-04-21 RX ADMIN — MUPIROCIN: 20 OINTMENT TOPICAL at 09:04

## 2024-04-21 RX ADMIN — LEUCINE, PHENYLALANINE, LYSINE, METHIONINE, ISOLEUCINE, VALINE, HISTIDINE, THREONINE, TRYPTOPHAN, ALANINE, GLYCINE, ARGININE, PROLINE, SERINE, TYROSINE, SODIUM ACETATE, DIBASIC POTASSIUM PHOSPHATE, MAGNESIUM CHLORIDE, SODIUM CHLORIDE, CALCIUM CHLORIDE, DEXTROSE
311; 238; 247; 170; 255; 247; 204; 179; 77; 880; 438; 489; 289; 213; 17; 297; 261; 51; 77; 33; 5 INJECTION INTRAVENOUS at 04:04

## 2024-04-21 RX ADMIN — ENOXAPARIN SODIUM 70 MG: 80 INJECTION SUBCUTANEOUS at 06:04

## 2024-04-21 RX ADMIN — FERROUS SULFATE TAB 325 MG (65 MG ELEMENTAL FE) 1 EACH: 325 (65 FE) TAB at 08:04

## 2024-04-21 RX ADMIN — SODIUM CHLORIDE, PRESERVATIVE FREE 10 ML: 5 INJECTION INTRAVENOUS at 06:04

## 2024-04-21 RX ADMIN — MIRTAZAPINE 15 MG: 15 TABLET, FILM COATED ORAL at 08:04

## 2024-04-21 RX ADMIN — SODIUM CHLORIDE, PRESERVATIVE FREE 10 ML: 5 INJECTION INTRAVENOUS at 11:04

## 2024-04-21 RX ADMIN — ENOXAPARIN SODIUM 70 MG: 80 INJECTION SUBCUTANEOUS at 05:04

## 2024-04-21 RX ADMIN — SODIUM CHLORIDE, PRESERVATIVE FREE 10 ML: 5 INJECTION INTRAVENOUS at 05:04

## 2024-04-21 RX ADMIN — DIPHENOXYLATE HYDROCHLORIDE AND ATROPINE SULFATE 2 TABLET: .025; 2.5 TABLET ORAL at 09:04

## 2024-04-21 RX ADMIN — VANCOMYCIN HYDROCHLORIDE 1000 MG: 1 INJECTION, POWDER, LYOPHILIZED, FOR SOLUTION INTRAVENOUS at 04:04

## 2024-04-21 RX ADMIN — CITALOPRAM HYDROBROMIDE 20 MG: 20 TABLET ORAL at 09:04

## 2024-04-21 RX ADMIN — VANCOMYCIN HYDROCHLORIDE 1000 MG: 1 INJECTION, POWDER, LYOPHILIZED, FOR SOLUTION INTRAVENOUS at 01:04

## 2024-04-21 NOTE — PROGRESS NOTES
"[facility] Hospital Medicine Progress Note        Chief Complaint: Fever (Pt has fever and body aches that started this am. Pt reports she was recently seen for the fever and dx with "fever of unknown origin". Denies cp/sob or n/v/d. Pt has an accessed mediport that she gets home infusions 4x a week. Febrile in triage. Took 500mg tylenol at 4am        HISTORY OF PRESENT ILLNESS:   Laura Acosta is a 78 y.o. 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  Patient reported fever and body aches began this morning. Patient is followed by Dr. Hoover and has had recurrent fevers for the past 2 months.  Patient is on suppressive Doxycycline secondary to previous MRSA bacteremia.  Patient receives infusions 4 times weekly via MediPort.  Patient reported her last TPN infusion was last night.  Patient reported her MediPort has been in place since 2019.  Patient had NM inflammatory whole-body scan on 04/16/2024 which revealed no scintigraphic evidence of localized infectious process.  On exam patient endorsed chronic diarrhea.  Patient was denied nausea, vomiting, dysuria, hematuria, cough, congestion, chest pain, and shortness of breath.   Initial vital signs in ED were /76, pulse 70, respirations 30, temperature 39.1° C, and SpO2 96% on 3 L nasal cannula.  Labs revealed WBC 8.70, sodium 134, CO2 21, and lactic acid 0.7.  Flu/RSV/COVID testing were negative.  Blood cultures were obtained.  Chest x-ray revealed no active pulmonary disease.  CTA chest revealed no pulmonary embolism identified with new 11 mm solid nodule in the right lower lobe likely infectious versus inflammatory.  Patient was placed on 3 L supplemental oxygen in ED and " given Tylenol 1000 mg and IV Zosyn 4.5 g. Patient was admitted to hospital medicine service for further medical management. 2/2 blood cultures growing staph epidermidis patient currently has a MediPort - infected.  Id recommends to remove MediPort, continue on IV vancomycin.        Todays Information   Patient seen and examined at bedside, family member at bedside   Patient has no new complaints today   General surgery evaluated patient to reconsult them when INR is less than 1.6 for bedside MediPort removal  And recommended outpatient right breast mass follow-up with Dr. Gonzalez  Repeat blood cultures currently negative at 24 hours        Exam  General appearance: Female in no apparent distress.  HENT: Atraumatic head.   Right breast- distorted, hard mass, attached to skin and underlying muscles   Eyes: Normal extraocular movements.   Neck: Supple.   Lungs: Clear to auscultation bilaterally.   Heart: Regular rate and rhythm.  Abdomen: Soft, non-distended, non-tender.  Extremities: No cyanosis, clubbing, or deformities.   Skin: Mediport to right chest wall   Neuro: Awake, alert, and oriented. Motor and sensory exams grossly intact.   Psych/mental status: Appropriate mood and affect. Responds appropriately to questions.         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 Gonzalez  Repeat blood cultures currently negative at 24 hours   Id recommends removal of MediPort, continue IV vancomycin  Avoid PICC line till cleared by ID  Resume home medications as deemed appropriate once medication reconciliation is updated  Labs in AM     VTE Prophylaxis:  Hold Coumadin     Discharge Planning and Disposition: TBD     Critical Care diagnoses bacteremia on IV antibiotics  Critical care time greater than 35 minutes         VITAL SIGNS: 24 HRS MIN & MAX LAST   Temp  Min: 97.6 °F (36.4 °C)  Max: 99.7 °F (37.6 °C) 98.5 °F (36.9 °C)   BP  Min: 120/56  Max: 159/77 (!) 120/56   Pulse  Min: 64  Max: 77  68   Resp  Min: 19  Max: 19 19   SpO2  Min: 93 %  Max: 98 % (!) 94 %     I have reviewed the following labs:  Recent Labs   Lab 04/15/24  0830 04/18/24  1051 04/19/24  0431   WBC 11.95* 8.70 6.83   RBC 4.13* 4.40 3.97*   HGB 11.3* 12.3 10.9*   HCT 35.9* 37.5 35.1*   MCV 86.9 85.2 88.4   MCH 27.4 28.0 27.5   MCHC 31.5* 32.8* 31.1*   RDW 14.7 15.1 15.7    160 128*   MPV 9.5 10.0 9.4     Recent Labs   Lab 04/15/24  0830 04/18/24  1051 04/18/24  1228 04/19/24  0431 04/21/24  0722    134*  --  138 140   K 3.9 3.9  --  3.7 4.6   CO2 25 21*  --  22* 21*   BUN 20.1 12.7  --  13.6 16.3   CREATININE 0.66 0.63  --  0.71 0.59   CALCIUM 8.6 9.0  --  8.0* 8.5   PH  --   --  7.430  --   --    MG 2.30  --   --   --   --    ALBUMIN 3.2* 3.3*  --  2.9* 2.9*   ALKPHOS 95 92  --  79 77   ALT 33 26  --  23 20   AST 42* 37*  --  27 25   BILITOT 0.5 0.9  --  1.0 0.4     Microbiology Results (last 7 days)       Procedure Component Value Units Date/Time    Blood Culture [[id number]]  (Normal) Collected: 04/19/24 2208    Order Status: Completed Specimen: Venous Blood Line Updated: 04/21/24 1000     CULTURE, BLOOD ([facility]) No Growth At 24 Hours    Blood Culture [[id number]]  (Abnormal) Collected: 04/18/24 1603    Order Status: Completed Specimen: Blood Updated: 04/21/24 0640      CULTURE, BLOOD ([facility]) Identification and Susceptibility To Follow      Gram-positive coccus probable staph     GRAM STAIN Gram Positive Cocci, probable Staphylococcus      Seen in gram stain of broth only      2 of 2 bottles positive    Blood culture x two cultures. Draw prior to antibiotics. [[id number]]  (Abnormal)  (Susceptibility) Collected: 04/18/24 1106    Order Status: Completed Specimen: Blood Updated: 04/21/24 0634     CULTURE, BLOOD ([facility]) Staphylococcus hominis     GRAM STAIN Gram Positive Cocci, probable Staphylococcus      Seen in gram stain of broth only      1 of 2 Aerobic bottles positive    Blood Culture [[id number]]  (Normal) Collected: 04/19/24 1925    Order Status: Completed Specimen: Blood, Venous Updated: 04/20/24 2101     CULTURE, BLOOD ([facility]) No Growth At 24 Hours    Blood culture x two cultures. Draw prior to antibiotics. [[id number]]  (Normal) Collected: 04/18/24 1106    Order Status: Completed Specimen: Blood Updated: 04/20/24 1300     CULTURE, BLOOD ([facility]) No Growth At 48 Hours    Stool Culture [[id number]]  (Normal) Collected: 04/19/24 1601    Order Status: Completed Specimen: Stool Updated: 04/20/24 1010     Stool Culture Negative for Salmonella, Shigella, Campylobacter, Vibrio, Aeromonas, Pleisiomonas,Yersinia, or Shiga Toxin 1 and 2.    Malaria Smear [[id number]] Collected: 04/18/24 1756    Order Status: Completed Specimen: Blood Updated: 04/19/24 1415     MALARIA SMEAR ([facility]) No Malarial or other blood borne parasites seen     Malaria Kit Negative    BCID2 Panel [[id number]]  (Abnormal) Collected: 04/18/24 1603    Order Status: Completed Specimen: Blood Updated: 04/19/24 0240     CTX-M (ESBL ) N/A     IMP (Cabapenemase ) N/A     KPC resistance gene (Carbapenemase ) N/A     mcr-1 N/A     mecA ID Detected     Comment: Note: Antimicrobial resistance can occur via multiple mechanisms. A Not Detected result for antimicrobial resistance gene(s) does not indicate  antimicrobial susceptibility. Subculturing is required for species identification and susceptibility testing of   isolates.        mecA/C and MREJ (MRSA) gene N/A     NDM (Carbapenemase ) N/A     OXA-48-like (Carbapenemase ) N/A     Maria Luisa/B (VRE gene) N/A     VIM (Carbapenemase ) N/A     Enterococcus faecalis Not Detected     Enterococcus faecium Not Detected     Listeria monocytogenes Not Detected     Staphylococcus spp. Detected     Staphylococcus aureus Not Detected     Staphylococcus epidermidis Detected     Staphylococcus lugdunensis Not Detected     Streptococcus spp. Not Detected     Streptococcus agalactiae (Group B) Not Detected     Streptococcus pneumoniae Not Detected     Streptococcus pyogenes (Group A) Not Detected     Acinetobacter calcoaceticus/baumannii complex Not Detected     Bacteroides fragilis Not Detected     Enterobacterales Not Detected     Enterobacter cloacae complex Not Detected     Escherichia coli Not Detected     Klebsiella aerogenes Not Detected     Klebsiella oxytoca Not Detected     Klebsiella pneumoniae group Not Detected     Proteus spp. Not Detected     Salmonella spp. Not Detected     Serratia marcescens Not Detected     Haemophilus influenzae Not Detected     Neisseria meningitidis Not Detected     Pseudomonas aeruginosa Not Detected     Stenotrophomonas maltophilia Not Detected     Candida albicans Not Detected     Candida auris Not Detected     Candida glabrata Not Detected     Candida krusei Not Detected     Candida parapsilosis Not Detected     Candida tropicalis Not Detected     Cryptococcus neoformans/gattii Not Detected    Narrative:      The [employer] BCID2 Panel is a multiplexed nucleic acid test intended for the use with [employer]® 2.0 or nCrypted Cloud® [employer] for the simultaneous qualitative detection and identification of multiple bacterial and yeast nucleic acids and select genetic determinants associated with antimicrobial  resistance.  The [employer] BCID2 Panel test is performed directly on blood culture samples identified as positive by a continuous monitoring blood culture system.  Results are intended to be interpreted in conjunction with Gram stain results.    Clostridium Diff Toxin, A & B, EIA [[id number]]     Order Status: Canceled Specimen: Stool              See below for Radiology    Scheduled Med:  Current Facility-Administered Medications   Medication Dose Route Frequency    busPIRone  15 mg Oral Daily    citalopram  20 mg Oral Daily    diphenoxylate-atropine 2.5-0.025 mg  2 tablet Oral BID    enoxparin  1 mg/kg Subcutaneous Q12H (treatment, non-standard time)    ergocalciferol  50,000 Units Oral Q7 Days    ferrous sulfate  1 tablet Oral BID    mirtazapine  15 mg Oral QHS    mupirocin   Nasal BID    sodium chloride 0.9%  10 mL Intravenous Q6H    vancomycin (VANCOCIN) IV (PEDS and ADULTS)  1,000 mg Intravenous Q8H      Continuous Infusions:  Current Facility-Administered Medications   Medication Dose Route Frequency Last Rate Last Admin    Amino acid 4.25% - dextrose 5% (CLINIMIX-E) solution (1L provides 42.5 gm AA, 50 gm CHO (170 kcal/L dextrose), Na 35, K 30, Mg 5, Ca 4.5, Acetate 70, Cl 39, Phos 15)   Intravenous Continuous          PRN Meds:    Current Facility-Administered Medications:     acetaminophen, 650 mg, Oral, Q8H PRN    acetaminophen, 650 mg, Oral, Q4H PRN    dextrose 10%, 12.5 g, Intravenous, PRN    dextrose 10%, 25 g, Intravenous, PRN    glucagon (human recombinant), 1 mg, Intramuscular, PRN    glucose, 16 g, Oral, PRN    glucose, 24 g, Oral, PRN    ondansetron, 4 mg, Intravenous, Q8H PRN    Flushing PICC/Midline Protocol, , , Until Discontinued **AND** sodium chloride 0.9%, 10 mL, Intravenous, Q6H **AND** sodium chloride 0.9%, 10 mL, Intravenous, PRN    vancomycin - pharmacy to dose, , Intravenous, pharmacy to manage frequency     Assessment/Plan:      VTE prophylaxis:     Patient condition:   Stable/Fair/Guarded/ Serious/ Critical    Anticipated discharge and Disposition:         All diagnosis and differential diagnosis have been reviewed; assessment and plan has been documented; I have personally reviewed the labs and test results that are presently available; I have reviewed the patients medication list; I have reviewed the consulting providers response and recommendations. I have reviewed or attempted to review medical records based upon their availability    All of the patient's questions have been  addressed and answered. Patient's is agreeable to the above stated plan. I will continue to monitor closely and make adjustments to medical management as needed.  _____________________________________________________________________    Nutrition Status:    Radiology:  I have personally reviewed the following imaging and agree with the radiologist.     CTA Chest Non-Coronary (PE Studies)  Narrative: EXAMINATION:  CTA CHEST NON CORONARY (PE STUDIES)    CLINICAL HISTORY:  Pulmonary embolism (PE) suspected, unknown D-dimer;    TECHNIQUE:  CTA imaging of the chest after IV contrast. Axial, coronal and sagittal reconstructions, including MIP images, are reviewed. Dose length product 426 mGycm. Automatic exposure control, adjustment of mA/kV or iterative reconstruction technique used to limit radiation dose.    COMPARISON:  CT 03/01/2024    FINDINGS:  Diagnostic quality: Adequate    Pulmonary embolism: None identified.    Lung parenchyma: New 11 mm mean diameter solid nodule within the right lower lobe (series 7, image 66).  8 mm solid nodule medially in the right lower lobe unchanged from prior exam.  Stable site of pleuroparenchymal scarring in the superior right lung.    Pleural effusion: None.    Mediastinum/ellen: Right-sided MediPort catheter tip in the SVC.  Mild coronary artery calcification.  No pathologically enlarged lymph node.    Chest wall/axilla: Stable appearance.    Upper abdomen: No significant  findings.    Bones: No acute osseous process.  Right shoulder arthroplasty.  Impression: No pulmonary embolism identified.    New 11 mm solid nodule in the right lower lobe, likely infectious or inflammatory.  Three month follow-up chest CT recommended to ensure resolution.    Electronically signed by: Adria Dooley  Date:    04/18/2024  Time:    13:52  X-Ray Chest AP Portable  Narrative: EXAMINATION:  XR CHEST AP PORTABLE    CLINICAL HISTORY:  Sepsis;    TECHNIQUE:  Single frontal view of the chest was performed.    COMPARISON:  February 2, 2024    FINDINGS:  Examination reveals mediastinal cardiac silhouette to be within normal limits lung fields are clear and free of gross infiltrates atelectasis or effusions a peripherally calcified lesion is identified projecting at the right base similar to previous exams and likely related to breast  Impression: No active pulmonary disease    Electronically signed by: Domingo Hughes  Date:    04/18/2024  Time:    10:44      Adis Haas MD  Department of Hospital Medicine   [facility]   04/21/2024

## 2024-04-21 NOTE — PROGRESS NOTES
Infectious Diseases Progress Note  79 y/o female who is known to our service. 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 On admit she was febrile without leukocytosis, ESR 20 and CRP 49.9. Blood cultures revealing Staph Hominis 2/2 sets. Influenza A/B Ag (-), RSV PCR (-) and SARS-CoV-2 PCR (-). CXR unremarkable. CTA chest with contrast showed no PE, new 11 mm solid nodule RLL.  She is currently on Vancomycin    Subjective:  Lying in bed, resting. No acute distress. No new complaints voiced. Afebrile. Friend at bedside    Review of Systems   Constitutional:  Positive for malaise/fatigue.   HENT: Negative.     Eyes: Negative.    Respiratory: Negative.     Cardiovascular: Negative.    Gastrointestinal: Negative.    Genitourinary: Negative.    Musculoskeletal: Negative.    Skin: Negative.    Neurological: Negative.    All other systems reviewed and are negative.      Review of patient's allergies indicates:   Allergen Reactions    Hydromorphone Itching     Other reaction(s): itching    Opium      Other reaction(s): itching  has itching with larger doses. Smaller doses do not cause a reaction.       Past Medical History:   Diagnosis Date    Acute URI     Anxiety and depression     Back pain     Breast cancer     Cholelithiasis     Contaminated small bowel syndrome     DVT (deep venous thrombosis)     Edema, lower extremity     Gallstone pancreatitis     HTN (hypertension)     Hypercholesterolemia     Insomnia     Irregular heart beat     Ischemic disease of gut     Laryngitis     Malabsorption     Meningitis, unspecified     OA (osteoarthritis)     PVD (peripheral vascular disease)     Rheumatoid arthritis, unspecified     Staph infection     infected mediport -- on doxycycline daily for prevention     Tremor     Unspecified cataract     Unspecified cirrhosis of liver 06/20/2023    Dr Carroll    Venous insufficiency        Past Surgical History:   Procedure Laterality Date    APPENDECTOMY      BOWEL RESECTION      BREAST LUMPECTOMY Right     CHOLECYSTECTOMY  05/2015    COLONOSCOPY  02/2022    repeat 3 years    CYST REMOVAL Right     breast    CYSTOSCOPY W/ STONE MANIPULATION      CYSTOSCOPY W/ URETERAL STENT PLACEMENT  12/2020    CYSTOSCOPY W/ URETERAL STENT REMOVAL  04/2021    CYSTOURETEROSCOPY, WITH HOLMIUM LASER LITHOTRIPSY OF URETERAL CALCULUS AND STENT INSERTION Left 05/02/2023    Procedure: CYSTOSCOPY, WITH URETERAL STENT INSERTION Left;  Surgeon: Jared Felix MD;  Location: [facility] OR;  Service: Urology;  Laterality: Left;    EGD, WITH CLOSED BIOPSY N/A 6/20/2023    Procedure: EGD;  Surgeon: Aashish Carroll MD;  Location: [facility] ENDOSCOPY;  Service: Gastroenterology;  Laterality: N/A;    ENDOSCOPIC RETROGRADE CHOLANGIOPANCREATOGRAPHY W/ SPHINCTEROTOMY AND STONE REMOVAL  04/2015    ESOPHAGOGASTRODUODENOSCOPY  06/20/2023    Dr Carroll - no signs esophageal varicies --repeat 3 yrs    GI Biopsy  02/15/2022    GI Biopsy  12/2018    HYSTERECTOMY      LAPAROTOMY, EXPLORATORY  06/2015    LITHOTRIPSY Left 04/2021    LITHOTRIPSY Left 03/2021    MEDIPORT INSERTION, SINGLE  06/2021    MEDIPORT INSERTION, SINGLE  07/2020    MEDIPORT INSERTION, SINGLE  12/2018    MEDIPORT REMOVAL  07/2020    PHACOEMULSIFICATION OF CATARACT Left 12/2016    PHACOEMULSIFICATION OF CATARACT Right 11/2016    PICC line Removal Right 06/2021    POLYPECTOMY  02/2022    PVD surgery      REMOVAL OF CATHETER  12/2020    SHOULDER SURGERY Right     shoulder replacement    SPHINCTEROTOMY OF URETHRA      VENTRAL HERNIA REPAIR  04/2016       Social History     Socioeconomic History    Marital status:     Number of children: 2   Occupational History    Occupation: Retired   Tobacco Use    Smoking status: Never    Smokeless  tobacco: Never   Substance and Sexual Activity    Alcohol use: Not Currently    Drug use: Never    Sexual activity: Not Currently     Social Determinants of Health     Financial Resource Strain: Low Risk  (4/20/2024)    Overall Financial Resource Strain (CARDIA)     Difficulty of Paying Living Expenses: Not hard at all   Food Insecurity: No Food Insecurity (4/20/2024)    Hunger Vital Sign     Worried About Running Out of Food in the Last Year: Never true     Ran Out of Food in the Last Year: Never true   Transportation Needs: No Transportation Needs (4/20/2024)    PRAPARE - Transportation     Lack of Transportation (Medical): No     Lack of Transportation (Non-Medical): No   Stress: No Stress Concern Present (4/20/2024)    [employer] [place] [employer] - Occupational Stress Questionnaire     Feeling of Stress : Not at all   Social Connections: Unknown (4/19/2024)    Social Connection and Isolation Panel [NHANES]     Marital Status: Living with partner   Housing Stability: Low Risk  (4/20/2024)    Housing Stability Vital Sign     Unable to Pay for Housing in the Last Year: No     Number of Times Moved in the Last Year: 0     Homeless in the Last Year: No         Scheduled Meds:  Current Facility-Administered Medications   Medication Dose Route Frequency    busPIRone  15 mg Oral Daily    citalopram  20 mg Oral Daily    diphenoxylate-atropine 2.5-0.025 mg  2 tablet Oral BID    enoxparin  1 mg/kg Subcutaneous Q12H (treatment, non-standard time)    ergocalciferol  50,000 Units Oral Q7 Days    fat emulsion 20%  250 mL Intravenous Twice Weekly    ferrous sulfate  1 tablet Oral BID    mirtazapine  15 mg Oral QHS    mupirocin   Nasal BID    sodium chloride 0.9%  10 mL Intravenous Q6H    [START ON 4/21/2024] vancomycin (VANCOCIN) IV (PEDS and ADULTS)  1,000 mg Intravenous Q8H     Continuous Infusions:  Current Facility-Administered Medications   Medication Dose Route Frequency Last Rate Last Admin    Amino acid  "4.25% - dextrose 5% (CLINIMIX-E) solution (1L provides 42.5 gm AA, 50 gm CHO (170 kcal/L dextrose), Na 35, K 30, Mg 5, Ca 4.5, Acetate 70, Cl 39, Phos 15)   Intravenous Continuous 75 mL/hr at 04/20/24 1749 New Bag at 04/20/24 1749     PRN Meds:  Current Facility-Administered Medications:     acetaminophen, 650 mg, Oral, Q8H PRN    acetaminophen, 650 mg, Oral, Q4H PRN    dextrose 10%, 12.5 g, Intravenous, PRN    dextrose 10%, 25 g, Intravenous, PRN    glucagon (human recombinant), 1 mg, Intramuscular, PRN    glucose, 16 g, Oral, PRN    glucose, 24 g, Oral, PRN    ondansetron, 4 mg, Intravenous, Q8H PRN    Flushing PICC/Midline Protocol, , , Until Discontinued **AND** sodium chloride 0.9%, 10 mL, Intravenous, Q6H **AND** sodium chloride 0.9%, 10 mL, Intravenous, PRN    vancomycin - pharmacy to dose, , Intravenous, pharmacy to manage frequency    Objective:  /65   Pulse 72   Temp 98.6 °F (37 °C)   Resp 19   Ht 5' 4" (1.626 m)   Wt 73 kg (160 lb 15 oz)   SpO2 95%   BMI 27.62 kg/m²     Physical Exam:   Physical Exam  Vitals reviewed.   Constitutional:       Appearance: She is obese.   HENT:      Head: Normocephalic.   Cardiovascular:      Rate and Rhythm: Normal rate and regular rhythm.   Pulmonary:      Effort: Pulmonary effort is normal. No respiratory distress.      Breath sounds: Normal breath sounds. No wheezing.   Abdominal:      General: Bowel sounds are normal. There is no distension.      Palpations: Abdomen is soft.      Tenderness: There is no abdominal tenderness.   Musculoskeletal:         General: Normal range of motion.      Cervical back: Normal range of motion.   Skin:     Findings: No rash.      Comments: R CW mediport, site benign. R breast mass   Neurological:      Mental Status: She is alert and oriented to person, place, and time.   Psychiatric:         Mood and Affect: Mood normal.         Behavior: Behavior normal.       Imaging      Lab Review   Recent Results (from the past 24 " hour(s))   Protime-INR    Collection Time: 04/20/24  5:21 AM   Result Value Ref Range    PT 27.2 (H) 12.5 - 14.5 seconds    INR 2.6 (H) <=1.3   VANCOMYCIN, TROUGH    Collection Time: 04/20/24  7:24 PM   Result Value Ref Range    Vancomycin Trough 5.7 (L) 15.0 - 20.0 ug/ml     Assessment/Plan:  Recurrent Staphylococcus bacteremia  Infected mediport  RLL pulmonary nodule  DM Type II  RA  Hx of short gut syndrome  Hepatic cirrhosis  Anemia  Thrombocytopenia  Elevated CRP  Hx of R breast CA     -Continue Vancomycin #2  -Afebrile without leukocytosis  -4/18 Blood cultures revealed Staph Hominis 2/2 sets  -Follow repeat blood cultures from 4/19 to assess for clearance  -Seen by Surgery, inputs noted including plans for mediport removal this coming week and recommended referral to breast surgeon for R breast mass  -Discussed with patient, friend and nursing

## 2024-04-21 NOTE — PROGRESS NOTES
Surgery update note    Discussed with patient that we will plan to remove the mediport given MRSA bacteremia, however INR was pending. Holding coumadin. It is 2.6 this AM. Will order recheck for tomorrow. Please call surgery back when INR is 1.6 or below and we can plan to remove mediport at bedside when appropriate.     Esha Fitzgerald MD  [employer] Surgery PGY4

## 2024-04-21 NOTE — PROGRESS NOTES
Acute Care Surgery   Progress Note  Admit Date: 4/18/2024  HD#3  POD#* No surgery found *    Subjective:   Interval history:  AF, VSS. NAEON. Patient denies pain or discomfort this AM. Coumadin held since yesterday.     Home Meds:  Current Outpatient Medications   Medication Instructions    busPIRone (BUSPAR) 15 mg, Oral, Daily    CeleXA 40 mg, Oral, Daily    diphenoxylate-atropine 2.5-0.025 mg (LOMOTIL) 2.5-0.025 mg per tablet 2 tablets, Oral, 2 times daily    doxycycline (VIBRA-TABS) 100 mg, Oral, 2 times daily    metoprolol tartrate (LOPRESSOR) 100 mg, Oral, 2 times daily    mirtazapine (REMERON) 15 mg, Oral, Nightly    solifenacin (VESICARE) 5 mg, Oral, Daily    VITAMIN D2 1,250 mcg (50,000 unit) capsule TAKE ONE CAPSULE BY MOUTH three times PER WEEK    warfarin (COUMADIN) 2.5 MG tablet TAKE ONE TABLET BY MOUTH ONCE DAILY IN THE EVENING OR AS DIRECTED BY CIS PROVIDER    warfarin (COUMADIN) 2.5 mg, Oral, Once, Take two tablets by mouth on Monday and take one tablet all other days      Scheduled Meds:  Current Facility-Administered Medications   Medication Dose Route Frequency    busPIRone  15 mg Oral Daily    citalopram  20 mg Oral Daily    diphenoxylate-atropine 2.5-0.025 mg  2 tablet Oral BID    enoxparin  1 mg/kg Subcutaneous Q12H (treatment, non-standard time)    ergocalciferol  50,000 Units Oral Q7 Days    fat emulsion 20%  250 mL Intravenous Twice Weekly    ferrous sulfate  1 tablet Oral BID    mirtazapine  15 mg Oral QHS    mupirocin   Nasal BID    sodium chloride 0.9%  10 mL Intravenous Q6H    vancomycin (VANCOCIN) IV (PEDS and ADULTS)  1,000 mg Intravenous Q8H     Continuous Infusions:  Current Facility-Administered Medications   Medication Dose Route Frequency Last Rate Last Admin     PRN Meds:  Current Facility-Administered Medications:     acetaminophen, 650 mg, Oral, Q8H PRN    acetaminophen, 650 mg, Oral, Q4H PRN    dextrose 10%, 12.5 g, Intravenous, PRN    dextrose 10%, 25 g, Intravenous, PRN     "glucagon (human recombinant), 1 mg, Intramuscular, PRN    glucose, 16 g, Oral, PRN    glucose, 24 g, Oral, PRN    ondansetron, 4 mg, Intravenous, Q8H PRN    Flushing PICC/Midline Protocol, , , Until Discontinued **AND** sodium chloride 0.9%, 10 mL, Intravenous, Q6H **AND** sodium chloride 0.9%, 10 mL, Intravenous, PRN    vancomycin - pharmacy to dose, , Intravenous, pharmacy to manage frequency     Objective:     VITAL SIGNS: 24 HR MIN & MAX LAST   Temp  Min: 97.6 °F (36.4 °C)  Max: 99.7 °F (37.6 °C)  98 °F (36.7 °C)   BP  Min: 120/57  Max: 159/77  (!) 130/50    Pulse  Min: 64  Max: 77  67    Resp  Min: 19  Max: 19  19    SpO2  Min: 92 %  Max: 98 %  95 %      HT: 5' 4" (162.6 cm)  WT: 73 kg (160 lb 15 oz)  BMI: 27.6     Intake/output:  Intake/Output - Last 3 Shifts         04/19 0700  04/20 0659 04/20 0700  04/21 0659 04/21 0700  04/22 0659    P.O.  480     Total Intake(mL/kg)  480 (6.6)     Urine (mL/kg/hr) 1600 (0.9) 1000 (0.6)     Total Output 1600 1000     Net -1600 -520            Urine Occurrence  1002 x             Intake/Output Summary (Last 24 hours) at 4/21/2024 0727  Last data filed at 4/21/2024 0536  Gross per 24 hour   Intake 480 ml   Output 1000 ml   Net -520 ml           Lines/drains/airway:       PowerPort A Cath Single Lumen Subclavian Right (Active)   Number of days:             Midline Catheter - Single Lumen 04/19/24 2010 Left basilic vein (medial side of arm) other (see comments) (Active)   $ Midline Charges (Upon insertion) Bedside Insertion Performed Pt > 3 Years Old;Midline Catheter (Supply);Ultrasound Guide for Vascular Access 04/19/24 2010   Site Assessment Clean;Dry;Intact;No redness;No swelling 04/20/24 2000   IV Device Securement catheter securement device 04/20/24 2000   Line Status Flushed;Infusing 04/20/24 2000   Extremity Circumference (cm) 0 cm 04/20/24 0400   Dressing Type CHG impregnated dressing/sponge 04/20/24 2000   Dressing Status Clean;Dry;Intact 04/20/24 2000   Dressing " "Intervention Integrity maintained 04/20/24 2000   Dressing Change Due 04/26/24 04/20/24 2000   Number of days: 1       Physical examination:  General: Well developed, well nourished, no acute distress  HEENT: Normocephalic, atraumatic, PERRL  CV: RRR  Resp: NWOB on room air  GI:  Abdomen soft, non-tender, non-distended, no guarding, no rebound  :  Deferred  MSK: No muscle atrophy, cyanosis, peripheral edema, moving all extremities spontaneously  Skin/wounds:  Mediport in place to right chest wall, some tenderness and erythema to surrounding skin  Neuro:  CNII-XII grossly intact, alert and oriented to person, place, and time    Labs:  Renal:  Recent Labs     04/18/24  1051 04/19/24  0431   BUN 12.7 13.6   CREATININE 0.63 0.71     Recent Labs     04/18/24  1107   LACTIC 0.7     FENGI:  Recent Labs     04/18/24  1051 04/19/24  0431   * 138   K 3.9 3.7   CO2 21* 22*   CALCIUM 9.0 8.0*   ALBUMIN 3.3* 2.9*   BILITOT 0.9 1.0   AST 37* 27   ALKPHOS 92 79   ALT 26 23     Heme:  Recent Labs     04/18/24  1051 04/18/24  1756 04/19/24  0431 04/20/24  0521   HGB 12.3  --  10.9*  --    HCT 37.5  --  35.1*  --      --  128*  --    INR  --  1.6* 1.6* 2.6*     ID:  Recent Labs     04/18/24  1051 04/19/24  0431   WBC 8.70 6.83     CBG:  Recent Labs     04/18/24  1051 04/19/24  0431   GLUCOSE 105 132*      Cardiovascular:  No results for input(s): "TROPONINI", "CKTOTAL", "CKMB", "BNP" in the last 168 hours.  I have reviewed all pertinent lab results within the past 24 hours.    Imaging:  CTA Chest Non-Coronary (PE Studies)   Final Result      No pulmonary embolism identified.      New 11 mm solid nodule in the right lower lobe, likely infectious or inflammatory.  Three month follow-up chest CT recommended to ensure resolution.         Electronically signed by: Adria Dooley   Date:    04/18/2024   Time:    13:52      X-Ray Chest AP Portable   Final Result      No active pulmonary disease         Electronically signed " by: Domingo Hughes   Date:    04/18/2024   Time:    10:44         I have reviewed all pertinent imaging results/findings within the past 24 hours.    Micro/Path/Other:  Microbiology Results (last 7 days)       Procedure Component Value Units Date/Time    Blood Culture [[id number]]  (Abnormal) Collected: 04/18/24 1603    Order Status: Completed Specimen: Blood Updated: 04/21/24 0640     CULTURE, BLOOD ([facility]) Identification and Susceptibility To Follow      Gram-positive coccus probable staph     GRAM STAIN Gram Positive Cocci, probable Staphylococcus      Seen in gram stain of broth only      2 of 2 bottles positive    Blood culture x two cultures. Draw prior to antibiotics. [[id number]]  (Abnormal)  (Susceptibility) Collected: 04/18/24 1106    Order Status: Completed Specimen: Blood Updated: 04/21/24 0634     CULTURE, BLOOD ([facility]) Staphylococcus hominis     GRAM STAIN Gram Positive Cocci, probable Staphylococcus      Seen in gram stain of broth only      1 of 2 Aerobic bottles positive    Blood Culture [[id number]]  (Normal) Collected: 04/19/24 1925    Order Status: Completed Specimen: Blood, Venous Updated: 04/20/24 2101     CULTURE, BLOOD ([facility]) No Growth At 24 Hours    Blood culture x two cultures. Draw prior to antibiotics. [[id number]]  (Normal) Collected: 04/18/24 1106    Order Status: Completed Specimen: Blood Updated: 04/20/24 1300     CULTURE, BLOOD ([facility]) No Growth At 48 Hours    Stool Culture [[id number]]  (Normal) Collected: 04/19/24 1601    Order Status: Completed Specimen: Stool Updated: 04/20/24 1010     Stool Culture Negative for Salmonella, Shigella, Campylobacter, Vibrio, Aeromonas, Pleisiomonas,Yersinia, or Shiga Toxin 1 and 2.    Blood Culture [[id number]] Collected: 04/19/24 2208    Order Status: Resulted Specimen: Venous Blood Line Updated: 04/20/24 0942    Malaria Smear [[id number]] Collected: 04/18/24 1756    Order Status: Completed Specimen: Blood Updated: 04/19/24 1415     MALARIA SMEAR  ([facility]) No Malarial or other blood borne parasites seen     Malaria Kit Negative    BCID2 Panel [[id number]]  (Abnormal) Collected: 04/18/24 1603    Order Status: Completed Specimen: Blood Updated: 04/19/24 0240     CTX-M (ESBL ) N/A     IMP (Cabapenemase ) N/A     KPC resistance gene (Carbapenemase ) N/A     mcr-1 N/A     mecA ID Detected     Comment: Note: Antimicrobial resistance can occur via multiple mechanisms. A Not Detected result for antimicrobial resistance gene(s) does not indicate antimicrobial susceptibility. Subculturing is required for species identification and susceptibility testing of   isolates.        mecA/C and MREJ (MRSA) gene N/A     NDM (Carbapenemase ) N/A     OXA-48-like (Carbapenemase ) N/A     Maria Luisa/B (VRE gene) N/A     VIM (Carbapenemase ) N/A     Enterococcus faecalis Not Detected     Enterococcus faecium Not Detected     Listeria monocytogenes Not Detected     Staphylococcus spp. Detected     Staphylococcus aureus Not Detected     Staphylococcus epidermidis Detected     Staphylococcus lugdunensis Not Detected     Streptococcus spp. Not Detected     Streptococcus agalactiae (Group B) Not Detected     Streptococcus pneumoniae Not Detected     Streptococcus pyogenes (Group A) Not Detected     Acinetobacter calcoaceticus/baumannii complex Not Detected     Bacteroides fragilis Not Detected     Enterobacterales Not Detected     Enterobacter cloacae complex Not Detected     Escherichia coli Not Detected     Klebsiella aerogenes Not Detected     Klebsiella oxytoca Not Detected     Klebsiella pneumoniae group Not Detected     Proteus spp. Not Detected     Salmonella spp. Not Detected     Serratia marcescens Not Detected     Haemophilus influenzae Not Detected     Neisseria meningitidis Not Detected     Pseudomonas aeruginosa Not Detected     Stenotrophomonas maltophilia Not Detected     Candida albicans Not Detected     Candida auris Not Detected      Lizbeth glabrata Not Detected     Candida krusei Not Detected     Candida parapsilosis Not Detected     Candida tropicalis Not Detected     Cryptococcus neoformans/gattii Not Detected    Narrative:      The [employer] BCID2 Panel is a multiplexed nucleic acid test intended for the use with [employer]® [employer] or [employer]® [employer]® [employer] for the simultaneous qualitative detection and identification of multiple bacterial and yeast nucleic acids and select genetic determinants associated with antimicrobial resistance.  The [employer] BCID2 Panel test is performed directly on blood culture samples identified as positive by a continuous monitoring blood culture system.  Results are intended to be interpreted in conjunction with Gram stain results.    Clostridium Diff Toxin, A & B, EIA [[id number]]     Order Status: Canceled Specimen: Stool            Pathology Results  (Last 7 days)      None             Assessment & Plan:   79 y/o F with PMHx HTN, HLD, PVD, multiple previous DVT on Coumadin (last dose today), short gut syndrome on TPN, chronic granulomatous infection, MRSA bacteremia, rheumatoid arthritis, cirrhosis, gallstone pancreatitis, breast cancer in remission who presented on 4/18 with weakness and fever. Found to have positive blood cultures in setting of Mediport.    -Please continue to hold Coumadin  - If INR < 1.6 this morning, will proceed with bedside mediport removal  -Will obtain surgical consents  -Recommend referral to breast surgeon (Dr. Gonzalez) for right sided breast mass   -Remainder of care per primary  -Surgery will continue to follow    Odette Jackson MD  [employer] General Surgery, PGY-2

## 2024-04-21 NOTE — PROGRESS NOTES
Pharmacokinetic Assessment Follow Up: IV Vancomycin    Vancomycin serum concentration assessment(s):    The trough level was drawn correctly and can be used to guide therapy at this time. The measurement is below the desired definitive target range of 15 to 20 mcg/mL.    Vancomycin Regimen Plan:    Change regimen to Vancomycin 1000 mg IV every 8 hours with next serum trough concentration measured at 1900 prior to the dose on 04/21    Scheduled Administration Times    0400  1200  2000    Drug levels (last 3 results):  Recent Labs   Lab Result Units 04/20/24  1924   Vancomycin Trough ug/ml 5.7*       Vancomycin Administrations:  vancomycin given in the last 96 hours                     vancomycin in dextrose 5 % 1 gram/250 mL IVPB 1,000 mg (mg) 1,000 mg New Bag 04/20/24 2027     1,000 mg New Bag  1008     1,000 mg New Bag 04/19/24 2121    vancomycin 1.75 g in 5 % dextrose 500 mL IVPB (mg) 1,750 mg New Bag 04/19/24 0401                    Pharmacy will continue to follow and monitor vancomycin.    Please contact pharmacy at extension [phone] for questions regarding this assessment.    Thank you for the consult,   Scott Ruffin       Patient brief summary:  Laura Acosta is a 78 y.o. female initiated on antimicrobial therapy with IV Vancomycin for treatment of bacteremia    The patient's current regimen is 1000mg q12h    Drug Allergies:   Review of patient's allergies indicates:   Allergen Reactions    Hydromorphone Itching     Other reaction(s): itching    Opium      Other reaction(s): itching  has itching with larger doses. Smaller doses do not cause a reaction.       Actual Body Weight:  Wt Readings from Last 1 Encounters:   04/18/24 73 kg (160 lb 15 oz)       Renal Function:   Estimated Creatinine Clearance: 63.9 mL/min (based on SCr of 0.71 mg/dL).,     Dialysis Method (if applicable):  N/A    CBC (last 72 hours):  Recent Labs   Lab Result Units 04/18/24  1051 04/19/24  0431   WBC x10(3)/mcL 8.70 6.83   Hgb g/dL 12.3  10.9*   Hct % 37.5 35.1*   Platelet x10(3)/mcL 160 128*   Mono % % 8.2 10.4   Eos % % 0.3 1.6   Basophil % % 0.3 0.3       Metabolic Panel (last 72 hours):  Recent Labs   Lab Result Units 04/18/24  1051 04/19/24  0431 04/19/24  0543 04/19/24  1601   Sodium Level mmol/L 134* 138  --   --    Potassium Level mmol/L 3.9 3.7  --   --    Chloride mmol/L 102 107  --   --    Carbon Dioxide mmol/L 21* 22*  --   --    Glucose Level mg/dL 105 132*  --   --    Glucose, UA   --   --  Normal  --    Blood Urea Nitrogen mg/dL 12.7 13.6  --   --    Creatinine mg/dL 0.63 0.71  --   --    Albumin Level g/dL 3.3* 2.9*  --   --    Bilirubin Total mg/dL 0.9 1.0  --   --    Alkaline Phosphatase unit/L 92 79  --   --    Aspartate Aminotransferase unit/L 37* 27  --   --    Astrovirus   --   --   --  Not Detected   Alanine Aminotransferase unit/L 26 23  --   --        Microbiologic Results:  Microbiology Results (last 7 days)       Procedure Component Value Units Date/Time    Blood Culture [[id number]]  (Normal) Collected: 04/19/24 1925    Order Status: Completed Specimen: Blood, Venous Updated: 04/20/24 2101     CULTURE, BLOOD ([facility]) No Growth At 24 Hours    Blood culture x two cultures. Draw prior to antibiotics. [[id number]]  (Normal) Collected: 04/18/24 1106    Order Status: Completed Specimen: Blood Updated: 04/20/24 1300     CULTURE, BLOOD ([facility]) No Growth At 48 Hours    Blood culture x two cultures. Draw prior to antibiotics. [[id number]]  (Abnormal) Collected: 04/18/24 1106    Order Status: Completed Specimen: Blood Updated: 04/20/24 1256     CULTURE, BLOOD ([facility]) Susceptibility To Follow      Staphylococcus hominis     GRAM STAIN Gram Positive Cocci, probable Staphylococcus      Seen in gram stain of broth only      1 of 2 Aerobic bottles positive    Stool Culture [[id number]]  (Normal) Collected: 04/19/24 1601    Order Status: Completed Specimen: Stool Updated: 04/20/24 1010     Stool Culture Negative for Salmonella, Shigella,  Campylobacter, Vibrio, Aeromonas, Pleisiomonas,Yersinia, or Shiga Toxin 1 and 2.    Blood Culture [[id number]] Collected: 04/19/24 2208    Order Status: Resulted Specimen: Venous Blood Line Updated: 04/20/24 0942    Malaria Smear [[id number]] Collected: 04/18/24 1756    Order Status: Completed Specimen: Blood Updated: 04/19/24 1415     MALARIA SMEAR ([facility]) No Malarial or other blood borne parasites seen     Malaria Kit Negative    BCID2 Panel [[id number]]  (Abnormal) Collected: 04/18/24 1603    Order Status: Completed Specimen: Blood Updated: 04/19/24 0240     CTX-M (ESBL ) N/A     IMP (Cabapenemase ) N/A     KPC resistance gene (Carbapenemase ) N/A     mcr-1 N/A     mecA ID Detected     Comment: Note: Antimicrobial resistance can occur via multiple mechanisms. A Not Detected result for antimicrobial resistance gene(s) does not indicate antimicrobial susceptibility. Subculturing is required for species identification and susceptibility testing of   isolates.        mecA/C and MREJ (MRSA) gene N/A     NDM (Carbapenemase ) N/A     OXA-48-like (Carbapenemase ) N/A     Maria Luisa/B (VRE gene) N/A     VIM (Carbapenemase ) N/A     Enterococcus faecalis Not Detected     Enterococcus faecium Not Detected     Listeria monocytogenes Not Detected     Staphylococcus spp. Detected     Staphylococcus aureus Not Detected     Staphylococcus epidermidis Detected     Staphylococcus lugdunensis Not Detected     Streptococcus spp. Not Detected     Streptococcus agalactiae (Group B) Not Detected     Streptococcus pneumoniae Not Detected     Streptococcus pyogenes (Group A) Not Detected     Acinetobacter calcoaceticus/baumannii complex Not Detected     Bacteroides fragilis Not Detected     Enterobacterales Not Detected     Enterobacter cloacae complex Not Detected     Escherichia coli Not Detected     Klebsiella aerogenes Not Detected     Klebsiella oxytoca Not Detected     Klebsiella pneumoniae  group Not Detected     Proteus spp. Not Detected     Salmonella spp. Not Detected     Serratia marcescens Not Detected     Haemophilus influenzae Not Detected     Neisseria meningitidis Not Detected     Pseudomonas aeruginosa Not Detected     Stenotrophomonas maltophilia Not Detected     Candida albicans Not Detected     Candida auris Not Detected     Candida glabrata Not Detected     Candida krusei Not Detected     Candida parapsilosis Not Detected     Candida tropicalis Not Detected     Cryptococcus neoformans/gattii Not Detected    Narrative:      The [employer] BCID2 Panel is a multiplexed nucleic acid test intended for the use with [employer] or [employer] for the simultaneous qualitative detection and identification of multiple bacterial and yeast nucleic acids and select genetic determinants associated with antimicrobial resistance.  The [employer] BCID2 Panel test is performed directly on blood culture samples identified as positive by a continuous monitoring blood culture system.  Results are intended to be interpreted in conjunction with Gram stain results.    Blood Culture [[id number]]  (Abnormal) Collected: 04/18/24 1603    Order Status: Completed Specimen: Blood Updated: 04/19/24 0211     GRAM STAIN Gram Positive Cocci, probable Staphylococcus      Seen in gram stain of broth only      2 of 2 bottles positive    Clostridium Diff Toxin, A & B, EIA [[id number]]     Order Status: Canceled Specimen: Stool

## 2024-04-22 LAB
ALBUMIN SERPL-MCNC: 2.8 G/DL (ref 3.4–4.8)
ALBUMIN/GLOB SERPL: 0.8 RATIO (ref 1.1–2)
ALP SERPL-CCNC: 83 UNIT/L (ref 40–150)
ALT SERPL-CCNC: 19 UNIT/L (ref 0–55)
AST SERPL-CCNC: 25 UNIT/L (ref 5–34)
BACTERIA BLD CULT: ABNORMAL
BACTERIA BLD CULT: ABNORMAL
BACTERIA STL CULT: NORMAL
BASOPHILS # BLD AUTO: 0.03 X10(3)/MCL
BASOPHILS NFR BLD AUTO: 0.6 %
BILIRUB SERPL-MCNC: 0.4 MG/DL
BUN SERPL-MCNC: 20.9 MG/DL (ref 9.8–20.1)
CALCIUM SERPL-MCNC: 8.6 MG/DL (ref 8.4–10.2)
CHLORIDE SERPL-SCNC: 109 MMOL/L (ref 98–107)
CO2 SERPL-SCNC: 21 MMOL/L (ref 23–31)
CREAT SERPL-MCNC: 0.64 MG/DL (ref 0.55–1.02)
EOSINOPHIL # BLD AUTO: 0.36 X10(3)/MCL (ref 0–0.9)
EOSINOPHIL NFR BLD AUTO: 6.8 %
ERYTHROCYTE [DISTWIDTH] IN BLOOD BY AUTOMATED COUNT: 15.4 % (ref 11.5–17)
GFR SERPLBLD CREATININE-BSD FMLA CKD-EPI: >60 MLS/MIN/1.73/M2
GLOBULIN SER-MCNC: 3.4 GM/DL (ref 2.4–3.5)
GLUCOSE SERPL-MCNC: 105 MG/DL (ref 82–115)
GRAM STN SPEC: ABNORMAL
HCT VFR BLD AUTO: 33.8 % (ref 37–47)
HGB BLD-MCNC: 10.4 G/DL (ref 12–16)
IMM GRANULOCYTES # BLD AUTO: 0.01 X10(3)/MCL (ref 0–0.04)
IMM GRANULOCYTES NFR BLD AUTO: 0.2 %
INR PPP: 2.2
LYMPHOCYTES # BLD AUTO: 2.21 X10(3)/MCL (ref 0.6–4.6)
LYMPHOCYTES NFR BLD AUTO: 41.5 %
MCH RBC QN AUTO: 27.5 PG (ref 27–31)
MCHC RBC AUTO-ENTMCNC: 30.8 G/DL (ref 33–36)
MCV RBC AUTO: 89.4 FL (ref 80–94)
MONOCYTES # BLD AUTO: 0.47 X10(3)/MCL (ref 0.1–1.3)
MONOCYTES NFR BLD AUTO: 8.8 %
NEUTROPHILS # BLD AUTO: 2.24 X10(3)/MCL (ref 2.1–9.2)
NEUTROPHILS NFR BLD AUTO: 42.1 %
NRBC BLD AUTO-RTO: 0 %
PLATELET # BLD AUTO: 173 X10(3)/MCL (ref 130–400)
PMV BLD AUTO: 9.3 FL (ref 7.4–10.4)
POTASSIUM SERPL-SCNC: 4.7 MMOL/L (ref 3.5–5.1)
PROT SERPL-MCNC: 6.2 GM/DL (ref 5.8–7.6)
PROTHROMBIN TIME: 24.4 SECONDS (ref 12.5–14.5)
RBC # BLD AUTO: 3.78 X10(6)/MCL (ref 4.2–5.4)
SODIUM SERPL-SCNC: 138 MMOL/L (ref 136–145)
VANCOMYCIN SERPL-MCNC: 11.9 UG/ML (ref 15–20)
WBC # SPEC AUTO: 5.32 X10(3)/MCL (ref 4.5–11.5)

## 2024-04-22 PROCEDURE — 27000207 HC ISOLATION

## 2024-04-22 PROCEDURE — 11000001 HC ACUTE MED/SURG PRIVATE ROOM

## 2024-04-22 PROCEDURE — 63600175 PHARM REV CODE 636 W HCPCS: Performed by: INTERNAL MEDICINE

## 2024-04-22 PROCEDURE — 25000003 PHARM REV CODE 250: Performed by: INTERNAL MEDICINE

## 2024-04-22 PROCEDURE — 85025 COMPLETE CBC W/AUTO DIFF WBC: CPT | Performed by: INTERNAL MEDICINE

## 2024-04-22 PROCEDURE — 21400001 HC TELEMETRY ROOM

## 2024-04-22 PROCEDURE — A4216 STERILE WATER/SALINE, 10 ML: HCPCS | Performed by: INTERNAL MEDICINE

## 2024-04-22 PROCEDURE — 80202 ASSAY OF VANCOMYCIN: CPT | Performed by: INTERNAL MEDICINE

## 2024-04-22 PROCEDURE — 85610 PROTHROMBIN TIME: CPT | Performed by: INTERNAL MEDICINE

## 2024-04-22 PROCEDURE — 80053 COMPREHEN METABOLIC PANEL: CPT | Performed by: INTERNAL MEDICINE

## 2024-04-22 RX ORDER — VANCOMYCIN HCL IN 5 % DEXTROSE 1.25 G/25
1250 PLASTIC BAG, INJECTION (ML) INTRAVENOUS
Status: DISCONTINUED | OUTPATIENT
Start: 2024-04-22 | End: 2024-04-22

## 2024-04-22 RX ORDER — PHYTONADIONE 5 MG/1
5 TABLET ORAL ONCE
Status: COMPLETED | OUTPATIENT
Start: 2024-04-22 | End: 2024-04-22

## 2024-04-22 RX ADMIN — FERROUS SULFATE TAB 325 MG (65 MG ELEMENTAL FE) 1 EACH: 325 (65 FE) TAB at 09:04

## 2024-04-22 RX ADMIN — Medication 1250 MG: at 08:04

## 2024-04-22 RX ADMIN — ENOXAPARIN SODIUM 70 MG: 80 INJECTION SUBCUTANEOUS at 06:04

## 2024-04-22 RX ADMIN — ENOXAPARIN SODIUM 70 MG: 80 INJECTION SUBCUTANEOUS at 05:04

## 2024-04-22 RX ADMIN — MUPIROCIN: 20 OINTMENT TOPICAL at 09:04

## 2024-04-22 RX ADMIN — SODIUM CHLORIDE, PRESERVATIVE FREE 10 ML: 5 INJECTION INTRAVENOUS at 12:04

## 2024-04-22 RX ADMIN — DIPHENOXYLATE HYDROCHLORIDE AND ATROPINE SULFATE 2 TABLET: .025; 2.5 TABLET ORAL at 09:04

## 2024-04-22 RX ADMIN — BUSPIRONE HYDROCHLORIDE 15 MG: 5 TABLET ORAL at 08:04

## 2024-04-22 RX ADMIN — CITALOPRAM HYDROBROMIDE 20 MG: 20 TABLET ORAL at 08:04

## 2024-04-22 RX ADMIN — PHYTONADIONE 5 MG: 5 TABLET ORAL at 06:04

## 2024-04-22 RX ADMIN — DIPHENOXYLATE HYDROCHLORIDE AND ATROPINE SULFATE 2 TABLET: .025; 2.5 TABLET ORAL at 08:04

## 2024-04-22 RX ADMIN — MIRTAZAPINE 15 MG: 15 TABLET, FILM COATED ORAL at 09:04

## 2024-04-22 RX ADMIN — SODIUM CHLORIDE, PRESERVATIVE FREE 10 ML: 5 INJECTION INTRAVENOUS at 06:04

## 2024-04-22 RX ADMIN — VANCOMYCIN HYDROCHLORIDE 1250 MG: 1.25 INJECTION, POWDER, LYOPHILIZED, FOR SOLUTION INTRAVENOUS at 09:04

## 2024-04-22 RX ADMIN — FERROUS SULFATE TAB 325 MG (65 MG ELEMENTAL FE) 1 EACH: 325 (65 FE) TAB at 08:04

## 2024-04-22 RX ADMIN — LEUCINE, PHENYLALANINE, LYSINE, METHIONINE, ISOLEUCINE, VALINE, HISTIDINE, THREONINE, TRYPTOPHAN, ALANINE, GLYCINE, ARGININE, PROLINE, SERINE, TYROSINE, SODIUM ACETATE, DIBASIC POTASSIUM PHOSPHATE, MAGNESIUM CHLORIDE, SODIUM CHLORIDE, CALCIUM CHLORIDE, DEXTROSE
311; 238; 247; 170; 255; 247; 204; 179; 77; 880; 438; 489; 289; 213; 17; 297; 261; 51; 77; 33; 5 INJECTION INTRAVENOUS at 10:04

## 2024-04-22 RX ADMIN — SODIUM CHLORIDE, PRESERVATIVE FREE 10 ML: 5 INJECTION INTRAVENOUS at 05:04

## 2024-04-22 RX ADMIN — MUPIROCIN: 20 OINTMENT TOPICAL at 08:04

## 2024-04-22 NOTE — CONSULTS
"Inpatient Nutrition Assessment    Admit Date: 4/18/2024   Total duration of encounter: 4 days   Patient Age: 78 y.o.    Nutrition Recommendation/Prescription     Diet Adult Regular Lactose Restricted, add "No fried foods" restrictrition  PPN recommendations provided:  Recommendations: Clinimix-E @ 75 mL/hr  with Intra lipids (20%) twice weekly  Kcal: 755 kcal/day (~49% est min kcal needs)  AA: 77 g/day (~105% est min protein needs)  Dextrose: 90 g/day (GIR: 0.86 mg/kg/min)  Fluid: 1870 mL/day (~120% est min fluid needs)  Monitor electrolytes and replete as needed  Mirtazapine ordered (4/18/2024)  Add vitamin/mineral supplementation as appropriate  Monitor appetite/PO intake, weight, and labs    Communication of Recommendations: reviewed with provider, reviewed with nurse, reviewed with patient, and reviewed with family    Nutrition Assessment     Malnutrition Assessment/Nutrition-Focused Physical Exam    Malnutrition Context: other (see comments) (Does not meet citeria at this time) (04/19/24 1354)  Malnutrition Level: other (see comments) (Does not meet citeria at this time) (04/19/24 1354)  Energy Intake (Malnutrition): other (see comments) (Does not meet citeria) (04/19/24 1354)  Weight Loss (Malnutrition): other (see comments) (Does not meet citeria per EMR) (04/19/24 1354)  Subcutaneous Fat (Malnutrition): other (see comments) (Does not meet citeria) (04/19/24 1354)           Muscle Mass (Malnutrition): other (see comments) (Does not meet citeria) (04/19/24 1354)                          Fluid Accumulation (Malnutrition): other (see comments) (Does not meet citeria) (04/19/24 1354)        A minimum of two characteristics is recommended for diagnosis of either severe or non-severe malnutrition.    Chart Review    Reason Seen: patient/family request, physician consult for Short gut syndrome, and follow-up    Malnutrition Screening Tool Results   Have you recently lost weight without trying?: No  Have you been " eating poorly because of a decreased appetite?: No   MST Score: 0   Diagnosis:  Acute hypoxemic respiratory failure  Recurrent fever of unknown origin  11 mm solid nodule in the right upper lobe  Chronic diarrhea  History of hypertension, hyperlipidemia, PVD, DVT, short gut syndrome on TPN, chronic granulomatous infection, MRSA bacteremia, rheumatoid arthritis, cirrhosis, gallstone pancreatitis, breast cancer, anxiety, and depression     Relevant Medical History:   Hypertension  Hyperlipidemia  PVD  DVT  Short gut syndrome on TPN   Chronic granulomatous infection   MRSA bacteremia  Rheumatoid arthritis  Cirrhosis  Gallstone pancreatitis   Breast cancer   Anxiety  Depression    Scheduled Medications:  busPIRone, 15 mg, Daily  citalopram, 20 mg, Daily  diphenoxylate-atropine 2.5-0.025 mg, 2 tablet, BID  enoxparin, 1 mg/kg, Q12H (treatment, non-standard time)  ergocalciferol, 50,000 Units, Q7 Days  ferrous sulfate, 1 tablet, BID  mirtazapine, 15 mg, QHS  mupirocin, , BID  sodium chloride 0.9%, 10 mL, Q6H  vancomycin (VANCOCIN) IV (PEDS and ADULTS), 1,250 mg, Q12H    Continuous Infusions:  Amino acid 4.25% - dextrose 5% (CLINIMIX-E) solution (1L provides 42.5 gm AA, 50 gm CHO (170 kcal/L dextrose), Na 35, K 30, Mg 5, Ca 4.5, Acetate 70, Cl 39, Phos 15), Last Rate: 75 mL/hr at 04/22/24 1039    PRN Medications:   Current Facility-Administered Medications:     acetaminophen, 650 mg, Oral, Q8H PRN    acetaminophen, 650 mg, Oral, Q4H PRN    dextrose 10%, 12.5 g, Intravenous, PRN    dextrose 10%, 25 g, Intravenous, PRN    glucagon (human recombinant), 1 mg, Intramuscular, PRN    glucose, 16 g, Oral, PRN    glucose, 24 g, Oral, PRN    ondansetron, 4 mg, Intravenous, Q8H PRN    Flushing PICC/Midline Protocol, , , Until Discontinued **AND** sodium chloride 0.9%, 10 mL, Intravenous, Q6H **AND** sodium chloride 0.9%, 10 mL, Intravenous, PRN    vancomycin - pharmacy to dose, , Intravenous, pharmacy to manage frequency    Calorie  Containing IV Medications: no significant kcals from medications at this time    Recent Labs   Lab 04/18/24  1051 04/19/24  0431 04/21/24  0722 04/22/24  0513   * 138 140 138   K 3.9 3.7 4.6 4.7   CALCIUM 9.0 8.0* 8.5 8.6   CHLORIDE 102 107 110* 109*   CO2 21* 22* 21* 21*   BUN 12.7 13.6 16.3 20.9*   CREATININE 0.63 0.71 0.59 0.64   EGFRNORACEVR >60 >60 >60 >60   GLUCOSE 105 132* 104 105   BILITOT 0.9 1.0 0.4 0.4   ALKPHOS 92 79 77 83   ALT 26 23 20 19   AST 37* 27 25 25   ALBUMIN 3.3* 2.9* 2.9* 2.8*   CRP 49.90*  --   --   --    WBC 8.70 6.83  --  5.32   HGB 12.3 10.9*  --  10.4*   HCT 37.5 35.1*  --  33.8*     Nutrition Orders:  Diet Adult Regular Lactose Restricted  Amino acid 4.25% - dextrose 5% (CLINIMIX-E) solution (1L provides 42.5 gm AA, 50 gm CHO (170 kcal/L dextrose), Na 35, K 30, Mg 5, Ca 4.5, Acetate 70, Cl 39, Phos 15)      Appetite/Oral Intake: good/50-75% of meals  Factors Affecting Nutritional Intake:  food preferences   Social Needs Impacting Access to Food: none identified  Food/Alevism/Cultural Preferences:  lactose free, no fried foods, no seasonings on grilled chicken/fish  Food Allergies: none reported  Last Bowel Movement: 04/21/24  Wound(s):  none noted    Comments    4/19/2024: Pt reports a good appetite/PO intake with chronic home TPN due to Short Gut Syndrome. Pt sees outpatient RD. Pt reports a fair appetite/PO intake currently due to food preferences. Provided PPN recommendations due to infected port site. Pt denies N/V, constipation, and chewing difficulties. Pt reports chronic diarrhea and swallowing difficulties. Pt denies unplanned wt loss. Per EMR, pt weighed 75.1 kg on 3/28/2024 (2.7% wt loss in 1 month, insignificant). Encouraged adequate PO intake and obtained food preferences. Provided short gut syndrome education and printed materials. Last BM noted. Will monitor.    4/22/2024: Pt reports a good appetite with fair PO intake due to preferences. Pt denies N/V. PPN @ 75  "mL/hr running. Last BM noted. Encouraged adequate PO intake. Will monitor.    Anthropometrics    Height: 5' 4" (162.6 cm),    Last Weight: 73 kg (160 lb 15 oz) (24 0957), Weight Method: Standard Scale  BMI (Calculated): 27.6  BMI Classification: overweight (BMI 25-29.9)     Ideal Body Weight (IBW), Female: 120 lb     % Ideal Body Weight, Female (lb): 134.12 %                    Usual Body Weight (UBW), k.1 kg  % Usual Body Weight: 97.41     Usual Weight Provided By: EMR weight history    Wt Readings from Last 5 Encounters:   24 73 kg (160 lb 15 oz)   24 75.1 kg (165 lb 9.1 oz)   24 74.4 kg (164 lb)   24 72.6 kg (160 lb)   24 75.8 kg (167 lb)     Weight Change(s) Since Admission:   2024: 73 kg  2024: no new wts  Wt Readings from Last 1 Encounters:   24 09 73 kg (160 lb 15 oz)   Admit Weight: 73 kg (160 lb 15 oz) (24 0957), Weight Method: Standard Scale    Estimated Needs    Weight Used For Calorie Calculations: 73 kg (160 lb 15 oz)  Energy Calorie Requirements (kcal): 4074-9469 (MSJ x 1.3-1.4)  Energy Need Method: [facility]  Weight Used For Protein Calculations: 73 kg (160 lb 15 oz)  Protein Requirements: 73-88 (1.0-1.2 g/kg)  Fluid Requirements (mL): 1553 (1 mL/kcal)        Enteral Nutrition     Patient not receiving enteral nutrition at this time.    Parenteral Nutrition     Standard Formula: Clinimix E 4.25/5  Custom Formula: not applicable  Additives: multivitamin with vitamin K  Rate/Volume: 75 mL/hr  Lipids: 250 ml 20% IV lipid emulsion twice weekly  Total Nutrition Provided by Parenteral Nutrition:  Calories Provided  755 kcal/d, 49% needs   Protein Provided  77 g/d, 105% needs   Dextrose Provided  90 g/d, GIR 0.86 mg CHO/kg/min   Fluid Provided  1870 ml/d, 120% needs       Evaluation of Received Nutrient Intake    Calories: meeting estimated needs  Protein: meeting estimated needs    Patient Education     Education Provided:  Short Bowel " Syndrome with colon  Teaching Method: explanation and printed materials  Comprehension: verbalizes understanding and needs reinforcement  Barriers to Learning: none evident  Expected Compliance: fair  Comments: All questions were answered and dietitian's contact information was provided.     Nutrition Diagnosis     PES: Food and nutrition related knowledge deficit related to chronic illness as evidenced by uncertainty about nutrition-related outcomes for Short gut syndrome. (active)       Nutrition Interventions     Intervention(s): general/healthful diet, modified composition of parenteral nutrition, and modified rate of parenteral nutrition    Goal: Meet greater than 80% of nutritional needs by follow-up. (goal progressing)    Nutrition Goals & Monitoring     Dietitian will monitor: food and beverage intake, parenteral nutrition intake, weight, electrolyte/renal panel, glucose/endocrine profile, and gastrointestinal profile  Discharge planning: too early to determine; pending clinical course  Nutrition Risk/Follow-Up: high (follow-up in 1-4 days)   Please consult if re-assessment needed sooner.

## 2024-04-22 NOTE — PROGRESS NOTES
Pharmacokinetic Assessment Follow Up: IV Vancomycin    Vancomycin serum concentration assessment(s):    The random level was drawn correctly and can be used to guide therapy at this time. The measurement is below the desired definitive target range of 15 to 20 mcg/mL.    Vancomycin Regimen Plan:    Change regimen to Vancomycin 1250 mg IV every 12 hours with next serum trough concentration measured at 0700 prior to 0830 dose on 4/24    Scheduled Administration Times    0830 & 2030    Drug levels (last 3 results):  Recent Labs   Lab Result Units 04/20/24  1924 04/21/24  1914 04/22/24  0513   Vanc Lvl Random ug/ml  --   --  11.9*   Vancomycin Trough ug/ml 5.7* 23.6*  --        Vancomycin Administrations:  vancomycin given in the last 96 hours                     vancomycin in dextrose 5 % 1 gram/250 mL IVPB 1,000 mg (mg) 1,000 mg New Bag 04/21/24 1358     1,000 mg New Bag  0406    vancomycin in dextrose 5 % 1 gram/250 mL IVPB 1,000 mg (mg) 1,000 mg New Bag 04/20/24 2027     1,000 mg New Bag  1008     1,000 mg New Bag 04/19/24 2121    vancomycin 1.75 g in 5 % dextrose 500 mL IVPB (mg) 1,750 mg New Bag 04/19/24 0401                    Pharmacy will continue to follow and monitor vancomycin.    Please contact pharmacy at extension [phone] for questions regarding this assessment.    Thank you for the consult,   Tristan Salas       Patient brief summary:  Laura Acosta is a 78 y.o. female initiated on antimicrobial therapy with IV Vancomycin for treatment of bacteremia    The patient's current regimen is 1250 mg every 12 hours    Drug Allergies:   Review of patient's allergies indicates:   Allergen Reactions    Hydromorphone Itching     Other reaction(s): itching    Opium      Other reaction(s): itching  has itching with larger doses. Smaller doses do not cause a reaction.       Actual Body Weight:  Wt Readings from Last 1 Encounters:   04/18/24 73 kg (160 lb 15 oz)       Renal Function:   Estimated Creatinine Clearance: 70.9  mL/min (based on SCr of 0.64 mg/dL).,     CBC (last 72 hours):  Recent Labs   Lab Result Units 04/22/24  0513   WBC x10(3)/mcL 5.32   Hgb g/dL 10.4*   Hct % 33.8*   Platelet x10(3)/mcL 173   Mono % % 8.8   Eos % % 6.8   Basophil % % 0.6       Metabolic Panel (last 72 hours):  Recent Labs   Lab Result Units 04/19/24  1601 04/21/24  0722 04/22/24  0513   Sodium Level mmol/L  --  140 138   Potassium Level mmol/L  --  4.6 4.7   Chloride mmol/L  --  110* 109*   Carbon Dioxide mmol/L  --  21* 21*   Glucose Level mg/dL  --  104 105   Blood Urea Nitrogen mg/dL  --  16.3 20.9*   Creatinine mg/dL  --  0.59 0.64   Albumin Level g/dL  --  2.9* 2.8*   Bilirubin Total mg/dL  --  0.4 0.4   Alkaline Phosphatase unit/L  --  77 83   Aspartate Aminotransferase unit/L  --  25 25   Astrovirus  Not Detected  --   --    Alanine Aminotransferase unit/L  --  20 19       Microbiologic Results:  Microbiology Results (last 7 days)       Procedure Component Value Units Date/Time    Blood Culture [[id number]]  (Abnormal)  (Susceptibility) Collected: 04/18/24 1603    Order Status: Completed Specimen: Blood Updated: 04/22/24 0646     CULTURE, BLOOD ([facility]) Staphylococcus epidermidis      Staphylococcus hominis     GRAM STAIN Gram Positive Cocci, probable Staphylococcus      Seen in gram stain of broth only      2 of 2 bottles positive    Blood Culture [[id number]]  (Normal) Collected: 04/19/24 1925    Order Status: Completed Specimen: Blood, Venous Updated: 04/21/24 2101     CULTURE, BLOOD ([facility]) No Growth At 48 Hours    Blood culture x two cultures. Draw prior to antibiotics. [[id number]]  (Normal) Collected: 04/18/24 1106    Order Status: Completed Specimen: Blood Updated: 04/21/24 1300     CULTURE, BLOOD ([facility]) No Growth At 72 Hours    Malaria Smear [[id number]] Collected: 04/18/24 1756    Order Status: Completed Specimen: Blood Updated: 04/21/24 1203     MALARIA SMEAR ([facility]) No Malarial or other blood borne parasites seen     Malaria Kit Negative     Blood Culture [[id number]]  (Normal) Collected: 04/19/24 2208    Order Status: Completed Specimen: Venous Blood Line Updated: 04/21/24 1000     CULTURE, BLOOD ([facility]) No Growth At 24 Hours    Blood culture x two cultures. Draw prior to antibiotics. [[id number]]  (Abnormal)  (Susceptibility) Collected: 04/18/24 1106    Order Status: Completed Specimen: Blood Updated: 04/21/24 0634     CULTURE, BLOOD ([facility]) Staphylococcus hominis     GRAM STAIN Gram Positive Cocci, probable Staphylococcus      Seen in gram stain of broth only      1 of 2 Aerobic bottles positive    Stool Culture [[id number]]  (Normal) Collected: 04/19/24 1601    Order Status: Completed Specimen: Stool Updated: 04/20/24 1010     Stool Culture Negative for Salmonella, Shigella, Campylobacter, Vibrio, Aeromonas, Pleisiomonas,Yersinia, or Shiga Toxin 1 and 2.    BCID2 Panel [[id number]]  (Abnormal) Collected: 04/18/24 1603    Order Status: Completed Specimen: Blood Updated: 04/19/24 0240     CTX-M (ESBL ) N/A     IMP (Cabapenemase ) N/A     KPC resistance gene (Carbapenemase ) N/A     mcr-1 N/A     mecA ID Detected     Comment: Note: Antimicrobial resistance can occur via multiple mechanisms. A Not Detected result for antimicrobial resistance gene(s) does not indicate antimicrobial susceptibility. Subculturing is required for species identification and susceptibility testing of   isolates.        mecA/C and MREJ (MRSA) gene N/A     NDM (Carbapenemase ) N/A     OXA-48-like (Carbapenemase ) N/A     Maria Luisa/B (VRE gene) N/A     VIM (Carbapenemase ) N/A     Enterococcus faecalis Not Detected     Enterococcus faecium Not Detected     Listeria monocytogenes Not Detected     Staphylococcus spp. Detected     Staphylococcus aureus Not Detected     Staphylococcus epidermidis Detected     Staphylococcus lugdunensis Not Detected     Streptococcus spp. Not Detected     Streptococcus agalactiae (Group B) Not Detected      Streptococcus pneumoniae Not Detected     Streptococcus pyogenes (Group A) Not Detected     Acinetobacter calcoaceticus/baumannii complex Not Detected     Bacteroides fragilis Not Detected     Enterobacterales Not Detected     Enterobacter cloacae complex Not Detected     Escherichia coli Not Detected     Klebsiella aerogenes Not Detected     Klebsiella oxytoca Not Detected     Klebsiella pneumoniae group Not Detected     Proteus spp. Not Detected     Salmonella spp. Not Detected     Serratia marcescens Not Detected     Haemophilus influenzae Not Detected     Neisseria meningitidis Not Detected     Pseudomonas aeruginosa Not Detected     Stenotrophomonas maltophilia Not Detected     Candida albicans Not Detected     Candida auris Not Detected     Candida glabrata Not Detected     Candida krusei Not Detected     Candida parapsilosis Not Detected     Candida tropicalis Not Detected     Cryptococcus neoformans/gattii Not Detected    Narrative:      The [employer] BCID2 Panel is a multiplexed nucleic acid test intended for the use with [employer] or [employer]® [employer] for the simultaneous qualitative detection and identification of multiple bacterial and yeast nucleic acids and select genetic determinants associated with antimicrobial resistance.  The [employer] BCID2 Panel test is performed directly on blood culture samples identified as positive by a continuous monitoring blood culture system.  Results are intended to be interpreted in conjunction with Gram stain results.    Clostridium Diff Toxin, A & B, EIA [[id number]]     Order Status: Canceled Specimen: Stool

## 2024-04-22 NOTE — PROGRESS NOTES
Pharmacokinetic Assessment Follow Up: IV Vancomycin    Vancomycin serum concentration assessment(s):    The trough level was drawn correctly and can be used to guide therapy at this time. The measurement is above the desired definitive target range of 15 to 20 mcg/mL.    Vancomycin Regimen Plan:    Re-dose when the random level is less than 20 mcg/mL, next level to be drawn at 0430 on 04/22    Drug levels (last 3 results):  Recent Labs   Lab Result Units 04/20/24  1924 04/21/24  1914   Vancomycin Trough ug/ml 5.7* 23.6*       Pharmacy will continue to follow and monitor vancomycin.    Please contact pharmacy at extension [phone] for questions regarding this assessment.    Thank you for the consult,   Mecca Hernandez       Patient brief summary:  Laura Acosta is a 78 y.o. female initiated on antimicrobial therapy with IV Vancomycin for treatment of bacteremia    The patient's current regimen is pulse dosing    Drug Allergies:   Review of patient's allergies indicates:   Allergen Reactions    Hydromorphone Itching     Other reaction(s): itching    Opium      Other reaction(s): itching  has itching with larger doses. Smaller doses do not cause a reaction.       Actual Body Weight:   73kg    Renal Function:   Estimated Creatinine Clearance: 76.9 mL/min (based on SCr of 0.59 mg/dL).,     Dialysis Method (if applicable):  N/A    CBC (last 72 hours):  Recent Labs   Lab Result Units 04/19/24  0431   WBC x10(3)/mcL 6.83   Hgb g/dL 10.9*   Hct % 35.1*   Platelet x10(3)/mcL 128*   Mono % % 10.4   Eos % % 1.6   Basophil % % 0.3       Metabolic Panel (last 72 hours):  Recent Labs   Lab Result Units 04/19/24  0431 04/19/24  0543 04/19/24  1601 04/21/24  0722   Sodium Level mmol/L 138  --   --  140   Potassium Level mmol/L 3.7  --   --  4.6   Chloride mmol/L 107  --   --  110*   Carbon Dioxide mmol/L 22*  --   --  21*   Glucose Level mg/dL 132*  --   --  104   Glucose, UA   --  Normal  --   --    Blood Urea Nitrogen mg/dL 13.6  --    --  16.3   Creatinine mg/dL 0.71  --   --  0.59   Albumin Level g/dL 2.9*  --   --  2.9*   Bilirubin Total mg/dL 1.0  --   --  0.4   Alkaline Phosphatase unit/L 79  --   --  77   Aspartate Aminotransferase unit/L 27  --   --  25   Astrovirus   --   --  Not Detected  --    Alanine Aminotransferase unit/L 23  --   --  20       Vancomycin Administrations:  vancomycin given in the last 96 hours                     vancomycin in dextrose 5 % 1 gram/250 mL IVPB 1,000 mg (mg) 1,000 mg New Bag 04/21/24 1358     1,000 mg New Bag  0406    vancomycin in dextrose 5 % 1 gram/250 mL IVPB 1,000 mg (mg) 1,000 mg New Bag 04/20/24 2027     1,000 mg New Bag  1008     1,000 mg New Bag 04/19/24 2121    vancomycin 1.75 g in 5 % dextrose 500 mL IVPB (mg) 1,750 mg New Bag 04/19/24 0401                    Microbiologic Results:  Microbiology Results (last 7 days)       Procedure Component Value Units Date/Time    Blood culture x two cultures. Draw prior to antibiotics. [[id number]]  (Normal) Collected: 04/18/24 1106    Order Status: Completed Specimen: Blood Updated: 04/21/24 1300     CULTURE, BLOOD ([facility]) No Growth At 72 Hours    Malaria Smear [[id number]] Collected: 04/18/24 1756    Order Status: Completed Specimen: Blood Updated: 04/21/24 1203     MALARIA SMEAR ([facility]) No Malarial or other blood borne parasites seen     Malaria Kit Negative    Blood Culture [[id number]]  (Abnormal) Collected: 04/18/24 1603    Order Status: Completed Specimen: Blood Updated: 04/21/24 1202     CULTURE, BLOOD ([facility]) Susceptibility To Follow      Staphylococcus epidermidis      Staphylococcus hominis     GRAM STAIN Gram Positive Cocci, probable Staphylococcus      Seen in gram stain of broth only      2 of 2 bottles positive    Blood Culture [[id number]]  (Normal) Collected: 04/19/24 2208    Order Status: Completed Specimen: Venous Blood Line Updated: 04/21/24 1000     CULTURE, BLOOD ([facility]) No Growth At 24 Hours    Blood culture x two cultures. Draw prior to  antibiotics. [[id number]]  (Abnormal)  (Susceptibility) Collected: 04/18/24 1106    Order Status: Completed Specimen: Blood Updated: 04/21/24 0634     CULTURE, BLOOD ([facility]) Staphylococcus hominis     GRAM STAIN Gram Positive Cocci, probable Staphylococcus      Seen in gram stain of broth only      1 of 2 Aerobic bottles positive    Blood Culture [[id number]]  (Normal) Collected: 04/19/24 1925    Order Status: Completed Specimen: Blood, Venous Updated: 04/20/24 2101     CULTURE, BLOOD ([facility]) No Growth At 24 Hours    Stool Culture [[id number]]  (Normal) Collected: 04/19/24 1601    Order Status: Completed Specimen: Stool Updated: 04/20/24 1010     Stool Culture Negative for Salmonella, Shigella, Campylobacter, Vibrio, Aeromonas, Pleisiomonas,Yersinia, or Shiga Toxin 1 and 2.    BCID2 Panel [[id number]]  (Abnormal) Collected: 04/18/24 1603    Order Status: Completed Specimen: Blood Updated: 04/19/24 0240     CTX-M (ESBL ) N/A     IMP (Cabapenemase ) N/A     KPC resistance gene (Carbapenemase ) N/A     mcr-1 N/A     mecA ID Detected     Comment: Note: Antimicrobial resistance can occur via multiple mechanisms. A Not Detected result for antimicrobial resistance gene(s) does not indicate antimicrobial susceptibility. Subculturing is required for species identification and susceptibility testing of   isolates.        mecA/C and MREJ (MRSA) gene N/A     NDM (Carbapenemase ) N/A     OXA-48-like (Carbapenemase ) N/A     Maria Luisa/B (VRE gene) N/A     VIM (Carbapenemase ) N/A     Enterococcus faecalis Not Detected     Enterococcus faecium Not Detected     Listeria monocytogenes Not Detected     Staphylococcus spp. Detected     Staphylococcus aureus Not Detected     Staphylococcus epidermidis Detected     Staphylococcus lugdunensis Not Detected     Streptococcus spp. Not Detected     Streptococcus agalactiae (Group B) Not Detected     Streptococcus pneumoniae Not Detected      Streptococcus pyogenes (Group A) Not Detected     Acinetobacter calcoaceticus/baumannii complex Not Detected     Bacteroides fragilis Not Detected     Enterobacterales Not Detected     Enterobacter cloacae complex Not Detected     Escherichia coli Not Detected     Klebsiella aerogenes Not Detected     Klebsiella oxytoca Not Detected     Klebsiella pneumoniae group Not Detected     Proteus spp. Not Detected     Salmonella spp. Not Detected     Serratia marcescens Not Detected     Haemophilus influenzae Not Detected     Neisseria meningitidis Not Detected     Pseudomonas aeruginosa Not Detected     Stenotrophomonas maltophilia Not Detected     Candida albicans Not Detected     Candida auris Not Detected     Candida glabrata Not Detected     Candida krusei Not Detected     Candida parapsilosis Not Detected     Candida tropicalis Not Detected     Cryptococcus neoformans/gattii Not Detected    Narrative:      The [employer] BCID2 Panel is a multiplexed nucleic acid test intended for the use with [employer] or [employer] for the simultaneous qualitative detection and identification of multiple bacterial and yeast nucleic acids and select genetic determinants associated with antimicrobial resistance.  The [employer] BCID2 Panel test is performed directly on blood culture samples identified as positive by a continuous monitoring blood culture system.  Results are intended to be interpreted in conjunction with Gram stain results.    Clostridium Diff Toxin, A & B, EIA [[id number]]     Order Status: Canceled Specimen: Stool

## 2024-04-22 NOTE — PROGRESS NOTES
"[facility] Hospital Medicine Progress Note      Chief Complaint: Fever (Pt has fever and body aches that started this am. Pt reports she was recently seen for the fever and dx with "fever of unknown origin". Denies cp/sob or n/v/d. Pt has an accessed mediport that she gets home infusions 4x a week. Febrile in triage. Took 500mg tylenol at 4am        HISTORY OF PRESENT ILLNESS:   Laura Acosta is a 78 y.o. female with a past medical history of hypertension, hyperlipidemia, PVD, DVT, short gut syndrome on TPN, chronic granulomatous infection, MRSA bacteremia, rheumatoid arthritis, cirrhosis, gallstone pancreatitis, breast cancer, anxiety, and depression who presented to [facility] on 4/18/2024 with c/o fever.  Patient reported fever and body aches began this morning. Patient is followed by Dr. Hoover and has had recurrent fevers for the past 2 months.  Patient is on suppressive Doxycycline secondary to previous MRSA bacteremia.  Patient receives infusions 4 times weekly via MediPort.  Patient reported her last TPN infusion was last night.  Patient reported her MediPort has been in place since 2019.  Patient had NM inflammatory whole-body scan on 04/16/2024 which revealed no scintigraphic evidence of localized infectious process.  On exam patient endorsed chronic diarrhea.  Patient was denied nausea, vomiting, dysuria, hematuria, cough, congestion, chest pain, and shortness of breath.   Initial vital signs in ED were /76, pulse 70, respirations 30, temperature 39.1° C, and SpO2 96% on 3 L nasal cannula.  Labs revealed WBC 8.70, sodium 134, CO2 21, and lactic acid 0.7.  Flu/RSV/COVID testing were negative.  Blood cultures were obtained.  Chest x-ray revealed no active pulmonary disease.  CTA chest revealed no pulmonary embolism identified with new 11 mm solid nodule in the right lower lobe likely infectious versus inflammatory.  Patient was placed on 3 L supplemental oxygen in ED and " given Tylenol 1000 mg and IV Zosyn 4.5 g. Patient was admitted to hospital medicine service for further medical management. 2/2 blood cultures growing staph epidermidis patient currently has a MediPort - infected.  Id recommends to remove MediPort, continue on IV vancomycin.        Todays Information   Patient seen and examined at bedside, family member at bedside   Patient has no new complaints today   INR remains at 2.2 still elevated  General surgery evaluated patient to reconsult them when INR is less than 1.6 for bedside MediPort removal  And recommended outpatient right breast mass follow-up with Dr. Gonzalez  Repeat blood cultures currently negative at 48 hours         Exam  General appearance: Female in no apparent distress.  HENT: Atraumatic head.   Right breast- distorted, hard mass, attached to skin and underlying muscles   Eyes: Normal extraocular movements.   Neck: Supple.   Lungs: Clear to auscultation bilaterally.   Heart: Regular rate and rhythm.  Abdomen: Soft, non-distended, non-tender.  Extremities: No cyanosis, clubbing, or deformities.   Skin: Mediport to right chest wall   Neuro: Awake, alert, and oriented. Motor and sensory exams grossly intact.   Psych/mental status: Appropriate mood and affect. Responds appropriately to questions.         ASSESSMENT & PLAN:   2/2 Staph epidermidis bacteremia likely due to MediPort infection/colonized  Recurrent fever of unknown origin likely d/t above   11 mm solid nodule in the right upper lobe  Chronic diarrhea  Right breast- distorted, hard mass, attached to skin and underlying muscles   Hx of breast cancer s/p lumpectomy   History of hypertension, hyperlipidemia, PVD, DVT, short gut syndrome on TPN, chronic granulomatous infection, MRSA bacteremia, rheumatoid arthritis, cirrhosis, gallstone pancreatitis, breast cancer, anxiety, and depression      Plan:  INR remains at 2.2 still elevated  Give a dose of po vitamin k 5mg x 1 today for reversal,  check INR  in am   General surgery evaluated patient to reconsult them when INR is less than 1.6  for bedside MediPort removal  And recommended outpatient right breast mass follow-up with Dr. Gonzalez  Repeat blood cultures currently negative at 48 hours   Id recommends removal of MediPort, continue IV vancomycin  Avoid PICC line till cleared by ID  Resume home medications as deemed appropriate once medication reconciliation is updated  Labs in AM     VTE Prophylaxis:  Hold Coumadin, fd lovenox      Discharge Planning and Disposition: TBD             VITAL SIGNS: 24 HRS MIN & MAX LAST   Temp  Min: 98.2 °F (36.8 °C)  Max: 99.1 °F (37.3 °C) 98.2 °F (36.8 °C)   BP  Min: 133/65  Max: 156/76 133/65   Pulse  Min: 60  Max: 75  68   Resp  Min: 18  Max: 18 18   SpO2  Min: 94 %  Max: 96 % 95 %     I have reviewed the following labs:  Recent Labs   Lab 04/18/24  1051 04/19/24  0431 04/22/24  0513   WBC 8.70 6.83 5.32   RBC 4.40 3.97* 3.78*   HGB 12.3 10.9* 10.4*   HCT 37.5 35.1* 33.8*   MCV 85.2 88.4 89.4   MCH 28.0 27.5 27.5   MCHC 32.8* 31.1* 30.8*   RDW 15.1 15.7 15.4    128* 173   MPV 10.0 9.4 9.3     Recent Labs   Lab 04/18/24  1228 04/19/24  0431 04/21/24  0722 04/22/24  0513   NA  --  138 140 138   K  --  3.7 4.6 4.7   CO2  --  22* 21* 21*   BUN  --  13.6 16.3 20.9*   CREATININE  --  0.71 0.59 0.64   CALCIUM  --  8.0* 8.5 8.6   PH 7.430  --   --   --    ALBUMIN  --  2.9* 2.9* 2.8*   ALKPHOS  --  79 77 83   ALT  --  23 20 19   AST  --  27 25 25   BILITOT  --  1.0 0.4 0.4     Microbiology Results (last 7 days)       Procedure Component Value Units Date/Time    Blood culture x two cultures. Draw prior to antibiotics. [[id number]]  (Normal) Collected: 04/18/24 1106    Order Status: Completed Specimen: Blood Updated: 04/22/24 1301     CULTURE, BLOOD ([facility]) No Growth At 96 Hours    Blood Culture [[id number]]  (Normal) Collected: 04/19/24 2208    Order Status: Completed Specimen: Venous Blood Line Updated: 04/22/24 1000     CULTURE,  BLOOD ([facility]) No Growth At 48 Hours    Stool Culture [[id number]]  (Normal) Collected: 04/19/24 1601    Order Status: Completed Specimen: Stool Updated: 04/22/24 0737     Stool Culture Negative for Salmonella, Shigella, Campylobacter, Vibrio, Aeromonas, Pleisiomonas,Yersinia, or Shiga Toxin 1 and 2.    Blood Culture [[id number]]  (Abnormal)  (Susceptibility) Collected: 04/18/24 1603    Order Status: Completed Specimen: Blood Updated: 04/22/24 0646     CULTURE, BLOOD ([facility]) Staphylococcus epidermidis      Staphylococcus hominis     GRAM STAIN Gram Positive Cocci, probable Staphylococcus      Seen in gram stain of broth only      2 of 2 bottles positive    Blood Culture [[id number]]  (Normal) Collected: 04/19/24 1925    Order Status: Completed Specimen: Blood, Venous Updated: 04/21/24 2101     CULTURE, BLOOD ([facility]) No Growth At 48 Hours    Malaria Smear [[id number]] Collected: 04/18/24 1756    Order Status: Completed Specimen: Blood Updated: 04/21/24 1203     MALARIA SMEAR ([facility]) No Malarial or other blood borne parasites seen     Malaria Kit Negative    Blood culture x two cultures. Draw prior to antibiotics. [[id number]]  (Abnormal)  (Susceptibility) Collected: 04/18/24 1106    Order Status: Completed Specimen: Blood Updated: 04/21/24 0634     CULTURE, BLOOD ([facility]) Staphylococcus hominis     GRAM STAIN Gram Positive Cocci, probable Staphylococcus      Seen in gram stain of broth only      1 of 2 Aerobic bottles positive    BCID2 Panel [[id number]]  (Abnormal) Collected: 04/18/24 1603    Order Status: Completed Specimen: Blood Updated: 04/19/24 0240     CTX-M (ESBL ) N/A     IMP (Cabapenemase ) N/A     KPC resistance gene (Carbapenemase ) N/A     mcr-1 N/A     mecA ID Detected     Comment: Note: Antimicrobial resistance can occur via multiple mechanisms. A Not Detected result for antimicrobial resistance gene(s) does not indicate antimicrobial susceptibility. Subculturing is required for species  identification and susceptibility testing of   isolates.        mecA/C and MREJ (MRSA) gene N/A     NDM (Carbapenemase ) N/A     OXA-48-like (Carbapenemase ) N/A     Maria Luisa/B (VRE gene) N/A     VIM (Carbapenemase ) N/A     Enterococcus faecalis Not Detected     Enterococcus faecium Not Detected     Listeria monocytogenes Not Detected     Staphylococcus spp. Detected     Staphylococcus aureus Not Detected     Staphylococcus epidermidis Detected     Staphylococcus lugdunensis Not Detected     Streptococcus spp. Not Detected     Streptococcus agalactiae (Group B) Not Detected     Streptococcus pneumoniae Not Detected     Streptococcus pyogenes (Group A) Not Detected     Acinetobacter calcoaceticus/baumannii complex Not Detected     Bacteroides fragilis Not Detected     Enterobacterales Not Detected     Enterobacter cloacae complex Not Detected     Escherichia coli Not Detected     Klebsiella aerogenes Not Detected     Klebsiella oxytoca Not Detected     Klebsiella pneumoniae group Not Detected     Proteus spp. Not Detected     Salmonella spp. Not Detected     Serratia marcescens Not Detected     Haemophilus influenzae Not Detected     Neisseria meningitidis Not Detected     Pseudomonas aeruginosa Not Detected     Stenotrophomonas maltophilia Not Detected     Candida albicans Not Detected     Candida auris Not Detected     Candida glabrata Not Detected     Candida krusei Not Detected     Candida parapsilosis Not Detected     Candida tropicalis Not Detected     Cryptococcus neoformans/gattii Not Detected    Narrative:      The Setup BCID2 Panel is a multiplexed nucleic acid test intended for the use with [employer]® 2.0 or [employer]® [employer] Systems for the simultaneous qualitative detection and identification of multiple bacterial and yeast nucleic acids and select genetic determinants associated with antimicrobial resistance.  The Setup BCID2 Panel test is performed directly on  blood culture samples identified as positive by a continuous monitoring blood culture system.  Results are intended to be interpreted in conjunction with Gram stain results.    Clostridium Diff Toxin, A & B, EIA [[id number]]     Order Status: Canceled Specimen: Stool              See below for Radiology    Scheduled Med:  Current Facility-Administered Medications   Medication Dose Route Frequency    busPIRone  15 mg Oral Daily    citalopram  20 mg Oral Daily    diphenoxylate-atropine 2.5-0.025 mg  2 tablet Oral BID    enoxparin  1 mg/kg Subcutaneous Q12H (treatment, non-standard time)    ergocalciferol  50,000 Units Oral Q7 Days    ferrous sulfate  1 tablet Oral BID    mirtazapine  15 mg Oral QHS    mupirocin   Nasal BID    phytonadione (vitamin K1)  5 mg Oral Once    sodium chloride 0.9%  10 mL Intravenous Q6H    vancomycin (VANCOCIN) IV (PEDS and ADULTS)  1,250 mg Intravenous Q12H      Continuous Infusions:  Current Facility-Administered Medications   Medication Dose Route Frequency Last Rate Last Admin    Amino acid 4.25% - dextrose 5% (CLINIMIX-E) solution (1L provides 42.5 gm AA, 50 gm CHO (170 kcal/L dextrose), Na 35, K 30, Mg 5, Ca 4.5, Acetate 70, Cl 39, Phos 15)   Intravenous Continuous 75 mL/hr at 04/22/24 1039 New Bag at 04/22/24 1039      PRN Meds:    Current Facility-Administered Medications:     acetaminophen, 650 mg, Oral, Q8H PRN    acetaminophen, 650 mg, Oral, Q4H PRN    dextrose 10%, 12.5 g, Intravenous, PRN    dextrose 10%, 25 g, Intravenous, PRN    glucagon (human recombinant), 1 mg, Intramuscular, PRN    glucose, 16 g, Oral, PRN    glucose, 24 g, Oral, PRN    ondansetron, 4 mg, Intravenous, Q8H PRN    Flushing PICC/Midline Protocol, , , Until Discontinued **AND** sodium chloride 0.9%, 10 mL, Intravenous, Q6H **AND** sodium chloride 0.9%, 10 mL, Intravenous, PRN    vancomycin - pharmacy to dose, , Intravenous, pharmacy to manage frequency     Assessment/Plan:      VTE prophylaxis:     Patient  condition:  Stable/Fair/Guarded/ Serious/ Critical    Anticipated discharge and Disposition:         All diagnosis and differential diagnosis have been reviewed; assessment and plan has been documented; I have personally reviewed the labs and test results that are presently available; I have reviewed the patients medication list; I have reviewed the consulting providers response and recommendations. I have reviewed or attempted to review medical records based upon their availability    All of the patient's questions have been  addressed and answered. Patient's is agreeable to the above stated plan. I will continue to monitor closely and make adjustments to medical management as needed.  _____________________________________________________________________    Nutrition Status:    Radiology:  I have personally reviewed the following imaging and agree with the radiologist.     CTA Chest Non-Coronary (PE Studies)  Narrative: EXAMINATION:  CTA CHEST NON CORONARY (PE STUDIES)    CLINICAL HISTORY:  Pulmonary embolism (PE) suspected, unknown D-dimer;    TECHNIQUE:  CTA imaging of the chest after IV contrast. Axial, coronal and sagittal reconstructions, including MIP images, are reviewed. Dose length product 426 mGycm. Automatic exposure control, adjustment of mA/kV or iterative reconstruction technique used to limit radiation dose.    COMPARISON:  CT 03/01/2024    FINDINGS:  Diagnostic quality: Adequate    Pulmonary embolism: None identified.    Lung parenchyma: New 11 mm mean diameter solid nodule within the right lower lobe (series 7, image 66).  8 mm solid nodule medially in the right lower lobe unchanged from prior exam.  Stable site of pleuroparenchymal scarring in the superior right lung.    Pleural effusion: None.    Mediastinum/ellen: Right-sided MediPort catheter tip in the SVC.  Mild coronary artery calcification.  No pathologically enlarged lymph node.    Chest wall/axilla: Stable appearance.    Upper abdomen: No  significant findings.    Bones: No acute osseous process.  Right shoulder arthroplasty.  Impression: No pulmonary embolism identified.    New 11 mm solid nodule in the right lower lobe, likely infectious or inflammatory.  Three month follow-up chest CT recommended to ensure resolution.    Electronically signed by: Adria Dooley  Date:    04/18/2024  Time:    13:52  X-Ray Chest AP Portable  Narrative: EXAMINATION:  XR CHEST AP PORTABLE    CLINICAL HISTORY:  Sepsis;    TECHNIQUE:  Single frontal view of the chest was performed.    COMPARISON:  February 2, 2024    FINDINGS:  Examination reveals mediastinal cardiac silhouette to be within normal limits lung fields are clear and free of gross infiltrates atelectasis or effusions a peripherally calcified lesion is identified projecting at the right base similar to previous exams and likely related to breast  Impression: No active pulmonary disease    Electronically signed by: Domingo Hughes  Date:    04/18/2024  Time:    10:44      Adis Haas MD  Department of Hospital Medicine   [facility]   04/22/2024

## 2024-04-22 NOTE — PROGRESS NOTES
Infectious Diseases Progress Note  79 y/o female who is known to our service. 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 On admit she was febrile without leukocytosis, ESR 20 and CRP 49.9. Blood cultures revealing Staph Hominis 2/2 sets. Influenza A/B Ag (-), RSV PCR (-) and SARS-CoV-2 PCR (-). CXR unremarkable. CTA chest with contrast showed no PE, new 11 mm solid nodule RLL.  She is currently on Vancomycin    Subjective:  Lying in bed, resting. No acute distress. No new complaints voiced. Afebrile. Friend at bedside    Review of Systems   Constitutional:  Positive for malaise/fatigue.   HENT: Negative.     Eyes: Negative.    Respiratory: Negative.     Cardiovascular: Negative.    Gastrointestinal: Negative.    Genitourinary: Negative.    Musculoskeletal: Negative.    Skin: Negative.    Neurological: Negative.    All other systems reviewed and are negative.      Review of patient's allergies indicates:   Allergen Reactions    Hydromorphone Itching     Other reaction(s): itching    Opium      Other reaction(s): itching  has itching with larger doses. Smaller doses do not cause a reaction.       Past Medical History:   Diagnosis Date    Acute URI     Anxiety and depression     Back pain     Breast cancer     Cholelithiasis     Contaminated small bowel syndrome     DVT (deep venous thrombosis)     Edema, lower extremity     Gallstone pancreatitis     HTN (hypertension)     Hypercholesterolemia     Insomnia     Irregular heart beat     Ischemic disease of gut     Laryngitis     Malabsorption     Meningitis, unspecified     OA (osteoarthritis)     PVD (peripheral vascular disease)     Rheumatoid arthritis, unspecified     Staph infection     infected mediport -- on doxycycline daily for prevention     Tremor     Unspecified cataract     Unspecified cirrhosis of liver 06/20/2023    Dr Carroll    Venous insufficiency        Past Surgical History:   Procedure Laterality Date    APPENDECTOMY      BOWEL RESECTION      BREAST LUMPECTOMY Right     CHOLECYSTECTOMY  05/2015    COLONOSCOPY  02/2022    repeat 3 years    CYST REMOVAL Right     breast    CYSTOSCOPY W/ STONE MANIPULATION      CYSTOSCOPY W/ URETERAL STENT PLACEMENT  12/2020    CYSTOSCOPY W/ URETERAL STENT REMOVAL  04/2021    CYSTOURETEROSCOPY, WITH HOLMIUM LASER LITHOTRIPSY OF URETERAL CALCULUS AND STENT INSERTION Left 05/02/2023    Procedure: CYSTOSCOPY, WITH URETERAL STENT INSERTION Left;  Surgeon: Jared Felix MD;  Location: [facility] OR;  Service: Urology;  Laterality: Left;    EGD, WITH CLOSED BIOPSY N/A 6/20/2023    Procedure: EGD;  Surgeon: Aashish Carroll MD;  Location: [facility] ENDOSCOPY;  Service: Gastroenterology;  Laterality: N/A;    ENDOSCOPIC RETROGRADE CHOLANGIOPANCREATOGRAPHY W/ SPHINCTEROTOMY AND STONE REMOVAL  04/2015    ESOPHAGOGASTRODUODENOSCOPY  06/20/2023    Dr Carroll - no signs esophageal varicies --repeat 3 yrs    GI Biopsy  02/15/2022    GI Biopsy  12/2018    HYSTERECTOMY      LAPAROTOMY, EXPLORATORY  06/2015    LITHOTRIPSY Left 04/2021    LITHOTRIPSY Left 03/2021    MEDIPORT INSERTION, SINGLE  06/2021    MEDIPORT INSERTION, SINGLE  07/2020    MEDIPORT INSERTION, SINGLE  12/2018    MEDIPORT REMOVAL  07/2020    PHACOEMULSIFICATION OF CATARACT Left 12/2016    PHACOEMULSIFICATION OF CATARACT Right 11/2016    PICC line Removal Right 06/2021    POLYPECTOMY  02/2022    PVD surgery      REMOVAL OF CATHETER  12/2020    SHOULDER SURGERY Right     shoulder replacement    SPHINCTEROTOMY OF URETHRA      VENTRAL HERNIA REPAIR  04/2016       Social History     Socioeconomic History    Marital status:     Number of children: 2   Occupational History    Occupation: Retired   Tobacco Use    Smoking status: Never    Smokeless  tobacco: Never   Substance and Sexual Activity    Alcohol use: Not Currently    Drug use: Never    Sexual activity: Not Currently     Social Determinants of Health     Financial Resource Strain: Low Risk  (4/20/2024)    Overall Financial Resource Strain (CARDIA)     Difficulty of Paying Living Expenses: Not hard at all   Food Insecurity: No Food Insecurity (4/20/2024)    Hunger Vital Sign     Worried About Running Out of Food in the Last Year: Never true     Ran Out of Food in the Last Year: Never true   Transportation Needs: No Transportation Needs (4/20/2024)    PRAPARE - Transportation     Lack of Transportation (Medical): No     Lack of Transportation (Non-Medical): No   Stress: No Stress Concern Present (4/20/2024)    [employer] - Occupational Stress Questionnaire     Feeling of Stress : Not at all   Social Connections: Unknown (4/19/2024)    Social Connection and Isolation Panel [NHANES]     Marital Status: Living with partner   Housing Stability: Low Risk  (4/20/2024)    Housing Stability Vital Sign     Unable to Pay for Housing in the Last Year: No     Number of Times Moved in the Last Year: 0     Homeless in the Last Year: No         Scheduled Meds:  Current Facility-Administered Medications   Medication Dose Route Frequency    busPIRone  15 mg Oral Daily    citalopram  20 mg Oral Daily    diphenoxylate-atropine 2.5-0.025 mg  2 tablet Oral BID    enoxparin  1 mg/kg Subcutaneous Q12H (treatment, non-standard time)    ergocalciferol  50,000 Units Oral Q7 Days    ferrous sulfate  1 tablet Oral BID    mirtazapine  15 mg Oral QHS    mupirocin   Nasal BID    phytonadione (vitamin K1)  5 mg Oral Once    sodium chloride 0.9%  10 mL Intravenous Q6H    vancomycin (VANCOCIN) IV (PEDS and ADULTS)  1,250 mg Intravenous Q12H     Continuous Infusions:  Current Facility-Administered Medications   Medication Dose Route Frequency Last Rate Last Admin    Amino acid 4.25% - dextrose 5%  "(CLINIMIX-E) solution (1L provides 42.5 gm AA, 50 gm CHO (170 kcal/L dextrose), Na 35, K 30, Mg 5, Ca 4.5, Acetate 70, Cl 39, Phos 15)   Intravenous Continuous 75 mL/hr at 04/22/24 1039 New Bag at 04/22/24 1039     PRN Meds:  Current Facility-Administered Medications:     acetaminophen, 650 mg, Oral, Q8H PRN    acetaminophen, 650 mg, Oral, Q4H PRN    dextrose 10%, 12.5 g, Intravenous, PRN    dextrose 10%, 25 g, Intravenous, PRN    glucagon (human recombinant), 1 mg, Intramuscular, PRN    glucose, 16 g, Oral, PRN    glucose, 24 g, Oral, PRN    ondansetron, 4 mg, Intravenous, Q8H PRN    Flushing PICC/Midline Protocol, , , Until Discontinued **AND** sodium chloride 0.9%, 10 mL, Intravenous, Q6H **AND** sodium chloride 0.9%, 10 mL, Intravenous, PRN    vancomycin - pharmacy to dose, , Intravenous, pharmacy to manage frequency    Objective:  /76   Pulse 69   Temp 98.9 °F (37.2 °C) (Oral)   Resp 18   Ht 5' 4" (1.626 m)   Wt 73 kg (160 lb 15 oz)   SpO2 95%   BMI 27.62 kg/m²     Physical Exam:   Physical Exam  Vitals reviewed.   Constitutional:       Appearance: She is obese.   HENT:      Head: Normocephalic.   Cardiovascular:      Rate and Rhythm: Normal rate and regular rhythm.   Pulmonary:      Effort: Pulmonary effort is normal. No respiratory distress.      Breath sounds: Normal breath sounds. No wheezing.   Abdominal:      General: Bowel sounds are normal. There is no distension.      Palpations: Abdomen is soft.      Tenderness: There is no abdominal tenderness.   Musculoskeletal:         General: Normal range of motion.      Cervical back: Normal range of motion.   Skin:     Findings: No rash.      Comments: R CW mediport, site benign. R breast mass   Neurological:      Mental Status: She is alert and oriented to person, place, and time.   Psychiatric:         Mood and Affect: Mood normal.         Behavior: Behavior normal.       Imaging      Lab Review   Recent Results (from the past 24 hour(s)) "   VANCOMYCIN, TROUGH    Collection Time: 04/21/24  7:14 PM   Result Value Ref Range    Vancomycin Trough 23.6 (H) 15.0 - 20.0 ug/ml   Protime-INR    Collection Time: 04/22/24  5:13 AM   Result Value Ref Range    PT 24.4 (H) 12.5 - 14.5 seconds    INR 2.2 (H) <=1.3   Comprehensive Metabolic Panel    Collection Time: 04/22/24  5:13 AM   Result Value Ref Range    Sodium Level 138 136 - 145 mmol/L    Potassium Level 4.7 3.5 - 5.1 mmol/L    Chloride 109 (H) 98 - 107 mmol/L    Carbon Dioxide 21 (L) 23 - 31 mmol/L    Glucose Level 105 82 - 115 mg/dL    Blood Urea Nitrogen 20.9 (H) 9.8 - 20.1 mg/dL    Creatinine 0.64 0.55 - 1.02 mg/dL    Calcium Level Total 8.6 8.4 - 10.2 mg/dL    Protein Total 6.2 5.8 - 7.6 gm/dL    Albumin Level 2.8 (L) 3.4 - 4.8 g/dL    Globulin 3.4 2.4 - 3.5 gm/dL    Albumin/Globulin Ratio 0.8 (L) 1.1 - 2.0 ratio    Bilirubin Total 0.4 <=1.5 mg/dL    Alkaline Phosphatase 83 40 - 150 unit/L    Alanine Aminotransferase 19 0 - 55 unit/L    Aspartate Aminotransferase 25 5 - 34 unit/L    eGFR >60 mls/min/1.73/m2   Vancomycin, Random    Collection Time: 04/22/24  5:13 AM   Result Value Ref Range    Vanc Lvl Random 11.9 (L) 15.0 - 20.0 ug/ml   CBC with Differential    Collection Time: 04/22/24  5:13 AM   Result Value Ref Range    WBC 5.32 4.50 - 11.50 x10(3)/mcL    RBC 3.78 (L) 4.20 - 5.40 x10(6)/mcL    Hgb 10.4 (L) 12.0 - 16.0 g/dL    Hct 33.8 (L) 37.0 - 47.0 %    MCV 89.4 80.0 - 94.0 fL    MCH 27.5 27.0 - 31.0 pg    MCHC 30.8 (L) 33.0 - 36.0 g/dL    RDW 15.4 11.5 - 17.0 %    Platelet 173 130 - 400 x10(3)/mcL    MPV 9.3 7.4 - 10.4 fL    Neut % 42.1 %    Lymph % 41.5 %    Mono % 8.8 %    Eos % 6.8 %    Basophil % 0.6 %    Lymph # 2.21 0.6 - 4.6 x10(3)/mcL    Neut # 2.24 2.1 - 9.2 x10(3)/mcL    Mono # 0.47 0.1 - 1.3 x10(3)/mcL    Eos # 0.36 0 - 0.9 x10(3)/mcL    Baso # 0.03 <=0.2 x10(3)/mcL    IG# 0.01 0 - 0.04 x10(3)/mcL    IG% 0.2 %    NRBC% 0.0 %     Assessment/Plan:  Recurrent Staphylococcus  bacteremia  Infected mediport  RLL pulmonary nodule  DM Type II  RA  Hx of short gut syndrome  Hepatic cirrhosis  Anemia  Thrombocytopenia  Elevated CRP  Hx of R breast CA     -Continue Vancomycin #4  -Afebrile without leukocytosis  -4/18 Blood cultures revealed Staph Hominis 2/2 sets  -4/19 repeat blood cultures remain negative  -Seen by Surgery, inputs noted including plans for mediport removal this coming week and recommended referral to breast surgeon for R breast mass  -Discussed with patient, friend and nursing

## 2024-04-23 ENCOUNTER — ANESTHESIA (OUTPATIENT)
Dept: SURGERY | Facility: HOSPITAL | Age: 79
DRG: 315 | End: 2024-04-23
Payer: MEDICARE

## 2024-04-23 ENCOUNTER — ANESTHESIA EVENT (OUTPATIENT)
Dept: SURGERY | Facility: HOSPITAL | Age: 79
DRG: 315 | End: 2024-04-23
Payer: MEDICARE

## 2024-04-23 LAB
ALBUMIN SERPL-MCNC: 3 G/DL (ref 3.4–4.8)
ALBUMIN/GLOB SERPL: 0.9 RATIO (ref 1.1–2)
ALP SERPL-CCNC: 91 UNIT/L (ref 40–150)
ALT SERPL-CCNC: 21 UNIT/L (ref 0–55)
AST SERPL-CCNC: 30 UNIT/L (ref 5–34)
BACTERIA BLD CULT: NORMAL
BASOPHILS # BLD AUTO: 0.02 X10(3)/MCL
BASOPHILS NFR BLD AUTO: 0.4 %
BILIRUB SERPL-MCNC: 0.5 MG/DL
BUN SERPL-MCNC: 21.2 MG/DL (ref 9.8–20.1)
CALCIUM SERPL-MCNC: 8.8 MG/DL (ref 8.4–10.2)
CHLORIDE SERPL-SCNC: 107 MMOL/L (ref 98–107)
CO2 SERPL-SCNC: 25 MMOL/L (ref 23–31)
CREAT SERPL-MCNC: 0.54 MG/DL (ref 0.55–1.02)
EOSINOPHIL # BLD AUTO: 0.33 X10(3)/MCL (ref 0–0.9)
EOSINOPHIL NFR BLD AUTO: 6.2 %
ERYTHROCYTE [DISTWIDTH] IN BLOOD BY AUTOMATED COUNT: 15.4 % (ref 11.5–17)
GFR SERPLBLD CREATININE-BSD FMLA CKD-EPI: >60 MLS/MIN/1.73/M2
GLOBULIN SER-MCNC: 3.4 GM/DL (ref 2.4–3.5)
GLUCOSE SERPL-MCNC: 92 MG/DL (ref 82–115)
HCT VFR BLD AUTO: 34.9 % (ref 37–47)
HGB BLD-MCNC: 10.9 G/DL (ref 12–16)
IMM GRANULOCYTES # BLD AUTO: 0.01 X10(3)/MCL (ref 0–0.04)
IMM GRANULOCYTES NFR BLD AUTO: 0.2 %
INR PPP: 1.5
LYMPHOCYTES # BLD AUTO: 2.44 X10(3)/MCL (ref 0.6–4.6)
LYMPHOCYTES NFR BLD AUTO: 46 %
MCH RBC QN AUTO: 27.2 PG (ref 27–31)
MCHC RBC AUTO-ENTMCNC: 31.2 G/DL (ref 33–36)
MCV RBC AUTO: 87 FL (ref 80–94)
MONOCYTES # BLD AUTO: 0.56 X10(3)/MCL (ref 0.1–1.3)
MONOCYTES NFR BLD AUTO: 10.5 %
NEUTROPHILS # BLD AUTO: 1.95 X10(3)/MCL (ref 2.1–9.2)
NEUTROPHILS NFR BLD AUTO: 36.7 %
NRBC BLD AUTO-RTO: 0 %
PLATELET # BLD AUTO: 193 X10(3)/MCL (ref 130–400)
PMV BLD AUTO: 9.1 FL (ref 7.4–10.4)
POTASSIUM SERPL-SCNC: 4.4 MMOL/L (ref 3.5–5.1)
PROT SERPL-MCNC: 6.4 GM/DL (ref 5.8–7.6)
PROTHROMBIN TIME: 17.9 SECONDS (ref 12.5–14.5)
RBC # BLD AUTO: 4.01 X10(6)/MCL (ref 4.2–5.4)
SODIUM SERPL-SCNC: 139 MMOL/L (ref 136–145)
WBC # SPEC AUTO: 5.31 X10(3)/MCL (ref 4.5–11.5)

## 2024-04-23 PROCEDURE — 63600175 PHARM REV CODE 636 W HCPCS: Performed by: INTERNAL MEDICINE

## 2024-04-23 PROCEDURE — 21400001 HC TELEMETRY ROOM

## 2024-04-23 PROCEDURE — 25000003 PHARM REV CODE 250: Performed by: INTERNAL MEDICINE

## 2024-04-23 PROCEDURE — 85025 COMPLETE CBC W/AUTO DIFF WBC: CPT | Performed by: INTERNAL MEDICINE

## 2024-04-23 PROCEDURE — 85610 PROTHROMBIN TIME: CPT | Performed by: INTERNAL MEDICINE

## 2024-04-23 PROCEDURE — 36415 COLL VENOUS BLD VENIPUNCTURE: CPT | Performed by: INTERNAL MEDICINE

## 2024-04-23 PROCEDURE — A4216 STERILE WATER/SALINE, 10 ML: HCPCS | Performed by: INTERNAL MEDICINE

## 2024-04-23 PROCEDURE — 80053 COMPREHEN METABOLIC PANEL: CPT | Performed by: INTERNAL MEDICINE

## 2024-04-23 PROCEDURE — 25000003 PHARM REV CODE 250: Performed by: NURSE PRACTITIONER

## 2024-04-23 PROCEDURE — 27000207 HC ISOLATION

## 2024-04-23 RX ADMIN — BUSPIRONE HYDROCHLORIDE 15 MG: 5 TABLET ORAL at 01:04

## 2024-04-23 RX ADMIN — DIPHENOXYLATE HYDROCHLORIDE AND ATROPINE SULFATE 2 TABLET: .025; 2.5 TABLET ORAL at 09:04

## 2024-04-23 RX ADMIN — FERROUS SULFATE TAB 325 MG (65 MG ELEMENTAL FE) 1 EACH: 325 (65 FE) TAB at 09:04

## 2024-04-23 RX ADMIN — VANCOMYCIN HYDROCHLORIDE 1250 MG: 1.25 INJECTION, POWDER, LYOPHILIZED, FOR SOLUTION INTRAVENOUS at 10:04

## 2024-04-23 RX ADMIN — VANCOMYCIN HYDROCHLORIDE 1250 MG: 1.25 INJECTION, POWDER, LYOPHILIZED, FOR SOLUTION INTRAVENOUS at 09:04

## 2024-04-23 RX ADMIN — ENOXAPARIN SODIUM 70 MG: 80 INJECTION SUBCUTANEOUS at 05:04

## 2024-04-23 RX ADMIN — SODIUM CHLORIDE, PRESERVATIVE FREE 10 ML: 5 INJECTION INTRAVENOUS at 05:04

## 2024-04-23 RX ADMIN — SODIUM CHLORIDE, PRESERVATIVE FREE 10 ML: 5 INJECTION INTRAVENOUS at 09:04

## 2024-04-23 RX ADMIN — CITALOPRAM HYDROBROMIDE 20 MG: 20 TABLET ORAL at 01:04

## 2024-04-23 RX ADMIN — MUPIROCIN: 20 OINTMENT TOPICAL at 09:04

## 2024-04-23 RX ADMIN — SODIUM CHLORIDE, PRESERVATIVE FREE 10 ML: 5 INJECTION INTRAVENOUS at 12:04

## 2024-04-23 RX ADMIN — LEUCINE, PHENYLALANINE, LYSINE, METHIONINE, ISOLEUCINE, VALINE, HISTIDINE, THREONINE, TRYPTOPHAN, ALANINE, GLYCINE, ARGININE, PROLINE, SERINE, TYROSINE, SODIUM ACETATE, DIBASIC POTASSIUM PHOSPHATE, MAGNESIUM CHLORIDE, SODIUM CHLORIDE, CALCIUM CHLORIDE, DEXTROSE
311; 238; 247; 170; 255; 247; 204; 179; 77; 880; 438; 489; 289; 213; 17; 297; 261; 51; 77; 33; 5 INJECTION INTRAVENOUS at 04:04

## 2024-04-23 RX ADMIN — FERROUS SULFATE TAB 325 MG (65 MG ELEMENTAL FE) 1 EACH: 325 (65 FE) TAB at 01:04

## 2024-04-23 RX ADMIN — SODIUM CHLORIDE, PRESERVATIVE FREE 10 ML: 5 INJECTION INTRAVENOUS at 11:04

## 2024-04-23 RX ADMIN — MIRTAZAPINE 15 MG: 15 TABLET, FILM COATED ORAL at 09:04

## 2024-04-23 NOTE — PLAN OF CARE
Problem: Adult Inpatient Plan of Care  Goal: Plan of Care Review  Outcome: Progressing  Goal: Patient-Specific Goal (Individualized)  Outcome: Progressing  Goal: Absence of Hospital-Acquired Illness or Injury  Outcome: Progressing  Goal: Optimal Comfort and Wellbeing  Outcome: Progressing  Goal: Readiness for Transition of Care  Outcome: Progressing     Problem: Infection  Goal: Absence of Infection Signs and Symptoms  Outcome: Progressing     Problem: Skin Injury Risk Increased  Goal: Skin Health and Integrity  Outcome: Progressing

## 2024-04-23 NOTE — NURSING
Nurses Note -- 4 Eyes      4/23/2024   3:23 PM      Skin assessed during: Admit      [x] No Altered Skin Integrity Present    [x]Prevention Measures Documented      [] Yes- Altered Skin Integrity Present or Discovered   [] LDA Added if Not in Epic (Describe Wound)   [] New Altered Skin Integrity was Present on Admit and Documented in LDA   [] Wound Image Taken    Wound Care Consulted? No    Attending Nurse:  Laxmi SCHAFER RN    Second RN/Staff Member:   Eric CARRANZA RN

## 2024-04-23 NOTE — PROGRESS NOTES
"[facility] Hospital Medicine Progress Note        Chief Complaint: Fever (Pt has fever and body aches that started this am. Pt reports she was recently seen for the fever and dx with "fever of unknown origin". Denies cp/sob or n/v/d. Pt has an accessed mediport that she gets home infusions 4x a week. Febrile in triage. Took 500mg tylenol at 4am        HISTORY OF PRESENT ILLNESS:   Laura Acosta is a 78 y.o. female with a past medical history of hypertension, hyperlipidemia, PVD, DVT, short gut syndrome on TPN, chronic granulomatous infection, MRSA bacteremia, rheumatoid arthritis, cirrhosis, gallstone pancreatitis, breast cancer, anxiety, and depression who presented to [facility] on 4/18/2024 with c/o fever.  Patient reported fever and body aches began this morning. Patient is followed by Dr. Hoover and has had recurrent fevers for the past 2 months.  Patient is on suppressive Doxycycline secondary to previous MRSA bacteremia.  Patient receives infusions 4 times weekly via MediPort.  Patient reported her last TPN infusion was last night.  Patient reported her MediPort has been in place since 2019.  Patient had NM inflammatory whole-body scan on 04/16/2024 which revealed no scintigraphic evidence of localized infectious process.  On exam patient endorsed chronic diarrhea.  Patient was denied nausea, vomiting, dysuria, hematuria, cough, congestion, chest pain, and shortness of breath.   Initial vital signs in ED were /76, pulse 70, respirations 30, temperature 39.1° C, and SpO2 96% on 3 L nasal cannula.  Labs revealed WBC 8.70, sodium 134, CO2 21, and lactic acid 0.7.  Flu/RSV/COVID testing were negative.  Blood cultures were obtained.  Chest x-ray revealed no active pulmonary disease.  CTA chest revealed no pulmonary embolism identified with new 11 mm solid nodule in the right lower lobe likely infectious versus inflammatory.  Patient was placed on 3 L supplemental oxygen in ED and " given Tylenol 1000 mg and IV Zosyn 4.5 g. Patient was admitted to hospital medicine service for further medical management. 2/2 blood cultures growing staph epidermidis patient currently has a MediPort - infected.  Id recommends to remove MediPort, continue on IV vancomycin.        Todays Information   Patient seen and examined at bedside, family members at bedside   Patient has no new complaints today   - would like to know ways her next mediport can be prevented from getting infected , this is her 3rd access   INR  1.5, plans for OR for mediport removal today        Exam  General appearance: Female in no apparent distress.  HENT: Atraumatic head.   Right breast- distorted, hard mass, attached to skin and underlying muscles   Eyes: Normal extraocular movements.   Neck: Supple.   Lungs: Clear to auscultation bilaterally.   Heart: Regular rate and rhythm.  Abdomen: Soft, non-distended, non-tender.  Extremities: No cyanosis, clubbing, or deformities.   Skin: Mediport to right chest wall   Neuro: Awake, alert, and oriented. Motor and sensory exams grossly intact.   Psych/mental status: Appropriate mood and affect. Responds appropriately to questions.         ASSESSMENT & PLAN:   2/2 Staph epidermidis bacteremia likely due to MediPort infection/colonized  Recurrent fever of unknown origin likely d/t above , resolved   11 mm solid nodule in the right upper lobe  Chronic diarrhea  Right breast- distorted, hard mass, attached to skin and underlying muscles   Hx of breast cancer s/p lumpectomy   History of hypertension, hyperlipidemia, PVD, DVT, short gut syndrome on TPN, chronic granulomatous infection, MRSA bacteremia, rheumatoid arthritis, cirrhosis, gallstone pancreatitis, breast cancer, anxiety, and depression      Plan:  INR  1.5, plans for OR for mediport removal today   General surgery on board, recommended outpatient right breast mass follow-up with Dr. Gonzalez  Repeat blood cultures currently negative at 72 hours    ID recommends removal of MediPort, continue IV vancomycin  Avoid PICC line till cleared by ID  Resume home medications as deemed appropriate once medication reconciliation is updated  Labs in AM     VTE Prophylaxis:  Hold Coumadin, fd lovenox      Discharge Planning and Disposition: - will need ID and surgical input on when mediport can be re inserted , that will determine if warfarin will be resumed and when       VITAL SIGNS: 24 HRS MIN & MAX LAST   Temp  Min: 97.9 °F (36.6 °C)  Max: 99.3 °F (37.4 °C) 97.9 °F (36.6 °C)   BP  Min: 110/65  Max: 135/76 110/65   Pulse  Min: 62  Max: 73  64   Resp  Min: 17  Max: 20 20   SpO2  Min: 92 %  Max: 96 % 95 %     I have reviewed the following labs:  Recent Labs   Lab 04/19/24  0431 04/22/24  0513 04/23/24 0411   WBC 6.83 5.32 5.31   RBC 3.97* 3.78* 4.01*   HGB 10.9* 10.4* 10.9*   HCT 35.1* 33.8* 34.9*   MCV 88.4 89.4 87.0   MCH 27.5 27.5 27.2   MCHC 31.1* 30.8* 31.2*   RDW 15.7 15.4 15.4   * 173 193   MPV 9.4 9.3 9.1     Recent Labs   Lab 04/18/24  1228 04/19/24  0431 04/21/24  0722 04/22/24  0513 04/23/24  0411   NA  --    < > 140 138 139   K  --    < > 4.6 4.7 4.4   CO2  --    < > 21* 21* 25   BUN  --    < > 16.3 20.9* 21.2*   CREATININE  --    < > 0.59 0.64 0.54*   CALCIUM  --    < > 8.5 8.6 8.8   PH 7.430  --   --   --   --    ALBUMIN  --    < > 2.9* 2.8* 3.0*   ALKPHOS  --    < > 77 83 91   ALT  --    < > 20 19 21   AST  --    < > 25 25 30   BILITOT  --    < > 0.4 0.4 0.5    < > = values in this interval not displayed.     Microbiology Results (last 7 days)       Procedure Component Value Units Date/Time    Blood culture x two cultures. Draw prior to antibiotics. [[id number]]  (Normal) Collected: 04/18/24 1106    Order Status: Completed Specimen: Blood Updated: 04/23/24 1300     CULTURE, BLOOD ([facility]) No Growth at 5 days    Blood Culture [[id number]]  (Normal) Collected: 04/19/24 2208    Order Status: Completed Specimen: Venous Blood Line Updated: 04/23/24 1000      CULTURE, BLOOD ([facility]) No Growth At 72 Hours    Blood Culture [[id number]]  (Normal) Collected: 04/19/24 1925    Order Status: Completed Specimen: Blood, Venous Updated: 04/22/24 2100     CULTURE, BLOOD ([facility]) No Growth At 72 Hours    Stool Culture [[id number]]  (Normal) Collected: 04/19/24 1601    Order Status: Completed Specimen: Stool Updated: 04/22/24 0737     Stool Culture Negative for Salmonella, Shigella, Campylobacter, Vibrio, Aeromonas, Pleisiomonas,Yersinia, or Shiga Toxin 1 and 2.    Blood Culture [[id number]]  (Abnormal)  (Susceptibility) Collected: 04/18/24 1603    Order Status: Completed Specimen: Blood Updated: 04/22/24 0646     CULTURE, BLOOD ([facility]) Staphylococcus epidermidis      Staphylococcus hominis     GRAM STAIN Gram Positive Cocci, probable Staphylococcus      Seen in gram stain of broth only      2 of 2 bottles positive    Malaria Smear [[id number]] Collected: 04/18/24 1756    Order Status: Completed Specimen: Blood Updated: 04/21/24 1203     MALARIA SMEAR ([facility]) No Malarial or other blood borne parasites seen     Malaria Kit Negative    Blood culture x two cultures. Draw prior to antibiotics. [[id number]]  (Abnormal)  (Susceptibility) Collected: 04/18/24 1106    Order Status: Completed Specimen: Blood Updated: 04/21/24 0634     CULTURE, BLOOD ([facility]) Staphylococcus hominis     GRAM STAIN Gram Positive Cocci, probable Staphylococcus      Seen in gram stain of broth only      1 of 2 Aerobic bottles positive    BCID2 Panel [[id number]]  (Abnormal) Collected: 04/18/24 1603    Order Status: Completed Specimen: Blood Updated: 04/19/24 0240     CTX-M (ESBL ) N/A     IMP (Cabapenemase ) N/A     KPC resistance gene (Carbapenemase ) N/A     mcr-1 N/A     mecA ID Detected     Comment: Note: Antimicrobial resistance can occur via multiple mechanisms. A Not Detected result for antimicrobial resistance gene(s) does not indicate antimicrobial susceptibility. Subculturing is required for  species identification and susceptibility testing of   isolates.        mecA/C and MREJ (MRSA) gene N/A     NDM (Carbapenemase ) N/A     OXA-48-like (Carbapenemase ) N/A     Maria Luisa/B (VRE gene) N/A     VIM (Carbapenemase ) N/A     Enterococcus faecalis Not Detected     Enterococcus faecium Not Detected     Listeria monocytogenes Not Detected     Staphylococcus spp. Detected     Staphylococcus aureus Not Detected     Staphylococcus epidermidis Detected     Staphylococcus lugdunensis Not Detected     Streptococcus spp. Not Detected     Streptococcus agalactiae (Group B) Not Detected     Streptococcus pneumoniae Not Detected     Streptococcus pyogenes (Group A) Not Detected     Acinetobacter calcoaceticus/baumannii complex Not Detected     Bacteroides fragilis Not Detected     Enterobacterales Not Detected     Enterobacter cloacae complex Not Detected     Escherichia coli Not Detected     Klebsiella aerogenes Not Detected     Klebsiella oxytoca Not Detected     Klebsiella pneumoniae group Not Detected     Proteus spp. Not Detected     Salmonella spp. Not Detected     Serratia marcescens Not Detected     Haemophilus influenzae Not Detected     Neisseria meningitidis Not Detected     Pseudomonas aeruginosa Not Detected     Stenotrophomonas maltophilia Not Detected     Candida albicans Not Detected     Candida auris Not Detected     Candida glabrata Not Detected     Candida krusei Not Detected     Candida parapsilosis Not Detected     Candida tropicalis Not Detected     Cryptococcus neoformans/gattii Not Detected    Narrative:      The VipVenta BCID2 Panel is a multiplexed nucleic acid test intended for the use with Sciences-U® 2.0 or Sciences-U® [employer] for the simultaneous qualitative detection and identification of multiple bacterial and yeast nucleic acids and select genetic determinants associated with antimicrobial resistance.  The VipVenta BCID2 Panel test is performed  directly on blood culture samples identified as positive by a continuous monitoring blood culture system.  Results are intended to be interpreted in conjunction with Gram stain results.    Clostridium Diff Toxin, A & B, EIA [[id number]]     Order Status: Canceled Specimen: Stool              See below for Radiology    Scheduled Med:  Current Facility-Administered Medications   Medication Dose Route Frequency    busPIRone  15 mg Oral Daily    citalopram  20 mg Oral Daily    diphenoxylate-atropine 2.5-0.025 mg  2 tablet Oral BID    enoxparin  1 mg/kg Subcutaneous Q12H (treatment, non-standard time)    ergocalciferol  50,000 Units Oral Q7 Days    ferrous sulfate  1 tablet Oral BID    mirtazapine  15 mg Oral QHS    mupirocin   Nasal BID    sodium chloride 0.9%  10 mL Intravenous Q6H    vancomycin (VANCOCIN) IV (PEDS and ADULTS)  1,250 mg Intravenous Q12H      Continuous Infusions:  Current Facility-Administered Medications   Medication Dose Route Frequency Last Rate Last Admin    Amino acid 4.25% - dextrose 5% (CLINIMIX-E) solution (1L provides 42.5 gm AA, 50 gm CHO (170 kcal/L dextrose), Na 35, K 30, Mg 5, Ca 4.5, Acetate 70, Cl 39, Phos 15)   Intravenous Continuous 75 mL/hr at 04/23/24 0406 New Bag at 04/23/24 0406      PRN Meds:    Current Facility-Administered Medications:     acetaminophen, 650 mg, Oral, Q8H PRN    acetaminophen, 650 mg, Oral, Q4H PRN    dextrose 10%, 12.5 g, Intravenous, PRN    dextrose 10%, 25 g, Intravenous, PRN    glucagon (human recombinant), 1 mg, Intramuscular, PRN    glucose, 16 g, Oral, PRN    glucose, 24 g, Oral, PRN    ondansetron, 4 mg, Intravenous, Q8H PRN    Flushing PICC/Midline Protocol, , , Until Discontinued **AND** sodium chloride 0.9%, 10 mL, Intravenous, Q6H **AND** sodium chloride 0.9%, 10 mL, Intravenous, PRN    vancomycin - pharmacy to dose, , Intravenous, pharmacy to manage frequency     Assessment/Plan:      VTE prophylaxis:     Patient condition:  Stable/Fair/Guarded/  Serious/ Critical    Anticipated discharge and Disposition:         All diagnosis and differential diagnosis have been reviewed; assessment and plan has been documented; I have personally reviewed the labs and test results that are presently available; I have reviewed the patients medication list; I have reviewed the consulting providers response and recommendations. I have reviewed or attempted to review medical records based upon their availability    All of the patient's questions have been  addressed and answered. Patient's is agreeable to the above stated plan. I will continue to monitor closely and make adjustments to medical management as needed.  _____________________________________________________________________    Nutrition Status:    Radiology:  I have personally reviewed the following imaging and agree with the radiologist.     CTA Chest Non-Coronary (PE Studies)  Narrative: EXAMINATION:  CTA CHEST NON CORONARY (PE STUDIES)    CLINICAL HISTORY:  Pulmonary embolism (PE) suspected, unknown D-dimer;    TECHNIQUE:  CTA imaging of the chest after IV contrast. Axial, coronal and sagittal reconstructions, including MIP images, are reviewed. Dose length product 426 mGycm. Automatic exposure control, adjustment of mA/kV or iterative reconstruction technique used to limit radiation dose.    COMPARISON:  CT 03/01/2024    FINDINGS:  Diagnostic quality: Adequate    Pulmonary embolism: None identified.    Lung parenchyma: New 11 mm mean diameter solid nodule within the right lower lobe (series 7, image 66).  8 mm solid nodule medially in the right lower lobe unchanged from prior exam.  Stable site of pleuroparenchymal scarring in the superior right lung.    Pleural effusion: None.    Mediastinum/ellen: Right-sided MediPort catheter tip in the SVC.  Mild coronary artery calcification.  No pathologically enlarged lymph node.    Chest wall/axilla: Stable appearance.    Upper abdomen: No significant findings.    Bones:  No acute osseous process.  Right shoulder arthroplasty.  Impression: No pulmonary embolism identified.    New 11 mm solid nodule in the right lower lobe, likely infectious or inflammatory.  Three month follow-up chest CT recommended to ensure resolution.    Electronically signed by: Adria Dooley  Date:    04/18/2024  Time:    13:52  X-Ray Chest AP Portable  Narrative: EXAMINATION:  XR CHEST AP PORTABLE    CLINICAL HISTORY:  Sepsis;    TECHNIQUE:  Single frontal view of the chest was performed.    COMPARISON:  February 2, 2024    FINDINGS:  Examination reveals mediastinal cardiac silhouette to be within normal limits lung fields are clear and free of gross infiltrates atelectasis or effusions a peripherally calcified lesion is identified projecting at the right base similar to previous exams and likely related to breast  Impression: No active pulmonary disease    Electronically signed by: Domingo Hughes  Date:    04/18/2024  Time:    10:44      Adis Haas MD  Department of Hospital Medicine   [facility]   04/23/2024

## 2024-04-24 LAB
ALBUMIN SERPL-MCNC: 3 G/DL (ref 3.4–4.8)
ALBUMIN/GLOB SERPL: 0.8 RATIO (ref 1.1–2)
ALP SERPL-CCNC: 92 UNIT/L (ref 40–150)
ALT SERPL-CCNC: 25 UNIT/L (ref 0–55)
AST SERPL-CCNC: 35 UNIT/L (ref 5–34)
BACTERIA BLD CULT: NORMAL
BILIRUB SERPL-MCNC: 0.4 MG/DL
BUN SERPL-MCNC: 18 MG/DL (ref 9.8–20.1)
CALCIUM SERPL-MCNC: 8.6 MG/DL (ref 8.4–10.2)
CHLORIDE SERPL-SCNC: 106 MMOL/L (ref 98–107)
CO2 SERPL-SCNC: 25 MMOL/L (ref 23–31)
CREAT SERPL-MCNC: 0.63 MG/DL (ref 0.55–1.02)
GFR SERPLBLD CREATININE-BSD FMLA CKD-EPI: >60 MLS/MIN/1.73/M2
GLOBULIN SER-MCNC: 3.6 GM/DL (ref 2.4–3.5)
GLUCOSE SERPL-MCNC: 100 MG/DL (ref 82–115)
INR PPP: 1.2
POTASSIUM SERPL-SCNC: 4.3 MMOL/L (ref 3.5–5.1)
PROT SERPL-MCNC: 6.6 GM/DL (ref 5.8–7.6)
PROTHROMBIN TIME: 14.7 SECONDS (ref 12.5–14.5)
SODIUM SERPL-SCNC: 136 MMOL/L (ref 136–145)
VANCOMYCIN TROUGH SERPL-MCNC: 23.1 UG/ML (ref 15–20)

## 2024-04-24 PROCEDURE — 36000707: Performed by: STUDENT IN AN ORGANIZED HEALTH CARE EDUCATION/TRAINING PROGRAM

## 2024-04-24 PROCEDURE — D9220A PRA ANESTHESIA: Mod: ANES,ICN,, | Performed by: ANESTHESIOLOGY

## 2024-04-24 PROCEDURE — 87040 BLOOD CULTURE FOR BACTERIA: CPT | Performed by: INTERNAL MEDICINE

## 2024-04-24 PROCEDURE — 37000009 HC ANESTHESIA EA ADD 15 MINS: Performed by: STUDENT IN AN ORGANIZED HEALTH CARE EDUCATION/TRAINING PROGRAM

## 2024-04-24 PROCEDURE — 25000003 PHARM REV CODE 250: Performed by: NURSE ANESTHETIST, CERTIFIED REGISTERED

## 2024-04-24 PROCEDURE — D9220A PRA ANESTHESIA: Mod: CRNA,ICN,, | Performed by: NURSE ANESTHETIST, CERTIFIED REGISTERED

## 2024-04-24 PROCEDURE — 11000001 HC ACUTE MED/SURG PRIVATE ROOM

## 2024-04-24 PROCEDURE — 63600175 PHARM REV CODE 636 W HCPCS: Mod: JZ,JG | Performed by: STUDENT IN AN ORGANIZED HEALTH CARE EDUCATION/TRAINING PROGRAM

## 2024-04-24 PROCEDURE — 36000706: Performed by: STUDENT IN AN ORGANIZED HEALTH CARE EDUCATION/TRAINING PROGRAM

## 2024-04-24 PROCEDURE — 25000003 PHARM REV CODE 250: Performed by: INTERNAL MEDICINE

## 2024-04-24 PROCEDURE — 88300 SURGICAL PATH GROSS: CPT | Performed by: STUDENT IN AN ORGANIZED HEALTH CARE EDUCATION/TRAINING PROGRAM

## 2024-04-24 PROCEDURE — 80202 ASSAY OF VANCOMYCIN: CPT | Performed by: INTERNAL MEDICINE

## 2024-04-24 PROCEDURE — 36415 COLL VENOUS BLD VENIPUNCTURE: CPT | Performed by: INTERNAL MEDICINE

## 2024-04-24 PROCEDURE — 0JPT0XZ REMOVAL OF TUNNELED VASCULAR ACCESS DEVICE FROM TRUNK SUBCUTANEOUS TISSUE AND FASCIA, OPEN APPROACH: ICD-10-PCS | Performed by: STUDENT IN AN ORGANIZED HEALTH CARE EDUCATION/TRAINING PROGRAM

## 2024-04-24 PROCEDURE — 63600175 PHARM REV CODE 636 W HCPCS: Performed by: NURSE PRACTITIONER

## 2024-04-24 PROCEDURE — 80053 COMPREHEN METABOLIC PANEL: CPT | Performed by: INTERNAL MEDICINE

## 2024-04-24 PROCEDURE — 85610 PROTHROMBIN TIME: CPT | Performed by: INTERNAL MEDICINE

## 2024-04-24 PROCEDURE — A4216 STERILE WATER/SALINE, 10 ML: HCPCS | Performed by: INTERNAL MEDICINE

## 2024-04-24 PROCEDURE — 36590 REMOVAL TUNNELED CV CATH: CPT | Mod: ,,, | Performed by: STUDENT IN AN ORGANIZED HEALTH CARE EDUCATION/TRAINING PROGRAM

## 2024-04-24 PROCEDURE — 37000008 HC ANESTHESIA 1ST 15 MINUTES: Performed by: STUDENT IN AN ORGANIZED HEALTH CARE EDUCATION/TRAINING PROGRAM

## 2024-04-24 PROCEDURE — 63600175 PHARM REV CODE 636 W HCPCS: Performed by: NURSE ANESTHETIST, CERTIFIED REGISTERED

## 2024-04-24 PROCEDURE — 21400001 HC TELEMETRY ROOM

## 2024-04-24 PROCEDURE — 25000003 PHARM REV CODE 250: Performed by: NURSE PRACTITIONER

## 2024-04-24 PROCEDURE — 63600175 PHARM REV CODE 636 W HCPCS: Performed by: INTERNAL MEDICINE

## 2024-04-24 PROCEDURE — 25000003 PHARM REV CODE 250: Performed by: STUDENT IN AN ORGANIZED HEALTH CARE EDUCATION/TRAINING PROGRAM

## 2024-04-24 RX ORDER — LIDOCAINE HYDROCHLORIDE 20 MG/ML
INJECTION, SOLUTION EPIDURAL; INFILTRATION; INTRACAUDAL; PERINEURAL
Status: DISCONTINUED | OUTPATIENT
Start: 2024-04-24 | End: 2024-04-24

## 2024-04-24 RX ORDER — LIDOCAINE HYDROCHLORIDE 10 MG/ML
INJECTION INFILTRATION; PERINEURAL
Status: DISCONTINUED | OUTPATIENT
Start: 2024-04-24 | End: 2024-04-24 | Stop reason: HOSPADM

## 2024-04-24 RX ORDER — PROPOFOL 10 MG/ML
VIAL (ML) INTRAVENOUS
Status: DISCONTINUED | OUTPATIENT
Start: 2024-04-24 | End: 2024-04-24

## 2024-04-24 RX ORDER — VANCOMYCIN HCL IN 5 % DEXTROSE 1G/250ML
1000 PLASTIC BAG, INJECTION (ML) INTRAVENOUS
Status: DISCONTINUED | OUTPATIENT
Start: 2024-04-24 | End: 2024-04-29 | Stop reason: HOSPADM

## 2024-04-24 RX ORDER — BUPIVACAINE HYDROCHLORIDE 5 MG/ML
INJECTION, SOLUTION EPIDURAL; INTRACAUDAL
Status: DISCONTINUED | OUTPATIENT
Start: 2024-04-24 | End: 2024-04-24 | Stop reason: HOSPADM

## 2024-04-24 RX ORDER — SODIUM CHLORIDE 0.9 % (FLUSH) 0.9 %
10 SYRINGE (ML) INJECTION
Status: CANCELLED | OUTPATIENT
Start: 2024-04-24

## 2024-04-24 RX ADMIN — LIDOCAINE HYDROCHLORIDE 80 MG: 20 INJECTION, SOLUTION EPIDURAL; INFILTRATION; INTRACAUDAL; PERINEURAL at 09:04

## 2024-04-24 RX ADMIN — ENOXAPARIN SODIUM 70 MG: 80 INJECTION SUBCUTANEOUS at 06:04

## 2024-04-24 RX ADMIN — FERROUS SULFATE TAB 325 MG (65 MG ELEMENTAL FE) 1 EACH: 325 (65 FE) TAB at 08:04

## 2024-04-24 RX ADMIN — ENOXAPARIN SODIUM 70 MG: 80 INJECTION SUBCUTANEOUS at 05:04

## 2024-04-24 RX ADMIN — ASCORBIC ACID, VITAMIN A PALMITATE, CHOLECALCIFEROL, THIAMINE HYDROCHLORIDE, RIBOFLAVIN-5 PHOSPHATE SODIUM, PYRIDOXINE HYDROCHLORIDE, NIACINAMIDE, DEXPANTHENOL, ALPHA-TOCOPHEROL ACETATE, VITAMIN K1, FOLIC ACID, BIOTIN, CYANOCOBALAMIN: 200; 3300; 200; 6; 3.6; 6; 40; 15; 10; 150; 600; 60; 5 INJECTION, SOLUTION INTRAVENOUS at 05:04

## 2024-04-24 RX ADMIN — SODIUM CHLORIDE, SODIUM GLUCONATE, SODIUM ACETATE, POTASSIUM CHLORIDE AND MAGNESIUM CHLORIDE: 526; 502; 368; 37; 30 INJECTION, SOLUTION INTRAVENOUS at 09:04

## 2024-04-24 RX ADMIN — MIRTAZAPINE 15 MG: 15 TABLET, FILM COATED ORAL at 08:04

## 2024-04-24 RX ADMIN — VANCOMYCIN HYDROCHLORIDE 1000 MG: 1 INJECTION, POWDER, LYOPHILIZED, FOR SOLUTION INTRAVENOUS at 03:04

## 2024-04-24 RX ADMIN — SODIUM CHLORIDE, PRESERVATIVE FREE 10 ML: 5 INJECTION INTRAVENOUS at 08:04

## 2024-04-24 RX ADMIN — SODIUM CHLORIDE, PRESERVATIVE FREE 10 ML: 5 INJECTION INTRAVENOUS at 05:04

## 2024-04-24 RX ADMIN — PROPOFOL 30 MG: 10 INJECTION, EMULSION INTRAVENOUS at 09:04

## 2024-04-24 RX ADMIN — DIPHENOXYLATE HYDROCHLORIDE AND ATROPINE SULFATE 2 TABLET: .025; 2.5 TABLET ORAL at 08:04

## 2024-04-24 NOTE — PROGRESS NOTES
"[facility] Hospital Medicine Progress Note        Chief Complaint: Fever (Pt has fever and body aches that started this am. Pt reports she was recently seen for the fever and dx with "fever of unknown origin". Denies cp/sob or n/v/d. Pt has an accessed mediport that she gets home infusions 4x a week. Febrile in triage. Took 500mg tylenol at 4am        HISTORY OF PRESENT ILLNESS:   Laura Acosta is a 78 y.o. female with a past medical history of hypertension, hyperlipidemia, PVD, DVT, short gut syndrome on TPN, chronic granulomatous infection, MRSA bacteremia, rheumatoid arthritis, cirrhosis, gallstone pancreatitis, breast cancer, anxiety, and depression who presented to [facility] on 4/18/2024 with c/o fever.  Patient reported fever and body aches began this morning. Patient is followed by Dr. Hoover and has had recurrent fevers for the past 2 months.  Patient is on suppressive Doxycycline secondary to previous MRSA bacteremia.  Patient receives infusions 4 times weekly via MediPort.  Patient reported her last TPN infusion was last night.  Patient reported her MediPort has been in place since 2019.  Patient had NM inflammatory whole-body scan on 04/16/2024 which revealed no scintigraphic evidence of localized infectious process.  On exam patient endorsed chronic diarrhea.  Patient was denied nausea, vomiting, dysuria, hematuria, cough, congestion, chest pain, and shortness of breath.   Initial vital signs in ED were /76, pulse 70, respirations 30, temperature 39.1° C, and SpO2 96% on 3 L nasal cannula.  Labs revealed WBC 8.70, sodium 134, CO2 21, and lactic acid 0.7.  Flu/RSV/COVID testing were negative.  Blood cultures were obtained.  Chest x-ray revealed no active pulmonary disease.  CTA chest revealed no pulmonary embolism identified with new 11 mm solid nodule in the right lower lobe likely infectious versus inflammatory.  Patient was placed on 3 L supplemental oxygen in ED and " given Tylenol 1000 mg and IV Zosyn 4.5 g. Patient was admitted to hospital medicine service for further medical management. 2/2 blood cultures growing staph epidermidis patient currently has a MediPort - infected.  Id recommends to remove MediPort, continue on IV vancomycin.        Todays Information   Patient seen and examined at bedside, family members at bedside   Patient has no new complaints today   - would like to know ways her next mediport can be prevented from getting infected , this is her 3rd access   INR  1.2, plans for OR for mediport removal today         Exam  General appearance: Female in no apparent distress.  HENT: Atraumatic head.   Right breast- distorted, hard mass, attached to skin and underlying muscles   Eyes: Normal extraocular movements.   Neck: Supple.   Lungs: Clear to auscultation bilaterally.   Heart: Regular rate and rhythm.  Abdomen: Soft, non-distended, non-tender.  Extremities: No cyanosis, clubbing, or deformities.   Skin: Mediport to right chest wall   Neuro: Awake, alert, and oriented. Motor and sensory exams grossly intact.   Psych/mental status: Appropriate mood and affect. Responds appropriately to questions.         ASSESSMENT & PLAN:   2/2 Staph epidermidis bacteremia likely due to MediPort infection/colonized  Recurrent fever of unknown origin likely d/t above , resolved   11 mm solid nodule in the right upper lobe  Chronic diarrhea  Right breast- distorted, hard mass, attached to skin and underlying muscles   Hx of breast cancer s/p lumpectomy   History of hypertension, hyperlipidemia, PVD, DVT, short gut syndrome on TPN, chronic granulomatous infection, MRSA bacteremia, rheumatoid arthritis, cirrhosis, gallstone pancreatitis, breast cancer, anxiety, and depression      Plan:  INR  1.2, plans for OR for mediport removal today   General surgery on board, recommended outpatient right breast mass follow-up with Dr. Gonzalez  Repeat blood cultures currently negative at 96 hours    ID recommends removal of MediPort, continue IV vancomycin  Avoid PICC line till cleared by ID  Resume home medications as deemed appropriate once medication reconciliation is updated  Labs in AM     VTE Prophylaxis:  Hold Coumadin, fd lovenox      Discharge Planning and Disposition: - will need ID and surgical input on when mediport can be re inserted , that will determine if warfarin will be resumed and when        VITAL SIGNS: 24 HRS MIN & MAX LAST   Temp  Min: 97.9 °F (36.6 °C)  Max: 98.2 °F (36.8 °C) 98.1 °F (36.7 °C)   BP  Min: 114/69  Max: 136/75 114/69   Pulse  Min: 60  Max: 85  65   Resp  Min: 14  Max: 20 14   SpO2  Min: 88 %  Max: 97 % 97 %     I have reviewed the following labs:  Recent Labs   Lab 04/19/24  0431 04/22/24  0513 04/23/24  0411   WBC 6.83 5.32 5.31   RBC 3.97* 3.78* 4.01*   HGB 10.9* 10.4* 10.9*   HCT 35.1* 33.8* 34.9*   MCV 88.4 89.4 87.0   MCH 27.5 27.5 27.2   MCHC 31.1* 30.8* 31.2*   RDW 15.7 15.4 15.4   * 173 193   MPV 9.4 9.3 9.1     Recent Labs   Lab 04/18/24  1228 04/19/24  0431 04/22/24  0513 04/23/24  0411 04/24/24  0510   NA  --    < > 138 139 136   K  --    < > 4.7 4.4 4.3   CO2  --    < > 21* 25 25   BUN  --    < > 20.9* 21.2* 18.0   CREATININE  --    < > 0.64 0.54* 0.63   CALCIUM  --    < > 8.6 8.8 8.6   PH 7.430  --   --   --   --    ALBUMIN  --    < > 2.8* 3.0* 3.0*   ALKPHOS  --    < > 83 91 92   ALT  --    < > 19 21 25   AST  --    < > 25 30 35*   BILITOT  --    < > 0.4 0.5 0.4    < > = values in this interval not displayed.     Microbiology Results (last 7 days)       Procedure Component Value Units Date/Time    Blood Culture [[id number]]  (Normal) Collected: 04/19/24 2208    Order Status: Completed Specimen: Venous Blood Line Updated: 04/24/24 1000     CULTURE, BLOOD ([facility]) No Growth At 96 Hours    Blood Culture [[id number]]  (Normal) Collected: 04/19/24 1925    Order Status: Completed Specimen: Blood, Venous Updated: 04/23/24 2100     CULTURE, BLOOD ([facility]) No Growth  At 96 Hours    Blood culture x two cultures. Draw prior to antibiotics. [[id number]]  (Normal) Collected: 04/18/24 1106    Order Status: Completed Specimen: Blood Updated: 04/23/24 1300     CULTURE, BLOOD ([facility]) No Growth at 5 days    Stool Culture [[id number]]  (Normal) Collected: 04/19/24 1601    Order Status: Completed Specimen: Stool Updated: 04/22/24 0737     Stool Culture Negative for Salmonella, Shigella, Campylobacter, Vibrio, Aeromonas, Pleisiomonas,Yersinia, or Shiga Toxin 1 and 2.    Blood Culture [[id number]]  (Abnormal)  (Susceptibility) Collected: 04/18/24 1603    Order Status: Completed Specimen: Blood Updated: 04/22/24 0646     CULTURE, BLOOD ([facility]) Staphylococcus epidermidis      Staphylococcus hominis     GRAM STAIN Gram Positive Cocci, probable Staphylococcus      Seen in gram stain of broth only      2 of 2 bottles positive    Malaria Smear [[id number]] Collected: 04/18/24 1756    Order Status: Completed Specimen: Blood Updated: 04/21/24 1203     MALARIA SMEAR ([facility]) No Malarial or other blood borne parasites seen     Malaria Kit Negative    Blood culture x two cultures. Draw prior to antibiotics. [[id number]]  (Abnormal)  (Susceptibility) Collected: 04/18/24 1106    Order Status: Completed Specimen: Blood Updated: 04/21/24 0634     CULTURE, BLOOD ([facility]) Staphylococcus hominis     GRAM STAIN Gram Positive Cocci, probable Staphylococcus      Seen in gram stain of broth only      1 of 2 Aerobic bottles positive    BCID2 Panel [[id number]]  (Abnormal) Collected: 04/18/24 1603    Order Status: Completed Specimen: Blood Updated: 04/19/24 0240     CTX-M (ESBL ) N/A     IMP (Cabapenemase ) N/A     KPC resistance gene (Carbapenemase ) N/A     mcr-1 N/A     mecA ID Detected     Comment: Note: Antimicrobial resistance can occur via multiple mechanisms. A Not Detected result for antimicrobial resistance gene(s) does not indicate antimicrobial susceptibility. Subculturing is required for  species identification and susceptibility testing of   isolates.        mecA/C and MREJ (MRSA) gene N/A     NDM (Carbapenemase ) N/A     OXA-48-like (Carbapenemase ) N/A     Maria Luisa/B (VRE gene) N/A     VIM (Carbapenemase ) N/A     Enterococcus faecalis Not Detected     Enterococcus faecium Not Detected     Listeria monocytogenes Not Detected     Staphylococcus spp. Detected     Staphylococcus aureus Not Detected     Staphylococcus epidermidis Detected     Staphylococcus lugdunensis Not Detected     Streptococcus spp. Not Detected     Streptococcus agalactiae (Group B) Not Detected     Streptococcus pneumoniae Not Detected     Streptococcus pyogenes (Group A) Not Detected     Acinetobacter calcoaceticus/baumannii complex Not Detected     Bacteroides fragilis Not Detected     Enterobacterales Not Detected     Enterobacter cloacae complex Not Detected     Escherichia coli Not Detected     Klebsiella aerogenes Not Detected     Klebsiella oxytoca Not Detected     Klebsiella pneumoniae group Not Detected     Proteus spp. Not Detected     Salmonella spp. Not Detected     Serratia marcescens Not Detected     Haemophilus influenzae Not Detected     Neisseria meningitidis Not Detected     Pseudomonas aeruginosa Not Detected     Stenotrophomonas maltophilia Not Detected     Candida albicans Not Detected     Candida auris Not Detected     Candida glabrata Not Detected     Candida krusei Not Detected     Candida parapsilosis Not Detected     Candida tropicalis Not Detected     Cryptococcus neoformans/gattii Not Detected    Narrative:      The [employer] BCID2 Panel is a multiplexed nucleic acid test intended for the use with [employer]® 2.0 or [employer]® [employer] for the simultaneous qualitative detection and identification of multiple bacterial and yeast nucleic acids and select genetic determinants associated with antimicrobial resistance.  The [employer] BCID2 Panel test is performed  directly on blood culture samples identified as positive by a continuous monitoring blood culture system.  Results are intended to be interpreted in conjunction with Gram stain results.    Clostridium Diff Toxin, A & B, EIA [[id number]]     Order Status: Canceled Specimen: Stool              See below for Radiology    Scheduled Med:  Current Facility-Administered Medications   Medication Dose Route Frequency    busPIRone  15 mg Oral Daily    citalopram  20 mg Oral Daily    diphenoxylate-atropine 2.5-0.025 mg  2 tablet Oral BID    enoxparin  1 mg/kg Subcutaneous Q12H (treatment, non-standard time)    ergocalciferol  50,000 Units Oral Q7 Days    ferrous sulfate  1 tablet Oral BID    mirtazapine  15 mg Oral QHS    sodium chloride 0.9%  10 mL Intravenous Q6H    vancomycin (VANCOCIN) IV (PEDS and ADULTS)  1,250 mg Intravenous Q12H      Continuous Infusions:  Current Facility-Administered Medications   Medication Dose Route Frequency Last Rate Last Admin      PRN Meds:    Current Facility-Administered Medications:     acetaminophen, 650 mg, Oral, Q8H PRN    acetaminophen, 650 mg, Oral, Q4H PRN    dextrose 10%, 12.5 g, Intravenous, PRN    dextrose 10%, 25 g, Intravenous, PRN    glucagon (human recombinant), 1 mg, Intramuscular, PRN    glucose, 16 g, Oral, PRN    glucose, 24 g, Oral, PRN    ondansetron, 4 mg, Intravenous, Q8H PRN    Flushing PICC/Midline Protocol, , , Until Discontinued **AND** sodium chloride 0.9%, 10 mL, Intravenous, Q6H **AND** sodium chloride 0.9%, 10 mL, Intravenous, PRN    vancomycin - pharmacy to dose, , Intravenous, pharmacy to manage frequency     Assessment/Plan:      VTE prophylaxis:     Patient condition:  Stable/Fair/Guarded/ Serious/ Critical    Anticipated discharge and Disposition:         All diagnosis and differential diagnosis have been reviewed; assessment and plan has been documented; I have personally reviewed the labs and test results that are presently available; I have reviewed the  patients medication list; I have reviewed the consulting providers response and recommendations. I have reviewed or attempted to review medical records based upon their availability    All of the patient's questions have been  addressed and answered. Patient's is agreeable to the above stated plan. I will continue to monitor closely and make adjustments to medical management as needed.  _____________________________________________________________________    Nutrition Status:    Radiology:  I have personally reviewed the following imaging and agree with the radiologist.     CTA Chest Non-Coronary (PE Studies)  Narrative: EXAMINATION:  CTA CHEST NON CORONARY (PE STUDIES)    CLINICAL HISTORY:  Pulmonary embolism (PE) suspected, unknown D-dimer;    TECHNIQUE:  CTA imaging of the chest after IV contrast. Axial, coronal and sagittal reconstructions, including MIP images, are reviewed. Dose length product 426 mGycm. Automatic exposure control, adjustment of mA/kV or iterative reconstruction technique used to limit radiation dose.    COMPARISON:  CT 03/01/2024    FINDINGS:  Diagnostic quality: Adequate    Pulmonary embolism: None identified.    Lung parenchyma: New 11 mm mean diameter solid nodule within the right lower lobe (series 7, image 66).  8 mm solid nodule medially in the right lower lobe unchanged from prior exam.  Stable site of pleuroparenchymal scarring in the superior right lung.    Pleural effusion: None.    Mediastinum/ellen: Right-sided MediPort catheter tip in the SVC.  Mild coronary artery calcification.  No pathologically enlarged lymph node.    Chest wall/axilla: Stable appearance.    Upper abdomen: No significant findings.    Bones: No acute osseous process.  Right shoulder arthroplasty.  Impression: No pulmonary embolism identified.    New 11 mm solid nodule in the right lower lobe, likely infectious or inflammatory.  Three month follow-up chest CT recommended to ensure resolution.    Electronically  signed by: Adria Dooley  Date:    04/18/2024  Time:    13:52  X-Ray Chest AP Portable  Narrative: EXAMINATION:  XR CHEST AP PORTABLE    CLINICAL HISTORY:  Sepsis;    TECHNIQUE:  Single frontal view of the chest was performed.    COMPARISON:  February 2, 2024    FINDINGS:  Examination reveals mediastinal cardiac silhouette to be within normal limits lung fields are clear and free of gross infiltrates atelectasis or effusions a peripherally calcified lesion is identified projecting at the right base similar to previous exams and likely related to breast  Impression: No active pulmonary disease    Electronically signed by: Domingo Hughes  Date:    04/18/2024  Time:    10:44      Adis Haas MD  Department of Hospital Medicine   [facility]   04/24/2024

## 2024-04-24 NOTE — ANESTHESIA POSTPROCEDURE EVALUATION
Anesthesia Post Evaluation    Patient: Laura Acosta    Procedure(s) Performed: Procedure(s) (LRB):  Removal, Vascular Access Catheter (Right)    Final Anesthesia Type: MAC      Patient location during evaluation: PACU  Patient participation: Yes- Able to Participate  Level of consciousness: awake  Post-procedure vital signs: reviewed and stable  Pain management: adequate  Airway patency: patent  TATYANA mitigation strategies: Postoperative administration of CPAP, nasopharyngeal airway, or oral appliance in the postanesthesia care unit (PACU)  PONV status at discharge: No PONV  Anesthetic complications: no      Cardiovascular status: hemodynamically stable  Respiratory status: unassisted and spontaneous ventilation  Hydration status: euvolemic  Follow-up not needed.            Vitals Value Taken Time   /60 04/24/24 1050   Temp 36 04/24/24 1050   Pulse 70 04/24/24 1050   Resp 15 04/24/24 1050   SpO2 95 04/24/24 1050         No case tracking events are documented in the log.      Pain/Chuy Score: No data recorded

## 2024-04-24 NOTE — PROGRESS NOTES
Pharmacokinetic Assessment Follow Up: IV Vancomycin    Vancomycin serum concentration assessment(s):    The trough level was drawn correctly and can be used to guide therapy at this time. The measurement is above the desired definitive target range of 15 to 20 mcg/mL.    Vancomycin Regimen Plan:    Change regimen to Vancomycin 1000 mg IV every 12 hours with next serum trough concentration measured at 0200 prior to 4th new dose on 04/26/24.    Drug levels (last 3 results):  Recent Labs   Lab Result Units 04/21/24  1914 04/22/24  0513 04/24/24  1046   Vanc Lvl Random ug/ml  --  11.9*  --    Vancomycin Trough ug/ml 23.6*  --  23.1*       Pharmacy will continue to follow and monitor vancomycin.    Please contact pharmacy at extension [phone] for questions regarding this assessment.    Thank you for the consult,   Marina Joseph       Patient brief summary:  Laura Acosta is a 78 y.o. female initiated on antimicrobial therapy with IV Vancomycin for treatment of bacteremia    The patient's current regimen is vancomycin 1250 mg q12h.    Drug Allergies:   Review of patient's allergies indicates:   Allergen Reactions    Hydromorphone Itching     Other reaction(s): itching    Opium      Other reaction(s): itching  has itching with larger doses. Smaller doses do not cause a reaction.       Actual Body Weight:   73 kg    Renal Function:   Estimated Creatinine Clearance: 72 mL/min (based on SCr of 0.63 mg/dL).,     Dialysis Method (if applicable):  N/A    CBC (last 72 hours):  Recent Labs   Lab Result Units 04/22/24  0513 04/23/24  0411   WBC x10(3)/mcL 5.32 5.31   Hgb g/dL 10.4* 10.9*   Hct % 33.8* 34.9*   Platelet x10(3)/mcL 173 193   Mono % % 8.8 10.5   Eos % % 6.8 6.2   Basophil % % 0.6 0.4       Metabolic Panel (last 72 hours):  Recent Labs   Lab Result Units 04/22/24  0513 04/23/24  0411 04/24/24  0510   Sodium Level mmol/L 138 139 136   Potassium Level mmol/L 4.7 4.4 4.3   Chloride mmol/L 109* 107 106   Carbon Dioxide mmol/L  21* 25 25   Glucose Level mg/dL 105 92 100   Blood Urea Nitrogen mg/dL 20.9* 21.2* 18.0   Creatinine mg/dL 0.64 0.54* 0.63   Albumin Level g/dL 2.8* 3.0* 3.0*   Bilirubin Total mg/dL 0.4 0.5 0.4   Alkaline Phosphatase unit/L 83 91 92   Aspartate Aminotransferase unit/L 25 30 35*   Alanine Aminotransferase unit/L 19 21 25       Vancomycin Administrations:  vancomycin given in the last 96 hours                     vancomycin 1.25 g in dextrose 5% 250 mL IVPB (ready to mix) (mg) 1,250 mg New Bag 04/23/24 2114     1,250 mg New Bag  1037    vancomycin 1.25 g in dextrose 5% 250 mL IVPB (ready to mix) (mg) 1,250 mg New Bag 04/22/24 2138    vancomycin (VANCOCIN) 1250 mg in 5 % dextrose 250 mL IVPB (mg) 1,250 mg New Bag 04/22/24 0842    vancomycin in dextrose 5 % 1 gram/250 mL IVPB 1,000 mg (mg) 1,000 mg New Bag 04/21/24 1358     1,000 mg New Bag  0406    vancomycin in dextrose 5 % 1 gram/250 mL IVPB 1,000 mg (mg) 1,000 mg New Bag 04/20/24 2027                    Microbiologic Results:  Microbiology Results (last 7 days)       Procedure Component Value Units Date/Time    Blood Culture [[id number]]  (Normal) Collected: 04/19/24 2208    Order Status: Completed Specimen: Venous Blood Line Updated: 04/24/24 1000     CULTURE, BLOOD ([facility]) No Growth At 96 Hours    Blood Culture [[id number]]  (Normal) Collected: 04/19/24 1925    Order Status: Completed Specimen: Blood, Venous Updated: 04/23/24 2100     CULTURE, BLOOD ([facility]) No Growth At 96 Hours    Blood culture x two cultures. Draw prior to antibiotics. [[id number]]  (Normal) Collected: 04/18/24 1106    Order Status: Completed Specimen: Blood Updated: 04/23/24 1300     CULTURE, BLOOD ([facility]) No Growth at 5 days    Stool Culture [[id number]]  (Normal) Collected: 04/19/24 1601    Order Status: Completed Specimen: Stool Updated: 04/22/24 0737     Stool Culture Negative for Salmonella, Shigella, Campylobacter, Vibrio, Aeromonas, Pleisiomonas,Yersinia, or Shiga Toxin 1 and 2.    Blood  Culture [[id number]]  (Abnormal)  (Susceptibility) Collected: 04/18/24 1603    Order Status: Completed Specimen: Blood Updated: 04/22/24 0646     CULTURE, BLOOD ([facility]) Staphylococcus epidermidis      Staphylococcus hominis     GRAM STAIN Gram Positive Cocci, probable Staphylococcus      Seen in gram stain of broth only      2 of 2 bottles positive    Malaria Smear [[id number]] Collected: 04/18/24 1756    Order Status: Completed Specimen: Blood Updated: 04/21/24 1203     MALARIA SMEAR ([facility]) No Malarial or other blood borne parasites seen     Malaria Kit Negative    Blood culture x two cultures. Draw prior to antibiotics. [[id number]]  (Abnormal)  (Susceptibility) Collected: 04/18/24 1106    Order Status: Completed Specimen: Blood Updated: 04/21/24 0634     CULTURE, BLOOD ([facility]) Staphylococcus hominis     GRAM STAIN Gram Positive Cocci, probable Staphylococcus      Seen in gram stain of broth only      1 of 2 Aerobic bottles positive    BCID2 Panel [[id number]]  (Abnormal) Collected: 04/18/24 1603    Order Status: Completed Specimen: Blood Updated: 04/19/24 0240     CTX-M (ESBL ) N/A     IMP (Cabapenemase ) N/A     KPC resistance gene (Carbapenemase ) N/A     mcr-1 N/A     mecA ID Detected     Comment: Note: Antimicrobial resistance can occur via multiple mechanisms. A Not Detected result for antimicrobial resistance gene(s) does not indicate antimicrobial susceptibility. Subculturing is required for species identification and susceptibility testing of   isolates.        mecA/C and MREJ (MRSA) gene N/A     NDM (Carbapenemase ) N/A     OXA-48-like (Carbapenemase ) N/A     Maria Luisa/B (VRE gene) N/A     VIM (Carbapenemase ) N/A     Enterococcus faecalis Not Detected     Enterococcus faecium Not Detected     Listeria monocytogenes Not Detected     Staphylococcus spp. Detected     Staphylococcus aureus Not Detected     Staphylococcus epidermidis Detected     Staphylococcus  lugdunensis Not Detected     Streptococcus spp. Not Detected     Streptococcus agalactiae (Group B) Not Detected     Streptococcus pneumoniae Not Detected     Streptococcus pyogenes (Group A) Not Detected     Acinetobacter calcoaceticus/baumannii complex Not Detected     Bacteroides fragilis Not Detected     Enterobacterales Not Detected     Enterobacter cloacae complex Not Detected     Escherichia coli Not Detected     Klebsiella aerogenes Not Detected     Klebsiella oxytoca Not Detected     Klebsiella pneumoniae group Not Detected     Proteus spp. Not Detected     Salmonella spp. Not Detected     Serratia marcescens Not Detected     Haemophilus influenzae Not Detected     Neisseria meningitidis Not Detected     Pseudomonas aeruginosa Not Detected     Stenotrophomonas maltophilia Not Detected     Candida albicans Not Detected     Candida auris Not Detected     Candida glabrata Not Detected     Candida krusei Not Detected     Candida parapsilosis Not Detected     Candida tropicalis Not Detected     Cryptococcus neoformans/gattii Not Detected    Narrative:      The [email or web address] BCID2 Panel is a multiplexed nucleic acid test intended for the use with [employer]® 2.0 or [employer] for the simultaneous qualitative detection and identification of multiple bacterial and yeast nucleic acids and select genetic determinants associated with antimicrobial resistance.  The [employer] BCID2 Panel test is performed directly on blood culture samples identified as positive by a continuous monitoring blood culture system.  Results are intended to be interpreted in conjunction with Gram stain results.    Clostridium Diff Toxin, A & B, EIA [[id number]]     Order Status: Canceled Specimen: Stool

## 2024-04-24 NOTE — TRANSFER OF CARE
"Anesthesia Transfer of Care Note    Patient: Laura Acosta    Procedure(s) Performed: Procedure(s) (LRB):  Removal, Vascular Access Catheter (Right)    Patient location: Telemetry/Step Down Unit    Anesthesia Type: general    Transport from OR: Transported from OR on room air with adequate spontaneous ventilation    Post pain: adequate analgesia    Post assessment: no apparent anesthetic complications and tolerated procedure well    Post vital signs: stable    Level of consciousness: awake, alert and oriented    Nausea/Vomiting: no nausea/vomiting    Complications: none    Transfer of care protocol was followedComments: Report given to Pako Youssef RN.       Last vitals: Visit Vitals  /75 (BP Location: Left arm, Patient Position: Lying)   Pulse 69   Temp 36.7 °C (98.1 °F) (Oral)   Resp 14   Ht 5' 4" (1.626 m)   Wt 73 kg (160 lb 15 oz)   SpO2 (!) 92%   BMI 27.62 kg/m²     "

## 2024-04-24 NOTE — PROGRESS NOTES
Infectious Diseases Progress Note  79 y/o female who is known to our service. 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 On admit she was febrile without leukocytosis, ESR 20 and CRP 49.9. Blood cultures revealing Staph Hominis 2/2 sets. Influenza A/B Ag (-), RSV PCR (-) and SARS-CoV-2 PCR (-). CXR unremarkable. CTA chest with contrast showed no PE, new 11 mm solid nodule RLL.  She is currently on Vancomycin    Subjective:  Lying in bed, resting. No acute distress. No new complaints voiced. Afebrile.  Daughter at bedside    Review of Systems   Constitutional:  Positive for malaise/fatigue.   HENT: Negative.     Eyes: Negative.    Respiratory: Negative.     Cardiovascular: Negative.    Gastrointestinal: Negative.    Genitourinary: Negative.    Musculoskeletal: Negative.    Skin: Negative.    Neurological: Negative.    All other systems reviewed and are negative.      Review of patient's allergies indicates:   Allergen Reactions    Hydromorphone Itching     Other reaction(s): itching    Opium      Other reaction(s): itching  has itching with larger doses. Smaller doses do not cause a reaction.       Past Medical History:   Diagnosis Date    Acute URI     Anxiety and depression     Back pain     Breast cancer     Cholelithiasis     Contaminated small bowel syndrome     DVT (deep venous thrombosis)     Edema, lower extremity     Gallstone pancreatitis     HTN (hypertension)     Hypercholesterolemia     Insomnia     Irregular heart beat     Ischemic disease of gut     Laryngitis     Malabsorption     Meningitis, unspecified     OA (osteoarthritis)     PVD (peripheral vascular disease)     Rheumatoid arthritis, unspecified     Staph infection     infected mediport -- on doxycycline daily for  prevention    Tremor     Unspecified cataract     Unspecified cirrhosis of liver 06/20/2023    Dr Carroll    Venous insufficiency        Past Surgical History:   Procedure Laterality Date    APPENDECTOMY      BOWEL RESECTION      BREAST LUMPECTOMY Right     CHOLECYSTECTOMY  05/2015    COLONOSCOPY  02/2022    repeat 3 years    CYST REMOVAL Right     breast    CYSTOSCOPY W/ STONE MANIPULATION      CYSTOSCOPY W/ URETERAL STENT PLACEMENT  12/2020    CYSTOSCOPY W/ URETERAL STENT REMOVAL  04/2021    CYSTOURETEROSCOPY, WITH HOLMIUM LASER LITHOTRIPSY OF URETERAL CALCULUS AND STENT INSERTION Left 05/02/2023    Procedure: CYSTOSCOPY, WITH URETERAL STENT INSERTION Left;  Surgeon: Jared Felix MD;  Location: [facility] OR;  Service: Urology;  Laterality: Left;    EGD, WITH CLOSED BIOPSY N/A 6/20/2023    Procedure: EGD;  Surgeon: Aashish Carroll MD;  Location: [facility] ENDOSCOPY;  Service: Gastroenterology;  Laterality: N/A;    ENDOSCOPIC RETROGRADE CHOLANGIOPANCREATOGRAPHY W/ SPHINCTEROTOMY AND STONE REMOVAL  04/2015    ESOPHAGOGASTRODUODENOSCOPY  06/20/2023    Dr Carroll - no signs esophageal varicies --repeat 3 yrs    GI Biopsy  02/15/2022    GI Biopsy  12/2018    HYSTERECTOMY      LAPAROTOMY, EXPLORATORY  06/2015    LITHOTRIPSY Left 04/2021    LITHOTRIPSY Left 03/2021    MEDIPORT INSERTION, SINGLE  06/2021    MEDIPORT INSERTION, SINGLE  07/2020    MEDIPORT INSERTION, SINGLE  12/2018    MEDIPORT REMOVAL  07/2020    PHACOEMULSIFICATION OF CATARACT Left 12/2016    PHACOEMULSIFICATION OF CATARACT Right 11/2016    PICC line Removal Right 06/2021    POLYPECTOMY  02/2022    PVD surgery      REMOVAL OF CATHETER  12/2020    SHOULDER SURGERY Right     shoulder replacement    SPHINCTEROTOMY OF URETHRA      VENTRAL HERNIA REPAIR  04/2016       Social History     Socioeconomic History    Marital status:     Number of children: 2   Occupational History    Occupation: Retired   Tobacco Use    Smoking status: Never     Smokeless tobacco: Never   Substance and Sexual Activity    Alcohol use: Not Currently    Drug use: Never    Sexual activity: Not Currently     Social Determinants of Health     Financial Resource Strain: Low Risk  (4/20/2024)    Overall Financial Resource Strain (CARDIA)     Difficulty of Paying Living Expenses: Not hard at all   Food Insecurity: No Food Insecurity (4/20/2024)    Hunger Vital Sign     Worried About Running Out of Food in the Last Year: Never true     Ran Out of Food in the Last Year: Never true   Transportation Needs: No Transportation Needs (4/20/2024)    PRAPARE - Transportation     Lack of Transportation (Medical): No     Lack of Transportation (Non-Medical): No   Stress: No Stress Concern Present (4/20/2024)    [employer] - Occupational Stress Questionnaire     Feeling of Stress : Not at all   Social Connections: Unknown (4/19/2024)    Social Connection and Isolation Panel [NHANES]     Marital Status: Living with partner   Housing Stability: Low Risk  (4/20/2024)    Housing Stability Vital Sign     Unable to Pay for Housing in the Last Year: No     Number of Times Moved in the Last Year: 0     Homeless in the Last Year: No         Scheduled Meds:  Current Facility-Administered Medications   Medication Dose Route Frequency    busPIRone  15 mg Oral Daily    citalopram  20 mg Oral Daily    diphenoxylate-atropine 2.5-0.025 mg  2 tablet Oral BID    enoxparin  1 mg/kg Subcutaneous Q12H (treatment, non-standard time)    ergocalciferol  50,000 Units Oral Q7 Days    [START ON 4/25/2024] fat emulsion 20%  250 mL Intravenous Twice Weekly    ferrous sulfate  1 tablet Oral BID    mirtazapine  15 mg Oral QHS    sodium chloride 0.9%  10 mL Intravenous Q6H    vancomycin (VANCOCIN) IV (PEDS and ADULTS)  1,000 mg Intravenous Q12H     Continuous Infusions:  Current Facility-Administered Medications   Medication Dose Route Frequency Last Rate Last Admin    Amino acid 4.25% - dextrose  "5% (CLINIMIX-E) solution (1L provides 42.5 gm AA, 50 gm CHO (170 kcal/L dextrose), Na 35, K 30, Mg 5, Ca 4.5, Acetate 70, Cl 39, Phos 15)   Intravenous Continuous 75 mL/hr at 04/24/24 1720 New Bag at 04/24/24 1720    [START ON 4/25/2024] Amino acid 4.25% - dextrose 5% (CLINIMIX-E) solution (1L provides 42.5 gm AA, 50 gm CHO (170 kcal/L dextrose), Na 35, K 30, Mg 5, Ca 4.5, Acetate 70, Cl 39, Phos 15)   Intravenous Continuous         PRN Meds:  Current Facility-Administered Medications:     acetaminophen, 650 mg, Oral, Q8H PRN    acetaminophen, 650 mg, Oral, Q4H PRN    dextrose 10%, 12.5 g, Intravenous, PRN    dextrose 10%, 25 g, Intravenous, PRN    glucagon (human recombinant), 1 mg, Intramuscular, PRN    glucose, 16 g, Oral, PRN    glucose, 24 g, Oral, PRN    ondansetron, 4 mg, Intravenous, Q8H PRN    Flushing PICC/Midline Protocol, , , Until Discontinued **AND** sodium chloride 0.9%, 10 mL, Intravenous, Q6H **AND** sodium chloride 0.9%, 10 mL, Intravenous, PRN    vancomycin - pharmacy to dose, , Intravenous, pharmacy to manage frequency    Objective:  /70   Pulse 85   Temp 98.1 °F (36.7 °C) (Oral)   Resp 14   Ht 5' 4" (1.626 m)   Wt 73 kg (160 lb 15 oz)   SpO2 96%   BMI 27.62 kg/m²     Physical Exam:   Physical Exam  Vitals reviewed.   Constitutional:       Appearance: She is obese.   HENT:      Head: Normocephalic.   Cardiovascular:      Rate and Rhythm: Normal rate and regular rhythm.   Pulmonary:      Effort: Pulmonary effort is normal. No respiratory distress.      Breath sounds: Normal breath sounds. No wheezing.   Abdominal:      General: Bowel sounds are normal. There is no distension.      Palpations: Abdomen is soft.      Tenderness: There is no abdominal tenderness.   Musculoskeletal:         General: Normal range of motion.      Cervical back: Normal range of motion.   Skin:     Findings: No rash.      Comments: R CW mediport, site benign. R breast mass   Neurological:      Mental Status: " She is alert and oriented to person, place, and time.   Psychiatric:         Mood and Affect: Mood normal.         Behavior: Behavior normal.       Imaging      Lab Review   Recent Results (from the past 24 hour(s))   Protime-INR    Collection Time: 04/24/24  5:10 AM   Result Value Ref Range    PT 14.7 (H) 12.5 - 14.5 seconds    INR 1.2 <=1.3   Comprehensive Metabolic Panel    Collection Time: 04/24/24  5:10 AM   Result Value Ref Range    Sodium Level 136 136 - 145 mmol/L    Potassium Level 4.3 3.5 - 5.1 mmol/L    Chloride 106 98 - 107 mmol/L    Carbon Dioxide 25 23 - 31 mmol/L    Glucose Level 100 82 - 115 mg/dL    Blood Urea Nitrogen 18.0 9.8 - 20.1 mg/dL    Creatinine 0.63 0.55 - 1.02 mg/dL    Calcium Level Total 8.6 8.4 - 10.2 mg/dL    Protein Total 6.6 5.8 - 7.6 gm/dL    Albumin Level 3.0 (L) 3.4 - 4.8 g/dL    Globulin 3.6 (H) 2.4 - 3.5 gm/dL    Albumin/Globulin Ratio 0.8 (L) 1.1 - 2.0 ratio    Bilirubin Total 0.4 <=1.5 mg/dL    Alkaline Phosphatase 92 40 - 150 unit/L    Alanine Aminotransferase 25 0 - 55 unit/L    Aspartate Aminotransferase 35 (H) 5 - 34 unit/L    eGFR >60 mls/min/1.73/m2   VANCOMYCIN, TROUGH    Collection Time: 04/24/24 10:46 AM   Result Value Ref Range    Vancomycin Trough 23.1 (H) 15.0 - 20.0 ug/ml     Assessment/Plan:  Recurrent Staphylococcus bacteremia  Infected mediport  RLL pulmonary nodule  DM Type II  RA  Hx of short gut syndrome  Hepatic cirrhosis  Anemia  Thrombocytopenia  Elevated CRP  Hx of R breast CA     -Continue Vancomycin #6  -Afebrile without leukocytosis  -4/18 Blood cultures revealed Staph Hominis 2/2 sets  -4/19 repeat blood cultures remain negative  -Seen by Surgery, inputs noted including recommended referral to breast surgeon for R breast mass  -S/p MediPort removal today 4/24 and can plan placement of a new MediPort after about 72 hours if no findings to justify otherwise  -We will obtain repeat blood cultures from today per patient and daughter's  preference  -Discussed with patient and nursing

## 2024-04-24 NOTE — ANESTHESIA PREPROCEDURE EVALUATION
04/24/2024  Laura D Use is a 78 y.o., female with   -------------------------------------    Acute URI    Anxiety and depression    Back pain    Breast cancer    Cholelithiasis    Contaminated small bowel syndrome    DVT (deep venous thrombosis)    Edema, lower extremity    Gallstone pancreatitis    HTN (hypertension)    Hypercholesterolemia    Insomnia    Irregular heart beat    Ischemic disease of gut    Laryngitis    Malabsorption    Meningitis, unspecified    OA (osteoarthritis)    PVD (peripheral vascular disease)    Rheumatoid arthritis, unspecified    Staph infection    infected mediport -- on doxycycline daily for prevention    Tremor    Unspecified cataract    Unspecified cirrhosis of liver    Dr Carroll    Venous insufficiency       And   ----------------------------    Appendectomy    Bowel resection    Breast lumpectomy    Cholecystectomy    Colonoscopy    repeat 3 years    Cyst removal    breast    Cystoscopy w/ stone manipulation    Cystoscopy w/ ureteral stent placement    Cystoscopy w/ ureteral stent removal    Cystoureteroscopy, with holmium laser lithotripsy of ureteral calculus and stent insertion    Procedure: CYSTOSCOPY, WITH URETERAL STENT INSERTION Left;  Surgeon: Jared Felix MD;  Location: [facility] OR;  Service: Urology;  Laterality: Left;    Egd, with closed biopsy    Procedure: EGD;  Surgeon: Aashish Carroll MD;  Location: [facility] ENDOSCOPY;  Service: Gastroenterology;  Laterality: N/A;    Endoscopic retrograde cholangiopancreatography w/ sphincterotomy and stone removal    Esophagogastroduodenoscopy    Dr Carroll - no signs esophageal varicies --repeat 3 yrs    Gi biopsy    Gi biopsy    Hysterectomy    Laparotomy, exploratory    Lithotripsy    Lithotripsy    Mediport insertion, single    Mediport insertion, single     Mediport insertion, single    Mediport removal    Phacoemulsification of cataract    Phacoemulsification of cataract    Picc line removal    Polypectomy    Pvd surgery    Removal of catheter    Shoulder surgery    shoulder replacement    Sphincterotomy of urethra    Ventral hernia repair       Presents for EGD.  Previous anesthetic cysto with LMA 3 in May 2023  Pt has elevated liver enzymes and cirrhosis from unknown cause - no previous EGD       Pre-op Assessment    I have reviewed the Patient Summary Reports.    I have reviewed the NPO Status.      Review of Systems  Cardiovascular:     Hypertension                                  Hypertension         Renal/:  Chronic Renal Disease        Kidney Function/Disease             Hepatic/GI:      Liver Disease,         Liver Disease        Musculoskeletal:  Arthritis        Arthritis          Neurological:      Arthritis                           Psych:  Psychiatric History                Physical Exam  General: Well nourished, Cooperative, Alert and Oriented  Pleasant, good historian   Airway:  Mallampati: II   Mouth Opening: Normal  TM Distance: Normal  Tongue: Normal  Neck ROM: Normal ROM    Dental:  Dentures  Top to be removed  Chest/Lungs:  Clear to auscultation, Normal Respiratory Rate    Heart:  Rate: Normal  Rhythm: Regular Rhythm      Anesthesia Plan  Type of Anesthesia, risks & benefits discussed:    Anesthesia Type: Gen Natural Airway, Gen Supraglottic Airway  Intra-op Monitoring Plan: Standard ASA Monitors  Post Op Pain Control Plan: IV/PO Opioids PRN  Induction:  IV  Airway Plan: Direct  Informed Consent: Informed consent signed with the Patient and all parties understand the risks and agree with anesthesia plan.  All questions answered. Patient consented to blood products? No  ASA Score: 3  Day of Surgery Review of History & Physical: H&P Update referred to the surgeon/provider.  Anesthesia Plan Notes: Nasal cannula vs facemask supplemental  oxygenation   For patients with TATYANA/obesity, may consider SuperNoval Nasal CPAP      Ready For Surgery From Anesthesia Perspective.     .

## 2024-04-24 NOTE — PROGRESS NOTES
Acute Care Surgery   Progress Note  Admit Date: 4/18/2024  HD#6  POD#* No surgery date entered *    Subjective:   Interval history:    Afebrile  Vitals stable  INR 1.2 today    Home Meds:  Current Outpatient Medications   Medication Instructions    busPIRone (BUSPAR) 15 mg, Oral, Daily    CeleXA 40 mg, Oral, Daily    diphenoxylate-atropine 2.5-0.025 mg (LOMOTIL) 2.5-0.025 mg per tablet 2 tablets, Oral, 2 times daily    doxycycline (VIBRA-TABS) 100 mg, Oral, 2 times daily    metoprolol tartrate (LOPRESSOR) 100 mg, Oral, 2 times daily    mirtazapine (REMERON) 15 mg, Oral, Nightly    solifenacin (VESICARE) 5 mg, Oral, Daily    VITAMIN D2 1,250 mcg (50,000 unit) capsule TAKE ONE CAPSULE BY MOUTH three times PER WEEK    warfarin (COUMADIN) 2.5 MG tablet TAKE ONE TABLET BY MOUTH ONCE DAILY IN THE EVENING OR AS DIRECTED BY CIS PROVIDER    warfarin (COUMADIN) 2.5 mg, Oral, Once, Take two tablets by mouth on Monday and take one tablet all other days      Scheduled Meds:  Current Facility-Administered Medications   Medication Dose Route Frequency    busPIRone  15 mg Oral Daily    citalopram  20 mg Oral Daily    diphenoxylate-atropine 2.5-0.025 mg  2 tablet Oral BID    enoxparin  1 mg/kg Subcutaneous Q12H (treatment, non-standard time)    ergocalciferol  50,000 Units Oral Q7 Days    ferrous sulfate  1 tablet Oral BID    mirtazapine  15 mg Oral QHS    sodium chloride 0.9%  10 mL Intravenous Q6H    vancomycin (VANCOCIN) IV (PEDS and ADULTS)  1,250 mg Intravenous Q12H     Continuous Infusions:  Current Facility-Administered Medications   Medication Dose Route Frequency Last Rate Last Admin     PRN Meds:  Current Facility-Administered Medications:     acetaminophen, 650 mg, Oral, Q8H PRN    acetaminophen, 650 mg, Oral, Q4H PRN    dextrose 10%, 12.5 g, Intravenous, PRN    dextrose 10%, 25 g, Intravenous, PRN    glucagon (human recombinant), 1 mg, Intramuscular, PRN    glucose, 16 g, Oral, PRN    glucose, 24 g, Oral, PRN     "ondansetron, 4 mg, Intravenous, Q8H PRN    Flushing PICC/Midline Protocol, , , Until Discontinued **AND** sodium chloride 0.9%, 10 mL, Intravenous, Q6H **AND** sodium chloride 0.9%, 10 mL, Intravenous, PRN    vancomycin - pharmacy to dose, , Intravenous, pharmacy to manage frequency     Objective:     VITAL SIGNS: 24 HR MIN & MAX LAST   Temp  Min: 97.9 °F (36.6 °C)  Max: 98.2 °F (36.8 °C)  98.1 °F (36.7 °C)   BP  Min: 121/76  Max: 138/76  138/76    Pulse  Min: 65  Max: 85  76    Resp  Min: 20  Max: 20  20    SpO2  Min: 92 %  Max: 96 %  96 %      HT: 5' 4" (162.6 cm)  WT: 73 kg (160 lb 15 oz)  BMI: 27.6     Intake/output:  Intake/Output - Last 3 Shifts         04/22 0700 04/23 0659 04/23 0700 04/24 0659 04/24 0700 04/25 0659    P.O. 480      Total Intake(mL/kg) 480 (6.6)      Urine (mL/kg/hr) 1700 (1) 1000 (0.6)     Stool 0      Total Output 1700 1000     Net -1220 -1000            Urine Occurrence 1 x      Stool Occurrence 1 x              Intake/Output Summary (Last 24 hours) at 4/24/2024 0724  Last data filed at 4/23/2024 0756  Gross per 24 hour   Intake --   Output 1000 ml   Net -1000 ml           Lines/drains/airway:       PowerPort A Cath Single Lumen Subclavian Right (Active)   Number of days:             Midline Catheter - Single Lumen 04/19/24 2010 Left basilic vein (medial side of arm) other (see comments) (Active)   $ Midline Charges (Upon insertion) Bedside Insertion Performed Pt > 3 Years Old;Midline Catheter (Supply);Ultrasound Guide for Vascular Access 04/19/24 2010   Site Assessment Bleeding 04/23/24 1800   IV Device Securement catheter securement device 04/23/24 2000   Line Status Flushed;Capped 04/23/24 2000   Extremity Circumference (cm) 0 cm 04/20/24 0400   Dressing Type CHG impregnated dressing/sponge 04/23/24 2000   Dressing Status Clean;Intact;Dry 04/23/24 2000   Dressing Intervention Integrity maintained 04/23/24 2000   Dressing Change Due 04/26/24 04/23/24 2000   Number of days: 4 " "      Physical examination:  Gen: NAD, AAOx3, answering questions appropriately  HEENT:  CV: RR  Resp: NWOB  Abd: S/NT/ND  Ext: moving all extremities spontaneously and purposefully  Neuro: CN II-XII grossly intact    Labs:  Renal:  Recent Labs     04/22/24  0513 04/23/24  0411 04/24/24  0510   BUN 20.9* 21.2* 18.0   CREATININE 0.64 0.54* 0.63     No results for input(s): "LACTIC" in the last 72 hours.  FENGI:  Recent Labs     04/22/24  0513 04/23/24  0411 04/24/24  0510    139 136   K 4.7 4.4 4.3   CO2 21* 25 25   CALCIUM 8.6 8.8 8.6   ALBUMIN 2.8* 3.0* 3.0*   BILITOT 0.4 0.5 0.4   AST 25 30 35*   ALKPHOS 83 91 92   ALT 19 21 25     Heme:  Recent Labs     04/22/24  0513 04/23/24  0411 04/24/24  0510   HGB 10.4* 10.9*  --    HCT 33.8* 34.9*  --     193  --    INR 2.2* 1.5* 1.2     ID:  Recent Labs     04/22/24  0513 04/23/24  0411   WBC 5.32 5.31     CBG:  Recent Labs     04/22/24  0513 04/23/24  0411 04/24/24  0510   GLUCOSE 105 92 100      Cardiovascular:  No results for input(s): "TROPONINI", "CKTOTAL", "CKMB", "BNP" in the last 168 hours.  I have reviewed all pertinent lab results within the past 24 hours.    Imaging:  CTA Chest Non-Coronary (PE Studies)   Final Result      No pulmonary embolism identified.      New 11 mm solid nodule in the right lower lobe, likely infectious or inflammatory.  Three month follow-up chest CT recommended to ensure resolution.         Electronically signed by: Adria Dooley   Date:    04/18/2024   Time:    13:52      X-Ray Chest AP Portable   Final Result      No active pulmonary disease         Electronically signed by: Domingo Hughes   Date:    04/18/2024   Time:    10:44         I have reviewed all pertinent imaging results/findings within the past 24 hours.    Micro/Path/Other:  Microbiology Results (last 7 days)       Procedure Component Value Units Date/Time    Blood Culture [[id number]]  (Normal) Collected: 04/19/24 1925    Order Status: Completed Specimen: " Blood, Venous Updated: 04/23/24 2100     CULTURE, BLOOD ([facility]) No Growth At 96 Hours    Blood culture x two cultures. Draw prior to antibiotics. [[id number]]  (Normal) Collected: 04/18/24 1106    Order Status: Completed Specimen: Blood Updated: 04/23/24 1300     CULTURE, BLOOD ([facility]) No Growth at 5 days    Blood Culture [[id number]]  (Normal) Collected: 04/19/24 2208    Order Status: Completed Specimen: Venous Blood Line Updated: 04/23/24 1000     CULTURE, BLOOD ([facility]) No Growth At 72 Hours    Stool Culture [[id number]]  (Normal) Collected: 04/19/24 1601    Order Status: Completed Specimen: Stool Updated: 04/22/24 0737     Stool Culture Negative for Salmonella, Shigella, Campylobacter, Vibrio, Aeromonas, Pleisiomonas,Yersinia, or Shiga Toxin 1 and 2.    Blood Culture [[id number]]  (Abnormal)  (Susceptibility) Collected: 04/18/24 1603    Order Status: Completed Specimen: Blood Updated: 04/22/24 0646     CULTURE, BLOOD ([facility]) Staphylococcus epidermidis      Staphylococcus hominis     GRAM STAIN Gram Positive Cocci, probable Staphylococcus      Seen in gram stain of broth only      2 of 2 bottles positive    Malaria Smear [[id number]] Collected: 04/18/24 1756    Order Status: Completed Specimen: Blood Updated: 04/21/24 1203     MALARIA SMEAR ([facility]) No Malarial or other blood borne parasites seen     Malaria Kit Negative    Blood culture x two cultures. Draw prior to antibiotics. [[id number]]  (Abnormal)  (Susceptibility) Collected: 04/18/24 1106    Order Status: Completed Specimen: Blood Updated: 04/21/24 0634     CULTURE, BLOOD ([facility]) Staphylococcus hominis     GRAM STAIN Gram Positive Cocci, probable Staphylococcus      Seen in gram stain of broth only      1 of 2 Aerobic bottles positive    BCID2 Panel [[id number]]  (Abnormal) Collected: 04/18/24 1603    Order Status: Completed Specimen: Blood Updated: 04/19/24 0240     CTX-M (ESBL ) N/A     IMP (Cabapenemase ) N/A     KPC resistance gene (Carbapenemase  ) N/A     mcr-1 N/A     mecA ID Detected     Comment: Note: Antimicrobial resistance can occur via multiple mechanisms. A Not Detected result for antimicrobial resistance gene(s) does not indicate antimicrobial susceptibility. Subculturing is required for species identification and susceptibility testing of   isolates.        mecA/C and MREJ (MRSA) gene N/A     NDM (Carbapenemase ) N/A     OXA-48-like (Carbapenemase ) N/A     Maria Luisa/B (VRE gene) N/A     VIM (Carbapenemase ) N/A     Enterococcus faecalis Not Detected     Enterococcus faecium Not Detected     Listeria monocytogenes Not Detected     Staphylococcus spp. Detected     Staphylococcus aureus Not Detected     Staphylococcus epidermidis Detected     Staphylococcus lugdunensis Not Detected     Streptococcus spp. Not Detected     Streptococcus agalactiae (Group B) Not Detected     Streptococcus pneumoniae Not Detected     Streptococcus pyogenes (Group A) Not Detected     Acinetobacter calcoaceticus/baumannii complex Not Detected     Bacteroides fragilis Not Detected     Enterobacterales Not Detected     Enterobacter cloacae complex Not Detected     Escherichia coli Not Detected     Klebsiella aerogenes Not Detected     Klebsiella oxytoca Not Detected     Klebsiella pneumoniae group Not Detected     Proteus spp. Not Detected     Salmonella spp. Not Detected     Serratia marcescens Not Detected     Haemophilus influenzae Not Detected     Neisseria meningitidis Not Detected     Pseudomonas aeruginosa Not Detected     Stenotrophomonas maltophilia Not Detected     Candida albicans Not Detected     Candida auris Not Detected     Candida glabrata Not Detected     Candida krusei Not Detected     Candida parapsilosis Not Detected     Candida tropicalis Not Detected     Cryptococcus neoformans/gattii Not Detected    Narrative:      The [employer] BCID2 Panel is a multiplexed nucleic acid test intended for the use with [employer]® 2.0 or  [employer]® [employer]® Torch Systems for the simultaneous qualitative detection and identification of multiple bacterial and yeast nucleic acids and select genetic determinants associated with antimicrobial resistance.  The [employer] BCID2 Panel test is performed directly on blood culture samples identified as positive by a continuous monitoring blood culture system.  Results are intended to be interpreted in conjunction with Gram stain results.    Clostridium Diff Toxin, A & B, EIA [[id number]]     Order Status: Canceled Specimen: Stool            Pathology Results  (Last 7 days)      None             Assessment & Plan:   Pt is a 77yo F with short gut syndrome and R sided mediport in place for home TPN, admitted with MRSA bacteremia.    - Plan for surgery today to remove mediport  - please keep NPO until after surgery    Elizabeth Troncoso MD  [employer] General Surgery, PGY-4

## 2024-04-25 LAB
ALBUMIN SERPL-MCNC: 3 G/DL (ref 3.4–4.8)
ALBUMIN/GLOB SERPL: 0.8 RATIO (ref 1.1–2)
ALP SERPL-CCNC: 90 UNIT/L (ref 40–150)
ALT SERPL-CCNC: 27 UNIT/L (ref 0–55)
AST SERPL-CCNC: 36 UNIT/L (ref 5–34)
BACTERIA BLD CULT: NORMAL
BILIRUB SERPL-MCNC: 0.6 MG/DL
BUN SERPL-MCNC: 16.3 MG/DL (ref 9.8–20.1)
CALCIUM SERPL-MCNC: 8.2 MG/DL (ref 8.4–10.2)
CHLORIDE SERPL-SCNC: 106 MMOL/L (ref 98–107)
CO2 SERPL-SCNC: 26 MMOL/L (ref 23–31)
CREAT SERPL-MCNC: 0.62 MG/DL (ref 0.55–1.02)
GFR SERPLBLD CREATININE-BSD FMLA CKD-EPI: >60 MLS/MIN/1.73/M2
GLOBULIN SER-MCNC: 3.6 GM/DL (ref 2.4–3.5)
GLUCOSE SERPL-MCNC: 96 MG/DL (ref 82–115)
INR PPP: 1.1
POTASSIUM SERPL-SCNC: 4.3 MMOL/L (ref 3.5–5.1)
PROT SERPL-MCNC: 6.6 GM/DL (ref 5.8–7.6)
PROTHROMBIN TIME: 14.1 SECONDS (ref 12.5–14.5)
SODIUM SERPL-SCNC: 138 MMOL/L (ref 136–145)
TRIGL SERPL-MCNC: 69 MG/DL (ref 37–140)

## 2024-04-25 PROCEDURE — 36415 COLL VENOUS BLD VENIPUNCTURE: CPT | Performed by: INTERNAL MEDICINE

## 2024-04-25 PROCEDURE — 21400001 HC TELEMETRY ROOM

## 2024-04-25 PROCEDURE — 11000001 HC ACUTE MED/SURG PRIVATE ROOM

## 2024-04-25 PROCEDURE — 63600175 PHARM REV CODE 636 W HCPCS: Performed by: NURSE PRACTITIONER

## 2024-04-25 PROCEDURE — 25000003 PHARM REV CODE 250: Performed by: NURSE PRACTITIONER

## 2024-04-25 PROCEDURE — A4216 STERILE WATER/SALINE, 10 ML: HCPCS | Performed by: INTERNAL MEDICINE

## 2024-04-25 PROCEDURE — 25000003 PHARM REV CODE 250: Performed by: INTERNAL MEDICINE

## 2024-04-25 PROCEDURE — 84478 ASSAY OF TRIGLYCERIDES: CPT | Performed by: INTERNAL MEDICINE

## 2024-04-25 PROCEDURE — 80053 COMPREHEN METABOLIC PANEL: CPT | Performed by: INTERNAL MEDICINE

## 2024-04-25 PROCEDURE — 63600175 PHARM REV CODE 636 W HCPCS: Performed by: INTERNAL MEDICINE

## 2024-04-25 PROCEDURE — 85610 PROTHROMBIN TIME: CPT | Performed by: INTERNAL MEDICINE

## 2024-04-25 PROCEDURE — B4185 PARENTERAL SOL 10 GM LIPIDS: HCPCS | Performed by: INTERNAL MEDICINE

## 2024-04-25 RX ADMIN — ENOXAPARIN SODIUM 70 MG: 80 INJECTION SUBCUTANEOUS at 06:04

## 2024-04-25 RX ADMIN — CITALOPRAM HYDROBROMIDE 20 MG: 20 TABLET ORAL at 09:04

## 2024-04-25 RX ADMIN — VANCOMYCIN HYDROCHLORIDE 1000 MG: 1 INJECTION, POWDER, LYOPHILIZED, FOR SOLUTION INTRAVENOUS at 03:04

## 2024-04-25 RX ADMIN — LEUCINE, PHENYLALANINE, LYSINE, METHIONINE, ISOLEUCINE, VALINE, HISTIDINE, THREONINE, TRYPTOPHAN, ALANINE, GLYCINE, ARGININE, PROLINE, SERINE, TYROSINE, SODIUM ACETATE, DIBASIC POTASSIUM PHOSPHATE, MAGNESIUM CHLORIDE, SODIUM CHLORIDE, CALCIUM CHLORIDE, DEXTROSE
311; 238; 247; 170; 255; 247; 204; 179; 77; 880; 438; 489; 289; 213; 17; 297; 261; 51; 77; 33; 5 INJECTION INTRAVENOUS at 09:04

## 2024-04-25 RX ADMIN — BUSPIRONE HYDROCHLORIDE 15 MG: 5 TABLET ORAL at 09:04

## 2024-04-25 RX ADMIN — FERROUS SULFATE TAB 325 MG (65 MG ELEMENTAL FE) 1 EACH: 325 (65 FE) TAB at 08:04

## 2024-04-25 RX ADMIN — DIPHENOXYLATE HYDROCHLORIDE AND ATROPINE SULFATE 2 TABLET: .025; 2.5 TABLET ORAL at 08:04

## 2024-04-25 RX ADMIN — SODIUM CHLORIDE, PRESERVATIVE FREE 10 ML: 5 INJECTION INTRAVENOUS at 11:04

## 2024-04-25 RX ADMIN — SODIUM CHLORIDE, PRESERVATIVE FREE 10 ML: 5 INJECTION INTRAVENOUS at 05:04

## 2024-04-25 RX ADMIN — MIRTAZAPINE 15 MG: 15 TABLET, FILM COATED ORAL at 08:04

## 2024-04-25 RX ADMIN — SODIUM CHLORIDE, PRESERVATIVE FREE 10 ML: 5 INJECTION INTRAVENOUS at 06:04

## 2024-04-25 RX ADMIN — I.V. FAT EMULSION 250 ML: 20 EMULSION INTRAVENOUS at 11:04

## 2024-04-25 RX ADMIN — FERROUS SULFATE TAB 325 MG (65 MG ELEMENTAL FE) 1 EACH: 325 (65 FE) TAB at 09:04

## 2024-04-25 RX ADMIN — DIPHENOXYLATE HYDROCHLORIDE AND ATROPINE SULFATE 2 TABLET: .025; 2.5 TABLET ORAL at 09:04

## 2024-04-25 RX ADMIN — ENOXAPARIN SODIUM 70 MG: 80 INJECTION SUBCUTANEOUS at 05:04

## 2024-04-25 NOTE — NURSING
Nurses Note -- 4 Eyes      4/24/2024   9:12 PM      Skin assessed during: Admit      [x] No Altered Skin Integrity Present    [x]Prevention Measures Documented      [] Yes- Altered Skin Integrity Present or Discovered   [] LDA Added if Not in Epic (Describe Wound)   [] New Altered Skin Integrity was Present on Admit and Documented in LDA   [] Wound Image Taken    Wound Care Consulted? No    Attending Nurse:   Pako Curtis RN/Staff Member:  Toya

## 2024-04-25 NOTE — PROGRESS NOTES
[facility]  Hospital Medicine Progress Note      Chief Complaint: fever     HPI: (personally reviewed by me and is documented from initial H&P)   78 y.o. female with a past medical history of hypertension, hyperlipidemia, PVD, DVT, short gut syndrome on TPN, chronic granulomatous infection, MRSA bacteremia, rheumatoid arthritis, cirrhosis, gallstone pancreatitis, breast cancer, anxiety, and depression.    Presented to [facility] on 4/18/2024 with c/o fever.  Patient reported fever and body aches began this morning. Patient is followed by Dr. Hoover and has had recurrent fevers for the past 2 months.  Patient is on suppressive Doxycycline secondary to previous MRSA bacteremia.  Patient receives infusions 4 times weekly via MediPort.  Patient reported her last TPN infusion was last night.  Patient reported her MediPort has been in place since 2019.  Patient had NM inflammatory whole-body scan on 04/16/2024 which revealed no scintigraphic evidence of localized infectious process.  On exam patient endorsed chronic diarrhea.  Patient was denied nausea, vomiting, dysuria, hematuria, cough, congestion, chest pain, and shortness of breath.     Initial vital signs in ED were /76, pulse 70, respirations 30, temperature 39.1° C, and SpO2 96% on 3 L nasal cannula.  Labs revealed WBC 8.70, sodium 134, CO2 21, and lactic acid 0.7.  Flu/RSV/COVID testing were negative.  Blood cultures were obtained.  Chest x-ray revealed no active pulmonary disease.  CTA chest revealed no pulmonary embolism identified with new 11 mm solid nodule in the right lower lobe likely infectious versus inflammatory.  Patient was placed on 3 L supplemental oxygen in ED and given Tylenol 1000 mg and IV Zosyn 4.5 g. Patient was admitted to hospital medicine service for further medical management. 2/2 blood cultures growing staph epidermidis patient currently has a MediPort - infected.  Id recommends to remove MediPort,  continue on IV vancomycin.       Interval Hx:   Patient cut me off several times during conversation to tell me that she is well aware of the plan of care because this has happened to her now 3 times.    Her  is present at bedside  Tolerating meals though she is complaining of options.  __________________________________________________________________________________________________________________________________    Objective/physical exam:  Vital signs have been personally reviewed by me   General: Appears comfortable, no acute distress.  Neuro:  Awake alert oriented.     Integumentary: Warm, dry, intact.  Musculoskeletal: Purposeful movement noted.     Respiratory: No accessory muscle use. Breath sounds are equal.  Cardiovascular: Regular rate.       VITAL SIGNS: 24 HRS MIN & MAX LAST   Temp  Min: 97.2 °F (36.2 °C)  Max: 98.6 °F (37 °C) 98.6 °F (37 °C)   BP  Min: 120/67  Max: 151/73 120/67   Pulse  Min: 70  Max: 85  72   No data recorded 14   SpO2  Min: 95 %  Max: 97 % 95 %     CTA Chest Non-Coronary (PE Studies)  Narrative: EXAMINATION:  CTA CHEST NON CORONARY (PE STUDIES)    CLINICAL HISTORY:  Pulmonary embolism (PE) suspected, unknown D-dimer;    TECHNIQUE:  CTA imaging of the chest after IV contrast. Axial, coronal and sagittal reconstructions, including MIP images, are reviewed. Dose length product 426 mGycm. Automatic exposure control, adjustment of mA/kV or iterative reconstruction technique used to limit radiation dose.    COMPARISON:  CT 03/01/2024    FINDINGS:  Diagnostic quality: Adequate    Pulmonary embolism: None identified.    Lung parenchyma: New 11 mm mean diameter solid nodule within the right lower lobe (series 7, image 66).  8 mm solid nodule medially in the right lower lobe unchanged from prior exam.  Stable site of pleuroparenchymal scarring in the superior right lung.    Pleural effusion: None.    Mediastinum/ellen: Right-sided MediPort catheter tip in the SVC.  Mild coronary artery  calcification.  No pathologically enlarged lymph node.    Chest wall/axilla: Stable appearance.    Upper abdomen: No significant findings.    Bones: No acute osseous process.  Right shoulder arthroplasty.  Impression: No pulmonary embolism identified.    New 11 mm solid nodule in the right lower lobe, likely infectious or inflammatory.  Three month follow-up chest CT recommended to ensure resolution.    Electronically signed by: Adria Dooley  Date:    04/18/2024  Time:    13:52  X-Ray Chest AP Portable  Narrative: EXAMINATION:  XR CHEST AP PORTABLE    CLINICAL HISTORY:  Sepsis;    TECHNIQUE:  Single frontal view of the chest was performed.    COMPARISON:  February 2, 2024    FINDINGS:  Examination reveals mediastinal cardiac silhouette to be within normal limits lung fields are clear and free of gross infiltrates atelectasis or effusions a peripherally calcified lesion is identified projecting at the right base similar to previous exams and likely related to breast  Impression: No active pulmonary disease    Electronically signed by: Domingo Hughes  Date:    04/18/2024  Time:    10:44    Recent Labs   Lab 04/19/24  0431 04/22/24  0513 04/23/24  0411   WBC 6.83 5.32 5.31   RBC 3.97* 3.78* 4.01*   HGB 10.9* 10.4* 10.9*   HCT 35.1* 33.8* 34.9*   MCV 88.4 89.4 87.0   MCH 27.5 27.5 27.2   MCHC 31.1* 30.8* 31.2*   RDW 15.7 15.4 15.4   * 173 193   MPV 9.4 9.3 9.1       Recent Labs   Lab 04/23/24  0411 04/24/24  0510 04/25/24  0541    136 138   K 4.4 4.3 4.3   CO2 25 25 26   BUN 21.2* 18.0 16.3   CREATININE 0.54* 0.63 0.62   CALCIUM 8.8 8.6 8.2*   ALBUMIN 3.0* 3.0* 3.0*   ALKPHOS 91 92 90   ALT 21 25 27   AST 30 35* 36*   BILITOT 0.5 0.4 0.6     Microbiology Results (last 7 days)       Procedure Component Value Units Date/Time    Blood Culture [[id number]]  (Normal) Collected: 04/19/24 2208    Order Status: Completed Specimen: Venous Blood Line Updated: 04/25/24 1000     CULTURE, BLOOD ([facility]) No Growth at 5  days    Blood Culture [[id number]]  (Normal) Collected: 04/19/24 1925    Order Status: Completed Specimen: Blood, Venous Updated: 04/24/24 2100     CULTURE, BLOOD ([facility]) No Growth at 5 days    Blood Culture [[id number]] Collected: 04/24/24 2016    Order Status: Resulted Specimen: Blood Updated: 04/24/24 2027    Blood culture x two cultures. Draw prior to antibiotics. [[id number]]  (Normal) Collected: 04/18/24 1106    Order Status: Completed Specimen: Blood Updated: 04/23/24 1300     CULTURE, BLOOD ([facility]) No Growth at 5 days    Stool Culture [[id number]]  (Normal) Collected: 04/19/24 1601    Order Status: Completed Specimen: Stool Updated: 04/22/24 0737     Stool Culture Negative for Salmonella, Shigella, Campylobacter, Vibrio, Aeromonas, Pleisiomonas,Yersinia, or Shiga Toxin 1 and 2.    Blood Culture [[id number]]  (Abnormal)  (Susceptibility) Collected: 04/18/24 1603    Order Status: Completed Specimen: Blood Updated: 04/22/24 0646     CULTURE, BLOOD ([facility]) Staphylococcus epidermidis      Staphylococcus hominis     GRAM STAIN Gram Positive Cocci, probable Staphylococcus      Seen in gram stain of broth only      2 of 2 bottles positive    Malaria Smear [[id number]] Collected: 04/18/24 1756    Order Status: Completed Specimen: Blood Updated: 04/21/24 1203     MALARIA SMEAR ([facility]) No Malarial or other blood borne parasites seen     Malaria Kit Negative    Blood culture x two cultures. Draw prior to antibiotics. [[id number]]  (Abnormal)  (Susceptibility) Collected: 04/18/24 1106    Order Status: Completed Specimen: Blood Updated: 04/21/24 0634     CULTURE, BLOOD ([facility]) Staphylococcus hominis     GRAM STAIN Gram Positive Cocci, probable Staphylococcus      Seen in gram stain of broth only      1 of 2 Aerobic bottles positive    BCID2 Panel [[id number]]  (Abnormal) Collected: 04/18/24 1603    Order Status: Completed Specimen: Blood Updated: 04/19/24 0240     CTX-M (ESBL ) N/A     IMP (Cabapenemase ) N/A      KPC resistance gene (Carbapenemase ) N/A     mcr-1 N/A     mecA ID Detected     Comment: Note: Antimicrobial resistance can occur via multiple mechanisms. A Not Detected result for antimicrobial resistance gene(s) does not indicate antimicrobial susceptibility. Subculturing is required for species identification and susceptibility testing of   isolates.        mecA/C and MREJ (MRSA) gene N/A     NDM (Carbapenemase ) N/A     OXA-48-like (Carbapenemase ) N/A     Maria Luisa/B (VRE gene) N/A     VIM (Carbapenemase ) N/A     Enterococcus faecalis Not Detected     Enterococcus faecium Not Detected     Listeria monocytogenes Not Detected     Staphylococcus spp. Detected     Staphylococcus aureus Not Detected     Staphylococcus epidermidis Detected     Staphylococcus lugdunensis Not Detected     Streptococcus spp. Not Detected     Streptococcus agalactiae (Group B) Not Detected     Streptococcus pneumoniae Not Detected     Streptococcus pyogenes (Group A) Not Detected     Acinetobacter calcoaceticus/baumannii complex Not Detected     Bacteroides fragilis Not Detected     Enterobacterales Not Detected     Enterobacter cloacae complex Not Detected     Escherichia coli Not Detected     Klebsiella aerogenes Not Detected     Klebsiella oxytoca Not Detected     Klebsiella pneumoniae group Not Detected     Proteus spp. Not Detected     Salmonella spp. Not Detected     Serratia marcescens Not Detected     Haemophilus influenzae Not Detected     Neisseria meningitidis Not Detected     Pseudomonas aeruginosa Not Detected     Stenotrophomonas maltophilia Not Detected     Candida albicans Not Detected     Candida auris Not Detected     Candida glabrata Not Detected     Candida krusei Not Detected     Candida parapsilosis Not Detected     Candida tropicalis Not Detected     Cryptococcus neoformans/gattii Not Detected    Narrative:      The [employer] BCID2 Panel is a multiplexed nucleic acid test intended for the  use with [employer]® FilmArray® 2.0 or [employer]® FilmArray® [employer] for the simultaneous qualitative detection and identification of multiple bacterial and yeast nucleic acids and select genetic determinants associated with antimicrobial resistance.  The [employer] BCID2 Panel test is performed directly on blood culture samples identified as positive by a continuous monitoring blood culture system.  Results are intended to be interpreted in conjunction with Gram stain results.    Clostridium Diff Toxin, A & B, EIA [[id number]]     Order Status: Canceled Specimen: Stool              Scheduled Med:  Current Facility-Administered Medications   Medication Dose Route Frequency    busPIRone  15 mg Oral Daily    citalopram  20 mg Oral Daily    diphenoxylate-atropine 2.5-0.025 mg  2 tablet Oral BID    enoxparin  1 mg/kg Subcutaneous Q12H (treatment, non-standard time)    ergocalciferol  50,000 Units Oral Q7 Days    fat emulsion 20%  250 mL Intravenous Twice Weekly    ferrous sulfate  1 tablet Oral BID    mirtazapine  15 mg Oral QHS    sodium chloride 0.9%  10 mL Intravenous Q6H    vancomycin (VANCOCIN) IV (PEDS and ADULTS)  1,000 mg Intravenous Q12H      Continuous Infusions:  Current Facility-Administered Medications   Medication Dose Route Frequency Last Rate Last Admin    Amino acid 4.25% - dextrose 5% (CLINIMIX-E) solution (1L provides 42.5 gm AA, 50 gm CHO (170 kcal/L dextrose), Na 35, K 30, Mg 5, Ca 4.5, Acetate 70, Cl 39, Phos 15)   Intravenous Continuous 75 mL/hr at 04/24/24 1720 New Bag at 04/24/24 1720    Amino acid 4.25% - dextrose 5% (CLINIMIX-E) solution (1L provides 42.5 gm AA, 50 gm CHO (170 kcal/L dextrose), Na 35, K 30, Mg 5, Ca 4.5, Acetate 70, Cl 39, Phos 15)   Intravenous Continuous 75 mL/hr at 04/25/24 0953 New Bag at 04/25/24 0953        PRN Meds:    Current Facility-Administered Medications:     acetaminophen, 650 mg, Oral, Q8H PRN    acetaminophen, 650 mg, Oral, Q4H PRN    dextrose 10%, 12.5 g,  Intravenous, PRN    dextrose 10%, 25 g, Intravenous, PRN    glucagon (human recombinant), 1 mg, Intramuscular, PRN    glucose, 16 g, Oral, PRN    glucose, 24 g, Oral, PRN    ondansetron, 4 mg, Intravenous, Q8H PRN    Flushing PICC/Midline Protocol, , , Until Discontinued **AND** sodium chloride 0.9%, 10 mL, Intravenous, Q6H **AND** sodium chloride 0.9%, 10 mL, Intravenous, PRN    vancomycin - pharmacy to dose, , Intravenous, pharmacy to manage frequency   __________________________________________________________________________________________________________________________________    Assessment/Plan:  Staph epidermis   MediPort infected   Chronic diarrhea   Right breast cancer  Hypertension  Hyperlipidemia  Peripheral vascular disease   History of DVT   Short gut syndrome on TPN   Chronic granulomatous infection   MRSA bacteremia history of   Rheumatoid arthritis  Cirrhosis   Gallstone pancreatitis   Anxiety/depression  11 mm solid nodule in the right lower lobe  Above present on admission     Anticipated discharge and Disposition when medically stable:  outpatient right breast mass follow-up with Dr. Gonzalez   11 mm solid nodule in the right lower lobe--followup needed     _______________________________________________________________________________________________________________________________  Fever secondary to bacteremia. Mediport removed.  Staph epidermis bacteremia, status post MediPort removal.  Blood cultures negative thus far.  Follow up with ID physician's recommendations.  No plans for PICC line at this time.    Continue to hold Coumadin, continue Lovenox  Continue supportive care  Continue checking vital signs q4hrs.    Patient remains on Clinimix for short gut syndrome, continue for now  Encourage ambulation; up to chair x3     Nurse notified to page me if any changes occur     DVT prophylaxis initiated   Nutrition Status: regular as tolerated.     Consults:  Infectious disease physician  I have  personally reviewed the specialist documentation and/or have spoken to the specialist with regard to the care of this patient; recommendations are noted above.     _______________________________________________________________________________________________________________________________    I have spent >30 minutes on the day of the visit; time spent includes face to face time and non-face to face time preparing to see the patient (eg, review of tests), independently reviewing and interpreting medical records, both past and current; documenting clinical information in the electronic or other health record, and communicating results to the patient/family/caregiver and care coordinator and nursing team.      All diagnosis and differential diagnosis have been reviewed,  interpreted and communicated appropriately to care team. assessment and plan has been documented; I have personally reviewed the labs and test results that are presently available and pertinent to this hospital course; I have reviewed medical records based upon their availability.    All of the patient's questions have been  addressed and answered. Patient's is agreeable to the above stated plan.   I will continue to monitor closely and make adjustments to medical management as needed.    Gabrielle Butler,    04/25/2024     This note was created with the assistance of Dragon voice recognition software. There may be transcription errors as a result of using this technology however minimal. Effort has been made to assure accuracy of transcription but any obvious errors or omissions should be clarified with the author of the document.

## 2024-04-25 NOTE — CONSULTS
"Inpatient Nutrition Assessment    Admit Date: 4/18/2024   Total duration of encounter: 7 days   Patient Age: 78 y.o.    Nutrition Recommendation/Prescription     Diet Adult Regular Lactose Restricted, add "No fried foods" restrictrition  PPN recommendations provided:  Recommendations: Clinimix-E @ 75 mL/hr  with Intra lipids (20%) twice weekly  Kcal: 755 kcal/day (~49% est min kcal needs)  AA: 77 g/day (~105% est min protein needs)  Dextrose: 90 g/day (GIR: 0.86 mg/kg/min)  Fluid: 1870 mL/day (~120% est min fluid needs)  Monitor electrolytes and replete as needed  Mirtazapine ordered (4/18/2024)  Add vitamin/mineral supplementation as appropriate  Monitor appetite/PO intake, weight, and labs    Communication of Recommendations: reviewed with provider, reviewed with nurse, reviewed with patient, and reviewed with family    Nutrition Assessment     Malnutrition Assessment/Nutrition-Focused Physical Exam    Malnutrition Context: other (see comments) (Does not meet citeria at this time) (04/19/24 1354)  Malnutrition Level: other (see comments) (Does not meet citeria at this time) (04/19/24 1354)  Energy Intake (Malnutrition): other (see comments) (Does not meet citeria) (04/19/24 1354)  Weight Loss (Malnutrition): other (see comments) (Does not meet citeria per EMR) (04/19/24 1354)  Subcutaneous Fat (Malnutrition): other (see comments) (Does not meet citeria) (04/19/24 1354)           Muscle Mass (Malnutrition): other (see comments) (Does not meet citeria) (04/19/24 1354)                          Fluid Accumulation (Malnutrition): other (see comments) (Does not meet citeria) (04/19/24 1354)        A minimum of two characteristics is recommended for diagnosis of either severe or non-severe malnutrition.    Chart Review    Reason Seen: patient/family request, physician consult for Short gut syndrome, and follow-up    Malnutrition Screening Tool Results   Have you recently lost weight without trying?: No  Have you been " eating poorly because of a decreased appetite?: No   MST Score: 0   Diagnosis:  Acute hypoxemic respiratory failure  Recurrent fever of unknown origin  11 mm solid nodule in the right upper lobe  Chronic diarrhea  History of hypertension, hyperlipidemia, PVD, DVT, short gut syndrome on TPN, chronic granulomatous infection, MRSA bacteremia, rheumatoid arthritis, cirrhosis, gallstone pancreatitis, breast cancer, anxiety, and depression     Relevant Medical History:   Hypertension  Hyperlipidemia  PVD  DVT  Short gut syndrome on TPN   Chronic granulomatous infection   MRSA bacteremia  Rheumatoid arthritis  Cirrhosis  Gallstone pancreatitis   Breast cancer   Anxiety  Depression    Scheduled Medications:  busPIRone, 15 mg, Daily  citalopram, 20 mg, Daily  diphenoxylate-atropine 2.5-0.025 mg, 2 tablet, BID  enoxparin, 1 mg/kg, Q12H (treatment, non-standard time)  ergocalciferol, 50,000 Units, Q7 Days  fat emulsion 20%, 250 mL, Twice Weekly  ferrous sulfate, 1 tablet, BID  mirtazapine, 15 mg, QHS  sodium chloride 0.9%, 10 mL, Q6H  vancomycin (VANCOCIN) IV (PEDS and ADULTS), 1,000 mg, Q12H    Continuous Infusions:  Amino acid 4.25% - dextrose 5% (CLINIMIX-E) solution (1L provides 42.5 gm AA, 50 gm CHO (170 kcal/L dextrose), Na 35, K 30, Mg 5, Ca 4.5, Acetate 70, Cl 39, Phos 15), Last Rate: 75 mL/hr at 04/24/24 1720  Amino acid 4.25% - dextrose 5% (CLINIMIX-E) solution (1L provides 42.5 gm AA, 50 gm CHO (170 kcal/L dextrose), Na 35, K 30, Mg 5, Ca 4.5, Acetate 70, Cl 39, Phos 15), Last Rate: 75 mL/hr at 04/25/24 0953    PRN Medications:   Current Facility-Administered Medications:     acetaminophen, 650 mg, Oral, Q8H PRN    acetaminophen, 650 mg, Oral, Q4H PRN    dextrose 10%, 12.5 g, Intravenous, PRN    dextrose 10%, 25 g, Intravenous, PRN    glucagon (human recombinant), 1 mg, Intramuscular, PRN    glucose, 16 g, Oral, PRN    glucose, 24 g, Oral, PRN    ondansetron, 4 mg, Intravenous, Q8H PRN    Flushing PICC/Midline  Protocol, , , Until Discontinued **AND** sodium chloride 0.9%, 10 mL, Intravenous, Q6H **AND** sodium chloride 0.9%, 10 mL, Intravenous, PRN    vancomycin - pharmacy to dose, , Intravenous, pharmacy to manage frequency    Calorie Containing IV Medications: no significant kcals from medications at this time    Recent Labs   Lab 04/19/24  0431 04/21/24  0722 04/22/24  0513 04/23/24  0411 04/24/24  0510 04/25/24  0541    140 138 139 136 138   K 3.7 4.6 4.7 4.4 4.3 4.3   CALCIUM 8.0* 8.5 8.6 8.8 8.6 8.2*   CHLORIDE 107 110* 109* 107 106 106   CO2 22* 21* 21* 25 25 26   BUN 13.6 16.3 20.9* 21.2* 18.0 16.3   CREATININE 0.71 0.59 0.64 0.54* 0.63 0.62   EGFRNORACEVR >60 >60 >60 >60 >60 >60   GLUCOSE 132* 104 105 92 100 96   BILITOT 1.0 0.4 0.4 0.5 0.4 0.6   ALKPHOS 79 77 83 91 92 90   ALT 23 20 19 21 25 27   AST 27 25 25 30 35* 36*   ALBUMIN 2.9* 2.9* 2.8* 3.0* 3.0* 3.0*   TRIG  --   --   --   --   --  69   WBC 6.83  --  5.32 5.31  --   --    HGB 10.9*  --  10.4* 10.9*  --   --    HCT 35.1*  --  33.8* 34.9*  --   --      Nutrition Orders:  Diet Adult Regular Lactose Restricted  Amino acid 4.25% - dextrose 5% (CLINIMIX-E) solution (1L provides 42.5 gm AA, 50 gm CHO (170 kcal/L dextrose), Na 35, K 30, Mg 5, Ca 4.5, Acetate 70, Cl 39, Phos 15)  Amino acid 4.25% - dextrose 5% (CLINIMIX-E) solution (1L provides 42.5 gm AA, 50 gm CHO (170 kcal/L dextrose), Na 35, K 30, Mg 5, Ca 4.5, Acetate 70, Cl 39, Phos 15)      Appetite/Oral Intake: good/50-75% of meals  Factors Affecting Nutritional Intake:  food preferences   Social Needs Impacting Access to Food: none identified  Food/Evangelical/Cultural Preferences:  lactose free, no fried foods, no seasonings on grilled chicken/fish  Food Allergies: none reported  Last Bowel Movement: 04/24/24  Wound(s):  none noted    Comments    4/19/2024: Pt reports a good appetite/PO intake with chronic home TPN due to Short Gut Syndrome. Pt sees outpatient RD. Pt reports a fair appetite/PO  "intake currently due to food preferences. Provided PPN recommendations due to infected port site. Pt denies N/V, constipation, and chewing difficulties. Pt reports chronic diarrhea and swallowing difficulties. Pt denies unplanned wt loss. Per EMR, pt weighed 75.1 kg on 3/28/2024 (2.7% wt loss in 1 month, insignificant). Encouraged adequate PO intake and obtained food preferences. Provided short gut syndrome education and printed materials. Last BM noted. Will monitor.    2024: Pt reports a good appetite with fair PO intake due to preferences. Pt denies N/V. PPN @ 75 mL/hr running. Last BM noted. Encouraged adequate PO intake. Will monitor.    2024: Pt on PPN. Pt reports a good appetite with fair PO intake due to preferences. Pt reports diarrhea getting better. Pt denies N/V. Last BM noted. Will monitor.    Anthropometrics    Height: 5' 4" (162.6 cm),    Last Weight: 73 kg (160 lb 15 oz) (24 09), Weight Method: Standard Scale  BMI (Calculated): 27.6  BMI Classification: overweight (BMI 25-29.9)     Ideal Body Weight (IBW), Female: 120 lb     % Ideal Body Weight, Female (lb): 134.12 %                    Usual Body Weight (UBW), k.1 kg  % Usual Body Weight: 97.41     Usual Weight Provided By: EMR weight history    Wt Readings from Last 5 Encounters:   24 73 kg (160 lb 15 oz)   24 75.1 kg (165 lb 9.1 oz)   24 74.4 kg (164 lb)   24 72.6 kg (160 lb)   24 75.8 kg (167 lb)     Weight Change(s) Since Admission:   2024: 73 kg  2024: no new wts  2024: no new wts  Wt Readings from Last 1 Encounters:   24 09 73 kg (160 lb 15 oz)   Admit Weight: 73 kg (160 lb 15 oz) (24 0957), Weight Method: Standard Scale    Estimated Needs    Weight Used For Calorie Calculations: 73 kg (160 lb 15 oz)  Energy Calorie Requirements (kcal): 3936-5105 (MSJ x 1.3-1.4)  Energy Need Method: [place]- [place]  Weight Used For Protein Calculations: 73 kg (160 lb 15 " oz)  Protein Requirements: 73-88 (1.0-1.2 g/kg)  Fluid Requirements (mL): 1553 (1 mL/kcal)        Enteral Nutrition     Patient not receiving enteral nutrition at this time.    Parenteral Nutrition     Standard Formula: Clinimix E 4.25/5  Custom Formula: not applicable  Additives: multivitamin with vitamin K  Rate/Volume: 75 mL/hr  Lipids: 250 ml 20% IV lipid emulsion twice weekly  Total Nutrition Provided by Parenteral Nutrition:  Calories Provided  755 kcal/d, 49% needs   Protein Provided  77 g/d, 105% needs   Dextrose Provided  90 g/d, GIR 0.86 mg CHO/kg/min   Fluid Provided  1870 ml/d, 120% needs       Evaluation of Received Nutrient Intake    Calories: meeting estimated needs  Protein: meeting estimated needs    Patient Education     Education Provided:  Short Bowel Syndrome with colon  Teaching Method: explanation and printed materials  Comprehension: verbalizes understanding and needs reinforcement  Barriers to Learning: none evident  Expected Compliance: fair  Comments: All questions were answered and dietitian's contact information was provided.     Nutrition Diagnosis     PES: Food and nutrition related knowledge deficit related to chronic illness as evidenced by uncertainty about nutrition-related outcomes for Short gut syndrome. (active)       Nutrition Interventions     Intervention(s): general/healthful diet, modified composition of parenteral nutrition, and modified rate of parenteral nutrition    Goal: Meet greater than 80% of nutritional needs by follow-up. (goal progressing)    Nutrition Goals & Monitoring     Dietitian will monitor: food and beverage intake, parenteral nutrition intake, weight, electrolyte/renal panel, glucose/endocrine profile, and gastrointestinal profile  Discharge planning: too early to determine; pending clinical course  Nutrition Risk/Follow-Up: high (follow-up in 1-4 days)   Please consult if re-assessment needed sooner.

## 2024-04-25 NOTE — PROGRESS NOTES
Acute Care Surgery   Progress Note  Admit Date: 4/18/2024  HD#7  POD#1 Day Post-Op    Subjective:   Interval history:    Afebrile  Vitals stable  Pain controlled    Home Meds:  Current Outpatient Medications   Medication Instructions    busPIRone (BUSPAR) 15 mg, Oral, Daily    CeleXA 40 mg, Oral, Daily    diphenoxylate-atropine 2.5-0.025 mg (LOMOTIL) 2.5-0.025 mg per tablet 2 tablets, Oral, 2 times daily    doxycycline (VIBRA-TABS) 100 mg, Oral, 2 times daily    metoprolol tartrate (LOPRESSOR) 100 mg, Oral, 2 times daily    mirtazapine (REMERON) 15 mg, Oral, Nightly    solifenacin (VESICARE) 5 mg, Oral, Daily    VITAMIN D2 1,250 mcg (50,000 unit) capsule TAKE ONE CAPSULE BY MOUTH three times PER WEEK    warfarin (COUMADIN) 2.5 MG tablet TAKE ONE TABLET BY MOUTH ONCE DAILY IN THE EVENING OR AS DIRECTED BY CIS PROVIDER    warfarin (COUMADIN) 2.5 mg, Oral, Once, Take two tablets by mouth on Monday and take one tablet all other days      Scheduled Meds:  Current Facility-Administered Medications   Medication Dose Route Frequency    busPIRone  15 mg Oral Daily    citalopram  20 mg Oral Daily    diphenoxylate-atropine 2.5-0.025 mg  2 tablet Oral BID    enoxparin  1 mg/kg Subcutaneous Q12H (treatment, non-standard time)    ergocalciferol  50,000 Units Oral Q7 Days    fat emulsion 20%  250 mL Intravenous Twice Weekly    ferrous sulfate  1 tablet Oral BID    mirtazapine  15 mg Oral QHS    sodium chloride 0.9%  10 mL Intravenous Q6H    vancomycin (VANCOCIN) IV (PEDS and ADULTS)  1,000 mg Intravenous Q12H     Continuous Infusions:  Current Facility-Administered Medications   Medication Dose Route Frequency Last Rate Last Admin    Amino acid 4.25% - dextrose 5% (CLINIMIX-E) solution (1L provides 42.5 gm AA, 50 gm CHO (170 kcal/L dextrose), Na 35, K 30, Mg 5, Ca 4.5, Acetate 70, Cl 39, Phos 15)   Intravenous Continuous 75 mL/hr at 04/24/24 1720 New Bag at 04/24/24 1720    Amino acid 4.25% - dextrose 5% (CLINIMIX-E) solution  "(1L provides 42.5 gm AA, 50 gm CHO (170 kcal/L dextrose), Na 35, K 30, Mg 5, Ca 4.5, Acetate 70, Cl 39, Phos 15)   Intravenous Continuous         PRN Meds:  Current Facility-Administered Medications:     acetaminophen, 650 mg, Oral, Q8H PRN    acetaminophen, 650 mg, Oral, Q4H PRN    dextrose 10%, 12.5 g, Intravenous, PRN    dextrose 10%, 25 g, Intravenous, PRN    glucagon (human recombinant), 1 mg, Intramuscular, PRN    glucose, 16 g, Oral, PRN    glucose, 24 g, Oral, PRN    ondansetron, 4 mg, Intravenous, Q8H PRN    Flushing PICC/Midline Protocol, , , Until Discontinued **AND** sodium chloride 0.9%, 10 mL, Intravenous, Q6H **AND** sodium chloride 0.9%, 10 mL, Intravenous, PRN    vancomycin - pharmacy to dose, , Intravenous, pharmacy to manage frequency     Objective:     VITAL SIGNS: 24 HR MIN & MAX LAST   Temp  Min: 97.2 °F (36.2 °C)  Max: 98.4 °F (36.9 °C)  98.4 °F (36.9 °C)   BP  Min: 114/69  Max: 148/76  135/68    Pulse  Min: 60  Max: 85  72    Resp  Min: 14  Max: 14  14    SpO2  Min: 88 %  Max: 99 %  95 %      HT: 5' 4" (162.6 cm)  WT: 73 kg (160 lb 15 oz)  BMI: 27.6     Intake/output:  Intake/Output - Last 3 Shifts         04/23 0700 04/24 0659 04/24 0700 04/25 0659 04/25 0700 04/26 0659    P.O.       IV Piggyback  300     Total Intake(mL/kg)  300 (4.1)     Urine (mL/kg/hr) 1000 (0.6) 400 (0.2)     Stool  1     Total Output 1000 401     Net -1000 -101                    Intake/Output Summary (Last 24 hours) at 4/25/2024 0819  Last data filed at 4/25/2024 0337  Gross per 24 hour   Intake 300 ml   Output 401 ml   Net -101 ml           Lines/drains/airway:       PowerPort A Cath Single Lumen Subclavian Right (Active)   Number of days:             Midline Catheter - Single Lumen 04/19/24 2010 Left basilic vein (medial side of arm) other (see comments) (Active)   $ Midline Charges (Upon insertion) Bedside Insertion Performed Pt > 3 Years Old;Midline Catheter (Supply);Ultrasound Guide for Vascular Access " "04/19/24 2010   Site Assessment Bleeding 04/23/24 1800   IV Device Securement catheter securement device 04/23/24 2000   Line Status Flushed;Capped 04/23/24 2000   Extremity Circumference (cm) 0 cm 04/20/24 0400   Dressing Type CHG impregnated dressing/sponge 04/23/24 2000   Dressing Status Clean;Intact;Dry 04/23/24 2000   Dressing Intervention Integrity maintained 04/23/24 2000   Dressing Change Due 04/26/24 04/23/24 2000   Number of days: 4       Physical examination:  Gen: NAD, AAOx3, answering questions appropriately  HEENT:  CV: RR  Resp: NWOB  Abd: nondistended  Ext: moving all extremities spontaneously and purposefully  Neuro: CN II-XII grossly intact  Wounds: R mediport site with bandage in place, c/d/i    Labs:  Renal:  Recent Labs     04/23/24  0411 04/24/24  0510 04/25/24  0541   BUN 21.2* 18.0 16.3   CREATININE 0.54* 0.63 0.62     No results for input(s): "LACTIC" in the last 72 hours.  FENGI:  Recent Labs     04/23/24  0411 04/24/24  0510 04/25/24  0541    136 138   K 4.4 4.3 4.3   CO2 25 25 26   CALCIUM 8.8 8.6 8.2*   ALBUMIN 3.0* 3.0* 3.0*   BILITOT 0.5 0.4 0.6   AST 30 35* 36*   ALKPHOS 91 92 90   ALT 21 25 27     Heme:  Recent Labs     04/23/24  0411 04/24/24  0510 04/25/24  0541   HGB 10.9*  --   --    HCT 34.9*  --   --      --   --    INR 1.5* 1.2 1.1     ID:  Recent Labs     04/23/24 0411   WBC 5.31     CBG:  Recent Labs     04/23/24  0411 04/24/24  0510 04/25/24  0541   GLUCOSE 92 100 96      Cardiovascular:  No results for input(s): "TROPONINI", "CKTOTAL", "CKMB", "BNP" in the last 168 hours.  I have reviewed all pertinent lab results within the past 24 hours.    Imaging:  CTA Chest Non-Coronary (PE Studies)   Final Result      No pulmonary embolism identified.      New 11 mm solid nodule in the right lower lobe, likely infectious or inflammatory.  Three month follow-up chest CT recommended to ensure resolution.         Electronically signed by: Adria Dooley "   Date:    04/18/2024   Time:    13:52      X-Ray Chest AP Portable   Final Result      No active pulmonary disease         Electronically signed by: Domingo Hughes   Date:    04/18/2024   Time:    10:44         I have reviewed all pertinent imaging results/findings within the past 24 hours.    Micro/Path/Other:  Microbiology Results (last 7 days)       Procedure Component Value Units Date/Time    Blood Culture [[id number]]  (Normal) Collected: 04/19/24 1925    Order Status: Completed Specimen: Blood, Venous Updated: 04/24/24 2100     CULTURE, BLOOD ([facility]) No Growth at 5 days    Blood Culture [[id number]] Collected: 04/24/24 2016    Order Status: Resulted Specimen: Blood Updated: 04/24/24 2027    Blood Culture [[id number]]  (Normal) Collected: 04/19/24 2208    Order Status: Completed Specimen: Venous Blood Line Updated: 04/24/24 1000     CULTURE, BLOOD ([facility]) No Growth At 96 Hours    Blood culture x two cultures. Draw prior to antibiotics. [[id number]]  (Normal) Collected: 04/18/24 1106    Order Status: Completed Specimen: Blood Updated: 04/23/24 1300     CULTURE, BLOOD ([facility]) No Growth at 5 days    Stool Culture [[id number]]  (Normal) Collected: 04/19/24 1601    Order Status: Completed Specimen: Stool Updated: 04/22/24 0737     Stool Culture Negative for Salmonella, Shigella, Campylobacter, Vibrio, Aeromonas, Pleisiomonas,Yersinia, or Shiga Toxin 1 and 2.    Blood Culture [[id number]]  (Abnormal)  (Susceptibility) Collected: 04/18/24 1603    Order Status: Completed Specimen: Blood Updated: 04/22/24 0646     CULTURE, BLOOD ([facility]) Staphylococcus epidermidis      Staphylococcus hominis     GRAM STAIN Gram Positive Cocci, probable Staphylococcus      Seen in gram stain of broth only      2 of 2 bottles positive    Malaria Smear [[id number]] Collected: 04/18/24 1756    Order Status: Completed Specimen: Blood Updated: 04/21/24 1203     MALARIA SMEAR ([facility]) No Malarial or other blood borne parasites seen     Malaria Kit  Negative    Blood culture x two cultures. Draw prior to antibiotics. [[id number]]  (Abnormal)  (Susceptibility) Collected: 04/18/24 1106    Order Status: Completed Specimen: Blood Updated: 04/21/24 0634     CULTURE, BLOOD ([facility]) Staphylococcus hominis     GRAM STAIN Gram Positive Cocci, probable Staphylococcus      Seen in gram stain of broth only      1 of 2 Aerobic bottles positive    BCID2 Panel [[id number]]  (Abnormal) Collected: 04/18/24 1603    Order Status: Completed Specimen: Blood Updated: 04/19/24 0240     CTX-M (ESBL ) N/A     IMP (Cabapenemase ) N/A     KPC resistance gene (Carbapenemase ) N/A     mcr-1 N/A     mecA ID Detected     Comment: Note: Antimicrobial resistance can occur via multiple mechanisms. A Not Detected result for antimicrobial resistance gene(s) does not indicate antimicrobial susceptibility. Subculturing is required for species identification and susceptibility testing of   isolates.        mecA/C and MREJ (MRSA) gene N/A     NDM (Carbapenemase ) N/A     OXA-48-like (Carbapenemase ) N/A     Maria Luisa/B (VRE gene) N/A     VIM (Carbapenemase ) N/A     Enterococcus faecalis Not Detected     Enterococcus faecium Not Detected     Listeria monocytogenes Not Detected     Staphylococcus spp. Detected     Staphylococcus aureus Not Detected     Staphylococcus epidermidis Detected     Staphylococcus lugdunensis Not Detected     Streptococcus spp. Not Detected     Streptococcus agalactiae (Group B) Not Detected     Streptococcus pneumoniae Not Detected     Streptococcus pyogenes (Group A) Not Detected     Acinetobacter calcoaceticus/baumannii complex Not Detected     Bacteroides fragilis Not Detected     Enterobacterales Not Detected     Enterobacter cloacae complex Not Detected     Escherichia coli Not Detected     Klebsiella aerogenes Not Detected     Klebsiella oxytoca Not Detected     Klebsiella pneumoniae group Not Detected     Proteus spp. Not  Detected     Salmonella spp. Not Detected     Serratia marcescens Not Detected     Haemophilus influenzae Not Detected     Neisseria meningitidis Not Detected     Pseudomonas aeruginosa Not Detected     Stenotrophomonas maltophilia Not Detected     Candida albicans Not Detected     Candida auris Not Detected     Candida glabrata Not Detected     Candida krusei Not Detected     Candida parapsilosis Not Detected     Candida tropicalis Not Detected     Cryptococcus neoformans/gattii Not Detected    Narrative:      The [employer] BCID2 Panel is a multiplexed nucleic acid test intended for the use with [employer]® 2.0 or [employer]® [employer] Systems for the simultaneous qualitative detection and identification of multiple bacterial and yeast nucleic acids and select genetic determinants associated with antimicrobial resistance.  The [employer] BCID2 Panel test is performed directly on blood culture samples identified as positive by a continuous monitoring blood culture system.  Results are intended to be interpreted in conjunction with Gram stain results.    Clostridium Diff Toxin, A & B, EIA [[id number]]     Order Status: Canceled Specimen: Stool            Pathology Results  (Last 7 days)      None             Assessment & Plan:   Pt is a 79yo F with short gut syndrome and R sided mediport in place for home TPN, admitted with MRSA bacteremia.    - OK to remove bandage today or tomorrow  - OK to shower as normal starting tomorrow  - Return to clinic in 2 weeks for suture removal    Elizabeth Troncoso MD  [employer] General Surgery, PGY-4

## 2024-04-25 NOTE — PLAN OF CARE
Problem: Adult Inpatient Plan of Care  Goal: Plan of Care Review  Outcome: Progressing  Goal: Patient-Specific Goal (Individualized)  Outcome: Progressing  Goal: Absence of Hospital-Acquired Illness or Injury  Outcome: Progressing  Goal: Optimal Comfort and Wellbeing  Outcome: Progressing  Goal: Readiness for Transition of Care  Outcome: Progressing     Problem: Infection  Goal: Absence of Infection Signs and Symptoms  Outcome: Progressing     Problem: Skin Injury Risk Increased  Goal: Skin Health and Integrity  Outcome: Progressing     Problem: Wound  Goal: Optimal Coping  Outcome: Progressing  Goal: Optimal Functional Ability  Outcome: Progressing  Goal: Absence of Infection Signs and Symptoms  Outcome: Progressing  Goal: Improved Oral Intake  Outcome: Progressing  Goal: Optimal Pain Control and Function  Outcome: Progressing  Goal: Skin Health and Integrity  Outcome: Progressing  Goal: Optimal Wound Healing  Outcome: Progressing

## 2024-04-26 LAB
ALBUMIN SERPL-MCNC: 3.2 G/DL (ref 3.4–4.8)
ALBUMIN/GLOB SERPL: 0.8 RATIO (ref 1.1–2)
ALP SERPL-CCNC: 96 UNIT/L (ref 40–150)
ALT SERPL-CCNC: 28 UNIT/L (ref 0–55)
AST SERPL-CCNC: 38 UNIT/L (ref 5–34)
BILIRUB SERPL-MCNC: 0.4 MG/DL
BUN SERPL-MCNC: 19.3 MG/DL (ref 9.8–20.1)
CALCIUM SERPL-MCNC: 8.5 MG/DL (ref 8.4–10.2)
CHLORIDE SERPL-SCNC: 107 MMOL/L (ref 98–107)
CO2 SERPL-SCNC: 21 MMOL/L (ref 23–31)
CREAT SERPL-MCNC: 0.66 MG/DL (ref 0.55–1.02)
GFR SERPLBLD CREATININE-BSD FMLA CKD-EPI: >60 MLS/MIN/1.73/M2
GLOBULIN SER-MCNC: 4 GM/DL (ref 2.4–3.5)
GLUCOSE SERPL-MCNC: 103 MG/DL (ref 82–115)
INR PPP: 1.1
POTASSIUM SERPL-SCNC: 4.2 MMOL/L (ref 3.5–5.1)
PROT SERPL-MCNC: 7.2 GM/DL (ref 5.8–7.6)
PROTHROMBIN TIME: 13.9 SECONDS (ref 12.5–14.5)
PSYCHE PATHOLOGY RESULT: NORMAL
SODIUM SERPL-SCNC: 137 MMOL/L (ref 136–145)
VANCOMYCIN TROUGH SERPL-MCNC: 16.8 UG/ML (ref 15–20)

## 2024-04-26 PROCEDURE — A4216 STERILE WATER/SALINE, 10 ML: HCPCS | Performed by: INTERNAL MEDICINE

## 2024-04-26 PROCEDURE — 36415 COLL VENOUS BLD VENIPUNCTURE: CPT | Performed by: INTERNAL MEDICINE

## 2024-04-26 PROCEDURE — 25000003 PHARM REV CODE 250: Performed by: NURSE PRACTITIONER

## 2024-04-26 PROCEDURE — 63600175 PHARM REV CODE 636 W HCPCS: Performed by: NURSE PRACTITIONER

## 2024-04-26 PROCEDURE — 80053 COMPREHEN METABOLIC PANEL: CPT | Performed by: INTERNAL MEDICINE

## 2024-04-26 PROCEDURE — 63600175 PHARM REV CODE 636 W HCPCS: Performed by: INTERNAL MEDICINE

## 2024-04-26 PROCEDURE — 85610 PROTHROMBIN TIME: CPT | Performed by: INTERNAL MEDICINE

## 2024-04-26 PROCEDURE — 80202 ASSAY OF VANCOMYCIN: CPT | Performed by: NURSE PRACTITIONER

## 2024-04-26 PROCEDURE — 25000003 PHARM REV CODE 250: Performed by: STUDENT IN AN ORGANIZED HEALTH CARE EDUCATION/TRAINING PROGRAM

## 2024-04-26 PROCEDURE — 21400001 HC TELEMETRY ROOM

## 2024-04-26 PROCEDURE — 25000003 PHARM REV CODE 250: Performed by: INTERNAL MEDICINE

## 2024-04-26 PROCEDURE — 11000001 HC ACUTE MED/SURG PRIVATE ROOM

## 2024-04-26 RX ADMIN — SODIUM CHLORIDE, PRESERVATIVE FREE 10 ML: 5 INJECTION INTRAVENOUS at 11:04

## 2024-04-26 RX ADMIN — CITALOPRAM HYDROBROMIDE 20 MG: 20 TABLET ORAL at 10:04

## 2024-04-26 RX ADMIN — ASCORBIC ACID, VITAMIN A PALMITATE, CHOLECALCIFEROL, THIAMINE HYDROCHLORIDE, RIBOFLAVIN-5 PHOSPHATE SODIUM, PYRIDOXINE HYDROCHLORIDE, NIACINAMIDE, DEXPANTHENOL, ALPHA-TOCOPHEROL ACETATE, VITAMIN K1, FOLIC ACID, BIOTIN, CYANOCOBALAMIN: 200; 3300; 200; 6; 3.6; 6; 40; 15; 10; 150; 600; 60; 5 INJECTION, SOLUTION INTRAVENOUS at 09:04

## 2024-04-26 RX ADMIN — FERROUS SULFATE TAB 325 MG (65 MG ELEMENTAL FE) 1 EACH: 325 (65 FE) TAB at 10:04

## 2024-04-26 RX ADMIN — MIRTAZAPINE 15 MG: 15 TABLET, FILM COATED ORAL at 08:04

## 2024-04-26 RX ADMIN — SODIUM CHLORIDE, PRESERVATIVE FREE 10 ML: 5 INJECTION INTRAVENOUS at 12:04

## 2024-04-26 RX ADMIN — BUSPIRONE HYDROCHLORIDE 15 MG: 5 TABLET ORAL at 10:04

## 2024-04-26 RX ADMIN — FERROUS SULFATE TAB 325 MG (65 MG ELEMENTAL FE) 1 EACH: 325 (65 FE) TAB at 08:04

## 2024-04-26 RX ADMIN — DIPHENOXYLATE HYDROCHLORIDE AND ATROPINE SULFATE 2 TABLET: .025; 2.5 TABLET ORAL at 08:04

## 2024-04-26 RX ADMIN — DIPHENOXYLATE HYDROCHLORIDE AND ATROPINE SULFATE 2 TABLET: .025; 2.5 TABLET ORAL at 10:04

## 2024-04-26 RX ADMIN — ENOXAPARIN SODIUM 70 MG: 80 INJECTION SUBCUTANEOUS at 06:04

## 2024-04-26 RX ADMIN — SODIUM CHLORIDE, PRESERVATIVE FREE 10 ML: 5 INJECTION INTRAVENOUS at 06:04

## 2024-04-26 RX ADMIN — LEUCINE, PHENYLALANINE, LYSINE, METHIONINE, ISOLEUCINE, VALINE, HISTIDINE, THREONINE, TRYPTOPHAN, ALANINE, GLYCINE, ARGININE, PROLINE, SERINE, TYROSINE, SODIUM ACETATE, DIBASIC POTASSIUM PHOSPHATE, MAGNESIUM CHLORIDE, SODIUM CHLORIDE, CALCIUM CHLORIDE, DEXTROSE
311; 238; 247; 170; 255; 247; 204; 179; 77; 880; 438; 489; 289; 213; 17; 297; 261; 51; 77; 33; 5 INJECTION INTRAVENOUS at 02:04

## 2024-04-26 RX ADMIN — VANCOMYCIN HYDROCHLORIDE 1000 MG: 1 INJECTION, POWDER, LYOPHILIZED, FOR SOLUTION INTRAVENOUS at 04:04

## 2024-04-26 RX ADMIN — VANCOMYCIN HYDROCHLORIDE 1000 MG: 1 INJECTION, POWDER, LYOPHILIZED, FOR SOLUTION INTRAVENOUS at 03:04

## 2024-04-26 NOTE — PLAN OF CARE
After Visit Summary   2/12/2018    Jeremiah Ortiz    MRN: [id number]           Patient Information     Date Of Birth          1959        Visit Information        Provider Department      2/12/2018 1:10 PM Awa Farooq PA-C [facility]        Today's Diagnoses     Acute on chronic systolic congestive heart failure (H)        Acute on chronic systolic heart failure (H)        Dilated cardiomyopathy (H)          Care Instructions    Call [facility] nurse for any questions or concerns:  [phone]   *If you have concerns after hours, please call [phone], option 2 to speak with on call Cardiologist.    1. Medication changes from today: Increase cozaar to 100 mg daily. I sent in a prescription for 100 mg tablets to the pharmacy. You can take two 50 mg tablets you have at home to use those up.      2. Weigh yourself daily and write it down.     3. Call [facility] nurse if your weight is up more than 2 pounds in one day or 5 pounds in one week.     4. Call [facility] nurse if you feel more short of breath, have more abdominal bloating, or leg swelling.     5. Continue low sodium diet (less than 2000 mg daily). If you eat less salt, you will retain less fluid.     6. Alcohol can weaken your heart further. You should avoid alcohol or limit its use to special times, such as a holiday or birthday.      7. Do NOT take Aleve or ibuprofen without talking to your doctor first.      8. Lab Results: your labs today look great. Kidney function and electrolytes are normal.      Component      Latest Ref Rng & Units 12/18/2017 2/12/2018   Sodium      136 - 145 mmol/L 138 139   Potassium      3.5 - 5.1 mmol/L 4.1 4.1   Chloride      98 - 107 mmol/L 102 102   Carbon Dioxide      23 - 29 mmol/L 29 28   Anion Gap      6 - 17 mmol/L 11.1 13.1   Glucose      70 - 105 mg/dL 131 (H) 100   Urea Nitrogen      7 - 30 mg/dL 15 12   Creatinine      0.70 - 1.30 mg/dL 1.12 1.03   GFR    Problem: Adult Inpatient Plan of Care  Goal: Plan of Care Review  4/26/2024 0555 by Yajaira Deras LPN  Outcome: Progressing  4/25/2024 2356 by Yajaira Deras LPN  Outcome: Progressing  Goal: Patient-Specific Goal (Individualized)  4/26/2024 0555 by Yajaira Deras LPN  Outcome: Progressing  4/25/2024 2356 by Yajaira Deras LPN  Outcome: Progressing  Goal: Absence of Hospital-Acquired Illness or Injury  4/26/2024 0555 by Yajaira Deras LPN  Outcome: Progressing  4/25/2024 2356 by Yjaaira Deras LPN  Outcome: Progressing  Goal: Optimal Comfort and Wellbeing  4/26/2024 0555 by Yajaira Deras LPN  Outcome: Progressing  4/25/2024 2356 by Yajaira Deras LPN  Outcome: Progressing  Goal: Readiness for Transition of Care  4/26/2024 0555 by Yajaira Deras LPN  Outcome: Progressing  4/25/2024 2356 by Yajaira Deras LPN  Outcome: Progressing     Problem: Infection  Goal: Absence of Infection Signs and Symptoms  4/26/2024 0555 by Yajaira Deras LPN  Outcome: Progressing  4/25/2024 2356 by Yajaira Deras LPN  Outcome: Progressing     Problem: Skin Injury Risk Increased  Goal: Skin Health and Integrity  4/26/2024 0555 by Yajaira Deras LPN  Outcome: Progressing  4/25/2024 2356 by Yajaira Deras LPN  Outcome: Progressing     Problem: Wound  Goal: Optimal Coping  4/26/2024 0555 by Yajaira Deras LPN  Outcome: Progressing  4/25/2024 2356 by Yajaira Deras LPN  Outcome: Progressing  Goal: Optimal Functional Ability  4/26/2024 0555 by Yajaira Deras LPN  Outcome: Progressing  4/25/2024 2356 by Yajaira Deras LPN  Outcome: Progressing  Goal: Absence of Infection Signs and Symptoms  4/26/2024 0555 by Yajaira Deras LPN  Outcome: Progressing  4/25/2024 2356 by Yajaira Deras LPN  Outcome: Progressing  Goal: Improved Oral Intake  4/26/2024 0555 by Yajaira Deras LPN  Outcome: Progressing  4/25/2024 2356 by Yajaira Deras LPN  Outcome: Progressing  Goal: Optimal Pain Control and Function  4/26/2024 0555 by Yajaira Deras LPN  Outcome: Progressing  4/25/2024  2356 by Achan, Yajaira, LPN  Outcome: Progressing  Goal: Skin Health and Integrity  4/26/2024 0555 by Yajaira Deras LPN  Outcome: Progressing  4/25/2024 2356 by Yajaira Deras LPN  Outcome: Progressing  Goal: Optimal Wound Healing  4/26/2024 0555 by Yajaira Deras LPN  Outcome: Progressing  4/25/2024 2356 by Yajaira Deras LPN  Outcome: Progressing      Estimate      >60 mL/min/1.7m2 67 74   GFR Estimate If Black      >60 mL/min/1.7m2 81 90   Calcium      8.5 - 10.5 mg/dL 9.6 8.8       Schedule the lab work and echocardiogram sometime in the next month or so when it's convenient for you. See me back in about 3 months, sooner with any concerns.     [facility]: Cardiomyopathy, Optimization, Rehabilitation, Education  The [facility] is a heart failure specialty clinic within the [facility] where you will work with specialized nurse practitioners, physician assistants, doctors, and registered nurses. They are dedicated to helping patients with heart failure to carefully adjust medications, receive education, and learn who and when to call if symptoms develop. They specialize in helping you better understand your condition, slow the progression of your disease, improve the length and quality of your life, help you detect future heart problems before they become life threatening, and avoid hospitalizations.  \          Follow-ups after your visit        Additional Services     Follow-Up with [facility] - [place] visit                 Your next 10 appointments already scheduled     Apr 09, 2018  4:30 PM CDT   Remote ICD Check with WEINER DCR2   [facility])    [address] [place] [address] [id number] [phone]           This appointment is for a remote check of your debrillator.  This is not an appointment at the office.              Future tests that were ordered for you today     Open Future Orders        Priority Expected Expires Ordered    Follow-Up with [facility] - [facility] visit Routine 5/13/2018 2/12/2019 2/12/2018    Basic metabolic panel Routine 5/13/2018 2/12/2019 2/12/2018    Basic metabolic panel Routine 2/19/2018 2/12/2019 2/12/2018    Echocardiogram Routine 2/19/2018 2/12/2019 2/12/2018            Who to contact     If you have questions or need follow up information about today's  "clinic visit or your schedule please contact [facility] directly at [phone].  Normal or non-critical lab and imaging results will be communicated to you by MyChart, letter or phone within 4 business days after the clinic has received the results. If you do not hear from us within 7 days, please contact the clinic through MyChart or phone. If you have a critical or abnormal lab result, we will notify you by phone as soon as possible.  Submit refill requests through [facility] or call your pharmacy and they will forward the refill request to us. Please allow 3 business days for your refill to be completed.          Additional Information About Your Visit        Care EveryWhere ID     This is your Care EveryWhere ID. This could be used by other organizations to access your [facility] medical records  [id number]        Your Vitals Were     Height BMI (Body Mass Index)                1.803 m (5' 11\") 28.03 kg/m2           Blood Pressure from Last 3 Encounters:   02/12/18 142/76   02/12/18 136/89   01/15/18 156/84    Weight from Last 3 Encounters:   02/12/18 91.2 kg (201 lb)   02/12/18 91.2 kg (201 lb)   01/15/18 90.4 kg (199 lb 6.4 oz)              We Performed the Following     Follow-Up with [facility] - [facility] visit          Today's Medication Changes          These changes are accurate as of 2/12/18  1:38 PM.  If you have any questions, ask your nurse or doctor.               These medicines have changed or have updated prescriptions.        Dose/Directions    losartan 100 MG tablet   Commonly known as:  COZAAR   This may have changed:    - medication strength  - how much to take   Used for:  Acute on chronic systolic heart failure (H), Dilated cardiomyopathy (H)   Changed by:  Awa Farooq PA-C        Dose:  100 mg   Take 1 tablet (100 mg) by mouth daily   Quantity:  30 tablet   Refills:  11            Where to get your medicines      These medications were sent to " [id number] - [place], [place] - [address]  [address] [place] [address], [facility] [address]     Phone:  [phone]     losartan 100 MG tablet                Primary Care Provider Office Phone # Fax #    Margret Rodriguez [phone] [address] [facility]  [address]  [facility] [address]        Equal Access to Services     [facility] Hospital[facility]AYLEE : Hadii aad ku hadasho Soomaali, waaxda luqadaha, qaybta kaalmada adeegyada, waxay idiin hayaan adeeg kharash la'aan . So [facility] [phone].    ATENCIÓN: Si glennla español, tiene a clark disposición servicios gratuitos de asistencia lingüística. [facility] [phone].    We comply with applicable federal civil rights laws and [place] laws. We do not discriminate on the basis of race, color, national origin, age, disability, sex, sexual orientation, or gender identity.            Thank you!     Thank you for choosing [facility]  for your care. Our goal is always to provide you with excellent care. Hearing back from our patients is one way we can continue to improve our services. Please take a few minutes to complete the written survey that you may receive in the mail after your visit with us. Thank you!             Your Updated Medication List - Protect others around you: Learn how to safely use, store and throw away your medicines at www.disposemymeds.org.          This list is accurate as of 2/12/18  1:38 PM.  Always use your most recent med list.                   Brand Name Dispense Instructions for use Diagnosis    amiodarone 200 MG tablet    PACERONE/CODARONE    90 tablet    Take 400 mg (2 tablets) by mouth x 2 weeks, then decrease to 200 mg (one tablet) by mouth daily.    PVC's (premature ventricular contractions)       ASPIRIN NOT PRESCRIBED    INTENTIONAL     continuous prn for other Antiplatelet medication not prescribed intentionally due to Refusal by patient     Hyperlipidemia with target LDL less than 130       carvedilol 12.5 MG tablet    COREG    180 tablet    Take 1 tablet (12.5 mg) by mouth 2 times daily (with meals)    Acute on chronic systolic heart failure (H)       CLARITIN-D 12 HOUR 5-120 MG per 12 hr tablet   Generic drug:  loratadine-pseudoePHEDrine     30 tablet    TAKE 1 TABLET BY MOUTH EVERY DAY AS NEEDED FOR ALLERGIES.    Non-seasonal allergic rhinitis due to pollen       furosemide 20 MG tablet    LASIX    90 tablet    Take 1 tablet (20 mg) by mouth daily    Left heart failure (H), Localized edema       losartan 100 MG tablet    COZAAR    30 tablet    Take 1 tablet (100 mg) by mouth daily    Acute on chronic systolic heart failure (H), Dilated cardiomyopathy (H)       multivitamin, therapeutic with minerals Tabs tablet      Take 1 tablet by mouth daily        spironolactone 25 MG tablet    ALDACTONE    90 tablet    Take 1 tablet (25 mg) by mouth daily    Acute on chronic systolic heart failure (H)       tadalafil 20 MG tablet    CIALIS    6 tablet    Take 0.5-1 tablets (10-20 mg) by mouth daily as needed for erectile dysfunction Never use with nitroglycerin, terazosin or doxazosin.    Other male erectile dysfunction

## 2024-04-26 NOTE — PROGRESS NOTES
"Pharmacokinetic Assessment Follow Up: IV Vancomycin    Vancomycin serum concentration assessment(s):    The trough level was drawn correctly and can be used to guide therapy at this time. The measurement is within the desired definitive target range of 15 to 20 mcg/mL.    Vancomycin Regimen Plan:    Continue regimen to Vancomycin 1000 mg IV every 12 hours with next serum trough concentration measured at 1 hour prior to 1500 dose on 04/29    Drug levels (last 3 results):  Recent Labs   Lab Result Units 04/24/24  1046 04/26/24  0214   Vancomycin Trough ug/ml 23.1* 16.8       Pharmacy will continue to follow and monitor vancomycin.    Please contact pharmacy at extension [phone] for questions regarding this assessment.    Thank you for the consult,   Usama Lane       Patient brief summary:  Laura Acosta is a 78 y.o. female initiated on antimicrobial therapy with IV Vancomycin for treatment of bacteremia    The patient's current regimen is 1000mg q12h    Drug Allergies:   Review of patient's allergies indicates:   Allergen Reactions    Hydromorphone Itching     Other reaction(s): itching    Opium      Other reaction(s): itching  has itching with larger doses. Smaller doses do not cause a reaction.       Actual Body Weight:   73kg    Renal Function:   Estimated Creatinine Clearance: 68.8 mL/min (based on SCr of 0.66 mg/dL).,     Dialysis Method (if applicable):  N/A    CBC (last 72 hours):  No results for input(s): "WHITE BLOOD CELL COUNT", "HEMOGLOBIN", "HEMATOCRIT", "PLATELETS", "GRAN%", "LYMPH%", "MONO%", "EOSINOPHIL%", "BASOPHIL%", "DIFFERENTIAL METHOD" in the last 72 hours.    Metabolic Panel (last 72 hours):  Recent Labs   Lab Result Units 04/24/24  0510 04/25/24  0541 04/26/24  0214   Sodium Level mmol/L 136 138 137   Potassium Level mmol/L 4.3 4.3 4.2   Chloride mmol/L 106 106 107   Carbon Dioxide mmol/L 25 26 21*   Glucose Level mg/dL 100 96 103   Blood Urea Nitrogen mg/dL 18.0 16.3 19.3   Creatinine mg/dL 0.63 " 0.62 0.66   Albumin Level g/dL 3.0* 3.0* 3.2*   Bilirubin Total mg/dL 0.4 0.6 0.4   Alkaline Phosphatase unit/L 92 90 96   Aspartate Aminotransferase unit/L 35* 36* 38*   Alanine Aminotransferase unit/L 25 27 28       Vancomycin Administrations:  vancomycin given in the last 96 hours                     vancomycin in dextrose 5 % 1 gram/250 mL IVPB 1,000 mg (mg) 1,000 mg New Bag 04/26/24 0310     1,000 mg New Bag 04/25/24 1547     1,000 mg New Bag  0337     1,000 mg New Bag 04/24/24 1523    vancomycin 1.25 g in dextrose 5% 250 mL IVPB (ready to mix) (mg) 1,250 mg New Bag 04/23/24 2114     1,250 mg New Bag  1037    vancomycin 1.25 g in dextrose 5% 250 mL IVPB (ready to mix) (mg) 1,250 mg New Bag 04/22/24 2138    vancomycin (VANCOCIN) 1250 mg in 5 % dextrose 250 mL IVPB (mg) 1,250 mg New Bag 04/22/24 0842                    Microbiologic Results:  Microbiology Results (last 7 days)       Procedure Component Value Units Date/Time    Blood Culture [[id number]]  (Normal) Collected: 04/24/24 2016    Order Status: Completed Specimen: Blood Updated: 04/25/24 2200     CULTURE, BLOOD ([facility]) No Growth At 24 Hours    Blood Culture [[id number]]  (Normal) Collected: 04/19/24 2208    Order Status: Completed Specimen: Venous Blood Line Updated: 04/25/24 1000     CULTURE, BLOOD ([facility]) No Growth at 5 days    Blood Culture [[id number]]  (Normal) Collected: 04/19/24 1925    Order Status: Completed Specimen: Blood, Venous Updated: 04/24/24 2100     CULTURE, BLOOD ([facility]) No Growth at 5 days    Blood culture x two cultures. Draw prior to antibiotics. [[id number]]  (Normal) Collected: 04/18/24 1106    Order Status: Completed Specimen: Blood Updated: 04/23/24 1300     CULTURE, BLOOD ([facility]) No Growth at 5 days    Stool Culture [[id number]]  (Normal) Collected: 04/19/24 1601    Order Status: Completed Specimen: Stool Updated: 04/22/24 0737     Stool Culture Negative for Salmonella, Shigella, Campylobacter, Vibrio, Aeromonas,  Pleisiomonas,Yersinia, or Shiga Toxin 1 and 2.    Blood Culture [[id number]]  (Abnormal)  (Susceptibility) Collected: 04/18/24 1603    Order Status: Completed Specimen: Blood Updated: 04/22/24 0646     CULTURE, BLOOD ([facility]) Staphylococcus epidermidis      Staphylococcus hominis     GRAM STAIN Gram Positive Cocci, probable Staphylococcus      Seen in gram stain of broth only      2 of 2 bottles positive    Malaria Smear [[id number]] Collected: 04/18/24 1756    Order Status: Completed Specimen: Blood Updated: 04/21/24 1203     MALARIA SMEAR ([facility]) No Malarial or other blood borne parasites seen     Malaria Kit Negative    Blood culture x two cultures. Draw prior to antibiotics. [[id number]]  (Abnormal)  (Susceptibility) Collected: 04/18/24 1106    Order Status: Completed Specimen: Blood Updated: 04/21/24 0634     CULTURE, BLOOD ([facility]) Staphylococcus hominis     GRAM STAIN Gram Positive Cocci, probable Staphylococcus      Seen in gram stain of broth only      1 of 2 Aerobic bottles positive

## 2024-04-26 NOTE — PROGRESS NOTES
Infectious Diseases Progress Note  79 y/o female who is known to our service. 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 On admit she was febrile without leukocytosis, ESR 20 and CRP 49.9. Blood cultures revealing Staph Hominis 2/2 sets. Influenza A/B Ag (-), RSV PCR (-) and SARS-CoV-2 PCR (-). CXR unremarkable. CTA chest with contrast showed no PE, new 11 mm solid nodule RLL.  She is currently on Vancomycin    Subjective:  Lying in bed, resting. No acute distress. No new complaints voiced. Afebrile.     Review of Systems   Constitutional:  Positive for malaise/fatigue.   HENT: Negative.     Eyes: Negative.    Respiratory: Negative.     Cardiovascular: Negative.    Gastrointestinal: Negative.    Genitourinary: Negative.    Musculoskeletal: Negative.    Skin: Negative.    Neurological: Negative.    All other systems reviewed and are negative.      Review of patient's allergies indicates:   Allergen Reactions    Hydromorphone Itching     Other reaction(s): itching    Opium      Other reaction(s): itching  has itching with larger doses. Smaller doses do not cause a reaction.       Past Medical History:   Diagnosis Date    Acute URI     Anxiety and depression     Back pain     Breast cancer     Cholelithiasis     Contaminated small bowel syndrome     DVT (deep venous thrombosis)     Edema, lower extremity     Gallstone pancreatitis     HTN (hypertension)     Hypercholesterolemia     Insomnia     Irregular heart beat     Ischemic disease of gut     Laryngitis     Malabsorption     Meningitis, unspecified     OA (osteoarthritis)     PVD (peripheral vascular disease)     Rheumatoid arthritis, unspecified     Staph infection     infected mediport -- on doxycycline daily for prevention    Tremor      Unspecified cataract     Unspecified cirrhosis of liver 06/20/2023    Dr Carroll    Venous insufficiency        Past Surgical History:   Procedure Laterality Date    APPENDECTOMY      BOWEL RESECTION      BREAST LUMPECTOMY Right     CHOLECYSTECTOMY  05/2015    COLONOSCOPY  02/2022    repeat 3 years    CYST REMOVAL Right     breast    CYSTOSCOPY W/ STONE MANIPULATION      CYSTOSCOPY W/ URETERAL STENT PLACEMENT  12/2020    CYSTOSCOPY W/ URETERAL STENT REMOVAL  04/2021    CYSTOURETEROSCOPY, WITH HOLMIUM LASER LITHOTRIPSY OF URETERAL CALCULUS AND STENT INSERTION Left 05/02/2023    Procedure: CYSTOSCOPY, WITH URETERAL STENT INSERTION Left;  Surgeon: Jared Felix MD;  Location: [facility] OR;  Service: Urology;  Laterality: Left;    EGD, WITH CLOSED BIOPSY N/A 6/20/2023    Procedure: EGD;  Surgeon: Aashish Carroll MD;  Location: [facility] ENDOSCOPY;  Service: Gastroenterology;  Laterality: N/A;    ENDOSCOPIC RETROGRADE CHOLANGIOPANCREATOGRAPHY W/ SPHINCTEROTOMY AND STONE REMOVAL  04/2015    ESOPHAGOGASTRODUODENOSCOPY  06/20/2023    Dr Carroll - no signs esophageal varicies --repeat 3 yrs    GI Biopsy  02/15/2022    GI Biopsy  12/2018    HYSTERECTOMY      LAPAROTOMY, EXPLORATORY  06/2015    LITHOTRIPSY Left 04/2021    LITHOTRIPSY Left 03/2021    MEDIPORT INSERTION, SINGLE  06/2021    MEDIPORT INSERTION, SINGLE  07/2020    MEDIPORT INSERTION, SINGLE  12/2018    MEDIPORT REMOVAL  07/2020    PHACOEMULSIFICATION OF CATARACT Left 12/2016    PHACOEMULSIFICATION OF CATARACT Right 11/2016    PICC line Removal Right 06/2021    POLYPECTOMY  02/2022    PVD surgery      REMOVAL OF CATHETER  12/2020    REMOVAL OF VASCULAR ACCESS CATHETER Right 4/24/2024    Procedure: Removal, Vascular Access Catheter;  Surgeon: Reed Ghosh MD;  Location: [facility];  Service: General;  Laterality: Right;    SHOULDER SURGERY Right     shoulder replacement    SPHINCTEROTOMY OF URETHRA      VENTRAL HERNIA REPAIR  04/2016       Social History      Socioeconomic History    Marital status:     Number of children: 2   Occupational History    Occupation: Retired   Tobacco Use    Smoking status: Never    Smokeless tobacco: Never   Substance and Sexual Activity    Alcohol use: Not Currently    Drug use: Never    Sexual activity: Not Currently     Social Determinants of Health     Financial Resource Strain: Low Risk  (4/20/2024)    Overall Financial Resource Strain (CARDIA)     Difficulty of Paying Living Expenses: Not hard at all   Food Insecurity: No Food Insecurity (4/20/2024)    Hunger Vital Sign     Worried About Running Out of Food in the Last Year: Never true     Ran Out of Food in the Last Year: Never true   Transportation Needs: No Transportation Needs (4/20/2024)    PRAPARE - Transportation     Lack of Transportation (Medical): No     Lack of Transportation (Non-Medical): No   Stress: No Stress Concern Present (4/20/2024)    [employer] - Occupational Stress Questionnaire     Feeling of Stress : Not at all   Social Connections: Unknown (4/19/2024)    Social Connection and Isolation Panel [NHANES]     Marital Status: Living with partner   Housing Stability: Low Risk  (4/20/2024)    Housing Stability Vital Sign     Unable to Pay for Housing in the Last Year: No     Number of Times Moved in the Last Year: 0     Homeless in the Last Year: No         Scheduled Meds:  Current Facility-Administered Medications   Medication Dose Route Frequency    busPIRone  15 mg Oral Daily    citalopram  20 mg Oral Daily    diphenoxylate-atropine 2.5-0.025 mg  2 tablet Oral BID    enoxparin  1 mg/kg Subcutaneous Q12H (treatment, non-standard time)    ergocalciferol  50,000 Units Oral Q7 Days    fat emulsion 20%  250 mL Intravenous Twice Weekly    ferrous sulfate  1 tablet Oral BID    mirtazapine  15 mg Oral QHS    sodium chloride 0.9%  10 mL Intravenous Q6H    vancomycin (VANCOCIN) IV (PEDS and ADULTS)  1,000 mg Intravenous Q12H  "    Continuous Infusions:  Current Facility-Administered Medications   Medication Dose Route Frequency Last Rate Last Admin    Amino acid 4.25% - dextrose 5% (CLINIMIX-E) solution (1L provides 42.5 gm AA, 50 gm CHO (170 kcal/L dextrose), Na 35, K 30, Mg 5, Ca 4.5, Acetate 70, Cl 39, Phos 15)   Intravenous Continuous 75 mL/hr at 04/25/24 0953 New Bag at 04/25/24 0953     PRN Meds:  Current Facility-Administered Medications:     acetaminophen, 650 mg, Oral, Q8H PRN    acetaminophen, 650 mg, Oral, Q4H PRN    dextrose 10%, 12.5 g, Intravenous, PRN    dextrose 10%, 25 g, Intravenous, PRN    glucagon (human recombinant), 1 mg, Intramuscular, PRN    glucose, 16 g, Oral, PRN    glucose, 24 g, Oral, PRN    ondansetron, 4 mg, Intravenous, Q8H PRN    Flushing PICC/Midline Protocol, , , Until Discontinued **AND** sodium chloride 0.9%, 10 mL, Intravenous, Q6H **AND** sodium chloride 0.9%, 10 mL, Intravenous, PRN    vancomycin - pharmacy to dose, , Intravenous, pharmacy to manage frequency    Objective:  BP 95/67   Pulse 73   Temp 98.8 °F (37.1 °C)   Resp 18   Ht 5' 4" (1.626 m)   Wt 73 kg (160 lb 15 oz)   SpO2 (!) 94%   BMI 27.62 kg/m²     Physical Exam:   Physical Exam  Vitals reviewed.   Constitutional:       Appearance: She is obese.   HENT:      Head: Normocephalic.   Cardiovascular:      Rate and Rhythm: Normal rate and regular rhythm.   Pulmonary:      Effort: Pulmonary effort is normal. No respiratory distress.      Breath sounds: Normal breath sounds. No wheezing.   Abdominal:      General: Bowel sounds are normal. There is no distension.      Palpations: Abdomen is soft.      Tenderness: There is no abdominal tenderness.   Musculoskeletal:         General: Normal range of motion.      Cervical back: Normal range of motion.   Skin:     Findings: No rash.      Comments: R CW mediport, site benign. R breast mass   Neurological:      Mental Status: She is alert and oriented to person, place, and time.   Psychiatric:  "        Mood and Affect: Mood normal.         Behavior: Behavior normal.       Imaging      Lab Review   Recent Results (from the past 24 hour(s))   Protime-INR    Collection Time: 04/25/24  5:41 AM   Result Value Ref Range    PT 14.1 12.5 - 14.5 seconds    INR 1.1 <=1.3   Comprehensive Metabolic Panel    Collection Time: 04/25/24  5:41 AM   Result Value Ref Range    Sodium Level 138 136 - 145 mmol/L    Potassium Level 4.3 3.5 - 5.1 mmol/L    Chloride 106 98 - 107 mmol/L    Carbon Dioxide 26 23 - 31 mmol/L    Glucose Level 96 82 - 115 mg/dL    Blood Urea Nitrogen 16.3 9.8 - 20.1 mg/dL    Creatinine 0.62 0.55 - 1.02 mg/dL    Calcium Level Total 8.2 (L) 8.4 - 10.2 mg/dL    Protein Total 6.6 5.8 - 7.6 gm/dL    Albumin Level 3.0 (L) 3.4 - 4.8 g/dL    Globulin 3.6 (H) 2.4 - 3.5 gm/dL    Albumin/Globulin Ratio 0.8 (L) 1.1 - 2.0 ratio    Bilirubin Total 0.6 <=1.5 mg/dL    Alkaline Phosphatase 90 40 - 150 unit/L    Alanine Aminotransferase 27 0 - 55 unit/L    Aspartate Aminotransferase 36 (H) 5 - 34 unit/L    eGFR >60 mls/min/1.73/m2   Triglycerides    Collection Time: 04/25/24  5:41 AM   Result Value Ref Range    Triglyceride 69 37 - 140 mg/dL     Assessment/Plan:  Recurrent Staphylococcus bacteremia  Infected mediport  RLL pulmonary nodule  DM Type II  RA  Hx of short gut syndrome  Hepatic cirrhosis  Anemia  Thrombocytopenia  Elevated CRP  Hx of R breast CA     -Continue Vancomycin #7  -Afebrile without leukocytosis  -4/18 Blood cultures revealed Staph Hominis 2/2 sets  -4/19 repeat blood cultures remain negative  -Seen by Surgery, inputs noted including recommended referral to breast surgeon for R breast mass  -S/p MediPort removal today 4/24 and can plan placement of a new MediPort after about 72 hours if no findings to justify otherwise  -We will obtain repeat blood cultures from today per patient and daughter's preference  -Discussed with patient  and nursing

## 2024-04-26 NOTE — CARE UPDATE
[id number] Pt will need a new mediport. ID reports the mediport can be placed 72 hours after the first mediport was removed which was on 4/24. Pt continues on vancomycin.

## 2024-04-26 NOTE — PROGRESS NOTES
[facility]  Hospital Medicine Progress Note      Chief Complaint: fever     HPI: (personally reviewed by me and is documented from initial H&P)   78 y.o. female with a past medical history of hypertension, hyperlipidemia, PVD, DVT, short gut syndrome on TPN, chronic granulomatous infection, MRSA bacteremia, rheumatoid arthritis, cirrhosis, gallstone pancreatitis, breast cancer, anxiety, and depression.    Presented to [facility] on 4/18/2024 with c/o fever.  Patient reported fever and body aches began this morning. Patient is followed by Dr. Hoover and has had recurrent fevers for the past 2 months.  Patient is on suppressive Doxycycline secondary to previous MRSA bacteremia.  Patient receives infusions 4 times weekly via MediPort.  Patient reported her last TPN infusion was last night.  Patient reported her MediPort has been in place since 2019.  Patient had NM inflammatory whole-body scan on 04/16/2024 which revealed no scintigraphic evidence of localized infectious process.  On exam patient endorsed chronic diarrhea.  Patient was denied nausea, vomiting, dysuria, hematuria, cough, congestion, chest pain, and shortness of breath.     Initial vital signs in ED were /76, pulse 70, respirations 30, temperature 39.1° C, and SpO2 96% on 3 L nasal cannula.  Labs revealed WBC 8.70, sodium 134, CO2 21, and lactic acid 0.7.  Flu/RSV/COVID testing were negative.  Blood cultures were obtained.  Chest x-ray revealed no active pulmonary disease.  CTA chest revealed no pulmonary embolism identified with new 11 mm solid nodule in the right lower lobe likely infectious versus inflammatory.  Patient was placed on 3 L supplemental oxygen in ED and given Tylenol 1000 mg and IV Zosyn 4.5 g. Patient was admitted to hospital medicine service for further medical management. 2/2 blood cultures growing staph epidermidis patient currently has a MediPort - infected.  Id recommends to remove MediPort,  continue on IV vancomycin.     __________________________________________________________________________________________________________________________________    Objective/physical exam:  Vital signs have been personally reviewed by me   General: Appears comfortable, no acute distress.  Neuro:  Awake alert oriented.     Integumentary: Warm, dry, intact.  Musculoskeletal: Purposeful movement noted.     Respiratory: No accessory muscle use. Breath sounds are equal.  Cardiovascular: Regular rate.       VITAL SIGNS: 24 HRS MIN & MAX LAST   Temp  Min: 97.9 °F (36.6 °C)  Max: 98.8 °F (37.1 °C) 98.5 °F (36.9 °C)   BP  Min: 95/67  Max: 130/74 129/68   Pulse  Min: 70  Max: 73  73   Resp  Min: 18  Max: 18 18   SpO2  Min: 92 %  Max: 95 % 95 %     CTA Chest Non-Coronary (PE Studies)  Narrative: EXAMINATION:  CTA CHEST NON CORONARY (PE STUDIES)    CLINICAL HISTORY:  Pulmonary embolism (PE) suspected, unknown D-dimer;    TECHNIQUE:  CTA imaging of the chest after IV contrast. Axial, coronal and sagittal reconstructions, including MIP images, are reviewed. Dose length product 426 mGycm. Automatic exposure control, adjustment of mA/kV or iterative reconstruction technique used to limit radiation dose.    COMPARISON:  CT 03/01/2024    FINDINGS:  Diagnostic quality: Adequate    Pulmonary embolism: None identified.    Lung parenchyma: New 11 mm mean diameter solid nodule within the right lower lobe (series 7, image 66).  8 mm solid nodule medially in the right lower lobe unchanged from prior exam.  Stable site of pleuroparenchymal scarring in the superior right lung.    Pleural effusion: None.    Mediastinum/ellen: Right-sided MediPort catheter tip in the SVC.  Mild coronary artery calcification.  No pathologically enlarged lymph node.    Chest wall/axilla: Stable appearance.    Upper abdomen: No significant findings.    Bones: No acute osseous process.  Right shoulder arthroplasty.  Impression: No pulmonary embolism  identified.    New 11 mm solid nodule in the right lower lobe, likely infectious or inflammatory.  Three month follow-up chest CT recommended to ensure resolution.    Electronically signed by: Adria Dooley  Date:    04/18/2024  Time:    13:52  X-Ray Chest AP Portable  Narrative: EXAMINATION:  XR CHEST AP PORTABLE    CLINICAL HISTORY:  Sepsis;    TECHNIQUE:  Single frontal view of the chest was performed.    COMPARISON:  February 2, 2024    FINDINGS:  Examination reveals mediastinal cardiac silhouette to be within normal limits lung fields are clear and free of gross infiltrates atelectasis or effusions a peripherally calcified lesion is identified projecting at the right base similar to previous exams and likely related to breast  Impression: No active pulmonary disease    Electronically signed by: Domingo Hughes  Date:    04/18/2024  Time:    10:44    Recent Labs   Lab 04/22/24  0513 04/23/24  0411   WBC 5.32 5.31   RBC 3.78* 4.01*   HGB 10.4* 10.9*   HCT 33.8* 34.9*   MCV 89.4 87.0   MCH 27.5 27.2   MCHC 30.8* 31.2*   RDW 15.4 15.4    193   MPV 9.3 9.1       Recent Labs   Lab 04/24/24  0510 04/25/24  0541 04/26/24  0214    138 137   K 4.3 4.3 4.2   CO2 25 26 21*   BUN 18.0 16.3 19.3   CREATININE 0.63 0.62 0.66   CALCIUM 8.6 8.2* 8.5   ALBUMIN 3.0* 3.0* 3.2*   ALKPHOS 92 90 96   ALT 25 27 28   AST 35* 36* 38*   BILITOT 0.4 0.6 0.4     Microbiology Results (last 7 days)       Procedure Component Value Units Date/Time    Blood Culture [[id number]]  (Normal) Collected: 04/24/24 2016    Order Status: Completed Specimen: Blood Updated: 04/25/24 2200     CULTURE, BLOOD ([facility]) No Growth At 24 Hours    Blood Culture [[id number]]  (Normal) Collected: 04/19/24 2208    Order Status: Completed Specimen: Venous Blood Line Updated: 04/25/24 1000     CULTURE, BLOOD ([facility]) No Growth at 5 days    Blood Culture [[id number]]  (Normal) Collected: 04/19/24 1925    Order Status: Completed Specimen: Blood, Venous Updated:  04/24/24 2100     CULTURE, BLOOD ([facility]) No Growth at 5 days    Blood culture x two cultures. Draw prior to antibiotics. [[id number]]  (Normal) Collected: 04/18/24 1106    Order Status: Completed Specimen: Blood Updated: 04/23/24 1300     CULTURE, BLOOD ([facility]) No Growth at 5 days    Stool Culture [[id number]]  (Normal) Collected: 04/19/24 1601    Order Status: Completed Specimen: Stool Updated: 04/22/24 0737     Stool Culture Negative for Salmonella, Shigella, Campylobacter, Vibrio, Aeromonas, Pleisiomonas,Yersinia, or Shiga Toxin 1 and 2.    Blood Culture [[id number]]  (Abnormal)  (Susceptibility) Collected: 04/18/24 1603    Order Status: Completed Specimen: Blood Updated: 04/22/24 0646     CULTURE, BLOOD ([facility]) Staphylococcus epidermidis      Staphylococcus hominis     GRAM STAIN Gram Positive Cocci, probable Staphylococcus      Seen in gram stain of broth only      2 of 2 bottles positive    Malaria Smear [[id number]] Collected: 04/18/24 1756    Order Status: Completed Specimen: Blood Updated: 04/21/24 1203     MALARIA SMEAR ([facility]) No Malarial or other blood borne parasites seen     Malaria Kit Negative    Blood culture x two cultures. Draw prior to antibiotics. [[id number]]  (Abnormal)  (Susceptibility) Collected: 04/18/24 1106    Order Status: Completed Specimen: Blood Updated: 04/21/24 0634     CULTURE, BLOOD ([facility]) Staphylococcus hominis     GRAM STAIN Gram Positive Cocci, probable Staphylococcus      Seen in gram stain of broth only      1 of 2 Aerobic bottles positive             Scheduled Med:  Current Facility-Administered Medications   Medication Dose Route Frequency    busPIRone  15 mg Oral Daily    citalopram  20 mg Oral Daily    diphenoxylate-atropine 2.5-0.025 mg  2 tablet Oral BID    enoxparin  1 mg/kg Subcutaneous Q12H (treatment, non-standard time)    ergocalciferol  50,000 Units Oral Q7 Days    ferrous sulfate  1 tablet Oral BID    mirtazapine  15 mg Oral QHS    sodium chloride 0.9%  10 mL Intravenous  Q6H    vancomycin (VANCOCIN) IV (PEDS and ADULTS)  1,000 mg Intravenous Q12H      Continuous Infusions:  Current Facility-Administered Medications   Medication Dose Route Frequency Last Rate Last Admin    Amino acid 4.25% - dextrose 5% (CLINIMIX-E) solution (1L provides 42.5 gm AA, 50 gm CHO (170 kcal/L dextrose), Na 35, K 30, Mg 5, Ca 4.5, Acetate 70, Cl 39, Phos 15)   Intravenous Continuous 75 mL/hr at 04/26/24 0215 New Bag at 04/26/24 0215        PRN Meds:    Current Facility-Administered Medications:     acetaminophen, 650 mg, Oral, Q8H PRN    acetaminophen, 650 mg, Oral, Q4H PRN    dextrose 10%, 12.5 g, Intravenous, PRN    dextrose 10%, 25 g, Intravenous, PRN    glucagon (human recombinant), 1 mg, Intramuscular, PRN    glucose, 16 g, Oral, PRN    glucose, 24 g, Oral, PRN    ondansetron, 4 mg, Intravenous, Q8H PRN    Flushing PICC/Midline Protocol, , , Until Discontinued **AND** sodium chloride 0.9%, 10 mL, Intravenous, Q6H **AND** sodium chloride 0.9%, 10 mL, Intravenous, PRN    vancomycin - pharmacy to dose, , Intravenous, pharmacy to manage frequency   __________________________________________________________________________________________________________________________________    Assessment/Plan:  Staph epidermis   MediPort infected   Chronic diarrhea   Right breast cancer  Hypertension  Hyperlipidemia  Peripheral vascular disease   History of DVT   Short gut syndrome on TPN   Chronic granulomatous infection   MRSA bacteremia history of   Rheumatoid arthritis  Cirrhosis   Gallstone pancreatitis   Anxiety/depression  11 mm solid nodule in the right lower lobe  Above present on admission     Anticipated discharge and Disposition when medically stable:  outpatient right breast mass follow-up with Dr. Grisby   11 mm solid nodule in the right lower lobe--followup needed     _______________________________________________________________________________________________________________________________  Fever  secondary to bacteremia. Mediport removed.  Staph epidermis bacteremia, status post MediPort removal.  Blood cultures negative thus far.  Follow up with ID physician's recommendations.  No plans for PICC line at this time.  Current blood cultures no growth at 24hrs     Continue to hold Coumadin, continue Lovenox  Continue supportive care  Continue checking vital signs q4hrs.    Patient remains on Clinimix for short gut syndrome, continue for now  Encourage ambulation; up to chair x3     Nurse notified to page me if any changes occur     DVT prophylaxis initiated   Nutrition Status: regular as tolerated.     Consults:  Infectious disease physician  I have personally reviewed the specialist documentation and/or have spoken to the specialist with regard to the care of this patient; recommendations are noted above.     _______________________________________________________________________________________________________________________________    I have spent >30 minutes on the day of the visit; time spent includes face to face time and non-face to face time preparing to see the patient (eg, review of tests), independently reviewing and interpreting medical records, both past and current; documenting clinical information in the electronic or other health record, and communicating results to the patient/family/caregiver and care coordinator and nursing team.      All diagnosis and differential diagnosis have been reviewed,  interpreted and communicated appropriately to care team. assessment and plan has been documented; I have personally reviewed the labs and test results that are presently available and pertinent to this hospital course; I have reviewed medical records based upon their availability.    All of the patient's questions have been  addressed and answered. Patient's is agreeable to the above stated plan.   I will continue to monitor closely and make adjustments to medical management as needed.    Gabrielle TORRES  DO Luke   04/26/2024     This note was created with the assistance of Dragon voice recognition software. There may be transcription errors as a result of using this technology however minimal. Effort has been made to assure accuracy of transcription but any obvious errors or omissions should be clarified with the author of the document.

## 2024-04-26 NOTE — PROGRESS NOTES
Infectious Diseases Progress Note  79 y/o female who is known to our service. 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 On admit she was febrile without leukocytosis, ESR 20 and CRP 49.9. Blood cultures revealing Staph Hominis 2/2 sets. Influenza A/B Ag (-), RSV PCR (-) and SARS-CoV-2 PCR (-). CXR unremarkable. CTA chest with contrast showed no PE, new 11 mm solid nodule RLL.  She is currently on Vancomycin    Subjective:  Lying in bed, resting. No acute distress. No new complaints voiced. Afebrile.     Review of Systems   Constitutional:  Positive for malaise/fatigue.   HENT: Negative.     Eyes: Negative.    Respiratory: Negative.     Cardiovascular: Negative.    Gastrointestinal: Negative.    Genitourinary: Negative.    Musculoskeletal: Negative.    Skin: Negative.    Neurological: Negative.    All other systems reviewed and are negative.      Review of patient's allergies indicates:   Allergen Reactions    Hydromorphone Itching     Other reaction(s): itching    Opium      Other reaction(s): itching  has itching with larger doses. Smaller doses do not cause a reaction.       Past Medical History:   Diagnosis Date    Acute URI     Anxiety and depression     Back pain     Breast cancer     Cholelithiasis     Contaminated small bowel syndrome     DVT (deep venous thrombosis)     Edema, lower extremity     Gallstone pancreatitis     HTN (hypertension)     Hypercholesterolemia     Insomnia     Irregular heart beat     Ischemic disease of gut     Laryngitis     Malabsorption     Meningitis, unspecified     OA (osteoarthritis)     PVD (peripheral vascular disease)     Rheumatoid arthritis, unspecified     Staph infection     infected mediport -- on doxycycline daily for prevention    Tremor      Unspecified cataract     Unspecified cirrhosis of liver 06/20/2023    Dr Carroll    Venous insufficiency        Past Surgical History:   Procedure Laterality Date    APPENDECTOMY      BOWEL RESECTION      BREAST LUMPECTOMY Right     CHOLECYSTECTOMY  05/2015    COLONOSCOPY  02/2022    repeat 3 years    CYST REMOVAL Right     breast    CYSTOSCOPY W/ STONE MANIPULATION      CYSTOSCOPY W/ URETERAL STENT PLACEMENT  12/2020    CYSTOSCOPY W/ URETERAL STENT REMOVAL  04/2021    CYSTOURETEROSCOPY, WITH HOLMIUM LASER LITHOTRIPSY OF URETERAL CALCULUS AND STENT INSERTION Left 05/02/2023    Procedure: CYSTOSCOPY, WITH URETERAL STENT INSERTION Left;  Surgeon: Jared Felix MD;  Location: [facility] OR;  Service: Urology;  Laterality: Left;    EGD, WITH CLOSED BIOPSY N/A 6/20/2023    Procedure: EGD;  Surgeon: Aashish Carroll MD;  Location: [facility];  Service: Gastroenterology;  Laterality: N/A;    ENDOSCOPIC RETROGRADE CHOLANGIOPANCREATOGRAPHY W/ SPHINCTEROTOMY AND STONE REMOVAL  04/2015    ESOPHAGOGASTRODUODENOSCOPY  06/20/2023    Dr Carroll - no signs esophageal varicies --repeat 3 yrs    GI Biopsy  02/15/2022    GI Biopsy  12/2018    HYSTERECTOMY      LAPAROTOMY, EXPLORATORY  06/2015    LITHOTRIPSY Left 04/2021    LITHOTRIPSY Left 03/2021    MEDIPORT INSERTION, SINGLE  06/2021    MEDIPORT INSERTION, SINGLE  07/2020    MEDIPORT INSERTION, SINGLE  12/2018    MEDIPORT REMOVAL  07/2020    PHACOEMULSIFICATION OF CATARACT Left 12/2016    PHACOEMULSIFICATION OF CATARACT Right 11/2016    PICC line Removal Right 06/2021    POLYPECTOMY  02/2022    PVD surgery      REMOVAL OF CATHETER  12/2020    REMOVAL OF VASCULAR ACCESS CATHETER Right 4/24/2024    Procedure: Removal, Vascular Access Catheter;  Surgeon: Reed Ghosh MD;  Location: [facility];  Service: General;  Laterality: Right;    SHOULDER SURGERY Right     shoulder replacement    SPHINCTEROTOMY OF URETHRA      VENTRAL HERNIA REPAIR  04/2016       Social History      Socioeconomic History    Marital status:     Number of children: 2   Occupational History    Occupation: Retired   Tobacco Use    Smoking status: Never    Smokeless tobacco: Never   Substance and Sexual Activity    Alcohol use: Not Currently    Drug use: Never    Sexual activity: Not Currently     Social Determinants of Health     Financial Resource Strain: Low Risk  (4/20/2024)    Overall Financial Resource Strain (CARDIA)     Difficulty of Paying Living Expenses: Not hard at all   Food Insecurity: No Food Insecurity (4/20/2024)    Hunger Vital Sign     Worried About Running Out of Food in the Last Year: Never true     Ran Out of Food in the Last Year: Never true   Transportation Needs: No Transportation Needs (4/20/2024)    PRAPARE - Transportation     Lack of Transportation (Medical): No     Lack of Transportation (Non-Medical): No   Stress: No Stress Concern Present (4/20/2024)    [employer] [place] [employer] - Occupational Stress Questionnaire     Feeling of Stress : Not at all   Social Connections: Unknown (4/19/2024)    Social Connection and Isolation Panel [NHANES]     Marital Status: Living with partner   Housing Stability: Low Risk  (4/20/2024)    Housing Stability Vital Sign     Unable to Pay for Housing in the Last Year: No     Number of Times Moved in the Last Year: 0     Homeless in the Last Year: No         Scheduled Meds:  Current Facility-Administered Medications   Medication Dose Route Frequency    busPIRone  15 mg Oral Daily    citalopram  20 mg Oral Daily    diphenoxylate-atropine 2.5-0.025 mg  2 tablet Oral BID    enoxparin  1 mg/kg Subcutaneous Q12H (treatment, non-standard time)    ergocalciferol  50,000 Units Oral Q7 Days    ferrous sulfate  1 tablet Oral BID    mirtazapine  15 mg Oral QHS    sodium chloride 0.9%  10 mL Intravenous Q6H    vancomycin (VANCOCIN) IV (PEDS and ADULTS)  1,000 mg Intravenous Q12H     Continuous Infusions:  Current Facility-Administered  "Medications   Medication Dose Route Frequency Last Rate Last Admin     PRN Meds:  Current Facility-Administered Medications:     acetaminophen, 650 mg, Oral, Q8H PRN    acetaminophen, 650 mg, Oral, Q4H PRN    dextrose 10%, 12.5 g, Intravenous, PRN    dextrose 10%, 25 g, Intravenous, PRN    glucagon (human recombinant), 1 mg, Intramuscular, PRN    glucose, 16 g, Oral, PRN    glucose, 24 g, Oral, PRN    ondansetron, 4 mg, Intravenous, Q8H PRN    Flushing PICC/Midline Protocol, , , Until Discontinued **AND** sodium chloride 0.9%, 10 mL, Intravenous, Q6H **AND** sodium chloride 0.9%, 10 mL, Intravenous, PRN    vancomycin - pharmacy to dose, , Intravenous, pharmacy to manage frequency    Objective:  /66   Pulse 67   Temp 98.4 °F (36.9 °C) (Oral)   Resp 18   Ht 5' 4" (1.626 m)   Wt 73 kg (160 lb 15 oz)   SpO2 95%   BMI 27.62 kg/m²     Physical Exam:   Physical Exam  Vitals reviewed.   Constitutional:       Appearance: She is obese.   HENT:      Head: Normocephalic.   Cardiovascular:      Rate and Rhythm: Normal rate and regular rhythm.   Pulmonary:      Effort: Pulmonary effort is normal. No respiratory distress.      Breath sounds: Normal breath sounds. No wheezing.   Abdominal:      General: Bowel sounds are normal. There is no distension.      Palpations: Abdomen is soft.      Tenderness: There is no abdominal tenderness.   Musculoskeletal:         General: Normal range of motion.      Cervical back: Normal range of motion.   Skin:     Findings: No rash.      Comments: R CW mediport, site benign. R breast mass   Neurological:      Mental Status: She is alert and oriented to person, place, and time.   Psychiatric:         Mood and Affect: Mood normal.         Behavior: Behavior normal.       Imaging      Lab Review   Recent Results (from the past 24 hour(s))   Comprehensive Metabolic Panel    Collection Time: 04/26/24  2:14 AM   Result Value Ref Range    Sodium Level 137 136 - 145 mmol/L    Potassium Level " 4.2 3.5 - 5.1 mmol/L    Chloride 107 98 - 107 mmol/L    Carbon Dioxide 21 (L) 23 - 31 mmol/L    Glucose Level 103 82 - 115 mg/dL    Blood Urea Nitrogen 19.3 9.8 - 20.1 mg/dL    Creatinine 0.66 0.55 - 1.02 mg/dL    Calcium Level Total 8.5 8.4 - 10.2 mg/dL    Protein Total 7.2 5.8 - 7.6 gm/dL    Albumin Level 3.2 (L) 3.4 - 4.8 g/dL    Globulin 4.0 (H) 2.4 - 3.5 gm/dL    Albumin/Globulin Ratio 0.8 (L) 1.1 - 2.0 ratio    Bilirubin Total 0.4 <=1.5 mg/dL    Alkaline Phosphatase 96 40 - 150 unit/L    Alanine Aminotransferase 28 0 - 55 unit/L    Aspartate Aminotransferase 38 (H) 5 - 34 unit/L    eGFR >60 mls/min/1.73/m2   VANCOMYCIN, TROUGH    Collection Time: 04/26/24  2:14 AM   Result Value Ref Range    Vancomycin Trough 16.8 15.0 - 20.0 ug/ml   Protime-INR    Collection Time: 04/26/24  8:49 AM   Result Value Ref Range    PT 13.9 12.5 - 14.5 seconds    INR 1.1 <=1.3     Assessment/Plan:  Recurrent Staphylococcus bacteremia  Infected mediport  RLL pulmonary nodule  DM Type II  RA  Hx of short gut syndrome  Hepatic cirrhosis  Anemia  Thrombocytopenia  Elevated CRP  Hx of R breast CA     -Continue Vancomycin #8  -Afebrile without leukocytosis  -4/18 Blood cultures revealed Staph Hominis 2/2 sets  -4/19 repeat blood cultures remain negative  -Seen by Surgery, inputs noted including recommended referral to breast surgeon for R breast mass  -S/p MediPort removal today 4/24 and can plan placement of a new MediPort after about 72 hours if no findings to justify otherwise  -4/24 repeat blood cultures remain negative, follow  -Discussed with patient  and nursing

## 2024-04-27 LAB
ALBUMIN SERPL-MCNC: 3.1 G/DL (ref 3.4–4.8)
ALBUMIN/GLOB SERPL: 0.7 RATIO (ref 1.1–2)
ALP SERPL-CCNC: 91 UNIT/L (ref 40–150)
ALT SERPL-CCNC: 29 UNIT/L (ref 0–55)
AST SERPL-CCNC: 37 UNIT/L (ref 5–34)
BILIRUB SERPL-MCNC: 0.5 MG/DL
BUN SERPL-MCNC: 18 MG/DL (ref 9.8–20.1)
CALCIUM SERPL-MCNC: 8.8 MG/DL (ref 8.4–10.2)
CHLORIDE SERPL-SCNC: 106 MMOL/L (ref 98–107)
CO2 SERPL-SCNC: 25 MMOL/L (ref 23–31)
CREAT SERPL-MCNC: 0.62 MG/DL (ref 0.55–1.02)
GFR SERPLBLD CREATININE-BSD FMLA CKD-EPI: >60 MLS/MIN/1.73/M2
GLOBULIN SER-MCNC: 4.2 GM/DL (ref 2.4–3.5)
GLUCOSE SERPL-MCNC: 97 MG/DL (ref 82–115)
INR PPP: 1.1
POTASSIUM SERPL-SCNC: 4.4 MMOL/L (ref 3.5–5.1)
PROT SERPL-MCNC: 7.3 GM/DL (ref 5.8–7.6)
PROTHROMBIN TIME: 14.2 SECONDS (ref 12.5–14.5)
SODIUM SERPL-SCNC: 137 MMOL/L (ref 136–145)
VANCOMYCIN TROUGH SERPL-MCNC: 18.9 UG/ML (ref 15–20)

## 2024-04-27 PROCEDURE — 80202 ASSAY OF VANCOMYCIN: CPT | Performed by: STUDENT IN AN ORGANIZED HEALTH CARE EDUCATION/TRAINING PROGRAM

## 2024-04-27 PROCEDURE — 80053 COMPREHEN METABOLIC PANEL: CPT | Performed by: INTERNAL MEDICINE

## 2024-04-27 PROCEDURE — 63600175 PHARM REV CODE 636 W HCPCS: Performed by: NURSE PRACTITIONER

## 2024-04-27 PROCEDURE — 21400001 HC TELEMETRY ROOM

## 2024-04-27 PROCEDURE — 85610 PROTHROMBIN TIME: CPT | Performed by: INTERNAL MEDICINE

## 2024-04-27 PROCEDURE — 36415 COLL VENOUS BLD VENIPUNCTURE: CPT | Performed by: INTERNAL MEDICINE

## 2024-04-27 PROCEDURE — 25000003 PHARM REV CODE 250: Performed by: INTERNAL MEDICINE

## 2024-04-27 PROCEDURE — 36415 COLL VENOUS BLD VENIPUNCTURE: CPT | Performed by: STUDENT IN AN ORGANIZED HEALTH CARE EDUCATION/TRAINING PROGRAM

## 2024-04-27 PROCEDURE — 25000003 PHARM REV CODE 250: Performed by: STUDENT IN AN ORGANIZED HEALTH CARE EDUCATION/TRAINING PROGRAM

## 2024-04-27 PROCEDURE — A4216 STERILE WATER/SALINE, 10 ML: HCPCS | Performed by: INTERNAL MEDICINE

## 2024-04-27 PROCEDURE — 63600175 PHARM REV CODE 636 W HCPCS: Performed by: INTERNAL MEDICINE

## 2024-04-27 PROCEDURE — 25000003 PHARM REV CODE 250: Performed by: NURSE PRACTITIONER

## 2024-04-27 PROCEDURE — 11000001 HC ACUTE MED/SURG PRIVATE ROOM

## 2024-04-27 RX ADMIN — LEUCINE, PHENYLALANINE, LYSINE, METHIONINE, ISOLEUCINE, VALINE, HISTIDINE, THREONINE, TRYPTOPHAN, ALANINE, GLYCINE, ARGININE, PROLINE, SERINE, TYROSINE, SODIUM ACETATE, DIBASIC POTASSIUM PHOSPHATE, MAGNESIUM CHLORIDE, SODIUM CHLORIDE, CALCIUM CHLORIDE, DEXTROSE
311; 238; 247; 170; 255; 247; 204; 179; 77; 880; 438; 489; 289; 213; 17; 297; 261; 51; 77; 33; 5 INJECTION INTRAVENOUS at 04:04

## 2024-04-27 RX ADMIN — SODIUM CHLORIDE, PRESERVATIVE FREE 10 ML: 5 INJECTION INTRAVENOUS at 05:04

## 2024-04-27 RX ADMIN — FERROUS SULFATE TAB 325 MG (65 MG ELEMENTAL FE) 1 EACH: 325 (65 FE) TAB at 09:04

## 2024-04-27 RX ADMIN — ERGOCALCIFEROL 50000 UNITS: 1.25 CAPSULE ORAL at 08:04

## 2024-04-27 RX ADMIN — DIPHENOXYLATE HYDROCHLORIDE AND ATROPINE SULFATE 2 TABLET: .025; 2.5 TABLET ORAL at 09:04

## 2024-04-27 RX ADMIN — FERROUS SULFATE TAB 325 MG (65 MG ELEMENTAL FE) 1 EACH: 325 (65 FE) TAB at 08:04

## 2024-04-27 RX ADMIN — BUSPIRONE HYDROCHLORIDE 15 MG: 5 TABLET ORAL at 08:04

## 2024-04-27 RX ADMIN — ENOXAPARIN SODIUM 70 MG: 80 INJECTION SUBCUTANEOUS at 04:04

## 2024-04-27 RX ADMIN — VANCOMYCIN HYDROCHLORIDE 1000 MG: 1 INJECTION, POWDER, LYOPHILIZED, FOR SOLUTION INTRAVENOUS at 02:04

## 2024-04-27 RX ADMIN — MIRTAZAPINE 15 MG: 15 TABLET, FILM COATED ORAL at 09:04

## 2024-04-27 RX ADMIN — CITALOPRAM HYDROBROMIDE 20 MG: 20 TABLET ORAL at 08:04

## 2024-04-27 RX ADMIN — DIPHENOXYLATE HYDROCHLORIDE AND ATROPINE SULFATE 2 TABLET: .025; 2.5 TABLET ORAL at 08:04

## 2024-04-27 RX ADMIN — SODIUM CHLORIDE, PRESERVATIVE FREE 10 ML: 5 INJECTION INTRAVENOUS at 11:04

## 2024-04-27 RX ADMIN — SODIUM CHLORIDE, PRESERVATIVE FREE 10 ML: 5 INJECTION INTRAVENOUS at 04:04

## 2024-04-27 RX ADMIN — ENOXAPARIN SODIUM 70 MG: 80 INJECTION SUBCUTANEOUS at 05:04

## 2024-04-27 NOTE — PROGRESS NOTES
"Pharmacokinetic Assessment Follow Up: IV Vancomycin    Vancomycin serum concentration assessment(s):    The trough level was drawn correctly and can be used to guide therapy at this time. The measurement is within the desired definitive target range of 15 to 20 mcg/mL.    Vancomycin Regimen Plan:    Continue regimen to Vancomycin 1000 mg IV every 12 hours with next serum trough concentration measured at 1300 prior to   dose on 04/30.    Drug levels (last 3 results):  Recent Labs   Lab Result Units 04/26/24  0214 04/27/24  1256   Vancomycin Trough ug/ml 16.8 18.9       Pharmacy will continue to follow and monitor vancomycin.    Please contact pharmacy at extension [phone] for questions regarding this assessment.    Thank you for the consult,   Vale Hall       Patient brief summary:  Laura Acosta is a 78 y.o. female initiated on antimicrobial therapy with IV Vancomycin for treatment of bacteremia    The patient's current regimen is 1000mg q12h    Drug Allergies:   Review of patient's allergies indicates:   Allergen Reactions    Hydromorphone Itching     Other reaction(s): itching    Opium      Other reaction(s): itching  has itching with larger doses. Smaller doses do not cause a reaction.       Actual Body Weight:   73kg    Renal Function:   Estimated Creatinine Clearance: 73.2 mL/min (based on SCr of 0.62 mg/dL).,     Dialysis Method (if applicable):  N/A    CBC (last 72 hours):  No results for input(s): "WHITE BLOOD CELL COUNT", "HEMOGLOBIN", "HEMATOCRIT", "PLATELETS", "GRAN%", "LYMPH%", "MONO%", "EOSINOPHIL%", "BASOPHIL%", "DIFFERENTIAL METHOD" in the last 72 hours.    Metabolic Panel (last 72 hours):  Recent Labs   Lab Result Units 04/25/24  0541 04/26/24  0214 04/27/24  0547   Sodium Level mmol/L 138 137 137   Potassium Level mmol/L 4.3 4.2 4.4   Chloride mmol/L 106 107 106   Carbon Dioxide mmol/L 26 21* 25   Glucose Level mg/dL 96 103 97   Blood Urea Nitrogen mg/dL 16.3 19.3 18.0   Creatinine mg/dL 0.62 0.66 " 0.62   Albumin Level g/dL 3.0* 3.2* 3.1*   Bilirubin Total mg/dL 0.6 0.4 0.5   Alkaline Phosphatase unit/L 90 96 91   Aspartate Aminotransferase unit/L 36* 38* 37*   Alanine Aminotransferase unit/L 27 28 29       Vancomycin Administrations:  vancomycin given in the last 96 hours                     vancomycin in dextrose 5 % 1 gram/250 mL IVPB 1,000 mg (mg) 1,000 mg New Bag 04/27/24 0206     1,000 mg New Bag 04/26/24 1600     1,000 mg New Bag  0310     1,000 mg New Bag 04/25/24 1547     1,000 mg New Bag  0337     1,000 mg New Bag 04/24/24 1523    vancomycin 1.25 g in dextrose 5% 250 mL IVPB (ready to mix) (mg) 1,250 mg New Bag 04/23/24 2114                    Microbiologic Results:  Microbiology Results (last 7 days)       Procedure Component Value Units Date/Time    Blood Culture [[id number]]  (Normal) Collected: 04/24/24 2016    Order Status: Completed Specimen: Blood Updated: 04/26/24 2200     CULTURE, BLOOD ([facility]) No Growth At 48 Hours    Blood Culture [[id number]]  (Normal) Collected: 04/19/24 2208    Order Status: Completed Specimen: Venous Blood Line Updated: 04/25/24 1000     CULTURE, BLOOD ([facility]) No Growth at 5 days    Blood Culture [[id number]]  (Normal) Collected: 04/19/24 1925    Order Status: Completed Specimen: Blood, Venous Updated: 04/24/24 2100     CULTURE, BLOOD ([facility]) No Growth at 5 days    Blood culture x two cultures. Draw prior to antibiotics. [[id number]]  (Normal) Collected: 04/18/24 1106    Order Status: Completed Specimen: Blood Updated: 04/23/24 1300     CULTURE, BLOOD ([facility]) No Growth at 5 days    Stool Culture [[id number]]  (Normal) Collected: 04/19/24 1601    Order Status: Completed Specimen: Stool Updated: 04/22/24 0737     Stool Culture Negative for Salmonella, Shigella, Campylobacter, Vibrio, Aeromonas, Pleisiomonas,Yersinia, or Shiga Toxin 1 and 2.    Blood Culture [[id number]]  (Abnormal)  (Susceptibility) Collected: 04/18/24 1603    Order Status: Completed Specimen: Blood Updated:  04/22/24 0646     CULTURE, BLOOD ([facility]) Staphylococcus epidermidis      Staphylococcus hominis     GRAM STAIN Gram Positive Cocci, probable Staphylococcus      Seen in gram stain of broth only      2 of 2 bottles positive    Malaria Smear [[id number]] Collected: 04/18/24 1756    Order Status: Completed Specimen: Blood Updated: 04/21/24 1203     MALARIA SMEAR ([facility]) No Malarial or other blood borne parasites seen     Malaria Kit Negative    Blood culture x two cultures. Draw prior to antibiotics. [[id number]]  (Abnormal)  (Susceptibility) Collected: 04/18/24 1106    Order Status: Completed Specimen: Blood Updated: 04/21/24 0634     CULTURE, BLOOD ([facility]) Staphylococcus hominis     GRAM STAIN Gram Positive Cocci, probable Staphylococcus      Seen in gram stain of broth only      1 of 2 Aerobic bottles positive

## 2024-04-27 NOTE — PROGRESS NOTES
[facility]  Hospital Medicine Progress Note      Chief Complaint: fever     HPI: (personally reviewed by me and is documented from initial H&P)   78 y.o. female with a past medical history of hypertension, hyperlipidemia, PVD, DVT, short gut syndrome on TPN, chronic granulomatous infection, MRSA bacteremia, rheumatoid arthritis, cirrhosis, gallstone pancreatitis, breast cancer, anxiety, and depression.    Presented to [facility] on 4/18/2024 with c/o fever.  Patient reported fever and body aches began this morning. Patient is followed by Dr. Hoover and has had recurrent fevers for the past 2 months.  Patient is on suppressive Doxycycline secondary to previous MRSA bacteremia.  Patient receives infusions 4 times weekly via MediPort.  Patient reported her last TPN infusion was last night.  Patient reported her MediPort has been in place since 2019.  Patient had NM inflammatory whole-body scan on 04/16/2024 which revealed no scintigraphic evidence of localized infectious process.  On exam patient endorsed chronic diarrhea.  Patient was denied nausea, vomiting, dysuria, hematuria, cough, congestion, chest pain, and shortness of breath.     Initial vital signs in ED were /76, pulse 70, respirations 30, temperature 39.1° C, and SpO2 96% on 3 L nasal cannula.  Labs revealed WBC 8.70, sodium 134, CO2 21, and lactic acid 0.7.  Flu/RSV/COVID testing were negative.  Blood cultures were obtained.  Chest x-ray revealed no active pulmonary disease.  CTA chest revealed no pulmonary embolism identified with new 11 mm solid nodule in the right lower lobe likely infectious versus inflammatory.  Patient was placed on 3 L supplemental oxygen in ED and given Tylenol 1000 mg and IV Zosyn 4.5 g. Patient was admitted to hospital medicine service for further medical management. 2/2 blood cultures growing staph epidermidis patient currently has a MediPort - infected.  Id recommends to remove MediPort,  continue on IV vancomycin.     __________________________________________________________________________________________________________________________________    Objective/physical exam:  Vital signs have been personally reviewed by me   General: Appears comfortable, no acute distress.  Neuro:  Awake alert oriented.     Integumentary: Warm, dry, intact.  Musculoskeletal: Purposeful movement noted.     Respiratory: No accessory muscle use. Breath sounds are equal.  Cardiovascular: Regular rate.       VITAL SIGNS: 24 HRS MIN & MAX LAST   Temp  Min: 97.7 °F (36.5 °C)  Max: 98.7 °F (37.1 °C) 98 °F (36.7 °C)   BP  Min: 118/76  Max: 148/69 (!) 146/78   Pulse  Min: 62  Max: 73  71   No data recorded 18   SpO2  Min: 95 %  Max: 97 % 96 %     CTA Chest Non-Coronary (PE Studies)  Narrative: EXAMINATION:  CTA CHEST NON CORONARY (PE STUDIES)    CLINICAL HISTORY:  Pulmonary embolism (PE) suspected, unknown D-dimer;    TECHNIQUE:  CTA imaging of the chest after IV contrast. Axial, coronal and sagittal reconstructions, including MIP images, are reviewed. Dose length product 426 mGycm. Automatic exposure control, adjustment of mA/kV or iterative reconstruction technique used to limit radiation dose.    COMPARISON:  CT 03/01/2024    FINDINGS:  Diagnostic quality: Adequate    Pulmonary embolism: None identified.    Lung parenchyma: New 11 mm mean diameter solid nodule within the right lower lobe (series 7, image 66).  8 mm solid nodule medially in the right lower lobe unchanged from prior exam.  Stable site of pleuroparenchymal scarring in the superior right lung.    Pleural effusion: None.    Mediastinum/ellen: Right-sided MediPort catheter tip in the SVC.  Mild coronary artery calcification.  No pathologically enlarged lymph node.    Chest wall/axilla: Stable appearance.    Upper abdomen: No significant findings.    Bones: No acute osseous process.  Right shoulder arthroplasty.  Impression: No pulmonary embolism identified.    New  11 mm solid nodule in the right lower lobe, likely infectious or inflammatory.  Three month follow-up chest CT recommended to ensure resolution.    Electronically signed by: Adria Dooley  Date:    04/18/2024  Time:    13:52  X-Ray Chest AP Portable  Narrative: EXAMINATION:  XR CHEST AP PORTABLE    CLINICAL HISTORY:  Sepsis;    TECHNIQUE:  Single frontal view of the chest was performed.    COMPARISON:  February 2, 2024    FINDINGS:  Examination reveals mediastinal cardiac silhouette to be within normal limits lung fields are clear and free of gross infiltrates atelectasis or effusions a peripherally calcified lesion is identified projecting at the right base similar to previous exams and likely related to breast  Impression: No active pulmonary disease    Electronically signed by: Domingo Hughes  Date:    04/18/2024  Time:    10:44    Recent Labs   Lab 04/22/24  0513 04/23/24  0411   WBC 5.32 5.31   RBC 3.78* 4.01*   HGB 10.4* 10.9*   HCT 33.8* 34.9*   MCV 89.4 87.0   MCH 27.5 27.2   MCHC 30.8* 31.2*   RDW 15.4 15.4    193   MPV 9.3 9.1       Recent Labs   Lab 04/25/24  0541 04/26/24  0214 04/27/24  0547    137 137   K 4.3 4.2 4.4   CO2 26 21* 25   BUN 16.3 19.3 18.0   CREATININE 0.62 0.66 0.62   CALCIUM 8.2* 8.5 8.8   ALBUMIN 3.0* 3.2* 3.1*   ALKPHOS 90 96 91   ALT 27 28 29   AST 36* 38* 37*   BILITOT 0.6 0.4 0.5     Microbiology Results (last 7 days)       Procedure Component Value Units Date/Time    Blood Culture [[id number]]  (Normal) Collected: 04/24/24 2016    Order Status: Completed Specimen: Blood Updated: 04/26/24 2200     CULTURE, BLOOD ([facility]) No Growth At 48 Hours    Blood Culture [[id number]]  (Normal) Collected: 04/19/24 2208    Order Status: Completed Specimen: Venous Blood Line Updated: 04/25/24 1000     CULTURE, BLOOD ([facility]) No Growth at 5 days    Blood Culture [[id number]]  (Normal) Collected: 04/19/24 1925    Order Status: Completed Specimen: Blood, Venous Updated: 04/24/24 2100      CULTURE, BLOOD ([facility]) No Growth at 5 days    Blood culture x two cultures. Draw prior to antibiotics. [[id number]]  (Normal) Collected: 04/18/24 1106    Order Status: Completed Specimen: Blood Updated: 04/23/24 1300     CULTURE, BLOOD ([facility]) No Growth at 5 days    Stool Culture [[id number]]  (Normal) Collected: 04/19/24 1601    Order Status: Completed Specimen: Stool Updated: 04/22/24 0737     Stool Culture Negative for Salmonella, Shigella, Campylobacter, Vibrio, Aeromonas, Pleisiomonas,Yersinia, or Shiga Toxin 1 and 2.    Blood Culture [[id number]]  (Abnormal)  (Susceptibility) Collected: 04/18/24 1603    Order Status: Completed Specimen: Blood Updated: 04/22/24 0646     CULTURE, BLOOD ([facility]) Staphylococcus epidermidis      Staphylococcus hominis     GRAM STAIN Gram Positive Cocci, probable Staphylococcus      Seen in gram stain of broth only      2 of 2 bottles positive    Malaria Smear [[id number]] Collected: 04/18/24 1756    Order Status: Completed Specimen: Blood Updated: 04/21/24 1203     MALARIA SMEAR ([facility]) No Malarial or other blood borne parasites seen     Malaria Kit Negative    Blood culture x two cultures. Draw prior to antibiotics. [[id number]]  (Abnormal)  (Susceptibility) Collected: 04/18/24 1106    Order Status: Completed Specimen: Blood Updated: 04/21/24 0634     CULTURE, BLOOD ([facility]) Staphylococcus hominis     GRAM STAIN Gram Positive Cocci, probable Staphylococcus      Seen in gram stain of broth only      1 of 2 Aerobic bottles positive             Scheduled Med:  Current Facility-Administered Medications   Medication Dose Route Frequency    busPIRone  15 mg Oral Daily    citalopram  20 mg Oral Daily    diphenoxylate-atropine 2.5-0.025 mg  2 tablet Oral BID    enoxparin  1 mg/kg Subcutaneous Q12H (treatment, non-standard time)    ergocalciferol  50,000 Units Oral Q7 Days    ferrous sulfate  1 tablet Oral BID    mirtazapine  15 mg Oral QHS    sodium chloride 0.9%  10 mL Intravenous Q6H    vancomycin  (VANCOCIN) IV (PEDS and ADULTS)  1,000 mg Intravenous Q12H      Continuous Infusions:  Current Facility-Administered Medications   Medication Dose Route Frequency Last Rate Last Admin    Amino acid 4.25% - dextrose 5% (CLINIMIX-E) solution (1L provides 42.5 gm AA, 50 gm CHO (170 kcal/L dextrose), Na 35, K 30, Mg 5, Ca 4.5, Acetate 70, Cl 39, Phos 15)   Intravenous Continuous 75 mL/hr at 04/26/24 2111 New Bag at 04/26/24 2111    Amino acid 4.25% - dextrose 5% (CLINIMIX-E) solution (1L provides 42.5 gm AA, 50 gm CHO (170 kcal/L dextrose), Na 35, K 30, Mg 5, Ca 4.5, Acetate 70, Cl 39, Phos 15)   Intravenous Continuous            PRN Meds:    Current Facility-Administered Medications:     acetaminophen, 650 mg, Oral, Q8H PRN    acetaminophen, 650 mg, Oral, Q4H PRN    dextrose 10%, 12.5 g, Intravenous, PRN    dextrose 10%, 25 g, Intravenous, PRN    glucagon (human recombinant), 1 mg, Intramuscular, PRN    glucose, 16 g, Oral, PRN    glucose, 24 g, Oral, PRN    ondansetron, 4 mg, Intravenous, Q8H PRN    Flushing PICC/Midline Protocol, , , Until Discontinued **AND** sodium chloride 0.9%, 10 mL, Intravenous, Q6H **AND** sodium chloride 0.9%, 10 mL, Intravenous, PRN    vancomycin - pharmacy to dose, , Intravenous, pharmacy to manage frequency   __________________________________________________________________________________________________________________________________    Assessment/Plan:  Staph epidermis   MediPort infected   Chronic diarrhea   Right breast cancer  Hypertension  Hyperlipidemia  Peripheral vascular disease   History of DVT   Short gut syndrome on TPN   Chronic granulomatous infection   MRSA bacteremia history of   Rheumatoid arthritis  Cirrhosis   Gallstone pancreatitis   Anxiety/depression  11 mm solid nodule in the right lower lobe  Above present on admission     Anticipated discharge and Disposition when medically stable:  outpatient right breast mass follow-up with Dr. Gonzalez   11 mm solid nodule in  the right lower lobe--followup needed     _______________________________________________________________________________________________________________________________  Fever secondary to bacteremia. Mediport removed.  Staph epidermis bacteremia, status post MediPort removal  Blood cultures negative thus far @ 48hrs.   Possible plan for mediport on Monday; will need to followup with surgeon to see if possible.   Restart coumadin post mediport placement.   Check INR.     Continue to hold Coumadin, continue Lovenox  Continue supportive care  Continue checking vital signs q4hrs.    Patient remains on Clinimix for short gut syndrome.  Encourage ambulation; up to chair x3     Nurse notified to page me if any changes occur     DVT prophylaxis initiated   Nutrition Status: regular as tolerated.     Consults:  Infectious disease physician  I have personally reviewed the specialist documentation and/or have spoken to the specialist with regard to the care of this patient; recommendations are noted above.     _______________________________________________________________________________________________________________________________    I have spent >30 minutes on the day of the visit; time spent includes face to face time and non-face to face time preparing to see the patient (eg, review of tests), independently reviewing and interpreting medical records, both past and current; documenting clinical information in the electronic or other health record, and communicating results to the patient/family/caregiver and care coordinator and nursing team.      All diagnosis and differential diagnosis have been reviewed,  interpreted and communicated appropriately to care team. assessment and plan has been documented; I have personally reviewed the labs and test results that are presently available and pertinent to this hospital course; I have reviewed medical records based upon their availability.    All of the patient's questions  have been  addressed and answered. Patient's is agreeable to the above stated plan.   I will continue to monitor closely and make adjustments to medical management as needed.    Gabrielle Butler, DO   04/27/2024     This note was created with the assistance of Dragon voice recognition software. There may be transcription errors as a result of using this technology however minimal. Effort has been made to assure accuracy of transcription but any obvious errors or omissions should be clarified with the author of the document.

## 2024-04-28 LAB
INR PPP: 1
PROTHROMBIN TIME: 13.2 SECONDS (ref 12.5–14.5)

## 2024-04-28 PROCEDURE — 25000003 PHARM REV CODE 250: Performed by: INTERNAL MEDICINE

## 2024-04-28 PROCEDURE — 36415 COLL VENOUS BLD VENIPUNCTURE: CPT | Performed by: INTERNAL MEDICINE

## 2024-04-28 PROCEDURE — 25000003 PHARM REV CODE 250: Performed by: STUDENT IN AN ORGANIZED HEALTH CARE EDUCATION/TRAINING PROGRAM

## 2024-04-28 PROCEDURE — 63600175 PHARM REV CODE 636 W HCPCS: Performed by: INTERNAL MEDICINE

## 2024-04-28 PROCEDURE — 21400001 HC TELEMETRY ROOM

## 2024-04-28 PROCEDURE — 63600175 PHARM REV CODE 636 W HCPCS: Performed by: NURSE PRACTITIONER

## 2024-04-28 PROCEDURE — 11000001 HC ACUTE MED/SURG PRIVATE ROOM

## 2024-04-28 PROCEDURE — 85610 PROTHROMBIN TIME: CPT | Performed by: INTERNAL MEDICINE

## 2024-04-28 PROCEDURE — 25000003 PHARM REV CODE 250: Performed by: NURSE PRACTITIONER

## 2024-04-28 PROCEDURE — A4216 STERILE WATER/SALINE, 10 ML: HCPCS | Performed by: INTERNAL MEDICINE

## 2024-04-28 RX ADMIN — SODIUM CHLORIDE, PRESERVATIVE FREE 10 ML: 5 INJECTION INTRAVENOUS at 05:04

## 2024-04-28 RX ADMIN — MIRTAZAPINE 15 MG: 15 TABLET, FILM COATED ORAL at 08:04

## 2024-04-28 RX ADMIN — BUSPIRONE HYDROCHLORIDE 15 MG: 5 TABLET ORAL at 09:04

## 2024-04-28 RX ADMIN — ASCORBIC ACID, VITAMIN A PALMITATE, CHOLECALCIFEROL, THIAMINE HYDROCHLORIDE, RIBOFLAVIN-5 PHOSPHATE SODIUM, PYRIDOXINE HYDROCHLORIDE, NIACINAMIDE, DEXPANTHENOL, ALPHA-TOCOPHEROL ACETATE, VITAMIN K1, FOLIC ACID, BIOTIN, CYANOCOBALAMIN: 200; 3300; 200; 6; 3.6; 6; 40; 15; 10; 150; 600; 60; 5 INJECTION, SOLUTION INTRAVENOUS at 09:04

## 2024-04-28 RX ADMIN — ENOXAPARIN SODIUM 70 MG: 80 INJECTION SUBCUTANEOUS at 06:04

## 2024-04-28 RX ADMIN — FERROUS SULFATE TAB 325 MG (65 MG ELEMENTAL FE) 1 EACH: 325 (65 FE) TAB at 08:04

## 2024-04-28 RX ADMIN — CITALOPRAM HYDROBROMIDE 20 MG: 20 TABLET ORAL at 09:04

## 2024-04-28 RX ADMIN — FERROUS SULFATE TAB 325 MG (65 MG ELEMENTAL FE) 1 EACH: 325 (65 FE) TAB at 09:04

## 2024-04-28 RX ADMIN — ENOXAPARIN SODIUM 70 MG: 80 INJECTION SUBCUTANEOUS at 05:04

## 2024-04-28 RX ADMIN — VANCOMYCIN HYDROCHLORIDE 1000 MG: 1 INJECTION, POWDER, LYOPHILIZED, FOR SOLUTION INTRAVENOUS at 01:04

## 2024-04-28 RX ADMIN — VANCOMYCIN HYDROCHLORIDE 1000 MG: 1 INJECTION, POWDER, LYOPHILIZED, FOR SOLUTION INTRAVENOUS at 02:04

## 2024-04-28 RX ADMIN — SODIUM CHLORIDE, PRESERVATIVE FREE 10 ML: 5 INJECTION INTRAVENOUS at 11:04

## 2024-04-28 RX ADMIN — DIPHENOXYLATE HYDROCHLORIDE AND ATROPINE SULFATE 2 TABLET: .025; 2.5 TABLET ORAL at 09:04

## 2024-04-28 RX ADMIN — SODIUM CHLORIDE, PRESERVATIVE FREE 10 ML: 5 INJECTION INTRAVENOUS at 12:04

## 2024-04-28 RX ADMIN — DIPHENOXYLATE HYDROCHLORIDE AND ATROPINE SULFATE 2 TABLET: .025; 2.5 TABLET ORAL at 08:04

## 2024-04-28 NOTE — CONSULTS
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 Pt presented to the hospital for fevers at home. She was found to have MRSA bacteremia, which she was treated for. Her mediport was removed, and she has recovered from her acute illness. However, she now is in need of vascular access for her home TPN.    Review of Systems:  12 point ROS negative except as stated in HPI    PAST HISTORY:   Past medical history:  Past Medical History:   Diagnosis Date    Acute URI     Anxiety and depression     Back pain     Breast cancer     Cholelithiasis     Contaminated small bowel syndrome     DVT (deep venous thrombosis)     Edema, lower extremity     Gallstone pancreatitis     HTN (hypertension)     Hypercholesterolemia     Insomnia     Irregular heart beat     Ischemic disease of gut     Laryngitis     Malabsorption     Meningitis, unspecified     OA (osteoarthritis)     PVD (peripheral vascular disease)     Rheumatoid arthritis, unspecified     Staph infection     infected mediport -- on doxycycline daily for prevention    Tremor     Unspecified cataract     Unspecified cirrhosis of liver 06/20/2023    Dr Carroll    Venous insufficiency        Past surgical history:  Past Surgical History:   Procedure Laterality Date    APPENDECTOMY      BOWEL RESECTION      BREAST LUMPECTOMY Right     CHOLECYSTECTOMY  05/2015    COLONOSCOPY  02/2022    repeat 3 years    CYST REMOVAL Right     breast    CYSTOSCOPY W/ STONE MANIPULATION      CYSTOSCOPY W/ URETERAL STENT PLACEMENT  12/2020    CYSTOSCOPY W/ URETERAL STENT REMOVAL  04/2021    CYSTOURETEROSCOPY, WITH HOLMIUM LASER  LITHOTRIPSY OF URETERAL CALCULUS AND STENT INSERTION Left 05/02/2023    Procedure: CYSTOSCOPY, WITH URETERAL STENT INSERTION Left;  Surgeon: Jared Felix MD;  Location: [facility];  Service: Urology;  Laterality: Left;    EGD, WITH CLOSED BIOPSY N/A 6/20/2023    Procedure: EGD;  Surgeon: Aashish Carroll MD;  Location: [facility] ENDOSCOPY;  Service: Gastroenterology;  Laterality: N/A;    ENDOSCOPIC RETROGRADE CHOLANGIOPANCREATOGRAPHY W/ SPHINCTEROTOMY AND STONE REMOVAL  04/2015    ESOPHAGOGASTRODUODENOSCOPY  06/20/2023    Dr Carroll - no signs esophageal varicies --repeat 3 yrs    GI Biopsy  02/15/2022    GI Biopsy  12/2018    HYSTERECTOMY      LAPAROTOMY, EXPLORATORY  06/2015    LITHOTRIPSY Left 04/2021    LITHOTRIPSY Left 03/2021    MEDIPORT INSERTION, SINGLE  06/2021    MEDIPORT INSERTION, SINGLE  07/2020    MEDIPORT INSERTION, SINGLE  12/2018    MEDIPORT REMOVAL  07/2020    PHACOEMULSIFICATION OF CATARACT Left 12/2016    PHACOEMULSIFICATION OF CATARACT Right 11/2016    PICC line Removal Right 06/2021    POLYPECTOMY  02/2022    PVD surgery      REMOVAL OF CATHETER  12/2020    REMOVAL OF VASCULAR ACCESS CATHETER Right 4/24/2024    Procedure: Removal, Vascular Access Catheter;  Surgeon: Reed Ghosh MD;  Location: [facility];  Service: General;  Laterality: Right;    SHOULDER SURGERY Right     shoulder replacement    SPHINCTEROTOMY OF URETHRA      VENTRAL HERNIA REPAIR  04/2016       Family history:  Family History   Problem Relation Name Age of Onset    Pneumonia Mother      Depression Mother      Osteoarthritis Mother      Breast cancer Mother      Uterine cancer Mother      Heart attack Father      Aneurysm Father          brain    Diabetes Father      Hyperlipidemia Father      Hypertension Father      Hyperlipidemia Sister      Hypertension Sister      Kidney cancer Sister      Osteoarthritis Sister      Breast cancer Sister      Hyperlipidemia Brother      Hypertension Brother      Coronary artery  disease Brother          CABG x3    Hyperlipidemia Brother      Hypertension Brother         Social history:  Social History     Socioeconomic History    Marital status:     Number of children: 2   Occupational History    Occupation: Retired   Tobacco Use    Smoking status: Never    Smokeless tobacco: Never   Substance and Sexual Activity    Alcohol use: Not Currently    Drug use: Never    Sexual activity: Not Currently     Social Determinants of Health     Financial Resource Strain: Low Risk  (4/20/2024)    Overall Financial Resource Strain (CARDIA)     Difficulty of Paying Living Expenses: Not hard at all   Food Insecurity: No Food Insecurity (4/20/2024)    Hunger Vital Sign     Worried About Running Out of Food in the Last Year: Never true     Ran Out of Food in the Last Year: Never true   Transportation Needs: No Transportation Needs (4/20/2024)    PRAPARE - Transportation     Lack of Transportation (Medical): No     Lack of Transportation (Non-Medical): No   Stress: No Stress Concern Present (4/20/2024)    [employer] - Occupational Stress Questionnaire     Feeling of Stress : Not at all   Social Connections: Unknown (4/19/2024)    Social Connection and Isolation Panel [NHANES]     Marital Status: Living with partner   Housing Stability: Low Risk  (4/20/2024)    Housing Stability Vital Sign     Unable to Pay for Housing in the Last Year: No     Number of Times Moved in the Last Year: 0     Homeless in the Last Year: No     Social History     Tobacco Use   Smoking Status Never   Smokeless Tobacco Never      Social History     Substance and Sexual Activity   Alcohol Use Not Currently        MEDICATIONS & ALLERGIES:     Current Facility-Administered Medications   Medication Dose Route Frequency Provider Last Rate Last Admin    acetaminophen tablet 650 mg  650 mg Oral Q8H PRN Fito Haskins PA-C        acetaminophen tablet 650 mg  650 mg Oral Q4H PRN Fito Haskins PA-C   650  mg at 04/20/24 1754    Amino acid 4.25% - dextrose 5% (CLINIMIX-E) solution (1L provides 42.5 gm AA, 50 gm CHO (170 kcal/L dextrose), Na 35, K 30, Mg 5, Ca 4.5, Acetate 70, Cl 39, Phos 15)   Intravenous Continuous Gabrielle Butler D, DO 75 mL/hr at 04/28/24 0910 New Bag at 04/28/24 0910    Amino acid 4.25% - dextrose 5% (CLINIMIX-E) solution (1L provides 42.5 gm AA, 50 gm CHO (170 kcal/L dextrose), Na 35, K 30, Mg 5, Ca 4.5, Acetate 70, Cl 39, Phos 15)   Intravenous Continuous Gabrielle Butler D, DO        busPIRone tablet 15 mg  15 mg Oral Daily Shannan Helton MD   15 mg at 04/28/24 0909    citalopram tablet 20 mg  20 mg Oral Daily Shannan Helton MD   20 mg at 04/28/24 0909    dextrose 10% bolus 125 mL 125 mL  12.5 g Intravenous PRN Fito Haskins PA-C        dextrose 10% bolus 250 mL 250 mL  25 g Intravenous PRN Fito Haskins PA-C        diphenoxylate-atropine 2.5-0.025 mg per tablet 2 tablet  2 tablet Oral BID Shannan Helton MD   2 tablet at 04/28/24 0909    enoxaparin injection 70 mg  1 mg/kg Subcutaneous Q12H (treatment, non-standard time) Adis Haas MD   70 mg at 04/28/24 0621    ergocalciferol capsule 50,000 Units  50,000 Units Oral Q7 Days Adis Haas MD   50,000 Units at 04/27/24 0835    ferrous sulfate tablet 1 each  1 tablet Oral BID Adis Haas MD   1 each at 04/28/24 0909    glucagon (human recombinant) injection 1 mg  1 mg Intramuscular PRN Fito Haskins PA-C        glucose chewable tablet 16 g  16 g Oral PRN Fito Haskins PA-C        glucose chewable tablet 24 g  24 g Oral PRN Fito Haskins PA-C        mirtazapine tablet 15 mg  15 mg Oral QHS Shannan Helton MD   15 mg at 04/27/24 2128    ondansetron injection 4 mg  4 mg Intravenous Q8H PRN Fito Haskins PA-C        sodium chloride 0.9% flush 10 mL  10 mL Intravenous Q6H Adis Haas MD   10 mL at 04/28/24 1119    And    sodium chloride 0.9% flush 10 mL  10 mL Intravenous PRN Adis Haas MD         vancomycin - pharmacy to dose   Intravenous pharmacy to manage frequency Diana Fowler FNP        vancomycin in dextrose 5 % 1 gram/250 mL IVPB 1,000 mg  1,000 mg Intravenous Q12H Diana Fowler FNP   Stopped at 04/28/24 1510       Allergies:   Review of patient's allergies indicates:   Allergen Reactions    Hydromorphone Itching     Other reaction(s): itching    Opium      Other reaction(s): itching  has itching with larger doses. Smaller doses do not cause a reaction.       Scheduled Meds:  Current Facility-Administered Medications   Medication Dose Route Frequency    busPIRone  15 mg Oral Daily    citalopram  20 mg Oral Daily    diphenoxylate-atropine 2.5-0.025 mg  2 tablet Oral BID    enoxparin  1 mg/kg Subcutaneous Q12H (treatment, non-standard time)    ergocalciferol  50,000 Units Oral Q7 Days    ferrous sulfate  1 tablet Oral BID    mirtazapine  15 mg Oral QHS    sodium chloride 0.9%  10 mL Intravenous Q6H    vancomycin (VANCOCIN) IV (PEDS and ADULTS)  1,000 mg Intravenous Q12H       Continuous Infusions:  Current Facility-Administered Medications   Medication Dose Route Frequency Last Rate Last Admin    Amino acid 4.25% - dextrose 5% (CLINIMIX-E) solution (1L provides 42.5 gm AA, 50 gm CHO (170 kcal/L dextrose), Na 35, K 30, Mg 5, Ca 4.5, Acetate 70, Cl 39, Phos 15)   Intravenous Continuous 75 mL/hr at 04/28/24 0910 New Bag at 04/28/24 0910    Amino acid 4.25% - dextrose 5% (CLINIMIX-E) solution (1L provides 42.5 gm AA, 50 gm CHO (170 kcal/L dextrose), Na 35, K 30, Mg 5, Ca 4.5, Acetate 70, Cl 39, Phos 15)   Intravenous Continuous           PRN Meds:  Current Facility-Administered Medications:     acetaminophen, 650 mg, Oral, Q8H PRN    acetaminophen, 650 mg, Oral, Q4H PRN    dextrose 10%, 12.5 g, Intravenous, PRN    dextrose 10%, 25 g, Intravenous, PRN    glucagon (human recombinant), 1 mg, Intramuscular, PRN    glucose, 16 g, Oral, PRN    glucose, 24 g, Oral, PRN    ondansetron, 4 mg, Intravenous,  "Q8H PRN    Flushing PICC/Midline Protocol, , , Until Discontinued **AND** sodium chloride 0.9%, 10 mL, Intravenous, Q6H **AND** sodium chloride 0.9%, 10 mL, Intravenous, PRN    vancomycin - pharmacy to dose, , Intravenous, pharmacy to manage frequency    OBJECTIVE:   Vital Signs:  VITAL SIGNS: 24 HR MIN & MAX LAST   Temp  Min: 97.8 °F (36.6 °C)  Max: 98.7 °F (37.1 °C)  98.1 °F (36.7 °C)   BP  Min: 118/74  Max: 144/68  118/74    Pulse  Min: 65  Max: 78  72    No data recorded  18    SpO2  Min: 93 %  Max: 96 %  (!) 94 %      HT: 5' 4" (162.6 cm)  WT: 73 kg (160 lb 15 oz)  BMI: 27.6     Intake/output:  Intake/Output - Last 3 Shifts         04/26 0700  04/27 0659 04/27 0700 04/28 0659 04/28 0700 04/29 0659    P.O.  240     Total Intake(mL/kg)  240 (3.3)     Urine (mL/kg/hr) 300 (0.2) 2000 (1.1)     Stool  0     Total Output 300 2000     Net -300 -1760            Urine Occurrence  2 x     Stool Occurrence  2 x             Intake/Output Summary (Last 24 hours) at 4/28/2024 1700  Last data filed at 4/27/2024 2235  Gross per 24 hour   Intake 240 ml   Output 1100 ml   Net -860 ml         Physical Exam:  General: Well developed, well nourished, no acute distress  HEENT: Normocephalic, atraumatic, PERRL  CV: RR. Sutures in place at site of previous mediport  Resp: NWOB  GI:  Abdomen nondistended  :  Deferred  MSK: No muscle atrophy, cyanosis, peripheral edema, moving all extremities spontaneously  Skin/wounds:  No rashes, ulcers, erythema  Neuro:  CNII-XII grossly intact, alert and oriented to person, place, and time    Labs:  Troponin:  No results for input(s): "TROPONINI" in the last 72 hours.  CBC:  No results for input(s): "WBC", "RBC", "HGB", "HCT", "PLT", "MCV", "MCH", "MCHC" in the last 72 hours.  CMP:  Recent Labs     04/26/24  0214 04/27/24  0547   CALCIUM 8.5 8.8   ALBUMIN 3.2* 3.1*    137   K 4.2 4.4   CO2 21* 25   BUN 19.3 18.0   CREATININE 0.66 0.62   ALKPHOS 96 91   ALT 28 29   AST 38* 37*   BILITOT 0.4 " "0.5     Lactic Acid:  No results for input(s): "LACTATE" in the last 72 hours.  ETOH:  No results for input(s): "ETHANOL" in the last 72 hours.   Urine Drug Screen:  No results for input(s): "COCAINE", "OPIATE", "BARBITURATE", "AMPHETAMINE", "FENTANYL", "CANNABINOIDS", "MDMA" in the last 72 hours.    Invalid input(s): "BENZODIAZEPINE", "PHENCYCLIDINE"   ABG:  No results for input(s): "PH", "PCO2", "PO2", "HCO3", "BE", "POCSATURATED" in the last 72 hours.    Diagnostic Results:  CTA Chest Non-Coronary (PE Studies)   Final Result      No pulmonary embolism identified.      New 11 mm solid nodule in the right lower lobe, likely infectious or inflammatory.  Three month follow-up chest CT recommended to ensure resolution.         Electronically signed by: Adria Dooley   Date:    04/18/2024   Time:    13:52      X-Ray Chest AP Portable   Final Result      No active pulmonary disease         Electronically signed by: Domingo Hughes   Date:    04/18/2024   Time:    10:44          ASSESSMENT & PLAN:    Patient is a 79 yo F presenting with MRSA bacteremia, now s/p mediport removal. She is in need of vascular access for home TPN.     - Recommend placement of PICC line for home TPN  - Patient may be scheduled for mediport replacement as an outpatient    - When mediport is replaced, recommend very careful skin care and care of mediport site, as at the time of her mediport removal, she was found to have ulcerated skin at the access site, exposing the mediport to the environment. Pt reports she typically leaves the mediport accessed at all times. We would recommend against this, as it can contribute to ulceration, skin breakdown, and infection    Elizabeth Troncoso MD   [employer] General Surgery, PGY-4    "

## 2024-04-28 NOTE — PROGRESS NOTES
[facility]  Hospital Medicine Progress Note      Chief Complaint: fever     HPI: (personally reviewed by me and is documented from initial H&P)   78 y.o. female with a past medical history of hypertension, hyperlipidemia, PVD, DVT, short gut syndrome on TPN, chronic granulomatous infection, MRSA bacteremia, rheumatoid arthritis, cirrhosis, gallstone pancreatitis, breast cancer, anxiety, and depression.    Presented to [facility] on 4/18/2024 with c/o fever.  Patient reported fever and body aches began this morning. Patient is followed by Dr. Hoover and has had recurrent fevers for the past 2 months.  Patient is on suppressive Doxycycline secondary to previous MRSA bacteremia.  Patient receives infusions 4 times weekly via MediPort.  Patient reported her last TPN infusion was last night.  Patient reported her MediPort has been in place since 2019.  Patient had NM inflammatory whole-body scan on 04/16/2024 which revealed no scintigraphic evidence of localized infectious process.  On exam patient endorsed chronic diarrhea.  Patient was denied nausea, vomiting, dysuria, hematuria, cough, congestion, chest pain, and shortness of breath.     Initial vital signs in ED were /76, pulse 70, respirations 30, temperature 39.1° C, and SpO2 96% on 3 L nasal cannula.  Labs revealed WBC 8.70, sodium 134, CO2 21, and lactic acid 0.7.  Flu/RSV/COVID testing were negative.  Blood cultures were obtained.  Chest x-ray revealed no active pulmonary disease.  CTA chest revealed no pulmonary embolism identified with new 11 mm solid nodule in the right lower lobe likely infectious versus inflammatory.  Patient was placed on 3 L supplemental oxygen in ED and given Tylenol 1000 mg and IV Zosyn 4.5 g. Patient was admitted to hospital medicine service for further medical management. 2/2 blood cultures growing staph epidermidis patient currently has a MediPort - infected.  Id recommends to remove MediPort,  continue on IV vancomycin.    Interval History  Spoke to nurse with regard to plan of care, we will need to reconsult surgeon who removed MediPort to see if MediPort can be replaced.     __________________________________________________________________________________________________________________________________    Objective/physical exam:  Vital signs have been personally reviewed by me   General: Appears comfortable, no acute distress.  Neuro:  Awake alert oriented.     Integumentary: Warm, dry, intact.  Musculoskeletal: Purposeful movement noted.     Respiratory: No accessory muscle use. Breath sounds are equal.  Cardiovascular: Regular rate.       VITAL SIGNS: 24 HRS MIN & MAX LAST   Temp  Min: 97.8 °F (36.6 °C)  Max: 98.7 °F (37.1 °C) 98.5 °F (36.9 °C)   BP  Min: 118/61  Max: 144/68 134/70   Pulse  Min: 65  Max: 78  66   No data recorded 18   SpO2  Min: 93 %  Max: 96 % (!) 94 %     CTA Chest Non-Coronary (PE Studies)  Narrative: EXAMINATION:  CTA CHEST NON CORONARY (PE STUDIES)    CLINICAL HISTORY:  Pulmonary embolism (PE) suspected, unknown D-dimer;    TECHNIQUE:  CTA imaging of the chest after IV contrast. Axial, coronal and sagittal reconstructions, including MIP images, are reviewed. Dose length product 426 mGycm. Automatic exposure control, adjustment of mA/kV or iterative reconstruction technique used to limit radiation dose.    COMPARISON:  CT 03/01/2024    FINDINGS:  Diagnostic quality: Adequate    Pulmonary embolism: None identified.    Lung parenchyma: New 11 mm mean diameter solid nodule within the right lower lobe (series 7, image 66).  8 mm solid nodule medially in the right lower lobe unchanged from prior exam.  Stable site of pleuroparenchymal scarring in the superior right lung.    Pleural effusion: None.    Mediastinum/ellen: Right-sided MediPort catheter tip in the SVC.  Mild coronary artery calcification.  No pathologically enlarged lymph node.    Chest wall/axilla: Stable  appearance.    Upper abdomen: No significant findings.    Bones: No acute osseous process.  Right shoulder arthroplasty.  Impression: No pulmonary embolism identified.    New 11 mm solid nodule in the right lower lobe, likely infectious or inflammatory.  Three month follow-up chest CT recommended to ensure resolution.    Electronically signed by: Adria Dooley  Date:    04/18/2024  Time:    13:52  X-Ray Chest AP Portable  Narrative: EXAMINATION:  XR CHEST AP PORTABLE    CLINICAL HISTORY:  Sepsis;    TECHNIQUE:  Single frontal view of the chest was performed.    COMPARISON:  February 2, 2024    FINDINGS:  Examination reveals mediastinal cardiac silhouette to be within normal limits lung fields are clear and free of gross infiltrates atelectasis or effusions a peripherally calcified lesion is identified projecting at the right base similar to previous exams and likely related to breast  Impression: No active pulmonary disease    Electronically signed by: Domingo Hughes  Date:    04/18/2024  Time:    10:44    Recent Labs   Lab 04/22/24  0513 04/23/24  0411   WBC 5.32 5.31   RBC 3.78* 4.01*   HGB 10.4* 10.9*   HCT 33.8* 34.9*   MCV 89.4 87.0   MCH 27.5 27.2   MCHC 30.8* 31.2*   RDW 15.4 15.4    193   MPV 9.3 9.1       Recent Labs   Lab 04/25/24  0541 04/26/24  0214 04/27/24  0547    137 137   K 4.3 4.2 4.4   CO2 26 21* 25   BUN 16.3 19.3 18.0   CREATININE 0.62 0.66 0.62   CALCIUM 8.2* 8.5 8.8   ALBUMIN 3.0* 3.2* 3.1*   ALKPHOS 90 96 91   ALT 27 28 29   AST 36* 38* 37*   BILITOT 0.6 0.4 0.5     Microbiology Results (last 7 days)       Procedure Component Value Units Date/Time    Blood Culture [[id number]]  (Normal) Collected: 04/24/24 2016    Order Status: Completed Specimen: Blood Updated: 04/27/24 2200     CULTURE, BLOOD ([facility]) No Growth At 72 Hours    Blood Culture [[id number]]  (Normal) Collected: 04/19/24 2208    Order Status: Completed Specimen: Venous Blood Line Updated: 04/25/24 1000     CULTURE,  BLOOD ([facility]) No Growth at 5 days    Blood Culture [[id number]]  (Normal) Collected: 04/19/24 1925    Order Status: Completed Specimen: Blood, Venous Updated: 04/24/24 2100     CULTURE, BLOOD ([facility]) No Growth at 5 days    Blood culture x two cultures. Draw prior to antibiotics. [[id number]]  (Normal) Collected: 04/18/24 1106    Order Status: Completed Specimen: Blood Updated: 04/23/24 1300     CULTURE, BLOOD ([facility]) No Growth at 5 days    Stool Culture [[id number]]  (Normal) Collected: 04/19/24 1601    Order Status: Completed Specimen: Stool Updated: 04/22/24 0737     Stool Culture Negative for Salmonella, Shigella, Campylobacter, Vibrio, Aeromonas, Pleisiomonas,Yersinia, or Shiga Toxin 1 and 2.    Blood Culture [[id number]]  (Abnormal)  (Susceptibility) Collected: 04/18/24 1603    Order Status: Completed Specimen: Blood Updated: 04/22/24 0646     CULTURE, BLOOD ([facility]) Staphylococcus epidermidis      Staphylococcus hominis     GRAM STAIN Gram Positive Cocci, probable Staphylococcus      Seen in gram stain of broth only      2 of 2 bottles positive    Malaria Smear [[id number]] Collected: 04/18/24 1756    Order Status: Completed Specimen: Blood Updated: 04/21/24 1203     MALARIA SMEAR ([facility]) No Malarial or other blood borne parasites seen     Malaria Kit Negative             Scheduled Med:  Current Facility-Administered Medications   Medication Dose Route Frequency    busPIRone  15 mg Oral Daily    citalopram  20 mg Oral Daily    diphenoxylate-atropine 2.5-0.025 mg  2 tablet Oral BID    enoxparin  1 mg/kg Subcutaneous Q12H (treatment, non-standard time)    ergocalciferol  50,000 Units Oral Q7 Days    ferrous sulfate  1 tablet Oral BID    mirtazapine  15 mg Oral QHS    sodium chloride 0.9%  10 mL Intravenous Q6H    vancomycin (VANCOCIN) IV (PEDS and ADULTS)  1,000 mg Intravenous Q12H      Continuous Infusions:  Current Facility-Administered Medications   Medication Dose Route Frequency Last Rate Last Admin    Amino acid  4.25% - dextrose 5% (CLINIMIX-E) solution (1L provides 42.5 gm AA, 50 gm CHO (170 kcal/L dextrose), Na 35, K 30, Mg 5, Ca 4.5, Acetate 70, Cl 39, Phos 15)   Intravenous Continuous 75 mL/hr at 04/28/24 0910 New Bag at 04/28/24 0910    Amino acid 4.25% - dextrose 5% (CLINIMIX-E) solution (1L provides 42.5 gm AA, 50 gm CHO (170 kcal/L dextrose), Na 35, K 30, Mg 5, Ca 4.5, Acetate 70, Cl 39, Phos 15)   Intravenous Continuous            PRN Meds:    Current Facility-Administered Medications:     acetaminophen, 650 mg, Oral, Q8H PRN    acetaminophen, 650 mg, Oral, Q4H PRN    dextrose 10%, 12.5 g, Intravenous, PRN    dextrose 10%, 25 g, Intravenous, PRN    glucagon (human recombinant), 1 mg, Intramuscular, PRN    glucose, 16 g, Oral, PRN    glucose, 24 g, Oral, PRN    ondansetron, 4 mg, Intravenous, Q8H PRN    Flushing PICC/Midline Protocol, , , Until Discontinued **AND** sodium chloride 0.9%, 10 mL, Intravenous, Q6H **AND** sodium chloride 0.9%, 10 mL, Intravenous, PRN    vancomycin - pharmacy to dose, , Intravenous, pharmacy to manage frequency   __________________________________________________________________________________________________________________________________    Assessment/Plan:  Staph epidermis   MediPort infected   Chronic diarrhea   Right breast cancer  Hypertension  Hyperlipidemia  Peripheral vascular disease   History of DVT   Short gut syndrome on TPN   Chronic granulomatous infection   MRSA bacteremia history of   Rheumatoid arthritis  Cirrhosis   Gallstone pancreatitis   Anxiety/depression  11 mm solid nodule in the right lower lobe  Above present on admission     Anticipated discharge and Disposition when medically stable:  outpatient right breast mass follow-up with Dr. Gonzalez   11 mm solid nodule in the right lower lobe--followup needed     _______________________________________________________________________________________________________________________________  Fever secondary to  bacteremia. Mediport removed.  Staph epidermis bacteremia, status post MediPort removal  Blood cultures negative thus far @ 48hrs.   Possible plan for mediport on Monday(will need to reconsult surgeon to replace mediport; will need to followup with surgeon to see if possible.   Restart coumadin post mediport placement.   Check INR.     Continue to hold Coumadin, continue Lovenox  Continue supportive care  Continue checking vital signs q4hrs.    Patient remains on Clinimix for short gut syndrome.  Encourage ambulation; up to chair x3     Nurse notified to page me if any changes occur     DVT prophylaxis initiated   Nutrition Status: regular as tolerated.     Consults:  Infectious disease physician  I have personally reviewed the specialist documentation and/or have spoken to the specialist with regard to the care of this patient; recommendations are noted above.     _______________________________________________________________________________________________________________________________    I have spent >30 minutes on the day of the visit; time spent includes face to face time and non-face to face time preparing to see the patient (eg, review of tests), independently reviewing and interpreting medical records, both past and current; documenting clinical information in the electronic or other health record, and communicating results to the patient/family/caregiver and care coordinator and nursing team.      All diagnosis and differential diagnosis have been reviewed,  interpreted and communicated appropriately to care team. assessment and plan has been documented; I have personally reviewed the labs and test results that are presently available and pertinent to this hospital course; I have reviewed medical records based upon their availability.    All of the patient's questions have been  addressed and answered. Patient's is agreeable to the above stated plan.   I will continue to monitor closely and make  adjustments to medical management as needed.    Gabrielle Butler, DO   04/28/2024     This note was created with the assistance of Dragon voice recognition software. There may be transcription errors as a result of using this technology however minimal. Effort has been made to assure accuracy of transcription but any obvious errors or omissions should be clarified with the author of the document.

## 2024-04-29 VITALS
HEIGHT: 64 IN | BODY MASS INDEX: 27.48 KG/M2 | DIASTOLIC BLOOD PRESSURE: 74 MMHG | SYSTOLIC BLOOD PRESSURE: 128 MMHG | HEART RATE: 73 BPM | RESPIRATION RATE: 18 BRPM | OXYGEN SATURATION: 95 % | TEMPERATURE: 98 F | WEIGHT: 160.94 LBS

## 2024-04-29 PROBLEM — R78.81 BACTEREMIA: Status: ACTIVE | Noted: 2024-04-29

## 2024-04-29 LAB
BACTERIA BLD CULT: NORMAL
INR PPP: 1.1
PROTHROMBIN TIME: 13.8 SECONDS (ref 12.5–14.5)

## 2024-04-29 PROCEDURE — 25000003 PHARM REV CODE 250: Performed by: INTERNAL MEDICINE

## 2024-04-29 PROCEDURE — 36569 INSJ PICC 5 YR+ W/O IMAGING: CPT

## 2024-04-29 PROCEDURE — 02HV33Z INSERTION OF INFUSION DEVICE INTO SUPERIOR VENA CAVA, PERCUTANEOUS APPROACH: ICD-10-PCS | Performed by: RADIOLOGY

## 2024-04-29 PROCEDURE — 63600175 PHARM REV CODE 636 W HCPCS: Performed by: NURSE PRACTITIONER

## 2024-04-29 PROCEDURE — C1751 CATH, INF, PER/CENT/MIDLINE: HCPCS

## 2024-04-29 PROCEDURE — 25000003 PHARM REV CODE 250: Performed by: NURSE PRACTITIONER

## 2024-04-29 PROCEDURE — 25000003 PHARM REV CODE 250: Performed by: STUDENT IN AN ORGANIZED HEALTH CARE EDUCATION/TRAINING PROGRAM

## 2024-04-29 PROCEDURE — A4216 STERILE WATER/SALINE, 10 ML: HCPCS | Performed by: INTERNAL MEDICINE

## 2024-04-29 PROCEDURE — 85610 PROTHROMBIN TIME: CPT | Performed by: INTERNAL MEDICINE

## 2024-04-29 PROCEDURE — 63600175 PHARM REV CODE 636 W HCPCS: Performed by: INTERNAL MEDICINE

## 2024-04-29 PROCEDURE — 36415 COLL VENOUS BLD VENIPUNCTURE: CPT | Performed by: INTERNAL MEDICINE

## 2024-04-29 RX ORDER — SODIUM CHLORIDE 0.9 % (FLUSH) 0.9 %
10 SYRINGE (ML) INJECTION EVERY 6 HOURS
Status: DISCONTINUED | OUTPATIENT
Start: 2024-04-29 | End: 2024-04-29 | Stop reason: HOSPADM

## 2024-04-29 RX ORDER — FERROUS SULFATE 325(65) MG
325 TABLET, DELAYED RELEASE (ENTERIC COATED) ORAL DAILY
Qty: 30 TABLET | Refills: 0 | Status: SHIPPED | OUTPATIENT
Start: 2024-04-29

## 2024-04-29 RX ORDER — VANCOMYCIN HCL IN 5 % DEXTROSE 1G/250ML
1000 PLASTIC BAG, INJECTION (ML) INTRAVENOUS EVERY 12 HOURS
Start: 2024-04-29 | End: 2024-05-13

## 2024-04-29 RX ORDER — SODIUM CHLORIDE 0.9 % (FLUSH) 0.9 %
10 SYRINGE (ML) INJECTION
Status: DISCONTINUED | OUTPATIENT
Start: 2024-04-29 | End: 2024-04-29 | Stop reason: HOSPADM

## 2024-04-29 RX ADMIN — LEUCINE, PHENYLALANINE, LYSINE, METHIONINE, ISOLEUCINE, VALINE, HISTIDINE, THREONINE, TRYPTOPHAN, ALANINE, GLYCINE, ARGININE, PROLINE, SERINE, TYROSINE, SODIUM ACETATE, DIBASIC POTASSIUM PHOSPHATE, MAGNESIUM CHLORIDE, SODIUM CHLORIDE, CALCIUM CHLORIDE, DEXTROSE
311; 238; 247; 170; 255; 247; 204; 179; 77; 880; 438; 489; 289; 213; 17; 297; 261; 51; 77; 33; 5 INJECTION INTRAVENOUS at 04:04

## 2024-04-29 RX ADMIN — DIPHENOXYLATE HYDROCHLORIDE AND ATROPINE SULFATE 2 TABLET: .025; 2.5 TABLET ORAL at 08:04

## 2024-04-29 RX ADMIN — ENOXAPARIN SODIUM 70 MG: 80 INJECTION SUBCUTANEOUS at 06:04

## 2024-04-29 RX ADMIN — SODIUM CHLORIDE, PRESERVATIVE FREE 10 ML: 5 INJECTION INTRAVENOUS at 05:04

## 2024-04-29 RX ADMIN — VANCOMYCIN HYDROCHLORIDE 1000 MG: 1 INJECTION, POWDER, LYOPHILIZED, FOR SOLUTION INTRAVENOUS at 02:04

## 2024-04-29 RX ADMIN — SODIUM CHLORIDE, PRESERVATIVE FREE 10 ML: 5 INJECTION INTRAVENOUS at 12:04

## 2024-04-29 RX ADMIN — FERROUS SULFATE TAB 325 MG (65 MG ELEMENTAL FE) 1 EACH: 325 (65 FE) TAB at 08:04

## 2024-04-29 RX ADMIN — CITALOPRAM HYDROBROMIDE 20 MG: 20 TABLET ORAL at 08:04

## 2024-04-29 RX ADMIN — BUSPIRONE HYDROCHLORIDE 15 MG: 5 TABLET ORAL at 08:04

## 2024-04-29 NOTE — OP NOTE
Removal, Vascular Access Catheter  Procedure Note    Laura TORRES Use  4/24/2024    Pre-op Diagnosis: Fever, unspecified fever cause [R50.9]       Post-op Diagnosis: same    Procedure(s):  Removal, Vascular Access Catheter    Surgeon(s):  Reed Ghosh MD Jacques, Sarah, MD    Anesthesia: Monitor Anesthesia Care    Staff:   Circulator: Lynette Garcia RN  Scrub Person: Vianey Ma    Estimated Blood Loss: minimal               Specimens: none      Drains: None    Operative Technique:  Risks and benefits were discussed with patient and family at bedside, informed consent signed.  Patient was taken to the OR and placed supine, endotracheal intubation was successful.  The chest was then prepped and draped in sterile fashion.  A time out was completed to confirm correct patient, procedure, and all staff was in agreement.      Using a 10 blade an incision was made in the skin over the patients existing mediport.  This was carried down through the subcutaneous tissue with bovie electrocautery.  The capsule surrounding the mediport was entered and the port was dissected free of the surrounding tissue with blunt and sharp dissection.  Once completely freed the mediport and catheter were removed completely.  Pressure was held over the venous access site.  The catheter tract was then closed with vicryl suture.  Wound was irrigated with sterile saline and hemostasis was assured.  Wound was then closed with nylon suture.  Sterile dressing was applied.  Patient tolerated procedure and was transferred to recovery in stable condition.      SURGEON:  Reed Ghosh MD    Date: 4/24/2024  Time: 09:50 AM

## 2024-04-29 NOTE — PLAN OF CARE
04/29/24 1531   Final Note   Assessment Type Final Discharge Note   Anticipated Discharge Disposition Home-Health   Post-Acute Status   Post-Acute Authorization Home Health;IV Infusion   Home Health Status Set-up Complete/Auth obtained  (Resume with C)   IV Infusion Status Set-up Complete/Auth obtained  (TPN and Vanc through [employer])   Discharge Delays None known at this time     Per Charanjit with [employer], TPN and Vanc will be delivered to patient's house.

## 2024-04-29 NOTE — PROCEDURES
"Laura Acosta is a 78 y.o. female patient.    Temp: 98.2 °F (36.8 °C) (04/29/24 1220)  Pulse: 66 (04/29/24 1220)  Resp: 18 (04/29/24 0800)  BP: 131/70 (04/29/24 1220)  SpO2: 95 % (04/29/24 1220)  Weight: 73 kg (160 lb 15 oz) (04/18/24 0957)  Height: 5' 4" (162.6 cm) (04/18/24 0957)    PICC  Time out: Immediately prior to procedure a time out was called to verify the correct patient, procedure, equipment, support staff and site/side marked as required  Indications: med administration  Preparation: skin prepped with ChloraPrep  Skin prep agent dried: skin prep agent completely dried prior to procedure  Sterile barriers: all five maximum sterile barriers used - cap, mask, sterile gown, sterile gloves, and large sterile sheet  Hand hygiene: hand hygiene performed prior to central venous catheter insertion  Location details: left brachial  Catheter type: double lumen  Catheter size: 5 Fr  Catheter Length: 39cm    Ultrasound guidance: yes  Needle advanced into vessel with real time Ultrasound guidance.  Guidewire confirmed in vessel.  Sterile sheath used.            Name jayleen  4/29/2024    " N/A

## 2024-04-30 ENCOUNTER — PATIENT OUTREACH (OUTPATIENT)
Dept: ADMINISTRATIVE | Facility: CLINIC | Age: 79
End: 2024-04-30
Payer: MEDICARE

## 2024-04-30 NOTE — PROGRESS NOTES
C3 nurse spoke with Laura Acosta  for a TCC post hospital discharge follow up call. The patient has a scheduled [facility] appointment with Curt Felton MD  on 05/02/2024 @ 1115 am.

## 2024-05-01 PROBLEM — R91.1 PULMONARY NODULE 1 CM OR GREATER IN DIAMETER: Status: ACTIVE | Noted: 2024-05-01

## 2024-05-02 ENCOUNTER — LAB REQUISITION (OUTPATIENT)
Dept: LAB | Facility: HOSPITAL | Age: 79
End: 2024-05-02
Payer: MEDICARE

## 2024-05-02 DIAGNOSIS — E46 UNSPECIFIED PROTEIN-CALORIE MALNUTRITION: ICD-10-CM

## 2024-05-02 DIAGNOSIS — K91.2 POSTSURGICAL MALABSORPTION, NOT ELSEWHERE CLASSIFIED: ICD-10-CM

## 2024-05-02 LAB
ALBUMIN SERPL-MCNC: 3.2 G/DL (ref 3.4–4.8)
ALBUMIN/GLOB SERPL: 0.9 RATIO (ref 1.1–2)
ALP SERPL-CCNC: 99 UNIT/L (ref 40–150)
ALT SERPL-CCNC: 28 UNIT/L (ref 0–55)
AST SERPL-CCNC: 26 UNIT/L (ref 5–34)
BASOPHILS # BLD AUTO: 0.06 X10(3)/MCL
BASOPHILS NFR BLD AUTO: 1.1 %
BILIRUB SERPL-MCNC: 0.3 MG/DL
BUN SERPL-MCNC: 19.2 MG/DL (ref 9.8–20.1)
CALCIUM SERPL-MCNC: 8.6 MG/DL (ref 8.4–10.2)
CHLORIDE SERPL-SCNC: 109 MMOL/L (ref 98–107)
CO2 SERPL-SCNC: 27 MMOL/L (ref 23–31)
CREAT SERPL-MCNC: 0.61 MG/DL (ref 0.55–1.02)
EOSINOPHIL # BLD AUTO: 0.5 X10(3)/MCL (ref 0–0.9)
EOSINOPHIL NFR BLD AUTO: 9.5 %
ERYTHROCYTE [DISTWIDTH] IN BLOOD BY AUTOMATED COUNT: 15.1 % (ref 11.5–17)
ERYTHROCYTE [SEDIMENTATION RATE] IN BLOOD: 45 MM/HR (ref 0–20)
GFR SERPLBLD CREATININE-BSD FMLA CKD-EPI: >60 MLS/MIN/1.73/M2
GLOBULIN SER-MCNC: 3.6 GM/DL (ref 2.4–3.5)
GLUCOSE SERPL-MCNC: 74 MG/DL (ref 82–115)
HCT VFR BLD AUTO: 34.7 % (ref 37–47)
HGB BLD-MCNC: 11 G/DL (ref 12–16)
IMM GRANULOCYTES # BLD AUTO: 0.01 X10(3)/MCL (ref 0–0.04)
IMM GRANULOCYTES NFR BLD AUTO: 0.2 %
LYMPHOCYTES # BLD AUTO: 1.77 X10(3)/MCL (ref 0.6–4.6)
LYMPHOCYTES NFR BLD AUTO: 33.7 %
MCH RBC QN AUTO: 28.1 PG (ref 27–31)
MCHC RBC AUTO-ENTMCNC: 31.7 G/DL (ref 33–36)
MCV RBC AUTO: 88.5 FL (ref 80–94)
MONOCYTES # BLD AUTO: 0.61 X10(3)/MCL (ref 0.1–1.3)
MONOCYTES NFR BLD AUTO: 11.6 %
NEUTROPHILS # BLD AUTO: 2.31 X10(3)/MCL (ref 2.1–9.2)
NEUTROPHILS NFR BLD AUTO: 43.9 %
NRBC BLD AUTO-RTO: 0 %
PLATELET # BLD AUTO: 161 X10(3)/MCL (ref 130–400)
PMV BLD AUTO: 10.2 FL (ref 7.4–10.4)
POTASSIUM SERPL-SCNC: 4.1 MMOL/L (ref 3.5–5.1)
PROT SERPL-MCNC: 6.8 GM/DL (ref 5.8–7.6)
RBC # BLD AUTO: 3.92 X10(6)/MCL (ref 4.2–5.4)
SODIUM SERPL-SCNC: 142 MMOL/L (ref 136–145)
VANCOMYCIN TROUGH SERPL-MCNC: 15.1 UG/ML (ref 15–20)
WBC # SPEC AUTO: 5.26 X10(3)/MCL (ref 4.5–11.5)

## 2024-05-02 PROCEDURE — 80053 COMPREHEN METABOLIC PANEL: CPT | Performed by: INTERNAL MEDICINE

## 2024-05-02 PROCEDURE — 85652 RBC SED RATE AUTOMATED: CPT | Performed by: INTERNAL MEDICINE

## 2024-05-02 PROCEDURE — 85025 COMPLETE CBC W/AUTO DIFF WBC: CPT | Performed by: INTERNAL MEDICINE

## 2024-05-02 PROCEDURE — 80202 ASSAY OF VANCOMYCIN: CPT | Performed by: INTERNAL MEDICINE

## 2024-05-03 DIAGNOSIS — N64.89 SEROMA OF BREAST: ICD-10-CM

## 2024-05-03 DIAGNOSIS — N63.0 BREAST MASS IN FEMALE: Primary | ICD-10-CM

## 2024-05-06 ENCOUNTER — LAB REQUISITION (OUTPATIENT)
Dept: LAB | Facility: HOSPITAL | Age: 79
End: 2024-05-06
Payer: MEDICARE

## 2024-05-06 DIAGNOSIS — K91.2 POSTSURGICAL MALABSORPTION, NOT ELSEWHERE CLASSIFIED: ICD-10-CM

## 2024-05-06 DIAGNOSIS — E46 UNSPECIFIED PROTEIN-CALORIE MALNUTRITION: ICD-10-CM

## 2024-05-06 LAB
ALBUMIN SERPL-MCNC: 3.3 G/DL (ref 3.4–4.8)
ALBUMIN/GLOB SERPL: 0.9 RATIO (ref 1.1–2)
ALP SERPL-CCNC: 123 UNIT/L (ref 40–150)
ALT SERPL-CCNC: 43 UNIT/L (ref 0–55)
AST SERPL-CCNC: 47 UNIT/L (ref 5–34)
BASOPHILS # BLD AUTO: 0.03 X10(3)/MCL
BASOPHILS NFR BLD AUTO: 0.9 %
BILIRUB SERPL-MCNC: 0.5 MG/DL
BUN SERPL-MCNC: 16.1 MG/DL (ref 9.8–20.1)
CALCIUM SERPL-MCNC: 8.8 MG/DL (ref 8.4–10.2)
CHLORIDE SERPL-SCNC: 111 MMOL/L (ref 98–107)
CO2 SERPL-SCNC: 24 MMOL/L (ref 23–31)
CREAT SERPL-MCNC: 0.69 MG/DL (ref 0.55–1.02)
CRP SERPL-MCNC: 11.1 MG/L
EOSINOPHIL # BLD AUTO: 0.38 X10(3)/MCL (ref 0–0.9)
EOSINOPHIL NFR BLD AUTO: 11.7 %
ERYTHROCYTE [DISTWIDTH] IN BLOOD BY AUTOMATED COUNT: 14.9 % (ref 11.5–17)
ERYTHROCYTE [SEDIMENTATION RATE] IN BLOOD: 22 MM/HR (ref 0–20)
GFR SERPLBLD CREATININE-BSD FMLA CKD-EPI: >60 MLS/MIN/1.73/M2
GLOBULIN SER-MCNC: 3.6 GM/DL (ref 2.4–3.5)
GLUCOSE SERPL-MCNC: 118 MG/DL (ref 82–115)
HCT VFR BLD AUTO: 41.4 % (ref 37–47)
HGB BLD-MCNC: 13.2 G/DL (ref 12–16)
IMM GRANULOCYTES # BLD AUTO: 0 X10(3)/MCL (ref 0–0.04)
IMM GRANULOCYTES NFR BLD AUTO: 0 %
INR PPP: 2.1
LYMPHOCYTES # BLD AUTO: 1.16 X10(3)/MCL (ref 0.6–4.6)
LYMPHOCYTES NFR BLD AUTO: 35.7 %
MCH RBC QN AUTO: 27.7 PG (ref 27–31)
MCHC RBC AUTO-ENTMCNC: 31.9 G/DL (ref 33–36)
MCV RBC AUTO: 86.8 FL (ref 80–94)
MONOCYTES # BLD AUTO: 0.34 X10(3)/MCL (ref 0.1–1.3)
MONOCYTES NFR BLD AUTO: 10.5 %
NEUTROPHILS # BLD AUTO: 1.34 X10(3)/MCL (ref 2.1–9.2)
NEUTROPHILS NFR BLD AUTO: 41.2 %
NRBC BLD AUTO-RTO: 0 %
PLATELET # BLD AUTO: 142 X10(3)/MCL (ref 130–400)
PMV BLD AUTO: 10.1 FL (ref 7.4–10.4)
POTASSIUM SERPL-SCNC: 3.7 MMOL/L (ref 3.5–5.1)
PROT SERPL-MCNC: 6.9 GM/DL (ref 5.8–7.6)
PROTHROMBIN TIME: 23 SECONDS (ref 12.5–14.5)
RBC # BLD AUTO: 4.77 X10(6)/MCL (ref 4.2–5.4)
SODIUM SERPL-SCNC: 143 MMOL/L (ref 136–145)
VANCOMYCIN TROUGH SERPL-MCNC: 18.1 UG/ML (ref 15–20)
WBC # SPEC AUTO: 3.25 X10(3)/MCL (ref 4.5–11.5)

## 2024-05-06 PROCEDURE — 85025 COMPLETE CBC W/AUTO DIFF WBC: CPT | Performed by: INTERNAL MEDICINE

## 2024-05-06 PROCEDURE — 80202 ASSAY OF VANCOMYCIN: CPT | Performed by: INTERNAL MEDICINE

## 2024-05-06 PROCEDURE — 85610 PROTHROMBIN TIME: CPT | Performed by: INTERNAL MEDICINE

## 2024-05-06 PROCEDURE — 85652 RBC SED RATE AUTOMATED: CPT | Performed by: INTERNAL MEDICINE

## 2024-05-06 PROCEDURE — 86140 C-REACTIVE PROTEIN: CPT | Performed by: INTERNAL MEDICINE

## 2024-05-06 PROCEDURE — 80053 COMPREHEN METABOLIC PANEL: CPT | Performed by: INTERNAL MEDICINE

## 2024-05-08 DIAGNOSIS — R91.1 PULMONARY NODULE 1 CM OR GREATER IN DIAMETER: Primary | ICD-10-CM

## 2024-05-14 NOTE — DISCHARGE SUMMARY
[facility] Hospital Medicine Discharge Summary    Admit Date: 4/18/2024  Discharge Date 4/29/24  Admitting Physician:  Team  Discharging Physician: Gabrielle Butler DO.  Primary Care Physician: Curt Felton MD    Discharge Diagnoses:  Staph epidermis   MediPort infected   Chronic diarrhea   Right breast cancer  Hypertension  Hyperlipidemia  Peripheral vascular disease   History of DVT   Short gut syndrome on TPN   Chronic granulomatous infection   MRSA bacteremia history of   Rheumatoid arthritis  Cirrhosis   Gallstone pancreatitis   Anxiety/depression  11 mm solid nodule in the right lower lobe  Above present on admission     Hospital Course:   Chief Complaint: fever      HPI: (personally reviewed by me and is documented from initial H&P)   78 y.o. female with a past medical history of hypertension, hyperlipidemia, PVD, DVT, short gut syndrome on TPN, chronic granulomatous infection, MRSA bacteremia, rheumatoid arthritis, cirrhosis, gallstone pancreatitis, breast cancer, anxiety, and depression.     Presented to [facility] on 4/18/2024 with c/o fever.  Patient reported fever and body aches began this morning. Patient is followed by Dr. Hoover and has had recurrent fevers for the past 2 months.  Patient is on suppressive Doxycycline secondary to previous MRSA bacteremia.  Patient receives infusions 4 times weekly via MediPort.  Patient reported her last TPN infusion was last night.  Patient reported her MediPort has been in place since 2019.  Patient had NM inflammatory whole-body scan on 04/16/2024 which revealed no scintigraphic evidence of localized infectious process.  On exam patient endorsed chronic diarrhea.  Patient was denied nausea, vomiting, dysuria, hematuria, cough, congestion, chest pain, and shortness of breath.      Initial vital signs in ED were /76, pulse 70, respirations 30, temperature 39.1° C, and SpO2 96% on 3 L nasal cannula.  Labs revealed WBC 8.70,  sodium 134, CO2 21, and lactic acid 0.7.  Flu/RSV/COVID testing were negative.  Blood cultures were obtained.  Chest x-ray revealed no active pulmonary disease.  CTA chest revealed no pulmonary embolism identified with new 11 mm solid nodule in the right lower lobe likely infectious versus inflammatory.  Patient was placed on 3 L supplemental oxygen in ED and given Tylenol 1000 mg and IV Zosyn 4.5 g. Patient was admitted to hospital medicine service for further medical management. 2/2 blood cultures growing staph epidermidis patient currently has a MediPort - infected.  Id recommends to remove MediPort, continue on IV vancomycin.     Interval History     Fever secondary to bacteremia. Mediport removed.  Staph epidermis bacteremia, status post MediPort removal. Blood cultures negative thus far @ 48hrs.   PICC line placed, patient will followup with surgeon outpatient for MEDIPORT re-evaluation     Coumadin restarted bridged appropriately at discharge with follow-up with home health.  Patient to continue Clinimix for short gut syndrome.        Hospital course and discharge care plan has been discussed with patient, patient voices understanding and agreement with plan. All questions have been answered to the best of my ability. Patient is advised to return to ED or call 911 in case of emergency and or if symptoms worsen.      Vitals:  VITAL SIGNS: 24 HRS MIN & MAX LAST   No data recorded 98.1 °F (36.7 °C)   No data recorded 128/74   No data recorded  73   No data recorded 18   No data recorded 95 %       Physical Exam:  Vital signs have been personally reviewed by me   General: Appears comfortable, no acute distress.  Neuro:  Awake alert oriented.     Integumentary: Warm, dry, intact.  Musculoskeletal: Purposeful movement noted.     Respiratory: No accessory muscle use. Breath sounds are equal.  Cardiovascular: Regular rate.        Procedures Performed: No admission procedures for hospital encounter.     Significant  "Diagnostic Studies: See Full reports for all details    No results for input(s): "WBC", "RBC", "HGB", "HCT", "MCV", "MCH", "MCHC", "RDW", "PLT", "MPV", "GRAN", "LYMPH", "MONO", "BASO", "NRBC" in the last 168 hours.    No results for input(s): "NA", "K", "CL", "CO2", "ANIONGAP", "BUN", "CREATININE", "GLU", "CALCIUM", "PH", "MG", "ALBUMIN", "PROT", "ALKPHOS", "ALT", "AST", "BILITOT" in the last 168 hours.     Microbiology Results (last 7 days)       Procedure Component Value Units Date/Time    Blood Culture [[id number]]  (Normal) Collected: 04/19/24 2208    Order Status: Completed Specimen: Venous Blood Line Updated: 04/25/24 1000     Blood Culture No Growth at 5 days    Blood Culture [[id number]]  (Normal) Collected: 04/19/24 1925    Order Status: Completed Specimen: Blood, Venous Updated: 04/24/24 2100     Blood Culture No Growth at 5 days    Blood culture x two cultures. Draw prior to antibiotics. [[id number]]  (Normal) Collected: 04/18/24 1106    Order Status: Completed Specimen: Blood Updated: 04/23/24 1300     Blood Culture No Growth at 5 days             X-Ray Chest 1 View  Narrative: EXAMINATION:  XR CHEST 1 VIEW    CLINICAL HISTORY:  picc;    TECHNIQUE:  One    COMPARISON:  April 18, 2024.    FINDINGS:  Left upper extremity approach PICC line terminates within the superior vena cava.  Interval removal of right chest implanted MediPort.  Cardiopericardial silhouette is within normal limits.  Right lower chest remote calcification similar.  No acute dense focal or segmental consolidation, congestive process, pleural effusions or pneumothorax.  Impression: PICC line terminates within the superior vena cava.    Electronically signed by: Isaac Richardson  Date:    04/29/2024  Time:    12:40         Medication List        START taking these medications      ferrous sulfate 325 (65 FE) MG EC tablet  Take 1 tablet (325 mg total) by mouth once daily.            CONTINUE taking these medications      busPIRone 15 MG " tablet  Commonly known as: BUSPAR     CeleXA 40 mg tablet  Generic drug: citalopram     diphenoxylate-atropine 2.5-0.025 mg 2.5-0.025 mg per tablet  Commonly known as: LOMOTIL     metoprolol tartrate 100 MG tablet  Commonly known as: LOPRESSOR     mirtazapine 15 MG tablet  Commonly known as: REMERON     solifenacin 5 MG tablet  Commonly known as: VESICARE     VITAMIN D2 1,250 mcg (50,000 unit) capsule  Generic drug: ergocalciferol     * warfarin 2.5 MG tablet  Commonly known as: COUMADIN     * warfarin 2.5 MG tablet  Commonly known as: COUMADIN           * This list has 2 medication(s) that are the same as other medications prescribed for you. Read the directions carefully, and ask your doctor or other care provider to review them with you.                STOP taking these medications      doxycycline 100 MG tablet  Commonly known as: VIBRA-TABS            ASK your doctor about these medications      vancomycin in dextrose 5 % 1 gram/250 mL Soln  Inject 250 mLs (1,000 mg total) into the vein every 12 (twelve) hours. for 14 days  Ask about: Should I take this medication?               Where to Get Your Medications        These medications were sent to [facility] [address] [address]      Phone: [phone]   ferrous sulfate 325 (65 FE) MG EC tablet       Information about where to get these medications is not yet available    Ask your nurse or doctor about these medications  vancomycin in dextrose 5 % 1 gram/250 mL Soln          Explained in detail to the patient about the discharge plan, medications, and follow-up visits. Pt understands and agrees with the treatment plan  Discharge Disposition: Home-Health Care [facility] [place]   Discharged Condition: stable  Diet-    Medications Per DC med rec  Activities as tolerated   Contact information for after-discharge care       Home Medical Care       [facility] .    Service: Home Health Services  Contact  information:  [address]  [facility] [address]  [phone]             [facility] .    Service: Home Infusion and Injection  Contact information:  [address], [address]  [facility] [id number] [phone]                                 For further questions contact hospitalist office    Discharge time 33 minutes    For worsening symptoms, chest pain, shortness of breath, increased abdominal pain, high grade fever, stroke or stroke like symptoms, immediately go to the nearest Emergency Room or call 911 as soon as possible.      Gabrielle Farfan M.D,  Date of Service 4/29/24

## 2024-05-15 ENCOUNTER — OFFICE VISIT (OUTPATIENT)
Dept: SURGERY | Facility: CLINIC | Age: 79
End: 2024-05-15
Payer: MEDICARE

## 2024-05-15 VITALS
HEIGHT: 64 IN | TEMPERATURE: 98 F | HEART RATE: 52 BPM | SYSTOLIC BLOOD PRESSURE: 133 MMHG | WEIGHT: 163 LBS | OXYGEN SATURATION: 96 % | DIASTOLIC BLOOD PRESSURE: 72 MMHG | RESPIRATION RATE: 16 BRPM | BODY MASS INDEX: 27.83 KG/M2

## 2024-05-15 DIAGNOSIS — N64.89 SEROMA OF BREAST: ICD-10-CM

## 2024-05-15 DIAGNOSIS — N63.0 BREAST MASS IN FEMALE: ICD-10-CM

## 2024-05-15 DIAGNOSIS — N63.15 MASS OVERLAPPING MULTIPLE QUADRANTS OF RIGHT BREAST: Primary | ICD-10-CM

## 2024-05-15 PROCEDURE — 99999 PR PBB SHADOW E&M-EST. PATIENT-LVL V: CPT | Mod: PBBFAC,,, | Performed by: PHYSICIAN ASSISTANT

## 2024-05-15 PROCEDURE — 99205 OFFICE O/P NEW HI 60 MIN: CPT | Mod: S$PBB,,, | Performed by: PHYSICIAN ASSISTANT

## 2024-05-15 PROCEDURE — 99215 OFFICE O/P EST HI 40 MIN: CPT | Mod: PBBFAC | Performed by: PHYSICIAN ASSISTANT

## 2024-05-15 RX ORDER — MULTIVITAMIN
1 TABLET ORAL DAILY
COMMUNITY

## 2024-05-15 NOTE — PROGRESS NOTES
[facility] Breast Surg  Breast Surgical Oncology  New Patient Office Visit - H&P      Referring Provider: Dr. Curt Felton  PCP: Curt Felton MD    Chief Complaint:   Chief Complaint   Patient presents with    Breast Mass     Patient reports cyst in right breast x 12-15 years, swelling, intermittent tenderness, no redness        Subjective:     HPI:  Laura Acosta is a 78 y.o. female with PMH of right breast cancer who presents on 5/15/2024 for evaluation of right breast mass which has been present for many years and was recently incidentally seen on CT scan while working up fever of unknown origin. The mass is located at lumpectomy site from previous breast cancer diagnosis 30 years ago. This mass was drained several times in the past. Over time she lost touch with surgeon and drainage of the mass was discontinued.  She reports that over the last 5 years she has noticed that the mass has gradually grown in size and has become slightly more nodular. This mass is tender to palpation. She currently denies any other breast issues including rashes, redness, pain, swelling, nipple discharge, or new lumps/masses.    Imaging:   Bilateral DG MG 5/20/2020; BIRADS 2; Benign - No mammographic evidence of malignancy is seen involving either breast.  No sonographic evidence of malignancy is seen involving the 2:00 to 3:00 right breast. The area of palpable concern in the 2:00 to 3:00 right breast corresponds to a heavily calcified benign seroma at the patient's lumpectomy site.  Right breast US 5/20/2020; BIRADS 2; Benign-A 36 x 25 x 29 mm oval, circumscribed mass at 2:00 to 3:00, 1-6 cm from the nipple, which demonstrates echogenicity consistent with calcifications throughout its periphery. Benign-appearing lymph nodes are noted in the right axilla  3.   CT chest w/o contrast 3/1/2024 - There is an area peripheral coarse calcification at the medial right breast. There is mild right apical  pleural/parenchymal increased density which is most suggestive of scarring. There is additional minimal subsegmental scarring at the peripheral middle lobe and anterior lingula. There is no acute pulmonary or pleural abnormality.  The large central airways are widely patent without filling defect although there is mild cylindrical bronchiectasis at the middle lobe. A subcentimeter calcified granuloma is noted in the left upper lobe. A right upper anterior chest wall MediPort is present with distal catheter tip at the lower SVC. Chronic findings as detailed above with no acute pathology at the chest and no abnormality identified to explain fever.   4.   CT Chest (PE study) 2024 - New 11 mm solid nodule in the right lower lobe, likely infectious or inflammatory.  Three month follow-up chest CT recommended to ensure resolution.    Pathology:   None     OB/GYN History:  Age at Menarche Onset: 12  Menopausal Status: postmenopausal, LMP: No LMP recorded. Patient has had a hysterectomy.  Hysterectomy/Oophorectomy: hysterectomy, at age 38  Hormonal birth control (duration): 1 year  Pregnancy History:   Age at first live birth: 21  Hormone Replacement Therapy: No, none    Other:  MG breast density: BIRADS B  Prior thoracic RT: yes   Genetic testing: yes  Ashkenazi Yarsanism descent: No    Family History:  Family History   Problem Relation Name Age of Onset    Pneumonia Mother      Depression Mother      Osteoarthritis Mother      Breast cancer Mother  70 - 75    Uterine cancer Mother  40 - 45    Bone cancer Mother  60 - 69    Heart attack Father      Aneurysm Father          brain    Diabetes Father      Hyperlipidemia Father      Hypertension Father      Hyperlipidemia Sister      Hypertension Sister      Kidney cancer Sister  55 - 56    Osteoarthritis Sister      Breast cancer Sister  52 - 53    Hyperlipidemia Brother      Hypertension Brother      Coronary artery disease Brother          CABG x3    Hyperlipidemia  Brother      Hypertension Brother      Kidney cancer Paternal Grandmother  60 - 69        Patient History:  Past Medical History:   Diagnosis Date    Acute URI     Anxiety and depression     Back pain     Bacteremia 04/29/2024    Breast cancer     Cholelithiasis     Contaminated small bowel syndrome     DVT (deep venous thrombosis)     Edema, lower extremity     Gallstone pancreatitis     HTN (hypertension)     Insomnia     Irregular heart beat     Ischemic disease of gut     Laryngitis     Malabsorption     Meningitis, unspecified     OA (osteoarthritis)     PVD (peripheral vascular disease)     Rheumatoid arthritis, unspecified     Staph infection     infected mediport -- on doxycycline daily for prevention    Tremor     Unspecified cataract     Unspecified cirrhosis of liver 06/20/2023    Dr Carroll       Active Problem List with Overview Notes    Diagnosis Date Noted    Pulmonary nodule 1 cm or greater in diameter 05/01/2024     11mm RLL seen on CTA chest 4/2024  Rad recommend repeat CT in 3 months to monitor      Bacteremia 04/29/2024    Hepatic cirrhosis 09/05/2023     Followed by Dr Carroll  Had EGD - neg for varicies      Overactive bladder 09/03/2023     Continue Oxybutynin 10 mg      History of kidney stones 03/16/2023    Hematuria 03/16/2023    Osteoporosis 03/02/2023     Followed by Dr Roney Moreno  Dexa 2023--need report  On Prolia      Vitamin D deficiency 11/01/2022        Continue current vit d supplementation      Anemia 11/01/2022     stable      Anxiety 07/03/2022     Continue Celexa 40 mg and Buspar 10mg bid      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     had resection 4 yrs ago  Malnourishment as a result  Receives TPN      Arthritis 07/03/2022    Calculus of kidney 07/03/2022    Deep vein thrombosis (DVT) of lower extremity 07/03/2022     4 yrs ago-pt on coumadin  Followed by Coumadin clinic      Depressive disorder 07/03/2022     Continue celexa 40 mg  remeron 15 mg qhs       Hypertension 07/03/2022     Problem automatically updated based on documentation on Capillary Refills, Brachial Pulses, Radial Pulses, Femoral Pulses, Dorsalis Pulses, Ulnar pulses, Popliteal Pulses, Postibial Pulses, Edemas, Affect/Behavior, Orientation Assessment, Arterial Line Site.      Malignant neoplasm of breast 07/03/2022     25 yrs ago--right breast lumpectomy  2020-had dx MMG and u/s that showed calcified seroma at site of previous lumpectomy, nothing worrisome for cancer  Recommend outpt follow up with Dr Gonzalez        Palpitations 07/03/2022     Continue metoprolol 100 mg      Staphylococcus infection 07/03/2022     Blood culture x 2 Staph epi  On IV Vancomycin until May 8th  Mediport removed 4/24/24  PICC line placed for temporary access for home IV vanc and TPN      Immunization due 07/03/2022     utd  Annual flu shot      Severe protein-calorie malnutrition      receives TPN at home daily- per PICC line  Due to short bowel syndrome following ischemic colitis      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     Continue coumadin - hx DVT and ischemic bowel          Past Surgical History:   Procedure Laterality Date    APPENDECTOMY      BOWEL RESECTION      BREAST LUMPECTOMY Right     CHOLECYSTECTOMY  05/2015    COLONOSCOPY  02/2022    repeat 3 years    CYST REMOVAL Right     breast    CYSTOSCOPY W/ STONE MANIPULATION      CYSTOSCOPY W/ URETERAL STENT PLACEMENT  12/2020    CYSTOSCOPY W/ URETERAL STENT REMOVAL  04/2021    CYSTOURETEROSCOPY, WITH HOLMIUM LASER LITHOTRIPSY OF URETERAL CALCULUS AND STENT INSERTION Left 05/02/2023    Procedure: CYSTOSCOPY, WITH URETERAL STENT INSERTION Left;  Surgeon: Jared Felix MD;  Location: [facility] OR;  Service: Urology;  Laterality: Left;    EGD, WITH CLOSED BIOPSY N/A 6/20/2023    Procedure: EGD;  Surgeon: Aashish Carroll MD;  Location: [facility] ENDOSCOPY;  Service: Gastroenterology;  Laterality: N/A;    ENDOSCOPIC RETROGRADE CHOLANGIOPANCREATOGRAPHY W/ SPHINCTEROTOMY  AND STONE REMOVAL  04/2015    ESOPHAGOGASTRODUODENOSCOPY  06/20/2023    Dr Carroll - no signs esophageal varicies --repeat 3 yrs    GI Biopsy  02/15/2022    GI Biopsy  12/2018    HYSTERECTOMY      LAPAROTOMY, EXPLORATORY  06/2015    LITHOTRIPSY Left 04/2021    LITHOTRIPSY Left 03/2021    MEDIPORT INSERTION, SINGLE  06/2021    MEDIPORT INSERTION, SINGLE  07/2020    MEDIPORT INSERTION, SINGLE  12/2018    MEDIPORT REMOVAL  07/2020    PHACOEMULSIFICATION OF CATARACT Left 12/2016    PHACOEMULSIFICATION OF CATARACT Right 11/2016    PICC line Removal Right 06/2021    POLYPECTOMY  02/2022    PVD surgery      REMOVAL OF CATHETER  12/2020    REMOVAL OF VASCULAR ACCESS CATHETER Right 4/24/2024    Procedure: Removal, Vascular Access Catheter;  Surgeon: Reed Ghosh MD;  Location: [facility];  Service: General;  Laterality: Right;    SHOULDER SURGERY Right     shoulder replacement    SPHINCTEROTOMY OF URETHRA      VENTRAL HERNIA REPAIR  04/2016       Social History     Socioeconomic History    Marital status:     Number of children: 2   Occupational History    Occupation: Retired   Tobacco Use    Smoking status: Never     Passive exposure: Past    Smokeless tobacco: Never   Substance and Sexual Activity    Alcohol use: Not Currently    Drug use: Never    Sexual activity: Not Currently     Social Determinants of Health     Financial Resource Strain: Low Risk  (4/20/2024)    Overall Financial Resource Strain (CARDIA)     Difficulty of Paying Living Expenses: Not hard at all   Food Insecurity: No Food Insecurity (4/20/2024)    Hunger Vital Sign     Worried About Running Out of Food in the Last Year: Never true     Ran Out of Food in the Last Year: Never true   Transportation Needs: No Transportation Needs (4/20/2024)    PRAPARE - Transportation     Lack of Transportation (Medical): No     Lack of Transportation (Non-Medical): No   Stress: No Stress Concern Present (4/20/2024)    [employer] -  Occupational Stress Questionnaire     Feeling of Stress : Not at all   Housing Stability: Low Risk  (4/20/2024)    Housing Stability Vital Sign     Unable to Pay for Housing in the Last Year: No     Homeless in the Last Year: No       Immunization History   Administered Date(s) Administered    COVID-19 MRNA, LN-S PF (MODERNA HALF 0.25 ML DOSE) 11/01/2021    COVID-19, MRNA, LN-S, PF (MODERNA FULL 0.5 ML DOSE) 01/20/2021, 01/20/2021, 02/17/2021, 02/17/2021, 11/01/2021    Hepatitis A / Hepatitis B 09/05/2023    Influenza (FLUAD) - Quadrivalent - Adjuvanted - PF *Preferred* (65+) 12/14/2023    Influenza - High Dose - PF (65 years and older) 02/02/2017, 01/04/2018    Influenza - Quadrivalent - High Dose - PF (65 years and older) 11/02/2022    Influenza - Quadrivalent - PF *Preferred* (6 months and older) 10/09/2018, 11/06/2019, 11/05/2020, 12/10/2021    Influenza - Trivalent - PF (ADULT) 10/09/2018, 11/06/2019, 11/05/2020, 12/10/2021    Pneumococcal Conjugate - 13 Valent 01/27/2015    Pneumococcal Polysaccharide - 23 Valent 02/02/2017       Medications/Allergies:    Current Outpatient Medications:     busPIRone (BUSPAR) 15 MG tablet, Take 15 mg by mouth 2 (two) times daily., Disp: , Rfl:     CELEXA 40 mg tablet, Take 40 mg by mouth once daily., Disp: , Rfl:     diphenoxylate-atropine 2.5-0.025 mg (LOMOTIL) 2.5-0.025 mg per tablet, Take 2 tablets by mouth 2 (two) times a day., Disp: , Rfl:     ferrous sulfate 325 (65 FE) MG EC tablet, Take 1 tablet (325 mg total) by mouth once daily. (Patient taking differently: Take 325 mg by mouth 2 (two) times daily.), Disp: 30 tablet, Rfl: 0    metoprolol tartrate (LOPRESSOR) 100 MG tablet, Take 100 mg by mouth 2 (two) times daily., Disp: , Rfl:     mirtazapine (REMERON) 15 MG tablet, Take 15 mg by mouth every evening., Disp: , Rfl:     multivitamin (ONE DAILY MULTIVITAMIN) per tablet, Take 1 tablet by mouth once daily., Disp: , Rfl:     solifenacin (VESICARE) 5 MG tablet, Take 5 mg  "by mouth once daily., Disp: , Rfl:     VITAMIN D2 1,250 mcg (50,000 unit) capsule, TAKE ONE CAPSULE BY MOUTH three times PER WEEK, Disp: , Rfl:     warfarin (COUMADIN) 2.5 MG tablet, TAKE ONE TABLET BY MOUTH ONCE DAILY IN THE EVENING OR AS DIRECTED BY CIS PROVIDER, Disp: , Rfl:     doxycycline (VIBRAMYCIN) 100 MG Cap, Take 100 mg by mouth every 12 (twelve) hours. (Patient not taking: Reported on 4/30/2024), Disp: , Rfl:     warfarin (COUMADIN) 2.5 MG tablet, Take 2.5 mg by mouth once. Take two tablets by mouth on Monday and take one tablet all other days (Patient not taking: Reported on 5/15/2024), Disp: , Rfl:      Review of patient's allergies indicates:   Allergen Reactions    Hydromorphone Itching     Other reaction(s): itching    Opium      Other reaction(s): itching  has itching with larger doses. Smaller doses do not cause a reaction.            Objective:     Vitals:  Vitals:    05/15/24 1311 05/15/24 1328   BP: (!) 113/58 133/72   BP Location: Right arm Left forearm   Patient Position: Sitting Sitting   BP Method: Medium (Automatic) Medium (Automatic)   Pulse: (!) 52    Resp: 16    Temp: 98 °F (36.7 °C)    TempSrc: Oral    SpO2: 96%    Weight: 73.9 kg (163 lb)    Height: 5' 4" (1.626 m)       Body mass index is 27.98 kg/m².     Physical Exam:  General: The patient is awake, alert and oriented times three. The patient is well nourished and in no acute distress.  Neck: There is no evidence of palpable cervical, supraclavicular or axillary adenopathy. The neck is supple. The thyroid is not enlarged.  Musculoskeletal:The patient has significantly decreased ROM in her right shoulder. The patient has a normal range of motion of her left upper extremity.  Breast:   Right: Examination of right breast reveals a 6 cm irregular nodular mass at 2:00 to 3:00, 1-6 cm from the nipple. It is located in the same location as the scar from her past lumpectomy. The mass is hard, non-mobile, and fixed to the skin. The " nipple/areola is retracted medially due to scarring. The remainder of the right breast fails to reveal any dominant masses or areas of significant focal nodularity. The nipple is everted without evidence of discharge. There is no skin dimpling with movement of the pectoralis. There is no significant skin changes overlying the breast.   Left: Examination of the left breast fails to reveal any dominant masses or areas of significant focal nodularity. The nipple is everted without evidence of discharge. There is no skin dimpling with movement of the pectoralis. There are no significant skin changes overlying the breast.  Integumentary: no rashes or skin lesions present  Neurologic: cranial nerves intact, no signs of peripheral neurological deficit, motor/sensory function intact    Assessment:     Patient Active Problem List   Diagnosis    Anxiety    Acquired short bowel syndrome    Arthritis    Calculus of kidney    Deep vein thrombosis (DVT) of lower extremity    Depressive disorder    Hypertension    Malignant neoplasm of breast    Palpitations    Staphylococcus infection    Severe protein-calorie malnutrition    Immunization due    Vitamin D deficiency    Anemia    Ischemic disease of gut    Osteoporosis    History of kidney stones    Hematuria    Overactive bladder    Hepatic cirrhosis    Bacteremia    Pulmonary nodule 1 cm or greater in diameter      Calcified Seroma:  -Laura was seen today for breast mass, which is consistent with a calcified seroma on physical exam as it is located in the same location as seroma from previous lumpectomy. However, she is due to screening mammogram and describes changes to the breast since her last mammogram. Therefore, recommend diagnostic work up.        Plan:   -Bilateral DG MG with unilateral US ordered followed by RTC for imaging review   -Patient advised to continue with plan for repeat CT for evaluation of pulmonary nodule and follow up with PCP      All of her questions  were answered. She was advised to call if she develops any questions or concerns.    MAXI Bond PA-S2 Rachel Trepnagier, PA-C      --------------------------------------------------------------------------------------------------------------  Total time on the date of the visit ranged from 60-74 mins (13159). Total time includes both face-to-face and non-face-to-face time personally spent by myself on the day of the visit.    Non-face-to-face time included:  _X_ preparing to see the patient such as reviewing the patient record  _X_ obtaining and reviewing separately obtained history  _X_ independently interpreting results  _X_ documenting clinical information in electronic health record.    Face-to-face time included:  _X_ performing an appropriate history and examination  _X_ communicating results to the patient  _X_ counseling and educating the patient  __ ordering appropriate medications  _x_ ordering appropriate tests  _X_ ordering appropriate procedures (including follow-up)  _X_ answering any questions the patient had    Total Time spent on date of visit: 61 minutes

## 2024-05-29 ENCOUNTER — TELEPHONE (OUTPATIENT)
Dept: SURGERY | Facility: CLINIC | Age: 79
End: 2024-05-29
Payer: MEDICARE

## 2024-05-29 NOTE — TELEPHONE ENCOUNTER
----- Message from Cheryl Mack, BIANCA sent at 5/29/2024  2:41 PM CDT -----  Regarding: RE: Est Pt  Noted. Thanks  ----- Message -----  From: Rut Hong  Sent: 5/29/2024   1:53 PM CDT  To: [facility] Staff  Subject: Est Pt                                           Referred to: Dr WEST    Diagnosis/Reason for consult: Mediport was removed - needs new one     Previous testing: in chart    History of Bariatric OR Abdominal Sx: no     Scheduled on: 6/3/24 @ 1pm    Spoke with: Zamzam @ [facility] - they had put referral in epic at the beginning of the month but it was not sent to our work que and was not faxed to us for us to know. Pt was currently at their office - zamzam will be giving her the appt info     Insurance: Medicare / [employer] - cards in chart

## 2024-05-30 ENCOUNTER — LAB REQUISITION (OUTPATIENT)
Dept: LAB | Facility: HOSPITAL | Age: 79
End: 2024-05-30
Payer: MEDICARE

## 2024-05-30 DIAGNOSIS — K91.2 POSTSURGICAL MALABSORPTION, NOT ELSEWHERE CLASSIFIED: ICD-10-CM

## 2024-05-30 DIAGNOSIS — T80.211A BLOODSTREAM INFECTION DUE TO CENTRAL VENOUS CATHETER, INITIAL ENCOUNTER: ICD-10-CM

## 2024-05-30 LAB
ALBUMIN SERPL-MCNC: 3.4 G/DL (ref 3.4–4.8)
ALBUMIN/GLOB SERPL: 1.1 RATIO (ref 1.1–2)
ALP SERPL-CCNC: 93 UNIT/L (ref 40–150)
ALT SERPL-CCNC: 32 UNIT/L (ref 0–55)
ANION GAP SERPL CALC-SCNC: 7 MEQ/L
AST SERPL-CCNC: 36 UNIT/L (ref 5–34)
BASOPHILS # BLD AUTO: 0.04 X10(3)/MCL
BASOPHILS NFR BLD AUTO: 1.1 %
BILIRUB SERPL-MCNC: 0.5 MG/DL
BUN SERPL-MCNC: 18.8 MG/DL (ref 9.8–20.1)
CALCIUM SERPL-MCNC: 8.8 MG/DL (ref 8.4–10.2)
CHLORIDE SERPL-SCNC: 108 MMOL/L (ref 98–107)
CO2 SERPL-SCNC: 26 MMOL/L (ref 23–31)
CREAT SERPL-MCNC: 0.64 MG/DL (ref 0.55–1.02)
CREAT/UREA NIT SERPL: 29
EOSINOPHIL # BLD AUTO: 0.34 X10(3)/MCL (ref 0–0.9)
EOSINOPHIL NFR BLD AUTO: 9.3 %
ERYTHROCYTE [DISTWIDTH] IN BLOOD BY AUTOMATED COUNT: 15.5 % (ref 11.5–17)
GFR SERPLBLD CREATININE-BSD FMLA CKD-EPI: >60 ML/MIN/1.73/M2
GLOBULIN SER-MCNC: 3.1 GM/DL (ref 2.4–3.5)
GLUCOSE SERPL-MCNC: 98 MG/DL (ref 82–115)
HCT VFR BLD AUTO: 37 % (ref 37–47)
HGB BLD-MCNC: 11.6 G/DL (ref 12–16)
IMM GRANULOCYTES # BLD AUTO: 0 X10(3)/MCL (ref 0–0.04)
IMM GRANULOCYTES NFR BLD AUTO: 0 %
LYMPHOCYTES # BLD AUTO: 1.56 X10(3)/MCL (ref 0.6–4.6)
LYMPHOCYTES NFR BLD AUTO: 42.5 %
MAGNESIUM SERPL-MCNC: 2 MG/DL (ref 1.6–2.6)
MCH RBC QN AUTO: 28.2 PG (ref 27–31)
MCHC RBC AUTO-ENTMCNC: 31.4 G/DL (ref 33–36)
MCV RBC AUTO: 90 FL (ref 80–94)
MONOCYTES # BLD AUTO: 0.36 X10(3)/MCL (ref 0.1–1.3)
MONOCYTES NFR BLD AUTO: 9.8 %
NEUTROPHILS # BLD AUTO: 1.37 X10(3)/MCL (ref 2.1–9.2)
NEUTROPHILS NFR BLD AUTO: 37.3 %
NRBC BLD AUTO-RTO: 0 %
PHOSPHATE SERPL-MCNC: 4.2 MG/DL (ref 2.3–4.7)
PLATELET # BLD AUTO: 116 X10(3)/MCL (ref 130–400)
PMV BLD AUTO: 10.5 FL (ref 7.4–10.4)
POTASSIUM SERPL-SCNC: 4.6 MMOL/L (ref 3.5–5.1)
PROT SERPL-MCNC: 6.5 GM/DL (ref 5.8–7.6)
RBC # BLD AUTO: 4.11 X10(6)/MCL (ref 4.2–5.4)
SODIUM SERPL-SCNC: 141 MMOL/L (ref 136–145)
TRIGL SERPL-MCNC: 93 MG/DL (ref 37–140)
WBC # SPEC AUTO: 3.67 X10(3)/MCL (ref 4.5–11.5)

## 2024-05-30 PROCEDURE — 84100 ASSAY OF PHOSPHORUS: CPT | Performed by: INTERNAL MEDICINE

## 2024-05-30 PROCEDURE — 83735 ASSAY OF MAGNESIUM: CPT | Performed by: INTERNAL MEDICINE

## 2024-05-30 PROCEDURE — 85025 COMPLETE CBC W/AUTO DIFF WBC: CPT | Performed by: INTERNAL MEDICINE

## 2024-05-30 PROCEDURE — 84478 ASSAY OF TRIGLYCERIDES: CPT | Performed by: INTERNAL MEDICINE

## 2024-05-30 PROCEDURE — 80053 COMPREHEN METABOLIC PANEL: CPT | Performed by: INTERNAL MEDICINE

## 2024-06-03 ENCOUNTER — OFFICE VISIT (OUTPATIENT)
Dept: INFECTIOUS DISEASES | Facility: CLINIC | Age: 79
End: 2024-06-03
Payer: MEDICARE

## 2024-06-03 ENCOUNTER — TELEPHONE (OUTPATIENT)
Dept: SURGERY | Facility: CLINIC | Age: 79
End: 2024-06-03

## 2024-06-03 VITALS
RESPIRATION RATE: 18 BRPM | WEIGHT: 166 LBS | SYSTOLIC BLOOD PRESSURE: 138 MMHG | DIASTOLIC BLOOD PRESSURE: 86 MMHG | OXYGEN SATURATION: 96 % | HEART RATE: 69 BPM | HEIGHT: 64 IN | BODY MASS INDEX: 28.34 KG/M2

## 2024-06-03 DIAGNOSIS — K74.60 HEPATIC CIRRHOSIS, UNSPECIFIED HEPATIC CIRRHOSIS TYPE, UNSPECIFIED WHETHER ASCITES PRESENT: ICD-10-CM

## 2024-06-03 DIAGNOSIS — R78.81 BACTEREMIA: ICD-10-CM

## 2024-06-03 DIAGNOSIS — R01.1 NEWLY RECOGNIZED MURMUR: ICD-10-CM

## 2024-06-03 DIAGNOSIS — K90.829 ACQUIRED SHORT BOWEL SYNDROME: ICD-10-CM

## 2024-06-03 DIAGNOSIS — B95.8 STAPHYLOCOCCUS INFECTION: ICD-10-CM

## 2024-06-03 DIAGNOSIS — C50.011 MALIGNANT NEOPLASM OF NIPPLE OF RIGHT BREAST IN FEMALE, UNSPECIFIED ESTROGEN RECEPTOR STATUS: ICD-10-CM

## 2024-06-03 DIAGNOSIS — K55.9: ICD-10-CM

## 2024-06-03 DIAGNOSIS — B99.9 RECURRENT INFECTIONS: Primary | ICD-10-CM

## 2024-06-03 PROCEDURE — 99205 OFFICE O/P NEW HI 60 MIN: CPT | Mod: S$PBB,,, | Performed by: GENERAL PRACTICE

## 2024-06-03 PROCEDURE — 99214 OFFICE O/P EST MOD 30 MIN: CPT | Mod: PBBFAC | Performed by: GENERAL PRACTICE

## 2024-06-03 PROCEDURE — 99999 PR PBB SHADOW E&M-EST. PATIENT-LVL IV: CPT | Mod: PBBFAC,,, | Performed by: GENERAL PRACTICE

## 2024-06-03 RX ORDER — DOXYCYCLINE 100 MG/1
100 CAPSULE ORAL EVERY 12 HOURS
Qty: 60 CAPSULE | Refills: 5 | Status: SHIPPED | OUTPATIENT
Start: 2024-06-03 | End: 2024-11-30

## 2024-06-03 NOTE — PROGRESS NOTES
Subjective:       Patient ID: Laura TORRES Use 78 y.o.     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 Glynn unclear treatment history, cirrhosis, gallstone pancreatitis, breast cancer, recent admission to the hospital discharge 04/29/2024 with Staphylococcus hominis and Staphylococcus epidermidis bacteremia concern for MediPort infection, MediPort was removed and patient treated with IV vancomycin, the patient is referred to our office for post hospital follow-up and long-term management.    Shoulder replacement around 2000. No rods or screws in the back, no other hardware. Discharged fro the hospital 04/29, completed 8 additional days of Vancomycin after discharge completed 05/07. Mediport removed 04/24. Completed course of Vancomycin for 2 weeks. Now has PICC line still for TPN.     Has been on suppressive Doxycycline for about 1.5 - 2 years and had bacteremia. Has had 2-3 Mediports removed and exchanged before this time.    She reports that since 01/2024, she was having cyclical fevers once every 7-10 days. Can happen at any time during the day or even sometimes wake up in the middle of the night with those fevers and severe rigors was also having night sweats. Since being discharged, she has not had any of those symptoms however. Currently has some chronic intermittent back pain at the lumbar area but non concerning, no joint pains.     Between 01/2024 and admission in 04/2024 she visited multiple physicians, ED, Urgent care but could not get any diagnosis. Ultimately was  admitted and managed as above.       DALE in 03/28/2024 with normal valvular structures with normal velocity.     Past Medical History:   Diagnosis Date    Acute URI     Anxiety and depression     Back pain     Bacteremia 04/29/2024    Breast cancer     Cholelithiasis     Contaminated small bowel syndrome     DVT (deep venous thrombosis)     Edema, lower extremity     Gallstone pancreatitis     HTN (hypertension)     Insomnia     Irregular heart beat     Ischemic disease of gut     Laryngitis     Malabsorption     Meningitis, unspecified     OA (osteoarthritis)     PVD (peripheral vascular disease)     Rheumatoid arthritis, unspecified     Staph infection     infected mediport -- on doxycycline daily for prevention    Tremor     Unspecified cataract     Unspecified cirrhosis of liver 06/20/2023    Dr Carroll        Past Surgical History:   Procedure Laterality Date    APPENDECTOMY      BOWEL RESECTION      BREAST LUMPECTOMY Right     CHOLECYSTECTOMY  05/2015    COLONOSCOPY  02/2022    repeat 3 years    CYST REMOVAL Right     breast    CYSTOSCOPY W/ STONE MANIPULATION      CYSTOSCOPY W/ URETERAL STENT PLACEMENT  12/2020    CYSTOSCOPY W/ URETERAL STENT REMOVAL  04/2021    CYSTOURETEROSCOPY, WITH HOLMIUM LASER LITHOTRIPSY OF URETERAL CALCULUS AND STENT INSERTION Left 05/02/2023    Procedure: CYSTOSCOPY, WITH URETERAL STENT INSERTION Left;  Surgeon: Jared Felix MD;  Location: [facility] OR;  Service: Urology;  Laterality: Left;    EGD, WITH CLOSED BIOPSY N/A 6/20/2023    Procedure: EGD;  Surgeon: Aashish Carroll MD;  Location: [facility] ENDOSCOPY;  Service: Gastroenterology;  Laterality: N/A;    ENDOSCOPIC RETROGRADE CHOLANGIOPANCREATOGRAPHY W/ SPHINCTEROTOMY AND STONE REMOVAL  04/2015    ESOPHAGOGASTRODUODENOSCOPY  06/20/2023    Dr Carroll - no signs esophageal varicies --repeat 3 yrs    GI Biopsy  02/15/2022    GI Biopsy  12/2018    HYSTERECTOMY      LAPAROTOMY, EXPLORATORY  06/2015    LITHOTRIPSY Left  04/2021    LITHOTRIPSY Left 03/2021    MEDIPORT INSERTION, SINGLE  06/2021    MEDIPORT INSERTION, SINGLE  07/2020    MEDIPORT INSERTION, SINGLE  12/2018    MEDIPORT REMOVAL  07/2020    PHACOEMULSIFICATION OF CATARACT Left 12/2016    PHACOEMULSIFICATION OF CATARACT Right 11/2016    PICC line Removal Right 06/2021    POLYPECTOMY  02/2022    PVD surgery      REMOVAL OF CATHETER  12/2020    REMOVAL OF VASCULAR ACCESS CATHETER Right 4/24/2024    Procedure: Removal, Vascular Access Catheter;  Surgeon: Reed Ghosh MD;  Location: [facility];  Service: General;  Laterality: Right;    SHOULDER SURGERY Right     shoulder replacement    SPHINCTEROTOMY OF URETHRA      VENTRAL HERNIA REPAIR  04/2016        Social History     Socioeconomic History    Marital status:     Number of children: 2   Occupational History    Occupation: Retired   Tobacco Use    Smoking status: Never     Passive exposure: Past    Smokeless tobacco: Never   Substance and Sexual Activity    Alcohol use: Not Currently    Drug use: Never    Sexual activity: Not Currently     Social Determinants of Health     Financial Resource Strain: Low Risk  (4/20/2024)    Overall Financial Resource Strain (CARDIA)     Difficulty of Paying Living Expenses: Not hard at all   Food Insecurity: No Food Insecurity (4/20/2024)    Hunger Vital Sign     Worried About Running Out of Food in the Last Year: Never true     Ran Out of Food in the Last Year: Never true   Transportation Needs: No Transportation Needs (4/20/2024)    PRAPARE - Transportation     Lack of Transportation (Medical): No     Lack of Transportation (Non-Medical): No   Stress: No Stress Concern Present (4/20/2024)    [employer] [place] [employer] - Occupational Stress Questionnaire     Feeling of Stress : Not at all   Housing Stability: Low Risk  (4/20/2024)    Housing Stability Vital Sign     Unable to Pay for Housing in the Last Year: No     Homeless in the Last Year: No        Family History  "  Problem Relation Name Age of Onset    Pneumonia Mother      Depression Mother      Osteoarthritis Mother      Breast cancer Mother  70 - 75    Uterine cancer Mother  40 - 45    Bone cancer Mother  60 - 69    Heart attack Father      Aneurysm Father          brain    Diabetes Father      Hyperlipidemia Father      Hypertension Father      Hyperlipidemia Sister      Hypertension Sister      Kidney cancer Sister  55 - 56    Osteoarthritis Sister      Breast cancer Sister  52 - 53    Hyperlipidemia Brother      Hypertension Brother      Coronary artery disease Brother          CABG x3    Hyperlipidemia Brother      Hypertension Brother      Kidney cancer Paternal Grandmother  60 - 69        Review of patient's allergies indicates:   Allergen Reactions    Hydromorphone Itching     Other reaction(s): itching    Opium      Other reaction(s): itching  has itching with larger doses. Smaller doses do not cause a reaction.        Immunization History   Administered Date(s) Administered    COVID-19 MRNA, LN-S PF (MODERNA HALF 0.25 ML DOSE) 11/01/2021    COVID-19, MRNA, LN-S, PF (MODERNA FULL 0.5 ML DOSE) 01/20/2021, 01/20/2021, 02/17/2021, 02/17/2021, 11/01/2021    Hepatitis A / Hepatitis B 09/05/2023    Influenza (FLUAD) - Quadrivalent - Adjuvanted - PF *Preferred* (65+) 12/14/2023    Influenza - High Dose - PF (65 years and older) 02/02/2017, 01/04/2018    Influenza - Quadrivalent - High Dose - PF (65 years and older) 11/02/2022    Influenza - Quadrivalent - PF *Preferred* (6 months and older) 10/09/2018, 11/06/2019, 11/05/2020, 12/10/2021    Influenza - Trivalent - PF (ADULT) 10/09/2018, 11/06/2019, 11/05/2020, 12/10/2021    Pneumococcal Conjugate - 13 Valent 01/27/2015    Pneumococcal Polysaccharide - 23 Valent 02/02/2017        ROS       Objective:      /86 (BP Location: Left arm)   Pulse 69   Resp 18   Ht 5' 4" (1.626 m)   Wt 75.3 kg (166 lb 0.1 oz)   SpO2 96%   BMI 28.49 kg/m²      Physical " Exam  Constitutional:       Appearance: Normal appearance.   HENT:      Head: Normocephalic and atraumatic.      Mouth/Throat:      Pharynx: No oropharyngeal exudate or posterior oropharyngeal erythema.   Eyes:      Extraocular Movements: Extraocular movements intact.      Pupils: Pupils are equal, round, and reactive to light.   Cardiovascular:      Rate and Rhythm: Normal rate and regular rhythm.      Heart sounds: Murmur heard.   Pulmonary:      Effort: No respiratory distress.      Breath sounds: No wheezing, rhonchi or rales.   Abdominal:      General: Bowel sounds are normal. There is no distension.      Palpations: Abdomen is soft.      Tenderness: There is no abdominal tenderness. There is no right CVA tenderness or left CVA tenderness.   Musculoskeletal:         General: No swelling or tenderness.      Cervical back: Neck supple. No rigidity or tenderness.   Lymphadenopathy:      Cervical: No cervical adenopathy.   Skin:     Findings: No lesion or rash.   Neurological:      General: No focal deficit present.      Mental Status: She is alert and oriented to person, place, and time. Mental status is at baseline.      Cranial Nerves: No cranial nerve deficit.      Motor: No weakness.   Psychiatric:         Mood and Affect: Mood normal.         Behavior: Behavior normal.          Labs: Reviewed most recent relevant labs available, notable results highlighted in this note    Imaging: Reviewed most recent relevant imaging studies available, notable results highlighted in this note    Assessment:       Problem List Items Addressed This Visit          Cardiac/Vascular    Newly recognized murmur    Relevant Orders    Echo       ID    Staphylococcus infection    Bacteremia    Recurrent infections - Primary    Relevant Medications    doxycycline (VIBRAMYCIN) 100 MG Cap       Oncology    Malignant neoplasm of breast       GI    Acquired short bowel syndrome    Ischemic disease of gut    Hepatic cirrhosis          Plan:        -Discussed with patient and  at length today including the mechanism of infection, the risk of infection and the fact that we will never be able to completely eliminate this risk given the patient's short gut syndrome, need for TPN and likely need for having long-term vascular access   -Replace Mediport when possible, no absolute contraindication from ID standpoint  -Restart Doxycycline since well tolerated and has increased intervals between infections in the past  -Try to reduce frequency of TPN if possible per gastroenterology Dr. Phelps, in the away reduce potential sentinel event that can precipitate an infection  -Remove PICC line when Mediport is done  -New murmur per patient was not present immediately after discharge, DALE without any evidence of vegetations, however this was prior to admission.   -Noted Hepatitis panel ordered, at next visit, can consider HIV, Syphilis testing for completion     Follow up in about 6 months (around 12/3/2024), or if symptoms worsen or fail to improve.    Portions of this note dictated using EMR integrated voice recognition software, and may be subject to voice recognition errors not corrected at proofreading. Please contact writer for clarification if needed.    60 minutes of total time spent on the encounter, which includes face to face time and non-face to face time preparing to see the patient (eg, review of tests), Obtaining and/or reviewing separately obtained history, Documenting clinical information in the electronic or other health record, Independently interpreting results (not separately reported) and communicating results to the patient/family/caregiver, or Care coordination (not separately reported).

## 2024-06-04 ENCOUNTER — EXTERNAL HOME HEALTH (OUTPATIENT)
Dept: HOME HEALTH SERVICES | Facility: HOSPITAL | Age: 79
End: 2024-06-04
Payer: MEDICARE

## 2024-06-10 ENCOUNTER — HOSPITAL ENCOUNTER (EMERGENCY)
Facility: HOSPITAL | Age: 79
Discharge: HOME | End: 2024-06-10
Attending: EMERGENCY MEDICINE
Payer: MEDICARE

## 2024-06-10 VITALS
OXYGEN SATURATION: 95 % | TEMPERATURE: 98 F | RESPIRATION RATE: 17 BRPM | HEIGHT: 64 IN | SYSTOLIC BLOOD PRESSURE: 132 MMHG | WEIGHT: 160 LBS | DIASTOLIC BLOOD PRESSURE: 61 MMHG | BODY MASS INDEX: 27.31 KG/M2 | HEART RATE: 60 BPM

## 2024-06-10 DIAGNOSIS — R10.31 RIGHT LOWER QUADRANT ABDOMINAL PAIN: Primary | ICD-10-CM

## 2024-06-10 LAB
ALBUMIN SERPL-MCNC: 3.6 G/DL (ref 3.4–4.8)
ALBUMIN/GLOB SERPL: 1 RATIO (ref 1.1–2)
ALP SERPL-CCNC: 101 UNIT/L (ref 40–150)
ALT SERPL-CCNC: 31 UNIT/L (ref 0–55)
ANION GAP SERPL CALC-SCNC: 6 MEQ/L
AST SERPL-CCNC: 39 UNIT/L (ref 5–34)
BACTERIA #/AREA URNS AUTO: ABNORMAL /HPF
BASOPHILS # BLD AUTO: 0.02 X10(3)/MCL
BASOPHILS NFR BLD AUTO: 0.4 %
BILIRUB SERPL-MCNC: 0.6 MG/DL
BILIRUB UR QL STRIP.AUTO: NEGATIVE
BUN SERPL-MCNC: 20.7 MG/DL (ref 9.8–20.1)
CALCIUM SERPL-MCNC: 9 MG/DL (ref 8.4–10.2)
CHLORIDE SERPL-SCNC: 109 MMOL/L (ref 98–107)
CLARITY UR: CLEAR
CO2 SERPL-SCNC: 26 MMOL/L (ref 23–31)
COLOR UR AUTO: ABNORMAL
CREAT SERPL-MCNC: 0.73 MG/DL (ref 0.55–1.02)
CREAT/UREA NIT SERPL: 28
EOSINOPHIL # BLD AUTO: 0.24 X10(3)/MCL (ref 0–0.9)
EOSINOPHIL NFR BLD AUTO: 4.9 %
ERYTHROCYTE [DISTWIDTH] IN BLOOD BY AUTOMATED COUNT: 15.2 % (ref 11.5–17)
GFR SERPLBLD CREATININE-BSD FMLA CKD-EPI: >60 ML/MIN/1.73/M2
GLOBULIN SER-MCNC: 3.5 GM/DL (ref 2.4–3.5)
GLUCOSE SERPL-MCNC: 95 MG/DL (ref 82–115)
GLUCOSE UR QL STRIP: NORMAL
HCT VFR BLD AUTO: 39.4 % (ref 37–47)
HGB BLD-MCNC: 12.9 G/DL (ref 12–16)
HGB UR QL STRIP: NEGATIVE
IMM GRANULOCYTES # BLD AUTO: 0.01 X10(3)/MCL (ref 0–0.04)
IMM GRANULOCYTES NFR BLD AUTO: 0.2 %
KETONES UR QL STRIP: NEGATIVE
LEUKOCYTE ESTERASE UR QL STRIP: NEGATIVE
LYMPHOCYTES # BLD AUTO: 2.26 X10(3)/MCL (ref 0.6–4.6)
LYMPHOCYTES NFR BLD AUTO: 46.3 %
MAGNESIUM SERPL-MCNC: 1.9 MG/DL (ref 1.6–2.6)
MCH RBC QN AUTO: 28.2 PG (ref 27–31)
MCHC RBC AUTO-ENTMCNC: 32.7 G/DL (ref 33–36)
MCV RBC AUTO: 86.2 FL (ref 80–94)
MONOCYTES # BLD AUTO: 0.43 X10(3)/MCL (ref 0.1–1.3)
MONOCYTES NFR BLD AUTO: 8.8 %
MUCOUS THREADS URNS QL MICRO: ABNORMAL /LPF
NEUTROPHILS # BLD AUTO: 1.92 X10(3)/MCL (ref 2.1–9.2)
NEUTROPHILS NFR BLD AUTO: 39.4 %
NITRITE UR QL STRIP: NEGATIVE
NRBC BLD AUTO-RTO: 0 %
PH UR STRIP: 6.5 [PH]
PLATELET # BLD AUTO: 166 X10(3)/MCL (ref 130–400)
PMV BLD AUTO: 9.9 FL (ref 7.4–10.4)
POTASSIUM SERPL-SCNC: 4.5 MMOL/L (ref 3.5–5.1)
PROT SERPL-MCNC: 7.1 GM/DL (ref 5.8–7.6)
PROT UR QL STRIP: NEGATIVE
RBC # BLD AUTO: 4.57 X10(6)/MCL (ref 4.2–5.4)
RBC #/AREA URNS AUTO: ABNORMAL /HPF
SODIUM SERPL-SCNC: 141 MMOL/L (ref 136–145)
SP GR UR STRIP.AUTO: 1.01 (ref 1–1.03)
SQUAMOUS #/AREA URNS LPF: ABNORMAL /HPF
UROBILINOGEN UR STRIP-ACNC: NORMAL
WBC # SPEC AUTO: 4.88 X10(3)/MCL (ref 4.5–11.5)
WBC #/AREA URNS AUTO: ABNORMAL /HPF

## 2024-06-10 PROCEDURE — 96360 HYDRATION IV INFUSION INIT: CPT

## 2024-06-10 PROCEDURE — 99285 EMERGENCY DEPT VISIT HI MDM: CPT | Mod: 25

## 2024-06-10 PROCEDURE — 96361 HYDRATE IV INFUSION ADD-ON: CPT

## 2024-06-10 PROCEDURE — 80053 COMPREHEN METABOLIC PANEL: CPT | Performed by: NURSE PRACTITIONER

## 2024-06-10 PROCEDURE — 25500020 PHARM REV CODE 255: Performed by: EMERGENCY MEDICINE

## 2024-06-10 PROCEDURE — 81015 MICROSCOPIC EXAM OF URINE: CPT | Performed by: NURSE PRACTITIONER

## 2024-06-10 PROCEDURE — 63600175 PHARM REV CODE 636 W HCPCS: Performed by: EMERGENCY MEDICINE

## 2024-06-10 PROCEDURE — 83735 ASSAY OF MAGNESIUM: CPT | Performed by: NURSE PRACTITIONER

## 2024-06-10 PROCEDURE — 85025 COMPLETE CBC W/AUTO DIFF WBC: CPT | Performed by: NURSE PRACTITIONER

## 2024-06-10 RX ORDER — DICYCLOMINE HYDROCHLORIDE 20 MG/1
20 TABLET ORAL 2 TIMES DAILY PRN
Qty: 20 TABLET | Refills: 0 | Status: SHIPPED | OUTPATIENT
Start: 2024-06-10 | End: 2024-06-20

## 2024-06-10 RX ORDER — HYDROCODONE BITARTRATE AND ACETAMINOPHEN 5; 325 MG/1; MG/1
1 TABLET ORAL EVERY 6 HOURS PRN
Qty: 12 TABLET | Refills: 0 | Status: SHIPPED | OUTPATIENT
Start: 2024-06-10

## 2024-06-10 RX ORDER — HYDROCODONE BITARTRATE AND ACETAMINOPHEN 5; 325 MG/1; MG/1
1 TABLET ORAL EVERY 6 HOURS PRN
Qty: 12 TABLET | Refills: 0 | Status: SHIPPED | OUTPATIENT
Start: 2024-06-10 | End: 2024-06-10

## 2024-06-10 RX ORDER — DICYCLOMINE HYDROCHLORIDE 20 MG/1
20 TABLET ORAL 2 TIMES DAILY PRN
Qty: 20 TABLET | Refills: 0 | Status: SHIPPED | OUTPATIENT
Start: 2024-06-10 | End: 2024-06-10

## 2024-06-10 RX ADMIN — SODIUM CHLORIDE, POTASSIUM CHLORIDE, SODIUM LACTATE AND CALCIUM CHLORIDE 1000 ML: 600; 310; 30; 20 INJECTION, SOLUTION INTRAVENOUS at 04:06

## 2024-06-10 RX ADMIN — IOHEXOL 100 ML: 350 INJECTION, SOLUTION INTRAVENOUS at 04:06

## 2024-06-10 NOTE — ED PROVIDER NOTES
"Encounter Date: 6/10/2024    SCRIBE #1 NOTE: I, Jose Cummingsbert, am scribing for, and in the presence of,  Akshat Paulino III, MD. I have scribed the following portions of the note - Other sections scribed: HPI, ROS, PE.       History     Chief Complaint   Patient presents with    Abdominal Pain     RLQ abdominal pain that started Friday, patient has short bowel syndrome. Denies any worsening diarrhea,urinary complaints, nausea, or vomiting. Gi doctor is [place].      79 y/o female with a hx of breast cancer, small bowel syndrome, HTN, PVD, rheumatoid arthritis, and cirrhosis of the liver presents to the ED for abdominal pain since Friday, 6/7. Pt states she is having intermittent abdominal pain that is worsened with movement and coughing. She compares the pain to a short, "grasping" pain whenever she moves. She notes she was supposed to have an appointment with Dr. Carroll today but the appointment was canceled so she came to the ED. She denies any nausea, vomiting, diarrhea, fever, headache, neck pain, cough, or congestion.     GI: Dr. Aashish Carroll MD    The history is provided by the patient and medical records. No  was used.     Review of patient's allergies indicates:   Allergen Reactions    Hydromorphone Itching     Other reaction(s): itching    Opium      Other reaction(s): itching  has itching with larger doses. Smaller doses do not cause a reaction.     Past Medical History:   Diagnosis Date    Acute URI     Anxiety and depression     Back pain     Bacteremia 04/29/2024    Breast cancer     Cholelithiasis     Contaminated small bowel syndrome     DVT (deep venous thrombosis)     Edema, lower extremity     Gallstone pancreatitis     HTN (hypertension)     Insomnia     Irregular heart beat     Ischemic disease of gut     Laryngitis     Malabsorption     Meningitis, unspecified     OA (osteoarthritis)     PVD (peripheral vascular disease)     Rheumatoid arthritis, unspecified     " Staph infection     infected mediport -- on doxycycline daily for prevention    Tremor     Unspecified cataract     Unspecified cirrhosis of liver 06/20/2023    Dr Carroll     Past Surgical History:   Procedure Laterality Date    APPENDECTOMY      BOWEL RESECTION      BREAST LUMPECTOMY Right     CHOLECYSTECTOMY  05/2015    COLONOSCOPY  02/2022    repeat 3 years    CYST REMOVAL Right     breast    CYSTOSCOPY W/ STONE MANIPULATION      CYSTOSCOPY W/ URETERAL STENT PLACEMENT  12/2020    CYSTOSCOPY W/ URETERAL STENT REMOVAL  04/2021    CYSTOURETEROSCOPY, WITH HOLMIUM LASER LITHOTRIPSY OF URETERAL CALCULUS AND STENT INSERTION Left 05/02/2023    Procedure: CYSTOSCOPY, WITH URETERAL STENT INSERTION Left;  Surgeon: Jared Felix MD;  Location: [facility] OR;  Service: Urology;  Laterality: Left;    EGD, WITH CLOSED BIOPSY N/A 6/20/2023    Procedure: EGD;  Surgeon: Aashish Carroll MD;  Location: [facility] ENDOSCOPY;  Service: Gastroenterology;  Laterality: N/A;    ENDOSCOPIC RETROGRADE CHOLANGIOPANCREATOGRAPHY W/ SPHINCTEROTOMY AND STONE REMOVAL  04/2015    ESOPHAGOGASTRODUODENOSCOPY  06/20/2023    Dr Carroll - no signs esophageal varicies --repeat 3 yrs    GI Biopsy  02/15/2022    GI Biopsy  12/2018    HYSTERECTOMY      LAPAROTOMY, EXPLORATORY  06/2015    LITHOTRIPSY Left 04/2021    LITHOTRIPSY Left 03/2021    MEDIPORT INSERTION, SINGLE  06/2021    MEDIPORT INSERTION, SINGLE  07/2020    MEDIPORT INSERTION, SINGLE  12/2018    MEDIPORT REMOVAL  07/2020    PHACOEMULSIFICATION OF CATARACT Left 12/2016    PHACOEMULSIFICATION OF CATARACT Right 11/2016    PICC line Removal Right 06/2021    POLYPECTOMY  02/2022    PVD surgery      REMOVAL OF CATHETER  12/2020    REMOVAL OF VASCULAR ACCESS CATHETER Right 4/24/2024    Procedure: Removal, Vascular Access Catheter;  Surgeon: Reed Ghosh MD;  Location: [facility];  Service: General;  Laterality: Right;    SHOULDER SURGERY Right     shoulder replacement    SPHINCTEROTOMY OF  URETHRA      VENTRAL HERNIA REPAIR  04/2016     Family History   Problem Relation Name Age of Onset    Pneumonia Mother      Depression Mother      Osteoarthritis Mother      Breast cancer Mother  70 - 75    Uterine cancer Mother  40 - 45    Bone cancer Mother  60 - 69    Heart attack Father      Aneurysm Father          brain    Diabetes Father      Hyperlipidemia Father      Hypertension Father      Hyperlipidemia Sister      Hypertension Sister      Kidney cancer Sister  55 - 56    Osteoarthritis Sister      Breast cancer Sister  52 - 53    Hyperlipidemia Brother      Hypertension Brother      Coronary artery disease Brother          CABG x3    Hyperlipidemia Brother      Hypertension Brother      Kidney cancer Paternal Grandmother  60 - 69     Social History     Tobacco Use    Smoking status: Never     Passive exposure: Past    Smokeless tobacco: Never   Substance Use Topics    Alcohol use: Not Currently    Drug use: Never     Review of Systems   Constitutional:  Negative for chills and fever.   HENT:  Negative for congestion and sinus pain.    Respiratory:  Negative for cough, chest tightness and shortness of breath.    Cardiovascular:  Negative for chest pain.   Gastrointestinal:  Positive for abdominal pain. Negative for diarrhea, nausea and vomiting.   Genitourinary:  Negative for dysuria and hematuria.   Musculoskeletal:  Negative for neck pain.   Skin:  Negative for rash.   Neurological:  Negative for syncope, weakness and headaches.   All other systems reviewed and are negative.      Physical Exam     Initial Vitals [06/10/24 1429]   BP Pulse Resp Temp SpO2   (!) 152/69 (!) 54 17 97.9 °F (36.6 °C) 96 %      MAP       --         Physical Exam    Nursing note and vitals reviewed.  Constitutional: No distress.   HENT:   Head: Normocephalic and atraumatic.   Neck: Trachea normal.   Cardiovascular:  Normal rate and regular rhythm.           No murmur heard.  Pulmonary/Chest: Breath sounds normal. No  respiratory distress.   Abdominal: Abdomen is soft. Bowel sounds are normal. She exhibits no distension. There is abdominal tenderness in the right lower quadrant. There is guarding (localized). There is no rebound.   Musculoskeletal:         General: Normal range of motion.      Lumbar back: Normal range of motion.     Neurological: She is alert and oriented to person, place, and time. She has normal strength.   Skin: Skin is warm and dry. No rash noted.   Psychiatric: She has a normal mood and affect. Judgment normal.         ED Course   Procedures  Labs Reviewed   CBC WITH DIFFERENTIAL - Abnormal; Notable for the following components:       Result Value    MCHC 32.7 (*)     Neut # 1.92 (*)     All other components within normal limits   COMPREHENSIVE METABOLIC PANEL - Abnormal; Notable for the following components:    Chloride 109 (*)     Blood Urea Nitrogen 20.7 (*)     Albumin/Globulin Ratio 1.0 (*)     AST 39 (*)     All other components within normal limits   URINALYSIS, REFLEX TO URINE CULTURE - Abnormal; Notable for the following components:    Mucous, UA Trace (*)     All other components within normal limits   MAGNESIUM - Normal          Imaging Results              CT Abdomen Pelvis With IV Contrast NO Oral Contrast (Final result)  Result time 06/10/24 16:59:33      Final result by Tejas Butler MD (06/10/24 16:59:33)                   Impression:      1. Mild inflammatory changes along the left renal pelvis without hydronephrosis, question pyelitis.  2. Otherwise no acute abdominopelvic findings.      Electronically signed by: Tejas Butler  Date:    06/10/2024  Time:    16:59               Narrative:    EXAMINATION:  CT ABDOMEN PELVIS WITH IV CONTRAST    CLINICAL HISTORY:  RLQ abdominal pain (Age >= 14y);    TECHNIQUE:  Helical acquisition through the abdomen and pelvis with IV contrast.  Three plane reconstructions were provided for review.  mGycm. Automatic exposure control, adjustment of  mA/kV or iterative reconstruction technique was used to reduce radiation.    COMPARISON:  1 March 2024    FINDINGS:  Similar mild chronic changes at the lung bases.    Mild surface lobulation of the liver, question chronic liver disease.  The gallbladder is surgically absent.  No significant biliary ductal dilatation.  No significant abnormality of the spleen.  Few small cystic lesions of the pancreas are of low suspicion.  No adrenal nodule.    No hydronephrosis.  Symmetric nephrograms.  There is mild stranding and urothelial thickening along the left renal pelvis images 53-62 series 2.  Small nonobstructing calculus left lower pole collecting system.    There is no bowel obstruction.  There is colonic diverticulosis without evidence of diverticulitis.  No free air.    No significant abnormality of the urinary bladder.  No pelvic free fluid.  Uterus surgically absent.  Similar small right adnexal cyst.  Abdominal aorta normal in caliber.  Mild atherosclerotic disease.    There are moderate degenerative changes of the spine                                       Medications   lactated ringers bolus 1,000 mL (0 mLs Intravenous Stopped 6/10/24 1759)   iohexoL (OMNIPAQUE 350) injection 100 mL (100 mLs Intravenous Given 6/10/24 1650)     Medical Decision Making  Differential diagnosis includes but is not limited to: hernia, nonspecific abdominal pain, colitis, intestinal infarction, ileus, small bowel obstruction       Patient with right lower quadrant abdominal pain that is intermittent worse when she moves multiple history of abdominal surgeries in the past.  No fevers chills nausea vomiting no change in bowel movements.  CBC without abnormality CT without abnormality will control patient's symptoms and discharge    Problems Addressed:  Right lower quadrant abdominal pain: complicated acute illness or injury that poses a threat to life or bodily functions    Amount and/or Complexity of Data Reviewed  Labs:  ordered.  Radiology: ordered.    Risk  Prescription drug management.            Scribe Attestation:   Scribe #1: I performed the above scribed service and the documentation accurately describes the services I performed. I attest to the accuracy of the note.    Attending Attestation:           Physician Attestation for Scribe:  Physician Attestation Statement for Scribe #1: I, Akshat Paulino III, MD, reviewed documentation, as scribed by Jose Dooley in my presence, and it is both accurate and complete.                                    Clinical Impression:  Final diagnoses:  [R10.31] Right lower quadrant abdominal pain (Primary)          ED Disposition Condition    Discharge Stable          ED Prescriptions       Medication Sig Dispense Start Date End Date Auth. Provider    dicyclomine (BENTYL) 20 mg tablet  (Status: Discontinued) Take 1 tablet (20 mg total) by mouth 2 (two) times daily as needed (abdominal pain spasm). 20 tablet 6/10/2024 6/10/2024 Akshat Paulino III, MD    HYDROcodone-acetaminophen (NORCO) 5-325 mg per tablet  (Status: Discontinued) Take 1 tablet by mouth every 6 (six) hours as needed for Pain. 12 tablet 6/10/2024 6/10/2024 Akshat Paulino III, MD    dicyclomine (BENTYL) 20 mg tablet Take 1 tablet (20 mg total) by mouth 2 (two) times daily as needed (abdominal pain spasm). 20 tablet 6/10/2024 6/20/2024 Akshat Paulino III, MD    HYDROcodone-acetaminophen (NORCO) 5-325 mg per tablet Take 1 tablet by mouth every 6 (six) hours as needed for Pain. 12 tablet 6/10/2024 -- Akshat Paulino III, MD          Follow-up Information       Follow up With Specialties Details Why Contact Info    Curt Felton MD Family Medicine In 3 days  [address] [place] [id number]  [phone]               Akshat Paulino III, MD  06/10/24 3519

## 2024-06-10 NOTE — FIRST PROVIDER EVALUATION
Medical screening examination initiated.  I have conducted a focused provider triage encounter, findings are as follows:    Brief history of present illness:  77 y/o presents with right lower abdominal pain since Friday. No n/v and no worsening diarrhea. She lives with diarrhea. No urinary complaints.     There were no vitals filed for this visit.    Pertinent physical exam:  alert, nonlabored, ambulatory     Brief workup plan:  labs, urine    Preliminary workup initiated; this workup will be continued and followed by the physician or advanced practice provider that is assigned to the patient when roomed.

## 2024-06-11 ENCOUNTER — PATIENT OUTREACH (OUTPATIENT)
Dept: EMERGENCY MEDICINE | Facility: HOSPITAL | Age: 79
End: 2024-06-11
Payer: MEDICARE

## 2024-06-11 ENCOUNTER — HOSPITAL ENCOUNTER (OUTPATIENT)
Dept: CARDIOLOGY | Facility: HOSPITAL | Age: 79
Discharge: HOME | End: 2024-06-11
Attending: GENERAL PRACTICE
Payer: MEDICARE

## 2024-06-11 DIAGNOSIS — R01.1 NEWLY RECOGNIZED MURMUR: ICD-10-CM

## 2024-06-11 LAB
AV INDEX (PROSTH): 0.81
AV MEAN GRADIENT: 4 MMHG
AV PEAK GRADIENT: 7 MMHG
AV VALVE AREA BY VELOCITY RATIO: 2.18 CM²
AV VALVE AREA: 2.56 CM²
AV VELOCITY RATIO: 0.69
CV ECHO LV RWT: 0.36 CM
DOP CALC AO PEAK VEL: 1.34 M/S
DOP CALC AO VTI: 30.7 CM
DOP CALC LVOT AREA: 3.1 CM2
DOP CALC LVOT DIAMETER: 2 CM
DOP CALC LVOT PEAK VEL: 0.93 M/S
DOP CALC LVOT STROKE VOLUME: 78.5 CM3
DOP CALC MV VTI: 22.4 CM
DOP CALCLVOT PEAK VEL VTI: 25 CM
E WAVE DECELERATION TIME: 238 MSEC
E/A RATIO: 1.05
E/E' RATIO: 6.5 M/S
ECHO LV POSTERIOR WALL: 0.9 CM (ref 0.6–1.1)
FRACTIONAL SHORTENING: 22 % (ref 28–44)
INTERVENTRICULAR SEPTUM: 1.1 CM (ref 0.6–1.1)
LEFT ATRIUM SIZE: 3.9 CM
LEFT ATRIUM VOLUME MOD: 25.7 CM3
LEFT INTERNAL DIMENSION IN SYSTOLE: 3.9 CM (ref 2.1–4)
LEFT VENTRICLE DIASTOLIC VOLUME: 118 ML
LEFT VENTRICLE SYSTOLIC VOLUME: 65.9 ML
LEFT VENTRICULAR INTERNAL DIMENSION IN DIASTOLE: 5 CM (ref 3.5–6)
LEFT VENTRICULAR MASS: 181.98 G
LV LATERAL E/E' RATIO: 5.42 M/S
LV SEPTAL E/E' RATIO: 8.13 M/S
LVOT MG: 2 MMHG
LVOT MV: 0.58 CM/S
MV MEAN GRADIENT: 2 MMHG
MV PEAK A VEL: 0.62 M/S
MV PEAK E VEL: 0.65 M/S
MV PEAK GRADIENT: 4 MMHG
MV STENOSIS PRESSURE HALF TIME: 54 MS
MV VALVE AREA BY CONTINUITY EQUATION: 3.5 CM2
MV VALVE AREA P 1/2 METHOD: 4.07 CM2
OHS LV EJECTION FRACTION SIMPSONS BIPLANE MOD: 55 %
PISA TR MAX VEL: 2.03 M/S
PV PEAK GRADIENT: 2 MMHG
PV PEAK VELOCITY: 0.77 M/S
RA PRESSURE ESTIMATED: 3 MMHG
RV TB RVSP: 5 MMHG
TDI LATERAL: 0.12 M/S
TDI SEPTAL: 0.08 M/S
TDI: 0.1 M/S
TR MAX PG: 16 MMHG
TRICUSPID ANNULAR PLANE SYSTOLIC EXCURSION: 1.99 CM
TV REST PULMONARY ARTERY PRESSURE: 19 MMHG

## 2024-06-11 PROCEDURE — 93306 TTE W/DOPPLER COMPLETE: CPT

## 2024-06-11 PROCEDURE — 93306 TTE W/DOPPLER COMPLETE: CPT | Mod: 26,,, | Performed by: STUDENT IN AN ORGANIZED HEALTH CARE EDUCATION/TRAINING PROGRAM

## 2024-06-11 NOTE — PROGRESS NOTES
Per chart PCP's staff have scheduled a post ED 7-day follow up with Dr Felton for 6/13/24 at 1:30 pm. Patient was notified regarding her follow up with her PCP.

## 2024-06-11 NOTE — PROGRESS NOTES
ED Navigator reminded the patient of scheduled appointment with Dr Felton on 6/13/24 at 1:30 pm. Patient agreed to appointment date and time. Patient declined additional services. Follow up encounter closed.

## 2024-06-12 ENCOUNTER — HOSPITAL ENCOUNTER (OUTPATIENT)
Dept: RADIOLOGY | Facility: HOSPITAL | Age: 79
Discharge: HOME | End: 2024-06-12
Attending: PHYSICIAN ASSISTANT
Payer: MEDICARE

## 2024-06-12 DIAGNOSIS — N63.0 BREAST MASS IN FEMALE: ICD-10-CM

## 2024-06-12 DIAGNOSIS — N63.15 MASS OVERLAPPING MULTIPLE QUADRANTS OF RIGHT BREAST: ICD-10-CM

## 2024-06-12 PROCEDURE — 77066 DX MAMMO INCL CAD BI: CPT | Mod: TC

## 2024-06-12 PROCEDURE — 76641 ULTRASOUND BREAST COMPLETE: CPT | Mod: TC,RT

## 2024-06-17 ENCOUNTER — OFFICE VISIT (OUTPATIENT)
Dept: SURGERY | Facility: CLINIC | Age: 79
End: 2024-06-17
Payer: MEDICARE

## 2024-06-17 VITALS
HEART RATE: 67 BPM | BODY MASS INDEX: 28.6 KG/M2 | DIASTOLIC BLOOD PRESSURE: 58 MMHG | HEIGHT: 64 IN | WEIGHT: 167.56 LBS | SYSTOLIC BLOOD PRESSURE: 121 MMHG

## 2024-06-17 DIAGNOSIS — E46 MALNUTRITION, UNSPECIFIED TYPE: Primary | ICD-10-CM

## 2024-06-17 PROCEDURE — 99213 OFFICE O/P EST LOW 20 MIN: CPT | Mod: ,,, | Performed by: SURGERY

## 2024-06-17 NOTE — PROGRESS NOTES
HISTORY & PHYSICAL  General Surgery    Patient Name: Laura Acosta  YOB: 1945    Date: 06/17/2024                   SUBJECTIVE:     Chief Complaint/Reason for Admission:   Chief Complaint   Patient presents with    Consult     Needs new mediport        History of Present Illness:  Ms. Laura Acosta is a 78 y.o. female who is in need of Mediport for nutritional access.  She recently has a port removed secondary to infection and now referred for placement for continued nutritional access.    Review of Systems:  12 point ROS negative except as stated in HPI    PAST HISTORY:     Past Medical History:   Diagnosis Date    Acute URI     Anxiety and depression     Back pain     Bacteremia 04/29/2024    Breast cancer     Cholelithiasis     Contaminated small bowel syndrome     DVT (deep venous thrombosis)     Edema, lower extremity     Gallstone pancreatitis     HTN (hypertension)     Insomnia     Irregular heart beat     Ischemic disease of gut     Laryngitis     Malabsorption     Meningitis, unspecified     OA (osteoarthritis)     PVD (peripheral vascular disease)     Rheumatoid arthritis, unspecified     Staph infection     infected mediport -- on doxycycline daily for prevention    Tremor     Unspecified cataract     Unspecified cirrhosis of liver 06/20/2023    Dr Carroll     Past Surgical History:   Procedure Laterality Date    APPENDECTOMY      BOWEL RESECTION      BREAST LUMPECTOMY Right     CHOLECYSTECTOMY  05/2015    COLONOSCOPY  02/2022    repeat 3 years    CYST REMOVAL Right     breast    CYSTOSCOPY W/ STONE MANIPULATION      CYSTOSCOPY W/ URETERAL STENT PLACEMENT  12/2020    CYSTOSCOPY W/ URETERAL STENT REMOVAL  04/2021    CYSTOURETEROSCOPY, WITH HOLMIUM LASER LITHOTRIPSY OF URETERAL CALCULUS AND STENT INSERTION Left 05/02/2023    Procedure: CYSTOSCOPY, WITH URETERAL STENT INSERTION Left;  Surgeon: Jared Felix MD;  Location: [facility];  Service: Urology;  Laterality: Left;    EGD, WITH CLOSED  BIOPSY N/A 6/20/2023    Procedure: EGD;  Surgeon: Aashish Carroll MD;  Location: [facility];  Service: Gastroenterology;  Laterality: N/A;    ENDOSCOPIC RETROGRADE CHOLANGIOPANCREATOGRAPHY W/ SPHINCTEROTOMY AND STONE REMOVAL  04/2015    ESOPHAGOGASTRODUODENOSCOPY  06/20/2023    Dr Carroll - no signs esophageal varicies --repeat 3 yrs    GI Biopsy  02/15/2022    GI Biopsy  12/2018    HYSTERECTOMY      LAPAROTOMY, EXPLORATORY  06/2015    LITHOTRIPSY Left 04/2021    LITHOTRIPSY Left 03/2021    MEDIPORT INSERTION, SINGLE  06/2021    MEDIPORT INSERTION, SINGLE  07/2020    MEDIPORT INSERTION, SINGLE  12/2018    MEDIPORT REMOVAL  07/2020    PHACOEMULSIFICATION OF CATARACT Left 12/2016    PHACOEMULSIFICATION OF CATARACT Right 11/2016    PICC line Removal Right 06/2021    POLYPECTOMY  02/2022    PVD surgery      REMOVAL OF CATHETER  12/2020    REMOVAL OF VASCULAR ACCESS CATHETER Right 4/24/2024    Procedure: Removal, Vascular Access Catheter;  Surgeon: Reed Ghosh MD;  Location: [facility];  Service: General;  Laterality: Right;    SHOULDER SURGERY Right     shoulder replacement    SPHINCTEROTOMY OF URETHRA      VENTRAL HERNIA REPAIR  04/2016     Family History   Problem Relation Name Age of Onset    Pneumonia Mother      Depression Mother      Osteoarthritis Mother      Breast cancer Mother  70 - 75    Uterine cancer Mother  40 - 45    Bone cancer Mother  60 - 69    Heart attack Father      Aneurysm Father          brain    Diabetes Father      Hyperlipidemia Father      Hypertension Father      Hyperlipidemia Sister      Hypertension Sister      Kidney cancer Sister  55 - 56    Osteoarthritis Sister      Breast cancer Sister  52 - 53    Hyperlipidemia Brother      Hypertension Brother      Coronary artery disease Brother          CABG x3    Hyperlipidemia Brother      Hypertension Brother      Kidney cancer Paternal Grandmother  60 - 69     Social History     Socioeconomic History    Marital status:      Number of children: 2   Occupational History    Occupation: Retired   Tobacco Use    Smoking status: Never     Passive exposure: Past    Smokeless tobacco: Never   Substance and Sexual Activity    Alcohol use: Not Currently    Drug use: Never    Sexual activity: Not Currently     Social Determinants of Health     Financial Resource Strain: Low Risk  (4/20/2024)    Overall Financial Resource Strain (CARDIA)     Difficulty of Paying Living Expenses: Not hard at all   Food Insecurity: No Food Insecurity (4/20/2024)    Hunger Vital Sign     Worried About Running Out of Food in the Last Year: Never true     Ran Out of Food in the Last Year: Never true   Transportation Needs: No Transportation Needs (4/20/2024)    PRAPARE - Transportation     Lack of Transportation (Medical): No     Lack of Transportation (Non-Medical): No   Stress: No Stress Concern Present (4/20/2024)    [employer] - Occupational Stress Questionnaire     Feeling of Stress : Not at all   Housing Stability: Low Risk  (4/20/2024)    Housing Stability Vital Sign     Unable to Pay for Housing in the Last Year: No     Homeless in the Last Year: No       MEDICATIONS & ALLERGIES:     Current Outpatient Medications on File Prior to Visit   Medication Sig    busPIRone (BUSPAR) 10 MG tablet Take 20 mg by mouth 2 (two) times daily.    CELEXA 40 mg tablet Take 40 mg by mouth once daily.    cholecalciferol, vitamin D3, 1,250 mcg (50,000 unit) capsule Take 50,000 Units by mouth every 7 days.    dicyclomine (BENTYL) 20 mg tablet Take 1 tablet (20 mg total) by mouth 2 (two) times daily as needed (abdominal pain spasm).    diphenoxylate-atropine 2.5-0.025 mg (LOMOTIL) 2.5-0.025 mg per tablet Take 2 tablets by mouth 2 (two) times a day.    doxycycline (VIBRAMYCIN) 100 MG Cap Take 1 capsule (100 mg total) by mouth every 12 (twelve) hours.    ferrous sulfate 325 (65 FE) MG EC tablet Take 1 tablet (325 mg total) by mouth once daily.  "(Patient taking differently: Take 325 mg by mouth 2 (two) times daily.)    HYDROcodone-acetaminophen (NORCO) 5-325 mg per tablet Take 1 tablet by mouth every 6 (six) hours as needed for Pain.    metoprolol tartrate (LOPRESSOR) 100 MG tablet Take 100 mg by mouth 2 (two) times daily.    mirtazapine (REMERON) 15 MG tablet Take 15 mg by mouth every evening.    multivitamin (ONE DAILY MULTIVITAMIN) per tablet Take 1 tablet by mouth once daily.    solifenacin (VESICARE) 5 MG tablet Take 5 mg by mouth once daily.    warfarin (COUMADIN) 2.5 MG tablet TAKE ONE TABLET BY MOUTH ONCE DAILY IN THE EVENING OR AS DIRECTED BY CIS PROVIDER     No current facility-administered medications on file prior to visit.     Review of patient's allergies indicates:   Allergen Reactions    Hydromorphone Itching     Other reaction(s): itching    Opium      Other reaction(s): itching  has itching with larger doses. Smaller doses do not cause a reaction.       OBJECTIVE:     Vitals:    06/17/24 1047   BP: (!) 121/58   Pulse: 67   Weight: 76 kg (167 lb 8.8 oz)   Height: 5' 4" (1.626 m)     Body mass index is 28.76 kg/m².    Physical Exam:  General:  Well developed, well nourished, no acute distress  HEENT:  Normocephalic, atraumatic, PERRL, EOMI, clear sclera, ears normal, neck supple, throat clear without erythema or exudates  CVS:  RRR, S1 and S2 normal, no murmurs, rubs, gallops  Resp:  Lungs clear to auscultation, no wheezes, rales, rhonchi, cough  GI:  Abdomen soft, non-tender, non-distended, normoactive bowel sounds, no masses  :  Deferred  MSK:  No muscle atrophy, cyanosis, peripheral edema, full range of motion  Skin:  No rashes, ulcers, erythema  Neuro:  CNII-XII grossly intact  Psych:  Alert and oriented to person, place, and time    Results:  I have independently reviewed all pertinent lab and radiologic studies relevant to general/bariatric surgery.      VISIT DIAGNOSES:       ICD-10-CM ICD-9-CM   1. Malnutrition, unspecified type  " E46 263.9       ASSESSMENT/PLAN:     77 yo female with Chronic Malnutrition     -  Plan for Mediport placement  -  Pre-operative workup

## 2024-06-19 ENCOUNTER — OFFICE VISIT (OUTPATIENT)
Dept: SURGERY | Facility: CLINIC | Age: 79
End: 2024-06-19
Payer: MEDICARE

## 2024-06-19 VITALS
WEIGHT: 165.5 LBS | SYSTOLIC BLOOD PRESSURE: 124 MMHG | HEART RATE: 59 BPM | OXYGEN SATURATION: 97 % | HEIGHT: 64 IN | DIASTOLIC BLOOD PRESSURE: 65 MMHG | TEMPERATURE: 98 F | BODY MASS INDEX: 28.25 KG/M2 | RESPIRATION RATE: 18 BRPM

## 2024-06-19 DIAGNOSIS — N64.89 SEROMA OF BREAST: ICD-10-CM

## 2024-06-19 DIAGNOSIS — E46 MALNUTRITION, UNSPECIFIED TYPE: Primary | ICD-10-CM

## 2024-06-19 DIAGNOSIS — N63.15 MASS OVERLAPPING MULTIPLE QUADRANTS OF RIGHT BREAST: Primary | ICD-10-CM

## 2024-06-19 PROCEDURE — 99215 OFFICE O/P EST HI 40 MIN: CPT | Mod: PBBFAC | Performed by: PHYSICIAN ASSISTANT

## 2024-06-19 PROCEDURE — 99213 OFFICE O/P EST LOW 20 MIN: CPT | Mod: S$PBB,,, | Performed by: PHYSICIAN ASSISTANT

## 2024-06-19 PROCEDURE — 99999 PR PBB SHADOW E&M-EST. PATIENT-LVL V: CPT | Mod: PBBFAC,,, | Performed by: PHYSICIAN ASSISTANT

## 2024-06-19 RX ORDER — SODIUM CHLORIDE, SODIUM LACTATE, POTASSIUM CHLORIDE, CALCIUM CHLORIDE 600; 310; 30; 20 MG/100ML; MG/100ML; MG/100ML; MG/100ML
INJECTION, SOLUTION INTRAVENOUS CONTINUOUS
OUTPATIENT
Start: 2024-06-19

## 2024-06-19 RX ORDER — ENOXAPARIN SODIUM 100 MG/ML
40 INJECTION SUBCUTANEOUS EVERY 24 HOURS
OUTPATIENT
Start: 2024-06-19

## 2024-06-20 NOTE — PROGRESS NOTES
"[facility] - [facility] [place] [facility]  Breast Surgical Oncology  New Patient Office Visit - H&P      Referring Provider: No ref. provider found  PCP: Curt Felton MD    Chief Complaint:   Chief Complaint   Patient presents with    Follow-up     Follow up visit after Dx Mammo/US        Subjective:       Interval History:  06/20/2024 - Laura Acosta returns today for follow up after MG/US. Imaging showed benign findings consistent with an already known heavily calcified benign post operative seroma within the lumpectomy bed. She was reassured and advised to continue annual screening. Patient states she does not want to continue screening due to her age and because she is "too old for chemo/radiation".    HPI:  Laura Acosta is a 78 y.o. female with PMH of right breast cancer who presents on 5/15/2024 for evaluation of right breast mass which has been present for many years and was recently incidentally seen on CT scan while working up fever of unknown origin. The mass is located at lumpectomy site from previous breast cancer diagnosis 30 years ago. This mass was drained several times in the past. Over time she lost touch with surgeon and drainage of the mass was discontinued.  She reports that over the last 5 years she has noticed that the mass has gradually grown in size and has become slightly more nodular. This mass is tender to palpation. She currently denies any other breast issues including rashes, redness, pain, swelling, nipple discharge, or new lumps/masses.    Imaging:   Bilateral DG MG 5/20/2020; BIRADS 2; Benign - No mammographic evidence of malignancy is seen involving either breast.  No sonographic evidence of malignancy is seen involving the 2:00 to 3:00 right breast. The area of palpable concern in the 2:00 to 3:00 right breast corresponds to a heavily calcified benign seroma at the patient's lumpectomy site.  Right breast US 5/20/2020; BIRADS 2; Benign-A 36 x 25 x 29 mm oval, " circumscribed mass at 2:00 to 3:00, 1-6 cm from the nipple, which demonstrates echogenicity consistent with calcifications throughout its periphery. Benign-appearing lymph nodes are noted in the right axilla  3.   CT chest w/o contrast 3/1/2024 - There is an area peripheral coarse calcification at the medial right breast. There is mild right apical pleural/parenchymal increased density which is most suggestive of scarring. There is additional minimal subsegmental scarring at the peripheral middle lobe and anterior lingula. There is no acute pulmonary or pleural abnormality.  The large central airways are widely patent without filling defect although there is mild cylindrical bronchiectasis at the middle lobe. A subcentimeter calcified granuloma is noted in the left upper lobe. A right upper anterior chest wall MediPort is present with distal catheter tip at the lower SVC. Chronic findings as detailed above with no acute pathology at the chest and no abnormality identified to explain fever.   4.   CT Chest (PE study) 2024 - New 11 mm solid nodule in the right lower lobe, likely infectious or inflammatory.  Three month follow-up chest CT recommended to ensure resolution.  5. BL DG MG 2024 at [facility] [place] - BIRADS 2: BENIGN: 1.  No mammographic or sonographic evidence of malignancy.  2.  Right breast 2:00 to 3:00 1-6 cm from the nipple area of clinical concern corresponds to a heavily calcified benign post operative seroma within the lumpectomy bed.  There has been an interval increase in peripheral dystrophic calcifications associated with the seroma    Pathology:   None     OB/GYN History:  Age at Menarche Onset: 12  Menopausal Status: postmenopausal, LMP: No LMP recorded. Patient has had a hysterectomy.  Hysterectomy/Oophorectomy: hysterectomy, at age 38  Hormonal birth control (duration): 1 year  Pregnancy History:   Age at first live birth: 21  Hormone Replacement Therapy: No, none    Other:  MG breast  density: BIRADS B  Prior thoracic RT: yes   Genetic testing: yes  Ashkenazi Sabianist descent: No    Family History:  Family History   Problem Relation Name Age of Onset    Pneumonia Mother      Depression Mother      Osteoarthritis Mother      Breast cancer Mother  70 - 75    Uterine cancer Mother  40 - 45    Bone cancer Mother  60 - 69    Heart attack Father      Aneurysm Father          brain    Diabetes Father      Hyperlipidemia Father      Hypertension Father      Hyperlipidemia Sister      Hypertension Sister      Kidney cancer Sister  55 - 56    Osteoarthritis Sister      Breast cancer Sister  52 - 53    Hyperlipidemia Brother      Hypertension Brother      Coronary artery disease Brother          CABG x3    Hyperlipidemia Brother      Hypertension Brother      Kidney cancer Paternal Grandmother  60 - 69        Patient History:  Past Medical History:   Diagnosis Date    Acute URI     Anxiety and depression     Back pain     Bacteremia 04/29/2024    Breast cancer     Cholelithiasis     Contaminated small bowel syndrome     DVT (deep venous thrombosis)     Edema, lower extremity     Gallstone pancreatitis     HTN (hypertension)     Insomnia     Irregular heart beat     Ischemic disease of gut     Laryngitis     Malabsorption     Meningitis, unspecified     OA (osteoarthritis)     PVD (peripheral vascular disease)     Rheumatoid arthritis, unspecified     Staph infection     infected mediport -- on doxycycline daily for prevention    Tremor     Unspecified cataract     Unspecified cirrhosis of liver 06/20/2023    Dr Carroll       Active Problem List with Overview Notes    Diagnosis Date Noted    Recurrent infections 06/03/2024    Newly recognized murmur 06/03/2024    Pulmonary nodule 1 cm or greater in diameter 05/01/2024     11mm RLL seen on CTA chest 4/2024  Rad recommend repeat CT in 3 months to monitor      Bacteremia 04/29/2024    Hepatic cirrhosis 09/05/2023     Followed by Dr Carroll  Had EGD - neg for  varicies      Overactive bladder 09/03/2023     Continue Oxybutynin 10 mg      History of kidney stones 03/16/2023    Hematuria 03/16/2023    Osteoporosis 03/02/2023     Followed by Dr Roney Carvajal 2023--need report  On Prolia      Vitamin D deficiency 11/01/2022        Continue current vit d supplementation      Anemia 11/01/2022     stable      Anxiety 07/03/2022     Continue Celexa 40 mg and Buspar 10mg bid      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     had colon resection 2019 due to ischemic bowel  Malnourishment as a result  Receives TPN 4 days per week      Arthritis 07/03/2022    Calculus of kidney 07/03/2022    Deep vein thrombosis (DVT) of lower extremity 07/03/2022      on coumadin  Followed by Coumadin clinic      Depressive disorder 07/03/2022     Continue celexa 40 mg  remeron 15 mg qhs      Hypertension 07/03/2022     Problem automatically updated based on documentation on Capillary Refills, Brachial Pulses, Radial Pulses, Femoral Pulses, Dorsalis Pulses, Ulnar pulses, Popliteal Pulses, Postibial Pulses, Edemas, Affect/Behavior, Orientation Assessment, Arterial Line Site.      Malignant neoplasm of breast 07/03/2022     25 yrs ago--right breast lumpectomy  2020-had dx MMG and u/s that showed calcified seroma at site of previous lumpectomy, nothing worrisome for cancer  Recommend outpt follow up with Dr Gonzalez        Palpitations 07/03/2022     Continue metoprolol 100 mg      Staphylococcus infection 07/03/2022     Blood culture x 2 Staph epi  Completed IV Vancomycin on May 8th  Mediport removed 4/24/24  PICC line placed for temporary access for home IV vanc and TPN  Referral Dr Welsh pending for mediport placement  Has Apt Dr Mcfadden on 6/3      Immunization due 07/03/2022     utd  Annual flu shot      Severe protein-calorie malnutrition      receives TPN at home daily- per PICC line  Due to short bowel syndrome following ischemic colitis      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      Continue coumadin - hx DVT and ischemic bowel          Past Surgical History:   Procedure Laterality Date    APPENDECTOMY      BOWEL RESECTION      BREAST LUMPECTOMY Right     CHOLECYSTECTOMY  05/2015    COLONOSCOPY  02/2022    repeat 3 years    CYST REMOVAL Right     breast    CYSTOSCOPY W/ STONE MANIPULATION      CYSTOSCOPY W/ URETERAL STENT PLACEMENT  12/2020    CYSTOSCOPY W/ URETERAL STENT REMOVAL  04/2021    CYSTOURETEROSCOPY, WITH HOLMIUM LASER LITHOTRIPSY OF URETERAL CALCULUS AND STENT INSERTION Left 05/02/2023    Procedure: CYSTOSCOPY, WITH URETERAL STENT INSERTION Left;  Surgeon: Jared Felix MD;  Location: [facility] OR;  Service: Urology;  Laterality: Left;    EGD, WITH CLOSED BIOPSY N/A 6/20/2023    Procedure: EGD;  Surgeon: Aashish Carroll MD;  Location: [facility] ENDOSCOPY;  Service: Gastroenterology;  Laterality: N/A;    ENDOSCOPIC RETROGRADE CHOLANGIOPANCREATOGRAPHY W/ SPHINCTEROTOMY AND STONE REMOVAL  04/2015    ESOPHAGOGASTRODUODENOSCOPY  06/20/2023    Dr Carroll - no signs esophageal varicies --repeat 3 yrs    GI Biopsy  02/15/2022    GI Biopsy  12/2018    HYSTERECTOMY      LAPAROTOMY, EXPLORATORY  06/2015    LITHOTRIPSY Left 04/2021    LITHOTRIPSY Left 03/2021    MEDIPORT INSERTION, SINGLE  06/2021    MEDIPORT INSERTION, SINGLE  07/2020    MEDIPORT INSERTION, SINGLE  12/2018    MEDIPORT REMOVAL  07/2020    PHACOEMULSIFICATION OF CATARACT Left 12/2016    PHACOEMULSIFICATION OF CATARACT Right 11/2016    PICC line Removal Right 06/2021    POLYPECTOMY  02/2022    PVD surgery      REMOVAL OF CATHETER  12/2020    REMOVAL OF VASCULAR ACCESS CATHETER Right 4/24/2024    Procedure: Removal, Vascular Access Catheter;  Surgeon: Reed Ghosh MD;  Location: [facility];  Service: General;  Laterality: Right;    SHOULDER SURGERY Right     shoulder replacement    SPHINCTEROTOMY OF URETHRA      VENTRAL HERNIA REPAIR  04/2016       Social History     Socioeconomic History    Marital status:     Number  of children: 2   Occupational History    Occupation: Retired   Tobacco Use    Smoking status: Never     Passive exposure: Past    Smokeless tobacco: Never   Substance and Sexual Activity    Alcohol use: Not Currently    Drug use: Never    Sexual activity: Not Currently     Social Determinants of Health     Financial Resource Strain: Low Risk  (4/20/2024)    Overall Financial Resource Strain (CARDIA)     Difficulty of Paying Living Expenses: Not hard at all   Food Insecurity: No Food Insecurity (4/20/2024)    Hunger Vital Sign     Worried About Running Out of Food in the Last Year: Never true     Ran Out of Food in the Last Year: Never true   Transportation Needs: No Transportation Needs (4/20/2024)    PRAPARE - Transportation     Lack of Transportation (Medical): No     Lack of Transportation (Non-Medical): No   Stress: No Stress Concern Present (4/20/2024)    [employer] [place] [employer] - Occupational Stress Questionnaire     Feeling of Stress : Not at all   Housing Stability: Low Risk  (4/20/2024)    Housing Stability Vital Sign     Unable to Pay for Housing in the Last Year: No     Homeless in the Last Year: No       Immunization History   Administered Date(s) Administered    COVID-19 MRNA, LN-S PF (MODERNA HALF 0.25 ML DOSE) 11/01/2021    COVID-19, MRNA, LN-S, PF (MODERNA FULL 0.5 ML DOSE) 01/20/2021, 01/20/2021, 02/17/2021, 02/17/2021, 11/01/2021    Hepatitis A / Hepatitis B 09/05/2023    Influenza (FLUAD) - Quadrivalent - Adjuvanted - PF *Preferred* (65+) 12/14/2023    Influenza - High Dose - PF (65 years and older) 02/02/2017, 01/04/2018    Influenza - Quadrivalent - High Dose - PF (65 years and older) 11/02/2022    Influenza - Quadrivalent - PF *Preferred* (6 months and older) 10/09/2018, 11/06/2019, 11/05/2020, 12/10/2021    Influenza - Trivalent - PF (ADULT) 10/09/2018, 11/06/2019, 11/05/2020, 12/10/2021    Pneumococcal Conjugate - 13 Valent 01/27/2015    Pneumococcal Polysaccharide - 23  Marsha 02/02/2017       Medications/Allergies:    Current Outpatient Medications:     busPIRone (BUSPAR) 10 MG tablet, Take 20 mg by mouth 2 (two) times daily., Disp: , Rfl:     CELEXA 40 mg tablet, Take 40 mg by mouth once daily., Disp: , Rfl:     cholecalciferol, vitamin D3, 1,250 mcg (50,000 unit) capsule, Take 50,000 Units by mouth every 7 days., Disp: , Rfl:     dicyclomine (BENTYL) 20 mg tablet, Take 1 tablet (20 mg total) by mouth 2 (two) times daily as needed (abdominal pain spasm)., Disp: 20 tablet, Rfl: 0    diphenoxylate-atropine 2.5-0.025 mg (LOMOTIL) 2.5-0.025 mg per tablet, Take 2 tablets by mouth 2 (two) times a day., Disp: , Rfl:     doxycycline (VIBRAMYCIN) 100 MG Cap, Take 1 capsule (100 mg total) by mouth every 12 (twelve) hours., Disp: 60 capsule, Rfl: 5    ferrous sulfate 325 (65 FE) MG EC tablet, Take 1 tablet (325 mg total) by mouth once daily. (Patient taking differently: Take 325 mg by mouth 2 (two) times daily.), Disp: 30 tablet, Rfl: 0    HYDROcodone-acetaminophen (NORCO) 5-325 mg per tablet, Take 1 tablet by mouth every 6 (six) hours as needed for Pain., Disp: 12 tablet, Rfl: 0    metoprolol tartrate (LOPRESSOR) 100 MG tablet, Take 100 mg by mouth 2 (two) times daily., Disp: , Rfl:     mirtazapine (REMERON) 15 MG tablet, Take 15 mg by mouth every evening., Disp: , Rfl:     multivitamin (ONE DAILY MULTIVITAMIN) per tablet, Take 1 tablet by mouth once daily., Disp: , Rfl:     solifenacin (VESICARE) 5 MG tablet, Take 5 mg by mouth once daily., Disp: , Rfl:     warfarin (COUMADIN) 2.5 MG tablet, TAKE ONE TABLET BY MOUTH ONCE DAILY IN THE EVENING OR AS DIRECTED BY CIS PROVIDER, Disp: , Rfl:      Review of patient's allergies indicates:   Allergen Reactions    Hydromorphone Itching     Other reaction(s): itching    Opium      Other reaction(s): itching  has itching with larger doses. Smaller doses do not cause a reaction.            Objective:     Vitals:  Vitals:    06/19/24 1250   BP: 124/65  "  BP Location: Left arm   Patient Position: Sitting   BP Method: Medium (Automatic)   Pulse: (!) 59   Resp: 18   Temp: 98 °F (36.7 °C)   TempSrc: Oral   SpO2: 97%   Weight: 75.1 kg (165 lb 8 oz)   Height: 5' 4" (1.626 m)      Body mass index is 28.41 kg/m².     Physical Exam:  General: The patient is awake, alert and oriented times three. The patient is well nourished and in no acute distress.  Neck: There is no evidence of palpable cervical, supraclavicular or axillary adenopathy. The neck is supple. The thyroid is not enlarged.  Musculoskeletal:The patient has significantly decreased ROM in her right shoulder. The patient has a normal range of motion of her left upper extremity.  Breast:   Right: Examination of right breast reveals a 6 cm irregular nodular mass at 2:00 to 3:00, 1-6 cm from the nipple. It is located in the same location as the scar from her past lumpectomy. The mass is hard, non-mobile, and fixed to the skin. The nipple/areola is retracted medially due to scarring.  No redness, swelling, or fluctuance. There has been no interval change to the area compared to her exam last month. The remainder of the right breast fails to reveal any dominant masses or areas of significant focal nodularity. The nipple is everted without evidence of discharge. There is no skin dimpling with movement of the pectoralis. There is no significant skin changes overlying the breast.   Left: Examination of the left breast fails to reveal any dominant masses or areas of significant focal nodularity. The nipple is everted without evidence of discharge. There is no skin dimpling with movement of the pectoralis. There are no significant skin changes overlying the breast.  Integumentary: no rashes or skin lesions present  Neurologic: cranial nerves intact, no signs of peripheral neurological deficit, motor/sensory function intact    Assessment:     Patient Active Problem List   Diagnosis    Anxiety    Acquired short bowel " syndrome    Arthritis    Calculus of kidney    Deep vein thrombosis (DVT) of lower extremity    Depressive disorder    Hypertension    Malignant neoplasm of breast    Palpitations    Staphylococcus infection    Severe protein-calorie malnutrition    Immunization due    Vitamin D deficiency    Anemia    Ischemic disease of gut    Osteoporosis    History of kidney stones    Hematuria    Overactive bladder    Hepatic cirrhosis    Bacteremia    Pulmonary nodule 1 cm or greater in diameter    Recurrent infections    Newly recognized murmur             Plan:       - Reassurance provided regarding benign findings on imaging. RTC as needed. Advised patient to continue screening mammograms, next due in June 2025. Discussed that even if she were to develop a breast cancer, there are treatments available other than chemo or radiation for treatments such as endocrine therapy. She will think about this and follow up with her PCP for future orders.  -Patient advised to continue with plan for repeat CT for evaluation of pulmonary nodule and follow up with PCP      All of her questions were answered. She was advised to call if she develops any questions or concerns.    La Yao PA-C    --------------------------------------------------------------------------------------------------------------  Total time on the date of the visit ranged from 20-29 mins (70001). Total time includes both face-to-face and non-face-to-face time personally spent by myself on the day of the visit.    Non-face-to-face time included:  _X_ preparing to see the patient such as reviewing the patient record  __ obtaining and reviewing separately obtained history  _X_ independently interpreting results  _X_ documenting clinical information in electronic health record.    Face-to-face time included:  _X_ performing an appropriate history and examination  _X_ communicating results to the patient  _X_ counseling and educating the patient  __ ordering  appropriate medications  __ ordering appropriate tests  _X_ ordering appropriate procedures (including follow-up)  _X_ answering any questions the patient had    Total Time spent on date of visit: 25 minutes

## 2024-06-24 ENCOUNTER — LAB REQUISITION (OUTPATIENT)
Dept: LAB | Facility: HOSPITAL | Age: 79
End: 2024-06-24
Payer: MEDICARE

## 2024-06-24 DIAGNOSIS — K91.2 POSTSURGICAL MALABSORPTION, NOT ELSEWHERE CLASSIFIED: ICD-10-CM

## 2024-06-24 LAB
ALBUMIN SERPL-MCNC: 3.5 G/DL (ref 3.4–4.8)
ALBUMIN/GLOB SERPL: 1.1 RATIO (ref 1.1–2)
ALP SERPL-CCNC: 93 UNIT/L (ref 40–150)
ALT SERPL-CCNC: 29 UNIT/L (ref 0–55)
ANION GAP SERPL CALC-SCNC: 7 MEQ/L
AST SERPL-CCNC: 35 UNIT/L (ref 5–34)
BASOPHILS # BLD AUTO: 0.04 X10(3)/MCL
BASOPHILS NFR BLD AUTO: 0.8 %
BILIRUB SERPL-MCNC: 0.5 MG/DL
BUN SERPL-MCNC: 22.6 MG/DL (ref 9.8–20.1)
CALCIUM SERPL-MCNC: 8.9 MG/DL (ref 8.4–10.2)
CHLORIDE SERPL-SCNC: 108 MMOL/L (ref 98–107)
CO2 SERPL-SCNC: 25 MMOL/L (ref 23–31)
CREAT SERPL-MCNC: 0.68 MG/DL (ref 0.55–1.02)
CREAT/UREA NIT SERPL: 33
EOSINOPHIL # BLD AUTO: 0.31 X10(3)/MCL (ref 0–0.9)
EOSINOPHIL NFR BLD AUTO: 6.6 %
ERYTHROCYTE [DISTWIDTH] IN BLOOD BY AUTOMATED COUNT: 15.3 % (ref 11.5–17)
GFR SERPLBLD CREATININE-BSD FMLA CKD-EPI: >60 ML/MIN/1.73/M2
GLOBULIN SER-MCNC: 3.3 GM/DL (ref 2.4–3.5)
GLUCOSE SERPL-MCNC: 83 MG/DL (ref 82–115)
HCT VFR BLD AUTO: 38.8 % (ref 37–47)
HGB BLD-MCNC: 12.8 G/DL (ref 12–16)
IMM GRANULOCYTES # BLD AUTO: 0.01 X10(3)/MCL (ref 0–0.04)
IMM GRANULOCYTES NFR BLD AUTO: 0.2 %
LYMPHOCYTES # BLD AUTO: 2.14 X10(3)/MCL (ref 0.6–4.6)
LYMPHOCYTES NFR BLD AUTO: 45.3 %
MAGNESIUM SERPL-MCNC: 1.9 MG/DL (ref 1.6–2.6)
MCH RBC QN AUTO: 28.3 PG (ref 27–31)
MCHC RBC AUTO-ENTMCNC: 33 G/DL (ref 33–36)
MCV RBC AUTO: 85.7 FL (ref 80–94)
MONOCYTES # BLD AUTO: 0.53 X10(3)/MCL (ref 0.1–1.3)
MONOCYTES NFR BLD AUTO: 11.2 %
NEUTROPHILS # BLD AUTO: 1.69 X10(3)/MCL (ref 2.1–9.2)
NEUTROPHILS NFR BLD AUTO: 35.9 %
NRBC BLD AUTO-RTO: 0 %
PHOSPHATE SERPL-MCNC: 3.6 MG/DL (ref 2.3–4.7)
PLATELET # BLD AUTO: 134 X10(3)/MCL (ref 130–400)
PLATELETS.RETICULATED NFR BLD AUTO: 2.5 % (ref 0.9–11.2)
PMV BLD AUTO: 10.2 FL (ref 7.4–10.4)
POTASSIUM SERPL-SCNC: 4.3 MMOL/L (ref 3.5–5.1)
PROT SERPL-MCNC: 6.8 GM/DL (ref 5.8–7.6)
RBC # BLD AUTO: 4.53 X10(6)/MCL (ref 4.2–5.4)
SODIUM SERPL-SCNC: 140 MMOL/L (ref 136–145)
TRIGL SERPL-MCNC: 89 MG/DL (ref 37–140)
WBC # BLD AUTO: 4.72 X10(3)/MCL (ref 4.5–11.5)

## 2024-06-24 PROCEDURE — 80053 COMPREHEN METABOLIC PANEL: CPT | Performed by: INTERNAL MEDICINE

## 2024-06-24 PROCEDURE — 84478 ASSAY OF TRIGLYCERIDES: CPT | Performed by: INTERNAL MEDICINE

## 2024-06-24 PROCEDURE — 84100 ASSAY OF PHOSPHORUS: CPT | Performed by: INTERNAL MEDICINE

## 2024-06-24 PROCEDURE — 85025 COMPLETE CBC W/AUTO DIFF WBC: CPT | Performed by: INTERNAL MEDICINE

## 2024-06-24 PROCEDURE — 83735 ASSAY OF MAGNESIUM: CPT | Performed by: INTERNAL MEDICINE

## 2024-07-01 ENCOUNTER — TELEPHONE (OUTPATIENT)
Dept: SURGERY | Facility: CLINIC | Age: 79
End: 2024-07-01
Payer: MEDICARE

## 2024-07-10 ENCOUNTER — TELEPHONE (OUTPATIENT)
Dept: SURGERY | Facility: CLINIC | Age: 79
End: 2024-07-10
Payer: MEDICARE

## 2024-07-15 ENCOUNTER — LAB REQUISITION (OUTPATIENT)
Dept: LAB | Facility: HOSPITAL | Age: 79
End: 2024-07-15
Payer: MEDICARE

## 2024-07-15 DIAGNOSIS — K91.2 POSTSURGICAL MALABSORPTION, NOT ELSEWHERE CLASSIFIED: ICD-10-CM

## 2024-07-15 DIAGNOSIS — B95.7 OTHER STAPHYLOCOCCUS AS THE CAUSE OF DISEASES CLASSIFIED ELSEWHERE: ICD-10-CM

## 2024-07-15 LAB
ALBUMIN SERPL-MCNC: 3.5 G/DL (ref 3.4–4.8)
ALBUMIN/GLOB SERPL: 1.2 RATIO (ref 1.1–2)
ALP SERPL-CCNC: 78 UNIT/L (ref 40–150)
ALT SERPL-CCNC: 32 UNIT/L (ref 0–55)
ANION GAP SERPL CALC-SCNC: 9 MEQ/L
AST SERPL-CCNC: 36 UNIT/L (ref 5–34)
BASOPHILS # BLD AUTO: 0.03 X10(3)/MCL
BASOPHILS NFR BLD AUTO: 0.6 %
BILIRUB SERPL-MCNC: 0.4 MG/DL
BUN SERPL-MCNC: 20.8 MG/DL (ref 9.8–20.1)
CALCIUM SERPL-MCNC: 9.3 MG/DL (ref 8.4–10.2)
CHLORIDE SERPL-SCNC: 105 MMOL/L (ref 98–107)
CO2 SERPL-SCNC: 28 MMOL/L (ref 23–31)
CREAT SERPL-MCNC: 0.74 MG/DL (ref 0.55–1.02)
CREAT/UREA NIT SERPL: 28
EOSINOPHIL # BLD AUTO: 0.22 X10(3)/MCL (ref 0–0.9)
EOSINOPHIL NFR BLD AUTO: 4.7 %
ERYTHROCYTE [DISTWIDTH] IN BLOOD BY AUTOMATED COUNT: 15 % (ref 11.5–17)
GFR SERPLBLD CREATININE-BSD FMLA CKD-EPI: >60 ML/MIN/1.73/M2
GLOBULIN SER-MCNC: 2.9 GM/DL (ref 2.4–3.5)
GLUCOSE SERPL-MCNC: 100 MG/DL (ref 82–115)
HCT VFR BLD AUTO: 38.1 % (ref 37–47)
HGB BLD-MCNC: 12.2 G/DL (ref 12–16)
IMM GRANULOCYTES # BLD AUTO: 0 X10(3)/MCL (ref 0–0.04)
IMM GRANULOCYTES NFR BLD AUTO: 0 %
LYMPHOCYTES # BLD AUTO: 1.71 X10(3)/MCL (ref 0.6–4.6)
LYMPHOCYTES NFR BLD AUTO: 36.8 %
MAGNESIUM SERPL-MCNC: 2 MG/DL (ref 1.6–2.6)
MCH RBC QN AUTO: 28.2 PG (ref 27–31)
MCHC RBC AUTO-ENTMCNC: 32 G/DL (ref 33–36)
MCV RBC AUTO: 88 FL (ref 80–94)
MONOCYTES # BLD AUTO: 0.45 X10(3)/MCL (ref 0.1–1.3)
MONOCYTES NFR BLD AUTO: 9.7 %
NEUTROPHILS # BLD AUTO: 2.24 X10(3)/MCL (ref 2.1–9.2)
NEUTROPHILS NFR BLD AUTO: 48.2 %
NRBC BLD AUTO-RTO: 0 %
PHOSPHATE SERPL-MCNC: 4 MG/DL (ref 2.3–4.7)
PLATELET # BLD AUTO: 136 X10(3)/MCL (ref 130–400)
PLATELETS.RETICULATED NFR BLD AUTO: 2.8 % (ref 0.9–11.2)
PMV BLD AUTO: 10.8 FL (ref 7.4–10.4)
POTASSIUM SERPL-SCNC: 4.3 MMOL/L (ref 3.5–5.1)
PROT SERPL-MCNC: 6.4 GM/DL (ref 5.8–7.6)
RBC # BLD AUTO: 4.33 X10(6)/MCL (ref 4.2–5.4)
SODIUM SERPL-SCNC: 142 MMOL/L (ref 136–145)
TRIGL SERPL-MCNC: 87 MG/DL (ref 37–140)
WBC # BLD AUTO: 4.65 X10(3)/MCL (ref 4.5–11.5)

## 2024-07-15 PROCEDURE — 83735 ASSAY OF MAGNESIUM: CPT | Performed by: INTERNAL MEDICINE

## 2024-07-15 PROCEDURE — 84100 ASSAY OF PHOSPHORUS: CPT | Performed by: INTERNAL MEDICINE

## 2024-07-15 PROCEDURE — 80053 COMPREHEN METABOLIC PANEL: CPT | Performed by: INTERNAL MEDICINE

## 2024-07-15 PROCEDURE — 84478 ASSAY OF TRIGLYCERIDES: CPT | Performed by: INTERNAL MEDICINE

## 2024-07-15 PROCEDURE — 85025 COMPLETE CBC W/AUTO DIFF WBC: CPT | Performed by: INTERNAL MEDICINE

## 2024-07-16 NOTE — CONSULTS
"Inpatient Nutrition Assessment    Admit Date: 4/18/2024   Total duration of encounter: 1 day   Patient Age: 78 y.o.    Nutrition Recommendation/Prescription     Diet Adult Regular Lactose Restricted, add "No fried foods" restrictrition  PPN recommendations provided:  Recommendations: Clinimix-E @ 75 mL/hr  with Intra lipids (20%) twice weekly  Kcal: 755 kcal/day (~49% est min kcal needs)  AA: 77 g/day (~105% est min protein needs)  Dextrose: 90 g/day (GIR: 0.86 mg/kg/min)  Fluid: 1870 mL/day (~120% est min fluid needs)  Monitor electrolytes and replete as needed  Mirtazapine ordered (4/18/2024)  Add vitamin/mineral supplementation as appropriate  Monitor appetite/PO intake, weight, and labs    Communication of Recommendations: reviewed with provider, reviewed with nurse, reviewed with patient, and reviewed with family    Nutrition Assessment     Malnutrition Assessment/Nutrition-Focused Physical Exam    Malnutrition Context: other (see comments) (Does not meet citeria at this time) (04/19/24 1354)  Malnutrition Level: other (see comments) (Does not meet citeria at this time) (04/19/24 1354)  Energy Intake (Malnutrition): other (see comments) (Does not meet citeria) (04/19/24 1354)  Weight Loss (Malnutrition): other (see comments) (Does not meet citeria per EMR) (04/19/24 1354)  Subcutaneous Fat (Malnutrition): other (see comments) (Does not meet citeria) (04/19/24 1354)           Muscle Mass (Malnutrition): other (see comments) (Does not meet citeria) (04/19/24 1354)                          Fluid Accumulation (Malnutrition): other (see comments) (Does not meet citeria) (04/19/24 1354)        A minimum of two characteristics is recommended for diagnosis of either severe or non-severe malnutrition.    Chart Review    Reason Seen: patient/family request and physician consult for Short gut syndrome    Malnutrition Screening Tool Results   Have you recently lost weight without trying?: No  Have you been eating poorly " pended   because of a decreased appetite?: No   MST Score: 0   Diagnosis:  Acute hypoxemic respiratory failure  Recurrent fever of unknown origin  11 mm solid nodule in the right upper lobe  Chronic diarrhea  History of hypertension, hyperlipidemia, PVD, DVT, short gut syndrome on TPN, chronic granulomatous infection, MRSA bacteremia, rheumatoid arthritis, cirrhosis, gallstone pancreatitis, breast cancer, anxiety, and depression     Relevant Medical History:   Hypertension  Hyperlipidemia  PVD  DVT  Short gut syndrome on TPN   Chronic granulomatous infection   MRSA bacteremia  Rheumatoid arthritis  Cirrhosis  Gallstone pancreatitis   Breast cancer   Anxiety  Depression    Scheduled Medications:  busPIRone, 15 mg, Daily  citalopram, 20 mg, Daily  diphenoxylate-atropine 2.5-0.025 mg, 2 tablet, BID  mirtazapine, 15 mg, QHS  mupirocin, , BID  piperacillin-tazobactam (Zosyn) IV (PEDS and ADULTS) (extended infusion is not appropriate), 4.5 g, Q8H  vancomycin (VANCOCIN) IV (PEDS and ADULTS), 1,000 mg, Q12H  warfarin, 5 mg, Daily    Continuous Infusions:  sodium chloride 0.9%, Last Rate: 100 mL/hr at 04/18/24 1920    PRN Medications:   Current Facility-Administered Medications:     acetaminophen, 650 mg, Oral, Q8H PRN    acetaminophen, 650 mg, Oral, Q4H PRN    dextrose 10%, 12.5 g, Intravenous, PRN    dextrose 10%, 25 g, Intravenous, PRN    glucagon (human recombinant), 1 mg, Intramuscular, PRN    glucose, 16 g, Oral, PRN    glucose, 24 g, Oral, PRN    ondansetron, 4 mg, Intravenous, Q8H PRN    vancomycin - pharmacy to dose, , Intravenous, pharmacy to manage frequency    Calorie Containing IV Medications: no significant kcals from medications at this time    Recent Labs   Lab 04/15/24  0830 04/18/24  1051 04/19/24  0431    134* 138   K 3.9 3.9 3.7   CALCIUM 8.6 9.0 8.0*   PHOS 3.1  --   --    MG 2.30  --   --    CHLORIDE 108* 102 107   CO2 25 21* 22*   BUN 20.1 12.7 13.6   CREATININE 0.66 0.63 0.71   EGFRNORACEVR >60 >60 >60  "  GLUCOSE 78* 105 132*   BILITOT 0.5 0.9 1.0   ALKPHOS 95 92 79   ALT 33 26 23   AST 42* 37* 27   ALBUMIN 3.2* 3.3* 2.9*   CRP  --  49.90*  --    TRIG 70  --   --    WBC 11.95* 8.70 6.83   HGB 11.3* 12.3 10.9*   HCT 35.9* 37.5 35.1*     Nutrition Orders:  Diet Adult Regular Lactose Restricted      Appetite/Oral Intake: fair/25-50% of meals  Factors Affecting Nutritional Intake:  food preferences   Social Needs Impacting Access to Food: none identified  Food/Moravian/Cultural Preferences:  lactose free, no fried foods, no seasonings on grilled chicken/fish  Food Allergies: none reported  Last Bowel Movement: 24  Wound(s):  none noted    Comments    2024: Pt reports a good appetite/PO intake with chronic home TPN due to Short Gut Syndrome. Pt sees outpatient RD. Pt reports a fair appetite/PO intake currently due to food preferences. Provided PPN recommendations due to infected port site. Pt denies N/V, constipation, and chewing difficulties. Pt reports chronic diarrhea and swallowing difficulties. Pt denies unplanned wt loss. Per EMR, pt weighed 75.1 kg on 3/28/2024 (2.7% wt loss in 1 month, insignificant). Encouraged adequate PO intake and obtained food preferences. Provided short gut syndrome education and printed materials. Last BM noted. Will monitor.    Anthropometrics    Height: 5' 4" (162.6 cm),    Last Weight: 73 kg (160 lb 15 oz) (24 0957), Weight Method: Standard Scale  BMI (Calculated): 27.6  BMI Classification: overweight (BMI 25-29.9)     Ideal Body Weight (IBW), Female: 120 lb     % Ideal Body Weight, Female (lb): 134.12 %                    Usual Body Weight (UBW), k.1 kg  % Usual Body Weight: 97.41     Usual Weight Provided By: EMR weight history    Wt Readings from Last 5 Encounters:   24 73 kg (160 lb 15 oz)   24 75.1 kg (165 lb 9.1 oz)   24 74.4 kg (164 lb)   24 72.6 kg (160 lb)   24 75.8 kg (167 lb)     Weight Change(s) Since Admission: "   4/18/2024: 73 kg  Wt Readings from Last 1 Encounters:   04/18/24 0957 73 kg (160 lb 15 oz)   Admit Weight: 73 kg (160 lb 15 oz) (04/18/24 0957), Weight Method: Standard Scale    Estimated Needs    Weight Used For Calorie Calculations: 73 kg (160 lb 15 oz)  Energy Calorie Requirements (kcal): 8050-6983 (MSJ x 1.3-1.4)  Energy Need Method: [place]-St Jeor  Weight Used For Protein Calculations: 73 kg (160 lb 15 oz)  Protein Requirements: 73-88 (1.0-1.2 g/kg)  Fluid Requirements (mL): 1553 (1 mL/kcal)        Enteral Nutrition     Patient not receiving enteral nutrition at this time.    Parenteral Nutrition     Patient not receiving parenteral nutrition support at this time.    Evaluation of Received Nutrient Intake    Calories: not meeting estimated needs  Protein: not meeting estimated needs    Patient Education     Education Provided:  Short Bowel Syndrome with colon  Teaching Method: explanation and printed materials  Comprehension: verbalizes understanding and needs reinforcement  Barriers to Learning: none evident  Expected Compliance: fair  Comments: All questions were answered and dietitian's contact information was provided.     Nutrition Diagnosis     PES: Food and nutrition related knowledge deficit related to chronic illness as evidenced by uncertainty about nutrition-related outcomes for Short gut syndrome. (new)       Nutrition Interventions     Intervention(s): general/healthful diet, modified composition of parenteral nutrition, and modified rate of parenteral nutrition    Goal: Meet greater than 80% of nutritional needs by follow-up. (new)    Nutrition Goals & Monitoring     Dietitian will monitor: food and beverage intake, parenteral nutrition intake, weight, electrolyte/renal panel, glucose/endocrine profile, and gastrointestinal profile  Discharge planning: too early to determine; pending clinical course  Nutrition Risk/Follow-Up: high (follow-up in 1-4 days)   Please consult if re-assessment needed  sooner.

## 2024-07-18 ENCOUNTER — HOSPITAL ENCOUNTER (OUTPATIENT)
Facility: HOSPITAL | Age: 79
Discharge: HOME | End: 2024-07-18
Attending: SURGERY | Admitting: SURGERY
Payer: MEDICARE

## 2024-07-18 ENCOUNTER — ANESTHESIA EVENT (OUTPATIENT)
Dept: SURGERY | Facility: HOSPITAL | Age: 79
End: 2024-07-18
Payer: MEDICARE

## 2024-07-18 ENCOUNTER — NURSE TRIAGE (OUTPATIENT)
Dept: ADMINISTRATIVE | Facility: CLINIC | Age: 79
End: 2024-07-18
Payer: MEDICARE

## 2024-07-18 ENCOUNTER — ANESTHESIA (OUTPATIENT)
Dept: SURGERY | Facility: HOSPITAL | Age: 79
End: 2024-07-18
Payer: MEDICARE

## 2024-07-18 LAB
CTP QC/QA: YES
INR PPP: 1.1 (ref 2–3)
PROTHROMBIN TIME, POC: 13.2 (ref 2–3)

## 2024-07-18 PROCEDURE — 77001 FLUOROGUIDE FOR VEIN DEVICE: CPT | Mod: 26,,, | Performed by: SURGERY

## 2024-07-18 PROCEDURE — 36561 INSERT TUNNELED CV CATH: CPT | Mod: AS,LT,,

## 2024-07-18 PROCEDURE — 37000009 HC ANESTHESIA EA ADD 15 MINS: Performed by: SURGERY

## 2024-07-18 PROCEDURE — 63600175 PHARM REV CODE 636 W HCPCS: Performed by: NURSE ANESTHETIST, CERTIFIED REGISTERED

## 2024-07-18 PROCEDURE — 36000707: Performed by: SURGERY

## 2024-07-18 PROCEDURE — 37000008 HC ANESTHESIA 1ST 15 MINUTES: Performed by: SURGERY

## 2024-07-18 PROCEDURE — 25000003 PHARM REV CODE 250: Performed by: SURGERY

## 2024-07-18 PROCEDURE — 36561 INSERT TUNNELED CV CATH: CPT | Mod: LT,,, | Performed by: SURGERY

## 2024-07-18 PROCEDURE — 36000706: Performed by: SURGERY

## 2024-07-18 PROCEDURE — 25000003 PHARM REV CODE 250: Performed by: NURSE ANESTHETIST, CERTIFIED REGISTERED

## 2024-07-18 PROCEDURE — C1788 PORT, INDWELLING, IMP: HCPCS | Performed by: SURGERY

## 2024-07-18 PROCEDURE — 71000015 HC POSTOP RECOV 1ST HR: Performed by: SURGERY

## 2024-07-18 PROCEDURE — 71000016 HC POSTOP RECOV ADDL HR: Performed by: SURGERY

## 2024-07-18 PROCEDURE — 63600175 PHARM REV CODE 636 W HCPCS: Performed by: SURGERY

## 2024-07-18 PROCEDURE — 71000033 HC RECOVERY, INTIAL HOUR: Performed by: SURGERY

## 2024-07-18 DEVICE — SYS SMART PORT CT 6.6F 55CM 7F: Type: IMPLANTABLE DEVICE | Site: CHEST | Status: FUNCTIONAL

## 2024-07-18 RX ORDER — TRAMADOL HYDROCHLORIDE 50 MG/1
50 TABLET ORAL EVERY 4 HOURS PRN
Status: DISCONTINUED | OUTPATIENT
Start: 2024-07-18 | End: 2024-07-18 | Stop reason: HOSPADM

## 2024-07-18 RX ORDER — ONDANSETRON HYDROCHLORIDE 2 MG/ML
4 INJECTION, SOLUTION INTRAVENOUS DAILY PRN
Status: DISCONTINUED | OUTPATIENT
Start: 2024-07-18 | End: 2024-07-18

## 2024-07-18 RX ORDER — HEPARIN SODIUM 5000 [USP'U]/ML
INJECTION, SOLUTION INTRAVENOUS; SUBCUTANEOUS
Status: DISCONTINUED
Start: 2024-07-18 | End: 2024-07-18 | Stop reason: HOSPADM

## 2024-07-18 RX ORDER — IPRATROPIUM BROMIDE AND ALBUTEROL SULFATE 2.5; .5 MG/3ML; MG/3ML
3 SOLUTION RESPIRATORY (INHALATION)
Status: DISCONTINUED | OUTPATIENT
Start: 2024-07-18 | End: 2024-07-18

## 2024-07-18 RX ORDER — DEXAMETHASONE SODIUM PHOSPHATE 4 MG/ML
INJECTION, SOLUTION INTRA-ARTICULAR; INTRALESIONAL; INTRAMUSCULAR; INTRAVENOUS; SOFT TISSUE
Status: DISCONTINUED | OUTPATIENT
Start: 2024-07-18 | End: 2024-07-18

## 2024-07-18 RX ORDER — GLUCAGON 1 MG
1 KIT INJECTION
Status: DISCONTINUED | OUTPATIENT
Start: 2024-07-18 | End: 2024-07-18 | Stop reason: HOSPADM

## 2024-07-18 RX ORDER — HYDROCODONE BITARTRATE AND ACETAMINOPHEN 7.5; 325 MG/1; MG/1
1 TABLET ORAL EVERY 6 HOURS PRN
Status: DISCONTINUED | OUTPATIENT
Start: 2024-07-18 | End: 2024-07-18 | Stop reason: HOSPADM

## 2024-07-18 RX ORDER — MEPERIDINE HYDROCHLORIDE 25 MG/ML
50 INJECTION INTRAMUSCULAR; INTRAVENOUS; SUBCUTANEOUS
Status: DISCONTINUED | OUTPATIENT
Start: 2024-07-18 | End: 2024-07-18 | Stop reason: HOSPADM

## 2024-07-18 RX ORDER — CEFAZOLIN SODIUM 1 G/3ML
INJECTION, POWDER, FOR SOLUTION INTRAMUSCULAR; INTRAVENOUS
Status: DISCONTINUED | OUTPATIENT
Start: 2024-07-18 | End: 2024-07-18

## 2024-07-18 RX ORDER — CEFAZOLIN 2 G/1
2 INJECTION, POWDER, FOR SOLUTION INTRAMUSCULAR; INTRAVENOUS ONCE
Status: DISCONTINUED | OUTPATIENT
Start: 2024-07-18 | End: 2024-07-18

## 2024-07-18 RX ORDER — CEFAZOLIN SODIUM 2 G/50ML
2 SOLUTION INTRAVENOUS
Status: DISCONTINUED | OUTPATIENT
Start: 2024-07-18 | End: 2024-07-18

## 2024-07-18 RX ORDER — TEDUGLUTIDE 5 MG/.5ML
INJECTION, POWDER, LYOPHILIZED, FOR SOLUTION SUBCUTANEOUS
COMMUNITY
Start: 2024-07-02

## 2024-07-18 RX ORDER — ENOXAPARIN SODIUM 100 MG/ML
40 INJECTION SUBCUTANEOUS EVERY 24 HOURS
Status: DISCONTINUED | OUTPATIENT
Start: 2024-07-18 | End: 2024-07-18

## 2024-07-18 RX ORDER — PROPOFOL 10 MG/ML
INJECTION, EMULSION INTRAVENOUS
Status: DISCONTINUED | OUTPATIENT
Start: 2024-07-18 | End: 2024-07-18

## 2024-07-18 RX ORDER — GLYCOPYRROLATE 0.2 MG/ML
INJECTION INTRAMUSCULAR; INTRAVENOUS
Status: DISCONTINUED | OUTPATIENT
Start: 2024-07-18 | End: 2024-07-18

## 2024-07-18 RX ORDER — LIDOCAINE HYDROCHLORIDE 10 MG/ML
INJECTION, SOLUTION EPIDURAL; INFILTRATION; INTRACAUDAL; PERINEURAL
Status: DISCONTINUED | OUTPATIENT
Start: 2024-07-18 | End: 2024-07-18

## 2024-07-18 RX ORDER — ENOXAPARIN SODIUM 100 MG/ML
INJECTION SUBCUTANEOUS
COMMUNITY
Start: 2024-07-02

## 2024-07-18 RX ORDER — TRAMADOL HYDROCHLORIDE 50 MG/1
50 TABLET ORAL EVERY 6 HOURS PRN
Qty: 20 TABLET | Refills: 0 | Status: SHIPPED | OUTPATIENT
Start: 2024-07-18

## 2024-07-18 RX ORDER — LIDOCAINE HYDROCHLORIDE 10 MG/ML
1 INJECTION, SOLUTION EPIDURAL; INFILTRATION; INTRACAUDAL; PERINEURAL ONCE
Status: DISCONTINUED | OUTPATIENT
Start: 2024-07-18 | End: 2024-07-18

## 2024-07-18 RX ORDER — SODIUM CHLORIDE, SODIUM GLUCONATE, SODIUM ACETATE, POTASSIUM CHLORIDE AND MAGNESIUM CHLORIDE 30; 37; 368; 526; 502 MG/100ML; MG/100ML; MG/100ML; MG/100ML; MG/100ML
INJECTION, SOLUTION INTRAVENOUS CONTINUOUS
Status: DISCONTINUED | OUTPATIENT
Start: 2024-07-18 | End: 2024-07-18

## 2024-07-18 RX ORDER — FENTANYL CITRATE 50 UG/ML
INJECTION, SOLUTION INTRAMUSCULAR; INTRAVENOUS
Status: DISCONTINUED | OUTPATIENT
Start: 2024-07-18 | End: 2024-07-18

## 2024-07-18 RX ORDER — BUPIVACAINE HYDROCHLORIDE AND EPINEPHRINE 2.5; 5 MG/ML; UG/ML
INJECTION, SOLUTION EPIDURAL; INFILTRATION; INTRACAUDAL; PERINEURAL
Status: DISCONTINUED
Start: 2024-07-18 | End: 2024-07-18 | Stop reason: HOSPADM

## 2024-07-18 RX ORDER — PROCHLORPERAZINE EDISYLATE 5 MG/ML
5 INJECTION INTRAMUSCULAR; INTRAVENOUS EVERY 30 MIN PRN
Status: DISCONTINUED | OUTPATIENT
Start: 2024-07-18 | End: 2024-07-18

## 2024-07-18 RX ORDER — MORPHINE SULFATE 4 MG/ML
2 INJECTION, SOLUTION INTRAMUSCULAR; INTRAVENOUS EVERY 5 MIN PRN
Status: DISCONTINUED | OUTPATIENT
Start: 2024-07-18 | End: 2024-07-18

## 2024-07-18 RX ORDER — SODIUM CHLORIDE, SODIUM LACTATE, POTASSIUM CHLORIDE, CALCIUM CHLORIDE 600; 310; 30; 20 MG/100ML; MG/100ML; MG/100ML; MG/100ML
INJECTION, SOLUTION INTRAVENOUS CONTINUOUS
Status: DISCONTINUED | OUTPATIENT
Start: 2024-07-18 | End: 2024-07-18

## 2024-07-18 RX ORDER — SODIUM CHLORIDE, SODIUM LACTATE, POTASSIUM CHLORIDE, CALCIUM CHLORIDE 600; 310; 30; 20 MG/100ML; MG/100ML; MG/100ML; MG/100ML
INJECTION, SOLUTION INTRAVENOUS CONTINUOUS
Status: DISCONTINUED | OUTPATIENT
Start: 2024-07-18 | End: 2024-07-18 | Stop reason: HOSPADM

## 2024-07-18 RX ORDER — VANCOMYCIN HYDROCHLORIDE 10 G/1
INJECTION, POWDER, LYOPHILIZED, FOR SOLUTION INTRAVENOUS
COMMUNITY
Start: 2024-05-02

## 2024-07-18 RX ORDER — ACETAMINOPHEN 10 MG/ML
INJECTION, SOLUTION INTRAVENOUS
Status: DISCONTINUED | OUTPATIENT
Start: 2024-07-18 | End: 2024-07-18

## 2024-07-18 RX ORDER — ONDANSETRON HYDROCHLORIDE 2 MG/ML
4 INJECTION, SOLUTION INTRAVENOUS EVERY 4 HOURS PRN
Status: DISCONTINUED | OUTPATIENT
Start: 2024-07-18 | End: 2024-07-18 | Stop reason: HOSPADM

## 2024-07-18 RX ORDER — ONDANSETRON HYDROCHLORIDE 2 MG/ML
INJECTION, SOLUTION INTRAVENOUS
Status: DISCONTINUED | OUTPATIENT
Start: 2024-07-18 | End: 2024-07-18

## 2024-07-18 RX ADMIN — CEFAZOLIN 2 G: 330 INJECTION, POWDER, FOR SOLUTION INTRAMUSCULAR; INTRAVENOUS at 08:07

## 2024-07-18 RX ADMIN — ONDANSETRON 4 MG: 2 INJECTION INTRAMUSCULAR; INTRAVENOUS at 08:07

## 2024-07-18 RX ADMIN — GLYCOPYRROLATE 0.1 MG: 0.2 INJECTION INTRAMUSCULAR; INTRAVENOUS at 08:07

## 2024-07-18 RX ADMIN — ACETAMINOPHEN 1000 MG: 10 INJECTION, SOLUTION INTRAVENOUS at 08:07

## 2024-07-18 RX ADMIN — SODIUM CHLORIDE, POTASSIUM CHLORIDE, SODIUM LACTATE AND CALCIUM CHLORIDE: 600; 310; 30; 20 INJECTION, SOLUTION INTRAVENOUS at 07:07

## 2024-07-18 RX ADMIN — LIDOCAINE HYDROCHLORIDE 50 MG: 10 INJECTION, SOLUTION EPIDURAL; INFILTRATION; INTRACAUDAL; PERINEURAL at 07:07

## 2024-07-18 RX ADMIN — DEXAMETHASONE SODIUM PHOSPHATE 4 MG: 4 INJECTION, SOLUTION INTRA-ARTICULAR; INTRALESIONAL; INTRAMUSCULAR; INTRAVENOUS; SOFT TISSUE at 08:07

## 2024-07-18 RX ADMIN — FENTANYL CITRATE 100 MCG: 50 INJECTION, SOLUTION INTRAMUSCULAR; INTRAVENOUS at 07:07

## 2024-07-18 RX ADMIN — ENOXAPARIN SODIUM 40 MG: 40 INJECTION SUBCUTANEOUS at 07:07

## 2024-07-18 RX ADMIN — PROPOFOL 130 MG: 10 INJECTION, EMULSION INTRAVENOUS at 07:07

## 2024-07-18 NOTE — CARE UPDATE
Dr. Jain at bedside and updated on patients status, bp, change of bp site from right leg to left lower arm-no new orders given.

## 2024-07-18 NOTE — CARE UPDATE
Received patient from the OR, she is awake, sabillon, oriented/reassured her, she denied c/o, respirations full-regular-deep-clear,hob up 30 degerees.

## 2024-07-18 NOTE — TRANSFER OF CARE
"Anesthesia Transfer of Care Note    Patient: Laura Acosta    Procedure(s) Performed: Procedure(s) (LRB):  [id number] (N/A)    Patient location: PACU    Anesthesia Type: general    Transport from OR: Transported from OR on room air with adequate spontaneous ventilation    Post pain: adequate analgesia    Post assessment: no apparent anesthetic complications    Post vital signs: stable    Level of consciousness: sedated    Nausea/Vomiting: no nausea/vomiting    Complications: none    Transfer of care protocol was followed      Last vitals: Visit Vitals  BP (!) 146/76   Pulse (!) 54   Temp 36.5 °C (97.7 °F)   Resp 14   Ht 5' 4" (1.626 m)   Wt 75.8 kg (167 lb 1.7 oz)   SpO2 97%   Breastfeeding No   BMI 28.68 kg/m²     "

## 2024-07-18 NOTE — CARE UPDATE
Dr. Jain at bedside. He stated only necessary  to take 2 or 3 more bp on her left lower arm while in pacu

## 2024-07-18 NOTE — OP NOTE
PATIENT:  Laura Acosta      : 1945       DATE OF SURGERY:   2024          SURGEON:  Ariel Welsh MD       ASSISTANT:  María Ballesteros NP          PREOPERATIVE DIAGNOSIS:  Malnutrition           POSTOPERATIVE DIAGNOSIS:  Same.           OPERATIONS: Left Subclavian Mediport Placement using Fluroscopic Guidance            Anesthesia:  General endotracheal anesthesia.      Estimated blood loss:  Less than 50 cc.      Blood administered:  None.      Lap and instrument counts correct x 2 at the end of the case.     Specimen: None           INDICATIONS/SIGNIFICANT HISTORY:  The patient is a 78 y.o. year old female who was diagnosed with chronic malnutrition and referred for mediport placement.  Risks and Benefits of surgery was discussed with the patient, who voiced understanding of risks and benefits and elected to proceed with surgery.           PROCEDURE IN DETAIL:  Once informed consents were obtained, the patient was taken to the operating room and placed supine on the operating table.  After general IV anesthesia was induced, the chest was prepped and draped in a standard surgical fashion.  1% lidocaine with epinephrine was used for local anesthesia in the subcutaneous tissue for placement of the port.  Using the seldinger technique, the Left subclavian vein was accessed and the guidewire placed under direct visualization.  The needle was removed and the guidewire secured.  An incision was made along the guidwire and dissection carried out to the pectoralis fashia.  A pocket was formed with blunt dissection for the port.  Attention was then directed  to the guidewire.  Using fluoroscopy the dilator with sheath was placed over the guidewire to the proper position then the dilator along with guidewire was removed.  The catheter was then placed into the sheath under fluroscopic guidance to the appropriate position and attached to the port.  The peal away sheath was removed.  The port was secured to the  pectoralis fascia using 2-0 prolene suture in an interuppted fashion.  The port was then accessed that showed easy aspiration of blood and flow and heparin locked appropriatedly.  Incisions were closed with a3-0 vicryl followed by a 4-0 vicryl stitch.  The wounds were cleaned and dried and steri-strips were applied.  A post-procedure CXR was obtained and reviewed by a radiologist.  The patient tolerated the procedure well and was transported to recovery room in good condition.

## 2024-07-18 NOTE — ANESTHESIA POSTPROCEDURE EVALUATION
Anesthesia Post Evaluation    Patient: Laura Acosta    Procedure(s) Performed: Procedure(s) (LRB):  [id number] (N/A)    Final Anesthesia Type: general      Patient location during evaluation: PACU  Patient participation: Yes- Able to Participate  Level of consciousness: awake and alert and oriented  Post-procedure vital signs: reviewed and stable  Pain management: adequate  Airway patency: patent    PONV status at discharge: No PONV  Anesthetic complications: no      Cardiovascular status: hemodynamically stable  Respiratory status: unassisted  Hydration status: euvolemic  Follow-up not needed.              Vitals Value Taken Time   /77 07/18/24 1046   Temp 36.4 °C (97.6 °F) 07/18/24 0945   Pulse 54 07/18/24 1046   Resp 18 07/18/24 0945   SpO2 97 % 07/18/24 1046         Event Time   Out of Recovery 09:43:00         Pain/Chuy Score: Chuy Score: 10 (7/18/2024  9:45 AM)

## 2024-07-18 NOTE — H&P
HISTORY & PHYSICAL  General Surgery    Patient Name: Laura Acosta  YOB: 1945    Date: 07/18/2024                   SUBJECTIVE:     Chief Complaint/Reason for Admission:   No chief complaint on file.       History of Present Illness:  Ms. Laura Acosta is a 78 y.o. female who is in need of Mediport for nutritional access.  She recently has a port removed secondary to infection and now referred for placement for continued nutritional access. She reports no changes to her history since the prior office visit.     Review of Systems:  12 point ROS negative except as stated in HPI    PAST HISTORY:     Past Medical History:   Diagnosis Date    Acute URI     Anxiety and depression     Back pain     Bacteremia 04/29/2024    Breast cancer     Cholelithiasis     Contaminated small bowel syndrome     DVT (deep venous thrombosis)     on coumadin    Edema, lower extremity     Gallstone pancreatitis     Heart murmur     HTN (hypertension)     Insomnia     Irregular heart beat     Ischemic disease of gut     Kidney stones     Laryngitis     Malabsorption     Malnutrition, unspecified type     Meningitis, unspecified     OA (osteoarthritis)     PVD (peripheral vascular disease)     Rheumatoid arthritis, unspecified     Staph infection     infected mediport -- on doxycycline daily for prevention    Tremor     Unspecified cataract     Unspecified cirrhosis of liver 06/20/2023    Dr Carroll     Past Surgical History:   Procedure Laterality Date    APPENDECTOMY      BOWEL RESECTION      BREAST LUMPECTOMY Right     CHOLECYSTECTOMY  05/2015    COLONOSCOPY  02/2022    repeat 3 years    CYST REMOVAL Right     breast    CYSTOSCOPY W/ STONE MANIPULATION      CYSTOSCOPY W/ URETERAL STENT PLACEMENT  12/2020    CYSTOSCOPY W/ URETERAL STENT REMOVAL  04/2021    CYSTOURETEROSCOPY, WITH HOLMIUM LASER LITHOTRIPSY OF URETERAL CALCULUS AND STENT INSERTION Left 05/02/2023    Procedure: CYSTOSCOPY, WITH URETERAL STENT INSERTION Left;   Surgeon: Jared Felix MD;  Location: [facility] OR;  Service: Urology;  Laterality: Left;    EGD, WITH CLOSED BIOPSY N/A 6/20/2023    Procedure: EGD;  Surgeon: Aashish Carroll MD;  Location: [facility] ENDOSCOPY;  Service: Gastroenterology;  Laterality: N/A;    ENDOSCOPIC RETROGRADE CHOLANGIOPANCREATOGRAPHY W/ SPHINCTEROTOMY AND STONE REMOVAL  04/2015    ESOPHAGOGASTRODUODENOSCOPY  06/20/2023    Dr Carroll - no signs esophageal varicies --repeat 3 yrs    GI Biopsy  02/15/2022    GI Biopsy  12/2018    HYSTERECTOMY      LAPAROTOMY, EXPLORATORY  06/2015    LITHOTRIPSY Left 04/2021    LITHOTRIPSY Left 03/2021    MEDIPORT INSERTION, SINGLE  06/2021    MEDIPORT INSERTION, SINGLE  07/2020    MEDIPORT INSERTION, SINGLE  12/2018    MEDIPORT REMOVAL  07/2020    PHACOEMULSIFICATION OF CATARACT Left 12/2016    PHACOEMULSIFICATION OF CATARACT Right 11/2016    PICC line Removal Right 06/2021    POLYPECTOMY  02/2022    PVD surgery      REMOVAL OF CATHETER  12/2020    REMOVAL OF VASCULAR ACCESS CATHETER Right 4/24/2024    Procedure: Removal, Vascular Access Catheter;  Surgeon: Reed Ghosh MD;  Location: [facility];  Service: General;  Laterality: Right;    SHOULDER SURGERY Right     shoulder replacement    SPHINCTEROTOMY OF URETHRA      VENTRAL HERNIA REPAIR  04/2016     Family History   Problem Relation Name Age of Onset    Pneumonia Mother      Depression Mother      Osteoarthritis Mother      Breast cancer Mother  70 - 75    Uterine cancer Mother  40 - 45    Bone cancer Mother  60 - 69    Heart attack Father      Aneurysm Father          brain    Diabetes Father      Hyperlipidemia Father      Hypertension Father      Hyperlipidemia Sister      Hypertension Sister      Kidney cancer Sister  55 - 56    Osteoarthritis Sister      Breast cancer Sister  52 - 53    Hyperlipidemia Brother      Hypertension Brother      Coronary artery disease Brother          CABG x3    Hyperlipidemia Brother      Hypertension Brother       Kidney cancer Paternal Grandmother  60 - 69     Social History     Socioeconomic History    Marital status:     Number of children: 2   Occupational History    Occupation: Retired   Tobacco Use    Smoking status: Never     Passive exposure: Past    Smokeless tobacco: Never   Substance and Sexual Activity    Alcohol use: Yes     Comment: twice a year    Drug use: Never    Sexual activity: Not Currently     Social Determinants of Health     Financial Resource Strain: Low Risk  (2024)    Overall Financial Resource Strain (CARDIA)     Difficulty of Paying Living Expenses: Not hard at all   Food Insecurity: No Food Insecurity (2024)    Hunger Vital Sign     Worried About Running Out of Food in the Last Year: Never true     Ran Out of Food in the Last Year: Never true   Transportation Needs: No Transportation Needs (2024)    PRAPARE - Transportation     Lack of Transportation (Medical): No     Lack of Transportation (Non-Medical): No   Stress: No Stress Concern Present (2024)    [employer] - Occupational Stress Questionnaire     Feeling of Stress : Not at all   Housing Stability: Low Risk  (2024)    Housing Stability Vital Sign     Unable to Pay for Housing in the Last Year: No     Homeless in the Last Year: No       MEDICATIONS & ALLERGIES:     No current facility-administered medications on file prior to encounter.     Current Outpatient Medications on File Prior to Encounter   Medication Sig    busPIRone (BUSPAR) 10 MG tablet Take 20 mg by mouth 2 (two) times daily.    CELEXA 40 mg tablet Take 40 mg by mouth once daily.    cholecalciferol, vitamin D3, 1,250 mcg (50,000 unit) capsule Take 50,000 Units by mouth every 7 days.    diphenoxylate-atropine 2.5-0.025 mg (LOMOTIL) 2.5-0.025 mg per tablet Take 2 tablets by mouth 2 (two) times a day.    enoxaparin (LOVENOX) 80 mg/0.8 mL Syrg SMARTSI Milligram(s) SUB-Q Twice Daily    ferrous sulfate 325 (65 FE) MG EC  "tablet Take 1 tablet (325 mg total) by mouth once daily. (Patient taking differently: Take 325 mg by mouth 2 (two) times daily.)    GATTEX 30-VIAL 5 mg Kit Inject into the skin.    metoprolol tartrate (LOPRESSOR) 100 MG tablet Take 100 mg by mouth 2 (two) times daily.    mirtazapine (REMERON) 15 MG tablet Take 15 mg by mouth every evening.    multivitamin (ONE DAILY MULTIVITAMIN) per tablet Take 1 tablet by mouth once daily.    solifenacin (VESICARE) 5 MG tablet Take 5 mg by mouth once daily.    vancomycin (VANCOCIN) 10 gram SolR Inject into the vein.    warfarin (COUMADIN) 2.5 MG tablet TAKE ONE TABLET BY MOUTH ONCE DAILY IN THE EVENING OR AS DIRECTED BY CIS PROVIDER    HYDROcodone-acetaminophen (NORCO) 5-325 mg per tablet Take 1 tablet by mouth every 6 (six) hours as needed for Pain.     Review of patient's allergies indicates:   Allergen Reactions    Hydromorphone Itching     Other reaction(s): itching    Opium      Other reaction(s): itching  has itching with larger doses. Smaller doses do not cause a reaction.       OBJECTIVE:     Vitals:    07/11/24 0840 07/18/24 0621   BP:  124/69   Pulse:  (!) 50   Resp:  20   Temp:  98.3 °F (36.8 °C)   SpO2:  (!) 94%   Weight:  75.8 kg (167 lb 1.7 oz)   Height: 5' 4" (1.626 m)      Body mass index is 28.68 kg/m².    Physical Exam:  General:  Well developed, well nourished, no acute distress  HEENT:  Normocephalic, atraumatic, PERRL, EOMI, clear sclera, ears normal, neck supple, throat clear without erythema or exudates  CVS:  RRR, S1 and S2 normal, no murmurs, rubs, gallops  Resp:  Lungs clear to auscultation, no wheezes, rales, rhonchi, cough  GI:  Abdomen soft, non-tender, non-distended, normoactive bowel sounds, no masses  :  Deferred  MSK:  No muscle atrophy, cyanosis, peripheral edema, full range of motion  Skin:  No rashes, ulcers, erythema  Neuro:  CNII-XII grossly intact  Psych:  Alert and oriented to person, place, and time    Results:  I have independently " reviewed all pertinent lab and radiologic studies relevant to general/bariatric surgery.      VISIT DIAGNOSES:     No diagnosis found.      ASSESSMENT/PLAN:     79 yo female with Chronic Malnutrition     -  Plan for Mediport placement     Rocael Hawthorne M.D.   [facility] General Surgery - PGY1

## 2024-07-18 NOTE — DISCHARGE SUMMARY
[facility] Surgical - Periop Services  Discharge Note  Short Stay    Procedure(s) (LRB):  [id number] (N/A)      OUTCOME: Patient tolerated treatment/procedure well without complication and is now ready for discharge.    DISPOSITION: Home or Self Care    FINAL DIAGNOSIS:  Malnutrition    FOLLOWUP: In clinic    DISCHARGE INSTRUCTIONS:  No discharge procedures on file.     TIME SPENT ON DISCHARGE:      minutes

## 2024-07-18 NOTE — ANESTHESIA PROCEDURE NOTES
Intubation    Date/Time: 7/18/2024 7:49 AM    Performed by: Oneal Horne CRNA  Authorized by: Karthikeyan Jain MD    Intubation:     Induction:  Intravenous    Intubated:  Postinduction    Mask Ventilation:  N/a    Attempts:  1    Attempted By:  CRNA    Difficult Airway Encountered?: No      Complications:  None    Airway Device:  Supraglottic airway/LMA    Airway Device Size:  3.0    Style/Cuff Inflation:  Cuffed (inflated to minimal occlusive pressure)    Inflation Amount (mL):  18    Placement Verified By:  Capnometry    Complicating Factors:  None    Findings Post-Intubation:  BS equal bilateral and atraumatic/condition of teeth unchanged

## 2024-07-18 NOTE — ANESTHESIA PREPROCEDURE EVALUATION
07/18/2024  Laura D Use is a 78 y.o., female.      Laura Use    Pre-op Diagnosis: Malnutrition, unspecified type [E46]    Procedure(s):  [id number]     Review of patient's allergies indicates:   Allergen Reactions    Hydromorphone Itching     Other reaction(s): itching    Opium      Other reaction(s): itching  has itching with larger doses. Smaller doses do not cause a reaction.       Current Outpatient Medications   Medication Instructions    busPIRone (BUSPAR) 20 mg, Oral, 2 times daily    CeleXA 40 mg, Oral, Daily    cholecalciferol (vitamin D3) 50,000 Units, Oral, Every 7 days    diphenoxylate-atropine 2.5-0.025 mg (LOMOTIL) 2.5-0.025 mg per tablet 2 tablets, Oral, 2 times daily    ferrous sulfate 325 mg, Oral, Daily    HYDROcodone-acetaminophen (NORCO) 5-325 mg per tablet 1 tablet, Oral, Every 6 hours PRN    metoprolol tartrate (LOPRESSOR) 100 mg, Oral, 2 times daily    mirtazapine (REMERON) 15 mg, Oral, Nightly    multivitamin (ONE DAILY MULTIVITAMIN) per tablet 1 tablet, Oral, Daily    solifenacin (VESICARE) 5 mg, Oral, Daily    warfarin (COUMADIN) 2.5 MG tablet TAKE ONE TABLET BY MOUTH ONCE DAILY IN THE EVENING OR AS DIRECTED BY CIS PROVIDER       [id number];RI INSERT TUNNELED CV CATH WITH PORT *    Past Medical History:   Diagnosis Date    Acute URI     Anxiety and depression     Back pain     Bacteremia 04/29/2024    Breast cancer     Cholelithiasis     Contaminated small bowel syndrome     DVT (deep venous thrombosis)     on coumadin    Edema, lower extremity     Gallstone pancreatitis     Heart murmur     HTN (hypertension)     Insomnia     Irregular heart beat     Ischemic disease of gut     Kidney stones     Laryngitis     Malabsorption     Malnutrition, unspecified type     Meningitis, unspecified     OA (osteoarthritis)     PVD (peripheral vascular disease)      Rheumatoid arthritis, unspecified     Staph infection     infected mediport -- on doxycycline daily for prevention    Tremor     Unspecified cataract     Unspecified cirrhosis of liver 06/20/2023    Dr Carroll       Past Surgical History:   Procedure Laterality Date    APPENDECTOMY      BOWEL RESECTION      BREAST LUMPECTOMY Right     CHOLECYSTECTOMY  05/2015    COLONOSCOPY  02/2022    repeat 3 years    CYST REMOVAL Right     breast    CYSTOSCOPY W/ STONE MANIPULATION      CYSTOSCOPY W/ URETERAL STENT PLACEMENT  12/2020    CYSTOSCOPY W/ URETERAL STENT REMOVAL  04/2021    CYSTOURETEROSCOPY, WITH HOLMIUM LASER LITHOTRIPSY OF URETERAL CALCULUS AND STENT INSERTION Left 05/02/2023    Procedure: CYSTOSCOPY, WITH URETERAL STENT INSERTION Left;  Surgeon: Jared Felix MD;  Location: [facility] OR;  Service: Urology;  Laterality: Left;    EGD, WITH CLOSED BIOPSY N/A 6/20/2023    Procedure: EGD;  Surgeon: Aashish Carroll MD;  Location: [facility] ENDOSCOPY;  Service: Gastroenterology;  Laterality: N/A;    ENDOSCOPIC RETROGRADE CHOLANGIOPANCREATOGRAPHY W/ SPHINCTEROTOMY AND STONE REMOVAL  04/2015    ESOPHAGOGASTRODUODENOSCOPY  06/20/2023    Dr Carroll - no signs esophageal varicies --repeat 3 yrs    GI Biopsy  02/15/2022    GI Biopsy  12/2018    HYSTERECTOMY      LAPAROTOMY, EXPLORATORY  06/2015    LITHOTRIPSY Left 04/2021    LITHOTRIPSY Left 03/2021    MEDIPORT INSERTION, SINGLE  06/2021    MEDIPORT INSERTION, SINGLE  07/2020    MEDIPORT INSERTION, SINGLE  12/2018    MEDIPORT REMOVAL  07/2020    PHACOEMULSIFICATION OF CATARACT Left 12/2016    PHACOEMULSIFICATION OF CATARACT Right 11/2016    PICC line Removal Right 06/2021    POLYPECTOMY  02/2022    PVD surgery      REMOVAL OF CATHETER  12/2020    REMOVAL OF VASCULAR ACCESS CATHETER Right 4/24/2024    Procedure: Removal, Vascular Access Catheter;  Surgeon: Reed Ghosh MD;  Location: [facility];  Service: General;  Laterality: Right;    SHOULDER SURGERY Right      shoulder replacement    SPHINCTEROTOMY OF URETHRA      VENTRAL HERNIA REPAIR  04/2016     Lab Results   Component Value Date    WBC 4.65 07/15/2024    HGB 12.2 07/15/2024    HCT 38.1 07/15/2024    MCV 88.0 07/15/2024     07/15/2024        BMP  Lab Results   Component Value Date     07/15/2024    K 4.3 07/15/2024     07/15/2024    CO2 28 07/15/2024    BUN 20.8 (H) 07/15/2024    CREATININE 0.74 07/15/2024    CALCIUM 9.3 07/15/2024    EGFRNONAA >60 07/25/2022        ECG 4/18/2024    Vent. Rate : 068 BPM     Atrial Rate : 068 BPM      P-R Int : 164 ms          QRS Dur : 090 ms       QT Int : 388 ms       P-R-T Axes : 062 -35 057 degrees      QTc Int : 412 ms     Normal sinus rhythm   Left axis deviation   Minimal voltage criteria for LVH, may be normal variant ( Grandy product )   Abnormal ECG   Confirmed by Wilson Antunez MD ([id number]) on 4/18/2024 12:05:11 PM       TTE   06/11/24   Interpretation Summary    Left Ventricle: The left ventricle is normal in size. Normal wall thickness. There is normal systolic function with a visually estimated ejection fraction of 55 - 60%.    Right Ventricle: Normal right ventricular cavity size. Systolic function is normal.    Aortic Valve: The aortic valve is a trileaflet valve. There is aortic valve sclerosis.    Mitral Valve: There is no stenosis. The mean pressure gradient across the mitral valve is 2 mmHg at a heart rate of  bpm.    IVC/SVC: Normal venous pressure at 3 mmHg.         Pre-op Assessment    I have reviewed the Patient Summary Reports.    I have reviewed the NPO Status.   I have reviewed the Medications.     Review of Systems  Anesthesia Hx:  No problems with previous Anesthesia             Denies Family Hx of Anesthesia complications.    Denies Personal Hx of Anesthesia complications.                    Social:  Non-Smoker       Cardiovascular:  Exercise tolerance: poor          Denies Angina.   Denies Orthopnea.  Denies PND. PVD   Denies WATTS.     Functional Capacity low / < 4 METS        Deep Venous Thrombosis (DVT)                  Renal/:   renal calculi               Hepatic/GI:      Liver Disease, (CIRRHOSIS)  SMALL BOWEL SYNDROME          Musculoskeletal:  Arthritis (RA)               Neurological:  Denies TIA.  Denies CVA.                                    Psych:   anxiety depression                Physical Exam  General: Well nourished, Alert and Oriented    Airway:  Mallampati: III   Mouth Opening: Normal  TM Distance: Normal  Tongue: Normal  Neck ROM: Normal ROM    Dental:  Intact    Chest/Lungs:  Clear to auscultation    Heart:  Rate: Normal  Rhythm: Regular Rhythm  No pretibial edema  No carotid bruits      Anesthesia Plan  Type of Anesthesia, risks & benefits discussed:    Anesthesia Type: Gen Supraglottic Airway  Intra-op Monitoring Plan: Standard ASA Monitors  Post Op Pain Control Plan: IV/PO Opioids PRN  Induction:  IV  Airway Plan: Direct, Post-Induction  Informed Consent: Informed consent signed with the Patient and all parties understand the risks and agree with anesthesia plan.  All questions answered. Patient consented to blood products? No  ASA Score: 3  Day of Surgery Review of History & Physical: H&P Update referred to the surgeon/provider.  Anesthesia Plan Notes: GA TIVA    Ready For Surgery From Anesthesia Perspective.     .

## 2024-07-19 VITALS
TEMPERATURE: 98 F | RESPIRATION RATE: 18 BRPM | HEART RATE: 54 BPM | OXYGEN SATURATION: 97 % | HEIGHT: 64 IN | BODY MASS INDEX: 28.53 KG/M2 | DIASTOLIC BLOOD PRESSURE: 77 MMHG | WEIGHT: 167.13 LBS | SYSTOLIC BLOOD PRESSURE: 148 MMHG

## 2024-07-19 NOTE — TELEPHONE ENCOUNTER
Patient calling with questions regarding discharge medications. Patient states she had a procedure done today and was not told if she should continue her Lovenox tonight. Patient advised per discharge instructions to continue Lovenox as directed. Verbalized understanding. Advised to call back if symptoms become worse or with further questions.      Reason for Disposition   Health information question, no triage required and triager able to answer question    Protocols used: Information Only Call - No Triage-A-

## 2024-07-30 ENCOUNTER — DOCUMENT SCAN (OUTPATIENT)
Dept: HOME HEALTH SERVICES | Facility: HOSPITAL | Age: 79
End: 2024-07-30
Payer: MEDICARE

## 2024-07-31 ENCOUNTER — OFFICE VISIT (OUTPATIENT)
Dept: SURGERY | Facility: CLINIC | Age: 79
End: 2024-07-31
Payer: MEDICARE

## 2024-07-31 VITALS
SYSTOLIC BLOOD PRESSURE: 138 MMHG | HEART RATE: 60 BPM | BODY MASS INDEX: 28.6 KG/M2 | DIASTOLIC BLOOD PRESSURE: 80 MMHG | HEIGHT: 64 IN | WEIGHT: 167.56 LBS

## 2024-07-31 DIAGNOSIS — Z98.890 POST-OPERATIVE STATE: Primary | ICD-10-CM

## 2024-07-31 PROCEDURE — 99024 POSTOP FOLLOW-UP VISIT: CPT | Mod: POP,,,

## 2024-07-31 NOTE — PROGRESS NOTES
Post Operative Progress Note  General Surgery    Patient Name: Laura Acosta  YOB: 1945    Date: 07/31/2024                   SUBJECTIVE:     Chief Complaint/Reason for Admission:   Chief Complaint   Patient presents with    Post-op Evaluation     7/18/24 Mediport placement        History of Present Illness:  Ms. Laura Acosta is a 78 y.o. female who is 2 weeks post op mediport placement.  The patient is currently without any complaints. PICC RUE in place.    Review of Systems:  12 point ROS negative except as stated in HPI    PAST HISTORY:     Past Medical History:   Diagnosis Date    Acute URI     Anxiety and depression     Back pain     Bacteremia 04/29/2024    Breast cancer     Cholelithiasis     Contaminated small bowel syndrome     DVT (deep venous thrombosis)     on coumadin    Edema, lower extremity     Gallstone pancreatitis     Heart murmur     HTN (hypertension)     Insomnia     Irregular heart beat     Ischemic disease of gut     Kidney stones     Laryngitis     Malabsorption     Malnutrition, unspecified type     Meningitis, unspecified     OA (osteoarthritis)     PVD (peripheral vascular disease)     Rheumatoid arthritis, unspecified     Staph infection     infected mediport -- on doxycycline daily for prevention    Tremor     Unspecified cataract     Unspecified cirrhosis of liver 06/20/2023    Dr Carroll     Past Surgical History:   Procedure Laterality Date    APPENDECTOMY      BOWEL RESECTION      BREAST LUMPECTOMY Right     CHOLECYSTECTOMY  05/2015    COLONOSCOPY  02/2022    repeat 3 years    CYST REMOVAL Right     breast    CYSTOSCOPY W/ STONE MANIPULATION      CYSTOSCOPY W/ URETERAL STENT PLACEMENT  12/2020    CYSTOSCOPY W/ URETERAL STENT REMOVAL  04/2021    CYSTOURETEROSCOPY, WITH HOLMIUM LASER LITHOTRIPSY OF URETERAL CALCULUS AND STENT INSERTION Left 05/02/2023    Procedure: CYSTOSCOPY, WITH URETERAL STENT INSERTION Left;  Surgeon: Jared Felix MD;  Location: [facility] OR;   Service: Urology;  Laterality: Left;    EGD, WITH CLOSED BIOPSY N/A 6/20/2023    Procedure: EGD;  Surgeon: Aashish Carroll MD;  Location: [facility] ENDOSCOPY;  Service: Gastroenterology;  Laterality: N/A;    ENDOSCOPIC RETROGRADE CHOLANGIOPANCREATOGRAPHY W/ SPHINCTEROTOMY AND STONE REMOVAL  04/2015    ESOPHAGOGASTRODUODENOSCOPY  06/20/2023    Dr Carroll - no signs esophageal varicies --repeat 3 yrs    GI Biopsy  02/15/2022    GI Biopsy  12/2018    HYSTERECTOMY      INSERTION OF TUNNELED CENTRAL VENOUS CATHETER (CVC) WITH SUBCUTANEOUS PORT N/A 7/18/2024    Procedure: [id number];  Surgeon: Ariel Welsh Jr., MD;  Location: [facility];  Service: General;  Laterality: N/A;    LAPAROTOMY, EXPLORATORY  06/2015    LITHOTRIPSY Left 04/2021    LITHOTRIPSY Left 03/2021    MEDIPORT INSERTION, SINGLE  06/2021    MEDIPORT INSERTION, SINGLE  07/2020    MEDIPORT INSERTION, SINGLE  12/2018    MEDIPORT REMOVAL  07/2020    PHACOEMULSIFICATION OF CATARACT Left 12/2016    PHACOEMULSIFICATION OF CATARACT Right 11/2016    PICC line Removal Right 06/2021    POLYPECTOMY  02/2022    PVD surgery      REMOVAL OF CATHETER  12/2020    REMOVAL OF VASCULAR ACCESS CATHETER Right 4/24/2024    Procedure: Removal, Vascular Access Catheter;  Surgeon: Reed Ghosh MD;  Location: [facility];  Service: General;  Laterality: Right;    SHOULDER SURGERY Right     shoulder replacement    SPHINCTEROTOMY OF URETHRA      VENTRAL HERNIA REPAIR  04/2016     Family History   Problem Relation Name Age of Onset    Pneumonia Mother      Depression Mother      Osteoarthritis Mother      Breast cancer Mother  70 - 75    Uterine cancer Mother  40 - 45    Bone cancer Mother  60 - 69    Heart attack Father      Aneurysm Father          brain    Diabetes Father      Hyperlipidemia Father      Hypertension Father      Hyperlipidemia Sister      Hypertension Sister      Kidney cancer Sister  55 - 56    Osteoarthritis Sister      Breast cancer Sister  52 -  53    Hyperlipidemia Brother      Hypertension Brother      Coronary artery disease Brother          CABG x3    Hyperlipidemia Brother      Hypertension Brother      Kidney cancer Paternal Grandmother  60 - 69     Social History     Socioeconomic History    Marital status:     Number of children: 2   Occupational History    Occupation: Retired   Tobacco Use    Smoking status: Never     Passive exposure: Past    Smokeless tobacco: Never   Substance and Sexual Activity    Alcohol use: Yes     Comment: twice a year    Drug use: Never    Sexual activity: Not Currently     Social Determinants of Health     Financial Resource Strain: Low Risk  (4/20/2024)    Overall Financial Resource Strain (CARDIA)     Difficulty of Paying Living Expenses: Not hard at all   Food Insecurity: No Food Insecurity (4/20/2024)    Hunger Vital Sign     Worried About Running Out of Food in the Last Year: Never true     Ran Out of Food in the Last Year: Never true   Transportation Needs: No Transportation Needs (4/20/2024)    PRAPARE - Transportation     Lack of Transportation (Medical): No     Lack of Transportation (Non-Medical): No   Stress: No Stress Concern Present (4/20/2024)    [employer] [place] [employer] - Occupational Stress Questionnaire     Feeling of Stress : Not at all   Housing Stability: Low Risk  (4/20/2024)    Housing Stability Vital Sign     Unable to Pay for Housing in the Last Year: No     Homeless in the Last Year: No       MEDICATIONS & ALLERGIES:     Current Outpatient Medications on File Prior to Visit   Medication Sig    busPIRone (BUSPAR) 10 MG tablet Take 20 mg by mouth 2 (two) times daily.    CELEXA 40 mg tablet Take 40 mg by mouth once daily.    cholecalciferol, vitamin D3, 1,250 mcg (50,000 unit) capsule Take 50,000 Units by mouth every 7 days.    diphenoxylate-atropine 2.5-0.025 mg (LOMOTIL) 2.5-0.025 mg per tablet Take 2 tablets by mouth 2 (two) times a day.    enoxaparin (LOVENOX) 80 mg/0.8 mL  "Syrg SMARTSI Milligram(s) SUB-Q Twice Daily    ferrous sulfate 325 (65 FE) MG EC tablet Take 1 tablet (325 mg total) by mouth once daily. (Patient taking differently: Take 325 mg by mouth 2 (two) times daily.)    GATTEX 30-VIAL 5 mg Kit Inject into the skin.    metoprolol tartrate (LOPRESSOR) 100 MG tablet Take 100 mg by mouth 2 (two) times daily.    mirtazapine (REMERON) 15 MG tablet Take 15 mg by mouth every evening.    multivitamin (ONE DAILY MULTIVITAMIN) per tablet Take 1 tablet by mouth once daily.    solifenacin (VESICARE) 5 MG tablet Take 5 mg by mouth once daily.    traMADoL (ULTRAM) 50 mg tablet Take 1 tablet (50 mg total) by mouth every 6 (six) hours as needed for Pain.    vancomycin (VANCOCIN) 10 gram SolR Inject into the vein.    warfarin (COUMADIN) 2.5 MG tablet TAKE ONE TABLET BY MOUTH ONCE DAILY IN THE EVENING OR AS DIRECTED BY CIS PROVIDER     No current facility-administered medications on file prior to visit.     Review of patient's allergies indicates:   Allergen Reactions    Hydromorphone Itching     Other reaction(s): itching    Opium      Other reaction(s): itching  has itching with larger doses. Smaller doses do not cause a reaction.       OBJECTIVE:   Visit Vitals  /80   Pulse 60   Ht 5' 4" (1.626 m)   Wt 76 kg (167 lb 8.8 oz)   BMI 28.76 kg/m²       Physical Exam:  General:  Well developed, well nourished, no acute distress  GI:  Abdomen soft, non-tender, non-distended, normoactive bowel sounds, no masses  Skin:  Incision Clean/Dry/Intact. Healing hematoma noted to mediport site and left breast.    VISIT DIAGNOSES:       ICD-10-CM ICD-9-CM   1. Post-operative state  Z98.890 V45.89       ASSESSMENT/PLAN:     78 y.o. female who is s/p mediport placement    -  Pt doing well  -  Wounds healing well    RTC PRN        "

## 2024-08-02 ENCOUNTER — DOCUMENT SCAN (OUTPATIENT)
Dept: HOME HEALTH SERVICES | Facility: HOSPITAL | Age: 79
End: 2024-08-02
Payer: MEDICARE

## 2024-08-09 ENCOUNTER — EXTERNAL HOME HEALTH (OUTPATIENT)
Dept: HOME HEALTH SERVICES | Facility: HOSPITAL | Age: 79
End: 2024-08-09
Payer: MEDICARE

## 2024-08-14 ENCOUNTER — DOCUMENT SCAN (OUTPATIENT)
Dept: HOME HEALTH SERVICES | Facility: HOSPITAL | Age: 79
End: 2024-08-14
Payer: MEDICARE

## 2024-08-19 ENCOUNTER — LAB REQUISITION (OUTPATIENT)
Dept: LAB | Facility: HOSPITAL | Age: 79
End: 2024-08-19
Payer: MEDICARE

## 2024-08-19 DIAGNOSIS — I25.10 ATHEROSCLEROTIC HEART DISEASE OF NATIVE CORONARY ARTERY WITHOUT ANGINA PECTORIS: ICD-10-CM

## 2024-08-19 DIAGNOSIS — E46 UNSPECIFIED PROTEIN-CALORIE MALNUTRITION: ICD-10-CM

## 2024-08-19 DIAGNOSIS — R00.1 BRADYCARDIA, UNSPECIFIED: ICD-10-CM

## 2024-08-19 DIAGNOSIS — K91.2 POSTSURGICAL MALABSORPTION, NOT ELSEWHERE CLASSIFIED: ICD-10-CM

## 2024-08-19 LAB
ALBUMIN SERPL-MCNC: 3.4 G/DL (ref 3.4–4.8)
ALBUMIN/GLOB SERPL: 1.1 RATIO (ref 1.1–2)
ALP SERPL-CCNC: 82 UNIT/L (ref 40–150)
ALT SERPL-CCNC: 32 UNIT/L (ref 0–55)
ANION GAP SERPL CALC-SCNC: 12 MEQ/L
AST SERPL-CCNC: 37 UNIT/L (ref 5–34)
BASOPHILS # BLD AUTO: 0.04 X10(3)/MCL
BASOPHILS NFR BLD AUTO: 1 %
BILIRUB SERPL-MCNC: 0.4 MG/DL
BUN SERPL-MCNC: 21 MG/DL (ref 9.8–20.1)
CALCIUM SERPL-MCNC: 8.8 MG/DL (ref 8.4–10.2)
CHLORIDE SERPL-SCNC: 105 MMOL/L (ref 98–107)
CO2 SERPL-SCNC: 24 MMOL/L (ref 23–31)
CREAT SERPL-MCNC: 0.7 MG/DL (ref 0.55–1.02)
CREAT/UREA NIT SERPL: 30
EOSINOPHIL # BLD AUTO: 0.28 X10(3)/MCL (ref 0–0.9)
EOSINOPHIL NFR BLD AUTO: 6.8 %
ERYTHROCYTE [DISTWIDTH] IN BLOOD BY AUTOMATED COUNT: 14.9 % (ref 11.5–17)
GFR SERPLBLD CREATININE-BSD FMLA CKD-EPI: >60 ML/MIN/1.73/M2
GLOBULIN SER-MCNC: 3.2 GM/DL (ref 2.4–3.5)
GLUCOSE SERPL-MCNC: 139 MG/DL (ref 82–115)
HCT VFR BLD AUTO: 37.5 % (ref 37–47)
HGB BLD-MCNC: 12.3 G/DL (ref 12–16)
IMM GRANULOCYTES # BLD AUTO: 0.01 X10(3)/MCL (ref 0–0.04)
IMM GRANULOCYTES NFR BLD AUTO: 0.2 %
INR PPP: 2.6
LYMPHOCYTES # BLD AUTO: 1.74 X10(3)/MCL (ref 0.6–4.6)
LYMPHOCYTES NFR BLD AUTO: 42.5 %
MAGNESIUM SERPL-MCNC: 2 MG/DL (ref 1.6–2.6)
MCH RBC QN AUTO: 28.7 PG (ref 27–31)
MCHC RBC AUTO-ENTMCNC: 32.8 G/DL (ref 33–36)
MCV RBC AUTO: 87.4 FL (ref 80–94)
MONOCYTES # BLD AUTO: 0.36 X10(3)/MCL (ref 0.1–1.3)
MONOCYTES NFR BLD AUTO: 8.8 %
NEUTROPHILS # BLD AUTO: 1.66 X10(3)/MCL (ref 2.1–9.2)
NEUTROPHILS NFR BLD AUTO: 40.7 %
NRBC BLD AUTO-RTO: 0 %
PHOSPHATE SERPL-MCNC: 3.7 MG/DL (ref 2.3–4.7)
PLATELET # BLD AUTO: 136 X10(3)/MCL (ref 130–400)
PMV BLD AUTO: 10 FL (ref 7.4–10.4)
POTASSIUM SERPL-SCNC: 4.5 MMOL/L (ref 3.5–5.1)
PROT SERPL-MCNC: 6.6 GM/DL (ref 5.8–7.6)
PROTHROMBIN TIME: 27.3 SECONDS (ref 12.5–14.5)
RBC # BLD AUTO: 4.29 X10(6)/MCL (ref 4.2–5.4)
SODIUM SERPL-SCNC: 141 MMOL/L (ref 136–145)
TRIGL SERPL-MCNC: 81 MG/DL (ref 37–140)
WBC # BLD AUTO: 4.09 X10(3)/MCL (ref 4.5–11.5)

## 2024-08-19 PROCEDURE — 84478 ASSAY OF TRIGLYCERIDES: CPT | Performed by: INTERNAL MEDICINE

## 2024-08-19 PROCEDURE — 84100 ASSAY OF PHOSPHORUS: CPT | Performed by: INTERNAL MEDICINE

## 2024-08-19 PROCEDURE — 83735 ASSAY OF MAGNESIUM: CPT | Performed by: INTERNAL MEDICINE

## 2024-08-19 PROCEDURE — 80053 COMPREHEN METABOLIC PANEL: CPT | Performed by: INTERNAL MEDICINE

## 2024-08-19 PROCEDURE — 85610 PROTHROMBIN TIME: CPT | Performed by: INTERNAL MEDICINE

## 2024-08-19 PROCEDURE — 85025 COMPLETE CBC W/AUTO DIFF WBC: CPT | Performed by: INTERNAL MEDICINE

## 2024-08-21 PROCEDURE — 83540 ASSAY OF IRON: CPT | Performed by: FAMILY MEDICINE

## 2024-09-03 ENCOUNTER — DOCUMENT SCAN (OUTPATIENT)
Dept: HOME HEALTH SERVICES | Facility: HOSPITAL | Age: 79
End: 2024-09-03
Payer: MEDICARE

## 2024-09-07 ENCOUNTER — DOCUMENT SCAN (OUTPATIENT)
Dept: HOME HEALTH SERVICES | Facility: HOSPITAL | Age: 79
End: 2024-09-07
Payer: MEDICARE

## 2024-09-16 ENCOUNTER — LAB REQUISITION (OUTPATIENT)
Dept: LAB | Facility: HOSPITAL | Age: 79
End: 2024-09-16
Payer: MEDICARE

## 2024-09-16 DIAGNOSIS — Z51.81 ENCOUNTER FOR THERAPEUTIC DRUG LEVEL MONITORING: ICD-10-CM

## 2024-09-16 DIAGNOSIS — K91.2 POSTSURGICAL MALABSORPTION, NOT ELSEWHERE CLASSIFIED: ICD-10-CM

## 2024-09-16 LAB
ALBUMIN SERPL-MCNC: 3.5 G/DL (ref 3.4–4.8)
ALBUMIN/GLOB SERPL: 1.1 RATIO (ref 1.1–2)
ALP SERPL-CCNC: 93 UNIT/L (ref 40–150)
ALT SERPL-CCNC: 31 UNIT/L (ref 0–55)
ANION GAP SERPL CALC-SCNC: 7 MEQ/L
AST SERPL-CCNC: 34 UNIT/L (ref 5–34)
BASOPHILS # BLD AUTO: 0.03 X10(3)/MCL
BASOPHILS NFR BLD AUTO: 0.6 %
BILIRUB SERPL-MCNC: 0.6 MG/DL
BUN SERPL-MCNC: 21 MG/DL (ref 9.8–20.1)
CALCIUM SERPL-MCNC: 8.7 MG/DL (ref 8.4–10.2)
CHLORIDE SERPL-SCNC: 107 MMOL/L (ref 98–107)
CO2 SERPL-SCNC: 26 MMOL/L (ref 23–31)
CREAT SERPL-MCNC: 0.64 MG/DL (ref 0.55–1.02)
CREAT/UREA NIT SERPL: 33
EOSINOPHIL # BLD AUTO: 0.22 X10(3)/MCL (ref 0–0.9)
EOSINOPHIL NFR BLD AUTO: 4.4 %
ERYTHROCYTE [DISTWIDTH] IN BLOOD BY AUTOMATED COUNT: 14.6 % (ref 11.5–17)
GFR SERPLBLD CREATININE-BSD FMLA CKD-EPI: >60 ML/MIN/1.73/M2
GLOBULIN SER-MCNC: 3.2 GM/DL (ref 2.4–3.5)
GLUCOSE SERPL-MCNC: 70 MG/DL (ref 82–115)
HCT VFR BLD AUTO: 40.2 % (ref 37–47)
HGB BLD-MCNC: 13.2 G/DL (ref 12–16)
IMM GRANULOCYTES # BLD AUTO: 0.01 X10(3)/MCL (ref 0–0.04)
IMM GRANULOCYTES NFR BLD AUTO: 0.2 %
INR PPP: 2.3
LYMPHOCYTES # BLD AUTO: 2.19 X10(3)/MCL (ref 0.6–4.6)
LYMPHOCYTES NFR BLD AUTO: 44.2 %
MAGNESIUM SERPL-MCNC: 2.1 MG/DL (ref 1.6–2.6)
MCH RBC QN AUTO: 29.1 PG (ref 27–31)
MCHC RBC AUTO-ENTMCNC: 32.8 G/DL (ref 33–36)
MCV RBC AUTO: 88.7 FL (ref 80–94)
MONOCYTES # BLD AUTO: 0.57 X10(3)/MCL (ref 0.1–1.3)
MONOCYTES NFR BLD AUTO: 11.5 %
NEUTROPHILS # BLD AUTO: 1.93 X10(3)/MCL (ref 2.1–9.2)
NEUTROPHILS NFR BLD AUTO: 39.1 %
NRBC BLD AUTO-RTO: 0 %
PHOSPHATE SERPL-MCNC: 3.2 MG/DL (ref 2.3–4.7)
PLATELET # BLD AUTO: 164 X10(3)/MCL (ref 130–400)
PMV BLD AUTO: 10 FL (ref 7.4–10.4)
POTASSIUM SERPL-SCNC: 4.5 MMOL/L (ref 3.5–5.1)
PROT SERPL-MCNC: 6.7 GM/DL (ref 5.8–7.6)
PROTHROMBIN TIME: 26 SECONDS (ref 12.5–14.5)
RBC # BLD AUTO: 4.53 X10(6)/MCL (ref 4.2–5.4)
SODIUM SERPL-SCNC: 140 MMOL/L (ref 136–145)
TRIGL SERPL-MCNC: 109 MG/DL (ref 37–140)
WBC # BLD AUTO: 4.95 X10(3)/MCL (ref 4.5–11.5)

## 2024-09-16 PROCEDURE — 85610 PROTHROMBIN TIME: CPT | Performed by: FAMILY MEDICINE

## 2024-09-16 PROCEDURE — 83735 ASSAY OF MAGNESIUM: CPT | Performed by: FAMILY MEDICINE

## 2024-09-16 PROCEDURE — 80053 COMPREHEN METABOLIC PANEL: CPT | Performed by: FAMILY MEDICINE

## 2024-09-16 PROCEDURE — 84100 ASSAY OF PHOSPHORUS: CPT | Performed by: FAMILY MEDICINE

## 2024-09-16 PROCEDURE — 85025 COMPLETE CBC W/AUTO DIFF WBC: CPT | Performed by: FAMILY MEDICINE

## 2024-09-16 PROCEDURE — 84478 ASSAY OF TRIGLYCERIDES: CPT | Performed by: FAMILY MEDICINE

## 2024-09-25 ENCOUNTER — DOCUMENT SCAN (OUTPATIENT)
Dept: HOME HEALTH SERVICES | Facility: HOSPITAL | Age: 79
End: 2024-09-25
Payer: MEDICARE

## 2024-10-03 ENCOUNTER — EXTERNAL HOME HEALTH (OUTPATIENT)
Dept: HOME HEALTH SERVICES | Facility: HOSPITAL | Age: 79
End: 2024-10-03
Payer: MEDICARE

## 2024-10-08 ENCOUNTER — DOCUMENT SCAN (OUTPATIENT)
Dept: HOME HEALTH SERVICES | Facility: HOSPITAL | Age: 79
End: 2024-10-08
Payer: MEDICARE

## 2024-10-14 ENCOUNTER — LAB REQUISITION (OUTPATIENT)
Dept: LAB | Facility: HOSPITAL | Age: 79
End: 2024-10-14
Payer: MEDICARE

## 2024-10-14 DIAGNOSIS — K91.2 POSTSURGICAL MALABSORPTION, NOT ELSEWHERE CLASSIFIED: ICD-10-CM

## 2024-10-14 DIAGNOSIS — Z51.81 ENCOUNTER FOR THERAPEUTIC DRUG LEVEL MONITORING: ICD-10-CM

## 2024-10-14 DIAGNOSIS — Z79.01 LONG TERM (CURRENT) USE OF ANTICOAGULANTS: ICD-10-CM

## 2024-10-14 LAB
ALBUMIN SERPL-MCNC: 3.2 G/DL (ref 3.4–4.8)
ALBUMIN/GLOB SERPL: 0.9 RATIO (ref 1.1–2)
ALP SERPL-CCNC: 107 UNIT/L (ref 40–150)
ALT SERPL-CCNC: 25 UNIT/L (ref 0–55)
ANION GAP SERPL CALC-SCNC: 10 MEQ/L
AST SERPL-CCNC: 27 UNIT/L (ref 5–34)
BASOPHILS # BLD AUTO: 0.02 X10(3)/MCL
BASOPHILS NFR BLD AUTO: 0.2 %
BILIRUB SERPL-MCNC: 0.6 MG/DL
BUN SERPL-MCNC: 17 MG/DL (ref 9.8–20.1)
CALCIUM SERPL-MCNC: 8.6 MG/DL (ref 8.4–10.2)
CHLORIDE SERPL-SCNC: 103 MMOL/L (ref 98–107)
CO2 SERPL-SCNC: 23 MMOL/L (ref 23–31)
CREAT SERPL-MCNC: 0.65 MG/DL (ref 0.55–1.02)
CREAT/UREA NIT SERPL: 26
EOSINOPHIL # BLD AUTO: 0.03 X10(3)/MCL (ref 0–0.9)
EOSINOPHIL NFR BLD AUTO: 0.3 %
ERYTHROCYTE [DISTWIDTH] IN BLOOD BY AUTOMATED COUNT: 14.3 % (ref 11.5–17)
GFR SERPLBLD CREATININE-BSD FMLA CKD-EPI: >60 ML/MIN/1.73/M2
GLOBULIN SER-MCNC: 3.6 GM/DL (ref 2.4–3.5)
GLUCOSE SERPL-MCNC: 184 MG/DL (ref 82–115)
HCT VFR BLD AUTO: 38.4 % (ref 37–47)
HGB BLD-MCNC: 12.7 G/DL (ref 12–16)
IMM GRANULOCYTES # BLD AUTO: 0.06 X10(3)/MCL (ref 0–0.04)
IMM GRANULOCYTES NFR BLD AUTO: 0.6 %
INR PPP: 1.6
LYMPHOCYTES # BLD AUTO: 0.76 X10(3)/MCL (ref 0.6–4.6)
LYMPHOCYTES NFR BLD AUTO: 8 %
MAGNESIUM SERPL-MCNC: 2.1 MG/DL (ref 1.6–2.6)
MCH RBC QN AUTO: 29.1 PG (ref 27–31)
MCHC RBC AUTO-ENTMCNC: 33.1 G/DL (ref 33–36)
MCV RBC AUTO: 88.1 FL (ref 80–94)
MONOCYTES # BLD AUTO: 0.54 X10(3)/MCL (ref 0.1–1.3)
MONOCYTES NFR BLD AUTO: 5.7 %
NEUTROPHILS # BLD AUTO: 8.12 X10(3)/MCL (ref 2.1–9.2)
NEUTROPHILS NFR BLD AUTO: 85.2 %
NRBC BLD AUTO-RTO: 0 %
PHOSPHATE SERPL-MCNC: 1.9 MG/DL (ref 2.3–4.7)
PLATELET # BLD AUTO: 135 X10(3)/MCL (ref 130–400)
PLATELETS.RETICULATED NFR BLD AUTO: 1.3 % (ref 0.9–11.2)
PMV BLD AUTO: 9.8 FL (ref 7.4–10.4)
POTASSIUM SERPL-SCNC: 3.8 MMOL/L (ref 3.5–5.1)
PROT SERPL-MCNC: 6.8 GM/DL (ref 5.8–7.6)
PROTHROMBIN TIME: 19 SECONDS (ref 12.5–14.5)
RBC # BLD AUTO: 4.36 X10(6)/MCL (ref 4.2–5.4)
SODIUM SERPL-SCNC: 136 MMOL/L (ref 136–145)
TRIGL SERPL-MCNC: 103 MG/DL (ref 37–140)
WBC # BLD AUTO: 9.53 X10(3)/MCL (ref 4.5–11.5)

## 2024-10-14 PROCEDURE — 85025 COMPLETE CBC W/AUTO DIFF WBC: CPT | Performed by: INTERNAL MEDICINE

## 2024-10-14 PROCEDURE — 84478 ASSAY OF TRIGLYCERIDES: CPT | Performed by: INTERNAL MEDICINE

## 2024-10-14 PROCEDURE — 80053 COMPREHEN METABOLIC PANEL: CPT | Performed by: INTERNAL MEDICINE

## 2024-10-14 PROCEDURE — 84100 ASSAY OF PHOSPHORUS: CPT | Performed by: INTERNAL MEDICINE

## 2024-10-14 PROCEDURE — 85610 PROTHROMBIN TIME: CPT | Performed by: INTERNAL MEDICINE

## 2024-10-14 PROCEDURE — 83735 ASSAY OF MAGNESIUM: CPT | Performed by: INTERNAL MEDICINE

## 2024-10-18 ENCOUNTER — HOSPITAL ENCOUNTER (INPATIENT)
Facility: HOSPITAL | Age: 79
LOS: 3 days | Discharge: HOME HEALTH | DRG: 194 | End: 2024-10-21
Attending: STUDENT IN AN ORGANIZED HEALTH CARE EDUCATION/TRAINING PROGRAM | Admitting: INTERNAL MEDICINE
Payer: MEDICARE

## 2024-10-18 DIAGNOSIS — R53.1 WEAKNESS: ICD-10-CM

## 2024-10-18 DIAGNOSIS — J18.9 PNEUMONIA DUE TO INFECTIOUS ORGANISM, UNSPECIFIED LATERALITY, UNSPECIFIED PART OF LUNG: ICD-10-CM

## 2024-10-18 DIAGNOSIS — A41.9 SEPSIS, DUE TO UNSPECIFIED ORGANISM, UNSPECIFIED WHETHER ACUTE ORGAN DYSFUNCTION PRESENT: Primary | ICD-10-CM

## 2024-10-18 DIAGNOSIS — R06.02 SHORTNESS OF BREATH: ICD-10-CM

## 2024-10-18 LAB
ALBUMIN SERPL-MCNC: 3.3 G/DL (ref 3.4–4.8)
ALBUMIN/GLOB SERPL: 0.8 RATIO (ref 1.1–2)
ALP SERPL-CCNC: 174 UNIT/L (ref 40–150)
ALT SERPL-CCNC: 83 UNIT/L (ref 0–55)
ANION GAP SERPL CALC-SCNC: 9 MEQ/L
AST SERPL-CCNC: 86 UNIT/L (ref 5–34)
BACTERIA #/AREA URNS AUTO: ABNORMAL /HPF
BASOPHILS # BLD AUTO: 0.05 X10(3)/MCL
BASOPHILS NFR BLD AUTO: 0.4 %
BILIRUB SERPL-MCNC: 0.9 MG/DL
BILIRUB UR QL STRIP.AUTO: NEGATIVE
BNP BLD-MCNC: 175.7 PG/ML
BUN SERPL-MCNC: 15.8 MG/DL (ref 9.8–20.1)
CALCIUM SERPL-MCNC: 9.1 MG/DL (ref 8.4–10.2)
CHLORIDE SERPL-SCNC: 104 MMOL/L (ref 98–107)
CK SERPL-CCNC: 84 U/L (ref 29–168)
CLARITY UR: CLEAR
CO2 SERPL-SCNC: 25 MMOL/L (ref 23–31)
COLOR UR AUTO: ABNORMAL
CREAT SERPL-MCNC: 0.78 MG/DL (ref 0.55–1.02)
CREAT/UREA NIT SERPL: 20
EOSINOPHIL # BLD AUTO: 0.02 X10(3)/MCL (ref 0–0.9)
EOSINOPHIL NFR BLD AUTO: 0.1 %
ERYTHROCYTE [DISTWIDTH] IN BLOOD BY AUTOMATED COUNT: 14.4 % (ref 11.5–17)
FLUAV AG UPPER RESP QL IA.RAPID: NOT DETECTED
FLUBV AG UPPER RESP QL IA.RAPID: NOT DETECTED
GFR SERPLBLD CREATININE-BSD FMLA CKD-EPI: >60 ML/MIN/1.73/M2
GLOBULIN SER-MCNC: 4.1 GM/DL (ref 2.4–3.5)
GLUCOSE SERPL-MCNC: 133 MG/DL (ref 82–115)
GLUCOSE UR QL STRIP: NORMAL
HCT VFR BLD AUTO: 40.1 % (ref 37–47)
HGB BLD-MCNC: 13.1 G/DL (ref 12–16)
HGB UR QL STRIP: NEGATIVE
IMM GRANULOCYTES # BLD AUTO: 0.05 X10(3)/MCL (ref 0–0.04)
IMM GRANULOCYTES NFR BLD AUTO: 0.4 %
INR PPP: 1.6
KETONES UR QL STRIP: NEGATIVE
LACTATE SERPL-SCNC: 1.3 MMOL/L (ref 0.5–2.2)
LEUKOCYTE ESTERASE UR QL STRIP: 250
LYMPHOCYTES # BLD AUTO: 1.02 X10(3)/MCL (ref 0.6–4.6)
LYMPHOCYTES NFR BLD AUTO: 7.4 %
MCH RBC QN AUTO: 28.7 PG (ref 27–31)
MCHC RBC AUTO-ENTMCNC: 32.7 G/DL (ref 33–36)
MCV RBC AUTO: 87.7 FL (ref 80–94)
MONOCYTES # BLD AUTO: 0.98 X10(3)/MCL (ref 0.1–1.3)
MONOCYTES NFR BLD AUTO: 7.1 %
MRSA PCR SCRN (OHS): NOT DETECTED
NEUTROPHILS # BLD AUTO: 11.61 X10(3)/MCL (ref 2.1–9.2)
NEUTROPHILS NFR BLD AUTO: 84.6 %
NITRITE UR QL STRIP: NEGATIVE
NRBC BLD AUTO-RTO: 0 %
PH UR STRIP: 6.5 [PH]
PLATELET # BLD AUTO: 152 X10(3)/MCL (ref 130–400)
PMV BLD AUTO: 9.4 FL (ref 7.4–10.4)
POTASSIUM SERPL-SCNC: 4 MMOL/L (ref 3.5–5.1)
PROT SERPL-MCNC: 7.4 GM/DL (ref 5.8–7.6)
PROT UR QL STRIP: NEGATIVE
PROTHROMBIN TIME: 18.8 SECONDS (ref 12.5–14.5)
RBC # BLD AUTO: 4.57 X10(6)/MCL (ref 4.2–5.4)
RBC #/AREA URNS AUTO: ABNORMAL /HPF
SARS-COV-2 RNA RESP QL NAA+PROBE: NOT DETECTED
SODIUM SERPL-SCNC: 138 MMOL/L (ref 136–145)
SP GR UR STRIP.AUTO: 1.02 (ref 1–1.03)
SQUAMOUS #/AREA URNS LPF: ABNORMAL /HPF
TROPONIN I SERPL-MCNC: 0.03 NG/ML (ref 0–0.04)
UROBILINOGEN UR STRIP-ACNC: NORMAL
WBC # BLD AUTO: 13.73 X10(3)/MCL (ref 4.5–11.5)
WBC #/AREA URNS AUTO: ABNORMAL /HPF

## 2024-10-18 PROCEDURE — 80053 COMPREHEN METABOLIC PANEL: CPT

## 2024-10-18 PROCEDURE — 85025 COMPLETE CBC W/AUTO DIFF WBC: CPT

## 2024-10-18 PROCEDURE — 87641 MR-STAPH DNA AMP PROBE: CPT | Performed by: NURSE PRACTITIONER

## 2024-10-18 PROCEDURE — 0240U COVID/FLU A&B PCR: CPT | Performed by: STUDENT IN AN ORGANIZED HEALTH CARE EDUCATION/TRAINING PROGRAM

## 2024-10-18 PROCEDURE — 96365 THER/PROPH/DIAG IV INF INIT: CPT

## 2024-10-18 PROCEDURE — 99285 EMERGENCY DEPT VISIT HI MDM: CPT | Mod: 25

## 2024-10-18 PROCEDURE — 25500020 PHARM REV CODE 255: Performed by: INTERNAL MEDICINE

## 2024-10-18 PROCEDURE — 81001 URINALYSIS AUTO W/SCOPE: CPT | Performed by: STUDENT IN AN ORGANIZED HEALTH CARE EDUCATION/TRAINING PROGRAM

## 2024-10-18 PROCEDURE — 25000003 PHARM REV CODE 250: Performed by: NURSE PRACTITIONER

## 2024-10-18 PROCEDURE — 63600175 PHARM REV CODE 636 W HCPCS: Performed by: NURSE PRACTITIONER

## 2024-10-18 PROCEDURE — 11000001 HC ACUTE MED/SURG PRIVATE ROOM

## 2024-10-18 PROCEDURE — 83605 ASSAY OF LACTIC ACID: CPT | Performed by: STUDENT IN AN ORGANIZED HEALTH CARE EDUCATION/TRAINING PROGRAM

## 2024-10-18 PROCEDURE — 82550 ASSAY OF CK (CPK): CPT | Performed by: STUDENT IN AN ORGANIZED HEALTH CARE EDUCATION/TRAINING PROGRAM

## 2024-10-18 PROCEDURE — 80074 ACUTE HEPATITIS PANEL: CPT | Performed by: INTERNAL MEDICINE

## 2024-10-18 PROCEDURE — 84484 ASSAY OF TROPONIN QUANT: CPT

## 2024-10-18 PROCEDURE — 83880 ASSAY OF NATRIURETIC PEPTIDE: CPT

## 2024-10-18 PROCEDURE — 25000003 PHARM REV CODE 250: Performed by: STUDENT IN AN ORGANIZED HEALTH CARE EDUCATION/TRAINING PROGRAM

## 2024-10-18 PROCEDURE — 63600175 PHARM REV CODE 636 W HCPCS: Performed by: STUDENT IN AN ORGANIZED HEALTH CARE EDUCATION/TRAINING PROGRAM

## 2024-10-18 PROCEDURE — 36415 COLL VENOUS BLD VENIPUNCTURE: CPT | Performed by: INTERNAL MEDICINE

## 2024-10-18 PROCEDURE — 87040 BLOOD CULTURE FOR BACTERIA: CPT | Performed by: STUDENT IN AN ORGANIZED HEALTH CARE EDUCATION/TRAINING PROGRAM

## 2024-10-18 PROCEDURE — 25000003 PHARM REV CODE 250: Performed by: INTERNAL MEDICINE

## 2024-10-18 PROCEDURE — 85610 PROTHROMBIN TIME: CPT | Performed by: INTERNAL MEDICINE

## 2024-10-18 RX ORDER — ACETAMINOPHEN 325 MG/1
650 TABLET ORAL EVERY 4 HOURS PRN
Status: DISCONTINUED | OUTPATIENT
Start: 2024-10-18 | End: 2024-10-21 | Stop reason: HOSPADM

## 2024-10-18 RX ORDER — WARFARIN 2.5 MG/1
2.5 TABLET ORAL DAILY
Status: DISCONTINUED | OUTPATIENT
Start: 2024-10-19 | End: 2024-10-21 | Stop reason: HOSPADM

## 2024-10-18 RX ORDER — MUPIROCIN 20 MG/G
OINTMENT TOPICAL 2 TIMES DAILY
Status: DISCONTINUED | OUTPATIENT
Start: 2024-10-20 | End: 2024-10-21 | Stop reason: HOSPADM

## 2024-10-18 RX ORDER — DEXAMETHASONE SODIUM PHOSPHATE 4 MG/ML
6 INJECTION, SOLUTION INTRA-ARTICULAR; INTRALESIONAL; INTRAMUSCULAR; INTRAVENOUS; SOFT TISSUE EVERY 24 HOURS
Status: DISCONTINUED | OUTPATIENT
Start: 2024-10-19 | End: 2024-10-18

## 2024-10-18 RX ORDER — ACETAMINOPHEN 325 MG/1
650 TABLET ORAL EVERY 8 HOURS PRN
Status: DISCONTINUED | OUTPATIENT
Start: 2024-10-18 | End: 2024-10-21 | Stop reason: HOSPADM

## 2024-10-18 RX ORDER — METOPROLOL TARTRATE 25 MG/1
25 TABLET, FILM COATED ORAL 2 TIMES DAILY
Status: DISCONTINUED | OUTPATIENT
Start: 2024-10-18 | End: 2024-10-21 | Stop reason: HOSPADM

## 2024-10-18 RX ORDER — ACETAMINOPHEN 500 MG
500 TABLET ORAL
Status: COMPLETED | OUTPATIENT
Start: 2024-10-18 | End: 2024-10-18

## 2024-10-18 RX ORDER — ONDANSETRON HYDROCHLORIDE 2 MG/ML
4 INJECTION, SOLUTION INTRAVENOUS EVERY 8 HOURS PRN
Status: DISCONTINUED | OUTPATIENT
Start: 2024-10-18 | End: 2024-10-21 | Stop reason: HOSPADM

## 2024-10-18 RX ADMIN — ACETAMINOPHEN 500 MG: 500 TABLET ORAL at 04:10

## 2024-10-18 RX ADMIN — PIPERACILLIN AND TAZOBACTAM 4.5 G: 4; .5 INJECTION, POWDER, LYOPHILIZED, FOR SOLUTION INTRAVENOUS; PARENTERAL at 02:10

## 2024-10-18 RX ADMIN — VANCOMYCIN HYDROCHLORIDE 1750 MG: 750 INJECTION, POWDER, LYOPHILIZED, FOR SOLUTION INTRAVENOUS at 04:10

## 2024-10-18 RX ADMIN — IOHEXOL 100 ML: 350 INJECTION, SOLUTION INTRAVENOUS at 03:10

## 2024-10-18 RX ADMIN — PIPERACILLIN AND TAZOBACTAM 4.5 G: 4; .5 INJECTION, POWDER, LYOPHILIZED, FOR SOLUTION INTRAVENOUS; PARENTERAL at 10:10

## 2024-10-18 RX ADMIN — SODIUM CHLORIDE, POTASSIUM CHLORIDE, SODIUM LACTATE AND CALCIUM CHLORIDE 1000 ML: 600; 310; 30; 20 INJECTION, SOLUTION INTRAVENOUS at 03:10

## 2024-10-18 NOTE — PROGRESS NOTES
"Pharmacokinetic Initial Assessment: IV Vancomycin    Assessment/Plan:    Initiate intravenous vancomycin with loading dose of 1750 mg once followed by a maintenance dose of vancomycin 1500 mg IV every 24 hours  Desired empiric serum trough concentration is 15 to 20 mcg/mL  Draw vancomycin trough level 60 min prior to third dose on 10/20/24 at approximately 1400  Pharmacy will continue to follow and monitor vancomycin.      Please contact pharmacy at extension [phone] with any questions regarding this assessment.     Thank you for the consult,   Marina Joseph       Patient brief summary:  Laura Acosta is a 78 y.o. female initiated on antimicrobial therapy with IV Vancomycin for treatment of suspected sepsis    Drug Allergies:   Review of patient's allergies indicates:   Allergen Reactions    Hydromorphone Itching     Other reaction(s): itching    Opium      Other reaction(s): itching  has itching with larger doses. Smaller doses do not cause a reaction.       Actual Body Weight:   73.9 kg    Renal Function:   Estimated Creatinine Clearance: 55.9 mL/min (based on SCr of 0.78 mg/dL).,     Dialysis Method (if applicable):  N/A    CBC (last 72 hours):  Recent Labs   Lab Result Units 10/18/24  1153   WBC x10(3)/mcL 13.73*   Hgb g/dL 13.1   Hct % 40.1   Platelet x10(3)/mcL 152   Mono % % 7.1   Eos % % 0.1   Basophil % % 0.4       Metabolic Panel (last 72 hours):  Recent Labs   Lab Result Units 10/18/24  1153   Sodium mmol/L 138   Potassium mmol/L 4.0   Chloride mmol/L 104   CO2 mmol/L 25   Glucose mg/dL 133*   Blood Urea Nitrogen mg/dL 15.8   Creatinine mg/dL 0.78   Albumin g/dL 3.3*   Bilirubin Total mg/dL 0.9   ALP unit/L 174*   AST unit/L 86*   ALT unit/L 83*       Drug levels (last 3 results):  No results for input(s): "VANCOMYCINRA", "VANCORANDOM", "VANCOMYCINPE", "VANCOPEAK", "VANCOMYCINTR", "VANCOTROUGH" in the last 72 hours.    Microbiologic Results:  Microbiology Results (last 7 days)       Procedure Component Value " Units Date/Time    Blood culture #1 **CANNOT BE ORDERED STAT** [[id number]] Collected: 10/18/24 1321    Order Status: Sent Specimen: Blood Updated: 10/18/24 1352    Blood culture #2 **CANNOT BE ORDERED STAT** [[id number]] Collected: 10/18/24 1321    Order Status: Sent Specimen: Blood Updated: 10/18/24 1351

## 2024-10-18 NOTE — H&P
[facility] Medicine History & Physical Examination       Patient Name: Laura Acosta  MRN: [id number]  Patient Class: IP- Inpatient   Admission Date: 10/18/2024   Admitting Physician: [facility]   Length of Stay: 0  Attending Physician: Julia Aleman MD  Primary Care Provider: Curt Felton MD  Face-to-Face encounter date: 10/18/2024  Code Status:full  Chief Complaint: Weakness (Weakness, SOB & fever x2 weeks after being dx with covid. Reports finishing Paxlovid prescription. )      Screening for Social Drivers for health:  Patient screened for food insecurity, housing instability, transportation needs, utility difficulties, and interpersonal safety (select all that apply as identified as concern)  []Housing or Food  []Transportation Needs  []Utility Difficulties  []Interpersonal safety  [x]None      Patient information was obtained from patient, patient's family, past medical records and ER records.  ED records were reviewed in detail and documented below    HISTORY OF PRESENT ILLNESS:   Laura Acosta is a   78-year-old female with past medical history of DVT, gallstone pancreatitis, hypertension, short gut syndrome, osteoarthritis, peripheral vascular disease, rheumatoid arthritis, DVT, cirrhosis of liver came into the ER with complaints of fever and generalized weakness.  Patient was diagnosed with COVID on October 9th and since then she has been spiking on and off fever.  Last night patient's spiked fever of 103 and had chills so she decided to come into the ER today.  Denies any abdominal pain denies any nausea vomiting diarrhea denies any burning while passing urine does complain of cough with some expectoration.  Patient does have history of Staph epidermidis bacteremia.  He does have MediPort and does TPN at home for short gut syndrome  Patient was tachycardic and tachypneic with lowest blood pressure of 100 by 48 in the ER was saturating well on room air.  Lab  work showed slight leukocytosis with white cell count of 13.7 and transaminitis.  CT of the abdomen showed nodular opacities at the left lung base maybe infectious versus inflammatory and a repeat CT in 3 months and mild urothelial enhancement chest x-ray negative patient has been admitted to hospitalist service for further management and care  PAST MEDICAL HISTORY:     Past Medical History:   Diagnosis Date    Acute URI     Anxiety and depression     Back pain     Bacteremia 04/29/2024    Breast cancer     Cholelithiasis     Contaminated small bowel syndrome     DVT (deep venous thrombosis)     on coumadin    Edema, lower extremity     Gallstone pancreatitis     Heart murmur     HTN (hypertension)     Insomnia     Irregular heart beat     Ischemic disease of gut     Kidney stones     Laryngitis     Malabsorption     Malnutrition, unspecified type     Meningitis, unspecified     OA (osteoarthritis)     PVD (peripheral vascular disease)     Rheumatoid arthritis, unspecified     Staph infection     infected mediport -- on doxycycline daily for prevention    Tremor     Unspecified cataract     Unspecified cirrhosis of liver 06/20/2023    Dr Carroll       PAST SURGICAL HISTORY:     Past Surgical History:   Procedure Laterality Date    APPENDECTOMY      BOWEL RESECTION      BREAST LUMPECTOMY Right     CHOLECYSTECTOMY  05/2015    COLONOSCOPY  02/2022    repeat 3 years    CYST REMOVAL Right     breast    CYSTOSCOPY W/ STONE MANIPULATION      CYSTOSCOPY W/ URETERAL STENT PLACEMENT  12/2020    CYSTOSCOPY W/ URETERAL STENT REMOVAL  04/2021    CYSTOURETEROSCOPY, WITH HOLMIUM LASER LITHOTRIPSY OF URETERAL CALCULUS AND STENT INSERTION Left 05/02/2023    Procedure: CYSTOSCOPY, WITH URETERAL STENT INSERTION Left;  Surgeon: Jared Felix MD;  Location: [facility];  Service: Urology;  Laterality: Left;    EGD, WITH CLOSED BIOPSY N/A 6/20/2023    Procedure: EGD;  Surgeon: Aashish Carroll MD;  Location: [facility] ENDOSCOPY;   Service: Gastroenterology;  Laterality: N/A;    ENDOSCOPIC RETROGRADE CHOLANGIOPANCREATOGRAPHY W/ SPHINCTEROTOMY AND STONE REMOVAL  04/2015    ESOPHAGOGASTRODUODENOSCOPY  06/20/2023    Dr Carrlol - no signs esophageal varicies --repeat 3 yrs    GI Biopsy  02/15/2022    GI Biopsy  12/2018    HYSTERECTOMY      INSERTION OF TUNNELED CENTRAL VENOUS CATHETER (CVC) WITH SUBCUTANEOUS PORT N/A 7/18/2024    Procedure: [id number];  Surgeon: Ariel Welsh Jr., MD;  Location: [facility] OR;  Service: General;  Laterality: N/A;    LAPAROTOMY, EXPLORATORY  06/2015    LITHOTRIPSY Left 04/2021    LITHOTRIPSY Left 03/2021    MEDIPORT INSERTION, SINGLE  06/2021    MEDIPORT INSERTION, SINGLE  07/2020    MEDIPORT INSERTION, SINGLE  12/2018    MEDIPORT REMOVAL  07/2020    PHACOEMULSIFICATION OF CATARACT Left 12/2016    PHACOEMULSIFICATION OF CATARACT Right 11/2016    PICC line Removal Right 06/2021    POLYPECTOMY  02/2022    PVD surgery      REMOVAL OF CATHETER  12/2020    REMOVAL OF VASCULAR ACCESS CATHETER Right 4/24/2024    Procedure: Removal, Vascular Access Catheter;  Surgeon: Reed Ghosh MD;  Location: [facility];  Service: General;  Laterality: Right;    SHOULDER SURGERY Right     shoulder replacement    SPHINCTEROTOMY OF URETHRA      VENTRAL HERNIA REPAIR  04/2016       ALLERGIES:   Hydromorphone and Opium    FAMILY HISTORY:   Reviewed and negative    SOCIAL HISTORY:     Social History     Tobacco Use    Smoking status: Never     Passive exposure: Past    Smokeless tobacco: Never   Substance Use Topics    Alcohol use: Yes     Comment: twice a year        HOME MEDICATIONS:     Prior to Admission medications    Medication Sig Start Date End Date Taking? Authorizing Provider   busPIRone (BUSPAR) 10 MG tablet Take 20 mg by mouth 2 (two) times daily. 5/22/24   Provider, Historical   CELEXA 40 mg tablet Take 40 mg by mouth once daily. 5/23/22   Provider, Historical   cholecalciferol, vitamin D3, 1,250 mcg (50,000 unit)  capsule Take 50,000 Units by mouth every 7 days. 24   Provider, Historical   diphenoxylate-atropine 2.5-0.025 mg (LOMOTIL) 2.5-0.025 mg per tablet Take 2 tablets by mouth 2 (two) times a day.    Provider, Historical   enoxaparin (LOVENOX) 80 mg/0.8 mL Syrg SMARTSI Milligram(s) SUB-Q Twice Daily 24   Provider, Historical   ferrous sulfate 325 (65 FE) MG EC tablet Take 1 tablet (325 mg total) by mouth once daily.  Patient taking differently: Take 325 mg by mouth 2 (two) times daily. 24   Gabrielle Butler,    GATTEX 30-VIAL 5 mg Kit Inject into the skin. 24   Provider, Historical   metoprolol tartrate (LOPRESSOR) 100 MG tablet Take 100 mg by mouth 2 (two) times daily. 22   Provider, Historical   mirtazapine (REMERON) 15 MG tablet Take 15 mg by mouth every evening.    Provider, Historical   multivitamin (ONE DAILY MULTIVITAMIN) per tablet Take 1 tablet by mouth once daily.    Provider, Historical   solifenacin (VESICARE) 5 MG tablet Take 5 mg by mouth once daily. 3/28/24   Provider, Historical   traMADoL (ULTRAM) 50 mg tablet Take 1 tablet (50 mg total) by mouth every 6 (six) hours as needed for Pain. 24   María Layne, VIVI   vancomycin (VANCOCIN) 10 gram SolR Inject into the vein. 24   Provider, Historical   warfarin (COUMADIN) 2.5 MG tablet TAKE ONE TABLET BY MOUTH ONCE DAILY IN THE EVENING OR AS DIRECTED BY CIS PROVIDER 23   Provider, Historical       REVIEW OF SYSTEMS:   Except as documented, all other systems reviewed and negative     PHYSICAL EXAM:     VITAL SIGNS: 24 HRS MIN & MAX LAST   Temp  Min: 98 °F (36.7 °C)  Max: 99 °F (37.2 °C) 98 °F (36.7 °C)   BP  Min: 100/48  Max: 124/77 124/77   Pulse  Min: 65  Max: 112  65   Resp  Min: 18  Max: 23 18   SpO2  Min: 93 %  Max: 98 % 97 %     General appearance: Well-developed, well-nourished female in no apparent distress.  Does have a MediPort on the left chest wall  HENT: Atraumatic head. Moist mucous membranes of oral  cavity.  Eyes: Normal extraocular movements.   Neck: Supple.   Lungs: Clear to auscultation bilaterally. No wheezing present.   Heart: Regular rate and rhythm. S1 and S2 present   Abdomen: Soft, non-distended  Extremities: No cyanosis, clubbing, or edema.  Skin: No Rash.   Neuro: Motor and sensory exams grossly intact. Good tone. Muscle strength 5/5 in all 4 extremities  Psych/mental status: Appropriate mood and affect. Responds appropriately to questions.     LABS AND IMAGING:     Recent Labs   Lab 10/14/24  0910 10/18/24  1153   WBC 9.53 13.73*   RBC 4.36 4.57   HGB 12.7 13.1   HCT 38.4 40.1   MCV 88.1 87.7   MCH 29.1 28.7   MCHC 33.1 32.7*   RDW 14.3 14.4    152   MPV 9.8 9.4       Recent Labs   Lab 10/14/24  0910 10/18/24  1153    138   K 3.8 4.0    104   CO2 23 25   BUN 17.0 15.8   CREATININE 0.65 0.78   CALCIUM 8.6 9.1   MG 2.10  --    ALBUMIN 3.2* 3.3*   ALKPHOS 107 174*   ALT 25 83*   AST 27 86*   BILITOT 0.6 0.9       Microbiology Results (last 7 days)       Procedure Component Value Units Date/Time    Blood culture #1 **CANNOT BE ORDERED STAT** [[id number]] Collected: 10/18/24 1321    Order Status: Resulted Specimen: Blood Updated: 10/18/24 1352    Blood culture #2 **CANNOT BE ORDERED STAT** [[id number]] Collected: 10/18/24 1321    Order Status: Resulted Specimen: Blood Updated: 10/18/24 1350             CT Abdomen Pelvis With IV Contrast NO Oral Contrast  Narrative: EXAMINATION:  CT ABDOMEN PELVIS WITH IV CONTRAST    CLINICAL HISTORY:  Abdominal abscess/infection suspected;Abdominal pain, acute, nonlocalized;    TECHNIQUE:  Helically acquired images with axial, sagittal and coronal reformations were obtained from the lung bases to the pubic symphysis after the IV administration of contrast.    Automated tube current modulation, weight-based exposure dosing, and/or iterative reconstruction technique utilized to reach lowest reasonably achievable exposure rate.    DLP: 1266  mGy*cm    COMPARISON:  CT abdomen pelvis 06/10/2024, CT chest 08/07/2024    FINDINGS:  HEART: Normal in size. No pericardial effusion.    LUNG BASES: There are new nodular opacities within the lingula (2, 80) and left lower lobe (2, 13).  Unchanged right lower lobe nodule.    LIVER: Cirrhotic morphology of the liver with irregular surface contour.    BILIARY: Gallbladder is surgically absent.  Common bile duct measures 9 mm, similar to previous.    PANCREAS: No inflammatory change.    SPLEEN: Upper lungs of normal in size.    ADRENALS: No mass.    KIDNEYS/URETERS: Mild urothelial enhancement at the left renal pelvis.  Extrarenal pelvis on the left.  Nonobstructing left nephrolithiasis.  Kidneys enhance symmetrically.    GI TRACT/MESENTERY: Stomach nondistended which limits evaluation.  Questionable edema at the GE junction.  Colonic diverticulosis without acute inflammatory change.  There is an ileocolic anastomosis.  No evidence of bowel obstruction.    PERITONEUM: No free fluid.No free air.    LYMPH NODES: No enlarged lymph nodes by size criteria.    VASCULATURE: Aortoiliac atherosclerosis.    BLADDER: Normal appearance given degree of distention.    REPRODUCTIVE ORGANS: Uterus is surgically absent.  Unchanged appearance of right ovarian remnant.    SOFT TISSUES: Postsurgical change at the right breast.    BONES: Unchanged.  Dextrocurvature of the lumbar spine.  Impression: 1. Nodular opacities at the left lung base may be infectious/inflammatory in the acute setting.  Suggest follow-up CT in 3 months to ensure improvement and exclude persistent or enlarging nodule.  2. Mild urothelial enhancement at the left renal pelvis.  Correlate to exclude pyelitis    Electronically signed by: Gianna Arndt  Date:    10/18/2024  Time:    16:02  X-Ray Chest AP  Narrative: EXAMINATION:  XR CHEST AP PORTABLE    CLINICAL HISTORY:  shortness of breath;    TECHNIQUE:  One view    COMPARISON:  July 18,  2024.    FINDINGS:  Cardiopericardial silhouette is within normal limits.  Left chest implanted tunnel castro catheter terminates within the superior vena cava.  No acute dense focal or segmental consolidation, congestive process, pleural effusions or pneumothorax.  Similar appearance of right breast rim of calcification.  Right shoulder arthroplasty.  Impression: No acute cardiopulmonary process identified.    Electronically signed by: Isaac Richardson  Date:    10/18/2024  Time:    11:51      ASSESSMENT & PLAN:     Sepsis   Possibly left lower lobe pneumonia versus inflammatory changes  Recently diagnosed with COVID but repeat COVID negative this time  Transaminitis  History of recurrent staph bacteremia   History of infected MediPort   History of short gut syndrome  History of 11 mm solid nodule in the right upper lobe  History of breast cancer   Essential hypertension   Peripheral vascular disease   Rheumatoid arthritis   Cirrhosis of liver  History of gallstone pancreatitis   Anxiety   Depression    I will order CT of the chest without contrast as patient does have history of nodule in the right upper lobe and will also have a better idea about the pneumonia  Continue with vancomycin and Zosyn blood cultures ordered  Resume home medications once updated  Patient is on Coumadin at home I will order stat PT and INR and accordingly will resume the dose of Coumadin  Blood pressure on the lower side will hold off on blood pressure medications  Liver enzymes are elevated will order right upper quadrant ultrasound and hep panel  CT of the abdomen did show some cirrhotic morphology but patient's baseline liver enzymes are in 40s so we will rule out any other pathology      VTE Prophylaxis:  SCD  Patient condition:  Stable/Fair/Guarded/ Serious/ Critical    __________________________________________________________________________  INPATIENT LIST OF MEDICATIONS     Scheduled Meds:   [START ON 10/20/2024] mupirocin   Nasal  BID    piperacillin-tazobactam (Zosyn) IV (PEDS and ADULTS) (extended infusion is not appropriate)  4.5 g Intravenous Q8H    vancomycin (VANCOCIN) IV (PEDS and ADULTS)  1,750 mg Intravenous ED 1 Time    [START ON 10/19/2024] vancomycin 1,500 mg in D5W 250 mL IVPB (admixture device)  1,500 mg Intravenous Q24H     Continuous Infusions:  PRN Meds:.  Current Facility-Administered Medications:     acetaminophen, 650 mg, Oral, Q8H PRN    acetaminophen, 650 mg, Oral, Q4H PRN    ondansetron, 4 mg, Intravenous, Q8H PRN    vancomycin - pharmacy to dose, , Intravenous, pharmacy to manage frequency          10/18/2024    ________________________________________________________________________________        Discharge Planning and Disposition: No mobility needs. Ambulating well. Good social support system.   Anticipated discharge    If patient was admitted under observational status it is with my approval/permission.        All diagnosis and differential diagnosis have been reviewed; assessment and plan has been documented; I have personally reviewed the labs and test results that are presently available; I have reviewed the patients medication list; I have reviewed the consulting providers response and recommendations. I have reviewed or attempted to review medical records based upon their availability.    All of the patient and family questions have been addressed and answered. Patient's is agreeable to the above stated plan. I will continue to monitor closely and make adjustments to medical management as needed.    If patient was admitted under observational status it is with my approval/permission.      Julia Aleman MD   10/18/2024

## 2024-10-18 NOTE — Clinical Note
Diagnosis: Weakness [[id number]]   Future Attending Provider: STAN ORNELAS [[id number]]   Admit to which facility:: [facility] [[id number]]   Reason for IP Medical Treatment  (Clinical interventions that can only be accomplished in the IP setting? ) :: Fever, weakness, hx of short gut, TPN, frequent bacteremia,

## 2024-10-18 NOTE — ED PROVIDER NOTES
Encounter Date: 10/18/2024    SCRIBE #1 NOTE: I, Bisi Contreras, am scribing for, and in the presence of,  Mohamud Soria MD. I have scribed the following portions of the note - the EKG reading. Other sections scribed: HPI, ROS, PE.       History     Chief Complaint   Patient presents with    Weakness     Weakness, SOB & fever x2 weeks after being dx with covid. Reports finishing Paxlovid prescription.      Patient is a 77 y/o female with a PMHx of anxiety, depression, bacteremia, cancer, DVT s/p on coumadin, HTN, PVD, RA, and cirrhosis presents to the ED for fever, chills, and generalized weakness beginning 2 weeks ago. Patient reports testing positive for COVID at an urgent care facility 2 weeks ago. She reports having fever, highest last night at 103 F. She reports wanting to see her PCP today but he was unable to.  She reports shortness of breath. She reports back and leg pain occurring last night. She denies nausea, vomiting, abdominal pain.     The history is provided by the patient and medical records. No  was used.     Review of patient's allergies indicates:   Allergen Reactions    Hydromorphone Itching     Other reaction(s): itching    Opium      Other reaction(s): itching  has itching with larger doses. Smaller doses do not cause a reaction.     Past Medical History:   Diagnosis Date    Acute URI     Anxiety and depression     Back pain     Bacteremia 04/29/2024    Breast cancer     Cholelithiasis     Contaminated small bowel syndrome     DVT (deep venous thrombosis)     on coumadin    Edema, lower extremity     Gallstone pancreatitis     Heart murmur     HTN (hypertension)     Insomnia     Irregular heart beat     Ischemic disease of gut     Kidney stones     Laryngitis     Malabsorption     Malnutrition, unspecified type     Meningitis, unspecified     OA (osteoarthritis)     PVD (peripheral vascular disease)     Rheumatoid arthritis, unspecified     Staph infection     infected mediport  -- on doxycycline daily for prevention    Tremor     Unspecified cataract     Unspecified cirrhosis of liver 06/20/2023    Dr Carroll     Past Surgical History:   Procedure Laterality Date    APPENDECTOMY      BOWEL RESECTION      BREAST LUMPECTOMY Right     CHOLECYSTECTOMY  05/2015    COLONOSCOPY  02/2022    repeat 3 years    CYST REMOVAL Right     breast    CYSTOSCOPY W/ STONE MANIPULATION      CYSTOSCOPY W/ URETERAL STENT PLACEMENT  12/2020    CYSTOSCOPY W/ URETERAL STENT REMOVAL  04/2021    CYSTOURETEROSCOPY, WITH HOLMIUM LASER LITHOTRIPSY OF URETERAL CALCULUS AND STENT INSERTION Left 05/02/2023    Procedure: CYSTOSCOPY, WITH URETERAL STENT INSERTION Left;  Surgeon: Jared Felix MD;  Location: [facility];  Service: Urology;  Laterality: Left;    EGD, WITH CLOSED BIOPSY N/A 6/20/2023    Procedure: EGD;  Surgeon: Aashish Carroll MD;  Location: [facility] ENDOSCOPY;  Service: Gastroenterology;  Laterality: N/A;    ENDOSCOPIC RETROGRADE CHOLANGIOPANCREATOGRAPHY W/ SPHINCTEROTOMY AND STONE REMOVAL  04/2015    ESOPHAGOGASTRODUODENOSCOPY  06/20/2023    Dr Carroll - no signs esophageal varicies --repeat 3 yrs    GI Biopsy  02/15/2022    GI Biopsy  12/2018    HYSTERECTOMY      INSERTION OF TUNNELED CENTRAL VENOUS CATHETER (CVC) WITH SUBCUTANEOUS PORT N/A 7/18/2024    Procedure: [id number];  Surgeon: Ariel Welsh Jr., MD;  Location: [facility];  Service: General;  Laterality: N/A;    LAPAROTOMY, EXPLORATORY  06/2015    LITHOTRIPSY Left 04/2021    LITHOTRIPSY Left 03/2021    MEDIPORT INSERTION, SINGLE  06/2021    MEDIPORT INSERTION, SINGLE  07/2020    MEDIPORT INSERTION, SINGLE  12/2018    MEDIPORT REMOVAL  07/2020    PHACOEMULSIFICATION OF CATARACT Left 12/2016    PHACOEMULSIFICATION OF CATARACT Right 11/2016    PICC line Removal Right 06/2021    POLYPECTOMY  02/2022    PVD surgery      REMOVAL OF CATHETER  12/2020    REMOVAL OF VASCULAR ACCESS CATHETER Right 4/24/2024    Procedure: Removal,  Vascular Access Catheter;  Surgeon: Reed Ghosh MD;  Location: [facility];  Service: General;  Laterality: Right;    SHOULDER SURGERY Right     shoulder replacement    SPHINCTEROTOMY OF URETHRA      VENTRAL HERNIA REPAIR  04/2016     Family History   Problem Relation Name Age of Onset    Pneumonia Mother      Depression Mother      Osteoarthritis Mother      Breast cancer Mother  70 - 75    Uterine cancer Mother  40 - 45    Bone cancer Mother  60 - 69    Heart attack Father      Aneurysm Father          brain    Diabetes Father      Hyperlipidemia Father      Hypertension Father      Hyperlipidemia Sister      Hypertension Sister      Kidney cancer Sister  55 - 56    Osteoarthritis Sister      Breast cancer Sister  52 - 53    Diabetes Mellitus Sister      Heart failure Sister      Kidney disease Sister      Migraines Sister      Arthritis Sister      Arthritis Sister      Parkinsonism Sister      Heart disease Sister      Hyperlipidemia Brother      Hypertension Brother      Coronary artery disease Brother      Coronary artery disease Brother          CABG x3    Hyperlipidemia Brother      Hypertension Brother      Kidney cancer Paternal Grandmother  60 - 69     Social History     Tobacco Use    Smoking status: Never     Passive exposure: Past    Smokeless tobacco: Never   Substance Use Topics    Alcohol use: Yes     Comment: twice a year    Drug use: Never     Review of Systems   Constitutional:  Positive for chills and fever.   Respiratory:  Positive for shortness of breath.    Gastrointestinal:  Negative for abdominal pain, nausea and vomiting.   Musculoskeletal:  Positive for back pain (resolved.).        Positive for leg pain. Resolved.   Neurological:  Positive for weakness (generalized).       Physical Exam     Initial Vitals [10/18/24 1126]   BP Pulse Resp Temp SpO2   (!) 120/40 (!) 112 18 99 °F (37.2 °C) 97 %      MAP       --         Physical Exam    Nursing note and vitals reviewed.  Constitutional: She  appears well-developed and well-nourished.   HENT:   Head: Normocephalic and atraumatic.   Eyes: EOM are normal. Pupils are equal, round, and reactive to light.   Neck:   Normal range of motion.  Cardiovascular:  Normal rate, regular rhythm, normal heart sounds and intact distal pulses.           No murmur heard.  Pulmonary/Chest: Breath sounds normal. No respiratory distress. She has no wheezes. She has no rales.   Mediport to left chest wall.   Abdominal: Abdomen is soft. She exhibits no distension. There is no abdominal tenderness. There is no rebound.   Musculoskeletal:         General: No tenderness or edema. Normal range of motion.      Cervical back: Normal range of motion.     Neurological: She is alert. She has normal strength. No cranial nerve deficit. GCS score is 15. GCS eye subscore is 4. GCS verbal subscore is 5. GCS motor subscore is 6.   Skin: Skin is warm and dry. Capillary refill takes less than 2 seconds. No rash noted. No erythema.   Psychiatric: She has a normal mood and affect.         ED Course   Procedures  Labs Reviewed   COMPREHENSIVE METABOLIC PANEL - Abnormal       Result Value    Sodium 138      Potassium 4.0      Chloride 104      CO2 25      Glucose 133 (*)     Blood Urea Nitrogen 15.8      Creatinine 0.78      Calcium 9.1      Protein Total 7.4      Albumin 3.3 (*)     Globulin 4.1 (*)     Albumin/Globulin Ratio 0.8 (*)     Bilirubin Total 0.9       (*)     ALT 83 (*)     AST 86 (*)     eGFR >60      Anion Gap 9.0      BUN/Creatinine Ratio 20     B-TYPE NATRIURETIC PEPTIDE - Abnormal    Natriuretic Peptide 175.7 (*)    CBC WITH DIFFERENTIAL - Abnormal    WBC 13.73 (*)     RBC 4.57      Hgb 13.1      Hct 40.1      MCV 87.7      MCH 28.7      MCHC 32.7 (*)     RDW 14.4      Platelet 152      MPV 9.4      Neut % 84.6      Lymph % 7.4      Mono % 7.1      Eos % 0.1      Basophil % 0.4      Lymph # 1.02      Neut # 11.61 (*)     Mono # 0.98      Eos # 0.02      Baso # 0.05      IG#  0.05 (*)     IG% 0.4      NRBC% 0.0     URINALYSIS, REFLEX TO URINE CULTURE - Abnormal    Color, UA Light-Yellow      Appearance, UA Clear      Specific Gravity, UA 1.022      pH, UA 6.5      Protein, UA Negative      Glucose, UA Normal      Ketones, UA Negative      Blood, UA Negative      Bilirubin, UA Negative      Urobilinogen, UA Normal      Nitrites, UA Negative      Leukocyte Esterase,  (*)     RBC, UA 0-5      WBC, UA 0-5      Bacteria, UA None Seen      Squamous Epithelial Cells, UA Trace     TROPONIN I - Normal    Troponin-I 0.031     COVID/FLU A&B PCR - Normal    Influenza A PCR Not Detected      Influenza B PCR Not Detected      SARS-CoV-2 PCR Not Detected      Narrative:     The Xpert Xpress SARS-CoV-2/FLU/RSV plus is a rapid, multiplexed real-time PCR test intended for the simultaneous qualitative detection and differentiation of SARS-CoV-2, Influenza A, Influenza B, and respiratory syncytial virus (RSV) viral RNA in either nasopharyngeal swab or nasal swab specimens.         LACTIC ACID, PLASMA - Normal    Lactic Acid Level 1.3     CK - Normal    Creatine Kinase 84     CBC W/ AUTO DIFFERENTIAL    Narrative:     The following orders were created for panel order CBC auto differential.  Procedure                               Abnormality         Status                     ---------                               -----------         ------                     CBC with Differential[[id number]]       Abnormal            Final result                 Please view results for these tests on the individual orders.     EKG Readings: (Independently Interpreted)   Initial Reading: No STEMI. Previous EKG Date: 4/18/24. Rhythm: Sinus Tachycardia. Heart Rate: 105. Ectopy: No Ectopy. Conduction: Normal. ST Segments: Normal ST Segments. T Waves: Normal. Clinical Impression: Sinus Tachycardia and Left Ventricular Hypertrophy (LDH)   Done at 11:27 on 10/18/24     ECG Results              EKG 12-lead (Final result)   Result time 10/24/24 08:12:22      Final result by Unknown User (10/24/24 08:12:22)                                      Imaging Results              CT Abdomen Pelvis With IV Contrast NO Oral Contrast (Final result)  Result time 10/18/24 16:02:55      Final result by Gianna Arndt MD (10/18/24 16:02:55)                   Impression:      1. Nodular opacities at the left lung base may be infectious/inflammatory in the acute setting.  Suggest follow-up CT in 3 months to ensure improvement and exclude persistent or enlarging nodule.  2. Mild urothelial enhancement at the left renal pelvis.  Correlate to exclude pyelitis      Electronically signed by: Gianna Arndt  Date:    10/18/2024  Time:    16:02               Narrative:    EXAMINATION:  CT ABDOMEN PELVIS WITH IV CONTRAST    CLINICAL HISTORY:  Abdominal abscess/infection suspected;Abdominal pain, acute, nonlocalized;    TECHNIQUE:  Helically acquired images with axial, sagittal and coronal reformations were obtained from the lung bases to the pubic symphysis after the IV administration of contrast.    Automated tube current modulation, weight-based exposure dosing, and/or iterative reconstruction technique utilized to reach lowest reasonably achievable exposure rate.    DLP: 1266 mGy*cm    COMPARISON:  CT abdomen pelvis 06/10/2024, CT chest 08/07/2024    FINDINGS:  HEART: Normal in size. No pericardial effusion.    LUNG BASES: There are new nodular opacities within the lingula (2, 80) and left lower lobe (2, 13).  Unchanged right lower lobe nodule.    LIVER: Cirrhotic morphology of the liver with irregular surface contour.    BILIARY: Gallbladder is surgically absent.  Common bile duct measures 9 mm, similar to previous.    PANCREAS: No inflammatory change.    SPLEEN: Upper lungs of normal in size.    ADRENALS: No mass.    KIDNEYS/URETERS: Mild urothelial enhancement at the left renal pelvis.  Extrarenal pelvis on the left.  Nonobstructing left  nephrolithiasis.  Kidneys enhance symmetrically.    GI TRACT/MESENTERY: Stomach nondistended which limits evaluation.  Questionable edema at the GE junction.  Colonic diverticulosis without acute inflammatory change.  There is an ileocolic anastomosis.  No evidence of bowel obstruction.    PERITONEUM: No free fluid.No free air.    LYMPH NODES: No enlarged lymph nodes by size criteria.    VASCULATURE: Aortoiliac atherosclerosis.    BLADDER: Normal appearance given degree of distention.    REPRODUCTIVE ORGANS: Uterus is surgically absent.  Unchanged appearance of right ovarian remnant.    SOFT TISSUES: Postsurgical change at the right breast.    BONES: Unchanged.  Dextrocurvature of the lumbar spine.                                       X-Ray Chest AP (Final result)  Result time 10/18/24 11:51:35      Final result by Isaac Richardson MD (10/18/24 11:51:35)                   Impression:      No acute cardiopulmonary process identified.      Electronically signed by: Isaac Richardson  Date:    10/18/2024  Time:    11:51               Narrative:    EXAMINATION:  XR CHEST AP PORTABLE    CLINICAL HISTORY:  shortness of breath;    TECHNIQUE:  One view    COMPARISON:  July 18, 2024.    FINDINGS:  Cardiopericardial silhouette is within normal limits.  Left chest implanted tunnel castro catheter terminates within the superior vena cava.  No acute dense focal or segmental consolidation, congestive process, pleural effusions or pneumothorax.  Similar appearance of right breast rim of calcification.  Right shoulder arthroplasty.                                       Medications   vancomycin (VANCOCIN) 1,750 mg in D5W 500 mL IVPB (0 mg Intravenous Stopped 10/18/24 1800)   piperacillin-tazobactam (ZOSYN) 4.5 g in D5W 100 mL IVPB (MB+) (0 g Intravenous Stopped 10/18/24 1455)   lactated ringers bolus 1,000 mL (0 mLs Intravenous Stopped 10/18/24 1715)   iohexoL (OMNIPAQUE 350) injection 100 mL (100 mLs Intravenous Given 10/18/24 1545)    acetaminophen tablet 500 mg (500 mg Oral Given 10/18/24 1617)     Medical Decision Making  Judging by the patient's chief complaint and pertinent history, the patient has the following possible differential diagnoses, including but not limited to the following.  Some of these are deemed to be lower likelihood and some more likely based on my physical exam and history combined with possible lab work and/or imaging studies.   Please see the pertinent studies, and refer to the HPI.  Some of these diagnoses will take further evaluation to fully rule out, perhaps as an outpatient and the patient was encouraged to follow up when discharged for more comprehensive evaluation.    Viral syndrome, COVID, flu, UTI, intraabdominal infection, bacterial pharyngitis, pneumonia, sepsis, pyelonephritis, cellulitis, abscess,     Patient is a 78-year-old female presents to emergency department fever, weakness, shortness of breath.  Recent diagnosis of COVID-19, completed Paxlovid.  Worsening symptoms.  Labs and imaging obtained, temperature of 99° on arrival, blood cultures have been obtained started on broad-spectrum antibiotics.  CT scan with infiltrates noted on lung imaging.  Discussed with medicine for admission given weakness.  All results discussed with the patient and family.  Answered all questions at this time.  Verbalized understanding and agreed to plan.    Problems Addressed:  Pneumonia due to infectious organism, unspecified laterality, unspecified part of lung: acute illness or injury that poses a threat to life or bodily functions  Sepsis, due to unspecified organism, unspecified whether acute organ dysfunction present: acute illness or injury that poses a threat to life or bodily functions  Shortness of breath: acute illness or injury that poses a threat to life or bodily functions  Weakness: acute illness or injury that poses a threat to life or bodily functions    Amount and/or Complexity of Data Reviewed  Labs:  ordered.  Radiology: ordered.  ECG/medicine tests: ordered and independent interpretation performed.     Details: No STEMI. Previous EKG Date: 4/18/24. Rhythm: Sinus Tachycardia. Heart Rate: 105. Ectopy: No Ectopy. Conduction: Normal. ST Segments: Normal ST Segments. T Waves: Normal. Clinical Impression: Sinus Tachycardia and Left Ventricular Hypertrophy (LDH)   Done at 11:27 on 10/18/24       Risk  Parenteral controlled substances.  Decision regarding hospitalization.            Scribe Attestation:   Scribe #1: I performed the above scribed service and the documentation accurately describes the services I performed. I attest to the accuracy of the note.    Attending Attestation:           Physician Attestation for Scribe:  Physician Attestation Statement for Scribe #1: I, Mohamud Soria MD, reviewed documentation, as scribed by Bisi Contreras in my presence, and it is both accurate and complete.                                    Clinical Impression:  Final diagnoses:  [R06.02] Shortness of breath  [R53.1] Weakness  [A41.9] Sepsis, due to unspecified organism, unspecified whether acute organ dysfunction present (Primary)  [J18.9] Pneumonia due to infectious organism, unspecified laterality, unspecified part of lung          ED Disposition Condition    Admit Stable                Mohamud Soria MD  10/25/24 [id number]

## 2024-10-19 LAB
ALBUMIN SERPL-MCNC: 2.8 G/DL (ref 3.4–4.8)
ALBUMIN/GLOB SERPL: 0.8 RATIO (ref 1.1–2)
ALP SERPL-CCNC: 131 UNIT/L (ref 40–150)
ALT SERPL-CCNC: 59 UNIT/L (ref 0–55)
ANION GAP SERPL CALC-SCNC: 7 MEQ/L
AST SERPL-CCNC: 51 UNIT/L (ref 5–34)
BASOPHILS # BLD AUTO: 0.02 X10(3)/MCL
BASOPHILS NFR BLD AUTO: 0.3 %
BILIRUB SERPL-MCNC: 0.7 MG/DL
BUN SERPL-MCNC: 13.8 MG/DL (ref 9.8–20.1)
CALCIUM SERPL-MCNC: 8.3 MG/DL (ref 8.4–10.2)
CHLORIDE SERPL-SCNC: 108 MMOL/L (ref 98–107)
CO2 SERPL-SCNC: 27 MMOL/L (ref 23–31)
CREAT SERPL-MCNC: 0.66 MG/DL (ref 0.55–1.02)
CREAT/UREA NIT SERPL: 21
EOSINOPHIL # BLD AUTO: 0.21 X10(3)/MCL (ref 0–0.9)
EOSINOPHIL NFR BLD AUTO: 2.7 %
ERYTHROCYTE [DISTWIDTH] IN BLOOD BY AUTOMATED COUNT: 14.7 % (ref 11.5–17)
GFR SERPLBLD CREATININE-BSD FMLA CKD-EPI: >60 ML/MIN/1.73/M2
GLOBULIN SER-MCNC: 3.5 GM/DL (ref 2.4–3.5)
GLUCOSE SERPL-MCNC: 104 MG/DL (ref 82–115)
HCT VFR BLD AUTO: 36.4 % (ref 37–47)
HGB BLD-MCNC: 11.9 G/DL (ref 12–16)
IMM GRANULOCYTES # BLD AUTO: 0.03 X10(3)/MCL (ref 0–0.04)
IMM GRANULOCYTES NFR BLD AUTO: 0.4 %
INR PPP: 1.4
LYMPHOCYTES # BLD AUTO: 1.89 X10(3)/MCL (ref 0.6–4.6)
LYMPHOCYTES NFR BLD AUTO: 24.7 %
MCH RBC QN AUTO: 28.7 PG (ref 27–31)
MCHC RBC AUTO-ENTMCNC: 32.7 G/DL (ref 33–36)
MCV RBC AUTO: 87.7 FL (ref 80–94)
MONOCYTES # BLD AUTO: 0.86 X10(3)/MCL (ref 0.1–1.3)
MONOCYTES NFR BLD AUTO: 11.2 %
NEUTROPHILS # BLD AUTO: 4.65 X10(3)/MCL (ref 2.1–9.2)
NEUTROPHILS NFR BLD AUTO: 60.7 %
NRBC BLD AUTO-RTO: 0 %
PLATELET # BLD AUTO: 146 X10(3)/MCL (ref 130–400)
PMV BLD AUTO: 9.3 FL (ref 7.4–10.4)
POTASSIUM SERPL-SCNC: 3.8 MMOL/L (ref 3.5–5.1)
PROT SERPL-MCNC: 6.3 GM/DL (ref 5.8–7.6)
PROTHROMBIN TIME: 17.3 SECONDS (ref 12.5–14.5)
RBC # BLD AUTO: 4.15 X10(6)/MCL (ref 4.2–5.4)
SODIUM SERPL-SCNC: 142 MMOL/L (ref 136–145)
WBC # BLD AUTO: 7.66 X10(3)/MCL (ref 4.5–11.5)

## 2024-10-19 PROCEDURE — 21400001 HC TELEMETRY ROOM

## 2024-10-19 PROCEDURE — 85025 COMPLETE CBC W/AUTO DIFF WBC: CPT | Performed by: NURSE PRACTITIONER

## 2024-10-19 PROCEDURE — 25000003 PHARM REV CODE 250: Performed by: INTERNAL MEDICINE

## 2024-10-19 PROCEDURE — 85610 PROTHROMBIN TIME: CPT | Performed by: INTERNAL MEDICINE

## 2024-10-19 PROCEDURE — 63600175 PHARM REV CODE 636 W HCPCS: Performed by: INTERNAL MEDICINE

## 2024-10-19 PROCEDURE — 25000003 PHARM REV CODE 250: Performed by: NURSE PRACTITIONER

## 2024-10-19 PROCEDURE — 36415 COLL VENOUS BLD VENIPUNCTURE: CPT | Performed by: INTERNAL MEDICINE

## 2024-10-19 PROCEDURE — 80053 COMPREHEN METABOLIC PANEL: CPT | Performed by: NURSE PRACTITIONER

## 2024-10-19 PROCEDURE — 63600175 PHARM REV CODE 636 W HCPCS: Performed by: NURSE PRACTITIONER

## 2024-10-19 PROCEDURE — 36415 COLL VENOUS BLD VENIPUNCTURE: CPT | Performed by: NURSE PRACTITIONER

## 2024-10-19 RX ORDER — ENOXAPARIN SODIUM 100 MG/ML
1 INJECTION SUBCUTANEOUS EVERY 12 HOURS
Status: DISCONTINUED | OUTPATIENT
Start: 2024-10-19 | End: 2024-10-21 | Stop reason: HOSPADM

## 2024-10-19 RX ORDER — DOXYCYCLINE 100 MG/1
200 CAPSULE ORAL DAILY
COMMUNITY

## 2024-10-19 RX ADMIN — PIPERACILLIN AND TAZOBACTAM 4.5 G: 4; .5 INJECTION, POWDER, LYOPHILIZED, FOR SOLUTION INTRAVENOUS; PARENTERAL at 05:10

## 2024-10-19 RX ADMIN — METOPROLOL TARTRATE 25 MG: 25 TABLET, FILM COATED ORAL at 08:10

## 2024-10-19 RX ADMIN — ACETAMINOPHEN 650 MG: 325 TABLET, FILM COATED ORAL at 08:10

## 2024-10-19 RX ADMIN — ENOXAPARIN SODIUM 80 MG: 80 INJECTION SUBCUTANEOUS at 05:10

## 2024-10-19 RX ADMIN — WARFARIN SODIUM 2.5 MG: 2.5 TABLET ORAL at 05:10

## 2024-10-19 RX ADMIN — PIPERACILLIN AND TAZOBACTAM 4.5 G: 4; .5 INJECTION, POWDER, LYOPHILIZED, FOR SOLUTION INTRAVENOUS; PARENTERAL at 10:10

## 2024-10-19 RX ADMIN — VANCOMYCIN HYDROCHLORIDE 1500 MG: 1.5 INJECTION, POWDER, LYOPHILIZED, FOR SOLUTION INTRAVENOUS at 05:10

## 2024-10-19 RX ADMIN — PIPERACILLIN AND TAZOBACTAM 4.5 G: 4; .5 INJECTION, POWDER, LYOPHILIZED, FOR SOLUTION INTRAVENOUS; PARENTERAL at 06:10

## 2024-10-19 RX ADMIN — ACETAMINOPHEN 650 MG: 325 TABLET, FILM COATED ORAL at 06:10

## 2024-10-19 NOTE — PLAN OF CARE
Problem: Adult Inpatient Plan of Care  Goal: Plan of Care Review  Reactivated  Goal: Patient-Specific Goal (Individualized)  Reactivated  Goal: Absence of Hospital-Acquired Illness or Injury  Reactivated  Goal: Optimal Comfort and Wellbeing  Reactivated  Goal: Readiness for Transition of Care  Reactivated     Problem: Infection  Goal: Absence of Infection Signs and Symptoms  Reactivated     Problem: Wound  Goal: Optimal Coping  Reactivated  Goal: Optimal Functional Ability  Reactivated  Goal: Absence of Infection Signs and Symptoms  Reactivated  Goal: Improved Oral Intake  Reactivated  Goal: Optimal Pain Control and Function  Reactivated  Goal: Skin Health and Integrity  Reactivated  Goal: Optimal Wound Healing  Reactivated     Problem: Pain Acute  Goal: Optimal Pain Control and Function  Reactivated     Problem: Fall Injury Risk  Goal: Absence of Fall and Fall-Related Injury  Reactivated

## 2024-10-19 NOTE — PLAN OF CARE
Problem: Adult Inpatient Plan of Care  Goal: Plan of Care Review  Outcome: Progressing  Goal: Patient-Specific Goal (Individualized)  Outcome: Progressing  Goal: Absence of Hospital-Acquired Illness or Injury  Outcome: Progressing  Goal: Optimal Comfort and Wellbeing  Outcome: Progressing  Goal: Readiness for Transition of Care  Outcome: Progressing     Problem: Infection  Goal: Absence of Infection Signs and Symptoms  Outcome: Progressing     Problem: Wound  Goal: Optimal Coping  Outcome: Progressing  Goal: Optimal Functional Ability  Outcome: Progressing  Goal: Absence of Infection Signs and Symptoms  Outcome: Progressing  Goal: Improved Oral Intake  Outcome: Progressing  Goal: Optimal Pain Control and Function  Outcome: Progressing  Goal: Skin Health and Integrity  Outcome: Progressing  Goal: Optimal Wound Healing  Outcome: Progressing     Problem: Pain Acute  Goal: Optimal Pain Control and Function  Outcome: Progressing     Problem: Fall Injury Risk  Goal: Absence of Fall and Fall-Related Injury  Outcome: Progressing

## 2024-10-19 NOTE — PROGRESS NOTES
[facility] Hospital Medicine Progress Note        Chief Complaint: Inpatient Follow-up for     HPI: 78-year-old female with past medical history of DVT, gallstone pancreatitis, hypertension, short gut syndrome, osteoarthritis, peripheral vascular disease, rheumatoid arthritis, DVT, cirrhosis of liver came into the ER with complaints of fever and generalized weakness.  Patient was diagnosed with COVID on October 9th and since then she has been spiking on and off fever.  Last night patient's spiked fever of 103 and had chills so she decided to come into the ER today.  Denies any abdominal pain denies any nausea vomiting diarrhea denies any burning while passing urine does complain of cough with some expectoration.  Patient does have history of Staph epidermidis bacteremia.  He does have [employer] and does TPN at home for short gut syndrome  Patient was tachycardic and tachypneic with lowest blood pressure of 100 by 48 in the ER was saturating well on room air.  Lab work showed slight leukocytosis with white cell count of 13.7 and transaminitis.  CT of the abdomen showed nodular opacities at the left lung base maybe infectious versus inflammatory and a repeat CT in 3 months and mild urothelial enhancement chest x-ray negative patient has been admitted to hospitalist service for further management and care   Repeat COVID ordered was negative influenza negative MRSA PCR negative blood cultures ordered patient was started on broad-spectrum antibiotics we had ordered CT of the chest without contrast  CT showed right middle lobe and left lung lingula mild infiltrates.  Also showed mild urothelial enhancement at the left renal pelvis in the CT of the abdomen  Interval Hx:   Patient seen and examined this morning family member bedside denied any complaints reports she is feeling better discuss the CT chest results  Case was discussed with patient's nurse and  on the  floor.    Objective/physical exam:  General: In no acute distress, afebrile  Chest: Clear to auscultation bilaterally  Heart: RRR, +S1, S2, no appreciable murmur  Abdomen: Soft, nontender, BS +  MSK: Warm, no lower extremity edema, no clubbing or cyanosis  Neurologic: Alert and oriented x4,   VITAL SIGNS: 24 HRS MIN & MAX LAST   Temp  Min: 97.9 °F (36.6 °C)  Max: 99 °F (37.2 °C) 98.2 °F (36.8 °C)   BP  Min: 98/61  Max: 127/71 127/71   Pulse  Min: 65  Max: 112  74   Resp  Min: 17  Max: 23 17   SpO2  Min: 92 %  Max: 98 % (!) 93 %     I have reviewed the following labs:  Recent Labs   Lab 10/14/24  0910 10/18/24  1153 10/19/24  0509   WBC 9.53 13.73* 7.66   RBC 4.36 4.57 4.15*   HGB 12.7 13.1 11.9*   HCT 38.4 40.1 36.4*   MCV 88.1 87.7 87.7   MCH 29.1 28.7 28.7   MCHC 33.1 32.7* 32.7*   RDW 14.3 14.4 14.7    152 146   MPV 9.8 9.4 9.3     Recent Labs   Lab 10/14/24  0910 10/18/24  1153 10/19/24  0509    138 142   K 3.8 4.0 3.8    104 108*   CO2 23 25 27   BUN 17.0 15.8 13.8   CREATININE 0.65 0.78 0.66   CALCIUM 8.6 9.1 8.3*   MG 2.10  --   --    ALBUMIN 3.2* 3.3* 2.8*   ALKPHOS 107 174* 131   ALT 25 83* 59*   AST 27 86* 51*   BILITOT 0.6 0.9 0.7     Microbiology Results (last 7 days)       Procedure Component Value Units Date/Time    Blood culture #1 **CANNOT BE ORDERED STAT** [[id number]] Collected: 10/18/24 1321    Order Status: Resulted Specimen: Blood Updated: 10/18/24 1352    Blood culture #2 **CANNOT BE ORDERED STAT** [[id number]] Collected: 10/18/24 1321    Order Status: Resulted Specimen: Blood Updated: 10/18/24 1350             See below for Radiology    Assessment/Plan:   right middle lobe and left lung lingula mild infiltrates  Sepsis   Recently diagnosed with COVID but repeat COVID negative this time  Transaminitis  DVT on anticoagulation  History of recurrent staph bacteremia   History of infected Med[employer]   History of short gut syndrome  History of 11 mm solid nodule in the right upper  lobe  History of breast cancer   Essential hypertension   Peripheral vascular disease   Rheumatoid arthritis   Cirrhosis of liver  History of gallstone pancreatitis   Anxiety   Depression     White cell count is improving this morning it is 7.66  Continue with vanc and Zosyn until blood cultures for 24 hours have been negative  Respiratory PCR ordered otherwise flu, COVID, MRSA PCR negative  INR was 1.6 yesterday awaiting INR for today home dose of Coumadin has been resumed if INR is still subtherapeutic then we will bridge her with Lovenox  Blood pressure on the lower side will hold off on blood pressure medications  Liver enzymes are trending down ultrasound negative      Will resume home medications once home med rec has been updated    VTE prophylaxis:     Patient condition:  Stable/Fair/Guarded/ Serious/ Critical    Anticipated discharge and Disposition:         All diagnosis and differential diagnosis have been reviewed; assessment and plan has been documented; I have personally reviewed the labs and test results that are presently available; I have reviewed the patients medication list; I have reviewed the consulting providers response and recommendations. I have reviewed or attempted to review medical records based upon their availability    All of the patient's questions have been  addressed and answered. Patient's is agreeable to the above stated plan. I will continue to monitor closely and make adjustments to medical management as needed.    Portions of this note dictated using EMR integrated voice recognition software, and may be subject to voice recognition errors not corrected at proofreading. Please contact writer for clarification if needed.   _____________________________________________________________________    Malnutrition Status:    Scheduled Med:   metoprolol tartrate  25 mg Oral BID    [START ON 10/20/2024] mupirocin   Nasal BID    piperacillin-tazobactam (Zosyn) IV (PEDS and ADULTS)  (extended infusion is not appropriate)  4.5 g Intravenous Q8H    vancomycin 1,500 mg in D5W 250 mL IVPB (admixture device)  1,500 mg Intravenous Q24H    warfarin  2.5 mg Oral Daily      Continuous Infusions:     PRN Meds:    Current Facility-Administered Medications:     acetaminophen, 650 mg, Oral, Q8H PRN    acetaminophen, 650 mg, Oral, Q4H PRN    influenza (adjuvanted), 0.5 mL, Intramuscular, vaccine x 1 dose    ondansetron, 4 mg, Intravenous, Q8H PRN    vancomycin - pharmacy to dose, , Intravenous, pharmacy to manage frequency     Radiology:  I have personally reviewed the following imaging and agree with the radiologist.     CT Chest Without Contrast  Narrative: EXAMINATION:  CT CHEST WITHOUT CONTRAST    CLINICAL HISTORY:  pneumonia;    TECHNIQUE:  Multidetector axial images were performed from thoracic inlet to below hemidiaphragms without intravenous contrast administration. Sagittal and coronal reformations performed.    Dose length product of 501 mGycm. Automated exposure control was utilized to minimize radiation dose.    COMPARISON:  CT chest August 7, 2024    FINDINGS:  Right apical opacities remain stable and likely represent pleuroparenchymal scarring.  There are mild infiltrates combined with atelectasis which involve the right middle lung lobe.  There are also mild infiltrates involving left lung lingular segment.  Otherwise, no dominant consolidation or pulmonary edema.  There are some chronic changes of lungs.  No significant fluid within the pleural and the pericardial spaces.    There are no enlarging chest lymph nodes.  Thoracic aorta is without aneurysmal dilatation.  Coronary arteries calcified plaques.  Cardiac chamber size enlargement is similar.    Bilateral kidneys non occluding calculi.  Gallbladder is surgically absent.  Thoracic kyphosis and demineralization of bones.  Right shoulder arthroplasty  Impression: 1. Right middle lung lobe and left lung lingular segment mild  infiltrates.    2. Details of the findings above.    Electronically signed by: Isaac Richardson  Date:    10/18/2024  Time:    23:37  US Abdomen Limited  Narrative: EXAMINATION:  US ABDOMEN LIMITED    CLINICAL HISTORY:  Transaminitis    TECHNIQUE:  Multiple real-time transverse and longitudinal sections were performed of the right abdomen by the sonographer. Select images were submitted for review.    COMPARISON:  October 18, 2024    FINDINGS:  Liver is remarkable for some steatosis without focal space occupying lesion. There was no delineation of discrete hepatic cystic or solid mass. Hepatic maximum diameter of 16.7 cm. There was unremarkable hepatopedal flow within the portal vein.  Pancreas obscured by overlying bowel gas.    Patient is status post cholecystectomy.  Post cholecystectomy common bile duct measures 9.7 mm    Normally located right kidney length measures 10.9 x 5.0 x 5.4 cm. Right renal corticomedullary differentiation is unremarkable. No evidence of hydronephrosis.  Impression: No acute findings identified.    Electronically signed by: Isaac Richardson  Date:    10/18/2024  Time:    23:28  CT Abdomen Pelvis With IV Contrast NO Oral Contrast  Narrative: EXAMINATION:  CT ABDOMEN PELVIS WITH IV CONTRAST    CLINICAL HISTORY:  Abdominal abscess/infection suspected;Abdominal pain, acute, nonlocalized;    TECHNIQUE:  Helically acquired images with axial, sagittal and coronal reformations were obtained from the lung bases to the pubic symphysis after the IV administration of contrast.    Automated tube current modulation, weight-based exposure dosing, and/or iterative reconstruction technique utilized to reach lowest reasonably achievable exposure rate.    DLP: 1266 mGy*cm    COMPARISON:  CT abdomen pelvis 06/10/2024, CT chest 08/07/2024    FINDINGS:  HEART: Normal in size. No pericardial effusion.    LUNG BASES: There are new nodular opacities within the lingula (2, 80) and left lower lobe (2, 13).  Unchanged  right lower lobe nodule.    LIVER: Cirrhotic morphology of the liver with irregular surface contour.    BILIARY: Gallbladder is surgically absent.  Common bile duct measures 9 mm, similar to previous.    PANCREAS: No inflammatory change.    SPLEEN: Upper lungs of normal in size.    ADRENALS: No mass.    KIDNEYS/URETERS: Mild urothelial enhancement at the left renal pelvis.  Extrarenal pelvis on the left.  Nonobstructing left nephrolithiasis.  Kidneys enhance symmetrically.    GI TRACT/MESENTERY: Stomach nondistended which limits evaluation.  Questionable edema at the GE junction.  Colonic diverticulosis without acute inflammatory change.  There is an ileocolic anastomosis.  No evidence of bowel obstruction.    PERITONEUM: No free fluid.No free air.    LYMPH NODES: No enlarged lymph nodes by size criteria.    VASCULATURE: Aortoiliac atherosclerosis.    BLADDER: Normal appearance given degree of distention.    REPRODUCTIVE ORGANS: Uterus is surgically absent.  Unchanged appearance of right ovarian remnant.    SOFT TISSUES: Postsurgical change at the right breast.    BONES: Unchanged.  Dextrocurvature of the lumbar spine.  Impression: 1. Nodular opacities at the left lung base may be infectious/inflammatory in the acute setting.  Suggest follow-up CT in 3 months to ensure improvement and exclude persistent or enlarging nodule.  2. Mild urothelial enhancement at the left renal pelvis.  Correlate to exclude pyelitis    Electronically signed by: Gianna Arndt  Date:    10/18/2024  Time:    16:02  X-Ray Chest AP  Narrative: EXAMINATION:  XR CHEST AP PORTABLE    CLINICAL HISTORY:  shortness of breath;    TECHNIQUE:  One view    COMPARISON:  July 18, 2024.    FINDINGS:  Cardiopericardial silhouette is within normal limits.  Left chest implanted tunnel castro catheter terminates within the superior vena cava.  No acute dense focal or segmental consolidation, congestive process, pleural effusions or pneumothorax.  Similar  appearance of right breast rim of calcification.  Right shoulder arthroplasty.  Impression: No acute cardiopulmonary process identified.    Electronically signed by: Isaac Richardson  Date:    10/18/2024  Time:    11:51      Julia Aleman MD  Department of Hospital Medicine   [facility]   10/19/2024

## 2024-10-20 LAB
ANION GAP SERPL CALC-SCNC: 8 MEQ/L
BASOPHILS # BLD AUTO: 0.03 X10(3)/MCL
BASOPHILS NFR BLD AUTO: 0.5 %
BUN SERPL-MCNC: 12.7 MG/DL (ref 9.8–20.1)
CALCIUM SERPL-MCNC: 8.1 MG/DL (ref 8.4–10.2)
CHLORIDE SERPL-SCNC: 111 MMOL/L (ref 98–107)
CO2 SERPL-SCNC: 22 MMOL/L (ref 23–31)
CREAT SERPL-MCNC: 0.64 MG/DL (ref 0.55–1.02)
CREAT/UREA NIT SERPL: 20
EOSINOPHIL # BLD AUTO: 0.29 X10(3)/MCL (ref 0–0.9)
EOSINOPHIL NFR BLD AUTO: 4.4 %
ERYTHROCYTE [DISTWIDTH] IN BLOOD BY AUTOMATED COUNT: 14.8 % (ref 11.5–17)
GFR SERPLBLD CREATININE-BSD FMLA CKD-EPI: >60 ML/MIN/1.73/M2
GLUCOSE SERPL-MCNC: 91 MG/DL (ref 82–115)
HAV IGM SERPL QL IA: NONREACTIVE
HBV CORE IGM SERPL QL IA: NONREACTIVE
HBV SURFACE AG SERPL QL IA: NONREACTIVE
HCT VFR BLD AUTO: 36.4 % (ref 37–47)
HCV AB SERPL QL IA: REACTIVE
HGB BLD-MCNC: 11.8 G/DL (ref 12–16)
IMM GRANULOCYTES # BLD AUTO: 0.02 X10(3)/MCL (ref 0–0.04)
IMM GRANULOCYTES NFR BLD AUTO: 0.3 %
INR PPP: 1.5
LYMPHOCYTES # BLD AUTO: 2.21 X10(3)/MCL (ref 0.6–4.6)
LYMPHOCYTES NFR BLD AUTO: 33.8 %
MCH RBC QN AUTO: 28.9 PG (ref 27–31)
MCHC RBC AUTO-ENTMCNC: 32.4 G/DL (ref 33–36)
MCV RBC AUTO: 89.2 FL (ref 80–94)
MONOCYTES # BLD AUTO: 0.75 X10(3)/MCL (ref 0.1–1.3)
MONOCYTES NFR BLD AUTO: 11.5 %
NEUTROPHILS # BLD AUTO: 3.24 X10(3)/MCL (ref 2.1–9.2)
NEUTROPHILS NFR BLD AUTO: 49.5 %
NRBC BLD AUTO-RTO: 0 %
PLATELET # BLD AUTO: 171 X10(3)/MCL (ref 130–400)
PMV BLD AUTO: 9.8 FL (ref 7.4–10.4)
POTASSIUM SERPL-SCNC: 4.2 MMOL/L (ref 3.5–5.1)
PROTHROMBIN TIME: 18.3 SECONDS (ref 12.5–14.5)
RBC # BLD AUTO: 4.08 X10(6)/MCL (ref 4.2–5.4)
SODIUM SERPL-SCNC: 141 MMOL/L (ref 136–145)
WBC # BLD AUTO: 6.54 X10(3)/MCL (ref 4.5–11.5)

## 2024-10-20 PROCEDURE — 63600175 PHARM REV CODE 636 W HCPCS: Performed by: INTERNAL MEDICINE

## 2024-10-20 PROCEDURE — 85610 PROTHROMBIN TIME: CPT | Performed by: INTERNAL MEDICINE

## 2024-10-20 PROCEDURE — 21400001 HC TELEMETRY ROOM

## 2024-10-20 PROCEDURE — 80048 BASIC METABOLIC PNL TOTAL CA: CPT | Performed by: INTERNAL MEDICINE

## 2024-10-20 PROCEDURE — 36415 COLL VENOUS BLD VENIPUNCTURE: CPT | Performed by: INTERNAL MEDICINE

## 2024-10-20 PROCEDURE — 25000003 PHARM REV CODE 250: Performed by: INTERNAL MEDICINE

## 2024-10-20 PROCEDURE — 63600175 PHARM REV CODE 636 W HCPCS: Performed by: NURSE PRACTITIONER

## 2024-10-20 PROCEDURE — 85025 COMPLETE CBC W/AUTO DIFF WBC: CPT | Performed by: INTERNAL MEDICINE

## 2024-10-20 PROCEDURE — 25000003 PHARM REV CODE 250: Performed by: NURSE PRACTITIONER

## 2024-10-20 RX ORDER — MUPIROCIN 20 MG/G
OINTMENT TOPICAL 2 TIMES DAILY
Status: DISCONTINUED | OUTPATIENT
Start: 2024-10-20 | End: 2024-10-21 | Stop reason: HOSPADM

## 2024-10-20 RX ADMIN — PIPERACILLIN AND TAZOBACTAM 4.5 G: 4; .5 INJECTION, POWDER, LYOPHILIZED, FOR SOLUTION INTRAVENOUS; PARENTERAL at 07:10

## 2024-10-20 RX ADMIN — MUPIROCIN: 20 OINTMENT TOPICAL at 09:10

## 2024-10-20 RX ADMIN — PIPERACILLIN AND TAZOBACTAM 4.5 G: 4; .5 INJECTION, POWDER, LYOPHILIZED, FOR SOLUTION INTRAVENOUS; PARENTERAL at 11:10

## 2024-10-20 RX ADMIN — WARFARIN SODIUM 2.5 MG: 2.5 TABLET ORAL at 04:10

## 2024-10-20 RX ADMIN — METOPROLOL TARTRATE 25 MG: 25 TABLET, FILM COATED ORAL at 08:10

## 2024-10-20 RX ADMIN — ENOXAPARIN SODIUM 80 MG: 80 INJECTION SUBCUTANEOUS at 04:10

## 2024-10-20 RX ADMIN — PIPERACILLIN AND TAZOBACTAM 4.5 G: 4; .5 INJECTION, POWDER, LYOPHILIZED, FOR SOLUTION INTRAVENOUS; PARENTERAL at 03:10

## 2024-10-20 RX ADMIN — MUPIROCIN: 20 OINTMENT TOPICAL at 08:10

## 2024-10-20 RX ADMIN — METOPROLOL TARTRATE 25 MG: 25 TABLET, FILM COATED ORAL at 09:10

## 2024-10-20 NOTE — PROGRESS NOTES
[facility] Hospital Medicine Progress Note        Chief Complaint: Inpatient Follow-up for     HPI: 78-year-old female with past medical history of DVT, gallstone pancreatitis, hypertension, short gut syndrome, osteoarthritis, peripheral vascular disease, rheumatoid arthritis, DVT, cirrhosis of liver came into the ER with complaints of fever and generalized weakness.  Patient was diagnosed with COVID on October 9th and since then she has been spiking on and off fever.  Last night patient's spiked fever of 103 and had chills so she decided to come into the ER today.  Denies any abdominal pain denies any nausea vomiting diarrhea denies any burning while passing urine does complain of cough with some expectoration.  Patient does have history of Staph epidermidis bacteremia.  He does have [employer] and does TPN at home for short gut syndrome  Patient was tachycardic and tachypneic with lowest blood pressure of 100 by 48 in the ER was saturating well on room air.  Lab work showed slight leukocytosis with white cell count of 13.7 and transaminitis.  CT of the abdomen showed nodular opacities at the left lung base maybe infectious versus inflammatory and a repeat CT in 3 months and mild urothelial enhancement chest x-ray negative patient has been admitted to hospitalist service for further management and care   Repeat COVID ordered was negative influenza negative MRSA PCR negative blood cultures ordered patient was started on broad-spectrum antibiotics we had ordered CT of the chest without contrast  CT showed right middle lobe and left lung lingula mild infiltrates.  Also showed mild urothelial enhancement at the left renal pelvis in the CT of the abdomen  Interval Hx:   Patient seen and examined this morning family member bedside denied any complaints reports he is feeling better all vitals have been stable  Objective/physical exam:  General: In no acute distress, afebrile  Chest: Clear to  auscultation bilaterally  Heart: RRR, +S1, S2, no appreciable murmur  Abdomen: Soft, nontender, BS +  MSK: Warm, no lower extremity edema, no clubbing or cyanosis  Neurologic: Alert and oriented x4,   VITAL SIGNS: 24 HRS MIN & MAX LAST   Temp  Min: 97.6 °F (36.4 °C)  Max: 98.3 °F (36.8 °C) 98.3 °F (36.8 °C)   BP  Min: 111/60  Max: 131/72 122/75   Pulse  Min: 58  Max: 73  64   Resp  Min: 18  Max: 18 18   SpO2  Min: 92 %  Max: 96 % 96 %     I have reviewed the following labs:  Recent Labs   Lab 10/18/24  1153 10/19/24  0509 10/20/24  0710   WBC 13.73* 7.66 6.54   RBC 4.57 4.15* 4.08*   HGB 13.1 11.9* 11.8*   HCT 40.1 36.4* 36.4*   MCV 87.7 87.7 89.2   MCH 28.7 28.7 28.9   MCHC 32.7* 32.7* 32.4*   RDW 14.4 14.7 14.8    146 171   MPV 9.4 9.3 9.8     Recent Labs   Lab 10/14/24  0910 10/18/24  1153 10/19/24  0509 10/20/24  0710    138 142 141   K 3.8 4.0 3.8 4.2    104 108* 111*   CO2 23 25 27 22*   BUN 17.0 15.8 13.8 12.7   CREATININE 0.65 0.78 0.66 0.64   CALCIUM 8.6 9.1 8.3* 8.1*   MG 2.10  --   --   --    ALBUMIN 3.2* 3.3* 2.8*  --    ALKPHOS 107 174* 131  --    ALT 25 83* 59*  --    AST 27 86* 51*  --    BILITOT 0.6 0.9 0.7  --      Microbiology Results (last 7 days)       Procedure Component Value Units Date/Time    Blood culture #1 **CANNOT BE ORDERED STAT** [[id number]]  (Normal) Collected: 10/18/24 1321    Order Status: Completed Specimen: Blood Updated: 10/19/24 1600     Blood Culture No Growth At 24 Hours    Blood culture #2 **CANNOT BE ORDERED STAT** [[id number]]  (Normal) Collected: 10/18/24 1321    Order Status: Completed Specimen: Blood Updated: 10/19/24 1600     Blood Culture No Growth At 24 Hours             See below for Radiology    Assessment/Plan:   right middle lobe and left lung lingula mild infiltrates  Sepsis   Recently diagnosed with COVID but repeat COVID negative this time  Transaminitis  DVT on anticoagulation  History of recurrent staph bacteremia   History of infected  [facility]   History of short gut syndrome  History of 11 mm solid nodule in the right upper lobe  History of breast cancer   Essential hypertension   Peripheral vascular disease   Rheumatoid arthritis   Cirrhosis of liver  History of gallstone pancreatitis   Anxiety   Depression     White cell count is improving patient is saturating well on room air  Will discontinue vancomycin and continue with Zosyn as blood cultures remain negative, MRSA PCR has been negative   Respiratory PCR ordered otherwise flu, COVID, MRSA PCR negative  INR of 1.5 we are bridging her with Lovenox   Blood pressure on the lower side will hold off on blood pressure medications  Liver enzymes are trending down ultrasound negative    Consult physical therapy   Potential discharge in a.m. if patient continues to do better  VTE prophylaxis:     Patient condition:  Stable/Fair/Guarded/ Serious/ Critical    Anticipated discharge and Disposition:         All diagnosis and differential diagnosis have been reviewed; assessment and plan has been documented; I have personally reviewed the labs and test results that are presently available; I have reviewed the patients medication list; I have reviewed the consulting providers response and recommendations. I have reviewed or attempted to review medical records based upon their availability    All of the patient's questions have been  addressed and answered. Patient's is agreeable to the above stated plan. I will continue to monitor closely and make adjustments to medical management as needed.    Portions of this note dictated using EMR integrated voice recognition software, and may be subject to voice recognition errors not corrected at proofreading. Please contact writer for clarification if needed.   _____________________________________________________________________    Malnutrition Status:    Scheduled Med:   enoxparin  1 mg/kg Subcutaneous Q12H (treatment, non-standard time)    metoprolol tartrate  25  mg Oral BID    mupirocin   Nasal BID    piperacillin-tazobactam (Zosyn) IV (PEDS and ADULTS) (extended infusion is not appropriate)  4.5 g Intravenous Q8H    vancomycin 1,500 mg in D5W 250 mL IVPB (admixture device)  1,500 mg Intravenous Q24H    warfarin  2.5 mg Oral Daily      Continuous Infusions:     PRN Meds:    Current Facility-Administered Medications:     acetaminophen, 650 mg, Oral, Q8H PRN    acetaminophen, 650 mg, Oral, Q4H PRN    influenza (adjuvanted), 0.5 mL, Intramuscular, vaccine x 1 dose    ondansetron, 4 mg, Intravenous, Q8H PRN    vancomycin - pharmacy to dose, , Intravenous, pharmacy to manage frequency     Radiology:  I have personally reviewed the following imaging and agree with the radiologist.     X-ray Shoulder 2 or More Views Right  Narrative: EXAMINATION:  XR SHOULDER COMPLETE 2 OR MORE VIEWS RIGHT    CLINICAL HISTORY:  pain;    COMPARISON:  24 July 2019    FINDINGS:  Three views of the right shoulder.  There is right humeral head arthroplasty.  No glenohumeral dislocation is evident.  Moderate AC joint degenerative change.  Impression: No acute findings.    Electronically signed by: Tejas Butler  Date:    10/19/2024  Time:    16:04      Julia Aleman MD  Department of Hospital Medicine   [facility]   10/20/2024

## 2024-10-21 ENCOUNTER — DOCUMENT SCAN (OUTPATIENT)
Dept: HOME HEALTH SERVICES | Facility: HOSPITAL | Age: 79
End: 2024-10-21
Payer: MEDICARE

## 2024-10-21 VITALS
DIASTOLIC BLOOD PRESSURE: 91 MMHG | TEMPERATURE: 98 F | HEART RATE: 65 BPM | OXYGEN SATURATION: 96 % | RESPIRATION RATE: 18 BRPM | SYSTOLIC BLOOD PRESSURE: 144 MMHG | WEIGHT: 167.81 LBS | BODY MASS INDEX: 28.65 KG/M2 | HEIGHT: 64 IN

## 2024-10-21 PROBLEM — J18.9 PNEUMONIA: Status: ACTIVE | Noted: 2024-10-21

## 2024-10-21 LAB
BASOPHILS # BLD AUTO: 0.02 X10(3)/MCL
BASOPHILS NFR BLD AUTO: 0.4 %
EOSINOPHIL # BLD AUTO: 0.2 X10(3)/MCL (ref 0–0.9)
EOSINOPHIL NFR BLD AUTO: 4.3 %
ERYTHROCYTE [DISTWIDTH] IN BLOOD BY AUTOMATED COUNT: 14.8 % (ref 11.5–17)
HCT VFR BLD AUTO: 37.6 % (ref 37–47)
HGB BLD-MCNC: 12 G/DL (ref 12–16)
IMM GRANULOCYTES # BLD AUTO: 0.01 X10(3)/MCL (ref 0–0.04)
IMM GRANULOCYTES NFR BLD AUTO: 0.2 %
INR PPP: 1.7
LYMPHOCYTES # BLD AUTO: 1.83 X10(3)/MCL (ref 0.6–4.6)
LYMPHOCYTES NFR BLD AUTO: 39.4 %
MCH RBC QN AUTO: 29.1 PG (ref 27–31)
MCHC RBC AUTO-ENTMCNC: 31.9 G/DL (ref 33–36)
MCV RBC AUTO: 91 FL (ref 80–94)
MONOCYTES # BLD AUTO: 0.43 X10(3)/MCL (ref 0.1–1.3)
MONOCYTES NFR BLD AUTO: 9.3 %
NEUTROPHILS # BLD AUTO: 2.15 X10(3)/MCL (ref 2.1–9.2)
NEUTROPHILS NFR BLD AUTO: 46.4 %
NRBC BLD AUTO-RTO: 0 %
PLATELET # BLD AUTO: 202 X10(3)/MCL (ref 130–400)
PMV BLD AUTO: 9.6 FL (ref 7.4–10.4)
PROTHROMBIN TIME: 20.1 SECONDS (ref 12.5–14.5)
RBC # BLD AUTO: 4.13 X10(6)/MCL (ref 4.2–5.4)
WBC # BLD AUTO: 4.64 X10(3)/MCL (ref 4.5–11.5)

## 2024-10-21 PROCEDURE — 85025 COMPLETE CBC W/AUTO DIFF WBC: CPT | Performed by: INTERNAL MEDICINE

## 2024-10-21 PROCEDURE — 85610 PROTHROMBIN TIME: CPT | Performed by: INTERNAL MEDICINE

## 2024-10-21 PROCEDURE — 36415 COLL VENOUS BLD VENIPUNCTURE: CPT | Performed by: INTERNAL MEDICINE

## 2024-10-21 PROCEDURE — 25000003 PHARM REV CODE 250: Performed by: INTERNAL MEDICINE

## 2024-10-21 PROCEDURE — 63600175 PHARM REV CODE 636 W HCPCS: Performed by: NURSE PRACTITIONER

## 2024-10-21 PROCEDURE — 97162 PT EVAL MOD COMPLEX 30 MIN: CPT

## 2024-10-21 PROCEDURE — 25000003 PHARM REV CODE 250: Performed by: NURSE PRACTITIONER

## 2024-10-21 PROCEDURE — 63600175 PHARM REV CODE 636 W HCPCS: Performed by: INTERNAL MEDICINE

## 2024-10-21 RX ORDER — ENOXAPARIN SODIUM 100 MG/ML
80 INJECTION SUBCUTANEOUS EVERY 12 HOURS
Qty: 11.2 ML | Refills: 0 | Status: SHIPPED | OUTPATIENT
Start: 2024-10-21 | End: 2024-10-28

## 2024-10-21 RX ORDER — AMOXICILLIN AND CLAVULANATE POTASSIUM 875; 125 MG/1; MG/1
1 TABLET, FILM COATED ORAL 2 TIMES DAILY
Qty: 8 TABLET | Refills: 0 | Status: SHIPPED | OUTPATIENT
Start: 2024-10-21 | End: 2024-10-25

## 2024-10-21 RX ORDER — CITALOPRAM 20 MG/1
40 TABLET, FILM COATED ORAL DAILY
Status: DISCONTINUED | OUTPATIENT
Start: 2024-10-21 | End: 2024-10-21 | Stop reason: HOSPADM

## 2024-10-21 RX ORDER — BUSPIRONE HYDROCHLORIDE 5 MG/1
20 TABLET ORAL 2 TIMES DAILY
Status: DISCONTINUED | OUTPATIENT
Start: 2024-10-21 | End: 2024-10-21 | Stop reason: HOSPADM

## 2024-10-21 RX ORDER — MIRTAZAPINE 15 MG/1
15 TABLET, FILM COATED ORAL NIGHTLY
Status: DISCONTINUED | OUTPATIENT
Start: 2024-10-21 | End: 2024-10-21 | Stop reason: HOSPADM

## 2024-10-21 RX ADMIN — METOPROLOL TARTRATE 25 MG: 25 TABLET, FILM COATED ORAL at 08:10

## 2024-10-21 RX ADMIN — ENOXAPARIN SODIUM 80 MG: 80 INJECTION SUBCUTANEOUS at 06:10

## 2024-10-21 RX ADMIN — MUPIROCIN: 20 OINTMENT TOPICAL at 09:10

## 2024-10-21 RX ADMIN — PIPERACILLIN AND TAZOBACTAM 4.5 G: 4; .5 INJECTION, POWDER, LYOPHILIZED, FOR SOLUTION INTRAVENOUS; PARENTERAL at 06:10

## 2024-10-21 RX ADMIN — ACETAMINOPHEN 650 MG: 325 TABLET, FILM COATED ORAL at 10:10

## 2024-10-21 RX ADMIN — CITALOPRAM HYDROBROMIDE 40 MG: 20 TABLET ORAL at 11:10

## 2024-10-21 RX ADMIN — BUSPIRONE HYDROCHLORIDE 20 MG: 5 TABLET ORAL at 11:10

## 2024-10-21 NOTE — PLAN OF CARE
Problem: Physical Therapy  Goal: Physical Therapy Goal  Description: Goals to be met by: 24     Patient will increase functional independence with mobility by performin. Supine to sit with [place]  2. Sit to supine with [place]  3. Sit to stand transfer with Modified [place]  4. Gait  x 300 feet with Modified [place] using LRAD    Outcome: Progressing

## 2024-10-21 NOTE — PLAN OF CARE
10/21/24 1016   Final Note   Assessment Type Final Discharge Note   Anticipated Discharge Disposition Home-Health   Post-Acute Status   Post-Acute Authorization IV Infusion;Home Health   Home Health Status Set-up Complete/Auth obtained  (Resume with [facility])   IV Infusion Status Set-up Complete/Auth obtained  (Resume TPN with [facility])   Discharge Delays None known at this time     Verified with Charanjit with [facility] and resumption of TPN order sent. Verified with Penny with [facility] regarding PT/INR monitoring

## 2024-10-21 NOTE — PT/OT/SLP EVAL
Physical Therapy Evaluation    Patient Name:  Laura Acosta   MRN:  [id number]    Recommendations:     Discharge therapy intensity: Low Intensity Therapy   Discharge Equipment Recommendations: none   Barriers to discharge: None    Assessment:     Laura Acosta is a 78 y.o. female admitted with SOB. Prior to admit, patient lived with spouse in a [facility]. At baseline, she is independent and occasionally ambulates with a SPC.  She presents with the following impairments/functional limitations: impaired endurance, weakness, gait instability, impaired balance, decreased safety awareness. Patient mobilized OOB and ambulated 127 ft with RW & CGA. Slowed pace. Patient to benefit from skilled PT to address deficits, improve functional mobility, and progress towards functional independence. Recommending low intensity therapy at discharge. Progress as tolerated.    Rehab Prognosis: Good; patient would benefit from acute skilled PT services to address these deficits and reach maximum level of function.    Recent Surgery: * No surgery found *      Plan:     During this hospitalization, patient would benefit from acute PT services 5 x/week to address the identified rehab impairments via gait training, therapeutic activities, therapeutic exercises, neuromuscular re-education and progress toward the following goals:    Plan of Care Expires:  11/21/24    Subjective     Chief Complaint: none  Patient/Family Comments/goals: none  Pain/Comfort:  Pain Rating 1: 0/10    Patients cultural, spiritual, Alevism conflicts given the current situation: no    Living Environment:  Prior to admit, patient lived with spouse in a [facility]. At baseline, she is independent and occasionally ambulates with a SPC. Equipment used at home: none (Occasionally uses SPC. Owns RW).  DME owned (not currently used): none.  Upon discharge, patient will have assistance from spouse.    Objective:     Communicated with nurse prior to session.  Patient found supine with  telemetry  upon PT entry to room.    General Precautions: Standard, fall  Orthopedic Precautions:N/A   Braces: N/A  Respiratory Status: Room air    Exams:  Cognitive Exam:  Patient is oriented to Person, Place, Time, and Situation  Sensation: -       Intact  RLE ROM: WFL  RLE Strength: 4/5 grossly  LLE ROM: WFL  LLE Strength: 4/5 grossly  Skin integrity: Visible skin intact      Functional Mobility:  Bed Mobility:  Supine to Sit: stand by assistance  Transfers:  Sit to Stand:  contact guard assistance with rolling walker  Gait: Ambulated 127 ft with RW & CGA. Slowed pace.  Balance: fair      AM-PAC 6 CLICK MOBILITY  Total Score:20     Education Provided:  Role and goals of PT, transfer training, bed mobility, gait training, balance training, safety awareness, assistive device, strengthening exercises, and importance of participating in PT to return to PLOF.    Patient left up in chair with all lines intact, call button in reach, and spouse present.    GOALS:   Multidisciplinary Problems       Physical Therapy Goals          Problem: Physical Therapy    Goal Priority Disciplines Outcome Interventions   Physical Therapy Goal     PT, PT/OT Progressing    Description: Goals to be met by: 24     Patient will increase functional independence with mobility by performin. Supine to sit with Riley  2. Sit to supine with Riley  3. Sit to stand transfer with Modified Riley  4. Gait  x 300 feet with Modified Riley using LRAD                         History:     Past Medical History:   Diagnosis Date    Acute URI     Anxiety and depression     Back pain     Bacteremia 2024    Breast cancer     Cholelithiasis     Contaminated small bowel syndrome     DVT (deep venous thrombosis)     on coumadin    Edema, lower extremity     Gallstone pancreatitis     Heart murmur     HTN (hypertension)     Insomnia     Irregular heart beat     Ischemic disease of gut     Kidney stones     Laryngitis      Malabsorption     Malnutrition, unspecified type     Meningitis, unspecified     OA (osteoarthritis)     PVD (peripheral vascular disease)     Rheumatoid arthritis, unspecified     Staph infection     infected mediport -- on doxycycline daily for prevention    Tremor     Unspecified cataract     Unspecified cirrhosis of liver 06/20/2023    Dr Carroll       Past Surgical History:   Procedure Laterality Date    APPENDECTOMY      BOWEL RESECTION      BREAST LUMPECTOMY Right     CHOLECYSTECTOMY  05/2015    COLONOSCOPY  02/2022    repeat 3 years    CYST REMOVAL Right     breast    CYSTOSCOPY W/ STONE MANIPULATION      CYSTOSCOPY W/ URETERAL STENT PLACEMENT  12/2020    CYSTOSCOPY W/ URETERAL STENT REMOVAL  04/2021    CYSTOURETEROSCOPY, WITH HOLMIUM LASER LITHOTRIPSY OF URETERAL CALCULUS AND STENT INSERTION Left 05/02/2023    Procedure: CYSTOSCOPY, WITH URETERAL STENT INSERTION Left;  Surgeon: Jared Felix MD;  Location: [facility] OR;  Service: Urology;  Laterality: Left;    EGD, WITH CLOSED BIOPSY N/A 6/20/2023    Procedure: EGD;  Surgeon: Aashish Carroll MD;  Location: [facility] ENDOSCOPY;  Service: Gastroenterology;  Laterality: N/A;    ENDOSCOPIC RETROGRADE CHOLANGIOPANCREATOGRAPHY W/ SPHINCTEROTOMY AND STONE REMOVAL  04/2015    ESOPHAGOGASTRODUODENOSCOPY  06/20/2023    Dr Carroll - no signs esophageal varicies --repeat 3 yrs    GI Biopsy  02/15/2022    GI Biopsy  12/2018    HYSTERECTOMY      INSERTION OF TUNNELED CENTRAL VENOUS CATHETER (CVC) WITH SUBCUTANEOUS PORT N/A 7/18/2024    Procedure: [id number];  Surgeon: Ariel Welsh Jr., MD;  Location: [facility];  Service: General;  Laterality: N/A;    LAPAROTOMY, EXPLORATORY  06/2015    LITHOTRIPSY Left 04/2021    LITHOTRIPSY Left 03/2021    MEDIPORT INSERTION, SINGLE  06/2021    MEDIPORT INSERTION, SINGLE  07/2020    MEDIPORT INSERTION, SINGLE  12/2018    MEDIPORT REMOVAL  07/2020    PHACOEMULSIFICATION OF CATARACT Left 12/2016     PHACOEMULSIFICATION OF CATARACT Right 11/2016    PICC line Removal Right 06/2021    POLYPECTOMY  02/2022    PVD surgery      REMOVAL OF CATHETER  12/2020    REMOVAL OF VASCULAR ACCESS CATHETER Right 4/24/2024    Procedure: Removal, Vascular Access Catheter;  Surgeon: Reed Ghosh MD;  Location: [facility];  Service: General;  Laterality: Right;    SHOULDER SURGERY Right     shoulder replacement    SPHINCTEROTOMY OF URETHRA      VENTRAL HERNIA REPAIR  04/2016       Time Tracking:     PT Received On: 10/21/24  PT Start Time: 0807     PT Stop Time: 0827  PT Total Time (min): 20 min     Billable Minutes: Evaluation 20 minutes      10/21/2024

## 2024-10-21 NOTE — PLAN OF CARE
Problem: Adult Inpatient Plan of Care  Goal: Plan of Care Review  Outcome: Progressing  Goal: Patient-Specific Goal (Individualized)  Outcome: Progressing  Goal: Absence of Hospital-Acquired Illness or Injury  Outcome: Met  Goal: Optimal Comfort and Wellbeing  Outcome: Met  Goal: Readiness for Transition of Care  Outcome: Met     Problem: Infection  Goal: Absence of Infection Signs and Symptoms  Outcome: Progressing     Problem: Wound  Goal: Optimal Coping  Outcome: Progressing  Goal: Optimal Functional Ability  Outcome: Progressing  Goal: Absence of Infection Signs and Symptoms  Outcome: Progressing  Goal: Improved Oral Intake  Outcome: Progressing  Goal: Optimal Pain Control and Function  Outcome: Met  Goal: Skin Health and Integrity  Outcome: Progressing  Goal: Optimal Wound Healing  Outcome: Progressing     Problem: Pain Acute  Goal: Optimal Pain Control and Function  Outcome: Progressing     Problem: Fall Injury Risk  Goal: Absence of Fall and Fall-Related Injury  Outcome: Progressing     Problem: Pneumonia  Goal: Fluid Balance  Outcome: Progressing  Goal: Resolution of Infection Signs and Symptoms  Outcome: Progressing  Goal: Effective Oxygenation and Ventilation  Outcome: Progressing

## 2024-10-21 NOTE — PLAN OF CARE
10/21/24 0912   Discharge Reassessment   Assessment Type Discharge Planning Reassessment   Discharge Plan discussed with: Patient   Discharge Plan A Home Health   Discharge Plan B Home Health   Post-Acute Status   Post-Acute Authorization Home Health;IV Infusion   Home Health Status Referrals Sent  (Resume with [facility])   IV Infusion Status Set-up Complete/Auth obtained  (TPN resumption order sent to [facility])

## 2024-10-22 ENCOUNTER — PATIENT OUTREACH (OUTPATIENT)
Dept: ADMINISTRATIVE | Facility: CLINIC | Age: 79
End: 2024-10-22
Payer: MEDICARE

## 2024-10-22 LAB — PATH REV: NORMAL

## 2024-10-22 NOTE — PROGRESS NOTES
C3 nurse spoke with Laura Acosta  for a TCC post hospital discharge follow up call. The patient does not have a scheduled HOSFU appointment with Curt Felton MD  within 5-7 days post hospital discharge date 10/21/24. C3 nurse was unable to schedule HOSFU appointment in [facility].    Message sent to PCP staff requesting they contact patient and schedule follow up appointment.

## 2024-10-23 ENCOUNTER — LAB REQUISITION (OUTPATIENT)
Dept: LAB | Facility: HOSPITAL | Age: 79
End: 2024-10-23
Payer: MEDICARE

## 2024-10-23 DIAGNOSIS — Z86.73 PERSONAL HISTORY OF TRANSIENT ISCHEMIC ATTACK (TIA), AND CEREBRAL INFARCTION WITHOUT RESIDUAL DEFICITS: ICD-10-CM

## 2024-10-23 DIAGNOSIS — Z51.81 ENCOUNTER FOR THERAPEUTIC DRUG LEVEL MONITORING: ICD-10-CM

## 2024-10-23 LAB
BACTERIA BLD CULT: NORMAL
BACTERIA BLD CULT: NORMAL
INR PPP: 1.7
PROTHROMBIN TIME: 20.4 SECONDS (ref 12.5–14.5)

## 2024-10-23 PROCEDURE — 85610 PROTHROMBIN TIME: CPT | Performed by: INTERNAL MEDICINE

## 2024-10-23 NOTE — PHYSICIAN QUERY
Due to the conflicting clinical picture, please clinically validate the diagnosis of sepsis. If validated, please provide additional clinical support for the diagnosis:    The condition is not confirmed and/or it has been ruled out

## 2024-10-24 PROCEDURE — 87522 HEPATITIS C REVRS TRNSCRPJ: CPT | Performed by: FAMILY MEDICINE

## 2024-10-25 ENCOUNTER — LAB REQUISITION (OUTPATIENT)
Dept: LAB | Facility: HOSPITAL | Age: 79
End: 2024-10-25
Payer: MEDICARE

## 2024-10-25 DIAGNOSIS — Z51.81 ENCOUNTER FOR THERAPEUTIC DRUG LEVEL MONITORING: ICD-10-CM

## 2024-10-25 DIAGNOSIS — Z79.899 OTHER LONG TERM (CURRENT) DRUG THERAPY: ICD-10-CM

## 2024-10-25 LAB
INR PPP: 2
PROTHROMBIN TIME: 22.3 SECONDS (ref 12.5–14.5)

## 2024-10-25 PROCEDURE — 85610 PROTHROMBIN TIME: CPT | Performed by: INTERNAL MEDICINE

## 2024-11-07 ENCOUNTER — HOSPITAL ENCOUNTER (INPATIENT)
Facility: HOSPITAL | Age: 79
LOS: 13 days | Discharge: HOME HEALTH | DRG: 314 | End: 2024-11-20
Attending: EMERGENCY MEDICINE | Admitting: INTERNAL MEDICINE
Payer: MEDICARE

## 2024-11-07 DIAGNOSIS — K55.9: ICD-10-CM

## 2024-11-07 DIAGNOSIS — R09.02 HYPOXIA: ICD-10-CM

## 2024-11-07 DIAGNOSIS — R68.83 CHILLS: ICD-10-CM

## 2024-11-07 DIAGNOSIS — R53.1 GENERALIZED WEAKNESS: ICD-10-CM

## 2024-11-07 DIAGNOSIS — J18.9 PNEUMONIA OF RIGHT LOWER LOBE DUE TO INFECTIOUS ORGANISM: Primary | ICD-10-CM

## 2024-11-07 DIAGNOSIS — I38 ENDOCARDITIS: ICD-10-CM

## 2024-11-07 DIAGNOSIS — R06.09 DYSPNEA ON EXERTION: ICD-10-CM

## 2024-11-07 DIAGNOSIS — B95.8 STAPHYLOCOCCUS INFECTION: ICD-10-CM

## 2024-11-07 DIAGNOSIS — C50.919 MALIGNANT NEOPLASM OF FEMALE BREAST, UNSPECIFIED ESTROGEN RECEPTOR STATUS, UNSPECIFIED LATERALITY, UNSPECIFIED SITE OF BREAST: ICD-10-CM

## 2024-11-07 DIAGNOSIS — B99.9 RECURRENT INFECTIONS: ICD-10-CM

## 2024-11-07 LAB
ALBUMIN SERPL-MCNC: 3.4 G/DL (ref 3.4–4.8)
ALBUMIN/GLOB SERPL: 0.7 RATIO (ref 1.1–2)
ALP SERPL-CCNC: 163 UNIT/L (ref 40–150)
ALT SERPL-CCNC: 37 UNIT/L (ref 0–55)
ANION GAP SERPL CALC-SCNC: 9 MEQ/L
AST SERPL-CCNC: 42 UNIT/L (ref 5–34)
BASOPHILS # BLD AUTO: 0.03 X10(3)/MCL
BASOPHILS NFR BLD AUTO: 0.3 %
BILIRUB SERPL-MCNC: 0.7 MG/DL
BUN SERPL-MCNC: 16.2 MG/DL (ref 9.8–20.1)
CALCIUM SERPL-MCNC: 9.5 MG/DL (ref 8.4–10.2)
CHLORIDE SERPL-SCNC: 106 MMOL/L (ref 98–107)
CO2 SERPL-SCNC: 24 MMOL/L (ref 23–31)
CREAT SERPL-MCNC: 0.82 MG/DL (ref 0.55–1.02)
CREAT/UREA NIT SERPL: 20
EOSINOPHIL # BLD AUTO: 0.07 X10(3)/MCL (ref 0–0.9)
EOSINOPHIL NFR BLD AUTO: 0.7 %
ERYTHROCYTE [DISTWIDTH] IN BLOOD BY AUTOMATED COUNT: 14.8 % (ref 11.5–17)
GFR SERPLBLD CREATININE-BSD FMLA CKD-EPI: >60 ML/MIN/1.73/M2
GLOBULIN SER-MCNC: 4.8 GM/DL (ref 2.4–3.5)
GLUCOSE SERPL-MCNC: 120 MG/DL (ref 82–115)
HCT VFR BLD AUTO: 42.2 % (ref 37–47)
HGB BLD-MCNC: 13.5 G/DL (ref 12–16)
IMM GRANULOCYTES # BLD AUTO: 0.05 X10(3)/MCL (ref 0–0.04)
IMM GRANULOCYTES NFR BLD AUTO: 0.5 %
LACTATE SERPL-SCNC: 1.6 MMOL/L (ref 0.5–2.2)
LYMPHOCYTES # BLD AUTO: 0.72 X10(3)/MCL (ref 0.6–4.6)
LYMPHOCYTES NFR BLD AUTO: 7.5 %
MCH RBC QN AUTO: 28.5 PG (ref 27–31)
MCHC RBC AUTO-ENTMCNC: 32 G/DL (ref 33–36)
MCV RBC AUTO: 89.2 FL (ref 80–94)
MONOCYTES # BLD AUTO: 0.61 X10(3)/MCL (ref 0.1–1.3)
MONOCYTES NFR BLD AUTO: 6.3 %
MRSA PCR SCRN (OHS): NOT DETECTED
NEUTROPHILS # BLD AUTO: 8.15 X10(3)/MCL (ref 2.1–9.2)
NEUTROPHILS NFR BLD AUTO: 84.7 %
NRBC BLD AUTO-RTO: 0 %
PLATELET # BLD AUTO: 170 X10(3)/MCL (ref 130–400)
PMV BLD AUTO: 9.3 FL (ref 7.4–10.4)
POTASSIUM SERPL-SCNC: 4.3 MMOL/L (ref 3.5–5.1)
PROT SERPL-MCNC: 8.2 GM/DL (ref 5.8–7.6)
RBC # BLD AUTO: 4.73 X10(6)/MCL (ref 4.2–5.4)
SODIUM SERPL-SCNC: 139 MMOL/L (ref 136–145)
WBC # BLD AUTO: 9.63 X10(3)/MCL (ref 4.5–11.5)

## 2024-11-07 PROCEDURE — 25000003 PHARM REV CODE 250: Performed by: EMERGENCY MEDICINE

## 2024-11-07 PROCEDURE — 87641 MR-STAPH DNA AMP PROBE: CPT | Performed by: EMERGENCY MEDICINE

## 2024-11-07 PROCEDURE — 99285 EMERGENCY DEPT VISIT HI MDM: CPT | Mod: 25

## 2024-11-07 PROCEDURE — 11000001 HC ACUTE MED/SURG PRIVATE ROOM

## 2024-11-07 PROCEDURE — 25500020 PHARM REV CODE 255: Performed by: EMERGENCY MEDICINE

## 2024-11-07 PROCEDURE — 21400001 HC TELEMETRY ROOM

## 2024-11-07 PROCEDURE — 85610 PROTHROMBIN TIME: CPT | Performed by: INTERNAL MEDICINE

## 2024-11-07 PROCEDURE — 96375 TX/PRO/DX INJ NEW DRUG ADDON: CPT

## 2024-11-07 PROCEDURE — 87077 CULTURE AEROBIC IDENTIFY: CPT | Performed by: NURSE PRACTITIONER

## 2024-11-07 PROCEDURE — 80053 COMPREHEN METABOLIC PANEL: CPT | Performed by: NURSE PRACTITIONER

## 2024-11-07 PROCEDURE — 83605 ASSAY OF LACTIC ACID: CPT | Performed by: NURSE PRACTITIONER

## 2024-11-07 PROCEDURE — 85025 COMPLETE CBC W/AUTO DIFF WBC: CPT | Performed by: NURSE PRACTITIONER

## 2024-11-07 PROCEDURE — 63600175 PHARM REV CODE 636 W HCPCS: Performed by: EMERGENCY MEDICINE

## 2024-11-07 PROCEDURE — 87154 CUL TYP ID BLD PTHGN 6+ TRGT: CPT | Performed by: NURSE PRACTITIONER

## 2024-11-07 PROCEDURE — 87086 URINE CULTURE/COLONY COUNT: CPT | Performed by: FAMILY MEDICINE

## 2024-11-07 PROCEDURE — 36415 COLL VENOUS BLD VENIPUNCTURE: CPT | Performed by: INTERNAL MEDICINE

## 2024-11-07 PROCEDURE — 96365 THER/PROPH/DIAG IV INF INIT: CPT

## 2024-11-07 RX ORDER — BUSPIRONE HYDROCHLORIDE 5 MG/1
20 TABLET ORAL 2 TIMES DAILY
Status: DISCONTINUED | OUTPATIENT
Start: 2024-11-07 | End: 2024-11-11

## 2024-11-07 RX ORDER — ACETAMINOPHEN 325 MG/1
650 TABLET ORAL EVERY 8 HOURS PRN
Status: DISCONTINUED | OUTPATIENT
Start: 2024-11-07 | End: 2024-11-07

## 2024-11-07 RX ORDER — ACETAMINOPHEN 10 MG/ML
1000 INJECTION, SOLUTION INTRAVENOUS ONCE
Status: COMPLETED | OUTPATIENT
Start: 2024-11-07 | End: 2024-11-07

## 2024-11-07 RX ORDER — CITALOPRAM 20 MG/1
40 TABLET, FILM COATED ORAL DAILY
Status: DISCONTINUED | OUTPATIENT
Start: 2024-11-08 | End: 2024-11-20 | Stop reason: HOSPADM

## 2024-11-07 RX ORDER — METOPROLOL TARTRATE 25 MG/1
25 TABLET, FILM COATED ORAL 2 TIMES DAILY
Status: DISCONTINUED | OUTPATIENT
Start: 2024-11-07 | End: 2024-11-08

## 2024-11-07 RX ORDER — MUPIROCIN 20 MG/G
OINTMENT TOPICAL 2 TIMES DAILY
Status: COMPLETED | OUTPATIENT
Start: 2024-11-08 | End: 2024-11-12

## 2024-11-07 RX ORDER — ACETAMINOPHEN 325 MG/1
650 TABLET ORAL EVERY 8 HOURS PRN
Status: DISCONTINUED | OUTPATIENT
Start: 2024-11-08 | End: 2024-11-07

## 2024-11-07 RX ORDER — TALC
6 POWDER (GRAM) TOPICAL NIGHTLY PRN
Status: DISCONTINUED | OUTPATIENT
Start: 2024-11-07 | End: 2024-11-20 | Stop reason: HOSPADM

## 2024-11-07 RX ORDER — ACETAMINOPHEN 325 MG/1
650 TABLET ORAL EVERY 8 HOURS PRN
Status: DISCONTINUED | OUTPATIENT
Start: 2024-11-07 | End: 2024-11-20 | Stop reason: HOSPADM

## 2024-11-07 RX ORDER — SODIUM CHLORIDE 0.9 % (FLUSH) 0.9 %
10 SYRINGE (ML) INJECTION
Status: DISCONTINUED | OUTPATIENT
Start: 2024-11-07 | End: 2024-11-20 | Stop reason: HOSPADM

## 2024-11-07 RX ORDER — WARFARIN 2.5 MG/1
2.5 TABLET ORAL DAILY
Status: DISCONTINUED | OUTPATIENT
Start: 2024-11-08 | End: 2024-11-11

## 2024-11-07 RX ORDER — MIRTAZAPINE 15 MG/1
15 TABLET, FILM COATED ORAL NIGHTLY
Status: DISCONTINUED | OUTPATIENT
Start: 2024-11-07 | End: 2024-11-20 | Stop reason: HOSPADM

## 2024-11-07 RX ORDER — VANCOMYCIN HCL IN 5 % DEXTROSE 1G/250ML
1000 PLASTIC BAG, INJECTION (ML) INTRAVENOUS
Status: DISCONTINUED | OUTPATIENT
Start: 2024-11-07 | End: 2024-11-07

## 2024-11-07 RX ORDER — CEFEPIME HYDROCHLORIDE 2 G/1
2 INJECTION, POWDER, FOR SOLUTION INTRAVENOUS
Status: COMPLETED | OUTPATIENT
Start: 2024-11-07 | End: 2024-11-07

## 2024-11-07 RX ORDER — CEFEPIME HYDROCHLORIDE 2 G/1
2 INJECTION, POWDER, FOR SOLUTION INTRAVENOUS
Status: DISCONTINUED | OUTPATIENT
Start: 2024-11-08 | End: 2024-11-08

## 2024-11-07 RX ORDER — ACETAMINOPHEN 325 MG/1
650 TABLET ORAL EVERY 4 HOURS PRN
Status: DISCONTINUED | OUTPATIENT
Start: 2024-11-07 | End: 2024-11-12

## 2024-11-07 RX ADMIN — IOHEXOL 100 ML: 350 INJECTION, SOLUTION INTRAVENOUS at 08:11

## 2024-11-07 RX ADMIN — CEFEPIME 2 G: 2 INJECTION, POWDER, FOR SOLUTION INTRAVENOUS at 09:11

## 2024-11-07 RX ADMIN — AZITHROMYCIN MONOHYDRATE 500 MG: 500 INJECTION, POWDER, LYOPHILIZED, FOR SOLUTION INTRAVENOUS at 09:11

## 2024-11-07 RX ADMIN — ACETAMINOPHEN 1000 MG: 10 INJECTION, SOLUTION INTRAVENOUS at 08:11

## 2024-11-07 NOTE — Clinical Note
Diagnosis: Hypoxia [644750]   Future Attending Provider: STAN ORNELAS [[id number]]   Admit to which facility:: [facility] [[id number]]   Reason for IP Medical Treatment  (Clinical interventions that can only be accomplished in the IP setting? ) :: hypoxia   Plans for Post-Acute care--if anticipated (pick the single best option):: A. No post acute care anticipated at this time

## 2024-11-07 NOTE — FIRST PROVIDER EVALUATION
"Medical screening examination initiated.  I have conducted a focused provider triage encounter, findings are as follows:    Brief history of present illness:  Patient states fever and recent diagnosis of UTI and pneumonia.     Vitals:    11/07/24 1553   BP: (!) 147/69   Pulse: 80   Resp: 18   Temp: 98.3 °F (36.8 °C)   TempSrc: Oral   SpO2: 95%   Weight: 74.8 kg (165 lb)   Height: 5' 4" (1.626 m)       Pertinent physical exam:  Awake, alert, ambulatory      Brief workup plan:  Labs, Imaging    Preliminary workup initiated; this workup will be continued and followed by the physician or advanced practice provider that is assigned to the patient when roomed.  "

## 2024-11-08 LAB
ACB COMPLEX DNA BLD POS QL NAA+NON-PROBE: NOT DETECTED
ALBUMIN SERPL-MCNC: 2.6 G/DL (ref 3.4–4.8)
ALBUMIN/GLOB SERPL: 0.7 RATIO (ref 1.1–2)
ALP SERPL-CCNC: 131 UNIT/L (ref 40–150)
ALT SERPL-CCNC: 27 UNIT/L (ref 0–55)
ANION GAP SERPL CALC-SCNC: 7 MEQ/L
AST SERPL-CCNC: 44 UNIT/L (ref 5–34)
B FRAGILIS DNA BLD POS QL NAA+PROBE: NOT DETECTED
B PERT.PT PRMT NPH QL NAA+NON-PROBE: NOT DETECTED
BASOPHILS # BLD AUTO: 0.04 X10(3)/MCL
BASOPHILS NFR BLD AUTO: 0.3 %
BILIRUB SERPL-MCNC: 0.6 MG/DL
BUN SERPL-MCNC: 13.5 MG/DL (ref 9.8–20.1)
C ALBICANS DNA BLD POS QL NAA+PROBE: NOT DETECTED
C AURIS DNA BLD POS QL NAA+NON-PROBE: NOT DETECTED
C GATTII+NEOFOR DNA CSF QL NAA+NON-PROBE: NOT DETECTED
C GLABRATA DNA BLD POS QL NAA+PROBE: NOT DETECTED
C KRUSEI DNA BLD POS QL NAA+PROBE: NOT DETECTED
C PARAP DNA BLD POS QL NAA+PROBE: NOT DETECTED
C PNEUM DNA NPH QL NAA+NON-PROBE: NOT DETECTED
C TROPICLS DNA BLD POS QL NAA+PROBE: NOT DETECTED
CALCIUM SERPL-MCNC: 7.5 MG/DL (ref 8.4–10.2)
CHLORIDE SERPL-SCNC: 101 MMOL/L (ref 98–107)
CO2 SERPL-SCNC: 19 MMOL/L (ref 23–31)
COLISTIN RES MCR-1 ISLT/SPM QL: ABNORMAL
CREAT SERPL-MCNC: 0.84 MG/DL (ref 0.55–1.02)
CREAT/UREA NIT SERPL: 16
E CLOAC COMP DNA BLD POS QL NAA+PROBE: NOT DETECTED
E COLI DNA BLD POS QL NAA+PROBE: NOT DETECTED
E FAECALIS+OTHR E SP RRNA BLD POS FISH: NOT DETECTED
E FAECIUM HSP60 BLD POS QL PROBE: NOT DETECTED
ENTEROBACTERALES DNA BLD POS NAA+N-PRB: NOT DETECTED
EOSINOPHIL # BLD AUTO: 0.05 X10(3)/MCL (ref 0–0.9)
EOSINOPHIL NFR BLD AUTO: 0.4 %
ERYTHROCYTE [DISTWIDTH] IN BLOOD BY AUTOMATED COUNT: 15 % (ref 11.5–17)
ESBL CFT TO CFT-CLAV IC RTO BD POS IMP: ABNORMAL
GFR SERPLBLD CREATININE-BSD FMLA CKD-EPI: >60 ML/MIN/1.73/M2
GLOBULIN SER-MCNC: 3.5 GM/DL (ref 2.4–3.5)
GLUCOSE SERPL-MCNC: 472 MG/DL (ref 82–115)
GP B STREP DNA CSF QL NAA+NON-PROBE: NOT DETECTED
HADV DNA NPH QL NAA+NON-PROBE: NOT DETECTED
HAEM INFLU DNA CSF QL NAA+NON-PROBE: NOT DETECTED
HCOV 229E RNA NPH QL NAA+NON-PROBE: NOT DETECTED
HCOV HKU1 RNA NPH QL NAA+NON-PROBE: NOT DETECTED
HCOV NL63 RNA NPH QL NAA+NON-PROBE: NOT DETECTED
HCOV OC43 RNA NPH QL NAA+NON-PROBE: NOT DETECTED
HCT VFR BLD AUTO: 34.1 % (ref 37–47)
HGB BLD-MCNC: 10.8 G/DL (ref 12–16)
HMPV RNA NPH QL NAA+NON-PROBE: NOT DETECTED
HPIV1 RNA NPH QL NAA+NON-PROBE: NOT DETECTED
HPIV2 RNA NPH QL NAA+NON-PROBE: NOT DETECTED
HPIV3 RNA NPH QL NAA+NON-PROBE: NOT DETECTED
HPIV4 RNA NPH QL NAA+NON-PROBE: NOT DETECTED
IMM GRANULOCYTES # BLD AUTO: 0.04 X10(3)/MCL (ref 0–0.04)
IMM GRANULOCYTES NFR BLD AUTO: 0.3 %
IMP CARBAPENEMASE ISLT QL IA.RAPID: ABNORMAL
INR PPP: 2.1
K OXYTOCA OMPA BLD POS QL PROBE: NOT DETECTED
KLEBSIELLA SP DNA BLD POS QL NAA+NON-PRB: NOT DETECTED
KLEBSIELLA SP DNA BLD POS QL NAA+NON-PRB: NOT DETECTED
KPC CARBAPENEMASE ISLT QL IA.RAPID: ABNORMAL
L MONOCYTOG DNA CSF QL NAA+NON-PROBE: NOT DETECTED
LYMPHOCYTES # BLD AUTO: 2.42 X10(3)/MCL (ref 0.6–4.6)
LYMPHOCYTES NFR BLD AUTO: 18.8 %
M PNEUMO DNA NPH QL NAA+NON-PROBE: NOT DETECTED
MCH RBC QN AUTO: 29.1 PG (ref 27–31)
MCHC RBC AUTO-ENTMCNC: 31.7 G/DL (ref 33–36)
MCV RBC AUTO: 91.9 FL (ref 80–94)
MECA+MECC NOSE QL NAA+PROBE: NOT DETECTED
MECA+MECC+MREJ ISLT/SPM QL: ABNORMAL
MONOCYTES # BLD AUTO: 1.29 X10(3)/MCL (ref 0.1–1.3)
MONOCYTES NFR BLD AUTO: 10 %
N MEN DNA CSF QL NAA+NON-PROBE: NOT DETECTED
NDM CARBAPENEMASE ISLT QL IA.RAPID: ABNORMAL
NEUTROPHILS # BLD AUTO: 9.03 X10(3)/MCL (ref 2.1–9.2)
NEUTROPHILS NFR BLD AUTO: 70.2 %
NRBC BLD AUTO-RTO: 0 %
OXA-48-LIKE CRBPNASE ISLT QL IA.RAPID: ABNORMAL
P AERUGINOSA DNA BLD POS QL NAA+PROBE: NOT DETECTED
PLATELET # BLD AUTO: 146 X10(3)/MCL (ref 130–400)
PMV BLD AUTO: 9.7 FL (ref 7.4–10.4)
POCT GLUCOSE: 144 MG/DL (ref 70–110)
POTASSIUM SERPL-SCNC: 3.2 MMOL/L (ref 3.5–5.1)
PROT SERPL-MCNC: 6.1 GM/DL (ref 5.8–7.6)
PROTEUS SP DNA BLD POS QL NAA+PROBE: NOT DETECTED
PROTHROMBIN TIME: 22.8 SECONDS (ref 12.5–14.5)
RBC # BLD AUTO: 3.71 X10(6)/MCL (ref 4.2–5.4)
RSV RNA NPH QL NAA+NON-PROBE: NOT DETECTED
RV+EV RNA NPH QL NAA+NON-PROBE: NOT DETECTED
S AUREUS DNA BLD POS QL NAA+PROBE: NOT DETECTED
S ENT+BONG DNA STL QL NAA+NON-PROBE: NOT DETECTED
S EPIDERMIDIS HSP60 BLD POS QL PROBE: DETECTED
S LUGDUNENSIS SODA BLD POS QL PROBE: NOT DETECTED
S MALTOPH DNA BLD POS QL NAA+PROBE: NOT DETECTED
S MARCESCENS DNA BLD POS QL NAA+PROBE: NOT DETECTED
S PNEUM DNA CSF QL NAA+NON-PROBE: NOT DETECTED
S PYOGENES HSP60 BLD POS QL PROBE: NOT DETECTED
SODIUM SERPL-SCNC: 127 MMOL/L (ref 136–145)
STAPH SP TUF BLD POS QL PROBE: DETECTED
STREPTOCOCCUS SP TUF BLD POS QL PROBE: NOT DETECTED
VAN(A+B+C1+C2) GENES ISLT/SPM: ABNORMAL
VIM CARBAPENEMASE ISLT QL IA.RAPID: ABNORMAL
WBC # BLD AUTO: 12.87 X10(3)/MCL (ref 4.5–11.5)

## 2024-11-08 PROCEDURE — 25000003 PHARM REV CODE 250: Performed by: INTERNAL MEDICINE

## 2024-11-08 PROCEDURE — 87632 RESP VIRUS 6-11 TARGETS: CPT | Performed by: INTERNAL MEDICINE

## 2024-11-08 PROCEDURE — 87184 SC STD DISK METHOD PER PLATE: CPT | Performed by: INTERNAL MEDICINE

## 2024-11-08 PROCEDURE — 80053 COMPREHEN METABOLIC PANEL: CPT | Performed by: INTERNAL MEDICINE

## 2024-11-08 PROCEDURE — 63600175 PHARM REV CODE 636 W HCPCS: Performed by: INTERNAL MEDICINE

## 2024-11-08 PROCEDURE — 36415 COLL VENOUS BLD VENIPUNCTURE: CPT | Performed by: INTERNAL MEDICINE

## 2024-11-08 PROCEDURE — 21400001 HC TELEMETRY ROOM

## 2024-11-08 PROCEDURE — 85025 COMPLETE CBC W/AUTO DIFF WBC: CPT | Performed by: INTERNAL MEDICINE

## 2024-11-08 RX ORDER — CEFEPIME HYDROCHLORIDE 2 G/1
2 INJECTION, POWDER, FOR SOLUTION INTRAVENOUS
Status: DISCONTINUED | OUTPATIENT
Start: 2024-11-08 | End: 2024-11-09

## 2024-11-08 RX ADMIN — BUSPIRONE HYDROCHLORIDE 20 MG: 5 TABLET ORAL at 08:11

## 2024-11-08 RX ADMIN — CEFEPIME 2 G: 2 INJECTION, POWDER, FOR SOLUTION INTRAVENOUS at 08:11

## 2024-11-08 RX ADMIN — MIRTAZAPINE 15 MG: 15 TABLET, FILM COATED ORAL at 08:11

## 2024-11-08 RX ADMIN — CITALOPRAM HYDROBROMIDE 40 MG: 20 TABLET ORAL at 08:11

## 2024-11-08 RX ADMIN — CEFEPIME 2 G: 2 INJECTION, POWDER, FOR SOLUTION INTRAVENOUS at 12:11

## 2024-11-08 RX ADMIN — WARFARIN SODIUM 2.5 MG: 2.5 TABLET ORAL at 04:11

## 2024-11-08 RX ADMIN — VANCOMYCIN HYDROCHLORIDE 1750 MG: 750 INJECTION, POWDER, LYOPHILIZED, FOR SOLUTION INTRAVENOUS at 02:11

## 2024-11-08 RX ADMIN — MIRTAZAPINE 15 MG: 15 TABLET, FILM COATED ORAL at 12:11

## 2024-11-08 RX ADMIN — BUSPIRONE HYDROCHLORIDE 20 MG: 5 TABLET ORAL at 12:11

## 2024-11-08 RX ADMIN — MUPIROCIN: 20 OINTMENT TOPICAL at 08:11

## 2024-11-08 NOTE — PROGRESS NOTES
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  She was had episodes of recurrent fevers in the past associated with MSSA bacteremia from an infected retained MediPort which was ultimately removed in April of 2024.  In October of 2024 she had recurrent fevers as well in the setting of COVID-19 and community-acquired pneumonia was briefly hospitalized that time with overall unremarkable workup and cultures.     Tonight she presents to the ER again reporting fevers over the last few days as well as generalized weakness with temperatures at home as high as 102.  This is despite recently being placed on outpatient antibiotics for community-acquired pneumonia per her PCP.  She arrived afebrile and hemodynamically stable initially satting well on room air but ultimately required 2 L due to sats dropping to the low 90s.  Laboratory work was overall unremarkable and CT chest abdomen and pelvis showed a right lower lung new opacity as well as excessive fluid within the small bowel possibly representing enterocolitis.  Broad-spectrum antibiotics were initiated and hospitalist was consulted for admission."    Today: Patient seen and examined at bedside, and chart reviewed.  Patient remains afebrile, though blood cultures have noted 1/2 sets positive for staph, PCR noting epidermidis.  Reports cough, but denies SOB.        MEDICATIONS   Scheduled   busPIRone  20 mg Oral BID    ceFEPime IV (PEDS " and ADULTS)  2 g Intravenous Q12H    citalopram  40 mg Oral Daily    mirtazapine  15 mg Oral QHS    mupirocin   Nasal BID    [START ON 11/9/2024] vancomycin (VANCOCIN) IV (PEDS and ADULTS)  1,500 mg Intravenous Q24H    warfarin  2.5 mg Oral Daily     Continuous Infusions        PHYSICAL EXAM   VITALS: T 98.1 °F (36.7 °C)   /73   P 72   RR 16   O2 97 %    GENERAL: Awake and in NAD  LUNGS: CTA B/L  CVS: Normal rate  GI/: Soft, NT, bowel sounds positive.  EXTREMITIES: No LE edema  NEURO: AAOx3  PSYCH: Cooperative      LABS   CBC  Recent Labs     11/07/24  1742 11/08/24  0342   WBC 9.63 12.87*   RBC 4.73 3.71*   HGB 13.5 10.8*   HCT 42.2 34.1*   MCV 89.2 91.9   MCH 28.5 29.1   MCHC 32.0* 31.7*   RDW 14.8 15.0    146     CHEM  Recent Labs     11/07/24 1742 11/08/24 0342    127*   K 4.3 3.2*   CO2 24 19*   BUN 16.2 13.5   CREATININE 0.82 0.84   GLUCOSE 120* 472*   CALCIUM 9.5 7.5*   ALBUMIN 3.4 2.6*   GLOBULIN 4.8* 3.5   ALKPHOS 163* 131   ALT 37 27   AST 42* 44*   BILITOT 0.7 0.6         MICROBIOLOGY     Microbiology Results (last 7 days)       Procedure Component Value Units Date/Time    BCID2 Panel [[id number]]  (Abnormal) Collected: 11/07/24 1742    Order Status: Completed Specimen: Blood Updated: 11/08/24 1520     CTX-M (ESBL ) N/A     IMP (Cabapenemase ) N/A     KPC resistance gene (Carbapenemase ) N/A     mcr-1 N/A     mecA ID Not Detected     Comment: Note: Antimicrobial resistance can occur via multiple mechanisms. A Not Detected result for antimicrobial resistance gene(s) does not indicate antimicrobial susceptibility. Subculturing is required for species identification and susceptibility testing of   isolates.        mecA/C and MREJ (MRSA) gene N/A     NDM (Carbapenemase ) N/A     OXA-48-like (Carbapenemase ) N/A     Maria Luisa/B (VRE gene) N/A     VIM (Carbapenemase ) N/A     Enterococcus faecalis Not Detected     Enterococcus faecium Not  Detected     Listeria monocytogenes Not Detected     Staphylococcus spp. Detected     Staphylococcus aureus Not Detected     Staphylococcus epidermidis Detected     Staphylococcus lugdunensis Not Detected     Streptococcus spp. Not Detected     Streptococcus agalactiae (Group B) Not Detected     Streptococcus pneumoniae Not Detected     Streptococcus pyogenes (Group A) Not Detected     Acinetobacter calcoaceticus/baumannii complex Not Detected     Bacteroides fragilis Not Detected     Enterobacterales Not Detected     Enterobacter cloacae complex Not Detected     Escherichia coli Not Detected     Klebsiella aerogenes Not Detected     Klebsiella oxytoca Not Detected     Klebsiella pneumoniae group Not Detected     Proteus spp. Not Detected     Salmonella spp. Not Detected     Serratia marcescens Not Detected     Haemophilus influenzae Not Detected     Neisseria meningitidis Not Detected     Pseudomonas aeruginosa Not Detected     Stenotrophomonas maltophilia Not Detected     Candida albicans Not Detected     Candida auris Not Detected     Candida glabrata Not Detected     Candida krusei Not Detected     Candida parapsilosis Not Detected     Candida tropicalis Not Detected     Cryptococcus neoformans/gattii Not Detected    Narrative:      The [employer] BCID2 Panel is a multiplexed nucleic acid test intended for the use with [employer]® 2.0 or [employer]® [employer] for the simultaneous qualitative detection and identification of multiple bacterial and yeast nucleic acids and select genetic determinants associated with antimicrobial resistance.  The [employer] BCID2 Panel test is performed directly on blood culture samples identified as positive by a continuous monitoring blood culture system.  Results are intended to be interpreted in conjunction with Gram stain results.    Blood Culture [[id number]]  (Abnormal) Collected: 11/07/24 7329    Order Status: Completed Specimen: Blood Updated: 11/08/24 1316      GRAM STAIN Gram Positive Cocci, probable Staphylococcus      Seen in gram stain of broth only      1 of 1 Pediatric bottle positive    Respiratory Culture [[id number]]     Order Status: Sent Specimen: Sputum, Expectorated     Blood Culture [[id number]] Collected: 11/07/24 1742    Order Status: Resulted Specimen: Blood Updated: 11/07/24 9297              DIAGNOSTICS   CT Chest Abdomen Pelvis With IV Contrast (XPD) NO Oral Contrast  Narrative:   CHEST FINDINGS:  Bilateral lungs are remarkable for peripheral subpleural fine to coarse reticulations consistent with fibrosis.  Previous exam was remarkable for right middle lung lobe and lingular segment some infiltrates which show interval improvement.  There are subtle new vague opacities which involve the right right lower lung lobe on image 70 series 4 and may be inflammatory or infectious.  There is no pulmonary edema.  No cavitary abnormality.  No fluid within the pleural and pericardial spaces.  Thoracic aorta is without aneurysmal dilatation and there are no signs of dissection.  Cardiac chamber size is within normal limits.  Left chest implanted tunnel castro catheter terminates within the superior vena cava.  Right breast medial aspect rim calcified density.  Thoracic kyphoscoliosis and degenerative changes.  ABDOMINAL FINDINGS:  Liver, mildly atrophic pancreas and the spleen are without acute findings.  Gallbladder is surgically absent.  There is no apparent dilatation of ducts.  Adrenals are unremarkable.  Left kidney mild cortical scarring.  Left kidney medial cortical small hypodensity is stable and is favored to be a cyst and for this no specific follow-up imaging is recommended.  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  There are no perinephric strandings.  No retroperitoneal periaortic enlarged lymph nodes.  Stomach is mostly decompressed.  Mild excessive fluid is present within loops of small bowel without transition or  bowel obstruction.  Similarly, there is some excessive fluid within the colon.  Similar appearance of ileocolic anastomosis.  No inflammatory strandings identified.  Extensive noninflamed diverticulosis coli involving the descending and the sigmoid colon.  No bowel obstruction.  No free fluid.  PELVIC FINDINGS:  Urinary bladder wall is not thickened.  No pelvic free fluid.  Thoracolumbar degenerative changes.  Scoliosis.  Impression:   1.  Right lower lung lobe small new vague opacity may be inflammatory or infectious.  Details of other chest findings above.  2.  Mild excessive fluid within loops of small bowel and the right colon could represent enterocolitis.  Details of other abdominopelvic findings above.  Electronically signed by: Isaac Richardson  Date:    11/07/2024  Time:    20:47    X-Ray Chest PA And Lateral  Narrative:   Left chest wall port catheter remains in place.  The heart is normal in size.  There is no focal airspace consolidation.  There is no pleural effusion or visible pneumothorax.  Impression:   No acute abnormality of the chest.  Electronically signed by: Isabel Brasher  Date:    11/07/2024  Time:    16:46        ASSESSMENT   SIRS, possible sepsis s/t bacterial pneumonia  vs  enterocolitis   1 of 2 blood cultures positive for Staph epidermidis  h/o short gut syndrome on home TPN via MediPort  h/o clotting disorder on Coumadin  h/o ischemic bowel s/p major resection      PLAN   Continue to monitor for fever, will trend white count  Repeat blood cultures with AM labs  In the interim will continue broad-spectrum IV antibiotics  Pending repeat culture results, will have to assess whether or not MediPort will need to be removed   Continue AC with home Coumadin, and monitoring of INR    Prophylaxis:  On Coumadin        Matt Bennett MD  [facility]

## 2024-11-08 NOTE — ED PROVIDER NOTES
Encounter Date: 11/7/2024    SCRIBE #1 NOTE: I, Rohit Greenberg, am scribing for, and in the presence of,  Demetria Still MD. I have scribed the following portions of the note - Other sections scribed: HPI, ROS, PE.       History     Chief Complaint   Patient presents with    Fever     C/o fever & weakness x 1 week. Was sent from PCP for dx of UTI. Pt denies abd pain & dysuria. Dx with covid x2 weeks ago, then dx with pneumonia. Not currently on antibiotics. No meds taken today.      78 year old female with pmhx of breast cacner, small bowel syndrome, DVT, HTN, PVD, liver cirrhosis presents to ED for generalized weakness onset this afternoon. Per  pt was unable to sign consent for blood cultures to be drawn due to shaking. Pt states that she has been having about 2-3 episodes per week of generalized weakness and shaking for the past few weeks. Pt states that she has had fever and chills for the past 3 days, tmax 102 F. Pt adds that she is currently on antibiotics for pneumonia, reports dry cough. Pt notes that she receives TPN 3 times per week. Pt state that she finds it difficult to ambulate more than a few feet due to generalized weakness and SOB since being diagnosed with covid 2 weeks ago. Pt denies abdominal pain, chest pain, dysuria, rash, and supplemental O2 at baseline.    The history is provided by the patient. No  was used.     Review of patient's allergies indicates:   Allergen Reactions    Hydromorphone Itching     Other reaction(s): itching    Opium      Other reaction(s): itching  has itching with larger doses. Smaller doses do not cause a reaction.     Past Medical History:   Diagnosis Date    Acute URI     Anxiety and depression     Back pain     Bacteremia 04/29/2024    Breast cancer     Cholelithiasis     Contaminated small bowel syndrome     DVT (deep venous thrombosis)     on coumadin    Edema, lower extremity     Gallstone pancreatitis     Heart murmur     HTN  (hypertension)     Insomnia     Irregular heart beat     Ischemic disease of gut     Kidney stones     Laryngitis     Malabsorption     Malnutrition, unspecified type     Meningitis, unspecified     OA (osteoarthritis)     PVD (peripheral vascular disease)     Rheumatoid arthritis, unspecified     Staph infection     infected mediport -- on doxycycline daily for prevention    Tremor     Unspecified cataract     Unspecified cirrhosis of liver 06/20/2023    Dr Carroll     Past Surgical History:   Procedure Laterality Date    APPENDECTOMY      BOWEL RESECTION      BREAST LUMPECTOMY Right     CHOLECYSTECTOMY  05/2015    COLONOSCOPY  02/2022    repeat 3 years    CYST REMOVAL Right     breast    CYSTOSCOPY W/ STONE MANIPULATION      CYSTOSCOPY W/ URETERAL STENT PLACEMENT  12/2020    CYSTOSCOPY W/ URETERAL STENT REMOVAL  04/2021    CYSTOURETEROSCOPY, WITH HOLMIUM LASER LITHOTRIPSY OF URETERAL CALCULUS AND STENT INSERTION Left 05/02/2023    Procedure: CYSTOSCOPY, WITH URETERAL STENT INSERTION Left;  Surgeon: Jared Felix MD;  Location: [facility];  Service: Urology;  Laterality: Left;    EGD, WITH CLOSED BIOPSY N/A 6/20/2023    Procedure: EGD;  Surgeon: Aashish Carroll MD;  Location: [facility] ENDOSCOPY;  Service: Gastroenterology;  Laterality: N/A;    ENDOSCOPIC RETROGRADE CHOLANGIOPANCREATOGRAPHY W/ SPHINCTEROTOMY AND STONE REMOVAL  04/2015    ESOPHAGOGASTRODUODENOSCOPY  06/20/2023    Dr Carroll - no signs esophageal varicies --repeat 3 yrs    GI Biopsy  02/15/2022    GI Biopsy  12/2018    HYSTERECTOMY      INSERTION OF TUNNELED CENTRAL VENOUS CATHETER (CVC) WITH SUBCUTANEOUS PORT N/A 7/18/2024    Procedure: EMBECOHNP-IHIV-A-CATH;  Surgeon: Ariel Welsh Jr., MD;  Location: [facility];  Service: General;  Laterality: N/A;    LAPAROTOMY, EXPLORATORY  06/2015    LITHOTRIPSY Left 04/2021    LITHOTRIPSY Left 03/2021    MEDIPORT INSERTION, SINGLE  06/2021    MEDIPORT INSERTION, SINGLE  07/2020    MEDIPORT  INSERTION, SINGLE  12/2018    MEDIPORT REMOVAL  07/2020    PHACOEMULSIFICATION OF CATARACT Left 12/2016    PHACOEMULSIFICATION OF CATARACT Right 11/2016    PICC line Removal Right 06/2021    POLYPECTOMY  02/2022    PVD surgery      REMOVAL OF CATHETER  12/2020    REMOVAL OF VASCULAR ACCESS CATHETER Right 4/24/2024    Procedure: Removal, Vascular Access Catheter;  Surgeon: Reed Ghosh MD;  Location: [facility] OR;  Service: General;  Laterality: Right;    SHOULDER SURGERY Right     shoulder replacement    SPHINCTEROTOMY OF URETHRA      VENTRAL HERNIA REPAIR  04/2016     Family History   Problem Relation Name Age of Onset    Pneumonia Mother      Depression Mother      Osteoarthritis Mother      Breast cancer Mother  70 - 75    Uterine cancer Mother  40 - 45    Bone cancer Mother  60 - 69    Heart attack Father      Aneurysm Father          brain    Diabetes Father      Hyperlipidemia Father      Hypertension Father      Hyperlipidemia Sister      Hypertension Sister      Kidney cancer Sister  55 - 56    Osteoarthritis Sister      Breast cancer Sister  52 - 53    Diabetes Mellitus Sister      Heart failure Sister      Kidney disease Sister      Migraines Sister      Arthritis Sister      Arthritis Sister      Parkinsonism Sister      Heart disease Sister      Hyperlipidemia Brother      Hypertension Brother      Coronary artery disease Brother      Coronary artery disease Brother          CABG x3    Hyperlipidemia Brother      Hypertension Brother      Kidney cancer Paternal Grandmother  60 - 69     Social History     Tobacco Use    Smoking status: Never     Passive exposure: Past    Smokeless tobacco: Never   Substance Use Topics    Alcohol use: Yes     Comment: twice a year    Drug use: Never     Review of Systems   Constitutional:  Positive for chills and fever.   HENT:  Negative for congestion and rhinorrhea.    Respiratory:  Positive for cough and shortness of breath.    Cardiovascular:  Negative for chest pain.    Gastrointestinal:  Negative for abdominal pain.   Genitourinary:  Negative for dysuria.   Musculoskeletal:         Generalized weakness   Skin:  Negative for rash.       Physical Exam     Initial Vitals [11/07/24 1553]   BP Pulse Resp Temp SpO2   (!) 147/69 80 18 98.3 °F (36.8 °C) 95 %      MAP       --         Physical Exam    Nursing note and vitals reviewed.  Constitutional: She appears well-developed and well-nourished. She is not diaphoretic. No distress.   HENT:   Head: Normocephalic and atraumatic.   Nose: Nose normal. Mouth/Throat: Oropharynx is clear and moist.   Crusting to lower facial area, does not appear to be drainage   Eyes: Conjunctivae and EOM are normal. Pupils are equal, round, and reactive to light.   Neck: Trachea normal. Neck supple. No tracheal deviation present.   Normal range of motion.  Cardiovascular:  Normal rate, regular rhythm, normal heart sounds and intact distal pulses.           No murmur heard.  Mediport to left chest wall    Pulmonary/Chest: No respiratory distress. She has no wheezes. She has no rhonchi. She exhibits no tenderness.   Crackles to right lower lobe    Abdominal: Abdomen is soft. Bowel sounds are normal. She exhibits no distension. There is no abdominal tenderness. There is no rebound and no guarding.   Musculoskeletal:         General: No tenderness or edema. Normal range of motion.      Cervical back: Normal range of motion and neck supple.      Lumbar back: Normal. Normal range of motion.     Neurological: She is alert and oriented to person, place, and time. She has normal strength. No cranial nerve deficit or sensory deficit. GCS score is 15. GCS eye subscore is 4. GCS verbal subscore is 5. GCS motor subscore is 6.   Generalized weakness   Skin: Skin is warm and dry. Capillary refill takes less than 2 seconds. No rash and no abscess noted. No erythema. No pallor.   Psychiatric: She has a normal mood and affect.         ED Course   Critical Care    Date/Time:  11/8/2024 1:36 AM    Performed by: Demetria Still MD  Authorized by: Demetria Still MD  Direct patient critical care time: 45 minutes  Total critical care time (exclusive of procedural time) : 45 minutes  Critical care was necessary to treat or prevent imminent or life-threatening deterioration of the following conditions: respiratory failure.  Critical care was time spent personally by me on the following activities: development of treatment plan with patient or surrogate, discussions with consultants, obtaining history from patient or surrogate, evaluation of patient's response to treatment, examination of patient, ordering and performing treatments and interventions, ordering and review of laboratory studies, ordering and review of radiographic studies, pulse oximetry, re-evaluation of patient's condition and review of old charts.        Labs Reviewed   COMPREHENSIVE METABOLIC PANEL - Abnormal       Result Value    Sodium 139      Potassium 4.3      Chloride 106      CO2 24      Glucose 120 (*)     Blood Urea Nitrogen 16.2      Creatinine 0.82      Calcium 9.5      Protein Total 8.2 (*)     Albumin 3.4      Globulin 4.8 (*)     Albumin/Globulin Ratio 0.7 (*)     Bilirubin Total 0.7       (*)     ALT 37      AST 42 (*)     eGFR >60      Anion Gap 9.0      BUN/Creatinine Ratio 20     CBC WITH DIFFERENTIAL - Abnormal    WBC 9.63      RBC 4.73      Hgb 13.5      Hct 42.2      MCV 89.2      MCH 28.5      MCHC 32.0 (*)     RDW 14.8      Platelet 170      MPV 9.3      Neut % 84.7      Lymph % 7.5      Mono % 6.3      Eos % 0.7      Basophil % 0.3      Lymph # 0.72      Neut # 8.15      Mono # 0.61      Eos # 0.07      Baso # 0.03      IG# 0.05 (*)     IG% 0.5      NRBC% 0.0     LACTIC ACID, PLASMA - Normal    Lactic Acid Level 1.6     BLOOD CULTURE [facility]   BLOOD CULTURE [facility]   RESPIRATORY CULTURE ([facility])   CBC W/ AUTO DIFFERENTIAL    Narrative:     The following orders were created for panel order CBC Auto  Differential.  Procedure                               Abnormality         Status                     ---------                               -----------         ------                     CBC with Differential[[id number]]       Abnormal            Final result                 Please view results for these tests on the individual orders.   RESPIRATORY PANEL          Imaging Results              CT Chest Abdomen Pelvis With IV Contrast (XPD) NO Oral Contrast (Final result)  Result time 11/07/24 20:47:43      Final result by Isaac Richardson MD (11/07/24 20:47:43)                   Impression:      1.  Right lower lung lobe small new vague opacity may be inflammatory or infectious.  Details of other chest findings above.    2.  Mild excessive fluid within loops of small bowel and the right colon could represent enterocolitis.  Details of other abdominopelvic findings above.      Electronically signed by: Isaac Richardson  Date:    11/07/2024  Time:    20:47               Narrative:    EXAMINATION:  CT CHEST ABDOMEN PELVIS WITH IV CONTRAST (XPD)    CLINICAL HISTORY:  Sepsis;    TECHNIQUE:  Multidetector axial images were obtained from the thoracic inlet through the greater trochanters following the administration of IV contrast.    Dose length product of 785 mGycm. Automated exposure control was utilized to minimize radiation dose.    COMPARISON:  CT chest and CT abdomen pelvis October 18, 2024.    CHEST FINDINGS:    Bilateral lungs are remarkable for peripheral subpleural fine to coarse reticulations consistent with fibrosis.  Previous exam was remarkable for right middle lung lobe and lingular segment some infiltrates which show interval improvement.  There are subtle new vague opacities which involve the right right lower lung lobe on image 70 series 4 and may be inflammatory or infectious.  There is no pulmonary edema.  No cavitary abnormality.  No fluid within the pleural and pericardial spaces.  Thoracic aorta is  without aneurysmal dilatation and there are no signs of dissection.  Cardiac chamber size is within normal limits.  Left chest implanted tunnel castro catheter terminates within the superior vena cava.  Right breast medial aspect rim calcified density.  Thoracic kyphoscoliosis and degenerative changes.    ABDOMINAL FINDINGS:    Liver, mildly atrophic pancreas and the spleen are without acute findings.  Gallbladder is surgically absent.  There is no apparent dilatation of ducts.  Adrenals are unremarkable.  Left kidney mild cortical scarring.  Left kidney medial cortical small hypodensity is stable and is favored to be a cyst and for this no specific follow-up imaging is recommended.  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  There are no perinephric strandings.  No retroperitoneal periaortic enlarged lymph nodes.    Stomach is mostly decompressed.  Mild excessive fluid is present within loops of small bowel without transition or bowel obstruction.  Similarly, there is some excessive fluid within the colon.  Similar appearance of ileocolic anastomosis.  No inflammatory strandings identified.  Extensive noninflamed diverticulosis coli involving the descending and the sigmoid colon.  No bowel obstruction.  No free fluid.    PELVIC FINDINGS:    Urinary bladder wall is not thickened.  No pelvic free fluid.    Thoracolumbar degenerative changes.  Scoliosis.                                       X-Ray Chest PA And Lateral (Final result)  Result time 11/07/24 16:46:33      Final result by Isabel Brasher MD (11/07/24 16:46:33)                   Impression:      No acute abnormality of the chest.      Electronically signed by: Isabel Brasehr  Date:    11/07/2024  Time:    16:46               Narrative:    EXAMINATION:  XR CHEST PA AND LATERAL    CLINICAL HISTORY:  Fever;    TECHNIQUE:  PA and lateral chest radiographs    COMPARISON:  Chest x-ray dated 11/07/2024    FINDINGS:  Left chest  wall port catheter remains in place.  The heart is normal in size.  There is no focal airspace consolidation.  There is no pleural effusion or visible pneumothorax.                                    X-Rays:   Independently Interpreted Readings:   Chest X-Ray: Normal heart size.  No infiltrates.  No acute abnormalities.     Medications   busPIRone tablet 20 mg (20 mg Oral Given 11/8/24 0013)   citalopram tablet 40 mg (has no administration in time range)   warfarin (COUMADIN) tablet 2.5 mg (has no administration in time range)   mirtazapine tablet 15 mg (15 mg Oral Given 11/8/24 0014)   metoprolol tartrate (LOPRESSOR) tablet 25 mg (0 mg Oral Hold 11/8/24 0014)   acetaminophen tablet 650 mg (has no administration in time range)   acetaminophen tablet 650 mg (has no administration in time range)   sodium chloride 0.9% flush 10 mL (has no administration in time range)   melatonin tablet 6 mg (has no administration in time range)   mupirocin 2 % ointment (has no administration in time range)   ceFEPIme injection 2 g (2 g Intravenous Given 11/8/24 0013)   vancomycin - pharmacy to dose (has no administration in time range)   vancomycin 1.5 g in dextrose 5 % 250 mL IVPB (ready to mix) (has no administration in time range)   vancomycin (VANCOCIN) 1,750 mg in D5W 500 mL IVPB (has no administration in time range)   iohexoL (OMNIPAQUE 350) injection 100 mL (100 mLs Intravenous Given 11/7/24 2016)   acetaminophen 1,000 mg/100 mL (10 mg/mL) injection 1,000 mg (0 mg Intravenous Stopped 11/7/24 2038)   ceFEPIme injection 2 g (2 g Intravenous Given 11/7/24 2147)   azithromycin (ZITHROMAX) 500 mg in D5W 250 mL  IVPB (admixture device) (0 mg Intravenous Stopped 11/7/24 2238)     Medical Decision Making  The differential diagnosis includes, but is not limited to, sepsis, bacteremia, pneumonia, uti  Cbc, cmp, lactic, cultures, cxr, ct chest abd pelvis ordered and reviewed  Labs unremarkable  Pneumonia on ct, recently on abx, covered  for mdr species  Lactic wnl  Not septic  Also hypoxic, admit    Problems Addressed:  Chills: acute illness or injury that poses a threat to life or bodily functions  Dyspnea on exertion: acute illness or injury that poses a threat to life or bodily functions  Generalized weakness: acute illness or injury that poses a threat to life or bodily functions  Hypoxia: acute illness or injury that poses a threat to life or bodily functions  Pneumonia of right lower lobe due to infectious organism: acute illness or injury that poses a threat to life or bodily functions    Amount and/or Complexity of Data Reviewed  External Data Reviewed: notes.     Details: Outpt visit and admit for pneumonia  Labs: ordered.  Radiology: ordered and independent interpretation performed.  ECG/medicine tests: ordered and independent interpretation performed.    Risk  OTC drugs.  Prescription drug management.  Decision regarding hospitalization.    Critical Care  Total time providing critical care: 45 minutes            Scribe Attestation:   Scribe #1: I performed the above scribed service and the documentation accurately describes the services I performed. I attest to the accuracy of the note.  Comments: Attending:   Physician Attestation Statement for Scribe #1: Demetria MANZANARES MD, personally performed the services described in this documentation. All medical record entries made by the scribe were at my direction and in my presence.  I have reviewed the chart and agree that the record reflects my personal performance and is accurate and complete.        Attending Attestation:           Physician Attestation for Scribe:  Physician Attestation Statement for Scribe #1: Tessy MANZANARES Brooke R, MD, reviewed documentation, as scribed by Rohit Greenberg in my presence, and it is both accurate and complete.                                    Clinical Impression:  Final diagnoses:  [R09.02] Hypoxia  [J18.9] Pneumonia of right lower lobe due to  infectious organism (Primary)  [R68.83] Chills  [R53.1] Generalized weakness  [R06.09] Dyspnea on exertion          ED Disposition Condition    Admit Stable                Demetria Still MD  11/08/24 0215

## 2024-11-08 NOTE — PROGRESS NOTES
Pharmacokinetic Initial Assessment: IV Vancomycin    Assessment/Plan:    Initiate intravenous vancomycin with loading dose of 1750 mg once followed by a maintenance dose of vancomycin 1500 mg IV every 24 hours  Desired empiric serum trough concentration is 10 to 20 mcg/mL  Draw vancomycin trough level 60 min prior to third dose on 11/10 at approximately 0100  Pharmacy will continue to follow and monitor vancomycin.      Please contact pharmacy at extension [phone] with any questions regarding this assessment.     Thank you for the consult,   Sheyla Carreno       Patient brief summary:  Laura Acosta is a 78 y.o. female initiated on antimicrobial therapy with IV Vancomycin for treatment of suspected lower respiratory infection    Drug Allergies:   Review of patient's allergies indicates:   Allergen Reactions    Hydromorphone Itching     Other reaction(s): itching    Opium      Other reaction(s): itching  has itching with larger doses. Smaller doses do not cause a reaction.       Actual Body Weight:   Wt Readings from Last 1 Encounters:   11/07/24 74.8 kg (165 lb)       Renal Function:   Estimated Creatinine Clearance: 56 mL/min (based on SCr of 0.82 mg/dL).,     Dialysis Method (if applicable):  N/A    CBC (last 72 hours):  Recent Labs   Lab Result Units 11/07/24  1411 11/07/24  1742   WBC x10(3)/mcL 7.60 9.63   Hgb g/dL 13.3 13.5   Hct % 41.9 42.2   Platelet x10(3)/mcL 220 170   Mono % % 12.4 6.3   Eos % %  --  0.7   Basophil % %  --  0.3       Metabolic Panel (last 72 hours):  Recent Labs   Lab Result Units 11/07/24  1351 11/07/24  1411 11/07/24  1742   Sodium mmol/L  --  140 139   Potassium mmol/L  --  3.7 4.3   Chloride mmol/L  --  103 106   CO2 mmol/L  --  26 24   Glucose mg/dL  --  130* 120*   Glucose, UA  Negative  --   --    Blood Urea Nitrogen mg/dL  --  18 16.2   Creatinine mg/dL  --  0.72 0.82   Albumin g/dL  --  3.8 3.4   Bilirubin Total mg/dL  --  0.7 0.7   ALP unit/L  --  121 163*   AST unit/L  --  38* 42*  "  ALT unit/L  --  32 37       Drug levels (last 3 results):  No results for input(s): "VANCOMYCINRA", "VANCORANDOM", "VANCOMYCINPE", "VANCOPEAK", "VANCOMYCINTR", "VANCOTROUGH" in the last 72 hours.    Microbiologic Results:  Microbiology Results (last 7 days)       Procedure Component Value Units Date/Time    Respiratory Culture [[id number]]     Order Status: Sent Specimen: Sputum, Expectorated     Blood Culture [[id number]] Collected: 11/07/24 1742    Order Status: Resulted Specimen: Blood Updated: 11/07/24 1855    Blood Culture [[id number]] Collected: 11/07/24 1742    Order Status: Resulted Specimen: Blood Updated: 11/07/24 1855           "

## 2024-11-08 NOTE — PLAN OF CARE
11/08/24 1012   Discharge Assessment   Assessment Type Discharge Planning Assessment   Confirmed/corrected address, phone number and insurance Yes   Confirmed Demographics Correct on Facesheet   Source of Information patient;family   Communicated VALERY with patient/caregiver Date not available/Unable to determine   Reason For Admission hypoxia   People in Home significant other   Do you expect to return to your current living situation? Yes   Do you have help at home or someone to help you manage your care at home? Yes   Who are your caregiver(s) and their phone number(s)? significant other John Jon   Prior to hospitilization cognitive status: Alert/Oriented   Current cognitive status: Alert/Oriented   Walking or Climbing Stairs Difficulty no   Dressing/Bathing Difficulty no   Equipment Currently Used at Home cane, straight;walker, rolling;shower chair;bedside commode  (patient has equipment from old procedure but doesn't currently use)   Patient currently being followed by outpatient case management? No   Do you currently have service(s) that help you manage your care at home? Yes   Name and Contact number of agency [facility]   Is the pt/caregiver preference to resume services with current agency Yes   Do you take prescription medications? Yes   Do you have prescription coverage? Yes   Who is going to help you get home at discharge? [facility] medicare   How do you get to doctors appointments? car, drives self   Are you on dialysis? No   Do you take coumadin? Yes   Who monitors your labs? [facility] and [facility]   Discharge Plan A Home Health   Discharge Plan B Home Health   DME Needed Upon Discharge  none   Discharge Plan discussed with: Patient   Transition of Care Barriers None   OTHER   Name(s) of People in Home significant other John Gaytanaydenson     Called [facility] and the patient is current. Clinical sent via [employer].  The patient takes coumadin at home and is monitored by home health and  [facility].

## 2024-11-08 NOTE — PLAN OF CARE
Problem: Adult Inpatient Plan of Care  Goal: Plan of Care Review  Outcome: Progressing  Goal: Patient-Specific Goal (Individualized)  Outcome: Progressing  Goal: Absence of Hospital-Acquired Illness or Injury  Outcome: Progressing  Goal: Optimal Comfort and Wellbeing  Outcome: Progressing  Goal: Readiness for Transition of Care  Outcome: Progressing     Problem: Pneumonia  Goal: Fluid Balance  Outcome: Progressing  Goal: Resolution of Infection Signs and Symptoms  Outcome: Progressing  Goal: Effective Oxygenation and Ventilation  Outcome: Progressing     Problem: Infection  Goal: Absence of Infection Signs and Symptoms  Outcome: Progressing     Problem: Wound  Goal: Optimal Coping  Outcome: Progressing  Goal: Optimal Functional Ability  Outcome: Progressing  Goal: Absence of Infection Signs and Symptoms  Outcome: Progressing  Goal: Improved Oral Intake  Outcome: Progressing  Goal: Optimal Pain Control and Function  Outcome: Progressing  Goal: Skin Health and Integrity  Outcome: Progressing  Goal: Optimal Wound Healing  Outcome: Progressing     Problem: Skin Injury Risk Increased  Goal: Skin Health and Integrity  Outcome: Progressing

## 2024-11-08 NOTE — PROGRESS NOTES
Pharmacist Renal Dose Adjustment Note    Laura Acosta is a 78 y.o. female being treated with the medication cefepime    Patient Data:    Vital Signs (Most Recent):  Temp: 97.5 °F (36.4 °C) (11/08/24 0725)  Pulse: (!) 59 (11/08/24 0725)  Resp: 18 (11/08/24 0420)  BP: (!) 115/58 (11/08/24 0725)  SpO2: 96 % (11/08/24 0725) Vital Signs (72h Range):  Temp:  [97.3 °F (36.3 °C)-99.7 °F (37.6 °C)]   Pulse:  [53-91]   Resp:  [18-25]   BP: ()/(49-83)   SpO2:  [62 %-96 %]      Recent Labs   Lab 11/07/24  1411 11/07/24  1742 11/08/24  0342   CREATININE 0.72 0.82 0.84     Serum creatinine: 0.84 mg/dL 11/08/24 0342  Estimated creatinine clearance: 53.5 mL/min    Cefepime 2g q8h will be changed to cefepime 2g q12h based on CrCl = 53.5 mL/min.    Pharmacist's Name: Vale Hall  Pharmacist's Extension: [phone]

## 2024-11-08 NOTE — H&P
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  She was had episodes of recurrent fevers in the past associated with MSSA bacteremia from an infected retained MediPort which was ultimately removed in April of 2024.  In October of 2024 she had recurrent fevers as well in the setting of COVID-19 and community-acquired pneumonia was briefly hospitalized that time with overall unremarkable workup and cultures.    Tonight she presents to the ER again reporting fevers over the last few days as well as generalized weakness with temperatures at home as high as 102.  This is despite recently being placed on outpatient antibiotics for community-acquired pneumonia per her PCP.  She arrived afebrile and hemodynamically stable initially satting well on room air but ultimately required 2 L due to sats dropping to the low 90s.  Laboratory work was overall unremarkable and CT chest abdomen and pelvis showed a right lower lung new opacity as well as excessive fluid within the small bowel possibly representing enterocolitis.  Broad-spectrum antibiotics were initiated and hospitalist was consulted for admission.    PAST MEDICAL HISTORY:     Past Medical History:    Diagnosis Date    Acute URI     Anxiety and depression     Back pain     Bacteremia 04/29/2024    Breast cancer     Cholelithiasis     Contaminated small bowel syndrome     DVT (deep venous thrombosis)     on coumadin    Edema, lower extremity     Gallstone pancreatitis     Heart murmur     HTN (hypertension)     Insomnia     Irregular heart beat     Ischemic disease of gut     Kidney stones     Laryngitis     Malabsorption     Malnutrition, unspecified type     Meningitis, unspecified     OA (osteoarthritis)     PVD (peripheral vascular disease)     Rheumatoid arthritis, unspecified     Staph infection     infected mediport -- on doxycycline daily for prevention    Tremor     Unspecified cataract     Unspecified cirrhosis of liver 06/20/2023    Dr Carroll       PAST SURGICAL HISTORY:     Past Surgical History:   Procedure Laterality Date    APPENDECTOMY      BOWEL RESECTION      BREAST LUMPECTOMY Right     CHOLECYSTECTOMY  05/2015    COLONOSCOPY  02/2022    repeat 3 years    CYST REMOVAL Right     breast    CYSTOSCOPY W/ STONE MANIPULATION      CYSTOSCOPY W/ URETERAL STENT PLACEMENT  12/2020    CYSTOSCOPY W/ URETERAL STENT REMOVAL  04/2021    CYSTOURETEROSCOPY, WITH HOLMIUM LASER LITHOTRIPSY OF URETERAL CALCULUS AND STENT INSERTION Left 05/02/2023    Procedure: CYSTOSCOPY, WITH URETERAL STENT INSERTION Left;  Surgeon: Jared Felix MD;  Location: [facility] OR;  Service: Urology;  Laterality: Left;    EGD, WITH CLOSED BIOPSY N/A 6/20/2023    Procedure: EGD;  Surgeon: Aashish Carroll MD;  Location: [facility] ENDOSCOPY;  Service: Gastroenterology;  Laterality: N/A;    ENDOSCOPIC RETROGRADE CHOLANGIOPANCREATOGRAPHY W/ SPHINCTEROTOMY AND STONE REMOVAL  04/2015    ESOPHAGOGASTRODUODENOSCOPY  06/20/2023    Dr Carroll - no signs esophageal varicies --repeat 3 yrs    GI Biopsy  02/15/2022    GI Biopsy  12/2018    HYSTERECTOMY      INSERTION OF TUNNELED CENTRAL VENOUS CATHETER (CVC) WITH SUBCUTANEOUS PORT N/A  7/18/2024    Procedure: [id number];  Surgeon: Ariel Welsh Jr., MD;  Location: [facility] OR;  Service: General;  Laterality: N/A;    LAPAROTOMY, EXPLORATORY  06/2015    LITHOTRIPSY Left 04/2021    LITHOTRIPSY Left 03/2021    MEDIPORT INSERTION, SINGLE  06/2021    MEDIPORT INSERTION, SINGLE  07/2020    MEDIPORT INSERTION, SINGLE  12/2018    MEDIPORT REMOVAL  07/2020    PHACOEMULSIFICATION OF CATARACT Left 12/2016    PHACOEMULSIFICATION OF CATARACT Right 11/2016    PICC line Removal Right 06/2021    POLYPECTOMY  02/2022    PVD surgery      REMOVAL OF CATHETER  12/2020    REMOVAL OF VASCULAR ACCESS CATHETER Right 4/24/2024    Procedure: Removal, Vascular Access Catheter;  Surgeon: Reed Ghosh MD;  Location: [facility] OR;  Service: General;  Laterality: Right;    SHOULDER SURGERY Right     shoulder replacement    SPHINCTEROTOMY OF URETHRA      VENTRAL HERNIA REPAIR  04/2016       ALLERGIES:   Hydromorphone and Opium    FAMILY HISTORY:   Reviewed and non-contributory     SOCIAL HISTORY:   Screening for Social Drivers for health: Patient screened for food insecurity, housing instability, transportation needs, utility   difficulties, and interpersonal safety (select all that apply as identified as concern)  []Housing or Food  []Transportation Needs  []Utility Difficulties  []Interpersonal safety  [x]None  Social History     Tobacco Use    Smoking status: Never     Passive exposure: Past    Smokeless tobacco: Never   Substance Use Topics    Alcohol use: Yes     Comment: twice a year        HOME MEDICATIONS:     Prior to Admission medications    Medication Sig Start Date End Date Taking? Authorizing Provider   busPIRone (BUSPAR) 10 MG tablet Take 20 mg by mouth 2 (two) times daily. 5/22/24   Provider, Historical   cefdinir (OMNICEF) 300 MG capsule Take 1 capsule (300 mg total) by mouth 2 (two) times daily. for 10 days 11/7/24 11/17/24  Curt Felton MD   CELEXA 40 mg tablet Take 40 mg by mouth once daily.  5/23/22   Provider, Historical   cholecalciferol, vitamin D3, 1,250 mcg (50,000 unit) capsule Take 50,000 Units by mouth every 7 days. 5/21/24   Provider, Historical   diphenoxylate-atropine 2.5-0.025 mg (LOMOTIL) 2.5-0.025 mg per tablet Take 2 tablets by mouth 2 (two) times a day.    Provider, Historical   doxycycline (VIBRAMYCIN) 100 MG Cap Take 200 mg by mouth once daily.    Provider, Historical   ferrous sulfate 325 (65 FE) MG EC tablet Take 1 tablet (325 mg total) by mouth once daily. 4/29/24   Gabrielle Butler,    GATTEX 30-VIAL 5 mg Kit Inject into the skin Daily. 7/2/24   Provider, Historical   metoprolol tartrate (LOPRESSOR) 100 MG tablet Take 100 mg by mouth 2 (two) times daily. 6/11/22   Provider, Historical   mirtazapine (REMERON) 15 MG tablet Take 15 mg by mouth every evening.    Provider, Historical   multivitamin (ONE DAILY MULTIVITAMIN) per tablet Take 1 tablet by mouth once daily.    Provider, Historical   solifenacin (VESICARE) 5 MG tablet Take 5 mg by mouth once daily. 3/28/24   Provider, Historical   warfarin (COUMADIN) 2.5 MG tablet TAKE ONE TABLET BY MOUTH ONCE DAILY IN THE EVENING OR AS DIRECTED BY CIS PROVIDER 8/16/23   Provider, Historical       REVIEW OF SYSTEMS:   Except as documented, all other systems reviewed and negative     PHYSICAL EXAM:   T 99.7 °F (37.6 °C)   BP (!) 109/58   P 66   RR (!) 25   O2 96 %  GENERAL: awake, alert, oriented and in no acute distress, non-toxic appearing   HEENT: normocephalic atraumatic   NECK: supple   LUNGS: Clear bilaterally, no wheezing or rales, no accessory muscle use   CVS: Regular rate and rhythm, normal peripheral perfusion  ABD: Soft, non-tender, non-distended, bowel sounds present  EXTREMITIES: no clubbing or cyanosis  SKIN: Warm, dry.   NEURO: alert and oriented, grossly without focal deficits   PSYCHIATRIC: Cooperative    LABS AND IMAGING:     Recent Labs     11/07/24  1411 11/07/24  1742   WBC 7.60 9.63   RBC 4.66 4.73   HGB 13.3 13.5  "  HCT 41.9 42.2   MCV 89.9 89.2   MCH 28.5 28.5   MCHC 31.7* 32.0*   RDW 14.6 14.8    170     No results for input(s): "LACTIC" in the last 72 hours.  No results for input(s): "INR", "APTT", "D-DIMER" in the last 72 hours.  No results for input(s): "HGBA1C", "CHOL", "TRIG", "LDL", "VLDL", "HDL" in the last 72 hours.   Recent Labs     11/07/24  1411 11/07/24  1742    139   K 3.7 4.3   CO2 26 24   BUN 18 16.2   CREATININE 0.72 0.82   GLUCOSE 130* 120*   CALCIUM 9.3 9.5   ALBUMIN 3.8 3.4   GLOBULIN 3.7* 4.8*   ALKPHOS 121 163*   ALT 32 37   AST 38* 42*   BILITOT 0.7 0.7     No results for input(s): "BNP", "CPK", "TROPONINI" in the last 72 hours.       CT Chest Abdomen Pelvis With IV Contrast (XPD) NO Oral Contrast  Narrative: EXAMINATION:  CT CHEST ABDOMEN PELVIS WITH IV CONTRAST (XPD)    CLINICAL HISTORY:  Sepsis;    TECHNIQUE:  Multidetector axial images were obtained from the thoracic inlet through the greater trochanters following the administration of IV contrast.    Dose length product of 785 mGycm. Automated exposure control was utilized to minimize radiation dose.    COMPARISON:  CT chest and CT abdomen pelvis October 18, 2024.    CHEST FINDINGS:    Bilateral lungs are remarkable for peripheral subpleural fine to coarse reticulations consistent with fibrosis.  Previous exam was remarkable for right middle lung lobe and lingular segment some infiltrates which show interval improvement.  There are subtle new vague opacities which involve the right right lower lung lobe on image 70 series 4 and may be inflammatory or infectious.  There is no pulmonary edema.  No cavitary abnormality.  No fluid within the pleural and pericardial spaces.  Thoracic aorta is without aneurysmal dilatation and there are no signs of dissection.  Cardiac chamber size is within normal limits.  Left chest implanted tunnel castro catheter terminates within the superior vena cava.  Right breast medial aspect rim calcified density. "  Thoracic kyphoscoliosis and degenerative changes.    ABDOMINAL FINDINGS:    Liver, mildly atrophic pancreas and the spleen are without acute findings.  Gallbladder is surgically absent.  There is no apparent dilatation of ducts.  Adrenals are unremarkable.  Left kidney mild cortical scarring.  Left kidney medial cortical small hypodensity is stable and is favored to be a cyst and for this no specific follow-up imaging is recommended.  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  There are no perinephric strandings.  No retroperitoneal periaortic enlarged lymph nodes.    Stomach is mostly decompressed.  Mild excessive fluid is present within loops of small bowel without transition or bowel obstruction.  Similarly, there is some excessive fluid within the colon.  Similar appearance of ileocolic anastomosis.  No inflammatory strandings identified.  Extensive noninflamed diverticulosis coli involving the descending and the sigmoid colon.  No bowel obstruction.  No free fluid.    PELVIC FINDINGS:    Urinary bladder wall is not thickened.  No pelvic free fluid.    Thoracolumbar degenerative changes.  Scoliosis.  Impression: 1.  Right lower lung lobe small new vague opacity may be inflammatory or infectious.  Details of other chest findings above.    2.  Mild excessive fluid within loops of small bowel and the right colon could represent enterocolitis.  Details of other abdominopelvic findings above.    Electronically signed by: Isaac Richardson  Date:    11/07/2024  Time:    20:47  X-Ray Chest PA And Lateral  Narrative: EXAMINATION:  XR CHEST PA AND LATERAL    CLINICAL HISTORY:  Fever;    TECHNIQUE:  PA and lateral chest radiographs    COMPARISON:  Chest x-ray dated 11/07/2024    FINDINGS:  Left chest wall port catheter remains in place.  The heart is normal in size.  There is no focal airspace consolidation.  There is no pleural effusion or visible pneumothorax.  Impression: No acute  abnormality of the chest.    Electronically signed by: Isabel Brasher  Date:    11/07/2024  Time:    16:46  X-Ray Chest 1 View  See  for faxed results.     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 (3x weekly), HTN, HLD, PVD, prior DVT, rheumatoid arthritis, cirrhosis, gallstone pancreatitis, anxiety/depression, previous MRSA bacteremia in the setting of a retained infected MediPort    -blood cultures, respiratory PCR, sputum culture  -empiric antibiotics for enteritis and potential pneumonia though clinically neither present; can consider noninfectious etiologies of fever however being on TPN does give her a high-risk for bloodstream infection  -broad-spectrum empiric antibiotics for now deescalate as appropriate  -supportive care and wean oxygen as tolerated   -continue home meds as appropriate pending med    DVT prophylaxis: resume coumadin, checking INR  Code status: full     If patient was admitted under observational status it is with my approval/permission.     At least 55 min was spent on this history and physical.  Time seen: 11PM  11/7  Natan Cuevas MD

## 2024-11-09 LAB
ALBUMIN SERPL-MCNC: 2.9 G/DL (ref 3.4–4.8)
ALBUMIN/GLOB SERPL: 0.8 RATIO (ref 1.1–2)
ALP SERPL-CCNC: 126 UNIT/L (ref 40–150)
ALT SERPL-CCNC: 27 UNIT/L (ref 0–55)
ANION GAP SERPL CALC-SCNC: 9 MEQ/L
AST SERPL-CCNC: 27 UNIT/L (ref 5–34)
BASOPHILS # BLD AUTO: 0.03 X10(3)/MCL
BASOPHILS NFR BLD AUTO: 0.4 %
BILIRUB SERPL-MCNC: 0.5 MG/DL
BUN SERPL-MCNC: 8.4 MG/DL (ref 9.8–20.1)
CALCIUM SERPL-MCNC: 8.5 MG/DL (ref 8.4–10.2)
CHLORIDE SERPL-SCNC: 110 MMOL/L (ref 98–107)
CO2 SERPL-SCNC: 23 MMOL/L (ref 23–31)
CREAT SERPL-MCNC: 0.75 MG/DL (ref 0.55–1.02)
CREAT/UREA NIT SERPL: 11
EOSINOPHIL # BLD AUTO: 0.18 X10(3)/MCL (ref 0–0.9)
EOSINOPHIL NFR BLD AUTO: 2.5 %
ERYTHROCYTE [DISTWIDTH] IN BLOOD BY AUTOMATED COUNT: 15.1 % (ref 11.5–17)
GFR SERPLBLD CREATININE-BSD FMLA CKD-EPI: >60 ML/MIN/1.73/M2
GLOBULIN SER-MCNC: 3.8 GM/DL (ref 2.4–3.5)
GLUCOSE SERPL-MCNC: 97 MG/DL (ref 82–115)
HCT VFR BLD AUTO: 37.3 % (ref 37–47)
HGB BLD-MCNC: 11.7 G/DL (ref 12–16)
IMM GRANULOCYTES # BLD AUTO: 0.02 X10(3)/MCL (ref 0–0.04)
IMM GRANULOCYTES NFR BLD AUTO: 0.3 %
INR PPP: 2.1
LYMPHOCYTES # BLD AUTO: 2.13 X10(3)/MCL (ref 0.6–4.6)
LYMPHOCYTES NFR BLD AUTO: 29.5 %
MCH RBC QN AUTO: 28.5 PG (ref 27–31)
MCHC RBC AUTO-ENTMCNC: 31.4 G/DL (ref 33–36)
MCV RBC AUTO: 90.8 FL (ref 80–94)
MONOCYTES # BLD AUTO: 0.79 X10(3)/MCL (ref 0.1–1.3)
MONOCYTES NFR BLD AUTO: 11 %
NEUTROPHILS # BLD AUTO: 4.06 X10(3)/MCL (ref 2.1–9.2)
NEUTROPHILS NFR BLD AUTO: 56.3 %
NRBC BLD AUTO-RTO: 0 %
PLATELET # BLD AUTO: 185 X10(3)/MCL (ref 130–400)
PMV BLD AUTO: 9.7 FL (ref 7.4–10.4)
POTASSIUM SERPL-SCNC: 3.8 MMOL/L (ref 3.5–5.1)
PROT SERPL-MCNC: 6.7 GM/DL (ref 5.8–7.6)
PROTHROMBIN TIME: 24 SECONDS (ref 12.5–14.5)
RBC # BLD AUTO: 4.11 X10(6)/MCL (ref 4.2–5.4)
SODIUM SERPL-SCNC: 142 MMOL/L (ref 136–145)
WBC # BLD AUTO: 7.21 X10(3)/MCL (ref 4.5–11.5)

## 2024-11-09 PROCEDURE — 36415 COLL VENOUS BLD VENIPUNCTURE: CPT | Performed by: INTERNAL MEDICINE

## 2024-11-09 PROCEDURE — 21400001 HC TELEMETRY ROOM

## 2024-11-09 PROCEDURE — 80053 COMPREHEN METABOLIC PANEL: CPT | Performed by: INTERNAL MEDICINE

## 2024-11-09 PROCEDURE — 25000003 PHARM REV CODE 250: Performed by: STUDENT IN AN ORGANIZED HEALTH CARE EDUCATION/TRAINING PROGRAM

## 2024-11-09 PROCEDURE — 25000003 PHARM REV CODE 250: Performed by: INTERNAL MEDICINE

## 2024-11-09 PROCEDURE — 85025 COMPLETE CBC W/AUTO DIFF WBC: CPT | Performed by: INTERNAL MEDICINE

## 2024-11-09 PROCEDURE — 85610 PROTHROMBIN TIME: CPT | Performed by: INTERNAL MEDICINE

## 2024-11-09 PROCEDURE — 63600175 PHARM REV CODE 636 W HCPCS: Performed by: INTERNAL MEDICINE

## 2024-11-09 PROCEDURE — 36410 VNPNXR 3YR/> PHY/QHP DX/THER: CPT

## 2024-11-09 PROCEDURE — C1751 CATH, INF, PER/CENT/MIDLINE: HCPCS

## 2024-11-09 RX ORDER — PHENAZOPYRIDINE HYDROCHLORIDE 100 MG/1
100 TABLET, FILM COATED ORAL
Status: DISCONTINUED | OUTPATIENT
Start: 2024-11-09 | End: 2024-11-20 | Stop reason: HOSPADM

## 2024-11-09 RX ORDER — LANOLIN ALCOHOL/MO/W.PET/CERES
1 CREAM (GRAM) TOPICAL DAILY
Status: DISCONTINUED | OUTPATIENT
Start: 2024-11-09 | End: 2024-11-20 | Stop reason: HOSPADM

## 2024-11-09 RX ORDER — SODIUM CHLORIDE 0.9 % (FLUSH) 0.9 %
10 SYRINGE (ML) INJECTION EVERY 12 HOURS PRN
Status: DISCONTINUED | OUTPATIENT
Start: 2024-11-09 | End: 2024-11-20 | Stop reason: HOSPADM

## 2024-11-09 RX ADMIN — CITALOPRAM HYDROBROMIDE 40 MG: 20 TABLET ORAL at 09:11

## 2024-11-09 RX ADMIN — BUSPIRONE HYDROCHLORIDE 20 MG: 5 TABLET ORAL at 09:11

## 2024-11-09 RX ADMIN — MUPIROCIN: 20 OINTMENT TOPICAL at 09:11

## 2024-11-09 RX ADMIN — WARFARIN SODIUM 2.5 MG: 2.5 TABLET ORAL at 05:11

## 2024-11-09 RX ADMIN — PHENAZOPYRIDINE HYDROCHLORIDE 100 MG: 100 TABLET ORAL at 09:11

## 2024-11-09 RX ADMIN — VANCOMYCIN HYDROCHLORIDE 1500 MG: 1.5 INJECTION, POWDER, LYOPHILIZED, FOR SOLUTION INTRAVENOUS at 01:11

## 2024-11-09 RX ADMIN — ACETAMINOPHEN 650 MG: 325 TABLET, FILM COATED ORAL at 10:11

## 2024-11-09 RX ADMIN — FERROUS SULFATE TAB 325 MG (65 MG ELEMENTAL FE) 1 EACH: 325 (65 FE) TAB at 03:11

## 2024-11-09 RX ADMIN — MIRTAZAPINE 15 MG: 15 TABLET, FILM COATED ORAL at 09:11

## 2024-11-09 RX ADMIN — PHENAZOPYRIDINE HYDROCHLORIDE 100 MG: 100 TABLET ORAL at 05:11

## 2024-11-09 NOTE — PLAN OF CARE
Problem: Adult Inpatient Plan of Care  Goal: Plan of Care Review  Outcome: Progressing  Goal: Patient-Specific Goal (Individualized)  Outcome: Progressing  Goal: Absence of Hospital-Acquired Illness or Injury  Outcome: Progressing  Goal: Optimal Comfort and Wellbeing  Outcome: Progressing  Goal: Readiness for Transition of Care  Outcome: Progressing     Problem: Pneumonia  Goal: Fluid Balance  Outcome: Progressing  Goal: Resolution of Infection Signs and Symptoms  Outcome: Progressing  Goal: Effective Oxygenation and Ventilation  Outcome: Progressing     Problem: Infection  Goal: Absence of Infection Signs and Symptoms  Outcome: Progressing     Problem: Wound  Goal: Optimal Coping  Outcome: Progressing  Goal: Optimal Functional Ability  Outcome: Progressing  Goal: Absence of Infection Signs and Symptoms  Outcome: Progressing  Goal: Improved Oral Intake  Outcome: Progressing  Goal: Optimal Pain Control and Function  Outcome: Progressing  Goal: Skin Health and Integrity  Outcome: Progressing  Goal: Optimal Wound Healing  Outcome: Progressing     Problem: Skin Injury Risk Increased  Goal: Skin Health and Integrity  Outcome: Progressing     Problem: Fall Injury Risk  Goal: Absence of Fall and Fall-Related Injury  Outcome: Progressing

## 2024-11-09 NOTE — CONSULTS
Inpatient Nutrition Assessment    Admit Date: 11/7/2024   Total duration of encounter: 2 days   Patient Age: 78 y.o.    Nutrition Recommendation/Prescription     Continue oral diet as tolerated; Diet Adult Regular Lactose Free  TPN ADULT CENTRAL LINE CUSTOM     Start PPN Clinimix 4.25/5% @ 50 ml/hr until TPN begins  Provides: 408 kcal, 51 gm protein, 60 gm dextrose    Resume home TPN (will start tomorrow evening):  Dex 70% 120gm  AA 15% 60 gm  IL 20% 50gm twice a week (home TPN: IL20% 30gm 3 times per week)  GIR: 1.17  Provides:   790 kcal (55% est needs)  60gm protein (85% est needs)    Adjust electrolytes per pharmacy  Monitor oral intake and adjust TPN as needed    Communication of Recommendations: reviewed with nurse    Nutrition Assessment     Malnutrition Assessment/Nutrition-Focused Physical Exam    Malnutrition Context: chronic illness (11/09/24 1646)  Malnutrition Level:  (unable to evaluate) (11/09/24 1646)                                                        A minimum of two characteristics is recommended for diagnosis of either severe or non-severe malnutrition.    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, HTN, HLD, PVD, prior DVT, rheumatoid arthritis, cirrhosis, gallstone pancreatitis, anxiety/depression, previous MRSA bacteremia in the setting of a retained infected MediPort     Scheduled Medications:  busPIRone, 20 mg, BID  citalopram, 40 mg, Daily  ferrous sulfate, 1 tablet,  "Daily  mirtazapine, 15 mg, QHS  mupirocin, , BID  phenazopyridine, 100 mg, TID WM  [START ON 11/10/2024] vancomycin 1,500 mg in D5W 250 mL IVPB (admixture device), 1,500 mg, Q24H  warfarin, 2.5 mg, Daily    Continuous Infusions:  TPN ADULT CENTRAL LINE CUSTOM    PRN Medications:  acetaminophen, 650 mg, Q8H PRN  acetaminophen, 650 mg, Q4H PRN  melatonin, 6 mg, Nightly PRN  sodium chloride 0.9%, 10 mL, PRN  vancomycin - pharmacy to dose, , pharmacy to manage frequency    Calorie Containing IV Medications: no significant kcals from medications at this time    Recent Labs   Lab 11/07/24  1411 11/07/24  1742 11/08/24  0342 11/09/24  0425    139 127* 142   K 3.7 4.3 3.2* 3.8   CALCIUM 9.3 9.5 7.5* 8.5    106 101 110*   CO2 26 24 19* 23   BUN 18 16.2 13.5 8.4*   CREATININE 0.72 0.82 0.84 0.75   EGFRNORACEVR >60 >60 >60 >60   GLUCOSE 130* 120* 472* 97   BILITOT 0.7 0.7 0.6 0.5   ALKPHOS 121 163* 131 126   ALT 32 37 27 27   AST 38* 42* 44* 27   ALBUMIN 3.8 3.4 2.6* 2.9*   WBC 7.60 9.63 12.87* 7.21   HGB 13.3 13.5 10.8* 11.7*   HCT 41.9 42.2 34.1* 37.3     Nutrition Orders:  Diet Adult Regular Lactose Free  TPN ADULT CENTRAL LINE CUSTOM      Appetite/Oral Intake: poor/0-25% of meals  Factors Affecting Nutritional Intake: decreased appetite, malabsorption, and nausea  Social Needs Impacting Access to Food: none identified  Food/Mormonism/Cultural Preferences: unable to obtain  Food Allergies: no known food allergies (lactose free)  Last Bowel Movement: 11/08/24  Wound(s):      Comments    11/9/24 received call from nurse requesting to restart home TPN; reports pt tolerating oral diet but not eating a whole lot. Plan to start PPN Clinimix 4.25/5% and resume home TPN tomorrow evening once filled; possible weight loss noted in EMR over the past few months; will attempt physical assessment on follow up    Anthropometrics    Height: 5' 4.02" (162.6 cm), Height Method: Stated  Last Weight: 71.5 kg (157 lb 10.1 oz) " (11/07/24 2300), Weight Method: Bed Scale  BMI (Calculated): 27  BMI Classification: overweight (BMI 25-29.9)     Ideal Body Weight (IBW), Female: 120.1 lb     % Ideal Body Weight, Female (lb): 131.36 %                             Usual Weight Provided By: EMR weight history    Wt Readings from Last 5 Encounters:   11/07/24 71.5 kg (157 lb 10.1 oz)   11/07/24 73 kg (161 lb)   10/24/24 74.4 kg (164 lb)   10/18/24 76.1 kg (167 lb 12.8 oz)   08/21/24 75.8 kg (167 lb)     Weight Change(s) Since Admission:   Wt Readings from Last 1 Encounters:   11/07/24 2300 71.5 kg (157 lb 10.1 oz)   11/07/24 1553 74.8 kg (165 lb)   Admit Weight: 74.8 kg (165 lb) (11/07/24 1553), Weight Method: Stated    Estimated Needs    Weight Used For Calorie Calculations: 71.5 kg (157 lb 10.1 oz)  Energy Calorie Requirements (kcal): 9156-0398 kcal (20-25 kcal/kg)  Energy Need Method: Kcal/kg  Weight Used For Protein Calculations: 71.5 kg (157 lb 10.1 oz)  Protein Requirements: 71-85gm (1-1.2 gm/kg)  Fluid Requirements (mL): 1787ml (25 ml/kg)        Enteral Nutrition     Patient not receiving enteral nutrition at this time.    Parenteral Nutrition     Patient not receiving parenteral nutrition support at this time.    Evaluation of Received Nutrient Intake    Calories: not meeting estimated needs  Protein: not meeting estimated needs    Patient Education     Not applicable.    Nutrition Diagnosis     PES: Inadequate energy intake related to suboptimal protein/energy intake as evidenced by <50% intake of meals; <80% est energy needs met. (new)         Nutrition Interventions     Intervention(s): collaboration with other providers    Goal: Meet greater than 80% of nutritional needs by follow-up. (new)  Goal: Maintain weight throughout hospitalization. (new)    Nutrition Goals & Monitoring     Dietitian will monitor: food and beverage intake, energy intake, parenteral nutrition intake, weight change, electrolyte/renal panel, glucose/endocrine profile,  and gastrointestinal profile  Discharge planning: resume home regimen  Nutrition Risk/Follow-Up: high (follow-up in 1-4 days)   Please consult if re-assessment needed sooner.

## 2024-11-09 NOTE — PROGRESS NOTES
[facility] Hospital Medicine Progress Note      Chief Complaint: fever        HPI: (personally reviewed by me and is documented from initial H&P)     Laura Acosta is a 78 y.o. female who  has a past medical history of Acute URI, Anxiety and depression, Back pain, Bacteremia (04/29/2024), Breast cancer, Cholelithiasis, Contaminated small bowel syndrome, DVT (deep venous thrombosis), Edema, lower extremity, Gallstone pancreatitis, Heart murmur, HTN (hypertension), Insomnia, Irregular heart beat, Ischemic disease of gut, Kidney stones, Laryngitis, Malabsorption, Malnutrition, unspecified type, Meningitis, unspecified, OA (osteoarthritis), PVD (peripheral vascular disease), Rheumatoid arthritis, unspecified, Staph infection, Tremor, Unspecified cataract, and Unspecified cirrhosis of liver (06/20/2023)..     She has also had previous MRSA bacteremia in the setting of a retained infected MediPort, and additional past medical history as below presents again with recurrent fevers and rigors at home.  She was had episodes of recurrent fevers in the past associated with MSSA bacteremia from an infected retained MediPort which was ultimately removed in April of 2024.  In October of 2024 she had recurrent fevers as well in the setting of COVID-19 and community-acquired pneumonia was briefly hospitalized that time with overall unremarkable workup and cultures.     presents to the ER again reporting fevers over the last few days as well as generalized weakness with temperatures at home as high as 102.  This is despite recently being placed on outpatient antibiotics for community-acquired pneumonia per her PCP.  She arrived afebrile and hemodynamically stable initially satting well on room air but ultimately required 2 L due to sats dropping to the low 90s.  Laboratory work was overall unremarkable and CT chest abdomen and pelvis showed a right lower lung new opacity as well as excessive fluid within  the small bowel possibly representing enterocolitis.  Broad-spectrum antibiotics were initiated and hospitalist was consulted for admission.      Interval History:        11/09/2024 patient here AICD and that she has been through a lot lately.  She feels dehydrated however labs was not significant late reflect such, patient is encouraged to increase oral intake, she tells me she understands that as she has been told by someone else that the hospital is short on IV fluids.    She has remained afebrile since hospitalization.      Objective Assessment:  Physical Exam      Vital signs have been personally reviewed by me   General: Appears comfortable, no acute distress.  Neuro: awake,alert, oriented    Integumentary: Warm, dry, intact.  Musculoskeletal: Purposeful movement noted.     Respiratory: No accessory muscle use. Breath sounds are equal.  Cardiovascular: Regular rate.       VITAL SIGNS: 24 HRS MIN & MAX LAST   Temp  Min: 98 °F (36.7 °C)  Max: 98.6 °F (37 °C) 98.6 °F (37 °C)   BP  Min: 112/68  Max: 146/70 112/68   Pulse  Min: 65  Max: 85  85   Resp  Min: 16  Max: 18 16   SpO2  Min: 93 %  Max: 98 % (!) 93 %     CT Chest Abdomen Pelvis With IV Contrast (XPD) NO Oral Contrast  Narrative: EXAMINATION:  CT CHEST ABDOMEN PELVIS WITH IV CONTRAST (XPD)    CLINICAL HISTORY:  Sepsis;    TECHNIQUE:  Multidetector axial images were obtained from the thoracic inlet through the greater trochanters following the administration of IV contrast.    Dose length product of 785 mGycm. Automated exposure control was utilized to minimize radiation dose.    COMPARISON:  CT chest and CT abdomen pelvis October 18, 2024.    CHEST FINDINGS:    Bilateral lungs are remarkable for peripheral subpleural fine to coarse reticulations consistent with fibrosis.  Previous exam was remarkable for right middle lung lobe and lingular segment some infiltrates which show interval improvement.  There are subtle new vague opacities which involve the right  right lower lung lobe on image 70 series 4 and may be inflammatory or infectious.  There is no pulmonary edema.  No cavitary abnormality.  No fluid within the pleural and pericardial spaces.  Thoracic aorta is without aneurysmal dilatation and there are no signs of dissection.  Cardiac chamber size is within normal limits.  Left chest implanted tunnel castro catheter terminates within the superior vena cava.  Right breast medial aspect rim calcified density.  Thoracic kyphoscoliosis and degenerative changes.    ABDOMINAL FINDINGS:    Liver, mildly atrophic pancreas and the spleen are without acute findings.  Gallbladder is surgically absent.  There is no apparent dilatation of ducts.  Adrenals are unremarkable.  Left kidney mild cortical scarring.  Left kidney medial cortical small hypodensity is stable and is favored to be a cyst and for this no specific follow-up imaging is recommended.  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  There are no perinephric strandings.  No retroperitoneal periaortic enlarged lymph nodes.    Stomach is mostly decompressed.  Mild excessive fluid is present within loops of small bowel without transition or bowel obstruction.  Similarly, there is some excessive fluid within the colon.  Similar appearance of ileocolic anastomosis.  No inflammatory strandings identified.  Extensive noninflamed diverticulosis coli involving the descending and the sigmoid colon.  No bowel obstruction.  No free fluid.    PELVIC FINDINGS:    Urinary bladder wall is not thickened.  No pelvic free fluid.    Thoracolumbar degenerative changes.  Scoliosis.  Impression: 1.  Right lower lung lobe small new vague opacity may be inflammatory or infectious.  Details of other chest findings above.    2.  Mild excessive fluid within loops of small bowel and the right colon could represent enterocolitis.  Details of other abdominopelvic findings above.    Electronically signed by: Isaac  Richardson  Date:    11/07/2024  Time:    20:47  X-Ray Chest PA And Lateral  Narrative: EXAMINATION:  XR CHEST PA AND LATERAL    CLINICAL HISTORY:  Fever;    TECHNIQUE:  PA and lateral chest radiographs    COMPARISON:  Chest x-ray dated 11/07/2024    FINDINGS:  Left chest wall port catheter remains in place.  The heart is normal in size.  There is no focal airspace consolidation.  There is no pleural effusion or visible pneumothorax.  Impression: No acute abnormality of the chest.    Electronically signed by: Isabel Brasher  Date:    11/07/2024  Time:    16:46  X-Ray Chest 1 View  See  for faxed results.     IMPRESSION: Please see  or link in Chart Review from order   for report      This procedure was auto-finalized by: Virtual Radiologist    Recent Labs   Lab 11/07/24 1742 11/08/24 0342 11/09/24 0425   WBC 9.63 12.87* 7.21   RBC 4.73 3.71* 4.11*   HGB 13.5 10.8* 11.7*   HCT 42.2 34.1* 37.3   MCV 89.2 91.9 90.8   MCH 28.5 29.1 28.5   MCHC 32.0* 31.7* 31.4*   RDW 14.8 15.0 15.1    146 185   MPV 9.3 9.7 9.7       Recent Labs   Lab 11/07/24 1742 11/08/24 0342 11/09/24 0425    127* 142   K 4.3 3.2* 3.8    101 110*   CO2 24 19* 23   BUN 16.2 13.5 8.4*   CREATININE 0.82 0.84 0.75   CALCIUM 9.5 7.5* 8.5   ALBUMIN 3.4 2.6* 2.9*   ALKPHOS 163* 131 126   ALT 37 27 27   AST 42* 44* 27   BILITOT 0.7 0.6 0.5     Microbiology Results (last 7 days)       Procedure Component Value Units Date/Time    Blood Culture [[id number]]  (Abnormal) Collected: 11/07/24 1742    Order Status: Completed Specimen: Blood Updated: 11/09/24 0657     GRAM STAIN Gram Positive Cocci, probable Staphylococcus      Seen in gram stain of broth only      1 of 1 Pediatric bottle positive    Blood Culture [[id number]]  (Abnormal) Collected: 11/08/24 1909    Order Status: Completed Specimen: Blood from [facility] Updated: 11/09/24 0657     GRAM STAIN Gram Positive Cocci, probable Staphylococcus      Seen in gram  no bladder dysfunction/no bowel dysfunction stain of broth only      2 of 2 bottles positive    BCID2 Panel [[id number]] Collected: 11/08/24 1909    Order Status: Canceled Specimen: Blood from Mediport Updated: 11/09/24 0644    Blood Culture [[id number]]  (Abnormal) Collected: 11/07/24 1742    Order Status: Completed Specimen: Blood Updated: 11/09/24 0623     GRAM STAIN Seen in gram stain of broth only      Gram Positive Cocci, probable Staphylococcus      1 of 1 Pediatric bottle positive    Blood Culture [[id number]] Collected: 11/08/24 1857    Order Status: Resulted Specimen: Blood from Arm, Left Updated: 11/08/24 1914    BCID2 Panel [[id number]]  (Abnormal) Collected: 11/07/24 1742    Order Status: Completed Specimen: Blood Updated: 11/08/24 1520     CTX-M (ESBL ) N/A     IMP (Cabapenemase ) N/A     KPC resistance gene (Carbapenemase ) N/A     mcr-1 N/A     mecA ID Not Detected     Comment: Note: Antimicrobial resistance can occur via multiple mechanisms. A Not Detected result for antimicrobial resistance gene(s) does not indicate antimicrobial susceptibility. Subculturing is required for species identification and susceptibility testing of   isolates.        mecA/C and MREJ (MRSA) gene N/A     NDM (Carbapenemase ) N/A     OXA-48-like (Carbapenemase ) N/A     Maria Luisa/B (VRE gene) N/A     VIM (Carbapenemase ) N/A     Enterococcus faecalis Not Detected     Enterococcus faecium Not Detected     Listeria monocytogenes Not Detected     Staphylococcus spp. Detected     Staphylococcus aureus Not Detected     Staphylococcus epidermidis Detected     Staphylococcus lugdunensis Not Detected     Streptococcus spp. Not Detected     Streptococcus agalactiae (Group B) Not Detected     Streptococcus pneumoniae Not Detected     Streptococcus pyogenes (Group A) Not Detected     Acinetobacter calcoaceticus/baumannii complex Not Detected     Bacteroides fragilis Not Detected     Enterobacterales Not Detected     Enterobacter  cloacae complex Not Detected     Escherichia coli Not Detected     Klebsiella aerogenes Not Detected     Klebsiella oxytoca Not Detected     Klebsiella pneumoniae group Not Detected     Proteus spp. Not Detected     Salmonella spp. Not Detected     Serratia marcescens Not Detected     Haemophilus influenzae Not Detected     Neisseria meningitidis Not Detected     Pseudomonas aeruginosa Not Detected     Stenotrophomonas maltophilia Not Detected     Candida albicans Not Detected     Candida auris Not Detected     Candida glabrata Not Detected     Candida krusei Not Detected     Candida parapsilosis Not Detected     Candida tropicalis Not Detected     Cryptococcus neoformans/gattii Not Detected    Narrative:      The [employer] BCID2 Panel is a multiplexed nucleic acid test intended for the use with Podaddies® 2.0 or Podaddies® Nextiva Systems for the simultaneous qualitative detection and identification of multiple bacterial and yeast nucleic acids and select genetic determinants associated with antimicrobial resistance.  The [employer] BCID2 Panel test is performed directly on blood culture samples identified as positive by a continuous monitoring blood culture system.  Results are intended to be interpreted in conjunction with Gram stain results.    Respiratory Culture [[id number]]     Order Status: Sent Specimen: Sputum, Expectorated                Medications for Hospital Course     Scheduled Med:   busPIRone  20 mg Oral BID    citalopram  40 mg Oral Daily    mirtazapine  15 mg Oral QHS    mupirocin   Nasal BID    phenazopyridine  100 mg Oral TID WM    warfarin  2.5 mg Oral Daily      PRN Meds:    Current Facility-Administered Medications:     acetaminophen, 650 mg, Oral, Q8H PRN    acetaminophen, 650 mg, Oral, Q4H PRN    melatonin, 6 mg, Oral, Nightly PRN    sodium chloride 0.9%, 10 mL, Intravenous, PRN    Pharmacy to dose Vancomycin consult, , , Once **AND** vancomycin - pharmacy to dose, ,  Intravenous, pharmacy to manage frequency     Assessment and Plan          Chronic medical issues:  has a past medical history of Acute URI, Anxiety and depression, Back pain, Bacteremia (04/29/2024), Breast cancer, Cholelithiasis, Contaminated small bowel syndrome, DVT (deep venous thrombosis), Edema, lower extremity, Gallstone pancreatitis, Heart murmur, HTN (hypertension), Insomnia, Irregular heart beat, Ischemic disease of gut, Kidney stones, Laryngitis, Malabsorption, Malnutrition, unspecified type, Meningitis, unspecified, OA (osteoarthritis), PVD (peripheral vascular disease), Rheumatoid arthritis, unspecified, Staph infection, Tremor, Unspecified cataract, and Unspecified cirrhosis of liver (06/20/2023)..    __________________________________________________________________________________________________________________________________    Patient reports fevers at home.  No fever in ED-on admission.     Blood culture soft positive will continue vancomycin.   Etiology of bacteremia not well defined (mediport placed greater than 7months ago)  TTE 6/11/2024 negative; consider repeat if blood cultures remain positive    Repeat 2nd blood cultures pending for a.m.   Discontinue cefepime for now  Continue supportive care  Continue checking vital signs q4hrs.  Reviewed and restarted appropriate home medications.     DVT prophylaxis initiated   Nurse notified to page me if any changes occur       Anticipated discharge and Disposition when medically stable:TBD    Therapy: PT/OT/Speech--as indicated     Nutrition: Diet Adult Regular Lactos     _______________________________________________________________________________________________________________________________      I have spent 40 minutes on the day of the visit; time spent includes face to face time and non-face to face time preparing to see the patient (eg, review of tests), independently reviewing and interpreting medical records, both past and current;  documenting clinical information in the electronic or other health record, and communicating results to the patient/family/caregiver and care coordinator and nursing team.      All diagnosis and differential diagnosis have been reviewed,  interpreted and communicated appropriately to care team. assessment and plan has been documented; I have personally reviewed the labs and test results that are presently available and pertinent to this hospital course; I have reviewed medical records based upon their availability.    All of the patient's questions have been  addressed and answered. Patient's is agreeable to the above stated plan.   I will continue to monitor closely and make adjustments to medical management as needed.    Gabrielle Butler,      11/09/2024     This note was created with the assistance of Dragon voice recognition software. There may be transcription errors as a result of using this technology however minimal. Effort has been made to assure accuracy of transcription but any obvious errors or omissions should be clarified with the author of the document.

## 2024-11-10 LAB
ANION GAP SERPL CALC-SCNC: 9 MEQ/L
BACTERIA BLD CULT: ABNORMAL
BACTERIA BLD CULT: ABNORMAL
BUN SERPL-MCNC: 10.2 MG/DL (ref 9.8–20.1)
CALCIUM SERPL-MCNC: 8.4 MG/DL (ref 8.4–10.2)
CHLORIDE SERPL-SCNC: 112 MMOL/L (ref 98–107)
CO2 SERPL-SCNC: 20 MMOL/L (ref 23–31)
CREAT SERPL-MCNC: 0.67 MG/DL (ref 0.55–1.02)
CREAT/UREA NIT SERPL: 15
GFR SERPLBLD CREATININE-BSD FMLA CKD-EPI: >60 ML/MIN/1.73/M2
GLUCOSE SERPL-MCNC: 113 MG/DL (ref 82–115)
GRAM STN SPEC: ABNORMAL
INR PPP: 2.2
MAGNESIUM SERPL-MCNC: 2 MG/DL (ref 1.6–2.6)
PHOSPHATE SERPL-MCNC: 2.7 MG/DL (ref 2.3–4.7)
POTASSIUM SERPL-SCNC: 3.7 MMOL/L (ref 3.5–5.1)
PROTHROMBIN TIME: 24.8 SECONDS (ref 12.5–14.5)
SODIUM SERPL-SCNC: 141 MMOL/L (ref 136–145)
TRIGL SERPL-MCNC: 100 MG/DL (ref 37–140)
VANCOMYCIN TROUGH SERPL-MCNC: 9 UG/ML (ref 15–20)

## 2024-11-10 PROCEDURE — 63600175 PHARM REV CODE 636 W HCPCS: Performed by: INTERNAL MEDICINE

## 2024-11-10 PROCEDURE — 80048 BASIC METABOLIC PNL TOTAL CA: CPT | Performed by: INTERNAL MEDICINE

## 2024-11-10 PROCEDURE — 25000003 PHARM REV CODE 250: Performed by: INTERNAL MEDICINE

## 2024-11-10 PROCEDURE — 84478 ASSAY OF TRIGLYCERIDES: CPT | Performed by: INTERNAL MEDICINE

## 2024-11-10 PROCEDURE — 83735 ASSAY OF MAGNESIUM: CPT | Performed by: INTERNAL MEDICINE

## 2024-11-10 PROCEDURE — 85610 PROTHROMBIN TIME: CPT | Performed by: INTERNAL MEDICINE

## 2024-11-10 PROCEDURE — 21400001 HC TELEMETRY ROOM

## 2024-11-10 PROCEDURE — 87184 SC STD DISK METHOD PER PLATE: CPT | Performed by: STUDENT IN AN ORGANIZED HEALTH CARE EDUCATION/TRAINING PROGRAM

## 2024-11-10 PROCEDURE — 36415 COLL VENOUS BLD VENIPUNCTURE: CPT | Performed by: INTERNAL MEDICINE

## 2024-11-10 PROCEDURE — 84100 ASSAY OF PHOSPHORUS: CPT | Performed by: INTERNAL MEDICINE

## 2024-11-10 PROCEDURE — 25000003 PHARM REV CODE 250: Performed by: STUDENT IN AN ORGANIZED HEALTH CARE EDUCATION/TRAINING PROGRAM

## 2024-11-10 PROCEDURE — 36415 COLL VENOUS BLD VENIPUNCTURE: CPT | Performed by: STUDENT IN AN ORGANIZED HEALTH CARE EDUCATION/TRAINING PROGRAM

## 2024-11-10 PROCEDURE — 80202 ASSAY OF VANCOMYCIN: CPT | Performed by: STUDENT IN AN ORGANIZED HEALTH CARE EDUCATION/TRAINING PROGRAM

## 2024-11-10 PROCEDURE — 63600175 PHARM REV CODE 636 W HCPCS: Performed by: STUDENT IN AN ORGANIZED HEALTH CARE EDUCATION/TRAINING PROGRAM

## 2024-11-10 RX ADMIN — MUPIROCIN: 20 OINTMENT TOPICAL at 10:11

## 2024-11-10 RX ADMIN — VANCOMYCIN HYDROCHLORIDE 1500 MG: 1.5 INJECTION, POWDER, LYOPHILIZED, FOR SOLUTION INTRAVENOUS at 01:11

## 2024-11-10 RX ADMIN — PHENAZOPYRIDINE HYDROCHLORIDE 100 MG: 100 TABLET ORAL at 10:11

## 2024-11-10 RX ADMIN — PHENAZOPYRIDINE HYDROCHLORIDE 100 MG: 100 TABLET ORAL at 12:11

## 2024-11-10 RX ADMIN — PHENAZOPYRIDINE HYDROCHLORIDE 100 MG: 100 TABLET ORAL at 05:11

## 2024-11-10 RX ADMIN — WARFARIN SODIUM 2.5 MG: 2.5 TABLET ORAL at 05:11

## 2024-11-10 RX ADMIN — BUSPIRONE HYDROCHLORIDE 20 MG: 5 TABLET ORAL at 09:11

## 2024-11-10 RX ADMIN — MUPIROCIN: 20 OINTMENT TOPICAL at 09:11

## 2024-11-10 RX ADMIN — MIRTAZAPINE 15 MG: 15 TABLET, FILM COATED ORAL at 09:11

## 2024-11-10 RX ADMIN — FERROUS SULFATE TAB 325 MG (65 MG ELEMENTAL FE) 1 EACH: 325 (65 FE) TAB at 10:11

## 2024-11-10 RX ADMIN — BUSPIRONE HYDROCHLORIDE 20 MG: 5 TABLET ORAL at 10:11

## 2024-11-10 RX ADMIN — VANCOMYCIN HYDROCHLORIDE 1000 MG: 1 INJECTION, POWDER, LYOPHILIZED, FOR SOLUTION INTRAVENOUS at 02:11

## 2024-11-10 RX ADMIN — CITALOPRAM HYDROBROMIDE 40 MG: 20 TABLET ORAL at 10:11

## 2024-11-10 NOTE — PROGRESS NOTES
[facility] Hospital Medicine Progress Note      Chief Complaint: fever        HPI: (personally reviewed by me and is documented from initial H&P)     Laura Acosta is a 78 y.o. female who  has a past medical history of Acute URI, Anxiety and depression, Back pain, Bacteremia (04/29/2024), Breast cancer, Cholelithiasis, Contaminated small bowel syndrome, DVT (deep venous thrombosis), Edema, lower extremity, Gallstone pancreatitis, Heart murmur, HTN (hypertension), Insomnia, Irregular heart beat, Ischemic disease of gut, Kidney stones, Laryngitis, Malabsorption, Malnutrition, unspecified type, Meningitis, unspecified, OA (osteoarthritis), PVD (peripheral vascular disease), Rheumatoid arthritis, unspecified, Staph infection, Tremor, Unspecified cataract, and Unspecified cirrhosis of liver (06/20/2023)..     She has also had previous MRSA bacteremia in the setting of a retained infected MediPort, and additional past medical history as below presents again with recurrent fevers and rigors at home.  She was had episodes of recurrent fevers in the past associated with MSSA bacteremia from an infected retained MediPort which was ultimately removed in April of 2024.  In October of 2024 she had recurrent fevers as well in the setting of COVID-19 and community-acquired pneumonia was briefly hospitalized that time with overall unremarkable workup and cultures.     presents to the ER again reporting fevers over the last few days as well as generalized weakness with temperatures at home as high as 102.  This is despite recently being placed on outpatient antibiotics for community-acquired pneumonia per her PCP.  She arrived afebrile and hemodynamically stable initially satting well on room air but ultimately required 2 L due to sats dropping to the low 90s.  Laboratory work was overall unremarkable and CT chest abdomen and pelvis showed a right lower lung new opacity as well as excessive fluid within  the small bowel possibly representing enterocolitis.  Broad-spectrum antibiotics were initiated and hospitalist was consulted for admission.      Interval History:        11/10/2024 no overnight events.  No new complaints  No family present at bedside at this time    Objective Assessment:  Physical Exam      Vital signs have been personally reviewed by me   General: Appears comfortable, no acute distress.  Neuro: awake,alert, oriented    Integumentary: Warm, dry, intact.  Musculoskeletal: Purposeful movement noted.     Respiratory: No accessory muscle use. Breath sounds are equal.  Cardiovascular: Regular rate.       VITAL SIGNS: 24 HRS MIN & MAX LAST   Temp  Min: 97.5 °F (36.4 °C)  Max: 98.3 °F (36.8 °C) 98 °F (36.7 °C)   BP  Min: 118/58  Max: 139/75 136/69   Pulse  Min: 67  Max: 95  74   No data recorded 16   SpO2  Min: 93 %  Max: 97 % 97 %     CT Chest Abdomen Pelvis With IV Contrast (XPD) NO Oral Contrast  Narrative: EXAMINATION:  CT CHEST ABDOMEN PELVIS WITH IV CONTRAST (XPD)    CLINICAL HISTORY:  Sepsis;    TECHNIQUE:  Multidetector axial images were obtained from the thoracic inlet through the greater trochanters following the administration of IV contrast.    Dose length product of 785 mGycm. Automated exposure control was utilized to minimize radiation dose.    COMPARISON:  CT chest and CT abdomen pelvis October 18, 2024.    CHEST FINDINGS:    Bilateral lungs are remarkable for peripheral subpleural fine to coarse reticulations consistent with fibrosis.  Previous exam was remarkable for right middle lung lobe and lingular segment some infiltrates which show interval improvement.  There are subtle new vague opacities which involve the right right lower lung lobe on image 70 series 4 and may be inflammatory or infectious.  There is no pulmonary edema.  No cavitary abnormality.  No fluid within the pleural and pericardial spaces.  Thoracic aorta is without aneurysmal dilatation and there are no signs of  dissection.  Cardiac chamber size is within normal limits.  Left chest implanted tunnel castro catheter terminates within the superior vena cava.  Right breast medial aspect rim calcified density.  Thoracic kyphoscoliosis and degenerative changes.    ABDOMINAL FINDINGS:    Liver, mildly atrophic pancreas and the spleen are without acute findings.  Gallbladder is surgically absent.  There is no apparent dilatation of ducts.  Adrenals are unremarkable.  Left kidney mild cortical scarring.  Left kidney medial cortical small hypodensity is stable and is favored to be a cyst and for this no specific follow-up imaging is recommended.  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  There are no perinephric strandings.  No retroperitoneal periaortic enlarged lymph nodes.    Stomach is mostly decompressed.  Mild excessive fluid is present within loops of small bowel without transition or bowel obstruction.  Similarly, there is some excessive fluid within the colon.  Similar appearance of ileocolic anastomosis.  No inflammatory strandings identified.  Extensive noninflamed diverticulosis coli involving the descending and the sigmoid colon.  No bowel obstruction.  No free fluid.    PELVIC FINDINGS:    Urinary bladder wall is not thickened.  No pelvic free fluid.    Thoracolumbar degenerative changes.  Scoliosis.  Impression: 1.  Right lower lung lobe small new vague opacity may be inflammatory or infectious.  Details of other chest findings above.    2.  Mild excessive fluid within loops of small bowel and the right colon could represent enterocolitis.  Details of other abdominopelvic findings above.    Electronically signed by: Isaac Richardson  Date:    11/07/2024  Time:    20:47  X-Ray Chest PA And Lateral  Narrative: EXAMINATION:  XR CHEST PA AND LATERAL    CLINICAL HISTORY:  Fever;    TECHNIQUE:  PA and lateral chest radiographs    COMPARISON:  Chest x-ray dated 11/07/2024    FINDINGS:  Left chest  wall port catheter remains in place.  The heart is normal in size.  There is no focal airspace consolidation.  There is no pleural effusion or visible pneumothorax.  Impression: No acute abnormality of the chest.    Electronically signed by: Isabel Brasher  Date:    11/07/2024  Time:    16:46  X-Ray Chest 1 View  See  for faxed results.     IMPRESSION: Please see  or link in Chart Review from order   for report      This procedure was auto-finalized by: Virtual Radiologist    Recent Labs   Lab 11/07/24 1742 11/08/24 0342 11/09/24 0425   WBC 9.63 12.87* 7.21   RBC 4.73 3.71* 4.11*   HGB 13.5 10.8* 11.7*   HCT 42.2 34.1* 37.3   MCV 89.2 91.9 90.8   MCH 28.5 29.1 28.5   MCHC 32.0* 31.7* 31.4*   RDW 14.8 15.0 15.1    146 185   MPV 9.3 9.7 9.7       Recent Labs   Lab 11/07/24 1742 11/08/24 0342 11/09/24 0425 11/10/24  0110    127* 142 141   K 4.3 3.2* 3.8 3.7    101 110* 112*   CO2 24 19* 23 20*   BUN 16.2 13.5 8.4* 10.2   CREATININE 0.82 0.84 0.75 0.67   CALCIUM 9.5 7.5* 8.5 8.4   MG  --   --   --  2.00   ALBUMIN 3.4 2.6* 2.9*  --    ALKPHOS 163* 131 126  --    ALT 37 27 27  --    AST 42* 44* 27  --    BILITOT 0.7 0.6 0.5  --      Microbiology Results (last 7 days)       Procedure Component Value Units Date/Time    Blood Culture [[id number]]  (Abnormal) Collected: 11/08/24 1909    Order Status: Completed Specimen: Blood from [facility] Updated: 11/10/24 1133     Blood Culture Susceptibility To Follow      Staphylococcus epidermidis     GRAM STAIN Gram Positive Cocci, probable Staphylococcus      Seen in gram stain of broth only      2 of 2 bottles positive    Blood Culture [[id number]]  (Abnormal) Collected: 11/08/24 1857    Order Status: Completed Specimen: Blood from Arm, Left Updated: 11/10/24 0651     GRAM STAIN Gram Positive Cocci, probable Staphylococcus      Seen in gram stain of broth only      1 of 2 Anaerobic bottles positive    Blood Culture [[id number]]   (Abnormal)  (Susceptibility) Collected: 11/07/24 1742    Order Status: Completed Specimen: Blood Updated: 11/10/24 0632     Blood Culture Staphylococcus epidermidis     GRAM STAIN Seen in gram stain of broth only      Gram Positive Cocci, probable Staphylococcus      1 of 1 Pediatric bottle positive    Blood Culture [[id number]]  (Abnormal)  (Susceptibility) Collected: 11/07/24 1742    Order Status: Completed Specimen: Blood Updated: 11/10/24 0632     Blood Culture Staphylococcus epidermidis     GRAM STAIN Gram Positive Cocci, probable Staphylococcus      Seen in gram stain of broth only      1 of 1 Pediatric bottle positive    BCID2 Panel [[id number]] Collected: 11/08/24 1909    Order Status: Canceled Specimen: Blood from Mediport Updated: 11/09/24 0644    BCID2 Panel [[id number]]  (Abnormal) Collected: 11/07/24 1742    Order Status: Completed Specimen: Blood Updated: 11/08/24 1520     CTX-M (ESBL ) N/A     IMP (Cabapenemase ) N/A     KPC resistance gene (Carbapenemase ) N/A     mcr-1 N/A     mecA ID Not Detected     Comment: Note: Antimicrobial resistance can occur via multiple mechanisms. A Not Detected result for antimicrobial resistance gene(s) does not indicate antimicrobial susceptibility. Subculturing is required for species identification and susceptibility testing of   isolates.        mecA/C and MREJ (MRSA) gene N/A     NDM (Carbapenemase ) N/A     OXA-48-like (Carbapenemase ) N/A     Maria Luisa/B (VRE gene) N/A     VIM (Carbapenemase ) N/A     Enterococcus faecalis Not Detected     Enterococcus faecium Not Detected     Listeria monocytogenes Not Detected     Staphylococcus spp. Detected     Staphylococcus aureus Not Detected     Staphylococcus epidermidis Detected     Staphylococcus lugdunensis Not Detected     Streptococcus spp. Not Detected     Streptococcus agalactiae (Group B) Not Detected     Streptococcus pneumoniae Not Detected     Streptococcus pyogenes  (Group A) Not Detected     Acinetobacter calcoaceticus/baumannii complex Not Detected     Bacteroides fragilis Not Detected     Enterobacterales Not Detected     Enterobacter cloacae complex Not Detected     Escherichia coli Not Detected     Klebsiella aerogenes Not Detected     Klebsiella oxytoca Not Detected     Klebsiella pneumoniae group Not Detected     Proteus spp. Not Detected     Salmonella spp. Not Detected     Serratia marcescens Not Detected     Haemophilus influenzae Not Detected     Neisseria meningitidis Not Detected     Pseudomonas aeruginosa Not Detected     Stenotrophomonas maltophilia Not Detected     Candida albicans Not Detected     Candida auris Not Detected     Candida glabrata Not Detected     Candida krusei Not Detected     Candida parapsilosis Not Detected     Candida tropicalis Not Detected     Cryptococcus neoformans/gattii Not Detected    Narrative:      The [employer] BCID2 Panel is a multiplexed nucleic acid test intended for the use with [employer].0 or [employer] for the simultaneous qualitative detection and identification of multiple bacterial and yeast nucleic acids and select genetic determinants associated with antimicrobial resistance.  The [employer] BCID2 Panel test is performed directly on blood culture samples identified as positive by a continuous monitoring blood culture system.  Results are intended to be interpreted in conjunction with Gram stain results.    Respiratory Culture [[id number]]     Order Status: Sent Specimen: Sputum, Expectorated                Medications for Hospital Course     Scheduled Med:   busPIRone  20 mg Oral BID    citalopram  40 mg Oral Daily    [START ON 11/11/2024] fat emulsion 20%  250 mL Intravenous Every Mon, Thurs    ferrous sulfate  1 tablet Oral Daily    mirtazapine  15 mg Oral QHS    mupirocin   Nasal BID    phenazopyridine  100 mg Oral TID WM    vancomycin (VANCOCIN) 1,000 mg in D5W 250 mL IVPB (admixture  device)  1,000 mg Intravenous Q12H    warfarin  2.5 mg Oral Daily      PRN Meds:    Current Facility-Administered Medications:     acetaminophen, 650 mg, Oral, Q8H PRN    acetaminophen, 650 mg, Oral, Q4H PRN    melatonin, 6 mg, Oral, Nightly PRN    sodium chloride 0.9%, 10 mL, Intravenous, PRN    Flushing PICC/Midline Protocol, , , Until Discontinued **AND** sodium chloride 0.9%, 10 mL, Intravenous, Q12H PRN    vancomycin - pharmacy to dose, , Intravenous, pharmacy to manage frequency     Assessment and Plan          Chronic medical issues:  has a past medical history of Acute URI, Anxiety and depression, Back pain, Bacteremia (04/29/2024), Breast cancer, Cholelithiasis, Contaminated small bowel syndrome, DVT (deep venous thrombosis), Edema, lower extremity, Gallstone pancreatitis, Heart murmur, HTN (hypertension), Insomnia, Irregular heart beat, Ischemic disease of gut, Kidney stones, Laryngitis, Malabsorption, Malnutrition, unspecified type, Meningitis, unspecified, OA (osteoarthritis), PVD (peripheral vascular disease), Rheumatoid arthritis, unspecified, Staph infection, Tremor, Unspecified cataract, and Unspecified cirrhosis of liver (06/20/2023)..    __________________________________________________________________________________________________________________________________    Patient reports fevers at home.  No fever in ED-on admission.     Blood culture soft positive will continue vancomycin.   Etiology of bacteremia not well defined (mediport placed greater than 7months ago)  TTE 6/11/2024 negative; consider repeat if blood cultures remain positive  Avoid use of MediPort for now  Midline in place.   Hold off on PICC line in setting of persistent bacteremia.  Thus will need to hold TPN  Will add IV fluid hydration in setting of above.       Repeat 2nd blood cultures pending for a.m.   Discontinue cefepime for now  Continue supportive care  Continue checking vital signs q4hrs.  Reviewed and restarted  appropriate home medications.     DVT prophylaxis initiated   Nurse notified to page me if any changes occur       Anticipated discharge and Disposition when medically stable:TBD    Therapy: PT/OT/Speech--as indicated     Nutrition: Diet Adult Regular Lactos     _______________________________________________________________________________________________________________________________      I have spent 40 minutes on the day of the visit; time spent includes face to face time and non-face to face time preparing to see the patient (eg, review of tests), independently reviewing and interpreting medical records, both past and current; documenting clinical information in the electronic or other health record, and communicating results to the patient/family/caregiver and care coordinator and nursing team.      All diagnosis and differential diagnosis have been reviewed,  interpreted and communicated appropriately to care team. assessment and plan has been documented; I have personally reviewed the labs and test results that are presently available and pertinent to this hospital course; I have reviewed medical records based upon their availability.    All of the patient's questions have been  addressed and answered. Patient's is agreeable to the above stated plan.   I will continue to monitor closely and make adjustments to medical management as needed.    Gabrielle Butler DO     11/10/2024     This note was created with the assistance of Dragon voice recognition software. There may be transcription errors as a result of using this technology however minimal. Effort has been made to assure accuracy of transcription but any obvious errors or omissions should be clarified with the author of the document.

## 2024-11-10 NOTE — PROGRESS NOTES
Pharmacokinetic Assessment Follow Up: IV Vancomycin    Vancomycin serum concentration assessment(s):    The trough level was drawn correctly and can be used to guide therapy at this time. The measurement is below the desired definitive target range of 10 to 20 mcg/mL.    Vancomycin Regimen Plan:    Change regimen to Vancomycin 1000 mg IV every 12 hours with next serum trough concentration measured at 1300 prior to 4th dose on 11/11    Scheduled Administration Times    0200  1400    Drug levels (last 3 results):  Recent Labs   Lab Result Units 11/10/24  0110   Vancomycin Trough ug/ml 9.0*       Vancomycin Administrations:  vancomycin given in the last 96 hours                     vancomycin 1,500 mg in D5W 250 mL IVPB (admixture device) (mg) 1,500 mg New Bag 11/10/24 0147    vancomycin 1.5 g in dextrose 5 % 250 mL IVPB (ready to mix) (mg) 1,500 mg New Bag 11/09/24 0117    vancomycin (VANCOCIN) 1,750 mg in D5W 500 mL IVPB (mg) 1,750 mg New Bag 11/08/24 0216                    Pharmacy will continue to follow and monitor vancomycin.    Please contact pharmacy at extension [phone] for questions regarding this assessment.    Thank you for the consult,   Sheyla Carreno       Patient brief summary:  Laura Acosta is a 78 y.o. female initiated on antimicrobial therapy with IV Vancomycin for treatment of bacteremia    The patient's current regimen is vancomycin 1000 mg IV Q12h    Drug Allergies:   Review of patient's allergies indicates:   Allergen Reactions    Hydromorphone Itching     Other reaction(s): itching    Opium      Other reaction(s): itching  has itching with larger doses. Smaller doses do not cause a reaction.       Actual Body Weight:  Wt Readings from Last 1 Encounters:   11/07/24 71.5 kg (157 lb 10.1 oz)       Renal Function:   Estimated Creatinine Clearance: 59.9 mL/min (based on SCr of 0.75 mg/dL).,     Dialysis Method (if applicable):  N/A    CBC (last 72 hours):  Recent Labs   Lab Result Units 11/07/24  1411  11/07/24  1742 11/08/24 0342 11/09/24  0425   WBC x10(3)/mcL 7.60 9.63 12.87* 7.21   Hgb g/dL 13.3 13.5 10.8* 11.7*   Hct % 41.9 42.2 34.1* 37.3   Platelet x10(3)/mcL 220 170 146 185   Mono % % 12.4 6.3 10.0 11.0   Eos % %  --  0.7 0.4 2.5   Basophil % %  --  0.3 0.3 0.4       Metabolic Panel (last 72 hours):  Recent Labs   Lab Result Units 11/07/24  1351 11/07/24  1411 11/07/24 1742 11/08/24 0342 11/09/24  0425   Sodium mmol/L  --  140 139 127* 142   Potassium mmol/L  --  3.7 4.3 3.2* 3.8   Chloride mmol/L  --  103 106 101 110*   CO2 mmol/L  --  26 24 19* 23   Glucose mg/dL  --  130* 120* 472* 97   Glucose, UA  Negative  --   --   --   --    Blood Urea Nitrogen mg/dL  --  18 16.2 13.5 8.4*   Creatinine mg/dL  --  0.72 0.82 0.84 0.75   Albumin g/dL  --  3.8 3.4 2.6* 2.9*   Bilirubin Total mg/dL  --  0.7 0.7 0.6 0.5   ALP unit/L  --  121 163* 131 126   AST unit/L  --  38* 42* 44* 27   ALT unit/L  --  32 37 27 27       Microbiologic Results:  Microbiology Results (last 7 days)       Procedure Component Value Units Date/Time    Blood Culture [[id number]]  (Abnormal) Collected: 11/08/24 1857    Order Status: Completed Specimen: Blood from Arm, Left Updated: 11/09/24 2232     Blood Culture No Growth At 24 Hours     GRAM STAIN Gram Positive Cocci, probable Staphylococcus      Seen in gram stain of broth only      1 of 2 Anaerobic bottles positive    Blood Culture [[id number]]  (Abnormal) Collected: 11/07/24 1742    Order Status: Completed Specimen: Blood Updated: 11/09/24 1156     Blood Culture Susceptibility To Follow      Staphylococcus epidermidis     GRAM STAIN Gram Positive Cocci, probable Staphylococcus      Seen in gram stain of broth only      1 of 1 Pediatric bottle positive    Blood Culture [[id number]]  (Abnormal) Collected: 11/07/24 9622    Order Status: Completed Specimen: Blood Updated: 11/09/24 1154     Blood Culture Susceptibility To Follow      Staphylococcus epidermidis     GRAM STAIN Seen in gram  stain of broth only      Gram Positive Cocci, probable Staphylococcus      1 of 1 Pediatric bottle positive    Blood Culture [[id number]]  (Abnormal) Collected: 11/08/24 1909    Order Status: Completed Specimen: Blood from [facility] Updated: 11/09/24 0657     GRAM STAIN Gram Positive Cocci, probable Staphylococcus      Seen in gram stain of broth only      2 of 2 bottles positive    BCID2 Panel [[id number]] Collected: 11/08/24 1909    Order Status: Canceled Specimen: Blood from [facility] Updated: 11/09/24 0644    BCID2 Panel [[id number]]  (Abnormal) Collected: 11/07/24 1742    Order Status: Completed Specimen: Blood Updated: 11/08/24 1520     CTX-M (ESBL ) N/A     IMP (Cabapenemase ) N/A     KPC resistance gene (Carbapenemase ) N/A     mcr-1 N/A     mecA ID Not Detected     Comment: Note: Antimicrobial resistance can occur via multiple mechanisms. A Not Detected result for antimicrobial resistance gene(s) does not indicate antimicrobial susceptibility. Subculturing is required for species identification and susceptibility testing of   isolates.        mecA/C and MREJ (MRSA) gene N/A     NDM (Carbapenemase ) N/A     OXA-48-like (Carbapenemase ) N/A     Maria Luisa/B (VRE gene) N/A     VIM (Carbapenemase ) N/A     Enterococcus faecalis Not Detected     Enterococcus faecium Not Detected     Listeria monocytogenes Not Detected     Staphylococcus spp. Detected     Staphylococcus aureus Not Detected     Staphylococcus epidermidis Detected     Staphylococcus lugdunensis Not Detected     Streptococcus spp. Not Detected     Streptococcus agalactiae (Group B) Not Detected     Streptococcus pneumoniae Not Detected     Streptococcus pyogenes (Group A) Not Detected     Acinetobacter calcoaceticus/baumannii complex Not Detected     Bacteroides fragilis Not Detected     Enterobacterales Not Detected     Enterobacter cloacae complex Not Detected     Escherichia coli Not Detected     Klebsiella  aerogenes Not Detected     Klebsiella oxytoca Not Detected     Klebsiella pneumoniae group Not Detected     Proteus spp. Not Detected     Salmonella spp. Not Detected     Serratia marcescens Not Detected     Haemophilus influenzae Not Detected     Neisseria meningitidis Not Detected     Pseudomonas aeruginosa Not Detected     Stenotrophomonas maltophilia Not Detected     Candida albicans Not Detected     Candida auris Not Detected     Candida glabrata Not Detected     Candida krusei Not Detected     Candida parapsilosis Not Detected     Candida tropicalis Not Detected     Cryptococcus neoformans/gattii Not Detected    Narrative:      The [employer] BCID2 Panel is a multiplexed nucleic acid test intended for the use with [employer]0 or [employer] for the simultaneous qualitative detection and identification of multiple bacterial and yeast nucleic acids and select genetic determinants associated with antimicrobial resistance.  The [employer] BCID2 Panel test is performed directly on blood culture samples identified as positive by a continuous monitoring blood culture system.  Results are intended to be interpreted in conjunction with Gram stain results.    Respiratory Culture [[id number]]     Order Status: Sent Specimen: Sputum, Expectorated

## 2024-11-11 LAB
ANION GAP SERPL CALC-SCNC: 7 MEQ/L
BACTERIA BLD CULT: ABNORMAL
BUN SERPL-MCNC: 10.5 MG/DL (ref 9.8–20.1)
CALCIUM SERPL-MCNC: 8 MG/DL (ref 8.4–10.2)
CHLORIDE SERPL-SCNC: 113 MMOL/L (ref 98–107)
CO2 SERPL-SCNC: 19 MMOL/L (ref 23–31)
CREAT SERPL-MCNC: 0.63 MG/DL (ref 0.55–1.02)
CREAT/UREA NIT SERPL: 17
ERYTHROCYTE [DISTWIDTH] IN BLOOD BY AUTOMATED COUNT: 14.9 % (ref 11.5–17)
GFR SERPLBLD CREATININE-BSD FMLA CKD-EPI: >60 ML/MIN/1.73/M2
GLUCOSE SERPL-MCNC: 103 MG/DL (ref 82–115)
GRAM STN SPEC: ABNORMAL
HCT VFR BLD AUTO: 37.8 % (ref 37–47)
HGB BLD-MCNC: 12.2 G/DL (ref 12–16)
INR PPP: 2.3
MAGNESIUM SERPL-MCNC: 1.9 MG/DL (ref 1.6–2.6)
MCH RBC QN AUTO: 28.8 PG (ref 27–31)
MCHC RBC AUTO-ENTMCNC: 32.3 G/DL (ref 33–36)
MCV RBC AUTO: 89.2 FL (ref 80–94)
NRBC BLD AUTO-RTO: 0 %
PHOSPHATE SERPL-MCNC: 3.1 MG/DL (ref 2.3–4.7)
PLATELET # BLD AUTO: 184 X10(3)/MCL (ref 130–400)
PMV BLD AUTO: 8.8 FL (ref 7.4–10.4)
POTASSIUM SERPL-SCNC: 4 MMOL/L (ref 3.5–5.1)
PROTHROMBIN TIME: 25.3 SECONDS (ref 12.5–14.5)
RBC # BLD AUTO: 4.24 X10(6)/MCL (ref 4.2–5.4)
SODIUM SERPL-SCNC: 139 MMOL/L (ref 136–145)
VANCOMYCIN TROUGH SERPL-MCNC: 19.5 UG/ML (ref 15–20)
WBC # BLD AUTO: 7.6 X10(3)/MCL (ref 4.5–11.5)

## 2024-11-11 PROCEDURE — 36415 COLL VENOUS BLD VENIPUNCTURE: CPT | Performed by: INTERNAL MEDICINE

## 2024-11-11 PROCEDURE — 36410 VNPNXR 3YR/> PHY/QHP DX/THER: CPT

## 2024-11-11 PROCEDURE — 84100 ASSAY OF PHOSPHORUS: CPT | Performed by: INTERNAL MEDICINE

## 2024-11-11 PROCEDURE — 36415 COLL VENOUS BLD VENIPUNCTURE: CPT

## 2024-11-11 PROCEDURE — 25000003 PHARM REV CODE 250: Performed by: STUDENT IN AN ORGANIZED HEALTH CARE EDUCATION/TRAINING PROGRAM

## 2024-11-11 PROCEDURE — 80048 BASIC METABOLIC PNL TOTAL CA: CPT | Performed by: INTERNAL MEDICINE

## 2024-11-11 PROCEDURE — 85610 PROTHROMBIN TIME: CPT | Performed by: INTERNAL MEDICINE

## 2024-11-11 PROCEDURE — C1751 CATH, INF, PER/CENT/MIDLINE: HCPCS

## 2024-11-11 PROCEDURE — B4185 PARENTERAL SOL 10 GM LIPIDS: HCPCS | Performed by: INTERNAL MEDICINE

## 2024-11-11 PROCEDURE — 83735 ASSAY OF MAGNESIUM: CPT | Performed by: INTERNAL MEDICINE

## 2024-11-11 PROCEDURE — 63600175 PHARM REV CODE 636 W HCPCS: Performed by: STUDENT IN AN ORGANIZED HEALTH CARE EDUCATION/TRAINING PROGRAM

## 2024-11-11 PROCEDURE — 21400001 HC TELEMETRY ROOM

## 2024-11-11 PROCEDURE — 99223 1ST HOSP IP/OBS HIGH 75: CPT | Mod: GC,,, | Performed by: STUDENT IN AN ORGANIZED HEALTH CARE EDUCATION/TRAINING PROGRAM

## 2024-11-11 PROCEDURE — 85027 COMPLETE CBC AUTOMATED: CPT

## 2024-11-11 PROCEDURE — 25000003 PHARM REV CODE 250: Performed by: INTERNAL MEDICINE

## 2024-11-11 PROCEDURE — 63600175 PHARM REV CODE 636 W HCPCS: Performed by: INTERNAL MEDICINE

## 2024-11-11 PROCEDURE — 80202 ASSAY OF VANCOMYCIN: CPT | Performed by: INTERNAL MEDICINE

## 2024-11-11 RX ORDER — BUSPIRONE HYDROCHLORIDE 15 MG/1
30 TABLET ORAL 2 TIMES DAILY
Status: DISCONTINUED | OUTPATIENT
Start: 2024-11-12 | End: 2024-11-20 | Stop reason: HOSPADM

## 2024-11-11 RX ORDER — BUSPIRONE HYDROCHLORIDE 5 MG/1
10 TABLET ORAL ONCE
Status: COMPLETED | OUTPATIENT
Start: 2024-11-11 | End: 2024-11-11

## 2024-11-11 RX ORDER — SODIUM CHLORIDE 0.9 % (FLUSH) 0.9 %
10 SYRINGE (ML) INJECTION EVERY 12 HOURS PRN
Status: DISCONTINUED | OUTPATIENT
Start: 2024-11-11 | End: 2024-11-20 | Stop reason: HOSPADM

## 2024-11-11 RX ORDER — SODIUM CHLORIDE, SODIUM LACTATE, POTASSIUM CHLORIDE, CALCIUM CHLORIDE 600; 310; 30; 20 MG/100ML; MG/100ML; MG/100ML; MG/100ML
INJECTION, SOLUTION INTRAVENOUS ONCE
Status: COMPLETED | OUTPATIENT
Start: 2024-11-11 | End: 2024-11-11

## 2024-11-11 RX ORDER — ENOXAPARIN SODIUM 100 MG/ML
40 INJECTION SUBCUTANEOUS EVERY 24 HOURS
Status: DISCONTINUED | OUTPATIENT
Start: 2024-11-12 | End: 2024-11-14

## 2024-11-11 RX ADMIN — I.V. FAT EMULSION 250 ML: 20 EMULSION INTRAVENOUS at 09:11

## 2024-11-11 RX ADMIN — ACETAMINOPHEN 650 MG: 325 TABLET, FILM COATED ORAL at 10:11

## 2024-11-11 RX ADMIN — PHENAZOPYRIDINE HYDROCHLORIDE 100 MG: 100 TABLET ORAL at 10:11

## 2024-11-11 RX ADMIN — MUPIROCIN: 20 OINTMENT TOPICAL at 08:11

## 2024-11-11 RX ADMIN — CITALOPRAM HYDROBROMIDE 40 MG: 20 TABLET ORAL at 09:11

## 2024-11-11 RX ADMIN — VANCOMYCIN HYDROCHLORIDE 1000 MG: 1 INJECTION, POWDER, LYOPHILIZED, FOR SOLUTION INTRAVENOUS at 04:11

## 2024-11-11 RX ADMIN — PHENAZOPYRIDINE HYDROCHLORIDE 100 MG: 100 TABLET ORAL at 04:11

## 2024-11-11 RX ADMIN — BUSPIRONE HYDROCHLORIDE 10 MG: 5 TABLET ORAL at 02:11

## 2024-11-11 RX ADMIN — SODIUM CHLORIDE, POTASSIUM CHLORIDE, SODIUM LACTATE AND CALCIUM CHLORIDE: 600; 310; 30; 20 INJECTION, SOLUTION INTRAVENOUS at 10:11

## 2024-11-11 RX ADMIN — VANCOMYCIN HYDROCHLORIDE 1000 MG: 1 INJECTION, POWDER, LYOPHILIZED, FOR SOLUTION INTRAVENOUS at 02:11

## 2024-11-11 RX ADMIN — LEUCINE, PHENYLALANINE, LYSINE, METHIONINE, ISOLEUCINE, VALINE, HISTIDINE, THREONINE, TRYPTOPHAN, ALANINE, GLYCINE, ARGININE, PROLINE, SERINE, TYROSINE, DEXTROSE: 311; 238; 247; 170; 255; 247; 204; 179; 77; 880; 438; 489; 289; 213; 17; 5 INJECTION INTRAVENOUS at 04:11

## 2024-11-11 RX ADMIN — MUPIROCIN: 20 OINTMENT TOPICAL at 09:11

## 2024-11-11 RX ADMIN — FERROUS SULFATE TAB 325 MG (65 MG ELEMENTAL FE) 1 EACH: 325 (65 FE) TAB at 09:11

## 2024-11-11 RX ADMIN — MIRTAZAPINE 15 MG: 15 TABLET, FILM COATED ORAL at 07:11

## 2024-11-11 RX ADMIN — BUSPIRONE HYDROCHLORIDE 20 MG: 5 TABLET ORAL at 09:11

## 2024-11-11 NOTE — BRIEF OP NOTE
MTM Referral outreach attempt #3 was made on 11/11/2024.    Outcome: Sent patient MyChart message explaining more in-depth what MTM is and how we can best support them. Attached ability to schedule from message, as well as offered opportunity to call me directly for help with scheduling.        [facility] - Periop Services  Brief Operative Note    SUMMARY     Surgery Date: 4/24/2024     Surgeons and Role:     * Reed Ghosh MD - Primary    Assisting Surgeon: None    Pre-op Diagnosis:  Fever, unspecified fever cause [R50.9]    Post-op Diagnosis:  Post-Op Diagnosis Codes:     * Fever, unspecified fever cause [R50.9]    Procedure(s) (LRB):  Removal, Vascular Access Catheter (Right)    Anesthesia: Monitor Anesthesia Care    Implants:  Implant Name Type Inv. Item Serial No.  Lot No. LRB No. Used Action   explanted mediport      Right 1 Explanted       Operative Findings: Mediport removed without difficulty    Estimated Blood Loss:   minimal         Specimens:   Specimen (24h ago, onward)       Start     Ordered    04/24/24 0939  Specimen to Pathology  RELEASE UPON ORDERING        References:    Click here for ordering Quick Tip   Question:  Release to patient  Answer:  Immediate    04/24/24 0939                    Elizabeth Troncoso MD

## 2024-11-11 NOTE — PROGRESS NOTES
[facility] Hospital Medicine Progress Note      Chief Complaint: fever        HPI: (personally reviewed by me and is documented from initial H&P)     Laura Acosta is a 78 y.o. female who  has a past medical history of Acute URI, Anxiety and depression, Back pain, Bacteremia (04/29/2024), Breast cancer, Cholelithiasis, Contaminated small bowel syndrome, DVT (deep venous thrombosis), Edema, lower extremity, Gallstone pancreatitis, Heart murmur, HTN (hypertension), Insomnia, Irregular heart beat, Ischemic disease of gut, Kidney stones, Laryngitis, Malabsorption, Malnutrition, unspecified type, Meningitis, unspecified, OA (osteoarthritis), PVD (peripheral vascular disease), Rheumatoid arthritis, unspecified, Staph infection, Tremor, Unspecified cataract, and Unspecified cirrhosis of liver (06/20/2023)..     She has also had previous MRSA bacteremia in the setting of a retained infected MediPort, and additional past medical history as below presents again with recurrent fevers and rigors at home.  She was had episodes of recurrent fevers in the past associated with MSSA bacteremia from an infected retained MediPort which was ultimately removed in April of 2024.  In October of 2024 she had recurrent fevers as well in the setting of COVID-19 and community-acquired pneumonia was briefly hospitalized that time with overall unremarkable workup and cultures.     presents to the ER again reporting fevers over the last few days as well as generalized weakness with temperatures at home as high as 102.  This is despite recently being placed on outpatient antibiotics for community-acquired pneumonia per her PCP.  She arrived afebrile and hemodynamically stable initially satting well on room air but ultimately required 2 L due to sats dropping to the low 90s.  Laboratory work was overall unremarkable and CT chest abdomen and pelvis showed a right lower lung new opacity as well as excessive fluid within  the small bowel possibly representing enterocolitis.  Broad-spectrum antibiotics were initiated and hospitalist was consulted for admission.      Interval History:        11/11/2024 no overnight events.  No new complaints  No family present at bedside at this time    Objective Assessment:  Physical Exam      Vital signs have been personally reviewed by me   General: Appears comfortable, no acute distress.  Neuro: awake,alert, oriented    Integumentary: Warm, dry, intact.  Musculoskeletal: Purposeful movement noted.     Respiratory: No accessory muscle use. Breath sounds are equal.  Cardiovascular: Regular rate.       VITAL SIGNS: 24 HRS MIN & MAX LAST   Temp  Min: 97.5 °F (36.4 °C)  Max: 98.4 °F (36.9 °C) 98.2 °F (36.8 °C)   BP  Min: 118/63  Max: 135/90 124/72   Pulse  Min: 70  Max: 103  72   No data recorded 16   SpO2  Min: 93 %  Max: 97 % (!) 94 %     CT Chest Abdomen Pelvis With IV Contrast (XPD) NO Oral Contrast  Narrative: EXAMINATION:  CT CHEST ABDOMEN PELVIS WITH IV CONTRAST (XPD)    CLINICAL HISTORY:  Sepsis;    TECHNIQUE:  Multidetector axial images were obtained from the thoracic inlet through the greater trochanters following the administration of IV contrast.    Dose length product of 785 mGycm. Automated exposure control was utilized to minimize radiation dose.    COMPARISON:  CT chest and CT abdomen pelvis October 18, 2024.    CHEST FINDINGS:    Bilateral lungs are remarkable for peripheral subpleural fine to coarse reticulations consistent with fibrosis.  Previous exam was remarkable for right middle lung lobe and lingular segment some infiltrates which show interval improvement.  There are subtle new vague opacities which involve the right right lower lung lobe on image 70 series 4 and may be inflammatory or infectious.  There is no pulmonary edema.  No cavitary abnormality.  No fluid within the pleural and pericardial spaces.  Thoracic aorta is without aneurysmal dilatation and there are no signs of  dissection.  Cardiac chamber size is within normal limits.  Left chest implanted tunnel castro catheter terminates within the superior vena cava.  Right breast medial aspect rim calcified density.  Thoracic kyphoscoliosis and degenerative changes.    ABDOMINAL FINDINGS:    Liver, mildly atrophic pancreas and the spleen are without acute findings.  Gallbladder is surgically absent.  There is no apparent dilatation of ducts.  Adrenals are unremarkable.  Left kidney mild cortical scarring.  Left kidney medial cortical small hypodensity is stable and is favored to be a cyst and for this no specific follow-up imaging is recommended.  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  There are no perinephric strandings.  No retroperitoneal periaortic enlarged lymph nodes.    Stomach is mostly decompressed.  Mild excessive fluid is present within loops of small bowel without transition or bowel obstruction.  Similarly, there is some excessive fluid within the colon.  Similar appearance of ileocolic anastomosis.  No inflammatory strandings identified.  Extensive noninflamed diverticulosis coli involving the descending and the sigmoid colon.  No bowel obstruction.  No free fluid.    PELVIC FINDINGS:    Urinary bladder wall is not thickened.  No pelvic free fluid.    Thoracolumbar degenerative changes.  Scoliosis.  Impression: 1.  Right lower lung lobe small new vague opacity may be inflammatory or infectious.  Details of other chest findings above.    2.  Mild excessive fluid within loops of small bowel and the right colon could represent enterocolitis.  Details of other abdominopelvic findings above.    Electronically signed by: Isaac Richardson  Date:    11/07/2024  Time:    20:47  X-Ray Chest PA And Lateral  Narrative: EXAMINATION:  XR CHEST PA AND LATERAL    CLINICAL HISTORY:  Fever;    TECHNIQUE:  PA and lateral chest radiographs    COMPARISON:  Chest x-ray dated 11/07/2024    FINDINGS:  Left chest  wall port catheter remains in place.  The heart is normal in size.  There is no focal airspace consolidation.  There is no pleural effusion or visible pneumothorax.  Impression: No acute abnormality of the chest.    Electronically signed by: Isabel Brasher  Date:    11/07/2024  Time:    16:46  X-Ray Chest 1 View  See  for faxed results.     IMPRESSION: Please see  or link in Chart Review from order   for report      This procedure was auto-finalized by: Virtual Radiologist    Recent Labs   Lab 11/08/24 0342 11/09/24 0425 11/11/24  1338   WBC 12.87* 7.21 7.60   RBC 3.71* 4.11* 4.24   HGB 10.8* 11.7* 12.2   HCT 34.1* 37.3 37.8   MCV 91.9 90.8 89.2   MCH 29.1 28.5 28.8   MCHC 31.7* 31.4* 32.3*   RDW 15.0 15.1 14.9    185 184   MPV 9.7 9.7 8.8       Recent Labs   Lab 11/07/24  1742 11/08/24 0342 11/09/24  0425 11/10/24  0110 11/11/24  0407    127* 142 141 139   K 4.3 3.2* 3.8 3.7 4.0    101 110* 112* 113*   CO2 24 19* 23 20* 19*   BUN 16.2 13.5 8.4* 10.2 10.5   CREATININE 0.82 0.84 0.75 0.67 0.63   CALCIUM 9.5 7.5* 8.5 8.4 8.0*   MG  --   --   --  2.00 1.90   ALBUMIN 3.4 2.6* 2.9*  --   --    ALKPHOS 163* 131 126  --   --    ALT 37 27 27  --   --    AST 42* 44* 27  --   --    BILITOT 0.7 0.6 0.5  --   --      Microbiology Results (last 7 days)       Procedure Component Value Units Date/Time    Blood Culture [[id number]]  (Abnormal) Collected: 11/10/24 4562    Order Status: Completed Specimen: Blood, Arterial Updated: 11/11/24 3389     GRAM STAIN Gram Positive Cocci, probable Staphylococcus      Seen in gram stain of broth only      1 of 2 Aerobic bottles positive    Blood Culture [[id number]]  (Abnormal) Collected: 11/08/24 8489    Order Status: Completed Specimen: Blood from Arm, Left Updated: 11/11/24 1139     Blood Culture Susceptibility To Follow      Staphylococcus epidermidis     GRAM STAIN Gram Positive Cocci, probable Staphylococcus      Seen in gram stain of  broth only      1 of 2 Anaerobic bottles positive    Blood Culture [[id number]]  (Abnormal)  (Susceptibility) Collected: 11/08/24 1909    Order Status: Completed Specimen: Blood from [facility] Updated: 11/11/24 0658     Blood Culture Staphylococcus epidermidis     GRAM STAIN Gram Positive Cocci, probable Staphylococcus      Seen in gram stain of broth only      2 of 2 bottles positive    Blood Culture [[id number]] Collected: 11/10/24 2357    Order Status: Resulted Specimen: Blood, Peripheral Line Updated: 11/11/24 0012    Blood Culture [[id number]]  (Abnormal)  (Susceptibility) Collected: 11/07/24 1742    Order Status: Completed Specimen: Blood Updated: 11/10/24 0632     Blood Culture Staphylococcus epidermidis     GRAM STAIN Seen in gram stain of broth only      Gram Positive Cocci, probable Staphylococcus      1 of 1 Pediatric bottle positive    Blood Culture [[id number]]  (Abnormal)  (Susceptibility) Collected: 11/07/24 1742    Order Status: Completed Specimen: Blood Updated: 11/10/24 0632     Blood Culture Staphylococcus epidermidis     GRAM STAIN Gram Positive Cocci, probable Staphylococcus      Seen in gram stain of broth only      1 of 1 Pediatric bottle positive    BCID2 Panel [[id number]] Collected: 11/08/24 1909    Order Status: Canceled Specimen: Blood from [facility] Updated: 11/09/24 0644    BCID2 Panel [[id number]]  (Abnormal) Collected: 11/07/24 1742    Order Status: Completed Specimen: Blood Updated: 11/08/24 1520     CTX-M (ESBL ) N/A     IMP (Cabapenemase ) N/A     KPC resistance gene (Carbapenemase ) N/A     mcr-1 N/A     mecA ID Not Detected     Comment: Note: Antimicrobial resistance can occur via multiple mechanisms. A Not Detected result for antimicrobial resistance gene(s) does not indicate antimicrobial susceptibility. Subculturing is required for species identification and susceptibility testing of   isolates.        mecA/C and MREJ (MRSA) gene N/A     NDM  (Carbapenemase ) N/A     OXA-48-like (Carbapenemase ) N/A     Maria Luisa/B (VRE gene) N/A     VIM (Carbapenemase ) N/A     Enterococcus faecalis Not Detected     Enterococcus faecium Not Detected     Listeria monocytogenes Not Detected     Staphylococcus spp. Detected     Staphylococcus aureus Not Detected     Staphylococcus epidermidis Detected     Staphylococcus lugdunensis Not Detected     Streptococcus spp. Not Detected     Streptococcus agalactiae (Group B) Not Detected     Streptococcus pneumoniae Not Detected     Streptococcus pyogenes (Group A) Not Detected     Acinetobacter calcoaceticus/baumannii complex Not Detected     Bacteroides fragilis Not Detected     Enterobacterales Not Detected     Enterobacter cloacae complex Not Detected     Escherichia coli Not Detected     Klebsiella aerogenes Not Detected     Klebsiella oxytoca Not Detected     Klebsiella pneumoniae group Not Detected     Proteus spp. Not Detected     Salmonella spp. Not Detected     Serratia marcescens Not Detected     Haemophilus influenzae Not Detected     Neisseria meningitidis Not Detected     Pseudomonas aeruginosa Not Detected     Stenotrophomonas maltophilia Not Detected     Candida albicans Not Detected     Candida auris Not Detected     Candida glabrata Not Detected     Candida krusei Not Detected     Candida parapsilosis Not Detected     Candida tropicalis Not Detected     Cryptococcus neoformans/gattii Not Detected    Narrative:      The [employer]ensing[employer]rip BCID2 Panel is a multiplexed nucleic acid test intended for the use with [employer]COM® 2.0 or VOICEPLATE.COM® [employer] for the simultaneous qualitative detection and identification of multiple bacterial and yeast nucleic acids and select genetic determinants associated with antimicrobial resistance.  The [employer]Fire BCID2 Panel test is performed directly on blood culture samples identified as positive by a continuous monitoring blood culture system.  Results  are intended to be interpreted in conjunction with Gram stain results.    Respiratory Culture [[id number]]     Order Status: Sent Specimen: Sputum, Expectorated                Medications for Hospital Course     Scheduled Med:   [START ON 11/12/2024] busPIRone  30 mg Oral BID    citalopram  40 mg Oral Daily    [START ON 11/12/2024] enoxparin  40 mg Subcutaneous Q24H (prophylaxis, 1700)    fat emulsion 20%  250 mL Intravenous Every Mon, Thurs    ferrous sulfate  1 tablet Oral Daily    mirtazapine  15 mg Oral QHS    mupirocin   Nasal BID    phenazopyridine  100 mg Oral TID WM    teduglutide  3.8 mg Subcutaneous Daily    vancomycin (VANCOCIN) 1,000 mg in D5W 250 mL IVPB (admixture device)  1,000 mg Intravenous Q12H      PRN Meds:    Current Facility-Administered Medications:     acetaminophen, 650 mg, Oral, Q8H PRN    acetaminophen, 650 mg, Oral, Q4H PRN    melatonin, 6 mg, Oral, Nightly PRN    sodium chloride 0.9%, 10 mL, Intravenous, PRN    Flushing PICC/Midline Protocol, , , Until Discontinued **AND** sodium chloride 0.9%, 10 mL, Intravenous, Q12H PRN    Flushing PICC/Midline Protocol, , , Until Discontinued **AND** sodium chloride 0.9%, 10 mL, Intravenous, Q12H PRN    vancomycin - pharmacy to dose, , Intravenous, pharmacy to manage frequency     Assessment and Plan          Chronic medical issues:  has a past medical history of Acute URI, Anxiety and depression, Back pain, Bacteremia (04/29/2024), Breast cancer, Cholelithiasis, Contaminated small bowel syndrome, DVT (deep venous thrombosis), Edema, lower extremity, Gallstone pancreatitis, Heart murmur, HTN (hypertension), Insomnia, Irregular heart beat, Ischemic disease of gut, Kidney stones, Laryngitis, Malabsorption, Malnutrition, unspecified type, Meningitis, unspecified, OA (osteoarthritis), PVD (peripheral vascular disease), Rheumatoid arthritis, unspecified, Staph infection, Tremor, Unspecified cataract, and Unspecified cirrhosis of liver  (06/20/2023)..    __________________________________________________________________________________________________________________________________    Patient reports fevers at home.  No fever in ED-on admission.     Blood culture soft positive will continue vancomycin.   Etiology of bacteremia not well defined (mediport placed greater than 7months ago)  TTE 6/11/2024 negative; consider repeat if blood cultures remain positive    Mediport will need to be removed, general surgery consulted.   Avoid use of MediPort for now  Midline in place.   Hold off on PICC line in setting of persistent bacteremia, for this reason, hold TPN  Infectious disease physicians   Clinimix initiated.  Hold coumadin, add lovenox. Hold for surgery     ---severe worsening anxiety, increased patient's BuSpar directed to 30 mg b.i.d.  Discontinue cefepime for now  Continue supportive care  Continue checking vital signs q4hrs.  Reviewed and restarted appropriate home medications.     DVT prophylaxis initiated   Nurse notified to page me if any changes occur       Anticipated discharge and Disposition when medically stable:TBD    Therapy: PT/OT/Speech--as indicated     Nutrition: Diet Adult Regular as tolerated      _______________________________________________________________________________________________________________________________      I have spent 40 minutes on the day of the visit; time spent includes face to face time and non-face to face time preparing to see the patient (eg, review of tests), independently reviewing and interpreting medical records, both past and current; documenting clinical information in the electronic or other health record, and communicating results to the patient/family/caregiver and care coordinator and nursing team.      All diagnosis and differential diagnosis have been reviewed,  interpreted and communicated appropriately to care team. assessment and plan has been documented; I have personally reviewed  the labs and test results that are presently available and pertinent to this hospital course; I have reviewed medical records based upon their availability.    All of the patient's questions have been  addressed and answered. Patient's is agreeable to the above stated plan.   I will continue to monitor closely and make adjustments to medical management as needed.    Gabrielle Butler, DO     11/11/2024     This note was created with the assistance of Dragon voice recognition software. There may be transcription errors as a result of using this technology however minimal. Effort has been made to assure accuracy of transcription but any obvious errors or omissions should be clarified with the author of the document.

## 2024-11-11 NOTE — PROGRESS NOTES
Pharmacokinetic Assessment Follow Up: IV Vancomycin    Vancomycin serum concentration assessment(s):    The trough level was drawn correctly and can be used to guide therapy at this time. The measurement is within the desired definitive target range of 15 to 20 mcg/mL.  Trough level came back at 19.5 ug/mL    Vancomycin Regimen Plan:    Continue regimen to Vancomycin 1000 mg IV every 12 hours with next serum trough concentration measured at 1300 prior to 5th dose on 11/13    Drug levels (last 3 results):  Recent Labs   Lab Result Units 11/10/24  0110 11/11/24  1213   Vancomycin Trough ug/ml 9.0* 19.5       Vancomycin Administrations:  vancomycin given in the last 96 hours                     vancomycin (VANCOCIN) 1,000 mg in D5W 250 mL IVPB (admixture device) (mg) 1,000 mg New Bag 11/11/24 0214     1,000 mg New Bag 11/10/24 1456    vancomycin 1,500 mg in D5W 250 mL IVPB (admixture device) (mg) 1,500 mg New Bag 11/10/24 0147    vancomycin 1.5 g in dextrose 5 % 250 mL IVPB (ready to mix) (mg) 1,500 mg New Bag 11/09/24 0117    vancomycin (VANCOCIN) 1,750 mg in D5W 500 mL IVPB (mg) 1,750 mg New Bag 11/08/24 0216                    Pharmacy will continue to follow and monitor vancomycin.    Please contact pharmacy at extension [phone] for questions regarding this assessment.    Thank you for the consult,   Imer Rosa       Patient brief summary:  Laura Acosta is a 78 y.o. female initiated on antimicrobial therapy with IV Vancomycin for treatment of bacteremia    The patient's current regimen is 1000mg every 12 hours    Drug Allergies:   Review of patient's allergies indicates:   Allergen Reactions    Hydromorphone Itching     Other reaction(s): itching    Opium      Other reaction(s): itching  has itching with larger doses. Smaller doses do not cause a reaction.       Actual Body Weight:  Wt Readings from Last 1 Encounters:   11/07/24 71.5 kg (157 lb 10.1 oz)       Renal Function:   Estimated Creatinine Clearance: 71.3  mL/min (based on SCr of 0.63 mg/dL).,     Dialysis Method (if applicable):  N/A    CBC (last 72 hours):  Recent Labs   Lab Result Units 11/09/24  0425   WBC x10(3)/mcL 7.21   Hgb g/dL 11.7*   Hct % 37.3   Platelet x10(3)/mcL 185   Mono % % 11.0   Eos % % 2.5   Basophil % % 0.4       Metabolic Panel (last 72 hours):  Recent Labs   Lab Result Units 11/09/24  0425 11/10/24  0110 11/11/24  0407   Sodium mmol/L 142 141 139   Potassium mmol/L 3.8 3.7 4.0   Chloride mmol/L 110* 112* 113*   CO2 mmol/L 23 20* 19*   Glucose mg/dL 97 113 103   Blood Urea Nitrogen mg/dL 8.4* 10.2 10.5   Creatinine mg/dL 0.75 0.67 0.63   Albumin g/dL 2.9*  --   --    Bilirubin Total mg/dL 0.5  --   --    ALP unit/L 126  --   --    AST unit/L 27  --   --    ALT unit/L 27  --   --    Magnesium Level mg/dL  --  2.00 1.90   Phosphorus Level mg/dL  --  2.7 3.1       Microbiologic Results:  Microbiology Results (last 7 days)       Procedure Component Value Units Date/Time    Blood Culture [[id number]]  (Abnormal) Collected: 11/08/24 1857    Order Status: Completed Specimen: Blood from Arm, Left Updated: 11/11/24 1139     Blood Culture Susceptibility To Follow      Staphylococcus epidermidis     GRAM STAIN Gram Positive Cocci, probable Staphylococcus      Seen in gram stain of broth only      1 of 2 Anaerobic bottles positive    Blood Culture [[id number]]  (Abnormal)  (Susceptibility) Collected: 11/08/24 1909    Order Status: Completed Specimen: Blood from Mediport Updated: 11/11/24 0658     Blood Culture Staphylococcus epidermidis     GRAM STAIN Gram Positive Cocci, probable Staphylococcus      Seen in gram stain of broth only      2 of 2 bottles positive    Blood Culture [[id number]] Collected: 11/10/24 2357    Order Status: Resulted Specimen: Blood, Peripheral Line Updated: 11/11/24 0012    Blood Culture [[id number]] Collected: 11/10/24 2357    Order Status: Resulted Specimen: Blood, Arterial Updated: 11/11/24 0012    Blood Culture [[id number]]   (Abnormal)  (Susceptibility) Collected: 11/07/24 1742    Order Status: Completed Specimen: Blood Updated: 11/10/24 0632     Blood Culture Staphylococcus epidermidis     GRAM STAIN Seen in gram stain of broth only      Gram Positive Cocci, probable Staphylococcus      1 of 1 Pediatric bottle positive    Blood Culture [[id number]]  (Abnormal)  (Susceptibility) Collected: 11/07/24 1742    Order Status: Completed Specimen: Blood Updated: 11/10/24 0632     Blood Culture Staphylococcus epidermidis     GRAM STAIN Gram Positive Cocci, probable Staphylococcus      Seen in gram stain of broth only      1 of 1 Pediatric bottle positive    BCID2 Panel [[id number]] Collected: 11/08/24 1909    Order Status: Canceled Specimen: Blood from Mediport Updated: 11/09/24 0644    BCID2 Panel [[id number]]  (Abnormal) Collected: 11/07/24 1742    Order Status: Completed Specimen: Blood Updated: 11/08/24 1520     CTX-M (ESBL ) N/A     IMP (Cabapenemase ) N/A     KPC resistance gene (Carbapenemase ) N/A     mcr-1 N/A     mecA ID Not Detected     Comment: Note: Antimicrobial resistance can occur via multiple mechanisms. A Not Detected result for antimicrobial resistance gene(s) does not indicate antimicrobial susceptibility. Subculturing is required for species identification and susceptibility testing of   isolates.        mecA/C and MREJ (MRSA) gene N/A     NDM (Carbapenemase ) N/A     OXA-48-like (Carbapenemase ) N/A     Maria Luisa/B (VRE gene) N/A     VIM (Carbapenemase ) N/A     Enterococcus faecalis Not Detected     Enterococcus faecium Not Detected     Listeria monocytogenes Not Detected     Staphylococcus spp. Detected     Staphylococcus aureus Not Detected     Staphylococcus epidermidis Detected     Staphylococcus lugdunensis Not Detected     Streptococcus spp. Not Detected     Streptococcus agalactiae (Group B) Not Detected     Streptococcus pneumoniae Not Detected     Streptococcus pyogenes  (Group A) Not Detected     Acinetobacter calcoaceticus/baumannii complex Not Detected     Bacteroides fragilis Not Detected     Enterobacterales Not Detected     Enterobacter cloacae complex Not Detected     Escherichia coli Not Detected     Klebsiella aerogenes Not Detected     Klebsiella oxytoca Not Detected     Klebsiella pneumoniae group Not Detected     Proteus spp. Not Detected     Salmonella spp. Not Detected     Serratia marcescens Not Detected     Haemophilus influenzae Not Detected     Neisseria meningitidis Not Detected     Pseudomonas aeruginosa Not Detected     Stenotrophomonas maltophilia Not Detected     Candida albicans Not Detected     Candida auris Not Detected     Candida glabrata Not Detected     Candida krusei Not Detected     Candida parapsilosis Not Detected     Candida tropicalis Not Detected     Cryptococcus neoformans/gattii Not Detected    Narrative:      The [employer] BCID2 Panel is a multiplexed nucleic acid test intended for the use with [employer]® 2.0 or [employer]® [employer] Systems for the simultaneous qualitative detection and identification of multiple bacterial and yeast nucleic acids and select genetic determinants associated with antimicrobial resistance.  The BioInC[employer]ue BCID2 Panel test is performed directly on blood culture samples identified as positive by a continuous monitoring blood culture system.  Results are intended to be interpreted in conjunction with Gram stain results.    Respiratory Culture [[id number]]     Order Status: Sent Specimen: Sputum, Expectorated

## 2024-11-11 NOTE — CONSULTS
Acute Care Surgery   Consult Note    Patient Name: Laura TORRES Use  YOB: 1945  Date: 11/11/2024 1:27 PM  Date of Admission: 11/7/2024  HD#4  POD#* No surgery found *    PRESENTING HISTORY   Chief Complaint/Reason for Admission: Hypoxia    History of Present Illness:  Laura TORRES Use is a 78 y.o. female with an extensive PMHx including short gut syndrome on TPN, breast cancer, HTN, HLD, PVD, prior DVT, rheumatoid arthritis, cirrhosis, gallstone pancreatitis, anxiety/depression, previous MRSA bacteremia in the setting of a retained infected MediPort who was admitted on 11/7/24 due to persistent fevers secondary to bacteriemia. Patient with history of MSSA bacteriemia from infected Mediport that was removed in April. Her current Mediport was placed in 07/2024. Patient currently uses Mediport for TPN due to chronic malnutrition. In the setting of persistent fevers and blood cultures positive for staph epidermidis, General Surgery is being consulted for removal of Mediport.     Review of Systems:  12 point ROS negative except as stated in HPI    PAST HISTORY:   Past medical history:  Past Medical History:   Diagnosis Date    Acute URI     Anxiety and depression     Back pain     Bacteremia 04/29/2024    Breast cancer     Cholelithiasis     Contaminated small bowel syndrome     DVT (deep venous thrombosis)     on coumadin    Edema, lower extremity     Gallstone pancreatitis     Heart murmur     HTN (hypertension)     Insomnia     Irregular heart beat     Ischemic disease of gut     Kidney stones     Laryngitis     Malabsorption     Malnutrition, unspecified type     Meningitis, unspecified     OA (osteoarthritis)     PVD (peripheral vascular disease)     Rheumatoid arthritis, unspecified     Staph infection     infected mediport -- on doxycycline daily for prevention    Tremor     Unspecified cataract     Unspecified cirrhosis of liver 06/20/2023    Dr Carroll       Past surgical history:  Past Surgical  History:   Procedure Laterality Date    APPENDECTOMY      BOWEL RESECTION      BREAST LUMPECTOMY Right     CHOLECYSTECTOMY  05/2015    COLONOSCOPY  02/2022    repeat 3 years    CYST REMOVAL Right     breast    CYSTOSCOPY W/ STONE MANIPULATION      CYSTOSCOPY W/ URETERAL STENT PLACEMENT  12/2020    CYSTOSCOPY W/ URETERAL STENT REMOVAL  04/2021    CYSTOURETEROSCOPY, WITH HOLMIUM LASER LITHOTRIPSY OF URETERAL CALCULUS AND STENT INSERTION Left 05/02/2023    Procedure: CYSTOSCOPY, WITH URETERAL STENT INSERTION Left;  Surgeon: Jared Felix MD;  Location[facility];  Service: Urology;  Laterality: Left;    EGD, WITH CLOSED BIOPSY N/A 6/20/2023    Procedure: EGD;  Surgeon: Aashish Carroll MD;  Location: [facility] ENDOSCOPY;  Service: Gastroenterology;  Laterality: N/A;    ENDOSCOPIC RETROGRADE CHOLANGIOPANCREATOGRAPHY W/ SPHINCTEROTOMY AND STONE REMOVAL  04/2015    ESOPHAGOGASTRODUODENOSCOPY  06/20/2023    Dr Carroll - no signs esophageal varicies --repeat 3 yrs    GI Biopsy  02/15/2022    GI Biopsy  12/2018    HYSTERECTOMY      INSERTION OF TUNNELED CENTRAL VENOUS CATHETER (CVC) WITH SUBCUTANEOUS PORT N/A 7/18/2024    Procedure: [id number];  Surgeon: Ariel Welsh Jr., MD;  Location: [facility] OR;  Service: General;  Laterality: N/A;    LAPAROTOMY, EXPLORATORY  06/2015    LITHOTRIPSY Left 04/2021    LITHOTRIPSY Left 03/2021    MEDIPORT INSERTION, SINGLE  06/2021    MEDIPORT INSERTION, SINGLE  07/2020    MEDIPORT INSERTION, SINGLE  12/2018    MEDIPORT REMOVAL  07/2020    PHACOEMULSIFICATION OF CATARACT Left 12/2016    PHACOEMULSIFICATION OF CATARACT Right 11/2016    PICC line Removal Right 06/2021    POLYPECTOMY  02/2022    PVD surgery      REMOVAL OF CATHETER  12/2020    REMOVAL OF VASCULAR ACCESS CATHETER Right 4/24/2024    Procedure: Removal, Vascular Access Catheter;  Surgeon: Reed Ghosh MD;  Location: [facility];  Service: General;  Laterality: Right;    SHOULDER SURGERY Right     shoulder  replacement    SPHINCTEROTOMY OF URETHRA      VENTRAL HERNIA REPAIR  04/2016       Family history:  Family History   Problem Relation Name Age of Onset    Pneumonia Mother      Depression Mother      Osteoarthritis Mother      Breast cancer Mother  70 - 75    Uterine cancer Mother  40 - 45    Bone cancer Mother  60 - 69    Heart attack Father      Aneurysm Father          brain    Diabetes Father      Hyperlipidemia Father      Hypertension Father      Hyperlipidemia Sister      Hypertension Sister      Kidney cancer Sister  55 - 56    Osteoarthritis Sister      Breast cancer Sister  52 - 53    Diabetes Mellitus Sister      Heart failure Sister      Kidney disease Sister      Migraines Sister      Arthritis Sister      Arthritis Sister      Parkinsonism Sister      Heart disease Sister      Hyperlipidemia Brother      Hypertension Brother      Coronary artery disease Brother      Coronary artery disease Brother          CABG x3    Hyperlipidemia Brother      Hypertension Brother      Kidney cancer Paternal Grandmother  60 - 69       Social history:  Social History     Socioeconomic History    Marital status:     Number of children: 2   Occupational History    Occupation: Retired   Tobacco Use    Smoking status: Never     Passive exposure: Past    Smokeless tobacco: Never   Substance and Sexual Activity    Alcohol use: Yes     Comment: twice a year    Drug use: Never    Sexual activity: Not Currently     Social Drivers of Health     Financial Resource Strain: Low Risk  (11/8/2024)    Overall Financial Resource Strain (CARDIA)     Difficulty of Paying Living Expenses: Not hard at all   Food Insecurity: No Food Insecurity (11/8/2024)    Hunger Vital Sign     Worried About Running Out of Food in the Last Year: Never true     Ran Out of Food in the Last Year: Never true   Transportation Needs: No Transportation Needs (11/8/2024)    TRANSPORTATION NEEDS     Transportation : No   Stress: No Stress Concern Present  (11/8/2024)    [employer] - Occupational Stress Questionnaire     Feeling of Stress : Not at all   Housing Stability: Low Risk  (11/8/2024)    Housing Stability Vital Sign     Unable to Pay for Housing in the Last Year: No     Homeless in the Last Year: No     Social History     Tobacco Use   Smoking Status Never    Passive exposure: Past   Smokeless Tobacco Never      Social History     Substance and Sexual Activity   Alcohol Use Yes    Comment: twice a year        MEDICATIONS & ALLERGIES:     No current facility-administered medications on file prior to encounter.     Current Outpatient Medications on File Prior to Encounter   Medication Sig    busPIRone (BUSPAR) 10 MG tablet Take 20 mg by mouth 2 (two) times daily.    cefdinir (OMNICEF) 300 MG capsule Take 1 capsule (300 mg total) by mouth 2 (two) times daily. for 10 days    CELEXA 40 mg tablet Take 40 mg by mouth once daily.    cholecalciferol, vitamin D3, 1,250 mcg (50,000 unit) capsule Take 50,000 Units by mouth every 7 days.    diphenoxylate-atropine 2.5-0.025 mg (LOMOTIL) 2.5-0.025 mg per tablet Take 2 tablets by mouth 2 (two) times a day.    doxycycline (VIBRAMYCIN) 100 MG Cap Take 200 mg by mouth once daily.    ferrous sulfate 325 (65 FE) MG EC tablet Take 1 tablet (325 mg total) by mouth once daily.    GATTEX 30-VIAL 5 mg Kit Inject into the skin Daily.    metoprolol tartrate (LOPRESSOR) 100 MG tablet Take 100 mg by mouth 2 (two) times daily.    mirtazapine (REMERON) 15 MG tablet Take 15 mg by mouth every evening.    multivitamin (ONE DAILY MULTIVITAMIN) per tablet Take 1 tablet by mouth once daily.    solifenacin (VESICARE) 5 MG tablet Take 5 mg by mouth once daily.    warfarin (COUMADIN) 2.5 MG tablet TAKE ONE TABLET BY MOUTH ONCE DAILY IN THE EVENING OR AS DIRECTED BY CIS PROVIDER       Allergies:   Review of patient's allergies indicates:   Allergen Reactions    Hydromorphone Itching     Other reaction(s): itching     "Opium      Other reaction(s): itching  has itching with larger doses. Smaller doses do not cause a reaction.       Scheduled Meds:   busPIRone  20 mg Oral BID    citalopram  40 mg Oral Daily    fat emulsion 20%  250 mL Intravenous Every Mon, Thurs    ferrous sulfate  1 tablet Oral Daily    mirtazapine  15 mg Oral QHS    mupirocin   Nasal BID    phenazopyridine  100 mg Oral TID WM    teduglutide  3.8 mg Subcutaneous Daily    vancomycin (VANCOCIN) 1,000 mg in D5W 250 mL IVPB (admixture device)  1,000 mg Intravenous Q12H    warfarin  2.5 mg Oral Daily       Continuous Infusions:   amino acid 4.25% - dextrose 5% solution   Intravenous Continuous           PRN Meds:  Current Facility-Administered Medications:     acetaminophen, 650 mg, Oral, Q8H PRN    acetaminophen, 650 mg, Oral, Q4H PRN    melatonin, 6 mg, Oral, Nightly PRN    sodium chloride 0.9%, 10 mL, Intravenous, PRN    Flushing PICC/Midline Protocol, , , Until Discontinued **AND** sodium chloride 0.9%, 10 mL, Intravenous, Q12H PRN    vancomycin - pharmacy to dose, , Intravenous, pharmacy to manage frequency    OBJECTIVE:   Vital Signs:  VITAL SIGNS: 24 HR MIN & MAX LAST   Temp  Min: 97.5 °F (36.4 °C)  Max: 98.4 °F (36.9 °C)  97.9 °F (36.6 °C)   BP  Min: 118/63  Max: 136/69  133/72    Pulse  Min: 70  Max: 103  80    No data recorded  16    SpO2  Min: 93 %  Max: 97 %  (!) 93 %      HT: 5' 4.02" (162.6 cm)  WT: 71.5 kg (157 lb 10.1 oz)  BMI: 27     Intake/output:  Intake/Output - Last 3 Shifts         11/09 0700  11/10 0659 11/10 0700  11/11 0659 11/11 0700  11/12 0659    P.O. 240 480     IV Piggyback 0 250     Total Intake(mL/kg) 240 (3.4) 730 (10.2)     Net +240 +730            Urine Occurrence 1 x 1 x             Intake/Output Summary (Last 24 hours) at 11/11/2024 1327  Last data filed at 11/10/2024 2000  Gross per 24 hour   Intake 730 ml   Output --   Net 730 ml         Physical Exam:  General: Well developed, well nourished, no acute distress  HEENT: " "Normocephalic, atraumatic, PERRL  CV: RR  Resp: NWOB  GI:  Abdomen soft, non-tender, non-distended, no guarding, no rebound, normoactive bowel sounds, no masses  :  Deferred  MSK: No muscle atrophy, cyanosis, peripheral edema, moving all extremities spontaneously  Skin/wounds:  No rashes, ulcers, erythema. Mediport located on upper left chest wall, can be palpated; area is non-erythematous, non-tender.   Neuro:  CNII-XII grossly intact, alert and oriented to person, place, and time    Labs:  Troponin:  No results for input(s): "TROPONINI" in the last 72 hours.  CBC:  Recent Labs     11/09/24  0425   WBC 7.21   RBC 4.11*   HGB 11.7*   HCT 37.3      MCV 90.8   MCH 28.5   MCHC 31.4*     CMP:  Recent Labs     11/09/24  0425 11/10/24  0110 11/11/24  0407   CALCIUM 8.5   < > 8.0*   ALBUMIN 2.9*  --   --       < > 139   K 3.8   < > 4.0   CO2 23   < > 19*   *   < > 113*   BUN 8.4*   < > 10.5   CREATININE 0.75   < > 0.63   ALKPHOS 126  --   --    ALT 27  --   --    AST 27  --   --    BILITOT 0.5  --   --     < > = values in this interval not displayed.     Lactic Acid:  No results for input(s): "LACTATE" in the last 72 hours.  ETOH:  No results for input(s): "ETHANOL" in the last 72 hours.   Urine Drug Screen:  No results for input(s): "COCAINE", "OPIATE", "BARBITURATE", "AMPHETAMINE", "FENTANYL", "CANNABINOIDS", "MDMA" in the last 72 hours.    Invalid input(s): "BENZODIAZEPINE", "PHENCYCLIDINE"   ABG:  No results for input(s): "PH", "PO2", "PCO2", "HCO3", "BE" in the last 168 hours.     Diagnostic Results:  CT Chest Abdomen Pelvis With IV Contrast (XPD) NO Oral Contrast   Final Result      1.  Right lower lung lobe small new vague opacity may be inflammatory or infectious.  Details of other chest findings above.      2.  Mild excessive fluid within loops of small bowel and the right colon could represent enterocolitis.  Details of other abdominopelvic findings above.         Electronically signed " by: Isaac Richardson   Date:    11/07/2024   Time:    20:47      X-Ray Chest PA And Lateral   Final Result      No acute abnormality of the chest.         Electronically signed by: Isabel Brasher   Date:    11/07/2024   Time:    16:46          ASSESSMENT & PLAN:    78 F admitted for bacteremia likely secondary to infected Mediport. Blood cultures + for staph epidermidis. Gen Surg consulted for removal.     -Spoke with Dr. Welsh who originally placed Mediport in 07/2024.   -Plan for Mediport removal tomorrow with Dr. Welsh   -NPO at midnight       Aimee Goncalves MD    [facility] Family Medicine Resident, HO-1

## 2024-11-11 NOTE — PROGRESS NOTES
Inpatient Nutrition Assessment    Admit Date: 11/7/2024   Total duration of encounter: 4 days   Patient Age: 78 y.o.    Nutrition Recommendation/Prescription     Continue regular, lactose free diet as tolerated    Start PPN Clinimix 4.25/5 @ 50 mL/hr as medically appropriate. Provides:    408 kcal (29% est needs)  51 gm AA (72% est needs)  60 gm dextrose (GIR: 0.6 mg/kg/min)  1200 mL fluid (67% est needs)    Once medically appropriate, resume home TPN.  Dex 70% 120 gm  AA 15% 60 gm  IL 20% 50 gm twice a week (home TPN: IL20% 30gm 3 times per week)  GIR: 1.17 mg/kg/min  Provides:   790 kcal (55% est needs)  60 gm protein (85% est needs)  Adjust electrolytes per pharmacy  Monitor oral intake and adjust TPN as needed    Consider probiotic as medically appropriate    Monitor po intake, energy intake, labs, and weight    Communication of Recommendations: reviewed with nurse, reviewed with patient, and reviewed with family    Nutrition Assessment     Malnutrition Assessment/Nutrition-Focused Physical Exam    Malnutrition Context: acute illness or injury (11/11/24 1130)  Malnutrition Level: other (see comments) (does not meet criteria) (11/11/24 1130)  Energy Intake (Malnutrition): other (see comments) (does not meet criteria) (11/11/24 1130)  Weight Loss (Malnutrition): other (see comments) (does not meet criteria) (11/11/24 1130)  Subcutaneous Fat (Malnutrition): other (see comments) (does not meet criteria) (11/11/24 1130)           Muscle Mass (Malnutrition): moderate depletion (11/11/24 1130)  [place] (Muscle Loss): mild depletion     Clavicle and Acromion Bone Region (Muscle Loss): moderate depletion     Dorsal Hand (Muscle Loss): mild depletion                    A minimum of two characteristics is recommended for diagnosis of either severe or non-severe malnutrition.    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, HTN, HLD, PVD, prior DVT, RA, cirrhosis, gallstone pancreatitis, anxiety, depression, previous MRSA bacteremia in the setting of retained infected MediPort    Scheduled Medications:  busPIRone, 20 mg, BID  citalopram, 40 mg, Daily  fat emulsion 20%, 250 mL, Every Mon, Thurs  ferrous sulfate, 1 tablet, Daily  mirtazapine, 15 mg, QHS  mupirocin, , BID  phenazopyridine, 100 mg, TID WM  teduglutide, 3.8 mg, Daily  vancomycin (VANCOCIN) 1,000 mg in D5W 250 mL IVPB (admixture device), 1,000 mg, Q12H  warfarin, 2.5 mg, Daily    Continuous Infusions:   PRN Medications:  acetaminophen, 650 mg, Q8H PRN  acetaminophen, 650 mg, Q4H PRN  melatonin, 6 mg, Nightly PRN  sodium chloride 0.9%, 10 mL, PRN  sodium chloride 0.9%, 10 mL, Q12H PRN  vancomycin - pharmacy to dose, , pharmacy to manage frequency    Calorie Containing IV Medications: no significant kcals from medications at this time    Recent Labs   Lab 11/07/24  1411 11/07/24  1742 11/08/24  0342 11/09/24  0425 11/10/24  0110 11/11/24  0407    139 127* 142 141 139   K 3.7 4.3 3.2* 3.8 3.7 4.0   CALCIUM 9.3 9.5 7.5* 8.5 8.4 8.0*   PHOS  --   --   --   --  2.7 3.1   MG  --   --   --   --  2.00 1.90    106 101 110* 112* 113*   CO2 26 24 19* 23 20* 19*   BUN 18 16.2 13.5 8.4* 10.2 10.5   CREATININE 0.72 0.82 0.84 0.75 0.67 0.63   EGFRNORACEVR >60 >60 >60 >60 >60 >60   GLUCOSE 130* 120* 472* 97 113 103   BILITOT 0.7 0.7 0.6 0.5  --   --    ALKPHOS 121 163* 131 126  --   --    ALT 32 37 27 27  --   --    AST 38* 42* 44* 27  --   --    ALBUMIN 3.8 3.4 2.6* 2.9*  --   --    TRIG  --   --   --  "  --  100  --    WBC 7.60 9.63 12.87* 7.21  --   --    HGB 13.3 13.5 10.8* 11.7*  --   --    HCT 41.9 42.2 34.1* 37.3  --   --      Nutrition Orders:  Diet Adult Regular Lactose Free      Appetite/Oral Intake: fair/50-75% of meals  Factors Affecting Nutritional Intake: altered gastrointestinal function and malabsorption  Social Needs Impacting Access to Food: none identified  Food/[employer]/Cultural Preferences:  eggs, banana, cheerios, cream of wheat, oatmeal, grits for breakfast. No raisin bran or sausage, no beans or green, leafy veggies  Food Allergies: no known food allergies  Last Bowel Movement: 24  Wound(s):  none documented    Comments    24 received call from nurse requesting to restart home TPN; reports pt tolerating oral diet but not eating a whole lot. Plan to start PPN Clinimix 4.25/5% and resume home TPN tomorrow evening once filled; possible weight loss noted in EMR over the past few months; will attempt physical assessment on follow up     24: Pt reports fair intake, receiving food items on breakfast tray that she cannot eat. Obtained food preferences. TPN held 2/2 holding off on PICC and not using MediPort 2/2 infection. Midline placed. Recommend starting PPN Clinimix 4.25/5 as medically appropriate to supplement oral intake until TPN medically feasible. Denies n/v, +chronic diarrhea. Last BM documented . UBW reported ~165# with 5# weight loss in >1 month. 4.8% weight loss noted (insignificant). Pt with mild-moderate muscle depletion per NFPE.    Anthropometrics    Height: 5' 4.02" (162.6 cm), Height Method: Stated  Last Weight: 71.5 kg (157 lb 10.1 oz) (24 2300), Weight Method: Bed Scale  BMI (Calculated): 27  BMI Classification: overweight (BMI 25-29.9)     Ideal Body Weight (IBW), Female: 120.1 lb     % Ideal Body Weight, Female (lb): 131.36 %                    Usual Body Weight (UBW), k kg  % Usual Body Weight: 95.53     Usual Weight Provided By: patient and EMR " weight history    Wt Readings from Last 5 Encounters:   11/07/24 71.5 kg (157 lb 10.1 oz)   11/07/24 73 kg (161 lb)   10/24/24 74.4 kg (164 lb)   10/18/24 76.1 kg (167 lb 12.8 oz)   08/21/24 75.8 kg (167 lb)     Weight Change(s) Since Admission:   Wt Readings from Last 1 Encounters:   11/07/24 2300 71.5 kg (157 lb 10.1 oz)   11/07/24 1553 74.8 kg (165 lb)   Admit Weight: 74.8 kg (165 lb) (11/07/24 1553), Weight Method: Stated    Estimated Needs    Weight Used For Calorie Calculations: 71.5 kg (157 lb 10.1 oz)  Energy Calorie Requirements (kcal): 9769-9087 kcal (20-25 kcal/kg)  Energy Need Method: Kcal/kg  Weight Used For Protein Calculations: 71.5 kg (157 lb 10.1 oz)  Protein Requirements: 71-85gm (1-1.2 gm/kg)  Fluid Requirements (mL): 1787ml (25 ml/kg)        Enteral Nutrition     Patient not receiving enteral nutrition at this time.    Parenteral Nutrition     Patient not receiving parenteral nutrition support at this time.    Evaluation of Received Nutrient Intake    Calories: not meeting estimated needs  Protein: not meeting estimated needs    Patient Education     Not applicable.    Nutrition Diagnosis     PES: Inadequate energy intake related to suboptimal protein/energy intake as evidenced by  <80% est needs met. (active)     Nutrition Interventions     Intervention(s): general/healthful diet, modified composition of parenteral nutrition, modified rate of parenteral nutrition, prescription medication, and collaboration with other providers    Goal: Meet greater than 80% of nutritional needs by follow-up. (goal progressing)  Goal: Maintain weight throughout hospitalization. (goal progressing)    Nutrition Goals & Monitoring     Dietitian will monitor: food and beverage intake, energy intake, parenteral nutrition intake, weight, electrolyte/renal panel, glucose/endocrine profile, and gastrointestinal profile  Discharge planning: resume home regimen  Nutrition Risk/Follow-Up: moderate (follow-up in 3-5 days)    Please consult if re-assessment needed sooner.

## 2024-11-12 ENCOUNTER — ANESTHESIA (OUTPATIENT)
Dept: SURGERY | Facility: HOSPITAL | Age: 79
End: 2024-11-12
Payer: MEDICARE

## 2024-11-12 ENCOUNTER — ANESTHESIA EVENT (OUTPATIENT)
Dept: SURGERY | Facility: HOSPITAL | Age: 79
End: 2024-11-12
Payer: MEDICARE

## 2024-11-12 LAB
ANION GAP SERPL CALC-SCNC: 8 MEQ/L
BACTERIA BLD CULT: ABNORMAL
BUN SERPL-MCNC: 9.9 MG/DL (ref 9.8–20.1)
CALCIUM SERPL-MCNC: 8.2 MG/DL (ref 8.4–10.2)
CHLORIDE SERPL-SCNC: 112 MMOL/L (ref 98–107)
CO2 SERPL-SCNC: 22 MMOL/L (ref 23–31)
CREAT SERPL-MCNC: 0.59 MG/DL (ref 0.55–1.02)
CREAT/UREA NIT SERPL: 17
GFR SERPLBLD CREATININE-BSD FMLA CKD-EPI: >60 ML/MIN/1.73/M2
GLUCOSE SERPL-MCNC: 96 MG/DL (ref 82–115)
GRAM STN SPEC: ABNORMAL
INR PPP: 2.5
MAGNESIUM SERPL-MCNC: 1.9 MG/DL (ref 1.6–2.6)
PHOSPHATE SERPL-MCNC: 2.6 MG/DL (ref 2.3–4.7)
POTASSIUM SERPL-SCNC: 3.5 MMOL/L (ref 3.5–5.1)
PROTHROMBIN TIME: 27.6 SECONDS (ref 12.5–14.5)
SODIUM SERPL-SCNC: 142 MMOL/L (ref 136–145)

## 2024-11-12 PROCEDURE — 25000003 PHARM REV CODE 250: Performed by: ANESTHESIOLOGY

## 2024-11-12 PROCEDURE — 0JPT0WZ REMOVAL OF TOTALLY IMPLANTABLE VASCULAR ACCESS DEVICE FROM TRUNK SUBCUTANEOUS TISSUE AND FASCIA, OPEN APPROACH: ICD-10-PCS | Performed by: SURGERY

## 2024-11-12 PROCEDURE — 63600175 PHARM REV CODE 636 W HCPCS: Performed by: ANESTHESIOLOGY

## 2024-11-12 PROCEDURE — 87077 CULTURE AEROBIC IDENTIFY: CPT | Performed by: SURGERY

## 2024-11-12 PROCEDURE — 37000009 HC ANESTHESIA EA ADD 15 MINS: Performed by: SURGERY

## 2024-11-12 PROCEDURE — 63600175 PHARM REV CODE 636 W HCPCS: Performed by: INTERNAL MEDICINE

## 2024-11-12 PROCEDURE — 21400001 HC TELEMETRY ROOM

## 2024-11-12 PROCEDURE — 25000003 PHARM REV CODE 250: Performed by: STUDENT IN AN ORGANIZED HEALTH CARE EDUCATION/TRAINING PROGRAM

## 2024-11-12 PROCEDURE — 37000008 HC ANESTHESIA 1ST 15 MINUTES: Performed by: SURGERY

## 2024-11-12 PROCEDURE — 25000003 PHARM REV CODE 250: Performed by: INTERNAL MEDICINE

## 2024-11-12 PROCEDURE — 87205 SMEAR GRAM STAIN: CPT | Performed by: SURGERY

## 2024-11-12 PROCEDURE — 11000001 HC ACUTE MED/SURG PRIVATE ROOM

## 2024-11-12 PROCEDURE — 36415 COLL VENOUS BLD VENIPUNCTURE: CPT | Performed by: INTERNAL MEDICINE

## 2024-11-12 PROCEDURE — 88300 SURGICAL PATH GROSS: CPT | Performed by: SURGERY

## 2024-11-12 PROCEDURE — 80048 BASIC METABOLIC PNL TOTAL CA: CPT | Performed by: INTERNAL MEDICINE

## 2024-11-12 PROCEDURE — 63600175 PHARM REV CODE 636 W HCPCS: Performed by: NURSE ANESTHETIST, CERTIFIED REGISTERED

## 2024-11-12 PROCEDURE — 85610 PROTHROMBIN TIME: CPT | Performed by: INTERNAL MEDICINE

## 2024-11-12 PROCEDURE — 84100 ASSAY OF PHOSPHORUS: CPT | Performed by: INTERNAL MEDICINE

## 2024-11-12 PROCEDURE — 25000003 PHARM REV CODE 250: Performed by: NURSE ANESTHETIST, CERTIFIED REGISTERED

## 2024-11-12 PROCEDURE — 36590 REMOVAL TUNNELED CV CATH: CPT | Mod: ,,, | Performed by: SURGERY

## 2024-11-12 PROCEDURE — 83735 ASSAY OF MAGNESIUM: CPT | Performed by: INTERNAL MEDICINE

## 2024-11-12 PROCEDURE — 25000003 PHARM REV CODE 250: Performed by: SURGERY

## 2024-11-12 PROCEDURE — 63600175 PHARM REV CODE 636 W HCPCS: Performed by: STUDENT IN AN ORGANIZED HEALTH CARE EDUCATION/TRAINING PROGRAM

## 2024-11-12 PROCEDURE — 71000033 HC RECOVERY, INTIAL HOUR: Performed by: SURGERY

## 2024-11-12 PROCEDURE — 36000707: Performed by: SURGERY

## 2024-11-12 PROCEDURE — 36000706: Performed by: SURGERY

## 2024-11-12 PROCEDURE — 87075 CULTR BACTERIA EXCEPT BLOOD: CPT | Performed by: STUDENT IN AN ORGANIZED HEALTH CARE EDUCATION/TRAINING PROGRAM

## 2024-11-12 RX ORDER — ONDANSETRON HYDROCHLORIDE 2 MG/ML
INJECTION, SOLUTION INTRAVENOUS
Status: DISCONTINUED | OUTPATIENT
Start: 2024-11-12 | End: 2024-11-12

## 2024-11-12 RX ORDER — LIDOCAINE HYDROCHLORIDE 10 MG/ML
1 INJECTION, SOLUTION EPIDURAL; INFILTRATION; INTRACAUDAL; PERINEURAL ONCE
Status: DISCONTINUED | OUTPATIENT
Start: 2024-11-12 | End: 2024-11-12 | Stop reason: HOSPADM

## 2024-11-12 RX ORDER — TRAMADOL HYDROCHLORIDE 50 MG/1
50 TABLET ORAL EVERY 6 HOURS PRN
Status: DISCONTINUED | OUTPATIENT
Start: 2024-11-12 | End: 2024-11-20 | Stop reason: HOSPADM

## 2024-11-12 RX ORDER — FENTANYL CITRATE 50 UG/ML
25 INJECTION, SOLUTION INTRAMUSCULAR; INTRAVENOUS EVERY 5 MIN PRN
Status: DISCONTINUED | OUTPATIENT
Start: 2024-11-12 | End: 2024-11-12 | Stop reason: HOSPADM

## 2024-11-12 RX ORDER — CALCIUM CHLORIDE INJECTION 100 MG/ML
INJECTION, SOLUTION INTRAVENOUS
Status: DISCONTINUED | OUTPATIENT
Start: 2024-11-12 | End: 2024-11-12

## 2024-11-12 RX ORDER — BUPIVACAINE HYDROCHLORIDE AND EPINEPHRINE 2.5; 5 MG/ML; UG/ML
INJECTION, SOLUTION EPIDURAL; INFILTRATION; INTRACAUDAL; PERINEURAL
Status: DISCONTINUED | OUTPATIENT
Start: 2024-11-12 | End: 2024-11-12 | Stop reason: HOSPADM

## 2024-11-12 RX ORDER — PROPOFOL 10 MG/ML
INJECTION, EMULSION INTRAVENOUS
Status: DISCONTINUED | OUTPATIENT
Start: 2024-11-12 | End: 2024-11-12

## 2024-11-12 RX ORDER — ACETAMINOPHEN 10 MG/ML
INJECTION, SOLUTION INTRAVENOUS
Status: DISCONTINUED | OUTPATIENT
Start: 2024-11-12 | End: 2024-11-12

## 2024-11-12 RX ORDER — LABETALOL HYDROCHLORIDE 5 MG/ML
INJECTION, SOLUTION INTRAVENOUS
Status: DISPENSED
Start: 2024-11-12 | End: 2024-11-13

## 2024-11-12 RX ORDER — LABETALOL HYDROCHLORIDE 5 MG/ML
5 INJECTION, SOLUTION INTRAVENOUS ONCE
Status: COMPLETED | OUTPATIENT
Start: 2024-11-12 | End: 2024-11-12

## 2024-11-12 RX ORDER — FENTANYL CITRATE 50 UG/ML
INJECTION, SOLUTION INTRAMUSCULAR; INTRAVENOUS
Status: DISCONTINUED | OUTPATIENT
Start: 2024-11-12 | End: 2024-11-12

## 2024-11-12 RX ORDER — GLUCAGON 1 MG
1 KIT INJECTION
Status: DISCONTINUED | OUTPATIENT
Start: 2024-11-12 | End: 2024-11-12 | Stop reason: HOSPADM

## 2024-11-12 RX ORDER — DIPHENHYDRAMINE HYDROCHLORIDE 50 MG/ML
12.5 INJECTION INTRAMUSCULAR; INTRAVENOUS ONCE
Status: COMPLETED | OUTPATIENT
Start: 2024-11-12 | End: 2024-11-12

## 2024-11-12 RX ORDER — SODIUM CHLORIDE 0.9 % (FLUSH) 0.9 %
10 SYRINGE (ML) INJECTION
Status: DISCONTINUED | OUTPATIENT
Start: 2024-11-12 | End: 2024-11-12 | Stop reason: HOSPADM

## 2024-11-12 RX ORDER — HYDROCODONE BITARTRATE AND ACETAMINOPHEN 5; 325 MG/1; MG/1
1 TABLET ORAL
Status: DISCONTINUED | OUTPATIENT
Start: 2024-11-12 | End: 2024-11-12 | Stop reason: HOSPADM

## 2024-11-12 RX ORDER — DEXAMETHASONE SODIUM PHOSPHATE 4 MG/ML
INJECTION, SOLUTION INTRA-ARTICULAR; INTRALESIONAL; INTRAMUSCULAR; INTRAVENOUS; SOFT TISSUE
Status: DISCONTINUED | OUTPATIENT
Start: 2024-11-12 | End: 2024-11-12

## 2024-11-12 RX ORDER — LIDOCAINE HYDROCHLORIDE 20 MG/ML
INJECTION INTRAVENOUS
Status: DISCONTINUED | OUTPATIENT
Start: 2024-11-12 | End: 2024-11-12

## 2024-11-12 RX ORDER — FAMOTIDINE 10 MG/ML
20 INJECTION INTRAVENOUS ONCE
Status: COMPLETED | OUTPATIENT
Start: 2024-11-12 | End: 2024-11-12

## 2024-11-12 RX ADMIN — VANCOMYCIN HYDROCHLORIDE 1000 MG: 1 INJECTION, POWDER, LYOPHILIZED, FOR SOLUTION INTRAVENOUS at 03:11

## 2024-11-12 RX ADMIN — MUPIROCIN: 20 OINTMENT TOPICAL at 09:11

## 2024-11-12 RX ADMIN — LABETALOL HYDROCHLORIDE 5 MG: 5 INJECTION, SOLUTION INTRAVENOUS at 07:11

## 2024-11-12 RX ADMIN — CITALOPRAM HYDROBROMIDE 40 MG: 20 TABLET ORAL at 08:11

## 2024-11-12 RX ADMIN — MUPIROCIN: 20 OINTMENT TOPICAL at 08:11

## 2024-11-12 RX ADMIN — CALCIUM CHLORIDE INJECTION 0.5 G: 100 INJECTION, SOLUTION INTRAVENOUS at 08:11

## 2024-11-12 RX ADMIN — VANCOMYCIN HYDROCHLORIDE 1000 MG: 1 INJECTION, POWDER, LYOPHILIZED, FOR SOLUTION INTRAVENOUS at 01:11

## 2024-11-12 RX ADMIN — FERROUS SULFATE TAB 325 MG (65 MG ELEMENTAL FE) 1 EACH: 325 (65 FE) TAB at 08:11

## 2024-11-12 RX ADMIN — FENTANYL CITRATE 50 MCG: 50 INJECTION, SOLUTION INTRAMUSCULAR; INTRAVENOUS at 07:11

## 2024-11-12 RX ADMIN — PHENAZOPYRIDINE HYDROCHLORIDE 100 MG: 100 TABLET ORAL at 11:11

## 2024-11-12 RX ADMIN — DEXAMETHASONE SODIUM PHOSPHATE 4 MG: 4 INJECTION, SOLUTION INTRA-ARTICULAR; INTRALESIONAL; INTRAMUSCULAR; INTRAVENOUS; SOFT TISSUE at 07:11

## 2024-11-12 RX ADMIN — LIDOCAINE HYDROCHLORIDE 50 MG: 20 INJECTION INTRAVENOUS at 07:11

## 2024-11-12 RX ADMIN — PHENAZOPYRIDINE HYDROCHLORIDE 100 MG: 100 TABLET ORAL at 04:11

## 2024-11-12 RX ADMIN — PROPOFOL 80 MG: 10 INJECTION, EMULSION INTRAVENOUS at 07:11

## 2024-11-12 RX ADMIN — FAMOTIDINE 20 MG: 10 INJECTION, SOLUTION INTRAVENOUS at 07:11

## 2024-11-12 RX ADMIN — BUSPIRONE HYDROCHLORIDE 30 MG: 15 TABLET ORAL at 08:11

## 2024-11-12 RX ADMIN — ACETAMINOPHEN 1000 MG: 10 INJECTION, SOLUTION INTRAVENOUS at 07:11

## 2024-11-12 RX ADMIN — LABETALOL HYDROCHLORIDE 5 MG: 5 INJECTION, SOLUTION INTRAVENOUS at 08:11

## 2024-11-12 RX ADMIN — PHENAZOPYRIDINE HYDROCHLORIDE 100 MG: 100 TABLET ORAL at 06:11

## 2024-11-12 RX ADMIN — DIPHENHYDRAMINE HYDROCHLORIDE 12.5 MG: 50 INJECTION INTRAMUSCULAR; INTRAVENOUS at 07:11

## 2024-11-12 RX ADMIN — ENOXAPARIN SODIUM 40 MG: 40 INJECTION SUBCUTANEOUS at 04:11

## 2024-11-12 RX ADMIN — ONDANSETRON 4 MG: 2 INJECTION INTRAMUSCULAR; INTRAVENOUS at 07:11

## 2024-11-12 RX ADMIN — PROPOFOL 60 MG: 10 INJECTION, EMULSION INTRAVENOUS at 07:11

## 2024-11-12 RX ADMIN — ACETAMINOPHEN 650 MG: 325 TABLET, FILM COATED ORAL at 12:11

## 2024-11-12 NOTE — ANESTHESIA PREPROCEDURE EVALUATION
11/12/2024  Laura Acosta is a 78 y.o., female.      Pre-op Assessment    I have reviewed the Patient Summary Reports.     I have reviewed the Nursing Notes. I have reviewed the NPO Status.   I have reviewed the Medications.     Review of Systems  Anesthesia Hx:  No problems with previous Anesthesia                Social:  Non-Smoker       Hematology/Oncology:                   Hematology Comments: Hx DVT                Oncology Comments: Hx breast CA       Cardiovascular:     Hypertension Valvular problems/Murmurs (new onset mild murmur)  Denies MI.        Denies Angina.  Denies CHF.   PVD no hyperlipidemia                         Hypertension         Pulmonary:  Pneumonia (4 days ago, required oxygen for a day)       Hx hypoxia           Pulmonary Infection:  Pneumonia.     Renal/:   Denies Chronic Renal Disease. renal calculi       Kidney Function/Disease             Hepatic/GI:      Denies GERD. Liver Disease,  Denies Hepatitis. Non alcoholic cirrhosis          Liver Disease        Musculoskeletal:  Arthritis   Rheumatoid arthritis     Arthritis          Neurological:    Denies CVA.    Denies Seizures.        Arthritis                           Endocrine:  Denies Diabetes. Denies Hypothyroidism.  Denies Hyperthyroidism.       Denies Obesity / BMI > 30  Psych:  Psychiatric History anxiety                 Physical Exam  General: Well nourished, Cooperative, Alert and Oriented    Airway:  Mallampati: I   Mouth Opening: Normal  TM Distance: Normal  Tongue: Normal  Neck ROM: Normal ROM    Dental:  Edentulous, Dentures        Anesthesia Plan  Type of Anesthesia, risks & benefits discussed:    Anesthesia Type: Gen Supraglottic Airway  Intra-op Monitoring Plan: Standard ASA Monitors  Induction:  IV  Informed Consent: Informed consent signed with the Patient and all parties understand the risks and agree with  anesthesia plan.  All questions answered.   ASA Score: 3  Day of Surgery Review of History & Physical: H&P Update referred to the surgeon/provider.    Ready For Surgery From Anesthesia Perspective.     .

## 2024-11-12 NOTE — PROGRESS NOTES
[facility] Hospital Medicine Progress Note      Chief Complaint: fever        HPI: (personally reviewed by me and is documented from initial H&P)     Laura Acosta is a 78 y.o. female who  has a past medical history of Acute URI, Anxiety and depression, Back pain, Bacteremia (04/29/2024), Breast cancer, Cholelithiasis, Contaminated small bowel syndrome, DVT (deep venous thrombosis), Edema, lower extremity, Gallstone pancreatitis, Heart murmur, HTN (hypertension), Insomnia, Irregular heart beat, Ischemic disease of gut, Kidney stones, Laryngitis, Malabsorption, Malnutrition, unspecified type, Meningitis, unspecified, OA (osteoarthritis), PVD (peripheral vascular disease), Rheumatoid arthritis, unspecified, Staph infection, Tremor, Unspecified cataract, and Unspecified cirrhosis of liver (06/20/2023)..     She has also had previous MRSA bacteremia in the setting of a retained infected MediPort, and additional past medical history as below presents again with recurrent fevers and rigors at home.  She was had episodes of recurrent fevers in the past associated with MSSA bacteremia from an infected retained MediPort which was ultimately removed in April of 2024.  In October of 2024 she had recurrent fevers as well in the setting of COVID-19 and community-acquired pneumonia was briefly hospitalized that time with overall unremarkable workup and cultures.     presents to the ER again reporting fevers over the last few days as well as generalized weakness with temperatures at home as high as 102.  This is despite recently being placed on outpatient antibiotics for community-acquired pneumonia per her PCP.  She arrived afebrile and hemodynamically stable initially satting well on room air but ultimately required 2 L due to sats dropping to the low 90s.  Laboratory work was overall unremarkable and CT chest abdomen and pelvis showed a right lower lung new opacity as well as excessive fluid within  the small bowel possibly representing enterocolitis.  Broad-spectrum antibiotics were initiated and hospitalist was consulted for admission.      Interval History:        Source of infection is suspected to be the MediPort, MediPort removed on today, 11/12/2024.  No family present at bedside.  No overnight events reported.  No new complaints reported    Objective Assessment:  Physical Exam      Vital signs have been personally reviewed by me   General: Appears comfortable, no acute distress.    Neuro: awake,alert, oriented    Integumentary: Warm, dry, intact.  Musculoskeletal: Purposeful movement noted.     Respiratory: No accessory muscle use. Breath sounds are equal.  Cardiovascular: Regular rate.       VITAL SIGNS: 24 HRS MIN & MAX LAST   Temp  Min: 97.9 °F (36.6 °C)  Max: 98.4 °F (36.9 °C) 97.9 °F (36.6 °C)   BP  Min: 124/72  Max: 152/77 (!) 152/77   Pulse  Min: 71  Max: 76  71   Resp  Min: 20  Max: 20 20   SpO2  Min: 93 %  Max: 96 % 96 %     CT Chest Abdomen Pelvis With IV Contrast (XPD) NO Oral Contrast  Narrative: EXAMINATION:  CT CHEST ABDOMEN PELVIS WITH IV CONTRAST (XPD)    CLINICAL HISTORY:  Sepsis;    TECHNIQUE:  Multidetector axial images were obtained from the thoracic inlet through the greater trochanters following the administration of IV contrast.    Dose length product of 785 mGycm. Automated exposure control was utilized to minimize radiation dose.    COMPARISON:  CT chest and CT abdomen pelvis October 18, 2024.    CHEST FINDINGS:    Bilateral lungs are remarkable for peripheral subpleural fine to coarse reticulations consistent with fibrosis.  Previous exam was remarkable for right middle lung lobe and lingular segment some infiltrates which show interval improvement.  There are subtle new vague opacities which involve the right right lower lung lobe on image 70 series 4 and may be inflammatory or infectious.  There is no pulmonary edema.  No cavitary abnormality.  No fluid within the pleural and  pericardial spaces.  Thoracic aorta is without aneurysmal dilatation and there are no signs of dissection.  Cardiac chamber size is within normal limits.  Left chest implanted tunnel castro catheter terminates within the superior vena cava.  Right breast medial aspect rim calcified density.  Thoracic kyphoscoliosis and degenerative changes.    ABDOMINAL FINDINGS:    Liver, mildly atrophic pancreas and the spleen are without acute findings.  Gallbladder is surgically absent.  There is no apparent dilatation of ducts.  Adrenals are unremarkable.  Left kidney mild cortical scarring.  Left kidney medial cortical small hypodensity is stable and is favored to be a cyst and for this no specific follow-up imaging is recommended.  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  There are no perinephric strandings.  No retroperitoneal periaortic enlarged lymph nodes.    Stomach is mostly decompressed.  Mild excessive fluid is present within loops of small bowel without transition or bowel obstruction.  Similarly, there is some excessive fluid within the colon.  Similar appearance of ileocolic anastomosis.  No inflammatory strandings identified.  Extensive noninflamed diverticulosis coli involving the descending and the sigmoid colon.  No bowel obstruction.  No free fluid.    PELVIC FINDINGS:    Urinary bladder wall is not thickened.  No pelvic free fluid.    Thoracolumbar degenerative changes.  Scoliosis.  Impression: 1.  Right lower lung lobe small new vague opacity may be inflammatory or infectious.  Details of other chest findings above.    2.  Mild excessive fluid within loops of small bowel and the right colon could represent enterocolitis.  Details of other abdominopelvic findings above.    Electronically signed by: Isaac Richardson  Date:    11/07/2024  Time:    20:47  X-Ray Chest PA And Lateral  Narrative: EXAMINATION:  XR CHEST PA AND LATERAL    CLINICAL HISTORY:  Fever;    TECHNIQUE:  PA and  lateral chest radiographs    COMPARISON:  Chest x-ray dated 11/07/2024    FINDINGS:  Left chest wall port catheter remains in place.  The heart is normal in size.  There is no focal airspace consolidation.  There is no pleural effusion or visible pneumothorax.  Impression: No acute abnormality of the chest.    Electronically signed by: Isabel Brasher  Date:    11/07/2024  Time:    16:46  X-Ray Chest 1 View  See  for faxed results.     IMPRESSION: Please see  or link in Chart Review from order   for report      This procedure was auto-finalized by: Virtual Radiologist    Recent Labs   Lab 11/08/24  0342 11/09/24  0425 11/11/24  1338   WBC 12.87* 7.21 7.60   RBC 3.71* 4.11* 4.24   HGB 10.8* 11.7* 12.2   HCT 34.1* 37.3 37.8   MCV 91.9 90.8 89.2   MCH 29.1 28.5 28.8   MCHC 31.7* 31.4* 32.3*   RDW 15.0 15.1 14.9    185 184   MPV 9.7 9.7 8.8       Recent Labs   Lab 11/07/24  1742 11/08/24  0342 11/09/24  0425 11/10/24  0110 11/11/24  0407 11/12/24  0353    127* 142 141 139 142   K 4.3 3.2* 3.8 3.7 4.0 3.5    101 110* 112* 113* 112*   CO2 24 19* 23 20* 19* 22*   BUN 16.2 13.5 8.4* 10.2 10.5 9.9   CREATININE 0.82 0.84 0.75 0.67 0.63 0.59   CALCIUM 9.5 7.5* 8.5 8.4 8.0* 8.2*   MG  --   --   --  2.00 1.90 1.90   ALBUMIN 3.4 2.6* 2.9*  --   --   --    ALKPHOS 163* 131 126  --   --   --    ALT 37 27 27  --   --   --    AST 42* 44* 27  --   --   --    BILITOT 0.7 0.6 0.5  --   --   --      Microbiology Results (last 7 days)       Procedure Component Value Units Date/Time    Blood Culture [[id number]]  (Abnormal)  (Susceptibility) Collected: 11/08/24 8787    Order Status: Completed Specimen: Blood from Arm, Left Updated: 11/12/24 0705     Blood Culture Staphylococcus epidermidis     GRAM STAIN Gram Positive Cocci, probable Staphylococcus      Seen in gram stain of broth only      1 of 2 Anaerobic bottles positive    Blood Culture [[id number]]  (Abnormal) Collected: 11/10/24 2019     Order Status: Completed Specimen: Blood, Arterial Updated: 11/12/24 0621     Blood Culture Gram-positive coccus probable staph     GRAM STAIN Gram Positive Cocci, probable Staphylococcus      Seen in gram stain of broth only      1 of 2 Aerobic bottles positive    Blood Culture [[id number]]  (Normal) Collected: 11/10/24 2357    Order Status: Completed Specimen: Blood, Peripheral Line Updated: 11/12/24 0102     Blood Culture No Growth At 24 Hours    Blood Culture [[id number]]  (Abnormal)  (Susceptibility) Collected: 11/08/24 1909    Order Status: Completed Specimen: Blood from [facility] Updated: 11/11/24 0658     Blood Culture Staphylococcus epidermidis     GRAM STAIN Gram Positive Cocci, probable Staphylococcus      Seen in gram stain of broth only      2 of 2 bottles positive    Blood Culture [[id number]]  (Abnormal)  (Susceptibility) Collected: 11/07/24 1742    Order Status: Completed Specimen: Blood Updated: 11/10/24 0632     Blood Culture Staphylococcus epidermidis     GRAM STAIN Seen in gram stain of broth only      Gram Positive Cocci, probable Staphylococcus      1 of 1 Pediatric bottle positive    Blood Culture [[id number]]  (Abnormal)  (Susceptibility) Collected: 11/07/24 1742    Order Status: Completed Specimen: Blood Updated: 11/10/24 0632     Blood Culture Staphylococcus epidermidis     GRAM STAIN Gram Positive Cocci, probable Staphylococcus      Seen in gram stain of broth only      1 of 1 Pediatric bottle positive    BCID2 Panel [[id number]] Collected: 11/08/24 1909    Order Status: Canceled Specimen: Blood from [facility] Updated: 11/09/24 0644    BCID2 Panel [[id number]]  (Abnormal) Collected: 11/07/24 1742    Order Status: Completed Specimen: Blood Updated: 11/08/24 1520     CTX-M (ESBL ) N/A     IMP (Cabapenemase ) N/A     KPC resistance gene (Carbapenemase ) N/A     mcr-1 N/A     mecA ID Not Detected     Comment: Note: Antimicrobial resistance can occur via multiple  mechanisms. A Not Detected result for antimicrobial resistance gene(s) does not indicate antimicrobial susceptibility. Subculturing is required for species identification and susceptibility testing of   isolates.        mecA/C and MREJ (MRSA) gene N/A     NDM (Carbapenemase ) N/A     OXA-48-like (Carbapenemase ) N/A     Maria Luisa/B (VRE gene) N/A     VIM (Carbapenemase ) N/A     Enterococcus faecalis Not Detected     Enterococcus faecium Not Detected     Listeria monocytogenes Not Detected     Staphylococcus spp. Detected     Staphylococcus aureus Not Detected     Staphylococcus epidermidis Detected     Staphylococcus lugdunensis Not Detected     Streptococcus spp. Not Detected     Streptococcus agalactiae (Group B) Not Detected     Streptococcus pneumoniae Not Detected     Streptococcus pyogenes (Group A) Not Detected     Acinetobacter calcoaceticus/baumannii complex Not Detected     Bacteroides fragilis Not Detected     Enterobacterales Not Detected     Enterobacter cloacae complex Not Detected     Escherichia coli Not Detected     Klebsiella aerogenes Not Detected     Klebsiella oxytoca Not Detected     Klebsiella pneumoniae group Not Detected     Proteus spp. Not Detected     Salmonella spp. Not Detected     Serratia marcescens Not Detected     Haemophilus influenzae Not Detected     Neisseria meningitidis Not Detected     Pseudomonas aeruginosa Not Detected     Stenotrophomonas maltophilia Not Detected     Candida albicans Not Detected     Candida auris Not Detected     Candida glabrata Not Detected     Candida krusei Not Detected     Candida parapsilosis Not Detected     Candida tropicalis Not Detected     Cryptococcus neoformans/gattii Not Detected    Narrative:      The [employer] BCID2 Panel is a multiplexed nucleic acid test intended for the use with Nexus Dx® 2.0 or Nexus Dx® [employer] Systems for the simultaneous qualitative detection and identification of multiple  bacterial and yeast nucleic acids and select genetic determinants associated with antimicrobial resistance.  The [employer] BCID2 Panel test is performed directly on blood culture samples identified as positive by a continuous monitoring blood culture system.  Results are intended to be interpreted in conjunction with Gram stain results.    Respiratory Culture [[id number]]     Order Status: Sent Specimen: Sputum, Expectorated                Medications for Hospital Course     Scheduled Med:   busPIRone  30 mg Oral BID    citalopram  40 mg Oral Daily    enoxparin  40 mg Subcutaneous Q24H (prophylaxis, 1700)    ferrous sulfate  1 tablet Oral Daily    mirtazapine  15 mg Oral QHS    mupirocin   Nasal BID    phenazopyridine  100 mg Oral TID WM    teduglutide  3.8 mg Subcutaneous Daily    vancomycin (VANCOCIN) 1,000 mg in D5W 250 mL IVPB (admixture device)  1,000 mg Intravenous Q12H      PRN Meds:    Current Facility-Administered Medications:     acetaminophen, 650 mg, Oral, Q8H PRN    acetaminophen, 650 mg, Oral, Q4H PRN    melatonin, 6 mg, Oral, Nightly PRN    sodium chloride 0.9%, 10 mL, Intravenous, PRN    Flushing PICC/Midline Protocol, , , Until Discontinued **AND** sodium chloride 0.9%, 10 mL, Intravenous, Q12H PRN    Flushing PICC/Midline Protocol, , , Until Discontinued **AND** sodium chloride 0.9%, 10 mL, Intravenous, Q12H PRN    vancomycin - pharmacy to dose, , Intravenous, pharmacy to manage frequency     Assessment and Plan          Chronic medical issues:  has a past medical history of Acute URI, Anxiety and depression, Back pain, Bacteremia (04/29/2024), Breast cancer, Cholelithiasis, Contaminated small bowel syndrome, DVT (deep venous thrombosis), Edema, lower extremity, Gallstone pancreatitis, Heart murmur, HTN (hypertension), Insomnia, Irregular heart beat, Ischemic disease of gut, Kidney stones, Laryngitis, Malabsorption, Malnutrition, unspecified type, Meningitis, unspecified, OA (osteoarthritis), PVD  (peripheral vascular disease), Rheumatoid arthritis, unspecified, Staph infection, Tremor, Unspecified cataract, and Unspecified cirrhosis of liver (06/20/2023)..    __________________________________________________________________________________________________________________________________    ----bacteremia, source MediPort  Plan for MediPort removal today  Midline in place.   Hold off on PICC line in setting of persistent bacteremia, for this reason  hold TPN  Infectious disease physician consulted  Clinimix initiated.  Hold coumadin, add lovenox. Hold for surgery     Continue increased BuSpar 30 mg b.i.d.  Discontinue cefepime for now    Continue to monitor PT/INR, hold Coumadin for now.  Continue Lovenox  Continue supportive care  Continue checking vital signs q4hrs.  Reviewed and restarted appropriate home medications.     DVT prophylaxis initiated   Nurse notified to page me if any changes occur       Anticipated discharge and Disposition when medically stable:TBD    Therapy: PT/OT/Speech--as indicated     Nutrition: Diet Adult Regular as tolerated      _______________________________________________________________________________________________________________________________    I have spent 40 minutes on the day of the visit; time spent includes face to face time and non-face to face time preparing to see the patient (eg, review of tests), independently reviewing and interpreting medical records, both past and current; documenting clinical information in the electronic or other health record, and communicating results to the patient/family/caregiver and care coordinator and nursing team.      All diagnosis and differential diagnosis have been reviewed,  interpreted and communicated appropriately to care team. assessment and plan has been documented; I have personally reviewed the labs and test results that are presently available and pertinent to this hospital course; I have reviewed medical records  based upon their availability.    All of the patient's questions have been  addressed and answered. Patient's is agreeable to the above stated plan.   I will continue to monitor closely and make adjustments to medical management as needed.    Gabrielle Butler, DO     11/12/2024     This note was created with the assistance of Dragon voice recognition software. There may be transcription errors as a result of using this technology however minimal. Effort has been made to assure accuracy of transcription but any obvious errors or omissions should be clarified with the author of the document.

## 2024-11-13 LAB
ANION GAP SERPL CALC-SCNC: 7 MEQ/L
BUN SERPL-MCNC: 12.5 MG/DL (ref 9.8–20.1)
CALCIUM SERPL-MCNC: 8.4 MG/DL (ref 8.4–10.2)
CHLORIDE SERPL-SCNC: 111 MMOL/L (ref 98–107)
CO2 SERPL-SCNC: 21 MMOL/L (ref 23–31)
CREAT SERPL-MCNC: 0.65 MG/DL (ref 0.55–1.02)
CREAT/UREA NIT SERPL: 19
GFR SERPLBLD CREATININE-BSD FMLA CKD-EPI: >60 ML/MIN/1.73/M2
GLUCOSE SERPL-MCNC: 164 MG/DL (ref 82–115)
GRAM STN SPEC: NORMAL
GRAM STN SPEC: NORMAL
INR PPP: 1.7
MAGNESIUM SERPL-MCNC: 1.9 MG/DL (ref 1.6–2.6)
PHOSPHATE SERPL-MCNC: 2.8 MG/DL (ref 2.3–4.7)
POTASSIUM SERPL-SCNC: 4 MMOL/L (ref 3.5–5.1)
PROTHROMBIN TIME: 20.1 SECONDS (ref 12.5–14.5)
SODIUM SERPL-SCNC: 139 MMOL/L (ref 136–145)
VANCOMYCIN TROUGH SERPL-MCNC: 19.8 UG/ML (ref 15–20)

## 2024-11-13 PROCEDURE — 36415 COLL VENOUS BLD VENIPUNCTURE: CPT | Performed by: STUDENT IN AN ORGANIZED HEALTH CARE EDUCATION/TRAINING PROGRAM

## 2024-11-13 PROCEDURE — 11000001 HC ACUTE MED/SURG PRIVATE ROOM

## 2024-11-13 PROCEDURE — 80048 BASIC METABOLIC PNL TOTAL CA: CPT | Performed by: INTERNAL MEDICINE

## 2024-11-13 PROCEDURE — 80202 ASSAY OF VANCOMYCIN: CPT | Performed by: STUDENT IN AN ORGANIZED HEALTH CARE EDUCATION/TRAINING PROGRAM

## 2024-11-13 PROCEDURE — 25000003 PHARM REV CODE 250: Performed by: STUDENT IN AN ORGANIZED HEALTH CARE EDUCATION/TRAINING PROGRAM

## 2024-11-13 PROCEDURE — 36415 COLL VENOUS BLD VENIPUNCTURE: CPT | Performed by: INTERNAL MEDICINE

## 2024-11-13 PROCEDURE — 85610 PROTHROMBIN TIME: CPT | Performed by: INTERNAL MEDICINE

## 2024-11-13 PROCEDURE — 63600175 PHARM REV CODE 636 W HCPCS: Performed by: INTERNAL MEDICINE

## 2024-11-13 PROCEDURE — 84100 ASSAY OF PHOSPHORUS: CPT | Performed by: INTERNAL MEDICINE

## 2024-11-13 PROCEDURE — 25000003 PHARM REV CODE 250: Performed by: INTERNAL MEDICINE

## 2024-11-13 PROCEDURE — 83735 ASSAY OF MAGNESIUM: CPT | Performed by: INTERNAL MEDICINE

## 2024-11-13 PROCEDURE — 21400001 HC TELEMETRY ROOM

## 2024-11-13 PROCEDURE — 63600175 PHARM REV CODE 636 W HCPCS: Performed by: STUDENT IN AN ORGANIZED HEALTH CARE EDUCATION/TRAINING PROGRAM

## 2024-11-13 RX ADMIN — VANCOMYCIN HYDROCHLORIDE 1000 MG: 1 INJECTION, POWDER, LYOPHILIZED, FOR SOLUTION INTRAVENOUS at 02:11

## 2024-11-13 RX ADMIN — LEUCINE, PHENYLALANINE, LYSINE, METHIONINE, ISOLEUCINE, VALINE, HISTIDINE, THREONINE, TRYPTOPHAN, ALANINE, GLYCINE, ARGININE, PROLINE, SERINE, TYROSINE, DEXTROSE: 311; 238; 247; 170; 255; 247; 204; 179; 77; 880; 438; 489; 289; 213; 17; 5 INJECTION INTRAVENOUS at 09:11

## 2024-11-13 RX ADMIN — BUSPIRONE HYDROCHLORIDE 30 MG: 15 TABLET ORAL at 09:11

## 2024-11-13 RX ADMIN — PHENAZOPYRIDINE HYDROCHLORIDE 100 MG: 100 TABLET ORAL at 12:11

## 2024-11-13 RX ADMIN — PHENAZOPYRIDINE HYDROCHLORIDE 100 MG: 100 TABLET ORAL at 05:11

## 2024-11-13 RX ADMIN — CITALOPRAM HYDROBROMIDE 40 MG: 20 TABLET ORAL at 09:11

## 2024-11-13 RX ADMIN — ENOXAPARIN SODIUM 40 MG: 40 INJECTION SUBCUTANEOUS at 05:11

## 2024-11-13 RX ADMIN — PHENAZOPYRIDINE HYDROCHLORIDE 100 MG: 100 TABLET ORAL at 09:11

## 2024-11-13 RX ADMIN — FERROUS SULFATE TAB 325 MG (65 MG ELEMENTAL FE) 1 EACH: 325 (65 FE) TAB at 09:11

## 2024-11-13 RX ADMIN — MIRTAZAPINE 15 MG: 15 TABLET, FILM COATED ORAL at 09:11

## 2024-11-13 NOTE — TRANSFER OF CARE
"Anesthesia Transfer of Care Note    Patient: Laura Acosta    Procedure(s) Performed: Procedure(s) (LRB):  Removal, Vascular Access Catheter (N/A)    Patient location: PACU    Anesthesia Type: general    Transport from OR: Transported from OR on room air with adequate spontaneous ventilation    Post pain: adequate analgesia    Post assessment: no apparent anesthetic complications    Post vital signs: stable    Level of consciousness: awake and alert    Nausea/Vomiting: no nausea/vomiting    Complications: none    Transfer of care protocol was followed      Last vitals: Visit Vitals  BP (!) 189/99 (BP Location: Left arm, Patient Position: Lying)   Pulse 92   Temp 36.8 °C (98.2 °F) (Skin)   Resp 15   Ht 5' 4.02" (1.626 m)   Wt 71.5 kg (157 lb 10.1 oz)   SpO2 99%   BMI 27.04 kg/m²     "

## 2024-11-13 NOTE — PROGRESS NOTES
[facility] Hospital Medicine Progress Note      Chief Complaint: fever        HPI: (personally reviewed by me and is documented from initial H&P)     Laura Acosta is a 78 y.o. female who  has a past medical history of Acute URI, Anxiety and depression, Back pain, Bacteremia (04/29/2024), Breast cancer, Cholelithiasis, Contaminated small bowel syndrome, DVT (deep venous thrombosis), Edema, lower extremity, Gallstone pancreatitis, Heart murmur, HTN (hypertension), Insomnia, Irregular heart beat, Ischemic disease of gut, Kidney stones, Laryngitis, Malabsorption, Malnutrition, unspecified type, Meningitis, unspecified, OA (osteoarthritis), PVD (peripheral vascular disease), Rheumatoid arthritis, unspecified, Staph infection, Tremor, Unspecified cataract, and Unspecified cirrhosis of liver (06/20/2023)..     She has also had previous MRSA bacteremia in the setting of a retained infected MediPort, and additional past medical history as below presents again with recurrent fevers and rigors at home.  She was had episodes of recurrent fevers in the past associated with MSSA bacteremia from an infected retained MediPort which was ultimately removed in April of 2024.  In October of 2024 she had recurrent fevers as well in the setting of COVID-19 and community-acquired pneumonia was briefly hospitalized that time with overall unremarkable workup and cultures.     presents to the ER again reporting fevers over the last few days as well as generalized weakness with temperatures at home as high as 102.  This is despite recently being placed on outpatient antibiotics for community-acquired pneumonia per her PCP.  She arrived afebrile and hemodynamically stable initially satting well on room air but ultimately required 2 L due to sats dropping to the low 90s.  Laboratory work was overall unremarkable and CT chest abdomen and pelvis showed a right lower lung new opacity as well as excessive fluid within  the small bowel possibly representing enterocolitis.  Broad-spectrum antibiotics were initiated and hospitalist was consulted for admission.      Interval History:        11/13/2024 no overnight events.  No new complaints  Significant other present at bedside at this time.    Source of infection is suspected to be the MediPort, MediPort removed on 11/12/2024.      Objective Assessment:  Physical Exam      Vital signs have been personally reviewed by me   General: Appears comfortable, no acute distress.    Neuro: awake,alert, oriented    Integumentary: Warm, dry, intact.  Musculoskeletal: Purposeful movement noted.     Respiratory: No accessory muscle use. Breath sounds are equal.  Cardiovascular: Regular rate.       VITAL SIGNS: 24 HRS MIN & MAX LAST   Temp  Min: 97.4 °F (36.3 °C)  Max: 98.9 °F (37.2 °C) 98.9 °F (37.2 °C)   BP  Min: 111/65  Max: 189/99 (!) 143/77   Pulse  Min: 72  Max: 96  75   Resp  Min: 12  Max: 16 12   SpO2  Min: 90 %  Max: 99 % 95 %     Cardiac catheterization  Procedure performed in the Invasive Lab    - See Procedure Log link below for nursing documentation    - See OpNote on Surgeries Tab for physician findings    - See Imaging Tab for radiologist dictation    Recent Labs   Lab 11/08/24  0342 11/09/24  0425 11/11/24  1338   WBC 12.87* 7.21 7.60   RBC 3.71* 4.11* 4.24   HGB 10.8* 11.7* 12.2   HCT 34.1* 37.3 37.8   MCV 91.9 90.8 89.2   MCH 29.1 28.5 28.8   MCHC 31.7* 31.4* 32.3*   RDW 15.0 15.1 14.9    185 184   MPV 9.7 9.7 8.8       Recent Labs   Lab 11/07/24  1742 11/08/24  0342 11/09/24  0425 11/10/24  0110 11/11/24  0407 11/12/24  0353 11/13/24  0536    127* 142   < > 139 142 139   K 4.3 3.2* 3.8   < > 4.0 3.5 4.0    101 110*   < > 113* 112* 111*   CO2 24 19* 23   < > 19* 22* 21*   BUN 16.2 13.5 8.4*   < > 10.5 9.9 12.5   CREATININE 0.82 0.84 0.75   < > 0.63 0.59 0.65   CALCIUM 9.5 7.5* 8.5   < > 8.0* 8.2* 8.4   MG  --   --   --    < > 1.90 1.90 1.90   ALBUMIN 3.4 2.6*  2.9*  --   --   --   --    ALKPHOS 163* 131 126  --   --   --   --    ALT 37 27 27  --   --   --   --    AST 42* 44* 27  --   --   --   --    BILITOT 0.7 0.6 0.5  --   --   --   --     < > = values in this interval not displayed.     Microbiology Results (last 7 days)       Procedure Component Value Units Date/Time    Medical Device Culture [[id number]] Collected: 11/12/24 2000    Order Status: Completed Specimen: Foreign Body from Mediport Updated: 11/13/24 1456     CULTURE, MEDICAL DEVICE ([facility]) No Growth At 24 Hours    Blood Culture [[id number]]  (Abnormal)  (Susceptibility) Collected: 11/10/24 2357    Order Status: Completed Specimen: Blood, Arterial Updated: 11/13/24 1209     Blood Culture Staphylococcus hominis      Staphylococcus epidermidis     Comment: Susceptibility To Follow        GRAM STAIN Gram Positive Cocci, probable Staphylococcus      Seen in gram stain of broth only      2 of 2 bottles positive    Mycobacteria and Nocardia Culture [[id number]] Collected: 11/12/24 2011    Order Status: Sent Specimen: Body Fluid from Chest, Right Updated: 11/13/24 1200    Fungal Culture [[id number]] Collected: 11/12/24 2011    Order Status: Sent Specimen: Mediport Tip from Chest Updated: 11/13/24 1200    Anaerobic Culture [facility] [[id number]] Collected: 11/12/24 2011    Order Status: Sent Specimen: Foreign Body from Mediport Updated: 11/13/24 1200    Gram Stain [[id number]] Collected: 11/12/24 2000    Order Status: Completed Specimen: Foreign Body from Mediport Updated: 11/13/24 0432     GRAM STAIN No WBC seen      Rare Gram positive cocci    Blood Culture [[id number]]  (Normal) Collected: 11/10/24 2357    Order Status: Completed Specimen: Blood, Peripheral Line Updated: 11/13/24 0101     Blood Culture No Growth At 48 Hours    Anaerobic Culture [[id number]] Collected: 11/12/24 2000    Order Status: Canceled Specimen: Foreign Body from Mediport     Blood Culture [[id number]]  (Abnormal)  (Susceptibility) Collected:  11/08/24 1857    Order Status: Completed Specimen: Blood from Arm, Left Updated: 11/12/24 0705     Blood Culture Staphylococcus epidermidis     GRAM STAIN Gram Positive Cocci, probable Staphylococcus      Seen in gram stain of broth only      1 of 2 Anaerobic bottles positive    Blood Culture [[id number]]  (Abnormal)  (Susceptibility) Collected: 11/08/24 1909    Order Status: Completed Specimen: Blood from [facility] Updated: 11/11/24 0658     Blood Culture Staphylococcus epidermidis     GRAM STAIN Gram Positive Cocci, probable Staphylococcus      Seen in gram stain of broth only      2 of 2 bottles positive    Blood Culture [[id number]]  (Abnormal)  (Susceptibility) Collected: 11/07/24 1742    Order Status: Completed Specimen: Blood Updated: 11/10/24 0632     Blood Culture Staphylococcus epidermidis     GRAM STAIN Seen in gram stain of broth only      Gram Positive Cocci, probable Staphylococcus      1 of 1 Pediatric bottle positive    Blood Culture [[id number]]  (Abnormal)  (Susceptibility) Collected: 11/07/24 1742    Order Status: Completed Specimen: Blood Updated: 11/10/24 0632     Blood Culture Staphylococcus epidermidis     GRAM STAIN Gram Positive Cocci, probable Staphylococcus      Seen in gram stain of broth only      1 of 1 Pediatric bottle positive    BCID2 Panel [[id number]] Collected: 11/08/24 1909    Order Status: Canceled Specimen: Blood from [facility] Updated: 11/09/24 0644    BCID2 Panel [[id number]]  (Abnormal) Collected: 11/07/24 1742    Order Status: Completed Specimen: Blood Updated: 11/08/24 1520     CTX-M (ESBL ) N/A     IMP (Cabapenemase ) N/A     KPC resistance gene (Carbapenemase ) N/A     mcr-1 N/A     mecA ID Not Detected     Comment: Note: Antimicrobial resistance can occur via multiple mechanisms. A Not Detected result for antimicrobial resistance gene(s) does not indicate antimicrobial susceptibility. Subculturing is required for species identification and  susceptibility testing of   isolates.        mecA/C and MREJ (MRSA) gene N/A     NDM (Carbapenemase ) N/A     OXA-48-like (Carbapenemase ) N/A     Maria Luisa/B (VRE gene) N/A     VIM (Carbapenemase ) N/A     Enterococcus faecalis Not Detected     Enterococcus faecium Not Detected     Listeria monocytogenes Not Detected     Staphylococcus spp. Detected     Staphylococcus aureus Not Detected     Staphylococcus epidermidis Detected     Staphylococcus lugdunensis Not Detected     Streptococcus spp. Not Detected     Streptococcus agalactiae (Group B) Not Detected     Streptococcus pneumoniae Not Detected     Streptococcus pyogenes (Group A) Not Detected     Acinetobacter calcoaceticus/baumannii complex Not Detected     Bacteroides fragilis Not Detected     Enterobacterales Not Detected     Enterobacter cloacae complex Not Detected     Escherichia coli Not Detected     Klebsiella aerogenes Not Detected     Klebsiella oxytoca Not Detected     Klebsiella pneumoniae group Not Detected     Proteus spp. Not Detected     Salmonella spp. Not Detected     Serratia marcescens Not Detected     Haemophilus influenzae Not Detected     Neisseria meningitidis Not Detected     Pseudomonas aeruginosa Not Detected     Stenotrophomonas maltophilia Not Detected     Candida albicans Not Detected     Candida auris Not Detected     Candida glabrata Not Detected     Candida krusei Not Detected     Candida parapsilosis Not Detected     Candida tropicalis Not Detected     Cryptococcus neoformans/gattii Not Detected    Narrative:      The [employer] BCID2 Panel is a multiplexed nucleic acid test intended for the use with [employer]® 2.0 or Agent Video Intelligence® [employer] for the simultaneous qualitative detection and identification of multiple bacterial and yeast nucleic acids and select genetic determinants associated with antimicrobial resistance.  The [employer] BCID2 Panel test is performed directly on blood culture  samples identified as positive by a continuous monitoring blood culture system.  Results are intended to be interpreted in conjunction with Gram stain results.    Respiratory Culture [[id number]]     Order Status: Sent Specimen: Sputum, Expectorated                Medications for Hospital Course     Scheduled Med:   busPIRone  30 mg Oral BID    citalopram  40 mg Oral Daily    enoxparin  40 mg Subcutaneous Q24H (prophylaxis, 1700)    ferrous sulfate  1 tablet Oral Daily    mirtazapine  15 mg Oral QHS    phenazopyridine  100 mg Oral TID WM    teduglutide  3.8 mg Subcutaneous Daily    vancomycin (VANCOCIN) 1,000 mg in D5W 250 mL IVPB (admixture device)  1,000 mg Intravenous Q12H      PRN Meds:    Current Facility-Administered Medications:     acetaminophen, 650 mg, Oral, Q8H PRN    dextrose 10%, 12.5 g, Intravenous, PRN    dextrose 10%, 25 g, Intravenous, PRN    melatonin, 6 mg, Oral, Nightly PRN    sodium chloride 0.9%, 10 mL, Intravenous, PRN    Flushing PICC/Midline Protocol, , , Until Discontinued **AND** sodium chloride 0.9%, 10 mL, Intravenous, Q12H PRN    Flushing PICC/Midline Protocol, , , Until Discontinued **AND** sodium chloride 0.9%, 10 mL, Intravenous, Q12H PRN    traMADoL, 50 mg, Oral, Q6H PRN    vancomycin - pharmacy to dose, , Intravenous, pharmacy to manage frequency     Assessment and Plan          Chronic medical issues:  has a past medical history of Acute URI, Anxiety and depression, Back pain, Bacteremia (04/29/2024), Breast cancer, Cholelithiasis, Contaminated small bowel syndrome, DVT (deep venous thrombosis), Edema, lower extremity, Gallstone pancreatitis, Heart murmur, HTN (hypertension), Insomnia, Irregular heart beat, Ischemic disease of gut, Kidney stones, Laryngitis, Malabsorption, Malnutrition, unspecified type, Meningitis, unspecified, OA (osteoarthritis), PVD (peripheral vascular disease), Rheumatoid arthritis, unspecified, Staph infection, Tremor, Unspecified cataract, and Unspecified  cirrhosis of liver (06/20/2023)..    __________________________________________________________________________________________________________________________________    ----bacteremia, source MediPort  Mediport removed 11/12/2024  Midline in place.   Hold off on PICC line in setting of persistent bacteremia, for this reason  hold TPN  Infectious disease physician consulted  Clinimix initiated.  Continue to monitor PT/INR, hold Coumadin for now.  Continue Lovenox  Consult cardiovascular surgeon? Several mediports have been placed and then extracted due to infection, followup with recommendations    Continue increased BuSpar 30 mg b.i.d.    Continue supportive care  Continue checking vital signs q4hrs.  Reviewed and restarted appropriate home medications.     DVT prophylaxis initiated   Nurse notified to page me if any changes occur       Anticipated discharge and Disposition when medically stable:TBD    Therapy: PT/OT/Speech--as indicated     Nutrition: Diet Adult Regular as tolerated      _______________________________________________________________________________________________________________________________    I have spent 40 minutes on the day of the visit; time spent includes face to face time and non-face to face time preparing to see the patient (eg, review of tests), independently reviewing and interpreting medical records, both past and current; documenting clinical information in the electronic or other health record, and communicating results to the patient/family/caregiver and care coordinator and nursing team.      All diagnosis and differential diagnosis have been reviewed,  interpreted and communicated appropriately to care team. assessment and plan has been documented; I have personally reviewed the labs and test results that are presently available and pertinent to this hospital course; I have reviewed medical records based upon their availability.    All of the patient's questions have been   addressed and answered. Patient's is agreeable to the above stated plan.   I will continue to monitor closely and make adjustments to medical management as needed.    Gabrielle Butler, DO     11/13/2024     This note was created with the assistance of Dragon voice recognition software. There may be transcription errors as a result of using this technology however minimal. Effort has been made to assure accuracy of transcription but any obvious errors or omissions should be clarified with the author of the document.

## 2024-11-13 NOTE — OP NOTE
PATIENT:  Laura Acosta      : 1945       DATE OF SURGERY:   24          SURGEON:  Ariel Welsh MD       ASSISTANT:  María Ballesteros NP      PREOPERATIVE DIAGNOSIS:  Bactermia           POSTOPERATIVE DIAGNOSIS:  Same.           OPERATIONS:  Mediport Removal (removal of implantable device)           Anesthesia:  General endotracheal anesthesia.      Estimated blood loss:  Less than 50 cc.      Blood administered:  None.      Lap and instrument counts correct x 2 at the end of the case.     Specimen: Mediport           INDICATIONS/SIGNIFICANT HISTORY:  The patient is a 78 y.o. year old female who was diagnosed with bactermia now in remission and referred for mediport removal.  Risks and Benefits of surgery was discussed with the patient, who voiced understanding of risks and benefits and elected to proceed with surgery.           PROCEDURE IN DETAIL:  Once informed consents were obtained, the patient was taken to the operating room and placed supine on the operating table.  After general IV anesthesia was induced, the chest was prepped and draped in a standard surgical fashion.  1% lidocaine with epinephrine was used for local anesthesia in the subcutaneous tissue at the site of the port.  An incision was made near the port and it was dissected and freed from surrounding adhesions.  The port and attached catheter was removed in its entirety and the track was closed with a 3-0 vicryl suture and passed off the table as a specimen.  The wound was irrigated and dried with no active bleeding noted.   Incisions were closed with a 3-0 vicryl followed by a 4-0 vicryl stitch.  The wounds were cleaned and dried and steri-strips were applied. María Ballesteros was present during the entirety of the case and was critical in retraction for proper dissection.  There was no qualified resident available for assisting during this procedure.  The patient tolerated the procedure well and was transported to recovery room  in good condition.

## 2024-11-13 NOTE — ANESTHESIA POSTPROCEDURE EVALUATION
Anesthesia Post Evaluation    Patient: Laura Acosta    Procedure(s) Performed: Procedure(s) (LRB):  Removal, Vascular Access Catheter (N/A)    Final Anesthesia Type: general      Patient location during evaluation: PACU  Patient participation: Yes- Able to Participate  Level of consciousness: awake and alert  Post-procedure vital signs: reviewed and stable  Pain management: adequate  Airway patency: patent    PONV status at discharge: No PONV  Anesthetic complications: no      Cardiovascular status: blood pressure returned to baseline  Respiratory status: unassisted and spontaneous ventilation  Hydration status: euvolemic  Follow-up needed               Vitals Value Taken Time   /72 11/12/24 2106   Temp 36.4 °C (97.6 °F) 11/12/24 2053   Pulse 80 11/12/24 2106   Resp 13 11/12/24 2045   SpO2 91 % 11/12/24 2106   Vitals shown include unfiled device data.      Event Time   Out of Recovery 11/12/2024 20:47:00         Pain/Chuy Score: Pain Rating Prior to Med Admin: 3 (11/12/2024 12:56 PM)  Pain Rating Post Med Admin: 0 (11/12/2024  1:40 PM)  Chuy Score: 8 (11/12/2024  8:47 PM)

## 2024-11-13 NOTE — ANESTHESIA PROCEDURE NOTES
Intubation    Date/Time: 11/12/2024 7:35 PM    Performed by: Kvng Devlin CRNA  Authorized by: Jaswinder Hartmann DO    Intubation:     Induction:  Intravenous    Intubated:  Postinduction    Mask Ventilation:  Not attempted    Attempts:  1    Attempted By:  CRNA    Difficult Airway Encountered?: No      Airway Device:  Supraglottic airway/LMA    Airway Device Size:  4.0    Secured at:  The lips    Complicating Factors:  None    Findings Post-Intubation:  BS equal bilateral and atraumatic/condition of teeth unchanged

## 2024-11-13 NOTE — PROGRESS NOTES
Pharmacokinetic Assessment Follow Up: IV Vancomycin    Vancomycin serum concentration assessment(s):    The trough level was drawn correctly and can be used to guide therapy at this time. The measurement is within the desired definitive target range of 15 to 20 mcg/mL.    Vancomycin Regimen Plan:    Continue regimen to Vancomycin 1000 mg IV every 12 hours with next serum trough concentration measured at 1300 prior to 1400 dose on 11/15    Scheduled Administration Times        Drug levels (last 3 results):  Recent Labs   Lab Result Units 11/11/24  1213 11/13/24  1237   Vancomycin Trough ug/ml 19.5 19.8       Vancomycin Administrations:  vancomycin given in the last 96 hours                     vancomycin (VANCOCIN) 1,000 mg in D5W 250 mL IVPB (admixture device) (mg) 1,000 mg New Bag 11/13/24 0246     1,000 mg New Bag 11/12/24 1502     1,000 mg New Bag  0103     1,000 mg New Bag 11/11/24 1646     1,000 mg New Bag  0214     1,000 mg New Bag 11/10/24 1456    vancomycin 1,500 mg in D5W 250 mL IVPB (admixture device) (mg) 1,500 mg New Bag 11/10/24 0147                    Pharmacy will continue to follow and monitor vancomycin.    Please contact pharmacy at extension [phone] for questions regarding this assessment.    Thank you for the consult,   Cecilia Yost       Patient brief summary:  Laura Acosta is a 78 y.o. female initiated on antimicrobial therapy with IV Vancomycin for treatment of bacteremia    The patient's current regimen is 1000 mg q12h    Drug Allergies:   Review of patient's allergies indicates:   Allergen Reactions    Hydromorphone Itching     Other reaction(s): itching    Opium      Other reaction(s): itching  has itching with larger doses. Smaller doses do not cause a reaction.       Actual Body Weight:  Wt Readings from Last 1 Encounters:   11/07/24 71.5 kg (157 lb 10.1 oz)       Renal Function:   Estimated Creatinine Clearance: 69.1 mL/min (based on SCr of 0.65 mg/dL).,     Dialysis Method (if  applicable):  N/A    CBC (last 72 hours):  Recent Labs   Lab Result Units 11/11/24  1338   WBC x10(3)/mcL 7.60   Hgb g/dL 12.2   Hct % 37.8   Platelet x10(3)/mcL 184       Metabolic Panel (last 72 hours):  Recent Labs   Lab Result Units 11/11/24  0407 11/12/24  0353 11/13/24  0536   Sodium mmol/L 139 142 139   Potassium mmol/L 4.0 3.5 4.0   Chloride mmol/L 113* 112* 111*   CO2 mmol/L 19* 22* 21*   Glucose mg/dL 103 96 164*   Blood Urea Nitrogen mg/dL 10.5 9.9 12.5   Creatinine mg/dL 0.63 0.59 0.65   Magnesium Level mg/dL 1.90 1.90 1.90   Phosphorus Level mg/dL 3.1 2.6 2.8       Microbiologic Results:  Microbiology Results (last 7 days)       Procedure Component Value Units Date/Time    Blood Culture [[id number]]  (Abnormal)  (Susceptibility) Collected: 11/10/24 2357    Order Status: Completed Specimen: Blood, Arterial Updated: 11/13/24 1209     Blood Culture Staphylococcus hominis      Staphylococcus epidermidis     Comment: Susceptibility To Follow        GRAM STAIN Gram Positive Cocci, probable Staphylococcus      Seen in gram stain of broth only      2 of 2 bottles positive    Mycobacteria and Nocardia Culture [[id number]] Collected: 11/12/24 2011    Order Status: Sent Specimen: Body Fluid from Chest, Right Updated: 11/13/24 1200    Fungal Culture [[id number]] Collected: 11/12/24 2011    Order Status: Sent Specimen: Mediport Tip from Chest Updated: 11/13/24 1200    Anaerobic Culture [facility] [[id number]] Collected: 11/12/24 2011    Order Status: Sent Specimen: Foreign Body from Mediport Updated: 11/13/24 1200    Gram Stain [[id number]] Collected: 11/12/24 2000    Order Status: Completed Specimen: Foreign Body from Mediport Updated: 11/13/24 0432     GRAM STAIN No WBC seen      Rare Gram positive cocci    Blood Culture [[id number]]  (Normal) Collected: 11/10/24 2357    Order Status: Completed Specimen: Blood, Peripheral Line Updated: 11/13/24 0101     Blood Culture No Growth At 48 Hours    Medical Device Culture  [[id number]] Collected: 11/12/24 2000    Order Status: Sent Specimen: Foreign Body from [facility] Updated: 11/12/24 2027    Anaerobic Culture [[id number]] Collected: 11/12/24 2000    Order Status: Canceled Specimen: Foreign Body from [facility]     Blood Culture [[id number]]  (Abnormal)  (Susceptibility) Collected: 11/08/24 1857    Order Status: Completed Specimen: Blood from Arm, Left Updated: 11/12/24 0705     Blood Culture Staphylococcus epidermidis     GRAM STAIN Gram Positive Cocci, probable Staphylococcus      Seen in gram stain of broth only      1 of 2 Anaerobic bottles positive    Blood Culture [[id number]]  (Abnormal)  (Susceptibility) Collected: 11/08/24 1909    Order Status: Completed Specimen: Blood from [facility] Updated: 11/11/24 0658     Blood Culture Staphylococcus epidermidis     GRAM STAIN Gram Positive Cocci, probable Staphylococcus      Seen in gram stain of broth only      2 of 2 bottles positive    Blood Culture [[id number]]  (Abnormal)  (Susceptibility) Collected: 11/07/24 1742    Order Status: Completed Specimen: Blood Updated: 11/10/24 0632     Blood Culture Staphylococcus epidermidis     GRAM STAIN Seen in gram stain of broth only      Gram Positive Cocci, probable Staphylococcus      1 of 1 Pediatric bottle positive    Blood Culture [[id number]]  (Abnormal)  (Susceptibility) Collected: 11/07/24 1742    Order Status: Completed Specimen: Blood Updated: 11/10/24 0632     Blood Culture Staphylococcus epidermidis     GRAM STAIN Gram Positive Cocci, probable Staphylococcus      Seen in gram stain of broth only      1 of 1 Pediatric bottle positive    BCID2 Panel [[id number]] Collected: 11/08/24 1909    Order Status: Canceled Specimen: Blood from [facility] Updated: 11/09/24 0644    BCID2 Panel [[id number]]  (Abnormal) Collected: 11/07/24 1742    Order Status: Completed Specimen: Blood Updated: 11/08/24 1520     CTX-M (ESBL ) N/A     IMP (Cabapenemase ) N/A     KPC resistance gene  (Carbapenemase ) N/A     mcr-1 N/A     mecA ID Not Detected     Comment: Note: Antimicrobial resistance can occur via multiple mechanisms. A Not Detected result for antimicrobial resistance gene(s) does not indicate antimicrobial susceptibility. Subculturing is required for species identification and susceptibility testing of   isolates.        mecA/C and MREJ (MRSA) gene N/A     NDM (Carbapenemase ) N/A     OXA-48-like (Carbapenemase ) N/A     Maria Luisa/B (VRE gene) N/A     VIM (Carbapenemase ) N/A     Enterococcus faecalis Not Detected     Enterococcus faecium Not Detected     Listeria monocytogenes Not Detected     Staphylococcus spp. Detected     Staphylococcus aureus Not Detected     Staphylococcus epidermidis Detected     Staphylococcus lugdunensis Not Detected     Streptococcus spp. Not Detected     Streptococcus agalactiae (Group B) Not Detected     Streptococcus pneumoniae Not Detected     Streptococcus pyogenes (Group A) Not Detected     Acinetobacter calcoaceticus/baumannii complex Not Detected     Bacteroides fragilis Not Detected     Enterobacterales Not Detected     Enterobacter cloacae complex Not Detected     Escherichia coli Not Detected     Klebsiella aerogenes Not Detected     Klebsiella oxytoca Not Detected     Klebsiella pneumoniae group Not Detected     Proteus spp. Not Detected     Salmonella spp. Not Detected     Serratia marcescens Not Detected     Haemophilus influenzae Not Detected     Neisseria meningitidis Not Detected     Pseudomonas aeruginosa Not Detected     Stenotrophomonas maltophilia Not Detected     Candida albicans Not Detected     Candida auris Not Detected     Candida glabrata Not Detected     Candida krusei Not Detected     Candida parapsilosis Not Detected     Candida tropicalis Not Detected     Cryptococcus neoformans/gattii Not Detected    Narrative:      The [employer] BCID2 Panel is a multiplexed nucleic acid test intended for the use with  [employer]® Film[employer]® 2.0 or [employer]® Film[employer]® [employer] Systems for the simultaneous qualitative detection and identification of multiple bacterial and yeast nucleic acids and select genetic determinants associated with antimicrobial resistance.  The [employer] BCID2 Panel test is performed directly on blood culture samples identified as positive by a continuous monitoring blood culture system.  Results are intended to be interpreted in conjunction with Gram stain results.    Respiratory Culture [[id number]]     Order Status: Sent Specimen: Sputum, Expectorated

## 2024-11-14 ENCOUNTER — DOCUMENT SCAN (OUTPATIENT)
Dept: HOME HEALTH SERVICES | Facility: HOSPITAL | Age: 79
End: 2024-11-14
Payer: MEDICARE

## 2024-11-14 LAB
ANION GAP SERPL CALC-SCNC: 7 MEQ/L
BACTERIA BLD CULT: ABNORMAL
BACTERIA BLD CULT: ABNORMAL
BASOPHILS # BLD AUTO: 0.03 X10(3)/MCL
BASOPHILS NFR BLD AUTO: 0.5 %
BUN SERPL-MCNC: 16.9 MG/DL (ref 9.8–20.1)
CALCIUM SERPL-MCNC: 8.7 MG/DL (ref 8.4–10.2)
CHLORIDE SERPL-SCNC: 108 MMOL/L (ref 98–107)
CO2 SERPL-SCNC: 24 MMOL/L (ref 23–31)
CREAT SERPL-MCNC: 0.65 MG/DL (ref 0.55–1.02)
CREAT/UREA NIT SERPL: 26
EOSINOPHIL # BLD AUTO: 0.15 X10(3)/MCL (ref 0–0.9)
EOSINOPHIL NFR BLD AUTO: 2.4 %
ERYTHROCYTE [DISTWIDTH] IN BLOOD BY AUTOMATED COUNT: 14.8 % (ref 11.5–17)
GFR SERPLBLD CREATININE-BSD FMLA CKD-EPI: >60 ML/MIN/1.73/M2
GLUCOSE SERPL-MCNC: 130 MG/DL (ref 82–115)
GRAM STN SPEC: ABNORMAL
HCT VFR BLD AUTO: 38.7 % (ref 37–47)
HGB BLD-MCNC: 12.4 G/DL (ref 12–16)
IMM GRANULOCYTES # BLD AUTO: 0.01 X10(3)/MCL (ref 0–0.04)
IMM GRANULOCYTES NFR BLD AUTO: 0.2 %
INR PPP: 1.4
LYMPHOCYTES # BLD AUTO: 2.51 X10(3)/MCL (ref 0.6–4.6)
LYMPHOCYTES NFR BLD AUTO: 40.9 %
MAGNESIUM SERPL-MCNC: 1.7 MG/DL (ref 1.6–2.6)
MCH RBC QN AUTO: 28.7 PG (ref 27–31)
MCHC RBC AUTO-ENTMCNC: 32 G/DL (ref 33–36)
MCV RBC AUTO: 89.6 FL (ref 80–94)
MONOCYTES # BLD AUTO: 0.52 X10(3)/MCL (ref 0.1–1.3)
MONOCYTES NFR BLD AUTO: 8.5 %
NEUTROPHILS # BLD AUTO: 2.91 X10(3)/MCL (ref 2.1–9.2)
NEUTROPHILS NFR BLD AUTO: 47.5 %
NRBC BLD AUTO-RTO: 0 %
PHOSPHATE SERPL-MCNC: 2.9 MG/DL (ref 2.3–4.7)
PLATELET # BLD AUTO: 211 X10(3)/MCL (ref 130–400)
PMV BLD AUTO: 9 FL (ref 7.4–10.4)
POTASSIUM SERPL-SCNC: 3.5 MMOL/L (ref 3.5–5.1)
PROTHROMBIN TIME: 17.1 SECONDS (ref 12.5–14.5)
RBC # BLD AUTO: 4.32 X10(6)/MCL (ref 4.2–5.4)
SODIUM SERPL-SCNC: 139 MMOL/L (ref 136–145)
WBC # BLD AUTO: 6.13 X10(3)/MCL (ref 4.5–11.5)

## 2024-11-14 PROCEDURE — 63600175 PHARM REV CODE 636 W HCPCS: Performed by: INTERNAL MEDICINE

## 2024-11-14 PROCEDURE — 25000003 PHARM REV CODE 250: Performed by: INTERNAL MEDICINE

## 2024-11-14 PROCEDURE — 36415 COLL VENOUS BLD VENIPUNCTURE: CPT | Performed by: INTERNAL MEDICINE

## 2024-11-14 PROCEDURE — 25000003 PHARM REV CODE 250: Performed by: STUDENT IN AN ORGANIZED HEALTH CARE EDUCATION/TRAINING PROGRAM

## 2024-11-14 PROCEDURE — 83735 ASSAY OF MAGNESIUM: CPT | Performed by: INTERNAL MEDICINE

## 2024-11-14 PROCEDURE — 84100 ASSAY OF PHOSPHORUS: CPT | Performed by: INTERNAL MEDICINE

## 2024-11-14 PROCEDURE — 11000001 HC ACUTE MED/SURG PRIVATE ROOM

## 2024-11-14 PROCEDURE — 63600175 PHARM REV CODE 636 W HCPCS: Performed by: STUDENT IN AN ORGANIZED HEALTH CARE EDUCATION/TRAINING PROGRAM

## 2024-11-14 PROCEDURE — 85610 PROTHROMBIN TIME: CPT | Performed by: INTERNAL MEDICINE

## 2024-11-14 PROCEDURE — 85025 COMPLETE CBC W/AUTO DIFF WBC: CPT | Performed by: INTERNAL MEDICINE

## 2024-11-14 PROCEDURE — 36415 COLL VENOUS BLD VENIPUNCTURE: CPT | Performed by: HOSPITALIST

## 2024-11-14 PROCEDURE — 80048 BASIC METABOLIC PNL TOTAL CA: CPT | Performed by: INTERNAL MEDICINE

## 2024-11-14 PROCEDURE — 87040 BLOOD CULTURE FOR BACTERIA: CPT | Performed by: HOSPITALIST

## 2024-11-14 PROCEDURE — 21400001 HC TELEMETRY ROOM

## 2024-11-14 PROCEDURE — 99223 1ST HOSP IP/OBS HIGH 75: CPT | Mod: ,,, | Performed by: HOSPITALIST

## 2024-11-14 RX ORDER — WARFARIN 2 MG/1
2 TABLET ORAL DAILY
Status: DISCONTINUED | OUTPATIENT
Start: 2024-11-15 | End: 2024-11-15

## 2024-11-14 RX ORDER — ENOXAPARIN SODIUM 100 MG/ML
1 INJECTION SUBCUTANEOUS EVERY 12 HOURS
Status: DISCONTINUED | OUTPATIENT
Start: 2024-11-15 | End: 2024-11-17

## 2024-11-14 RX ORDER — CHLORHEXIDINE GLUCONATE 40 MG/ML
SOLUTION TOPICAL
Qty: 3785 ML | Refills: 4 | Status: SHIPPED | OUTPATIENT
Start: 2024-11-15 | End: 2025-11-15

## 2024-11-14 RX ORDER — RIFAMPIN 300 MG/1
300 CAPSULE ORAL EVERY 12 HOURS
Status: DISCONTINUED | OUTPATIENT
Start: 2024-11-15 | End: 2024-11-20 | Stop reason: HOSPADM

## 2024-11-14 RX ADMIN — PHENAZOPYRIDINE HYDROCHLORIDE 100 MG: 100 TABLET ORAL at 08:11

## 2024-11-14 RX ADMIN — PHENAZOPYRIDINE HYDROCHLORIDE 100 MG: 100 TABLET ORAL at 04:11

## 2024-11-14 RX ADMIN — BUSPIRONE HYDROCHLORIDE 30 MG: 15 TABLET ORAL at 08:11

## 2024-11-14 RX ADMIN — VANCOMYCIN HYDROCHLORIDE 1000 MG: 1 INJECTION, POWDER, LYOPHILIZED, FOR SOLUTION INTRAVENOUS at 02:11

## 2024-11-14 RX ADMIN — PHENAZOPYRIDINE HYDROCHLORIDE 100 MG: 100 TABLET ORAL at 11:11

## 2024-11-14 RX ADMIN — FERROUS SULFATE TAB 325 MG (65 MG ELEMENTAL FE) 1 EACH: 325 (65 FE) TAB at 08:11

## 2024-11-14 RX ADMIN — LEUCINE, PHENYLALANINE, LYSINE, METHIONINE, ISOLEUCINE, VALINE, HISTIDINE, THREONINE, TRYPTOPHAN, ALANINE, GLYCINE, ARGININE, PROLINE, SERINE, TYROSINE, DEXTROSE: 311; 238; 247; 170; 255; 247; 204; 179; 77; 880; 438; 489; 289; 213; 17; 5 INJECTION INTRAVENOUS at 05:11

## 2024-11-14 RX ADMIN — ENOXAPARIN SODIUM 40 MG: 40 INJECTION SUBCUTANEOUS at 04:11

## 2024-11-14 RX ADMIN — CITALOPRAM HYDROBROMIDE 40 MG: 20 TABLET ORAL at 08:11

## 2024-11-14 RX ADMIN — MIRTAZAPINE 15 MG: 15 TABLET, FILM COATED ORAL at 08:11

## 2024-11-14 NOTE — PROGRESS NOTES
[facility] Hospital Medicine Progress Note        Chief Complaint: Inpatient Follow-up    HPI:     78-year-old female with significant history of short gut syndrome on TPN at home, HTN, HLD, PVD, DVT/clotting disorder on Coumadin, rheumatoid arthritis, cirrhosis, gallstone pancreatitis, anxiety/depression.  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  Last MediPort replacement was 7 months back.  Patient had a recent hospitalization in October which fever spikes which was attributed to COVID-19 infection and pneumonia, cultures were negative then and was discharged home.  Patient presented back to the ED on 11/07 with complaints of fever, generalized weakness, labs non impressive.  Imaging concerning for pneumonia/enterocolitis, patient was admitted to hospital medicine services, cultures ordered and was initiated on broad-spectrum antimicrobials for colitis/pneumonia.  Cultures from admission grew Staph epidermidis.  Repeat cultures with 1 bottle growing Staph epidermidis/Staph hominis.  Concern for MediPort infection, General surgery consulted for removal of the same.  In the meantime a midline was placed for IV access.  MediPort successfully removed 11/12.    Interval Hx:   Patient seen at bedside, comfortably sitting in the side of the bed, hemodynamics are stable, she is afebrile, no new complaints, receiving TPN through midline    Objective/physical exam:  General: In no acute distress, afebrile  Chest: Clear to auscultation bilaterally  Heart: S1, S2, no appreciable murmur  Abdomen: Soft, nontender, BS +  MSK: Warm, no lower extremity edema, no clubbing or cyanosis  Neurologic: Alert and oriented x4, moving all extremities with good strength     VITAL SIGNS: 24 HRS MIN & MAX LAST   Temp  Min: 97.8 °F (36.6 °C)  Max: 98.9 °F (37.2 °C) 98 °F (36.7 °C)   BP  Min: 116/57  Max: 143/77 (!) 142/60   Pulse   Min: 64  Max: 79  76   Resp  Min: 18  Max: 18 18   SpO2  Min: 92 %  Max: 95 % (!) 92 %       Recent Labs   Lab 11/11/24  1338   WBC 7.60   RBC 4.24   HGB 12.2   HCT 37.8   MCV 89.2   MCH 28.8   MCHC 32.3*   RDW 14.9      MPV 8.8         Recent Labs   Lab 11/09/24  0425 11/10/24  0110 11/14/24  0359      < > 139   K 3.8   < > 3.5   *   < > 108*   CO2 23   < > 24   BUN 8.4*   < > 16.9   CREATININE 0.75   < > 0.65   CALCIUM 8.5   < > 8.7   MG  --    < > 1.70   ALBUMIN 2.9*  --   --    ALKPHOS 126  --   --    ALT 27  --   --    AST 27  --   --    BILITOT 0.5  --   --     < > = values in this interval not displayed.          Microbiology Results (last 7 days)       Procedure Component Value Units Date/Time    Blood Culture [[id number]]  (Abnormal)  (Susceptibility) Collected: 11/10/24 2357    Order Status: Completed Specimen: Blood, Arterial Updated: 11/14/24 0630     Blood Culture Staphylococcus hominis      Staphylococcus epidermidis     GRAM STAIN Gram Positive Cocci, probable Staphylococcus      Seen in gram stain of broth only      2 of 2 bottles positive    Blood Culture [[id number]]  (Normal) Collected: 11/10/24 2357    Order Status: Completed Specimen: Blood, Peripheral Line Updated: 11/14/24 0100     Blood Culture No Growth At 72 Hours    Medical Device Culture [[id number]] Collected: 11/12/24 2000    Order Status: Completed Specimen: Foreign Body from Mediport Updated: 11/13/24 1456     CULTURE, MEDICAL DEVICE ([facility]) No Growth At 24 Hours    Mycobacteria and Nocardia Culture [[id number]] Collected: 11/12/24 2011    Order Status: Sent Specimen: Body Fluid from Chest, Right Updated: 11/13/24 1200    Fungal Culture [[id number]] Collected: 11/12/24 2011    Order Status: Sent Specimen: Mediport Tip from Chest Updated: 11/13/24 1200    Anaerobic Culture [facility] [[id number]] Collected: 11/12/24 2011    Order Status: Sent Specimen: Foreign Body from Mediport Updated: 11/13/24 1200    Gram Stain  [[id number]] Collected: 11/12/24 2000    Order Status: Completed Specimen: Foreign Body from [facility] Updated: 11/13/24 0432     GRAM STAIN No WBC seen      Rare Gram positive cocci    Anaerobic Culture [[id number]] Collected: 11/12/24 2000    Order Status: Canceled Specimen: Foreign Body from [facility]     Blood Culture [[id number]]  (Abnormal)  (Susceptibility) Collected: 11/08/24 1857    Order Status: Completed Specimen: Blood from Arm, Left Updated: 11/12/24 0705     Blood Culture Staphylococcus epidermidis     GRAM STAIN Gram Positive Cocci, probable Staphylococcus      Seen in gram stain of broth only      1 of 2 Anaerobic bottles positive    Blood Culture [[id number]]  (Abnormal)  (Susceptibility) Collected: 11/08/24 1909    Order Status: Completed Specimen: Blood from [facility] Updated: 11/11/24 0658     Blood Culture Staphylococcus epidermidis     GRAM STAIN Gram Positive Cocci, probable Staphylococcus      Seen in gram stain of broth only      2 of 2 bottles positive    Blood Culture [[id number]]  (Abnormal)  (Susceptibility) Collected: 11/07/24 1742    Order Status: Completed Specimen: Blood Updated: 11/10/24 0632     Blood Culture Staphylococcus epidermidis     GRAM STAIN Seen in gram stain of broth only      Gram Positive Cocci, probable Staphylococcus      1 of 1 Pediatric bottle positive    Blood Culture [[id number]]  (Abnormal)  (Susceptibility) Collected: 11/07/24 1742    Order Status: Completed Specimen: Blood Updated: 11/10/24 0632     Blood Culture Staphylococcus epidermidis     GRAM STAIN Gram Positive Cocci, probable Staphylococcus      Seen in gram stain of broth only      1 of 1 Pediatric bottle positive    BCID2 Panel [[id number]] Collected: 11/08/24 1909    Order Status: Canceled Specimen: Blood from [facility] Updated: 11/09/24 0644    BCID2 Panel [[id number]]  (Abnormal) Collected: 11/07/24 1742    Order Status: Completed Specimen: Blood Updated: 11/08/24 1520     CTX-M (ESBL ) N/A      IMP (Cabapenemase ) N/A     KPC resistance gene (Carbapenemase ) N/A     mcr-1 N/A     mecA ID Not Detected     Comment: Note: Antimicrobial resistance can occur via multiple mechanisms. A Not Detected result for antimicrobial resistance gene(s) does not indicate antimicrobial susceptibility. Subculturing is required for species identification and susceptibility testing of   isolates.        mecA/C and MREJ (MRSA) gene N/A     NDM (Carbapenemase ) N/A     OXA-48-like (Carbapenemase ) N/A     Maria Luisa/B (VRE gene) N/A     VIM (Carbapenemase ) N/A     Enterococcus faecalis Not Detected     Enterococcus faecium Not Detected     Listeria monocytogenes Not Detected     Staphylococcus spp. Detected     Staphylococcus aureus Not Detected     Staphylococcus epidermidis Detected     Staphylococcus lugdunensis Not Detected     Streptococcus spp. Not Detected     Streptococcus agalactiae (Group B) Not Detected     Streptococcus pneumoniae Not Detected     Streptococcus pyogenes (Group A) Not Detected     Acinetobacter calcoaceticus/baumannii complex Not Detected     Bacteroides fragilis Not Detected     Enterobacterales Not Detected     Enterobacter cloacae complex Not Detected     Escherichia coli Not Detected     Klebsiella aerogenes Not Detected     Klebsiella oxytoca Not Detected     Klebsiella pneumoniae group Not Detected     Proteus spp. Not Detected     Salmonella spp. Not Detected     Serratia marcescens Not Detected     Haemophilus influenzae Not Detected     Neisseria meningitidis Not Detected     Pseudomonas aeruginosa Not Detected     Stenotrophomonas maltophilia Not Detected     Candida albicans Not Detected     Candida auris Not Detected     Candida glabrata Not Detected     Candida krusei Not Detected     Candida parapsilosis Not Detected     Candida tropicalis Not Detected     Cryptococcus neoformans/gattii Not Detected    Narrative:      The [employer] BCID2 Panel is a  multiplexed nucleic acid test intended for the use with [employer]® [employer]® 2.0 or [employer]® [employer] Systems for the simultaneous qualitative detection and identification of multiple bacterial and yeast nucleic acids and select genetic determinants associated with antimicrobial resistance.  The [employer] BCID2 Panel test is performed directly on blood culture samples identified as positive by a continuous monitoring blood culture system.  Results are intended to be interpreted in conjunction with Gram stain results.    Respiratory Culture [[id number]]     Order Status: Sent Specimen: Sputum, Expectorated              Scheduled Med:   busPIRone  30 mg Oral BID    citalopram  40 mg Oral Daily    enoxparin  40 mg Subcutaneous Q24H (prophylaxis, 1700)    ferrous sulfate  1 tablet Oral Daily    mirtazapine  15 mg Oral QHS    phenazopyridine  100 mg Oral TID WM    teduglutide  3.8 mg Subcutaneous Daily    vancomycin (VANCOCIN) 1,000 mg in D5W 250 mL IVPB (admixture device)  1,000 mg Intravenous Q12H          Assessment/Plan:    Staph epidermidis/Staph hominis bacteremia-cleared 11/12  Infected MediPort-removed 11/12  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, 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, 2024  Suspected right-sided pneumonia on admit-completed treatment   Recent COVID-19 pneumonitis   History of short gut syndrome on TPN at home   Essential HTN-stable   HLD   History of PVD   History of clotting disorder with history of DVT on Coumadin   Rheumatoid arthritis   Cirrhosis-appears compensated   History of gallstone pancreatitis   Anxiety/depression   Prophylaxis    Patient is on IV vancomycin   Currently has a midline   Will consult Infectious Disease for recommendations as far as course and duration of antibiotics and also for recommendations as far as when MediPort can be replaced or a PICC line can be placed for TPN for her short gut syndrome   Patient is now afebrile, hemodynamically stable with no more  leukocytosis  Now that MediPort as resumed resume home dose Coumadin and also place on full-dose anticoagulation with Lovenox until INR is therapeutic  Latest INR is 1.4   Continue home meds-BuSpar, citalopram, iron, Remeron, phenazopyridine,   Patient is now receiving TPN through midline  DVT prophylaxis-Lovenox with Coumadin      Raine Moscoso MD   11/14/2024

## 2024-11-14 NOTE — CONSULTS
Infectious Disease  Patient Name Laura Acosta  78 y.o. female.  MRN: [id number]   Length of stay: 7  Chief Complaint: Fever (C/o fever & weakness x 1 week. Was sent from PCP for dx of UTI. Pt denies abd pain & dysuria. Dx with covid x2 weeks ago, then dx with pneumonia. Not currently on antibiotics. No meds taken today. )      Interval history:   11/14 - afebrile. Muitple CONS spp and morphotype. Recurrently infections requiring removal over last 2-3 years. Patient is port dependent. Would avoid removing port in futures unless absolutely necessary. Can attempt port salvage with abx or ethanol locks in future. Blood cultures and labs should never be drawn from port in active bacteremia this seeds the port if not already seeded. I highly suspect there is a break in the sterile technique when accessing the port for TPN at home. Patient educated an proper access/handling, use of hand hygiene gloves. Another possibility is seeding of her shoulder prosthesis which would be unusual, but we discussed a trial of rifampin to see if that helps. She has broke through doxy suppression so that will no longer be of use. I also discussed not replacing the port for several months to a year and using the PICC instead. She has limited access points now as it is, and if it is a technique error, perhaps using the PICC will be better.   Assessment and Plan:   1) Sepsis  - present on admission   - fever, leukocytosis, hypoxia  - CXR 11/07 - No acute abnormality of the chest.   - CT chest/abd/pelvis - .  Right lower lung lobe small new vague opacity may be inflammatory or infectious.  Details of other chest findings above.   Mild excessive fluid within loops of small bowel and the right colon could represent enterocolitis.  Details of other abdominopelvic findings above.   - currently on vancomycin, goal 15-20, Rx to dose     2) Bacteremia  - in setting of mediport for long term TPN due to short gut syndrome   - recurrent Staph sp  bacteremia, requiring port removal multiple times in past on oral suppressive therapy with daily doxycyline for 2-3 years  - had bacteremia in April with mediport removed at that time  - established care at [facility] in September   - mediport implanted on   - BCx 11/07 Staphylococus epidermis (S-oxacilin; R-tetra) both sets  - BCx 11/07 Staphylococus epidermis (S-oxacilin; R-tetra) both sets   - BCx 11/1 -  Staphylococus epidermis (S-oxacilin; R-tetra) both sets and Staphy hominis 2/4 [R-oxacillin, tetra]  - now again with infection   - Mediport removed on 11/12/24  - Mediport Cx 11/12 - rare GPC--> Staphylococcus epidermis   - given the repeated isoaltion of CONS, I highly suspect there is some break in sterile technique/accessing of the port that is introducing the bacteria so often. I reviewed her technique: weekly port access by skilled RN using sterile technique. Line remains accessed and used for TPN 3x week at night and is capped in between. Patient performs hand hygiene and accesses line for heparin, flush and caps when not in use. She does not use gloves  - CHG 3 x week  - obtain TTE and if negative plan for  DALE   - will add rifampin, will have to monitor INR on coumadin, plan for 6 weeks [low chance the shoulder hardware is seeded, but will trial this]    3) COVID  - recent, 2 weeks prior  - recovered    Discussed with patient and daughter at bedside 11/14  Discussed and seen with RN    Lyn San MD, MPH  [facility]    Thank you for this consultation. I will follow up with the patient. Please contact via Epic secure chat with any questions.       HPI:   Patient is Laura Acosta a 78 y.o. female admitted on 11/7 with fever and malaise for several days. Patient reports recent COVID infection in October with brief admission. She was given oral abx by her PCP but did not improve. Upon evaluation patient with fever, hypoxia, and evidence of RLL opacity and enterocolitis. She  was started on empiric antimicrobials. Blood cx isolated CONS and patient had Mediport removed on 11/12. Patient has midline in place for access. 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 Infectious diseases consulted for evaluation and management.       Past Medical History:   Diagnosis Date    Acute URI     Anxiety and depression     Back pain     Bacteremia 04/29/2024    Breast cancer     Cholelithiasis     Contaminated small bowel syndrome     DVT (deep venous thrombosis)     on coumadin    Edema, lower extremity     Gallstone pancreatitis     Heart murmur     HTN (hypertension)     Insomnia     Irregular heart beat     Ischemic disease of gut     Kidney stones     Laryngitis     Malabsorption     Malnutrition, unspecified type     Meningitis, unspecified     OA (osteoarthritis)     PVD (peripheral vascular disease)     Rheumatoid arthritis, unspecified     Staph infection     infected mediport -- on doxycycline daily for prevention    Tremor     Unspecified cataract     Unspecified cirrhosis of liver 06/20/2023    Dr Carroll     Past Surgical History:   Procedure Laterality Date    APPENDECTOMY      BOWEL RESECTION      BREAST LUMPECTOMY Right     CHOLECYSTECTOMY  05/2015    COLONOSCOPY  02/2022    repeat 3 years    CYST REMOVAL Right     breast    CYSTOSCOPY W/ STONE MANIPULATION      CYSTOSCOPY W/ URETERAL STENT PLACEMENT  12/2020    CYSTOSCOPY W/ URETERAL STENT REMOVAL  04/2021    CYSTOURETEROSCOPY, WITH HOLMIUM LASER LITHOTRIPSY OF URETERAL CALCULUS AND STENT INSERTION Left 05/02/2023    Procedure: CYSTOSCOPY, WITH URETERAL STENT INSERTION Left;  Surgeon: Jared TORRES  MD Elvira;  Location: [facility] OR;  Service: Urology;  Laterality: Left;    EGD, WITH CLOSED BIOPSY N/A 6/20/2023    Procedure: EGD;  Surgeon: Aashish Carroll MD;  Location: [facility] ENDOSCOPY;  Service: Gastroenterology;  Laterality: N/A;    ENDOSCOPIC RETROGRADE CHOLANGIOPANCREATOGRAPHY W/ SPHINCTEROTOMY AND STONE REMOVAL  04/2015    ESOPHAGOGASTRODUODENOSCOPY  06/20/2023    Dr Carroll - no signs esophageal varicies --repeat 3 yrs    GI Biopsy  02/15/2022    GI Biopsy  12/2018    HYSTERECTOMY      INSERTION OF TUNNELED CENTRAL VENOUS CATHETER (CVC) WITH SUBCUTANEOUS PORT N/A 7/18/2024    Procedure: [id number];  Surgeon: Ariel Welsh Jr., MD;  Location: [facility];  Service: General;  Laterality: N/A;    LAPAROTOMY, EXPLORATORY  06/2015    LITHOTRIPSY Left 04/2021    LITHOTRIPSY Left 03/2021    MEDIPORT INSERTION, SINGLE  06/2021    MEDIPORT INSERTION, SINGLE  07/2020    MEDIPORT INSERTION, SINGLE  12/2018    MEDIPORT REMOVAL  07/2020    PHACOEMULSIFICATION OF CATARACT Left 12/2016    PHACOEMULSIFICATION OF CATARACT Right 11/2016    PICC line Removal Right 06/2021    POLYPECTOMY  02/2022    PVD surgery      REMOVAL OF CATHETER  12/2020    REMOVAL OF VASCULAR ACCESS CATHETER Right 4/24/2024    Procedure: Removal, Vascular Access Catheter;  Surgeon: Reed Ghosh MD;  Location: [facility];  Service: General;  Laterality: Right;    REMOVAL OF VASCULAR ACCESS CATHETER N/A 11/12/2024    Procedure: Removal, Vascular Access Catheter;  Surgeon: Ariel Welsh Jr., MD;  Location: [facility];  Service: General;  Laterality: N/A;  removal mediport left chest wall    SHOULDER SURGERY Right     shoulder replacement    SPHINCTEROTOMY OF URETHRA      VENTRAL HERNIA REPAIR  04/2016     Review of patient's allergies indicates:   Allergen Reactions    Hydromorphone Itching     Other reaction(s): itching    Opium      Other reaction(s): itching  has itching with larger doses. Smaller doses do not cause a reaction.      Current Outpatient Medications   Medication Instructions    busPIRone (BUSPAR) 20 mg, 2 times daily    cefdinir (OMNICEF) 300 mg, Oral, 2 times daily    CeleXA 40 mg, Daily    cholecalciferol (vitamin D3) 50,000 Units, Every 7 days    diphenoxylate-atropine 2.5-0.025 mg (LOMOTIL) 2.5-0.025 mg per tablet 2 tablets, 2 times daily    doxycycline (VIBRAMYCIN) 200 mg, Daily    ferrous sulfate 325 mg, Oral, Daily    GATTEX 30-VIAL 5 mg Kit Daily    metoprolol tartrate (LOPRESSOR) 100 mg, 2 times daily    mirtazapine (REMERON) 15 mg, Nightly    multivitamin (ONE DAILY MULTIVITAMIN) per tablet 1 tablet, Daily    solifenacin (VESICARE) 5 mg, Daily    warfarin (COUMADIN) 2.5 MG tablet TAKE ONE TABLET BY MOUTH ONCE DAILY IN THE EVENING OR AS DIRECTED BY CIS PROVIDER       Current Facility-Administered Medications:     acetaminophen tablet 650 mg, 650 mg, Oral, Q8H PRN, Natan Cuevas MD    amino acid 4.25% - dextrose 5% solution, , Intravenous, Continuous, Gabrielle Butler DO, Last Rate: 50 mL/hr at 11/13/24 2143, New Bag at 11/13/24 2143    busPIRone tablet 30 mg, 30 mg, Oral, BID, Gabrielle Butler DO, 30 mg at 11/14/24 0833    citalopram tablet 40 mg, 40 mg, Oral, Daily, Natan Cuevas MD, 40 mg at 11/14/24 0833    dextrose 10% bolus 125 mL 125 mL, 12.5 g, Intravenous, PRN, Jaswinder Hartmann DO    dextrose 10% bolus 250 mL 250 mL, 25 g, Intravenous, PRN, Jaswinder Hartmann DO    enoxaparin injection 40 mg, 40 mg, Subcutaneous, Q24H (prophylaxis, 1700), Gabrielle Butler DO, 40 mg at 11/13/24 1711    ferrous sulfate tablet 1 each, 1 tablet, Oral, Daily, Gabrielle Butler DO, 1 each at 11/14/24 0833    melatonin tablet 6 mg, 6 mg, Oral, Nightly PRN, Cuevas, Christopher C, MD    mirtazapine tablet 15 mg, 15 mg, Oral, QHS, Natan Cuevas MD, 15 mg at 11/13/24 2139    phenazopyridine tablet 100 mg, 100 mg, Oral, TID WM, Gabrielle Butler DO, 100 mg at 11/14/24 0832    sodium chloride 0.9% flush 10  mL, 10 mL, Intravenous, PRN, Natan Cuevas MD    Flushing PICC/Midline Protocol, , , Until Discontinued **AND** sodium chloride 0.9% flush 10 mL, 10 mL, Intravenous, Q12H PRN, Matt Bennett MD    Flushing PICC/Midline Protocol, , , Until Discontinued **AND** sodium chloride 0.9% flush 10 mL, 10 mL, Intravenous, Q12H PRN, Gabrielle Butler DO    teduglutide Kit 3.8 mg, 3.8 mg, Subcutaneous, Daily, Gabrielle Butler DO, 3.8 mg at 11/13/24 2139    traMADoL tablet 50 mg, 50 mg, Oral, Q6H PRN, María Layne FNP    vancomycin (VANCOCIN) 1,000 mg in D5W 250 mL IVPB (admixture device), 1,000 mg, Intravenous, Q12H, Matt Bennett MD, Stopped at 11/14/24 0349    vancomycin - pharmacy to dose, , Intravenous, pharmacy to manage frequency, Gabrielle Butler DO  Review of Systems   Constitutional:  Negative for chills and fever.   HENT:  Negative for congestion, ear discharge, ear pain, facial swelling, mouth sores, postnasal drip, rhinorrhea, sinus pressure, sinus pain, sneezing, sore throat and trouble swallowing.    Eyes:  Negative for discharge, redness and itching.   Respiratory:  Negative for cough, chest tightness, shortness of breath and wheezing.    Cardiovascular:  Negative for chest pain, palpitations and leg swelling.   Gastrointestinal:  Negative for abdominal distention, abdominal pain, diarrhea, nausea and vomiting.   Genitourinary:  Negative for dysuria, flank pain, frequency and urgency.   Musculoskeletal:  Negative for back pain, myalgias and neck stiffness.   Skin:  Negative for rash and wound.   Allergic/Immunologic: Negative for immunocompromised state.   Neurological:  Negative for dizziness, light-headedness and headaches.   Hematological:  Negative for adenopathy.   Psychiatric/Behavioral:  Negative for agitation, confusion and suicidal ideas. The patient is not nervous/anxious.        Objective:   Temp:  [97.8 °F (36.6 °C)-98.9 °F (37.2 °C)] 98 °F (36.7 °C)  Pulse:  [64-79] 76  Resp:  " [18] 18  SpO2:  [92 %-95 %] 92 %  BP: (116-143)/(57-77) 142/60     Physical Exam  Constitutional:       Appearance: Normal appearance. She is well-developed.   HENT:      Head: Normocephalic.      Nose: Nose normal.      Mouth/Throat:      Pharynx: No oropharyngeal exudate.   Eyes:      General: Lids are normal. No scleral icterus.        Right eye: No discharge.      Conjunctiva/sclera: Conjunctivae normal.      Pupils: Pupils are equal, round, and reactive to light.   Neck:      Thyroid: No thyromegaly.      Vascular: No JVD.      Trachea: Trachea normal.   Cardiovascular:      Rate and Rhythm: Normal rate and regular rhythm.      Pulses: Normal pulses.      Heart sounds: Normal heart sounds. No murmur heard.     No friction rub.   Pulmonary:      Effort: Pulmonary effort is normal. No respiratory distress.      Breath sounds: Normal breath sounds. No wheezing.   Chest:      Chest wall: No tenderness.   Abdominal:      General: Bowel sounds are normal. There is no distension.      Palpations: Abdomen is soft.      Tenderness: There is no abdominal tenderness. There is no guarding or rebound.   Musculoskeletal:         General: No tenderness. Normal range of motion.      Cervical back: Full passive range of motion without pain, normal range of motion and neck supple.   Lymphadenopathy:      Cervical: No cervical adenopathy.   Skin:     General: Skin is warm and dry.      Findings: No rash.   Neurological:      Mental Status: She is alert and oriented to person, place, and time.      Cranial Nerves: No cranial nerve deficit.      Sensory: No sensory deficit.   Psychiatric:         Speech: Speech normal.         Behavior: Behavior normal.         Thought Content: Thought content normal.         Judgment: Judgment normal.       Estimated Creatinine Clearance: 69.1 mL/min (based on SCr of 0.65 mg/dL).  No results for input(s): "WBC", "PLT", "CREAT", "PROCAL" in the last 48 hours.    Invalid input(s): " ""HEM"  Microbiology Results (last 7 days)       Procedure Component Value Units Date/Time    Blood Culture [[id number]]  (Abnormal)  (Susceptibility) Collected: 11/10/24 2357    Order Status: Completed Specimen: Blood, Arterial Updated: 11/14/24 0630     Blood Culture Staphylococcus hominis      Staphylococcus epidermidis     GRAM STAIN Gram Positive Cocci, probable Staphylococcus      Seen in gram stain of broth only      2 of 2 bottles positive    Blood Culture [[id number]]  (Normal) Collected: 11/10/24 2357    Order Status: Completed Specimen: Blood, Peripheral Line Updated: 11/14/24 0100     Blood Culture No Growth At 72 Hours    Medical Device Culture [[id number]] Collected: 11/12/24 2000    Order Status: Completed Specimen: Foreign Body from Mediport Updated: 11/13/24 1456     CULTURE, MEDICAL DEVICE ([facility]) No Growth At 24 Hours    Mycobacteria and Nocardia Culture [[id number]] Collected: 11/12/24 2011    Order Status: Sent Specimen: Body Fluid from Chest, Right Updated: 11/13/24 1200    Fungal Culture [[id number]] Collected: 11/12/24 2011    Order Status: Sent Specimen: Mediport Tip from Chest Updated: 11/13/24 1200    Anaerobic Culture [facility] [[id number]] Collected: 11/12/24 2011    Order Status: Sent Specimen: Foreign Body from Mediport Updated: 11/13/24 1200    Gram Stain [[id number]] Collected: 11/12/24 2000    Order Status: Completed Specimen: Foreign Body from Mediport Updated: 11/13/24 0432     GRAM STAIN No WBC seen      Rare Gram positive cocci    Anaerobic Culture [[id number]] Collected: 11/12/24 2000    Order Status: Canceled Specimen: Foreign Body from Mediport     Blood Culture [[id number]]  (Abnormal)  (Susceptibility) Collected: 11/08/24 1857    Order Status: Completed Specimen: Blood from Arm, Left Updated: 11/12/24 0705     Blood Culture Staphylococcus epidermidis     GRAM STAIN Gram Positive Cocci, probable Staphylococcus      Seen in gram stain of broth only      1 of 2 Anaerobic bottles " positive    Blood Culture [[id number]]  (Abnormal)  (Susceptibility) Collected: 11/08/24 1909    Order Status: Completed Specimen: Blood from [facility] Updated: 11/11/24 0658     Blood Culture Staphylococcus epidermidis     GRAM STAIN Gram Positive Cocci, probable Staphylococcus      Seen in gram stain of broth only      2 of 2 bottles positive    Blood Culture [[id number]]  (Abnormal)  (Susceptibility) Collected: 11/07/24 1742    Order Status: Completed Specimen: Blood Updated: 11/10/24 0632     Blood Culture Staphylococcus epidermidis     GRAM STAIN Seen in gram stain of broth only      Gram Positive Cocci, probable Staphylococcus      1 of 1 Pediatric bottle positive    Blood Culture [[id number]]  (Abnormal)  (Susceptibility) Collected: 11/07/24 1742    Order Status: Completed Specimen: Blood Updated: 11/10/24 0632     Blood Culture Staphylococcus epidermidis     GRAM STAIN Gram Positive Cocci, probable Staphylococcus      Seen in gram stain of broth only      1 of 1 Pediatric bottle positive    BCID2 Panel [[id number]] Collected: 11/08/24 1909    Order Status: Canceled Specimen: Blood from [facility] Updated: 11/09/24 0644    BCID2 Panel [[id number]]  (Abnormal) Collected: 11/07/24 1742    Order Status: Completed Specimen: Blood Updated: 11/08/24 1520     CTX-M (ESBL ) N/A     IMP (Cabapenemase ) N/A     KPC resistance gene (Carbapenemase ) N/A     mcr-1 N/A     mecA ID Not Detected     Comment: Note: Antimicrobial resistance can occur via multiple mechanisms. A Not Detected result for antimicrobial resistance gene(s) does not indicate antimicrobial susceptibility. Subculturing is required for species identification and susceptibility testing of   isolates.        mecA/C and MREJ (MRSA) gene N/A     NDM (Carbapenemase ) N/A     OXA-48-like (Carbapenemase ) N/A     Maria Luisa/B (VRE gene) N/A     VIM (Carbapenemase ) N/A     Enterococcus faecalis Not Detected      Enterococcus faecium Not Detected     Listeria monocytogenes Not Detected     Staphylococcus spp. Detected     Staphylococcus aureus Not Detected     Staphylococcus epidermidis Detected     Staphylococcus lugdunensis Not Detected     Streptococcus spp. Not Detected     Streptococcus agalactiae (Group B) Not Detected     Streptococcus pneumoniae Not Detected     Streptococcus pyogenes (Group A) Not Detected     Acinetobacter calcoaceticus/baumannii complex Not Detected     Bacteroides fragilis Not Detected     Enterobacterales Not Detected     Enterobacter cloacae complex Not Detected     Escherichia coli Not Detected     Klebsiella aerogenes Not Detected     Klebsiella oxytoca Not Detected     Klebsiella pneumoniae group Not Detected     Proteus spp. Not Detected     Salmonella spp. Not Detected     Serratia marcescens Not Detected     Haemophilus influenzae Not Detected     Neisseria meningitidis Not Detected     Pseudomonas aeruginosa Not Detected     Stenotrophomonas maltophilia Not Detected     Candida albicans Not Detected     Candida auris Not Detected     Candida glabrata Not Detected     Candida krusei Not Detected     Candida parapsilosis Not Detected     Candida tropicalis Not Detected     Cryptococcus neoformans/gattii Not Detected    Narrative:      The [employer] BCID2 Panel is a multiplexed nucleic acid test intended for the use with [employer]® 2.0 or [employer]® Outbox Systems Systems for the simultaneous qualitative detection and identification of multiple bacterial and yeast nucleic acids and select genetic determinants associated with antimicrobial resistance.  The [employer] BCID2 Panel test is performed directly on blood culture samples identified as positive by a continuous monitoring blood culture system.  Results are intended to be interpreted in conjunction with Gram stain results.    Respiratory Culture [[id number]]     Order Status: Sent Specimen: Sputum, Expectorated              Significant Labs: All pertinent labs within the past 24 hours have been reviewed.    Significant Imaging: I have reviewed all relevant and available imaging results/findings within the past 24 hours.      Plan -- see top of note

## 2024-11-15 LAB
ALBUMIN SERPL-MCNC: 3.3 G/DL (ref 3.4–4.8)
ALBUMIN/GLOB SERPL: 0.9 RATIO (ref 1.1–2)
ALP SERPL-CCNC: 117 UNIT/L (ref 40–150)
ALT SERPL-CCNC: 25 UNIT/L (ref 0–55)
ANION GAP SERPL CALC-SCNC: 9 MEQ/L
APICAL FOUR CHAMBER EJECTION FRACTION: 72 %
APICAL TWO CHAMBER EJECTION FRACTION: 71 %
AST SERPL-CCNC: 30 UNIT/L (ref 5–34)
AV INDEX (PROSTH): 0.67
AV MEAN GRADIENT: 5 MMHG
AV PEAK GRADIENT: 9 MMHG
AV VELOCITY RATIO: 0.6
BACTERIA CATH TIP CULT: ABNORMAL
BASOPHILS # BLD AUTO: 0.03 X10(3)/MCL
BASOPHILS NFR BLD AUTO: 0.6 %
BILIRUB SERPL-MCNC: 0.5 MG/DL
BSA FOR ECHO PROCEDURE: 1.8 M2
BUN SERPL-MCNC: 17 MG/DL (ref 9.8–20.1)
CALCIUM SERPL-MCNC: 9 MG/DL (ref 8.4–10.2)
CHLORIDE SERPL-SCNC: 109 MMOL/L (ref 98–107)
CO2 SERPL-SCNC: 20 MMOL/L (ref 23–31)
CREAT SERPL-MCNC: 0.66 MG/DL (ref 0.55–1.02)
CREAT/UREA NIT SERPL: 26
CV ECHO LV RWT: 0.59 CM
DOP CALC AO PEAK VEL: 1.5 M/S
DOP CALC AO VTI: 24.6 CM
DOP CALC LVOT PEAK VEL: 0.9 M/S
DOP CALCLVOT PEAK VEL VTI: 16.5 CM
E WAVE DECELERATION TIME: 166 MSEC
E/A RATIO: 0.54
E/E' RATIO: 5.53 M/S
ECHO LV POSTERIOR WALL: 1.2 CM (ref 0.6–1.1)
EOSINOPHIL # BLD AUTO: 0.2 X10(3)/MCL (ref 0–0.9)
EOSINOPHIL NFR BLD AUTO: 3.9 %
ERYTHROCYTE [DISTWIDTH] IN BLOOD BY AUTOMATED COUNT: 14.9 % (ref 11.5–17)
FRACTIONAL SHORTENING: 26.8 % (ref 28–44)
GFR SERPLBLD CREATININE-BSD FMLA CKD-EPI: >60 ML/MIN/1.73/M2
GLOBULIN SER-MCNC: 3.8 GM/DL (ref 2.4–3.5)
GLUCOSE SERPL-MCNC: 97 MG/DL (ref 82–115)
HCT VFR BLD AUTO: 38.6 % (ref 37–47)
HGB BLD-MCNC: 12.3 G/DL (ref 12–16)
IMM GRANULOCYTES # BLD AUTO: 0.01 X10(3)/MCL (ref 0–0.04)
IMM GRANULOCYTES NFR BLD AUTO: 0.2 %
INR PPP: 1.3
INTERVENTRICULAR SEPTUM: 1.5 CM (ref 0.6–1.1)
LEFT ATRIUM AREA SYSTOLIC (APICAL 2 CHAMBER): 14.5 CM2
LEFT ATRIUM AREA SYSTOLIC (APICAL 4 CHAMBER): 16.6 CM2
LEFT ATRIUM SIZE: 3.1 CM
LEFT ATRIUM VOLUME INDEX MOD: 23.8 ML/M2
LEFT ATRIUM VOLUME MOD: 42.2 ML
LEFT INTERNAL DIMENSION IN SYSTOLE: 3 CM (ref 2.1–4)
LEFT VENTRICLE DIASTOLIC VOLUME INDEX: 42.66 ML/M2
LEFT VENTRICLE DIASTOLIC VOLUME: 75.5 ML
LEFT VENTRICLE END DIASTOLIC VOLUME APICAL 2 CHAMBER: 92 ML
LEFT VENTRICLE END DIASTOLIC VOLUME APICAL 4 CHAMBER: 72.7 ML
LEFT VENTRICLE END SYSTOLIC VOLUME APICAL 2 CHAMBER: 38.3 ML
LEFT VENTRICLE END SYSTOLIC VOLUME APICAL 4 CHAMBER: 45 ML
LEFT VENTRICLE MASS INDEX: 115.7 G/M2
LEFT VENTRICLE SYSTOLIC VOLUME INDEX: 19.9 ML/M2
LEFT VENTRICLE SYSTOLIC VOLUME: 35.3 ML
LEFT VENTRICULAR INTERNAL DIMENSION IN DIASTOLE: 4.1 CM (ref 3.5–6)
LEFT VENTRICULAR MASS: 204.9 G
LV LATERAL E/E' RATIO: 3.92 M/S
LV SEPTAL E/E' RATIO: 9.4 M/S
LVED V (TEICH): 75.5 ML
LVES V (TEICH): 35.3 ML
LVOT MG: 2 MMHG
LVOT MV: 0.62 CM/S
LYMPHOCYTES # BLD AUTO: 2.15 X10(3)/MCL (ref 0.6–4.6)
LYMPHOCYTES NFR BLD AUTO: 41.7 %
MAGNESIUM SERPL-MCNC: 1.8 MG/DL (ref 1.6–2.6)
MCH RBC QN AUTO: 28.7 PG (ref 27–31)
MCHC RBC AUTO-ENTMCNC: 31.9 G/DL (ref 33–36)
MCV RBC AUTO: 90 FL (ref 80–94)
MONOCYTES # BLD AUTO: 0.58 X10(3)/MCL (ref 0.1–1.3)
MONOCYTES NFR BLD AUTO: 11.3 %
MV PEAK A VEL: 0.87 M/S
MV PEAK E VEL: 0.47 M/S
NEUTROPHILS # BLD AUTO: 2.18 X10(3)/MCL (ref 2.1–9.2)
NEUTROPHILS NFR BLD AUTO: 42.3 %
NRBC BLD AUTO-RTO: 0 %
OHS LV EJECTION FRACTION SIMPSONS BIPLANE MOD: 72 %
PHOSPHATE SERPL-MCNC: 3.2 MG/DL (ref 2.3–4.7)
PLATELET # BLD AUTO: 210 X10(3)/MCL (ref 130–400)
PMV BLD AUTO: 9 FL (ref 7.4–10.4)
POTASSIUM SERPL-SCNC: 3.7 MMOL/L (ref 3.5–5.1)
PROT SERPL-MCNC: 7.1 GM/DL (ref 5.8–7.6)
PROTHROMBIN TIME: 15.6 SECONDS (ref 12.5–14.5)
PV PEAK GRADIENT: 2 MMHG
PV PEAK VELOCITY: 0.77 M/S
RA PRESSURE ESTIMATED: 8 MMHG
RBC # BLD AUTO: 4.29 X10(6)/MCL (ref 4.2–5.4)
SODIUM SERPL-SCNC: 138 MMOL/L (ref 136–145)
TDI LATERAL: 0.12 M/S
TDI SEPTAL: 0.05 M/S
TDI: 0.09 M/S
TRICUSPID ANNULAR PLANE SYSTOLIC EXCURSION: 1.92 CM
VANCOMYCIN TROUGH SERPL-MCNC: 18.2 UG/ML (ref 15–20)
WBC # BLD AUTO: 5.15 X10(3)/MCL (ref 4.5–11.5)
Z-SCORE OF LEFT VENTRICULAR DIMENSION IN END DIASTOLE: -1.77
Z-SCORE OF LEFT VENTRICULAR DIMENSION IN END SYSTOLE: -0.07

## 2024-11-15 PROCEDURE — 36415 COLL VENOUS BLD VENIPUNCTURE: CPT | Performed by: INTERNAL MEDICINE

## 2024-11-15 PROCEDURE — 21400001 HC TELEMETRY ROOM

## 2024-11-15 PROCEDURE — 25000003 PHARM REV CODE 250: Performed by: STUDENT IN AN ORGANIZED HEALTH CARE EDUCATION/TRAINING PROGRAM

## 2024-11-15 PROCEDURE — 80202 ASSAY OF VANCOMYCIN: CPT | Performed by: STUDENT IN AN ORGANIZED HEALTH CARE EDUCATION/TRAINING PROGRAM

## 2024-11-15 PROCEDURE — 25000003 PHARM REV CODE 250: Performed by: INTERNAL MEDICINE

## 2024-11-15 PROCEDURE — 25000003 PHARM REV CODE 250: Performed by: HOSPITALIST

## 2024-11-15 PROCEDURE — 11000001 HC ACUTE MED/SURG PRIVATE ROOM

## 2024-11-15 PROCEDURE — 99233 SBSQ HOSP IP/OBS HIGH 50: CPT | Mod: ,,, | Performed by: HOSPITALIST

## 2024-11-15 PROCEDURE — 83735 ASSAY OF MAGNESIUM: CPT | Performed by: INTERNAL MEDICINE

## 2024-11-15 PROCEDURE — 85610 PROTHROMBIN TIME: CPT | Performed by: INTERNAL MEDICINE

## 2024-11-15 PROCEDURE — 80053 COMPREHEN METABOLIC PANEL: CPT | Performed by: INTERNAL MEDICINE

## 2024-11-15 PROCEDURE — 63600175 PHARM REV CODE 636 W HCPCS: Performed by: INTERNAL MEDICINE

## 2024-11-15 PROCEDURE — 36415 COLL VENOUS BLD VENIPUNCTURE: CPT | Performed by: STUDENT IN AN ORGANIZED HEALTH CARE EDUCATION/TRAINING PROGRAM

## 2024-11-15 PROCEDURE — 85025 COMPLETE CBC W/AUTO DIFF WBC: CPT | Performed by: INTERNAL MEDICINE

## 2024-11-15 PROCEDURE — 84100 ASSAY OF PHOSPHORUS: CPT | Performed by: INTERNAL MEDICINE

## 2024-11-15 RX ORDER — WARFARIN SODIUM 5 MG/1
5 TABLET ORAL DAILY
Status: DISCONTINUED | OUTPATIENT
Start: 2024-11-15 | End: 2024-11-19

## 2024-11-15 RX ADMIN — BUSPIRONE HYDROCHLORIDE 30 MG: 15 TABLET ORAL at 09:11

## 2024-11-15 RX ADMIN — LEUCINE, PHENYLALANINE, LYSINE, METHIONINE, ISOLEUCINE, VALINE, HISTIDINE, THREONINE, TRYPTOPHAN, ALANINE, GLYCINE, ARGININE, PROLINE, SERINE, TYROSINE, DEXTROSE: 311; 238; 247; 170; 255; 247; 204; 179; 77; 880; 438; 489; 289; 213; 17; 5 INJECTION INTRAVENOUS at 04:11

## 2024-11-15 RX ADMIN — PHENAZOPYRIDINE HYDROCHLORIDE 100 MG: 100 TABLET ORAL at 12:11

## 2024-11-15 RX ADMIN — ENOXAPARIN SODIUM 70 MG: 80 INJECTION SUBCUTANEOUS at 04:11

## 2024-11-15 RX ADMIN — WARFARIN SODIUM 5 MG: 5 TABLET ORAL at 04:11

## 2024-11-15 RX ADMIN — MIRTAZAPINE 15 MG: 15 TABLET, FILM COATED ORAL at 09:11

## 2024-11-15 RX ADMIN — VANCOMYCIN HYDROCHLORIDE 1000 MG: 1 INJECTION, POWDER, LYOPHILIZED, FOR SOLUTION INTRAVENOUS at 02:11

## 2024-11-15 RX ADMIN — RIFAMPIN 300 MG: 300 CAPSULE ORAL at 09:11

## 2024-11-15 RX ADMIN — CITALOPRAM HYDROBROMIDE 40 MG: 20 TABLET ORAL at 09:11

## 2024-11-15 RX ADMIN — FERROUS SULFATE TAB 325 MG (65 MG ELEMENTAL FE) 1 EACH: 325 (65 FE) TAB at 09:11

## 2024-11-15 RX ADMIN — PHENAZOPYRIDINE HYDROCHLORIDE 100 MG: 100 TABLET ORAL at 04:11

## 2024-11-15 RX ADMIN — PHENAZOPYRIDINE HYDROCHLORIDE 100 MG: 100 TABLET ORAL at 09:11

## 2024-11-15 RX ADMIN — ENOXAPARIN SODIUM 70 MG: 80 INJECTION SUBCUTANEOUS at 05:11

## 2024-11-15 NOTE — PROGRESS NOTES
[facility] Hospital Medicine Progress Note        Chief Complaint: Inpatient Follow-up    HPI:     78-year-old female with significant history of short gut syndrome on TPN at home, HTN, HLD, PVD, DVT/clotting disorder on Coumadin, rheumatoid arthritis, cirrhosis, gallstone pancreatitis, anxiety/depression.  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  Last MediPort replacement was 7 months back.  Patient had a recent hospitalization in October which fever spikes which was attributed to COVID-19 infection and pneumonia, cultures were negative then and was discharged home.  Patient presented back to the ED on 11/07 with complaints of fever, generalized weakness, labs non impressive.  Imaging concerning for pneumonia/enterocolitis, patient was admitted to hospital medicine services, cultures ordered and was initiated on broad-spectrum antimicrobials for colitis/pneumonia.  Cultures from admission grew Staph epidermidis.  Repeat cultures with 1 bottle growing Staph epidermidis/Staph hominis.  Concern for MediPort infection, General surgery consulted for removal of the same.  In the meantime a midline was placed for IV access.  MediPort successfully removed 11/12.  Infectious Disease consulted, awaiting recs, vancomycin continued    Interval Hx:   Patient seen at bedside, comfortably laying in bed, afebrile and hemodynamically stable, no new complaints, no acute events overnight,    Objective/physical exam:  General: In no acute distress, afebrile  Chest: Clear to auscultation bilaterally  Heart: S1, S2, no appreciable murmur  Abdomen: Soft, nontender, BS +  MSK: Warm, no lower extremity edema, no clubbing or cyanosis  Neurologic: Alert and oriented x4, moving all extremities with good strength     VITAL SIGNS: 24 HRS MIN & MAX LAST   Temp  Min: 97.8 °F (36.6 °C)  Max: 98.6 °F (37 °C) 98.6 °F (37 °C)   BP  Min:  123/71  Max: 149/83 129/72   Pulse  Min: 76  Max: 88  83   No data recorded 18   SpO2  Min: 93 %  Max: 96 % (!) 93 %       Recent Labs   Lab 11/15/24  0518   WBC 5.15   RBC 4.29   HGB 12.3   HCT 38.6   MCV 90.0   MCH 28.7   MCHC 31.9*   RDW 14.9      MPV 9.0         Recent Labs   Lab 11/15/24  0518      K 3.7   *   CO2 20*   BUN 17.0   CREATININE 0.66   CALCIUM 9.0   MG 1.80   ALBUMIN 3.3*   ALKPHOS 117   ALT 25   AST 30   BILITOT 0.5          Microbiology Results (last 7 days)       Procedure Component Value Units Date/Time    Blood Culture [[id number]]  (Normal) Collected: 11/10/24 2357    Order Status: Completed Specimen: Blood, Peripheral Line Updated: 11/15/24 0102     Blood Culture No Growth At 96 Hours    Blood Culture [[id number]] Collected: 11/14/24 2251    Order Status: Resulted Specimen: Blood from Hand, Left Updated: 11/14/24 2314    Blood Culture [[id number]] Collected: 11/14/24 2251    Order Status: Resulted Specimen: Blood from Hand, Left Updated: 11/14/24 2314    Medical Device Culture [[id number]]  (Abnormal) Collected: 11/12/24 2000    Order Status: Completed Specimen: Foreign Body from Mediport Updated: 11/14/24 1218     CULTURE, MEDICAL DEVICE ([facility]) <15 CFU/mL Staphylococcus epidermidis    Blood Culture [[id number]]  (Abnormal)  (Susceptibility) Collected: 11/10/24 2357    Order Status: Completed Specimen: Blood, Arterial Updated: 11/14/24 0630     Blood Culture Staphylococcus hominis      Staphylococcus epidermidis     GRAM STAIN Gram Positive Cocci, probable Staphylococcus      Seen in gram stain of broth only      2 of 2 bottles positive    Mycobacteria and Nocardia Culture [[id number]] Collected: 11/12/24 2011    Order Status: Sent Specimen: Body Fluid from Chest, Right Updated: 11/13/24 1200    Fungal Culture [[id number]] Collected: 11/12/24 2011    Order Status: Sent Specimen: Mediport Tip from Chest Updated: 11/13/24 1200    Anaerobic Culture [facility] [[id number]] Collected:  11/12/24 2011    Order Status: Sent Specimen: Foreign Body from [facility] Updated: 11/13/24 1200    Gram Stain [[id number]] Collected: 11/12/24 2000    Order Status: Completed Specimen: Foreign Body from [facility] Updated: 11/13/24 0432     GRAM STAIN No WBC seen      Rare Gram positive cocci    Anaerobic Culture [[id number]] Collected: 11/12/24 2000    Order Status: Canceled Specimen: Foreign Body from [facility]     Blood Culture [[id number]]  (Abnormal)  (Susceptibility) Collected: 11/08/24 1857    Order Status: Completed Specimen: Blood from Arm, Left Updated: 11/12/24 0705     Blood Culture Staphylococcus epidermidis     GRAM STAIN Gram Positive Cocci, probable Staphylococcus      Seen in gram stain of broth only      1 of 2 Anaerobic bottles positive    Blood Culture [[id number]]  (Abnormal)  (Susceptibility) Collected: 11/08/24 1909    Order Status: Completed Specimen: Blood from [facility] Updated: 11/11/24 0658     Blood Culture Staphylococcus epidermidis     GRAM STAIN Gram Positive Cocci, probable Staphylococcus      Seen in gram stain of broth only      2 of 2 bottles positive    Blood Culture [[id number]]  (Abnormal)  (Susceptibility) Collected: 11/07/24 1742    Order Status: Completed Specimen: Blood Updated: 11/10/24 0632     Blood Culture Staphylococcus epidermidis     GRAM STAIN Seen in gram stain of broth only      Gram Positive Cocci, probable Staphylococcus      1 of 1 Pediatric bottle positive    Blood Culture [[id number]]  (Abnormal)  (Susceptibility) Collected: 11/07/24 1742    Order Status: Completed Specimen: Blood Updated: 11/10/24 0632     Blood Culture Staphylococcus epidermidis     GRAM STAIN Gram Positive Cocci, probable Staphylococcus      Seen in gram stain of broth only      1 of 1 Pediatric bottle positive    BCID2 Panel [[id number]] Collected: 11/08/24 1909    Order Status: Canceled Specimen: Blood from [facility] Updated: 11/09/24 0644    BCID2 Panel [[id number]]  (Abnormal) Collected:  11/07/24 1742    Order Status: Completed Specimen: Blood Updated: 11/08/24 1520     CTX-M (ESBL ) N/A     IMP (Cabapenemase ) N/A     KPC resistance gene (Carbapenemase ) N/A     mcr-1 N/A     mecA ID Not Detected     Comment: Note: Antimicrobial resistance can occur via multiple mechanisms. A Not Detected result for antimicrobial resistance gene(s) does not indicate antimicrobial susceptibility. Subculturing is required for species identification and susceptibility testing of   isolates.        mecA/C and MREJ (MRSA) gene N/A     NDM (Carbapenemase ) N/A     OXA-48-like (Carbapenemase ) N/A     Maria Luisa/B (VRE gene) N/A     VIM (Carbapenemase ) N/A     Enterococcus faecalis Not Detected     Enterococcus faecium Not Detected     Listeria monocytogenes Not Detected     Staphylococcus spp. Detected     Staphylococcus aureus Not Detected     Staphylococcus epidermidis Detected     Staphylococcus lugdunensis Not Detected     Streptococcus spp. Not Detected     Streptococcus agalactiae (Group B) Not Detected     Streptococcus pneumoniae Not Detected     Streptococcus pyogenes (Group A) Not Detected     Acinetobacter calcoaceticus/baumannii complex Not Detected     Bacteroides fragilis Not Detected     Enterobacterales Not Detected     Enterobacter cloacae complex Not Detected     Escherichia coli Not Detected     Klebsiella aerogenes Not Detected     Klebsiella oxytoca Not Detected     Klebsiella pneumoniae group Not Detected     Proteus spp. Not Detected     Salmonella spp. Not Detected     Serratia marcescens Not Detected     Haemophilus influenzae Not Detected     Neisseria meningitidis Not Detected     Pseudomonas aeruginosa Not Detected     Stenotrophomonas maltophilia Not Detected     Candida albicans Not Detected     Candida auris Not Detected     Candida glabrata Not Detected     Candida krusei Not Detected     Candida parapsilosis Not Detected     Candida tropicalis  Not Detected     Cryptococcus neoformans/gattii Not Detected    Narrative:      The [employer] BCID2 Panel is a multiplexed nucleic acid test intended for the use with ubitus® 2.0 or ubitus® [employer] for the simultaneous qualitative detection and identification of multiple bacterial and yeast nucleic acids and select genetic determinants associated with antimicrobial resistance.  The [employer] BCID2 Panel test is performed directly on blood culture samples identified as positive by a continuous monitoring blood culture system.  Results are intended to be interpreted in conjunction with Gram stain results.             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, 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, 2024  Suspected right-sided pneumonia on admit-completed treatment   Recent COVID-19 pneumonitis   History of short gut syndrome on TPN at home   Essential HTN-stable   HLD   History of PVD   History of clotting disorder with history of DVT on Coumadin   Rheumatoid arthritis   Cirrhosis-appears compensated   History of gallstone pancreatitis   Anxiety/depression   Prophylaxis      Appreciate infectious disease recommendation   Now on IV vancomycin and rifampin   TTE is negative   Infectious Disease is recommending DALE if TTE negative, have consulted Cardiology  MediPort and PICC line removed, now has midline  On TPN through midline  Patient  is now afebrile, hemodynamically stable with no more leukocytosis  Continue home dose Coumadin and also  full-dose anticoagulation with Lovenox until INR is therapeutic  INR is 1.3  Continue home meds-BuSpar, citalopram, iron, Remeron, phenazopyridine,   DVT prophylaxis-Lovenox with Coumadin    Await DALE, follow ID recommendations      Raine Moscoso MD   11/15/2024

## 2024-11-15 NOTE — PROGRESS NOTES
Inpatient Nutrition Assessment    Admit Date: 2024   Total duration of encounter: 8 days   Patient Age: 78 y.o.    Nutrition Recommendation/Prescription     Continue regular, lactose free diet as tolerated    Continue PPN Clinimix 4.25/5 at 50 mL/hr, providin kcal (29% est needs)  51 gm AA (72% est needs)  60 gm dextrose (GIR: 0.6 mg/kg/min)  1200 mL fluid (67% est needs)    Once medically feasible, resume home TPN  Dex 70% 120 gm  AA 15% 60 gm  IL 20% 50 gm twice a week (home TPN: IL20% 30gm 3 times per week)  GIR: 1.17 mg/kg/min  Provides:   790 kcal (55% est needs)  60 gm protein (85% est needs)  Adjust electrolytes per pharmacy  Monitor oral intake and adjust TPN as needed    Monitor, po intake, energy intake, labs and weight    Communication of Recommendations: reviewed with patient    Nutrition Assessment     Malnutrition Assessment/Nutrition-Focused Physical Exam    Malnutrition Context: acute illness or injury (24)  Malnutrition Level: other (see comments) (does not meet criteria) (24)  Energy Intake (Malnutrition): other (see comments) (does not meet criteria) (24)  Weight Loss (Malnutrition): other (see comments) (does not meet criteria) (24)  Subcutaneous Fat (Malnutrition): other (see comments) (does not meet criteria) (24)           Muscle Mass (Malnutrition): moderate depletion (24)  [place] (Muscle Loss): mild depletion     Clavicle and Acromion Bone Region (Muscle Loss): moderate depletion     Dorsal Hand (Muscle Loss): mild depletion                    A minimum of two characteristics is recommended for diagnosis of either severe or non-severe malnutrition.    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, 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, 2024  Suspected right-sided pneumonia on admit-completed treatment   Recent COVID-19 pneumonitis   History of short gut syndrome on TPN at home     Relevant Medical History: short gut syndrome on TPN, HTN, HLD, PVD, prior DVT, RA, cirrhosis, gallstone pancreatitis, anxiety, depression, previous MRSA bacteremia in the setting of retained infected MediPort     Scheduled Medications:  busPIRone, 30 mg, BID  citalopram, 40 mg, Daily  enoxparin, 1 mg/kg, Q12H (treatment, non-standard time)  ferrous sulfate, 1 tablet, Daily  mirtazapine, 15 mg, QHS  phenazopyridine, 100 mg, TID WM  rifAMpin, 300 mg, Q12H  teduglutide, 3.8 mg, Daily  vancomycin (VANCOCIN) 1,000 mg in D5W 250 mL IVPB (admixture device), 1,000 mg, Q12H  warfarin, 5 mg, Daily    Continuous Infusions:  amino acid 4.25% - dextrose 5% solution    PRN Medications:  acetaminophen, 650 mg, Q8H PRN  dextrose 10%, 12.5 g, PRN  dextrose 10%, 25 g, PRN  melatonin, 6 mg, Nightly PRN  sodium chloride 0.9%, 10 mL, PRN  sodium chloride 0.9%, 10 mL, Q12H PRN  sodium chloride 0.9%, 10 mL, Q12H PRN  traMADoL, 50 mg, Q6H PRN  vancomycin - pharmacy to dose, , pharmacy to manage frequency    Calorie Containing IV Medications: no significant kcals from medications at this time and Clinimix    Recent Labs   Lab 11/09/24  0425 11/10/24  0110 11/11/24  0407 11/11/24  1338 11/12/24  0353 11/13/24  0536 11/14/24  0359 11/14/24  1009 11/15/24  0518    141 139  --  142 139 139  --  138   K 3.8 3.7 4.0  --  3.5 4.0 3.5  --  3.7   CALCIUM 8.5 8.4 8.0*  --  8.2* 8.4 8.7  --  9.0   PHOS  --  2.7 3.1  --  2.6 2.8 2.9  --  3.2   MG  --  2.00 1.90  --  1.90 1.90 1.70  --  1.80   * 112* 113*  --  112* 111* 108*  --  109*   CO2 23 20* 19*  --  22* 21* 24  --  20*   BUN 8.4* 10.2 10.5  --  9.9 12.5 16.9  --  17.0   CREATININE 0.75 0.67 0.63  --  0.59  0.65 0.65  --  0.66   EGFRNORACEVR >60 >60 >60  --  >60 >60 >60  --  >60   GLUCOSE 97 113 103  --  96 164* 130*  --  97   BILITOT 0.5  --   --   --   --   --   --   --  0.5   ALKPHOS 126  --   --   --   --   --   --   --  117   ALT 27  --   --   --   --   --   --   --  25   AST 27  --   --   --   --   --   --   --  30   ALBUMIN 2.9*  --   --   --   --   --   --   --  3.3*   TRIG  --  100  --   --   --   --   --   --   --    WBC 7.21  --   --  7.60  --   --   --  6.13 5.15   HGB 11.7*  --   --  12.2  --   --   --  12.4 12.3   HCT 37.3  --   --  37.8  --   --   --  38.7 38.6     Nutrition Orders:  Diet Adult Regular Lactose Free  amino acid 4.25% - dextrose 5% solution      Appetite/Oral Intake: fair/50-75% of meals  Factors Affecting Nutritional Intake: altered gastrointestinal function and malabsorption  Social Needs Impacting Access to Food: none identified  Food/Faith/Cultural Preferences:   eggs, banana, cheerios, cream of wheat, oatmeal, grits for breakfast. No raisin bran or sausage, no beans or green, leafy veggies  Food Allergies: no known food allergies  Last Bowel Movement: 11/15/24  Wound(s):  none documented    Comments    11/9/24 received call from nurse requesting to restart home TPN; reports pt tolerating oral diet but not eating a whole lot. Plan to start PPN Clinimix 4.25/5% and resume home TPN tomorrow evening once filled; possible weight loss noted in EMR over the past few months; will attempt physical assessment on follow up      11/11/24: Pt reports fair intake, receiving food items on breakfast tray that she cannot eat. Obtained food preferences. TPN held 2/2 holding off on PICC and not using MediPort 2/2 infection. Midline placed. Recommend starting PPN Clinimix 4.25/5 as medically appropriate to supplement oral intake until TPN medically feasible. Denies n/v, +chronic diarrhea. Last BM documented 11/8. UBW reported ~165# with 5# weight loss in >1 month. 4.8% weight loss noted  "(insignificant). Pt with mild-moderate muscle depletion per [facility]PE.    11/15/24: Pt reports fair-good intake, breakfast intake improved since food preferences relayed to kitchen. PPN continues at 50 mL/hr. No reports of n/v, LBM 11/15. Will continue to monitor.    Anthropometrics    Height: 5' 4.02" (162.6 cm), Height Method: Stated  Last Weight: 71.5 kg (157 lb 10.1 oz) (24 2300), Weight Method: Bed Scale  BMI (Calculated): 27  BMI Classification: overweight (BMI 25-29.9)     Ideal Body Weight (IBW), Female: 120.1 lb     % Ideal Body Weight, Female (lb): 131.36 %                    Usual Body Weight (UBW), k kg  % Usual Body Weight: 95.53     Usual Weight Provided By: patient and EMR weight history    Wt Readings from Last 5 Encounters:   24 71.5 kg (157 lb 10.1 oz)   24 73 kg (161 lb)   10/24/24 74.4 kg (164 lb)   10/18/24 76.1 kg (167 lb 12.8 oz)   24 75.8 kg (167 lb)     Weight Change(s) Since Admission:   Wt Readings from Last 1 Encounters:   24 2300 71.5 kg (157 lb 10.1 oz)   24 1553 74.8 kg (165 lb)   Admit Weight: 74.8 kg (165 lb) (24 1553), Weight Method: Stated    Estimated Needs    Weight Used For Calorie Calculations: 71.5 kg (157 lb 10.1 oz)  Energy Calorie Requirements (kcal): 5332-5081 kcal (20-25 kcal/kg)  Energy Need Method: Kcal/kg  Weight Used For Protein Calculations: 71.5 kg (157 lb 10.1 oz)  Protein Requirements: 71-85gm (1-1.2 gm/kg)  Fluid Requirements (mL): 1787ml (25 ml/kg)        Enteral Nutrition     Patient not receiving enteral nutrition at this time.    Parenteral Nutrition     Standard Formula: Clinimix 4.25/5  Custom Formula: not applicable  Additives: none  Rate/Volume: 50 mL/hr  Lipids: none  Total Nutrition Provided by Parenteral Nutrition:  Calories Provided  408 kcal/d, 29% needs   Protein Provided  51 g/d, 72% needs   Dextrose Provided  60 g/d, GIR 0.6 mg CHO/kg/min   Fluid Provided  1200 ml/d, 67% needs       Evaluation of Received " Nutrient Intake    Calories: meeting estimated needs (likely meeting >80% est needs with po intake and PPN)  Protein: meeting estimated needs (likely meeting >80% est needs with po intake and PPN)    Patient Education     Not applicable.    Nutrition Diagnosis     PES: Inadequate energy intake related to suboptimal protein/energy intake as evidenced by <80% est needs met. (resolved)     Nutrition Interventions     Intervention(s): general/healthful diet, modified composition of parenteral nutrition, modified rate of parenteral nutrition, and collaboration with other providers    Goal: Meet greater than 80% of nutritional needs by follow-up. (goal met)  Goal: Maintain weight throughout hospitalization. (goal progressing)    Nutrition Goals & Monitoring     Dietitian will monitor: food and beverage intake, energy intake, parenteral nutrition intake, weight, electrolyte/renal panel, glucose/endocrine profile, and gastrointestinal profile  Discharge planning: resume home regimen  Nutrition Risk/Follow-Up: moderate (follow-up in 3-5 days)   Please consult if re-assessment needed sooner.

## 2024-11-15 NOTE — PROGRESS NOTES
Pharmacokinetic Assessment Follow Up: IV Vancomycin    Vancomycin serum concentration assessment(s):    The trough level was drawn correctly and can be used to guide therapy at this time. The measurement is within the desired definitive target range of 15 to 20 mcg/mL.    Vancomycin Regimen Plan:    Continue regimen to Vancomycin 1000 mg IV every 12 hours with next serum trough concentration measured at 1300 prior to the dose on 11/18    Scheduled Administration Times    02  14    Drug levels (last 3 results):  Recent Labs   Lab Result Units 11/13/24  1237 11/15/24  1306   Vancomycin Trough ug/ml 19.8 18.2       Vancomycin Administrations:  vancomycin given in the last 96 hours                     vancomycin (VANCOCIN) 1,000 mg in D5W 250 mL IVPB (admixture device) (mg) 1,000 mg New Bag 11/15/24 0208     1,000 mg New Bag 11/14/24 1415     1,000 mg New Bag  0219     1,000 mg New Bag 11/13/24 1404     1,000 mg New Bag  0246     1,000 mg New Bag 11/12/24 1502     1,000 mg New Bag  0103     1,000 mg New Bag 11/11/24 1646                    Pharmacy will continue to follow and monitor vancomycin.    Please contact pharmacy at extension [phone] for questions regarding this assessment.    Thank you for the consult,   Scott Ruffin       Patient brief summary:  Laura Acosta is a 78 y.o. female initiated on antimicrobial therapy with IV Vancomycin for treatment of bacteremia    The patient's current regimen is 1000mg q12h    Drug Allergies:   Review of patient's allergies indicates:   Allergen Reactions    Hydromorphone Itching     Other reaction(s): itching    Opium      Other reaction(s): itching  has itching with larger doses. Smaller doses do not cause a reaction.       Actual Body Weight:  Wt Readings from Last 1 Encounters:   11/07/24 71.5 kg (157 lb 10.1 oz)       Renal Function:   Estimated Creatinine Clearance: 68.1 mL/min (based on SCr of 0.66 mg/dL).,     Dialysis Method (if applicable):  N/A    CBC (last 72  hours):  Recent Labs   Lab Result Units 11/14/24  1009 11/15/24  0518   WBC x10(3)/mcL 6.13 5.15   Hgb g/dL 12.4 12.3   Hct % 38.7 38.6   Platelet x10(3)/mcL 211 210   Mono % % 8.5 11.3   Eos % % 2.4 3.9   Basophil % % 0.5 0.6       Metabolic Panel (last 72 hours):  Recent Labs   Lab Result Units 11/13/24  0536 11/14/24  0359 11/15/24  0518   Sodium mmol/L 139 139 138   Potassium mmol/L 4.0 3.5 3.7   Chloride mmol/L 111* 108* 109*   CO2 mmol/L 21* 24 20*   Glucose mg/dL 164* 130* 97   Blood Urea Nitrogen mg/dL 12.5 16.9 17.0   Creatinine mg/dL 0.65 0.65 0.66   Albumin g/dL  --   --  3.3*   Bilirubin Total mg/dL  --   --  0.5   ALP unit/L  --   --  117   AST unit/L  --   --  30   ALT unit/L  --   --  25   Magnesium Level mg/dL 1.90 1.70 1.80   Phosphorus Level mg/dL 2.8 2.9 3.2       Microbiologic Results:  Microbiology Results (last 7 days)       Procedure Component Value Units Date/Time    Anaerobic Culture [facility] [[id number]] Collected: 11/12/24 2011    Order Status: Completed Specimen: Foreign Body from [facility] Updated: 11/15/24 1028     Anaerobe Culture No Anaerobes Isolated    Medical Device Culture [[id number]]  (Abnormal)  (Susceptibility) Collected: 11/12/24 2000    Order Status: Completed Specimen: Foreign Body from [facility] Updated: 11/15/24 0830     CULTURE, MEDICAL DEVICE ([facility]) <15 CFU/mL Staphylococcus epidermidis    Blood Culture [[id number]]  (Normal) Collected: 11/10/24 2357    Order Status: Completed Specimen: Blood, Peripheral Line Updated: 11/15/24 0102     Blood Culture No Growth At 96 Hours    Blood Culture [[id number]] Collected: 11/14/24 2251    Order Status: Resulted Specimen: Blood from Hand, Left Updated: 11/14/24 2314    Blood Culture [[id number]] Collected: 11/14/24 2251    Order Status: Resulted Specimen: Blood from Hand, Left Updated: 11/14/24 2314    Blood Culture [[id number]]  (Abnormal)  (Susceptibility) Collected: 11/10/24 4749    Order Status: Completed Specimen: Blood, Arterial  Updated: 11/14/24 0630     Blood Culture Staphylococcus hominis      Staphylococcus epidermidis     GRAM STAIN Gram Positive Cocci, probable Staphylococcus      Seen in gram stain of broth only      2 of 2 bottles positive    Mycobacteria and Nocardia Culture [[id number]] Collected: 11/12/24 2011    Order Status: Sent Specimen: Body Fluid from Chest, Right Updated: 11/13/24 1200    Fungal Culture [[id number]] Collected: 11/12/24 2011    Order Status: Sent Specimen: [facility] Tip from Chest Updated: 11/13/24 1200    Gram Stain [[id number]] Collected: 11/12/24 2000    Order Status: Completed Specimen: Foreign Body from [facility] Updated: 11/13/24 0432     GRAM STAIN No WBC seen      Rare Gram positive cocci    Anaerobic Culture [[id number]] Collected: 11/12/24 2000    Order Status: Canceled Specimen: Foreign Body from [facility]     Blood Culture [[id number]]  (Abnormal)  (Susceptibility) Collected: 11/08/24 1857    Order Status: Completed Specimen: Blood from Arm, Left Updated: 11/12/24 0705     Blood Culture Staphylococcus epidermidis     GRAM STAIN Gram Positive Cocci, probable Staphylococcus      Seen in gram stain of broth only      1 of 2 Anaerobic bottles positive    Blood Culture [[id number]]  (Abnormal)  (Susceptibility) Collected: 11/08/24 1909    Order Status: Completed Specimen: Blood from [facility] Updated: 11/11/24 0658     Blood Culture Staphylococcus epidermidis     GRAM STAIN Gram Positive Cocci, probable Staphylococcus      Seen in gram stain of broth only      2 of 2 bottles positive    Blood Culture [[id number]]  (Abnormal)  (Susceptibility) Collected: 11/07/24 1742    Order Status: Completed Specimen: Blood Updated: 11/10/24 0632     Blood Culture Staphylococcus epidermidis     GRAM STAIN Seen in gram stain of broth only      Gram Positive Cocci, probable Staphylococcus      1 of 1 Pediatric bottle positive    Blood Culture [[id number]]  (Abnormal)  (Susceptibility) Collected: 11/07/24 1742    Order  Status: Completed Specimen: Blood Updated: 11/10/24 0632     Blood Culture Staphylococcus epidermidis     GRAM STAIN Gram Positive Cocci, probable Staphylococcus      Seen in gram stain of broth only      1 of 1 Pediatric bottle positive    BCID2 Panel [[id number]] Collected: 11/08/24 1909    Order Status: Canceled Specimen: Blood from [facility] Updated: 11/09/24 0644    BCID2 Panel [[id number]]  (Abnormal) Collected: 11/07/24 1742    Order Status: Completed Specimen: Blood Updated: 11/08/24 1520     CTX-M (ESBL ) N/A     IMP (Cabapenemase ) N/A     KPC resistance gene (Carbapenemase ) N/A     mcr-1 N/A     mecA ID Not Detected     Comment: Note: Antimicrobial resistance can occur via multiple mechanisms. A Not Detected result for antimicrobial resistance gene(s) does not indicate antimicrobial susceptibility. Subculturing is required for species identification and susceptibility testing of   isolates.        mecA/C and MREJ (MRSA) gene N/A     NDM (Carbapenemase ) N/A     OXA-48-like (Carbapenemase ) N/A     Maria Luisa/B (VRE gene) N/A     VIM (Carbapenemase ) N/A     Enterococcus faecalis Not Detected     Enterococcus faecium Not Detected     Listeria monocytogenes Not Detected     Staphylococcus spp. Detected     Staphylococcus aureus Not Detected     Staphylococcus epidermidis Detected     Staphylococcus lugdunensis Not Detected     Streptococcus spp. Not Detected     Streptococcus agalactiae (Group B) Not Detected     Streptococcus pneumoniae Not Detected     Streptococcus pyogenes (Group A) Not Detected     Acinetobacter calcoaceticus/baumannii complex Not Detected     Bacteroides fragilis Not Detected     Enterobacterales Not Detected     Enterobacter cloacae complex Not Detected     Escherichia coli Not Detected     Klebsiella aerogenes Not Detected     Klebsiella oxytoca Not Detected     Klebsiella pneumoniae group Not Detected     Proteus spp. Not Detected      Salmonella spp. Not Detected     Serratia marcescens Not Detected     Haemophilus influenzae Not Detected     Neisseria meningitidis Not Detected     Pseudomonas aeruginosa Not Detected     Stenotrophomonas maltophilia Not Detected     Candida albicans Not Detected     Candida auris Not Detected     Candida glabrata Not Detected     Candida krusei Not Detected     Candida parapsilosis Not Detected     Candida tropicalis Not Detected     Cryptococcus neoformans/gattii Not Detected    Narrative:      The [employer] BCID2 Panel is a multiplexed nucleic acid test intended for the use with [employer]® [employer] or [employer] Systems for the simultaneous qualitative detection and identification of multiple bacterial and yeast nucleic acids and select genetic determinants associated with antimicrobial resistance.  The [employer] BCID2 Panel test is performed directly on blood culture samples identified as positive by a continuous monitoring blood culture system.  Results are intended to be interpreted in conjunction with Gram stain results.

## 2024-11-15 NOTE — PROGRESS NOTES
Infectious Disease  Patient Name Laura Acosta  78 y.o. female.  MRN: [id number]   Length of stay: 8  Chief Complaint: Fever (C/o fever & weakness x 1 week. Was sent from PCP for dx of UTI. Pt denies abd pain & dysuria. Dx with covid x2 weeks ago, then dx with pneumonia. Not currently on antibiotics. No meds taken today. )       Interval history:   11/14 - afebrile. Muitple CONS spp and morphotype. Recurrently infections requiring removal over last 2-3 years. Patient is port dependent. Would avoid removing port in futures unless absolutely necessary. Can attempt port salvage with abx or ethanol locks in future. Blood cultures and labs should never be drawn from port in active bacteremia this seeds the port if not already seeded. I highly suspect there is a break in the sterile technique when accessing the port for TPN at home. Patient educated an proper access/handling, use of hand hygiene gloves. Another possibility is seeding of her shoulder prosthesis which would be unusual, but we discussed a trial of rifampin to see if that helps. She has broke through doxy suppression so that will no longer be of use. I also discussed not replacing the port for several months to a year and using the PICC instead. She has limited access points now as it is, and if it is a technique error, perhaps using the PICC will be better.   11/15 - afebrile. TTE negative, plan for DALE. Will follow up peripherally this weekend.   Assessment and Plan:   1) Sepsis  - present on admission   - fever, leukocytosis, hypoxia  - CXR 11/07 - No acute abnormality of the chest.   - CT chest/abd/pelvis - .  Right lower lung lobe small new vague opacity may be inflammatory or infectious.  Details of other chest findings above.   Mild excessive fluid within loops of small bowel and the right colon could represent enterocolitis.  Details of other abdominopelvic findings above.   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   - recurrent Staph sp bacteremia, requiring port removal multiple times in past on oral suppressive therapy with daily doxycyline for 2-3 years  - had bacteremia in April with mediport removed at that time  - established care at [facility] [place] [facility] in September   - mediport implanted on   - BCx 11/07 Staphylococus epidermis (S-oxacilin; R-tetra) both sets  - BCx 11/07 Staphylococus epidermis (S-oxacilin; R-tetra) both sets   - BCx 11/1 -  Staphylococus epidermis (S-oxacilin; R-tetra) both sets and Staphy hominis 2/4 [R-oxacillin, tetra]  - BCx 11/14 - in process  - now again with infection   - Mediport removed on 11/12/24  - Mediport Cx 11/12 - rare GPC--> Staphylococcus epidermis   - given the repeated isoaltion of CONS, I highly suspect there is some break in sterile technique/accessing of the port that is introducing the bacteria so often. I reviewed her technique: weekly port access by skilled RN using sterile technique. Line remains accessed and used for TPN 3x week at night and is capped in between. Patient performs hand hygiene and accesses line for heparin, flush and caps when not in use. She does not use gloves  - CHG 3 x week  - obtain TTE and if negative plan for  DLAE   - will add rifampin, will have to monitor INR on coumadin, plan for 6 weeks [low chance the shoulder hardware is seeded, but will trial this]     3) COVID  - recent, 2 weeks prior  - recovered     Discussed with patient      Lyn San MD, MPH  [facility]     Thank you for this consultation. I will follow up with the patient. Please contact via Epic secure chat with any questions.         HPI:   Patient is Laura Acosta a 78 y.o. female admitted on 11/7 with fever and malaise for several days. Patient reports recent COVID infection in October with brief admission. She was given oral abx by her PCP but did not improve. Upon evaluation patient with fever,  hypoxia, and evidence of RLL opacity and enterocolitis. She was started on empiric antimicrobials. Blood cx isolated CONS and patient had Mediport removed on 11/12. Patient has midline in place for access. 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 Infectious diseases consulted for evaluation and management.        Past Medical History:   Diagnosis Date    Acute URI     Anxiety and depression     Back pain     Bacteremia 04/29/2024    Breast cancer     Cholelithiasis     Contaminated small bowel syndrome     DVT (deep venous thrombosis)     on coumadin    Edema, lower extremity     Gallstone pancreatitis     Heart murmur     HTN (hypertension)     Insomnia     Irregular heart beat     Ischemic disease of gut     Kidney stones     Laryngitis     Malabsorption     Malnutrition, unspecified type     Meningitis, unspecified     OA (osteoarthritis)     PVD (peripheral vascular disease)     Rheumatoid arthritis, unspecified     Staph infection     infected mediport -- on doxycycline daily for prevention    Tremor     Unspecified cataract     Unspecified cirrhosis of liver 06/20/2023    Dr Carroll     Past Surgical History:   Procedure Laterality Date    APPENDECTOMY      BOWEL RESECTION      BREAST LUMPECTOMY Right     CHOLECYSTECTOMY  05/2015    COLONOSCOPY  02/2022    repeat 3 years    CYST REMOVAL Right     breast    CYSTOSCOPY W/ STONE MANIPULATION      CYSTOSCOPY W/ URETERAL STENT PLACEMENT  12/2020    CYSTOSCOPY W/ URETERAL STENT REMOVAL  04/2021    CYSTOURETEROSCOPY, WITH HOLMIUM LASER LITHOTRIPSY OF URETERAL CALCULUS AND STENT INSERTION Left 05/02/2023    Procedure: CYSTOSCOPY,  WITH URETERAL STENT INSERTION Left;  Surgeon: Jared Felix MD;  Location: [facility] OR;  Service: Urology;  Laterality: Left;    EGD, WITH CLOSED BIOPSY N/A 6/20/2023    Procedure: EGD;  Surgeon: Aashish Carroll MD;  Location: [facility] ENDOSCOPY;  Service: Gastroenterology;  Laterality: N/A;    ENDOSCOPIC RETROGRADE CHOLANGIOPANCREATOGRAPHY W/ SPHINCTEROTOMY AND STONE REMOVAL  04/2015    ESOPHAGOGASTRODUODENOSCOPY  06/20/2023    Dr Carroll - no signs esophageal varicies --repeat 3 yrs    GI Biopsy  02/15/2022    GI Biopsy  12/2018    HYSTERECTOMY      INSERTION OF TUNNELED CENTRAL VENOUS CATHETER (CVC) WITH SUBCUTANEOUS PORT N/A 7/18/2024    Procedure: [id number];  Surgeon: Ariel Welsh Jr., MD;  Location: [facility] OR;  Service: General;  Laterality: N/A;    LAPAROTOMY, EXPLORATORY  06/2015    LITHOTRIPSY Left 04/2021    LITHOTRIPSY Left 03/2021    MEDIPORT INSERTION, SINGLE  06/2021    MEDIPORT INSERTION, SINGLE  07/2020    MEDIPORT INSERTION, SINGLE  12/2018    MEDIPORT REMOVAL  07/2020    PHACOEMULSIFICATION OF CATARACT Left 12/2016    PHACOEMULSIFICATION OF CATARACT Right 11/2016    PICC line Removal Right 06/2021    POLYPECTOMY  02/2022    PVD surgery      REMOVAL OF CATHETER  12/2020    REMOVAL OF VASCULAR ACCESS CATHETER Right 4/24/2024    Procedure: Removal, Vascular Access Catheter;  Surgeon: Reed Ghosh MD;  Location: [facility];  Service: General;  Laterality: Right;    REMOVAL OF VASCULAR ACCESS CATHETER N/A 11/12/2024    Procedure: Removal, Vascular Access Catheter;  Surgeon: Ariel Welsh Jr., MD;  Location: [facility];  Service: General;  Laterality: N/A;  removal mediport left chest wall    SHOULDER SURGERY Right     shoulder replacement    SPHINCTEROTOMY OF URETHRA      VENTRAL HERNIA REPAIR  04/2016     Review of patient's allergies indicates:   Allergen Reactions    Hydromorphone Itching     Other reaction(s): itching    Opium      Other reaction(s): itching  has itching with  larger doses. Smaller doses do not cause a reaction.     Current Outpatient Medications   Medication Instructions    busPIRone (BUSPAR) 20 mg, 2 times daily    cefdinir (OMNICEF) 300 mg, Oral, 2 times daily    CeleXA 40 mg, Daily    chlorhexidine (HIBICLENS) 4 % external liquid Topical (Top), Three times weekly, Daily showers for 5 days every other week for 3-6 months    cholecalciferol (vitamin D3) 50,000 Units, Every 7 days    diphenoxylate-atropine 2.5-0.025 mg (LOMOTIL) 2.5-0.025 mg per tablet 2 tablets, 2 times daily    doxycycline (VIBRAMYCIN) 200 mg, Daily    ferrous sulfate 325 mg, Oral, Daily    GATTEX 30-VIAL 5 mg Kit Daily    metoprolol tartrate (LOPRESSOR) 100 mg, 2 times daily    mirtazapine (REMERON) 15 mg, Nightly    multivitamin (ONE DAILY MULTIVITAMIN) per tablet 1 tablet, Daily    solifenacin (VESICARE) 5 mg, Daily    warfarin (COUMADIN) 2.5 MG tablet TAKE ONE TABLET BY MOUTH ONCE DAILY IN THE EVENING OR AS DIRECTED BY CIS PROVIDER       Current Facility-Administered Medications:     acetaminophen tablet 650 mg, 650 mg, Oral, Q8H PRN, Natan Cuevas MD    amino acid 4.25% - dextrose 5% solution, , Intravenous, Continuous, Raine Moscoso MD    busPIRone tablet 30 mg, 30 mg, Oral, BID, Gabrielle Butler DO, 30 mg at 11/14/24 2021    citalopram tablet 40 mg, 40 mg, Oral, Daily, Natan Cuevas MD, 40 mg at 11/14/24 0833    dextrose 10% bolus 125 mL 125 mL, 12.5 g, Intravenous, PRN, Jaswidner Hartmann DO    dextrose 10% bolus 250 mL 250 mL, 25 g, Intravenous, PRN, Jaswinder Hartmann DO    enoxaparin injection 70 mg, 1 mg/kg, Subcutaneous, Q12H (treatment, non-standard time), Raine Moscoso MD, 70 mg at 11/15/24 0534    ferrous sulfate tablet 1 each, 1 tablet, Oral, Daily, Gabrielle Butler DO, 1 each at 11/14/24 0833    melatonin tablet 6 mg, 6 mg, Oral, Nightly PRN, Natan Cuevas MD    mirtazapine tablet 15 mg, 15 mg, Oral, QHS, Natan Cuevas MD, 15 mg at 11/14/24 2021     phenazopyridine tablet 100 mg, 100 mg, Oral, TID WM, Gabrielle Butler DO, 100 mg at 11/14/24 1622    rifAMpin capsule 300 mg, 300 mg, Oral, Q12H, Lyn San MD    sodium chloride 0.9% flush 10 mL, 10 mL, Intravenous, PRN, Natan Cuevas MD    Flushing PICC/Midline Protocol, , , Until Discontinued **AND** sodium chloride 0.9% flush 10 mL, 10 mL, Intravenous, Q12H PRN, Matt Bennett MD    Flushing PICC/Midline Protocol, , , Until Discontinued **AND** sodium chloride 0.9% flush 10 mL, 10 mL, Intravenous, Q12H PRN, Gabrielle Butler DO    teduglutide Kit 3.8 mg, 3.8 mg, Subcutaneous, Daily, Gabrielle Butler DO, 3.8 mg at 11/14/24 2021    traMADoL tablet 50 mg, 50 mg, Oral, Q6H PRN, María Layne FNP    vancomycin (VANCOCIN) 1,000 mg in D5W 250 mL IVPB (admixture device), 1,000 mg, Intravenous, Q12H, Matt Bennett MD, Stopped at 11/15/24 0338    vancomycin - pharmacy to dose, , Intravenous, pharmacy to manage frequency, Gabrielle Butler DO    warfarin (COUMADIN) tablet 5 mg, 5 mg, Oral, Daily, Raine Moscoso MD  Review of Systems    Objective:   Temp:  [97.8 °F (36.6 °C)-98.6 °F (37 °C)] 98.6 °F (37 °C)  Pulse:  [76-88] 83  SpO2:  [93 %-96 %] 93 %  BP: (123-149)/(65-83) 129/72     Physical Exam    Estimated Creatinine Clearance: 68.1 mL/min (based on SCr of 0.66 mg/dL).  Recent Labs   Lab 11/14/24  1009 11/15/24  0518   WBC 6.13 5.15    210     Microbiology Results (last 7 days)       Procedure Component Value Units Date/Time    Medical Device Culture [[id number]]  (Abnormal)  (Susceptibility) Collected: 11/12/24 2000    Order Status: Completed Specimen: Foreign Body from Mediport Updated: 11/15/24 0830     CULTURE, MEDICAL DEVICE ([facility]) <15 CFU/mL Staphylococcus epidermidis    Blood Culture [[id number]]  (Normal) Collected: 11/10/24 5121    Order Status: Completed Specimen: Blood, Peripheral Line Updated: 11/15/24 0102     Blood Culture No Growth At 96 Hours    Blood  Culture [[id number]] Collected: 11/14/24 2251    Order Status: Resulted Specimen: Blood from Hand, Left Updated: 11/14/24 2314    Blood Culture [[id number]] Collected: 11/14/24 2251    Order Status: Resulted Specimen: Blood from Hand, Left Updated: 11/14/24 2314    Blood Culture [[id number]]  (Abnormal)  (Susceptibility) Collected: 11/10/24 2357    Order Status: Completed Specimen: Blood, Arterial Updated: 11/14/24 0630     Blood Culture Staphylococcus hominis      Staphylococcus epidermidis     GRAM STAIN Gram Positive Cocci, probable Staphylococcus      Seen in gram stain of broth only      2 of 2 bottles positive    Mycobacteria and Nocardia Culture [[id number]] Collected: 11/12/24 2011    Order Status: Sent Specimen: Body Fluid from Chest, Right Updated: 11/13/24 1200    Fungal Culture [[id number]] Collected: 11/12/24 2011    Order Status: Sent Specimen: Mediport Tip from Chest Updated: 11/13/24 1200    Anaerobic Culture [facility] [[id number]] Collected: 11/12/24 2011    Order Status: Sent Specimen: Foreign Body from Mediport Updated: 11/13/24 1200    Gram Stain [[id number]] Collected: 11/12/24 2000    Order Status: Completed Specimen: Foreign Body from Mediport Updated: 11/13/24 0432     GRAM STAIN No WBC seen      Rare Gram positive cocci    Anaerobic Culture [[id number]] Collected: 11/12/24 2000    Order Status: Canceled Specimen: Foreign Body from Mediport     Blood Culture [[id number]]  (Abnormal)  (Susceptibility) Collected: 11/08/24 1857    Order Status: Completed Specimen: Blood from Arm, Left Updated: 11/12/24 0705     Blood Culture Staphylococcus epidermidis     GRAM STAIN Gram Positive Cocci, probable Staphylococcus      Seen in gram stain of broth only      1 of 2 Anaerobic bottles positive    Blood Culture [[id number]]  (Abnormal)  (Susceptibility) Collected: 11/08/24 1909    Order Status: Completed Specimen: Blood from Mediport Updated: 11/11/24 0658     Blood Culture Staphylococcus epidermidis     GRAM  STAIN Gram Positive Cocci, probable Staphylococcus      Seen in gram stain of broth only      2 of 2 bottles positive    Blood Culture [[id number]]  (Abnormal)  (Susceptibility) Collected: 11/07/24 1742    Order Status: Completed Specimen: Blood Updated: 11/10/24 0632     Blood Culture Staphylococcus epidermidis     GRAM STAIN Seen in gram stain of broth only      Gram Positive Cocci, probable Staphylococcus      1 of 1 Pediatric bottle positive    Blood Culture [[id number]]  (Abnormal)  (Susceptibility) Collected: 11/07/24 1742    Order Status: Completed Specimen: Blood Updated: 11/10/24 0632     Blood Culture Staphylococcus epidermidis     GRAM STAIN Gram Positive Cocci, probable Staphylococcus      Seen in gram stain of broth only      1 of 1 Pediatric bottle positive    BCID2 Panel [[id number]] Collected: 11/08/24 1909    Order Status: Canceled Specimen: Blood from Mediport Updated: 11/09/24 0644    BCID2 Panel [[id number]]  (Abnormal) Collected: 11/07/24 1742    Order Status: Completed Specimen: Blood Updated: 11/08/24 1520     CTX-M (ESBL ) N/A     IMP (Cabapenemase ) N/A     KPC resistance gene (Carbapenemase ) N/A     mcr-1 N/A     mecA ID Not Detected     Comment: Note: Antimicrobial resistance can occur via multiple mechanisms. A Not Detected result for antimicrobial resistance gene(s) does not indicate antimicrobial susceptibility. Subculturing is required for species identification and susceptibility testing of   isolates.        mecA/C and MREJ (MRSA) gene N/A     NDM (Carbapenemase ) N/A     OXA-48-like (Carbapenemase ) N/A     Maria Luisa/B (VRE gene) N/A     VIM (Carbapenemase ) N/A     Enterococcus faecalis Not Detected     Enterococcus faecium Not Detected     Listeria monocytogenes Not Detected     Staphylococcus spp. Detected     Staphylococcus aureus Not Detected     Staphylococcus epidermidis Detected     Staphylococcus lugdunensis Not Detected      Streptococcus spp. Not Detected     Streptococcus agalactiae (Group B) Not Detected     Streptococcus pneumoniae Not Detected     Streptococcus pyogenes (Group A) Not Detected     Acinetobacter calcoaceticus/baumannii complex Not Detected     Bacteroides fragilis Not Detected     Enterobacterales Not Detected     Enterobacter cloacae complex Not Detected     Escherichia coli Not Detected     Klebsiella aerogenes Not Detected     Klebsiella oxytoca Not Detected     Klebsiella pneumoniae group Not Detected     Proteus spp. Not Detected     Salmonella spp. Not Detected     Serratia marcescens Not Detected     Haemophilus influenzae Not Detected     Neisseria meningitidis Not Detected     Pseudomonas aeruginosa Not Detected     Stenotrophomonas maltophilia Not Detected     Candida albicans Not Detected     Candida auris Not Detected     Candida glabrata Not Detected     Candida krusei Not Detected     Candida parapsilosis Not Detected     Candida tropicalis Not Detected     Cryptococcus neoformans/gattii Not Detected    Narrative:      The [employer] BCID2 Panel is a multiplexed nucleic acid test intended for the use with [employer]® 2.0 or [employer]is® Multicast Media Systems for the simultaneous qualitative detection and identification of multiple bacterial and yeast nucleic acids and select genetic determinants associated with antimicrobial resistance.  The [employer] BCID2 Panel test is performed directly on blood culture samples identified as positive by a continuous monitoring blood culture system.  Results are intended to be interpreted in conjunction with Gram stain results.            Significant Labs: All pertinent labs within the past 24 hours have been reviewed.    Significant Imaging: I have reviewed all relevant and available imaging results/findings within the past 24 hours.      Plan -- see top of note

## 2024-11-16 LAB
ALBUMIN SERPL-MCNC: 3 G/DL (ref 3.4–4.8)
ALBUMIN/GLOB SERPL: 0.9 RATIO (ref 1.1–2)
ALP SERPL-CCNC: 122 UNIT/L (ref 40–150)
ALT SERPL-CCNC: 26 UNIT/L (ref 0–55)
ANION GAP SERPL CALC-SCNC: 7 MEQ/L
AST SERPL-CCNC: 34 UNIT/L (ref 5–34)
BACTERIA BLD CULT: NORMAL
BACTERIA SPEC ANAEROBE CULT: NORMAL
BASOPHILS # BLD AUTO: 0.03 X10(3)/MCL
BASOPHILS NFR BLD AUTO: 0.8 %
BILIRUB SERPL-MCNC: 0.9 MG/DL
BUN SERPL-MCNC: 17.8 MG/DL (ref 9.8–20.1)
CALCIUM SERPL-MCNC: 8.5 MG/DL (ref 8.4–10.2)
CHLORIDE SERPL-SCNC: 110 MMOL/L (ref 98–107)
CO2 SERPL-SCNC: 21 MMOL/L (ref 23–31)
CREAT SERPL-MCNC: 0.63 MG/DL (ref 0.55–1.02)
CREAT/UREA NIT SERPL: 28
EOSINOPHIL # BLD AUTO: 0.18 X10(3)/MCL (ref 0–0.9)
EOSINOPHIL NFR BLD AUTO: 5 %
ERYTHROCYTE [DISTWIDTH] IN BLOOD BY AUTOMATED COUNT: 14.9 % (ref 11.5–17)
GFR SERPLBLD CREATININE-BSD FMLA CKD-EPI: >60 ML/MIN/1.73/M2
GLOBULIN SER-MCNC: 3.4 GM/DL (ref 2.4–3.5)
GLUCOSE SERPL-MCNC: 97 MG/DL (ref 82–115)
HCT VFR BLD AUTO: 35.8 % (ref 37–47)
HGB BLD-MCNC: 11.5 G/DL (ref 12–16)
IMM GRANULOCYTES # BLD AUTO: 0.01 X10(3)/MCL (ref 0–0.04)
IMM GRANULOCYTES NFR BLD AUTO: 0.3 %
INR PPP: 1.2
LYMPHOCYTES # BLD AUTO: 1.7 X10(3)/MCL (ref 0.6–4.6)
LYMPHOCYTES NFR BLD AUTO: 46.8 %
MAGNESIUM SERPL-MCNC: 1.9 MG/DL (ref 1.6–2.6)
MCH RBC QN AUTO: 28.8 PG (ref 27–31)
MCHC RBC AUTO-ENTMCNC: 32.1 G/DL (ref 33–36)
MCV RBC AUTO: 89.7 FL (ref 80–94)
MONOCYTES # BLD AUTO: 0.5 X10(3)/MCL (ref 0.1–1.3)
MONOCYTES NFR BLD AUTO: 13.8 %
NEUTROPHILS # BLD AUTO: 1.21 X10(3)/MCL (ref 2.1–9.2)
NEUTROPHILS NFR BLD AUTO: 33.3 %
NRBC BLD AUTO-RTO: 0 %
PHOSPHATE SERPL-MCNC: 3.3 MG/DL (ref 2.3–4.7)
PLATELET # BLD AUTO: 166 X10(3)/MCL (ref 130–400)
PMV BLD AUTO: 9.8 FL (ref 7.4–10.4)
POTASSIUM SERPL-SCNC: 3.7 MMOL/L (ref 3.5–5.1)
PROT SERPL-MCNC: 6.4 GM/DL (ref 5.8–7.6)
PROTHROMBIN TIME: 14.8 SECONDS (ref 12.5–14.5)
RBC # BLD AUTO: 3.99 X10(6)/MCL (ref 4.2–5.4)
SODIUM SERPL-SCNC: 138 MMOL/L (ref 136–145)
WBC # BLD AUTO: 3.63 X10(3)/MCL (ref 4.5–11.5)

## 2024-11-16 PROCEDURE — 25000003 PHARM REV CODE 250: Performed by: INTERNAL MEDICINE

## 2024-11-16 PROCEDURE — 99233 SBSQ HOSP IP/OBS HIGH 50: CPT | Mod: ,,, | Performed by: HOSPITALIST

## 2024-11-16 PROCEDURE — 63600175 PHARM REV CODE 636 W HCPCS: Performed by: INTERNAL MEDICINE

## 2024-11-16 PROCEDURE — 36415 COLL VENOUS BLD VENIPUNCTURE: CPT | Performed by: INTERNAL MEDICINE

## 2024-11-16 PROCEDURE — 25000003 PHARM REV CODE 250: Performed by: HOSPITALIST

## 2024-11-16 PROCEDURE — 83735 ASSAY OF MAGNESIUM: CPT | Performed by: INTERNAL MEDICINE

## 2024-11-16 PROCEDURE — 25000003 PHARM REV CODE 250: Performed by: STUDENT IN AN ORGANIZED HEALTH CARE EDUCATION/TRAINING PROGRAM

## 2024-11-16 PROCEDURE — 84100 ASSAY OF PHOSPHORUS: CPT | Performed by: INTERNAL MEDICINE

## 2024-11-16 PROCEDURE — 80053 COMPREHEN METABOLIC PANEL: CPT | Performed by: INTERNAL MEDICINE

## 2024-11-16 PROCEDURE — 11000001 HC ACUTE MED/SURG PRIVATE ROOM

## 2024-11-16 PROCEDURE — 85610 PROTHROMBIN TIME: CPT | Performed by: INTERNAL MEDICINE

## 2024-11-16 PROCEDURE — 85025 COMPLETE CBC W/AUTO DIFF WBC: CPT | Performed by: INTERNAL MEDICINE

## 2024-11-16 PROCEDURE — 21400001 HC TELEMETRY ROOM

## 2024-11-16 RX ADMIN — MIRTAZAPINE 15 MG: 15 TABLET, FILM COATED ORAL at 07:11

## 2024-11-16 RX ADMIN — BUSPIRONE HYDROCHLORIDE 30 MG: 15 TABLET ORAL at 08:11

## 2024-11-16 RX ADMIN — PHENAZOPYRIDINE HYDROCHLORIDE 100 MG: 100 TABLET ORAL at 08:11

## 2024-11-16 RX ADMIN — VANCOMYCIN HYDROCHLORIDE 1000 MG: 1 INJECTION, POWDER, LYOPHILIZED, FOR SOLUTION INTRAVENOUS at 02:11

## 2024-11-16 RX ADMIN — PHENAZOPYRIDINE HYDROCHLORIDE 100 MG: 100 TABLET ORAL at 11:11

## 2024-11-16 RX ADMIN — FERROUS SULFATE TAB 325 MG (65 MG ELEMENTAL FE) 1 EACH: 325 (65 FE) TAB at 08:11

## 2024-11-16 RX ADMIN — RIFAMPIN 300 MG: 300 CAPSULE ORAL at 08:11

## 2024-11-16 RX ADMIN — LEUCINE, PHENYLALANINE, LYSINE, METHIONINE, ISOLEUCINE, VALINE, HISTIDINE, THREONINE, TRYPTOPHAN, ALANINE, GLYCINE, ARGININE, PROLINE, SERINE, TYROSINE, DEXTROSE: 311; 238; 247; 170; 255; 247; 204; 179; 77; 880; 438; 489; 289; 213; 17; 5 INJECTION INTRAVENOUS at 04:11

## 2024-11-16 RX ADMIN — BUSPIRONE HYDROCHLORIDE 30 MG: 15 TABLET ORAL at 07:11

## 2024-11-16 RX ADMIN — WARFARIN SODIUM 5 MG: 5 TABLET ORAL at 04:11

## 2024-11-16 RX ADMIN — CITALOPRAM HYDROBROMIDE 40 MG: 20 TABLET ORAL at 08:11

## 2024-11-16 RX ADMIN — ENOXAPARIN SODIUM 70 MG: 80 INJECTION SUBCUTANEOUS at 04:11

## 2024-11-16 RX ADMIN — ENOXAPARIN SODIUM 70 MG: 80 INJECTION SUBCUTANEOUS at 05:11

## 2024-11-16 RX ADMIN — PHENAZOPYRIDINE HYDROCHLORIDE 100 MG: 100 TABLET ORAL at 04:11

## 2024-11-16 RX ADMIN — RIFAMPIN 300 MG: 300 CAPSULE ORAL at 07:11

## 2024-11-16 NOTE — PLAN OF CARE
Problem: Adult Inpatient Plan of Care  Goal: Plan of Care Review  11/16/2024 1157 by Kj Herrera RN  Outcome: Progressing  11/16/2024 1143 by Kj Herrera RN  Outcome: Progressing  Goal: Patient-Specific Goal (Individualized)  11/16/2024 1157 by Kj Herrera RN  Outcome: Progressing  11/16/2024 1143 by Kj Herrera RN  Outcome: Progressing  Goal: Absence of Hospital-Acquired Illness or Injury  11/16/2024 1157 by Kj Herrera RN  Outcome: Progressing  11/16/2024 1143 by jK Herrera RN  Outcome: Progressing  Goal: Optimal Comfort and Wellbeing  11/16/2024 1157 by Kj Herrera RN  Outcome: Progressing  11/16/2024 1143 by Kj Herrera RN  Outcome: Progressing  Goal: Readiness for Transition of Care  11/16/2024 1157 by Kj Herrera RN  Outcome: Progressing  11/16/2024 1143 by Kj Herrera RN  Outcome: Progressing     Problem: Pneumonia  Goal: Fluid Balance  11/16/2024 1157 by Kj Herrera RN  Outcome: Progressing  11/16/2024 1143 by Kj Herrera RN  Outcome: Progressing  Goal: Resolution of Infection Signs and Symptoms  11/16/2024 1157 by Kj Herrera RN  Outcome: Progressing  11/16/2024 1143 by Kj Herrera RN  Outcome: Progressing  Goal: Effective Oxygenation and Ventilation  11/16/2024 1157 by Kj Herrera RN  Outcome: Progressing  11/16/2024 1143 by Kj Herrera RN  Outcome: Progressing     Problem: Infection  Goal: Absence of Infection Signs and Symptoms  11/16/2024 1157 by Kj Herrera RN  Outcome: Progressing  11/16/2024 1143 by Kj Herrera RN  Outcome: Progressing     Problem: Wound  Goal: Optimal Coping  11/16/2024 1157 by Kj Herrera RN  Outcome: Progressing  11/16/2024 1143 by Kj Herrera RN  Outcome: Progressing  Goal: Optimal Functional Ability  11/16/2024 1157 by Kj Herrera RN  Outcome: Progressing  11/16/2024 1143 by Kj Herrera RN  Outcome: Progressing  Goal: Absence of Infection Signs and  Symptoms  11/16/2024 1157 by Kj Herrera RN  Outcome: Progressing  11/16/2024 1143 by Kj Herrera RN  Outcome: Progressing  Goal: Improved Oral Intake  11/16/2024 1157 by Kj Herrera RN  Outcome: Progressing  11/16/2024 1143 by Kj Herrera RN  Outcome: Progressing  Goal: Optimal Pain Control and Function  11/16/2024 1157 by Kj Herrera RN  Outcome: Progressing  11/16/2024 1143 by Kj Herrera RN  Outcome: Progressing  Goal: Skin Health and Integrity  11/16/2024 1157 by Kj Herrera RN  Outcome: Progressing  11/16/2024 1143 by Kj Herrera RN  Outcome: Progressing  Goal: Optimal Wound Healing  11/16/2024 1157 by Kj Herrera RN  Outcome: Progressing  11/16/2024 1143 by Kj Herrera RN  Outcome: Progressing     Problem: Skin Injury Risk Increased  Goal: Skin Health and Integrity  11/16/2024 1157 by Kj Herrera RN  Outcome: Progressing  11/16/2024 1143 by Kj Herrera RN  Outcome: Progressing     Problem: Fall Injury Risk  Goal: Absence of Fall and Fall-Related Injury  11/16/2024 1157 by Kj Herrera RN  Outcome: Progressing  11/16/2024 1143 by jK Herrera RN  Outcome: Progressing

## 2024-11-16 NOTE — PROGRESS NOTES
[facility] Hospital Medicine Progress Note        Chief Complaint: Inpatient Follow-up    HPI:     78-year-old female with significant history of short gut syndrome on TPN at home, HTN, HLD, PVD, DVT/clotting disorder on Coumadin, rheumatoid arthritis, cirrhosis, gallstone pancreatitis, anxiety/depression.  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  Last MediPort replacement was 7 months back.  Patient had a recent hospitalization in October which fever spikes which was attributed to COVID-19 infection and pneumonia, cultures were negative then and was discharged home.  Patient presented back to the ED on 11/07 with complaints of fever, generalized weakness, labs non impressive.  Imaging concerning for pneumonia/enterocolitis, patient was admitted to hospital medicine services, cultures ordered and was initiated on broad-spectrum antimicrobials for colitis/pneumonia.  Cultures from admission grew Staph epidermidis.  Repeat cultures with 1 bottle growing Staph epidermidis/Staph hominis.  Concern for MediPort infection, General surgery consulted for removal of the same.  In the meantime a midline was placed for IV access.  MediPort successfully removed 11/12.  Infectious Disease consulted, awaiting recs, vancomycin continued.  Infectious Disease added rifampin, TTE was ordered which came back negative and Infectious Disease recommended DALE if TTE negative, cardiology consulted    Interval Hx:   Patient seen at bedside, comfortably laying in bed, no new complaints, no acute events overnight, afebrile and hemodynamics stable    Objective/physical exam:  General: In no acute distress, afebrile  Chest: Clear to auscultation bilaterally  Heart: S1, S2, no appreciable murmur  Abdomen: Soft, nontender, BS +  MSK: Warm, no lower extremity edema, no clubbing or cyanosis  Neurologic: Alert and oriented x4, moving all  extremities with good strength     VITAL SIGNS: 24 HRS MIN & MAX LAST   Temp  Min: 98 °F (36.7 °C)  Max: 98.6 °F (37 °C) 98.1 °F (36.7 °C)   BP  Min: 121/62  Max: 145/73 127/67   Pulse  Min: 66  Max: 91  69   No data recorded 18   SpO2  Min: 92 %  Max: 94 % (!) 94 %       Recent Labs   Lab 11/16/24  0531   WBC 3.63*   RBC 3.99*   HGB 11.5*   HCT 35.8*   MCV 89.7   MCH 28.8   MCHC 32.1*   RDW 14.9      MPV 9.8         Recent Labs   Lab 11/16/24  0531      K 3.7   *   CO2 21*   BUN 17.8   CREATININE 0.63   CALCIUM 8.5   MG 1.90   ALBUMIN 3.0*   ALKPHOS 122   ALT 26   AST 34   BILITOT 0.9          Microbiology Results (last 7 days)       Procedure Component Value Units Date/Time    Anaerobic Culture [facility] [[id number]] Collected: 11/12/24 2011    Order Status: Completed Specimen: Foreign Body from [facility] Updated: 11/16/24 0745     Anaerobe Culture No Anaerobes Isolated    Blood Culture [[id number]]  (Normal) Collected: 11/10/24 2357    Order Status: Completed Specimen: Blood, Peripheral Line Updated: 11/16/24 0100     Blood Culture No Growth at 5 days    Blood Culture [[id number]]  (Normal) Collected: 11/14/24 2251    Order Status: Completed Specimen: Blood from Hand, Left Updated: 11/16/24 0004     Blood Culture No Growth At 24 Hours    Blood Culture [[id number]]  (Normal) Collected: 11/14/24 2251    Order Status: Completed Specimen: Blood from Hand, Left Updated: 11/16/24 0004     Blood Culture No Growth At 24 Hours    Medical Device Culture [[id number]]  (Abnormal)  (Susceptibility) Collected: 11/12/24 2000    Order Status: Completed Specimen: Foreign Body from [facility] Updated: 11/15/24 0830     CULTURE, MEDICAL DEVICE ([facility]) <15 CFU/mL Staphylococcus epidermidis    Blood Culture [[id number]]  (Abnormal)  (Susceptibility) Collected: 11/10/24 2357    Order Status: Completed Specimen: Blood, Arterial Updated: 11/14/24 0630     Blood Culture Staphylococcus hominis      Staphylococcus epidermidis      GRAM STAIN Gram Positive Cocci, probable Staphylococcus      Seen in gram stain of broth only      2 of 2 bottles positive    Mycobacteria and Nocardia Culture [[id number]] Collected: 11/12/24 2011    Order Status: Sent Specimen: Body Fluid from Chest, Right Updated: 11/13/24 1200    Fungal Culture [[id number]] Collected: 11/12/24 2011    Order Status: Sent Specimen: Mediport Tip from Chest Updated: 11/13/24 1200    Gram Stain [[id number]] Collected: 11/12/24 2000    Order Status: Completed Specimen: Foreign Body from Med[employer] Updated: 11/13/24 0432     GRAM STAIN No WBC seen      Rare Gram positive cocci    Anaerobic Culture [[id number]] Collected: 11/12/24 2000    Order Status: Canceled Specimen: Foreign Body from Mediport     Blood Culture [[id number]]  (Abnormal)  (Susceptibility) Collected: 11/08/24 1857    Order Status: Completed Specimen: Blood from Arm, Left Updated: 11/12/24 0705     Blood Culture Staphylococcus epidermidis     GRAM STAIN Gram Positive Cocci, probable Staphylococcus      Seen in gram stain of broth only      1 of 2 Anaerobic bottles positive    Blood Culture [[id number]]  (Abnormal)  (Susceptibility) Collected: 11/08/24 1909    Order Status: Completed Specimen: Blood from Mediport Updated: 11/11/24 0658     Blood Culture Staphylococcus epidermidis     GRAM STAIN Gram Positive Cocci, probable Staphylococcus      Seen in gram stain of broth only      2 of 2 bottles positive    Blood Culture [[id number]]  (Abnormal)  (Susceptibility) Collected: 11/07/24 1742    Order Status: Completed Specimen: Blood Updated: 11/10/24 0632     Blood Culture Staphylococcus epidermidis     GRAM STAIN Seen in gram stain of broth only      Gram Positive Cocci, probable Staphylococcus      1 of 1 Pediatric bottle positive    Blood Culture [[id number]]  (Abnormal)  (Susceptibility) Collected: 11/07/24 1742    Order Status: Completed Specimen: Blood Updated: 11/10/24 0632     Blood Culture Staphylococcus epidermidis      GRAM STAIN Gram Positive Cocci, probable Staphylococcus      Seen in gram stain of broth only      1 of 1 Pediatric bottle positive             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, 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, 2024  Suspected right-sided pneumonia on admit-completed treatment   Recent COVID-19 pneumonitis   History of short gut syndrome on TPN at home   Essential HTN-stable   HLD   History of PVD   History of clotting disorder with history of DVT on Coumadin   Rheumatoid arthritis   Cirrhosis-appears compensated   History of gallstone pancreatitis   Anxiety/depression   Prophylaxis      Appreciate infectious disease recommendation   Now on IV vancomycin and rifampin   TTE is negative   Infectious Disease recommended DALE if TTE negative,   Cardiology consulted 11/15, await recs MediPort and PICC line removed, now has midline  On TPN through midline  Patient is now afebrile, hemodynamically stable with no more leukocytosis  Continue home dose Coumadin and also  full-dose anticoagulation with Lovenox until INR is therapeutic  INR is 1.2  Continue home meds-BuSpar, citalopram, iron, Remeron, phenazopyridine,   DVT prophylaxis-Lovenox with Coumadin    Await DALE, follow ID recommendations      Raine Moscoso MD   11/16/2024

## 2024-11-16 NOTE — PROGRESS NOTES
Infectious Disease  Patient Name Laura Acosta  78 y.o. female.  MRN: [id number]   Length of stay: 9  Chief Complaint: Fever (C/o fever & weakness x 1 week. Was sent from PCP for dx of UTI. Pt denies abd pain & dysuria. Dx with covid x2 weeks ago, then dx with pneumonia. Not currently on antibiotics. No meds taken today. )       Interval history:   11/14 - afebrile. Muitple CONS spp and morphotype. Recurrently infections requiring removal over last 2-3 years. Patient is port dependent. Would avoid removing port in futures unless absolutely necessary. Can attempt port salvage with abx or ethanol locks in future. Blood cultures and labs should never be drawn from port in active bacteremia this seeds the port if not already seeded. I highly suspect there is a break in the sterile technique when accessing the port for TPN at home. Patient educated an proper access/handling, use of hand hygiene gloves. Another possibility is seeding of her shoulder prosthesis which would be unusual, but we discussed a trial of rifampin to see if that helps. She has broke through doxy suppression so that will no longer be of use. I also discussed not replacing the port for several months to a year and using the PICC instead. She has limited access points now as it is, and if it is a technique error, perhaps using the PICC will be better.   11/15 - afebrile. TTE negative, plan for DALE. Will follow up peripherally this weekend.   11/16 - afebrile. Blood cx negative. Tolerating rifampin without adverse effects. DALE on Monday. Monitor INR and CMP. Will follow up on Monday.  Assessment and Plan:   1) Sepsis  - present on admission   - fever, leukocytosis, hypoxia  - CXR 11/07 - No acute abnormality of the chest.   - CT chest/abd/pelvis - .  Right lower lung lobe small new vague opacity may be inflammatory or infectious.  Details of other chest findings above.   Mild excessive fluid within loops of small bowel and the right colon could represent  enterocolitis.  Details of other abdominopelvic findings above.   - currently on vancomycin, goal 15-20, Rx to dose      2) Bacteremia  - in setting of mediport for long term TPN due to short gut syndrome   - recurrent Staph sp bacteremia, requiring port removal multiple times in past on oral suppressive therapy with daily doxycyline for 2-3 years  - had bacteremia in April with mediport removed at that time  - established care at [facility] in September   - mediport implanted on   - BCx 11/07 Staphylococus epidermis (S-oxacilin; R-tetra) both sets  - BCx 11/07 Staphylococus epidermis (S-oxacilin; R-tetra) both sets   - BCx 11/1 -  Staphylococus epidermis (S-oxacilin; R-tetra) both sets and Staphy hominis 2/4 [R-oxacillin, tetra]  - BCx 11/14 - in process  - now again with infection   - Mediport removed on 11/12/24  - Mediport Cx 11/12 - rare GPC--> Staphylococcus epidermis   - given the repeated isoaltion of CONS, I highly suspect there is some break in sterile technique/accessing of the port that is introducing the bacteria so often. I reviewed her technique: weekly port access by skilled RN using sterile technique. Line remains accessed and used for TPN 3x week at night and is capped in between. Patient performs hand hygiene and accesses line for heparin, flush and caps when not in use. She does not use gloves  - CHG 3 x week  - obtain TTE and if negative plan for  DALE   - currently on vancomycin, goal 15-20, Rx to dose  - currently on rifampin, will have to monitor INR on coumadin, plan for 6 weeks [low chance the shoulder hardware is seeded, but will trial this]     3) COVID  - recent, 2 weeks prior  - recovered     Discussed with patient      Lyn San MD, MPH  [facility]     Thank you for this consultation. I will follow up with the patient. Please contact via Epic secure chat with any questions.         HPI:   Patient is Laura Acosta a 78 y.o. female admitted on 11/7 with  fever and malaise for several days. Patient reports recent COVID infection in October with brief admission. She was given oral abx by her PCP but did not improve. Upon evaluation patient with fever, hypoxia, and evidence of RLL opacity and enterocolitis. She was started on empiric antimicrobials. Blood cx isolated CONS and patient had Mediport removed on 11/12. Patient has midline in place for access. 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 Infectious diseases consulted for evaluation and management.        Past Medical History:   Diagnosis Date    Acute URI     Anxiety and depression     Back pain     Bacteremia 04/29/2024    Breast cancer     Cholelithiasis     Contaminated small bowel syndrome     DVT (deep venous thrombosis)     on coumadin    Edema, lower extremity     Gallstone pancreatitis     Heart murmur     HTN (hypertension)     Insomnia     Irregular heart beat     Ischemic disease of gut     Kidney stones     Laryngitis     Malabsorption     Malnutrition, unspecified type     Meningitis, unspecified     OA (osteoarthritis)     PVD (peripheral vascular disease)     Rheumatoid arthritis, unspecified     Staph infection     infected mediport -- on doxycycline daily for prevention    Tremor     Unspecified cataract     Unspecified cirrhosis of liver 06/20/2023    Dr Carroll     Past Surgical History:   Procedure Laterality Date    APPENDECTOMY      BOWEL RESECTION      BREAST LUMPECTOMY Right     CHOLECYSTECTOMY  05/2015    COLONOSCOPY  02/2022    repeat 3 years    CYST REMOVAL Right     breast    CYSTOSCOPY W/ STONE MANIPULATION      CYSTOSCOPY W/ URETERAL STENT  PLACEMENT  12/2020    CYSTOSCOPY W/ URETERAL STENT REMOVAL  04/2021    CYSTOURETEROSCOPY, WITH HOLMIUM LASER LITHOTRIPSY OF URETERAL CALCULUS AND STENT INSERTION Left 05/02/2023    Procedure: CYSTOSCOPY, WITH URETERAL STENT INSERTION Left;  Surgeon: Jared Felix MD;  Location: [facility] OR;  Service: Urology;  Laterality: Left;    EGD, WITH CLOSED BIOPSY N/A 6/20/2023    Procedure: EGD;  Surgeon: Aashish Carroll MD;  Location: [facility] ENDOSCOPY;  Service: Gastroenterology;  Laterality: N/A;    ENDOSCOPIC RETROGRADE CHOLANGIOPANCREATOGRAPHY W/ SPHINCTEROTOMY AND STONE REMOVAL  04/2015    ESOPHAGOGASTRODUODENOSCOPY  06/20/2023    Dr Carroll - no signs esophageal varicies --repeat 3 yrs    GI Biopsy  02/15/2022    GI Biopsy  12/2018    HYSTERECTOMY      INSERTION OF TUNNELED CENTRAL VENOUS CATHETER (CVC) WITH SUBCUTANEOUS PORT N/A 7/18/2024    Procedure: [id number];  Surgeon: Ariel Welsh Jr., MD;  Location: [facility];  Service: General;  Laterality: N/A;    LAPAROTOMY, EXPLORATORY  06/2015    LITHOTRIPSY Left 04/2021    LITHOTRIPSY Left 03/2021    MEDIPORT INSERTION, SINGLE  06/2021    MEDIPORT INSERTION, SINGLE  07/2020    MEDIPORT INSERTION, SINGLE  12/2018    MEDIPORT REMOVAL  07/2020    PHACOEMULSIFICATION OF CATARACT Left 12/2016    PHACOEMULSIFICATION OF CATARACT Right 11/2016    PICC line Removal Right 06/2021    POLYPECTOMY  02/2022    PVD surgery      REMOVAL OF CATHETER  12/2020    REMOVAL OF VASCULAR ACCESS CATHETER Right 4/24/2024    Procedure: Removal, Vascular Access Catheter;  Surgeon: Reed Ghosh MD;  Location: [facility];  Service: General;  Laterality: Right;    REMOVAL OF VASCULAR ACCESS CATHETER N/A 11/12/2024    Procedure: Removal, Vascular Access Catheter;  Surgeon: Ariel Welsh Jr., MD;  Location: [facility];  Service: General;  Laterality: N/A;  removal mediport left chest wall    SHOULDER SURGERY Right     shoulder replacement    SPHINCTEROTOMY OF URETHRA      VENTRAL  HERNIA REPAIR  04/2016     Review of patient's allergies indicates:   Allergen Reactions    Hydromorphone Itching     Other reaction(s): itching    Opium      Other reaction(s): itching  has itching with larger doses. Smaller doses do not cause a reaction.     Current Outpatient Medications   Medication Instructions    busPIRone (BUSPAR) 20 mg, 2 times daily    cefdinir (OMNICEF) 300 mg, Oral, 2 times daily    CeleXA 40 mg, Daily    chlorhexidine (HIBICLENS) 4 % external liquid Topical (Top), Three times weekly, Daily showers for 5 days every other week for 3-6 months    cholecalciferol (vitamin D3) 50,000 Units, Every 7 days    diphenoxylate-atropine 2.5-0.025 mg (LOMOTIL) 2.5-0.025 mg per tablet 2 tablets, 2 times daily    doxycycline (VIBRAMYCIN) 200 mg, Daily    ferrous sulfate 325 mg, Oral, Daily    GATTEX 30-VIAL 5 mg Kit Daily    metoprolol tartrate (LOPRESSOR) 100 mg, 2 times daily    mirtazapine (REMERON) 15 mg, Nightly    multivitamin (ONE DAILY MULTIVITAMIN) per tablet 1 tablet, Daily    solifenacin (VESICARE) 5 mg, Daily    warfarin (COUMADIN) 2.5 MG tablet TAKE ONE TABLET BY MOUTH ONCE DAILY IN THE EVENING OR AS DIRECTED BY CIS PROVIDER       Current Facility-Administered Medications:     acetaminophen tablet 650 mg, 650 mg, Oral, Q8H PRN, Natan Cuevas MD    amino acid 4.25% - dextrose 5% solution, , Intravenous, Continuous, Raine Moscoso MD, Last Rate: 50 mL/hr at 11/15/24 1646, New Bag at 11/15/24 1646    busPIRone tablet 30 mg, 30 mg, Oral, BID, Gabrielle Butler DO, 30 mg at 11/16/24 0825    citalopram tablet 40 mg, 40 mg, Oral, Daily, Natan Cuevas MD, 40 mg at 11/16/24 0825    dextrose 10% bolus 125 mL 125 mL, 12.5 g, Intravenous, PRN, Jaswinder Hartmann DO    dextrose 10% bolus 250 mL 250 mL, 25 g, Intravenous, PRN, Jaswinder Hartmann DO    enoxaparin injection 70 mg, 1 mg/kg, Subcutaneous, Q12H (treatment, non-standard time), Raine Moscoso MD, 70 mg at 11/16/24 0532    ferrous  sulfate tablet 1 each, 1 tablet, Oral, Daily, Gabrielle Butler DO, 1 each at 11/16/24 0825    melatonin tablet 6 mg, 6 mg, Oral, Nightly PRN, Natan Cuevas MD    mirtazapine tablet 15 mg, 15 mg, Oral, QHS, Natan Cuevas MD, 15 mg at 11/15/24 2136    phenazopyridine tablet 100 mg, 100 mg, Oral, TID WM, Gabrielle Butler DO, 100 mg at 11/16/24 1138    rifAMpin capsule 300 mg, 300 mg, Oral, Q12H, Lyn San MD, 300 mg at 11/16/24 0825    sodium chloride 0.9% flush 10 mL, 10 mL, Intravenous, PRN, Natan Cuevas MD    Flushing PICC/Midline Protocol, , , Until Discontinued **AND** sodium chloride 0.9% flush 10 mL, 10 mL, Intravenous, Q12H PRN, Matt Bennett MD    Flushing PICC/Midline Protocol, , , Until Discontinued **AND** sodium chloride 0.9% flush 10 mL, 10 mL, Intravenous, Q12H PRN, Gabrielle Butler DO    teduglutide Kit 3.8 mg, 3.8 mg, Subcutaneous, Daily, Gabrielle Butler DO, 3.8 mg at 11/14/24 2021    traMADoL tablet 50 mg, 50 mg, Oral, Q6H PRN, María Layne FNP    vancomycin (VANCOCIN) 1,000 mg in D5W 250 mL IVPB (admixture device), 1,000 mg, Intravenous, Q12H, Matt Bennett MD, Stopped at 11/16/24 0334    vancomycin - pharmacy to dose, , Intravenous, pharmacy to manage frequency, Gabrielle Butler DO    warfarin (COUMADIN) tablet 5 mg, 5 mg, Oral, Daily, Raine Moscoso MD, 5 mg at 11/15/24 1644  Review of Systems   Constitutional:  Negative for chills and fever.   HENT:  Negative for congestion, ear discharge, ear pain, facial swelling, mouth sores, postnasal drip, rhinorrhea, sinus pressure, sinus pain, sneezing, sore throat and trouble swallowing.    Eyes:  Negative for discharge, redness and itching.   Respiratory:  Negative for cough, chest tightness, shortness of breath and wheezing.    Cardiovascular:  Negative for chest pain, palpitations and leg swelling.   Gastrointestinal:  Negative for abdominal distention, abdominal pain, diarrhea,  nausea and vomiting.   Genitourinary:  Negative for dysuria, flank pain, frequency and urgency.   Musculoskeletal:  Negative for back pain, myalgias and neck stiffness.   Skin:  Negative for rash and wound.   Allergic/Immunologic: Negative for immunocompromised state.   Neurological:  Negative for dizziness, light-headedness and headaches.   Hematological:  Negative for adenopathy.   Psychiatric/Behavioral:  Negative for agitation, confusion and suicidal ideas. The patient is not nervous/anxious.        Objective:   Temp:  [98 °F (36.7 °C)-99 °F (37.2 °C)] 99 °F (37.2 °C)  Pulse:  [66-98] 98  Resp:  [18] 18  SpO2:  [92 %-94 %] 94 %  BP: (121-136)/(62-77) 136/75     Physical Exam  Constitutional:       Appearance: Normal appearance. She is well-developed.   HENT:      Head: Normocephalic.      Nose: Nose normal.      Mouth/Throat:      Pharynx: No oropharyngeal exudate.   Eyes:      General: Lids are normal. No scleral icterus.        Right eye: No discharge.      Conjunctiva/sclera: Conjunctivae normal.      Pupils: Pupils are equal, round, and reactive to light.   Neck:      Thyroid: No thyromegaly.      Vascular: No JVD.      Trachea: Trachea normal.   Cardiovascular:      Rate and Rhythm: Normal rate and regular rhythm.      Pulses: Normal pulses.      Heart sounds: Normal heart sounds. No murmur heard.     No friction rub.   Pulmonary:      Effort: Pulmonary effort is normal. No respiratory distress.      Breath sounds: Normal breath sounds. No wheezing.   Chest:      Chest wall: No tenderness.   Abdominal:      General: Bowel sounds are normal. There is no distension.      Palpations: Abdomen is soft.      Tenderness: There is no abdominal tenderness. There is no guarding or rebound.   Musculoskeletal:         General: No tenderness. Normal range of motion.      Cervical back: Full passive range of motion without pain, normal range of motion and neck supple.   Lymphadenopathy:      Cervical: No cervical  adenopathy.   Skin:     General: Skin is warm and dry.      Findings: No rash.      Comments: Midline LUE    Neurological:      Mental Status: She is alert and oriented to person, place, and time.      Cranial Nerves: No cranial nerve deficit.      Sensory: No sensory deficit.   Psychiatric:         Speech: Speech normal.         Behavior: Behavior normal.         Thought Content: Thought content normal.         Judgment: Judgment normal.         Estimated Creatinine Clearance: 71.3 mL/min (based on SCr of 0.63 mg/dL).  Recent Labs   Lab 11/15/24  0518 11/16/24  0531   WBC 5.15 3.63*    166     Microbiology Results (last 7 days)       Procedure Component Value Units Date/Time    Anaerobic Culture [facility] [[id number]] Collected: 11/12/24 2011    Order Status: Completed Specimen: Foreign Body from [facility] Updated: 11/16/24 0745     Anaerobe Culture No Anaerobes Isolated    Blood Culture [[id number]]  (Normal) Collected: 11/10/24 2357    Order Status: Completed Specimen: Blood, Peripheral Line Updated: 11/16/24 0100     Blood Culture No Growth at 5 days    Blood Culture [[id number]]  (Normal) Collected: 11/14/24 2251    Order Status: Completed Specimen: Blood from Hand, Left Updated: 11/16/24 0004     Blood Culture No Growth At 24 Hours    Blood Culture [[id number]]  (Normal) Collected: 11/14/24 2251    Order Status: Completed Specimen: Blood from Hand, Left Updated: 11/16/24 0004     Blood Culture No Growth At 24 Hours    Medical Device Culture [[id number]]  (Abnormal)  (Susceptibility) Collected: 11/12/24 2000    Order Status: Completed Specimen: Foreign Body from [facility] Updated: 11/15/24 0830     CULTURE, MEDICAL DEVICE ([facility]) <15 CFU/mL Staphylococcus epidermidis    Blood Culture [[id number]]  (Abnormal)  (Susceptibility) Collected: 11/10/24 2357    Order Status: Completed Specimen: Blood, Arterial Updated: 11/14/24 0630     Blood Culture Staphylococcus hominis      Staphylococcus epidermidis     GRAM STAIN  Gram Positive Cocci, probable Staphylococcus      Seen in gram stain of broth only      2 of 2 bottles positive    Mycobacteria and Nocardia Culture [[id number]] Collected: 11/12/24 2011    Order Status: Sent Specimen: Body Fluid from Chest, Right Updated: 11/13/24 1200    Fungal Culture [[id number]] Collected: 11/12/24 2011    Order Status: Sent Specimen: Mediport Tip from Chest Updated: 11/13/24 1200    Gram Stain [[id number]] Collected: 11/12/24 2000    Order Status: Completed Specimen: Foreign Body from Mediport Updated: 11/13/24 0432     GRAM STAIN No WBC seen      Rare Gram positive cocci    Anaerobic Culture [[id number]] Collected: 11/12/24 2000    Order Status: Canceled Specimen: Foreign Body from Mediport     Blood Culture [[id number]]  (Abnormal)  (Susceptibility) Collected: 11/08/24 1857    Order Status: Completed Specimen: Blood from Arm, Left Updated: 11/12/24 0705     Blood Culture Staphylococcus epidermidis     GRAM STAIN Gram Positive Cocci, probable Staphylococcus      Seen in gram stain of broth only      1 of 2 Anaerobic bottles positive    Blood Culture [[id number]]  (Abnormal)  (Susceptibility) Collected: 11/08/24 1909    Order Status: Completed Specimen: Blood from Mediport Updated: 11/11/24 0658     Blood Culture Staphylococcus epidermidis     GRAM STAIN Gram Positive Cocci, probable Staphylococcus      Seen in gram stain of broth only      2 of 2 bottles positive    Blood Culture [[id number]]  (Abnormal)  (Susceptibility) Collected: 11/07/24 1742    Order Status: Completed Specimen: Blood Updated: 11/10/24 0632     Blood Culture Staphylococcus epidermidis     GRAM STAIN Seen in gram stain of broth only      Gram Positive Cocci, probable Staphylococcus      1 of 1 Pediatric bottle positive    Blood Culture [[id number]]  (Abnormal)  (Susceptibility) Collected: 11/07/24 1742    Order Status: Completed Specimen: Blood Updated: 11/10/24 0632     Blood Culture Staphylococcus epidermidis     GRAM  STAIN Gram Positive Cocci, probable Staphylococcus      Seen in gram stain of broth only      1 of 1 Pediatric bottle positive            Significant Labs: All pertinent labs within the past 24 hours have been reviewed.    Significant Imaging: I have reviewed all relevant and available imaging results/findings within the past 24 hours.      Plan -- see top of note

## 2024-11-16 NOTE — CONSULTS
DALE Inpatient consult to Cardiology  Consult performed by: Pollo Cuevas ANP  Consult ordered by: Raine Moscoso MD [facility]    Cardiology  Consult Note    Patient Name: Laura Acosta  MRN: [id number]  Admission Date: 11/7/2024  Hospital Length of Stay: 9 days  Code Status: Full Code   Attending Provider: Raine Moscoso MD   Consulting Provider: KEVIN Ruiz  Primary Care Physician: Curt Felton MD  Principal Problem:Hypoxia    Patient information was obtained from past medical records and ER records.     Subjective:     Chief Complaint/Reason for Consult: Request for DALE    HPI: Ms. Acosta is a 79 y/o female who is known to [facility], Dr. Cheng. The patient presented to [facility] on 11.7.24 under the direction of her PCP.  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  She has had multiple line infections including her MediPort resulting in MRSA bacteremia.  Approximately 7 months ago she had a MediPort replacement for this reason.  She was hospitalized last month with fever which was attributed to COVID-19 infection and pneumonia, at this time her blood cultures were unremarkable.  For this hospitalization she presents with complaints of fever, generalized weakness and fatigue.  Initial workup revealed radiographic imaging concerning for pneumonia/enterocolitis.  She was admitted to hospital medicine services and started on broad-spectrum antibiotics.  Initial cultures revealed Staph epidermis and repeat blood cultures revealed 1/2 bottles of staph epidermis/staph hominis.  Concern for MediPort infection arose and general surgery was consulted for removal, which was removed on November 12, 2024.  ID was consulted for recommendations and Cardiology was consulted for a DALE to evaluate for endocarditis.    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), Warfarin/DVT of Iliac Vein, Depression, Fibromyalgia, Chronic TPN/Port, NICANOR/Bilaterally Mild, Meningitis, Bacteremia, Pancreatitis, Renal Caliculi, OA, RA  PSH: Small Bowel Intestinal Resection, Breast Lumpectomy, Appendectomy, DANIAL, Shoulder Surgery, Cholecystectomy, Hernia Repair, Colonoscopy, Cystoscopy, Hysterectomy, Ventral Hernia Repair  Family History: Father, D, Brain Aneurysm, Mother, D, Breast Cancer, Uterine Cancer  Social History: Denies Illicit Drug, ETOH and Tobacco Use     Previous Cardiac Diagnostics:   ECHO 11.14.24:  Left Ventricle: The left ventricle is normal in size. Mildly increased wall thickness. There is normal systolic function with a visually estimated ejection fraction of 65 - 70%. Grade I diastolic dysfunction.  Right Ventricle: Normal right ventricular cavity size. Systolic function is normal. TAPSE is 1.92 cm.  Aortic Valve: There is mild aortic valve sclerosis.  Tricuspid Valve: There is mild regurgitation.  IVC/SVC: Intermediate venous pressure at 8 mmHg.  No vegetation seen    ECHO 6.11.24:  Left Ventricle: The left ventricle is normal in size. Normal wall thickness. There is normal systolic function with a visually estimated ejection fraction of 55 - 60%.  Right Ventricle: Normal right ventricular cavity size. Systolic function is normal.  Aortic Valve: The aortic valve is a trileaflet valve. There is aortic valve sclerosis.  Mitral Valve: There is no stenosis. The mean pressure gradient across the mitral valve is 2 mmHg at a heart rate of  bpm.  IVC/SVC: Normal venous pressure at 3 mmHg.    Coronary Calcium Score 7.25.16:  Total 1    Review of patient's allergies indicates:   Allergen Reactions    Hydromorphone Itching     Other reaction(s): itching    Opium      Other reaction(s): itching  has itching with larger doses. Smaller doses do not cause a reaction.     No current facility-administered medications on file prior to encounter.     Current Outpatient Medications on  File Prior to Encounter   Medication Sig    busPIRone (BUSPAR) 10 MG tablet Take 20 mg by mouth 2 (two) times daily.    cefdinir (OMNICEF) 300 MG capsule Take 1 capsule (300 mg total) by mouth 2 (two) times daily. for 10 days    CELEXA 40 mg tablet Take 40 mg by mouth once daily.    cholecalciferol, vitamin D3, 1,250 mcg (50,000 unit) capsule Take 50,000 Units by mouth every 7 days.    diphenoxylate-atropine 2.5-0.025 mg (LOMOTIL) 2.5-0.025 mg per tablet Take 2 tablets by mouth 2 (two) times a day.    doxycycline (VIBRAMYCIN) 100 MG Cap Take 200 mg by mouth once daily.    ferrous sulfate 325 (65 FE) MG EC tablet Take 1 tablet (325 mg total) by mouth once daily.    GATTEX 30-VIAL 5 mg Kit Inject into the skin Daily.    metoprolol tartrate (LOPRESSOR) 100 MG tablet Take 100 mg by mouth 2 (two) times daily.    mirtazapine (REMERON) 15 MG tablet Take 15 mg by mouth every evening.    multivitamin (ONE DAILY MULTIVITAMIN) per tablet Take 1 tablet by mouth once daily.    solifenacin (VESICARE) 5 MG tablet Take 5 mg by mouth once daily.    warfarin (COUMADIN) 2.5 MG tablet TAKE ONE TABLET BY MOUTH ONCE DAILY IN THE EVENING OR AS DIRECTED BY [facility] PROVIDER     Review of Systems   Constitutional:  Positive for chills, fatigue and fever.   Respiratory:  Negative for shortness of breath.    Cardiovascular:  Negative for chest pain, palpitations and leg swelling.   Neurological:  Positive for weakness.   All other systems reviewed and are negative.    Objective:     Vital Signs (Most Recent):  Temp: 98.1 °F (36.7 °C) (11/16/24 0805)  Pulse: 69 (11/16/24 0805)  Resp: 18 (11/13/24 2318)  BP: 127/67 (11/16/24 0805)  SpO2: (!) 94 % (11/16/24 0805) Vital Signs (24h Range):  Temp:  [98 °F (36.7 °C)-98.6 °F (37 °C)] 98.1 °F (36.7 °C)  Pulse:  [66-91] 69  SpO2:  [92 %-94 %] 94 %  BP: (121-145)/(62-77) 127/67   Weight: 71.5 kg (157 lb 10.1 oz)  Body mass index is 27.04 kg/m².  SpO2: (!) 94 %       Intake/Output Summary (Last 24 hours) at  11/16/2024 0848  Last data filed at 11/16/2024 0535  Gross per 24 hour   Intake 480 ml   Output 500 ml   Net -20 ml     Lines/Drains/Airways       Peripheral Intravenous Line  Duration                  Midline Catheter - Single Lumen 11/11/24 1634 Left basilic vein (medial side of arm) 4 days         Peripheral IV - Single Lumen 11/11/24 1635 20 G 2 1/4 in Yes Anterior;Left Forearm 4 days                  Significant Labs:   Chemistries:   Recent Labs   Lab 11/12/24  0353 11/13/24  0536 11/14/24  0359 11/15/24  0518 11/16/24  0531    139 139 138 138   K 3.5 4.0 3.5 3.7 3.7   * 111* 108* 109* 110*   CO2 22* 21* 24 20* 21*   BUN 9.9 12.5 16.9 17.0 17.8   CREATININE 0.59 0.65 0.65 0.66 0.63   CALCIUM 8.2* 8.4 8.7 9.0 8.5   BILITOT  --   --   --  0.5 0.9   ALKPHOS  --   --   --  117 122   ALT  --   --   --  25 26   AST  --   --   --  30 34   GLUCOSE 96 164* 130* 97 97   MG 1.90 1.90 1.70 1.80 1.90   PHOS 2.6 2.8 2.9 3.2 3.3        CBC/Anemia Labs: Coags:    Recent Labs   Lab 11/14/24  1009 11/15/24  0518 11/16/24  0531   WBC 6.13 5.15 3.63*   HGB 12.4 12.3 11.5*   HCT 38.7 38.6 35.8*    210 166   MCV 89.6 90.0 89.7   RDW 14.8 14.9 14.9    Recent Labs   Lab 11/14/24  0400 11/15/24  0518 11/16/24  0531   INR 1.4* 1.3 1.2        Significant Imaging:  Imaging Results              CT Chest Abdomen Pelvis With IV Contrast (XPD) NO Oral Contrast (Final result)  Result time 11/07/24 20:47:43      Final result by Isaac Richardson MD (11/07/24 20:47:43)                   Impression:      1.  Right lower lung lobe small new vague opacity may be inflammatory or infectious.  Details of other chest findings above.    2.  Mild excessive fluid within loops of small bowel and the right colon could represent enterocolitis.  Details of other abdominopelvic findings above.      Electronically signed by: Isaac Richardson  Date:    11/07/2024  Time:    20:47               Narrative:    EXAMINATION:  CT CHEST ABDOMEN PELVIS  WITH IV CONTRAST (XPD)    CLINICAL HISTORY:  Sepsis;    TECHNIQUE:  Multidetector axial images were obtained from the thoracic inlet through the greater trochanters following the administration of IV contrast.    Dose length product of 785 mGycm. Automated exposure control was utilized to minimize radiation dose.    COMPARISON:  CT chest and CT abdomen pelvis October 18, 2024.    CHEST FINDINGS:    Bilateral lungs are remarkable for peripheral subpleural fine to coarse reticulations consistent with fibrosis.  Previous exam was remarkable for right middle lung lobe and lingular segment some infiltrates which show interval improvement.  There are subtle new vague opacities which involve the right right lower lung lobe on image 70 series 4 and may be inflammatory or infectious.  There is no pulmonary edema.  No cavitary abnormality.  No fluid within the pleural and pericardial spaces.  Thoracic aorta is without aneurysmal dilatation and there are no signs of dissection.  Cardiac chamber size is within normal limits.  Left chest implanted tunnel castro catheter terminates within the superior vena cava.  Right breast medial aspect rim calcified density.  Thoracic kyphoscoliosis and degenerative changes.    ABDOMINAL FINDINGS:    Liver, mildly atrophic pancreas and the spleen are without acute findings.  Gallbladder is surgically absent.  There is no apparent dilatation of ducts.  Adrenals are unremarkable.  Left kidney mild cortical scarring.  Left kidney medial cortical small hypodensity is stable and is favored to be a cyst and for this no specific follow-up imaging is recommended.  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  There are no perinephric strandings.  No retroperitoneal periaortic enlarged lymph nodes.    Stomach is mostly decompressed.  Mild excessive fluid is present within loops of small bowel without transition or bowel obstruction.  Similarly, there is some excessive fluid  within the colon.  Similar appearance of ileocolic anastomosis.  No inflammatory strandings identified.  Extensive noninflamed diverticulosis coli involving the descending and the sigmoid colon.  No bowel obstruction.  No free fluid.    PELVIC FINDINGS:    Urinary bladder wall is not thickened.  No pelvic free fluid.    Thoracolumbar degenerative changes.  Scoliosis.                                       X-Ray Chest PA And Lateral (Final result)  Result time 11/07/24 16:46:33      Final result by Isabel Brasher MD (11/07/24 16:46:33)                   Impression:      No acute abnormality of the chest.      Electronically signed by: Isabel Brasher  Date:    11/07/2024  Time:    16:46               Narrative:    EXAMINATION:  XR CHEST PA AND LATERAL    CLINICAL HISTORY:  Fever;    TECHNIQUE:  PA and lateral chest radiographs    COMPARISON:  Chest x-ray dated 11/07/2024    FINDINGS:  Left chest wall port catheter remains in place.  The heart is normal in size.  There is no focal airspace consolidation.  There is no pleural effusion or visible pneumothorax.                                    Telemetry: SR    Physical Exam  Constitutional:       General: She is not in acute distress.     Appearance: Normal appearance.   HENT:      Head: Normocephalic.      Mouth/Throat:      Mouth: Mucous membranes are moist.   Eyes:      Extraocular Movements: Extraocular movements intact.      Conjunctiva/sclera: Conjunctivae normal.   Cardiovascular:      Rate and Rhythm: Normal rate and regular rhythm.      Pulses: Normal pulses.   Pulmonary:      Effort: Pulmonary effort is normal. No respiratory distress.      Breath sounds: Normal breath sounds.   Abdominal:      Palpations: Abdomen is soft.   Musculoskeletal:         General: No swelling. Normal range of motion.   Skin:     General: Skin is warm and dry.   Neurological:      General: No focal deficit present.      Mental Status: She is alert and oriented to person,  place, and time.   Psychiatric:         Mood and Affect: Mood normal.         Behavior: Behavior normal.         Judgment: Judgment normal.       Home Medications:   No current facility-administered medications on file prior to encounter.     Current Outpatient Medications on File Prior to Encounter   Medication Sig Dispense Refill    busPIRone (BUSPAR) 10 MG tablet Take 20 mg by mouth 2 (two) times daily.      cefdinir (OMNICEF) 300 MG capsule Take 1 capsule (300 mg total) by mouth 2 (two) times daily. for 10 days 20 capsule 0    CELEXA 40 mg tablet Take 40 mg by mouth once daily.      cholecalciferol, vitamin D3, 1,250 mcg (50,000 unit) capsule Take 50,000 Units by mouth every 7 days.      diphenoxylate-atropine 2.5-0.025 mg (LOMOTIL) 2.5-0.025 mg per tablet Take 2 tablets by mouth 2 (two) times a day.      doxycycline (VIBRAMYCIN) 100 MG Cap Take 200 mg by mouth once daily.      ferrous sulfate 325 (65 FE) MG EC tablet Take 1 tablet (325 mg total) by mouth once daily. 30 tablet 0    GATTEX 30-VIAL 5 mg Kit Inject into the skin Daily.      metoprolol tartrate (LOPRESSOR) 100 MG tablet Take 100 mg by mouth 2 (two) times daily.      mirtazapine (REMERON) 15 MG tablet Take 15 mg by mouth every evening.      multivitamin (ONE DAILY MULTIVITAMIN) per tablet Take 1 tablet by mouth once daily.      solifenacin (VESICARE) 5 MG tablet Take 5 mg by mouth once daily.      warfarin (COUMADIN) 2.5 MG tablet TAKE ONE TABLET BY MOUTH ONCE DAILY IN THE EVENING OR AS DIRECTED BY [facility] PROVIDER       Current Schedule Inpatient Medications:   busPIRone  30 mg Oral BID    citalopram  40 mg Oral Daily    enoxparin  1 mg/kg Subcutaneous Q12H (treatment, non-standard time)    ferrous sulfate  1 tablet Oral Daily    mirtazapine  15 mg Oral QHS    phenazopyridine  100 mg Oral TID WM    rifAMpin  300 mg Oral Q12H    teduglutide  3.8 mg Subcutaneous Daily    vancomycin (VANCOCIN) 1,000 mg in D5W 250 mL IVPB (admixture device)  1,000 mg  Intravenous Q12H    warfarin  5 mg Oral Daily     Continuous Infusions:   amino acid 4.25% - dextrose 5% solution   Intravenous Continuous 50 mL/hr at 11/15/24 1646 New Bag at 11/15/24 1646     Assessment:   Bacteremia/Sepsis     - ECHO (11.14.24) - LVEF 65-70%, Grade I DD; No Vegetation Seen    - Staph Epi/Staph Hominis (Cleared)  Infected MediPort     - Explanted (11.12.24)  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     Pollo Cuevas, KEVIN  Cardiology  [facility]     Physician addendum:  I have seen and examined this patient as a split-shared visit with the SAAD d/t complicated medical management of above problems written in assessment and high acuity requiring physician expertise in medical decision-making. I performed the substantive portion of the history and exam. Above medical decision-making is also formulated by me.    Cardiovascular exam:  S1, S2  Lungs:  fine crackles at bases.  Extremities:  Trace edema bilaterally    Plan:  Plan DALE on Monday.  Werner Dodson MD  Cardiologist

## 2024-11-17 LAB
ANION GAP SERPL CALC-SCNC: 7 MEQ/L
BUN SERPL-MCNC: 16.9 MG/DL (ref 9.8–20.1)
CALCIUM SERPL-MCNC: 8.2 MG/DL (ref 8.4–10.2)
CHLORIDE SERPL-SCNC: 112 MMOL/L (ref 98–107)
CO2 SERPL-SCNC: 21 MMOL/L (ref 23–31)
CREAT SERPL-MCNC: 0.64 MG/DL (ref 0.55–1.02)
CREAT/UREA NIT SERPL: 26
GFR SERPLBLD CREATININE-BSD FMLA CKD-EPI: >60 ML/MIN/1.73/M2
GLUCOSE SERPL-MCNC: 105 MG/DL (ref 82–115)
INR PPP: 1.9
MAGNESIUM SERPL-MCNC: 2 MG/DL (ref 1.6–2.6)
PHOSPHATE SERPL-MCNC: 2.4 MG/DL (ref 2.3–4.7)
POTASSIUM SERPL-SCNC: 3.7 MMOL/L (ref 3.5–5.1)
PROTHROMBIN TIME: 21.1 SECONDS (ref 12.5–14.5)
SODIUM SERPL-SCNC: 140 MMOL/L (ref 136–145)

## 2024-11-17 PROCEDURE — 25000003 PHARM REV CODE 250: Performed by: STUDENT IN AN ORGANIZED HEALTH CARE EDUCATION/TRAINING PROGRAM

## 2024-11-17 PROCEDURE — 25000003 PHARM REV CODE 250: Performed by: HOSPITALIST

## 2024-11-17 PROCEDURE — 25000003 PHARM REV CODE 250: Performed by: INTERNAL MEDICINE

## 2024-11-17 PROCEDURE — 11000001 HC ACUTE MED/SURG PRIVATE ROOM

## 2024-11-17 PROCEDURE — 63600175 PHARM REV CODE 636 W HCPCS: Performed by: INTERNAL MEDICINE

## 2024-11-17 PROCEDURE — C1751 CATH, INF, PER/CENT/MIDLINE: HCPCS

## 2024-11-17 PROCEDURE — 80048 BASIC METABOLIC PNL TOTAL CA: CPT | Performed by: INTERNAL MEDICINE

## 2024-11-17 PROCEDURE — 36415 COLL VENOUS BLD VENIPUNCTURE: CPT | Performed by: INTERNAL MEDICINE

## 2024-11-17 PROCEDURE — 83735 ASSAY OF MAGNESIUM: CPT | Performed by: INTERNAL MEDICINE

## 2024-11-17 PROCEDURE — 36573 INSJ PICC RS&I 5 YR+: CPT

## 2024-11-17 PROCEDURE — 85610 PROTHROMBIN TIME: CPT | Performed by: INTERNAL MEDICINE

## 2024-11-17 PROCEDURE — 21400001 HC TELEMETRY ROOM

## 2024-11-17 PROCEDURE — 02HV33Z INSERTION OF INFUSION DEVICE INTO SUPERIOR VENA CAVA, PERCUTANEOUS APPROACH: ICD-10-PCS | Performed by: STUDENT IN AN ORGANIZED HEALTH CARE EDUCATION/TRAINING PROGRAM

## 2024-11-17 PROCEDURE — 84100 ASSAY OF PHOSPHORUS: CPT | Performed by: INTERNAL MEDICINE

## 2024-11-17 RX ORDER — LABETALOL HYDROCHLORIDE 5 MG/ML
10 INJECTION, SOLUTION INTRAVENOUS EVERY 4 HOURS PRN
Status: DISCONTINUED | OUTPATIENT
Start: 2024-11-17 | End: 2024-11-20 | Stop reason: HOSPADM

## 2024-11-17 RX ORDER — SODIUM CHLORIDE 0.9 % (FLUSH) 0.9 %
10 SYRINGE (ML) INJECTION EVERY 12 HOURS PRN
Status: DISCONTINUED | OUTPATIENT
Start: 2024-11-17 | End: 2024-11-20 | Stop reason: HOSPADM

## 2024-11-17 RX ORDER — ENOXAPARIN SODIUM 100 MG/ML
1 INJECTION SUBCUTANEOUS ONCE
Status: COMPLETED | OUTPATIENT
Start: 2024-11-17 | End: 2024-11-17

## 2024-11-17 RX ADMIN — CITALOPRAM HYDROBROMIDE 40 MG: 20 TABLET ORAL at 08:11

## 2024-11-17 RX ADMIN — FERROUS SULFATE TAB 325 MG (65 MG ELEMENTAL FE) 1 EACH: 325 (65 FE) TAB at 08:11

## 2024-11-17 RX ADMIN — ENOXAPARIN SODIUM 70 MG: 80 INJECTION SUBCUTANEOUS at 05:11

## 2024-11-17 RX ADMIN — BUSPIRONE HYDROCHLORIDE 30 MG: 15 TABLET ORAL at 07:11

## 2024-11-17 RX ADMIN — VANCOMYCIN HYDROCHLORIDE 1000 MG: 1 INJECTION, POWDER, LYOPHILIZED, FOR SOLUTION INTRAVENOUS at 03:11

## 2024-11-17 RX ADMIN — PHENAZOPYRIDINE HYDROCHLORIDE 100 MG: 100 TABLET ORAL at 12:11

## 2024-11-17 RX ADMIN — RIFAMPIN 300 MG: 300 CAPSULE ORAL at 07:11

## 2024-11-17 RX ADMIN — VANCOMYCIN HYDROCHLORIDE 1000 MG: 1 INJECTION, POWDER, LYOPHILIZED, FOR SOLUTION INTRAVENOUS at 01:11

## 2024-11-17 RX ADMIN — ENOXAPARIN SODIUM 70 MG: 80 INJECTION SUBCUTANEOUS at 04:11

## 2024-11-17 RX ADMIN — PHENAZOPYRIDINE HYDROCHLORIDE 100 MG: 100 TABLET ORAL at 04:11

## 2024-11-17 RX ADMIN — WARFARIN SODIUM 5 MG: 5 TABLET ORAL at 04:11

## 2024-11-17 RX ADMIN — MIRTAZAPINE 15 MG: 15 TABLET, FILM COATED ORAL at 07:11

## 2024-11-17 RX ADMIN — LABETALOL HYDROCHLORIDE 10 MG: 5 INJECTION, SOLUTION INTRAVENOUS at 05:11

## 2024-11-17 RX ADMIN — RIFAMPIN 300 MG: 300 CAPSULE ORAL at 08:11

## 2024-11-17 RX ADMIN — LEUCINE, PHENYLALANINE, LYSINE, METHIONINE, ISOLEUCINE, VALINE, HISTIDINE, THREONINE, TRYPTOPHAN, ALANINE, GLYCINE, ARGININE, PROLINE, SERINE, TYROSINE, DEXTROSE: 311; 238; 247; 170; 255; 247; 204; 179; 77; 880; 438; 489; 289; 213; 17; 5 INJECTION INTRAVENOUS at 04:11

## 2024-11-17 RX ADMIN — BUSPIRONE HYDROCHLORIDE 30 MG: 15 TABLET ORAL at 08:11

## 2024-11-17 NOTE — PROCEDURES
"Laura Acosta is a 78 y.o. female patient.    Temp: 97.9 °F (36.6 °C) (11/09/24 1541)  Pulse: 75 (11/09/24 1541)  Resp: 16 (11/09/24 0530)  BP: 120/75 (11/09/24 1541)  SpO2: 96 % (11/09/24 1541)  Weight: 71.5 kg (157 lb 10.1 oz) (11/07/24 2300)  Height: 5' 4.02" (162.6 cm) (11/09/24 1630)    PICC  Date/Time: 11/9/2024 6:15 PM  Consent Done: Yes  Time out: Immediately prior to procedure a time out was called to verify the correct patient, procedure, equipment, support staff and site/side marked as required  Indications: vascular access  Anesthesia: local infiltration  Local anesthetic: lidocaine 1% without epinephrine  Anesthetic Total (mL): 3  Preparation: skin prepped with ChloraPrep  Skin prep agent dried: skin prep agent completely dried prior to procedure  Sterile barriers: all five maximum sterile barriers used - cap, mask, sterile gown, sterile gloves, and large sterile sheet  Hand hygiene: hand hygiene performed prior to central venous catheter insertion  Location details: left brachial  Catheter type: single lumen  Catheter size: 4 Fr  Catheter Length: 16cm    Ultrasound guidance: yes  Vessel Caliber: medium and patent, compressibility normal  Needle advanced into vessel with real time Ultrasound guidance.  Guidewire confirmed in vessel.  Image recorded and saved.  Sterile sheath used.  Number of attempts: 1  Post-procedure: blood return through all ports and sterile dressing applied    Assessment: successful placement  Complications: none          Name john olivas  11/9/2024    "
"Laura Acosta is a 78 y.o. female patient.    Temp: 98.1 °F (36.7 °C) (11/17/24 0809)  Pulse: 82 (11/17/24 0809)  Resp: 18 (11/16/24 0805)  BP: 111/66 (11/17/24 0809)  SpO2: 95 % (11/17/24 0809)  Weight: 71.5 kg (157 lb 10.1 oz) (11/07/24 2300)  Height: 5' 4.02" (162.6 cm) (11/09/24 1630)    PICC  Date/Time: 11/17/2024 1:57 PM  Performed by: Semaj Hdez RN  Consent Done: Yes  Time out: Immediately prior to procedure a time out was called to verify the correct patient, procedure, equipment, support staff and site/side marked as required  Indications: med administration and vascular access  Anesthesia: local infiltration  Local anesthetic: lidocaine 1% without epinephrine  Anesthetic Total (mL): 5  Preparation: skin prepped with ChloraPrep  Skin prep agent dried: skin prep agent completely dried prior to procedure  Sterile barriers: all five maximum sterile barriers used - cap, mask, sterile gown, sterile gloves, and large sterile sheet  Hand hygiene: hand hygiene performed prior to central venous catheter insertion  Location details: left basilic  Catheter type: double lumen  Catheter size: 5 Fr  Catheter Length: 42cm    Ultrasound guidance: yes  Vessel Caliber: medium and patent, compressibility normal  Needle advanced into vessel with real time Ultrasound guidance.  Guidewire confirmed in vessel.  Sterile sheath used.  Number of attempts: 1  Post-procedure: blood return through all ports, chlorhexidine patch and sterile dressing applied    Assessment: placement verified by x-ray          Name Semaj Hdez RN  11/17/2024    "
"Laura Acosta is a 78 y.o. female patient.    Temp: 98.2 °F (36.8 °C) (11/11/24 1536)  Pulse: 72 (11/11/24 1536)  Resp: 16 (11/09/24 0530)  BP: 124/72 (11/11/24 1536)  SpO2: (!) 94 % (11/11/24 1536)  Weight: 71.5 kg (157 lb 10.1 oz) (11/07/24 2300)  Height: 5' 4.02" (162.6 cm) (11/09/24 1630)    PICC  Date/Time: 11/11/2024 4:33 PM  Performed by: Sharon Steven RN  Consent Done: Yes  Time out: Immediately prior to procedure a time out was called to verify the correct patient, procedure, equipment, support staff and site/side marked as required  Indications: med administration and vascular access  Anesthesia: local infiltration  Local anesthetic: lidocaine 1% without epinephrine  Anesthetic Total (mL): 4  Preparation: skin prepped with ChloraPrep  Skin prep agent dried: skin prep agent completely dried prior to procedure  Sterile barriers: all five maximum sterile barriers used - cap, mask, sterile gown, sterile gloves, and large sterile sheet  Hand hygiene: hand hygiene performed prior to central venous catheter insertion  Location details: left basilic  Catheter type: single lumen  Catheter size: 4 Fr  Catheter Length: 15cm    Ultrasound guidance: yes  Vessel Caliber: medium and patent, compressibility normal  Needle advanced into vessel with real time Ultrasound guidance.  Guidewire confirmed in vessel.  Sterile sheath used.  Number of attempts: 1  Post-procedure: sterile dressing applied, chlorhexidine patch and blood return through all ports    Complications: none  Comments: Arm circ- 34cm          Sharon steven RN  11/11/2024    "
Detail Level: Zone

## 2024-11-17 NOTE — PROGRESS NOTES
[facility]  Hospital Medicine Progress Note        Chief Complaint: Inpatient Follow-up    HPI:     78-year-old female with significant history of short gut syndrome on TPN at home, HTN, HLD, PVD, DVT/clotting disorder on Coumadin, rheumatoid arthritis, cirrhosis, gallstone pancreatitis, anxiety/depression.  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  Last MediPort replacement was 7 months back.  Patient had a recent hospitalization in October which fever spikes which was attributed to COVID-19 infection and pneumonia, cultures were negative then and was discharged home.  Patient presented back to the ED on 11/07 with complaints of fever, generalized weakness, labs non impressive.  Imaging concerning for pneumonia/enterocolitis, patient was admitted to hospital medicine services, cultures ordered and was initiated on broad-spectrum antimicrobials for colitis/pneumonia.  Cultures from admission grew Staph epidermidis.  Repeat cultures with 1 bottle growing Staph epidermidis/Staph hominis.  Concern for MediPort infection, General surgery consulted for removal of the same.  In the meantime a midline was placed for IV access.  MediPort successfully removed 11/12.  Infectious Disease consulted, awaiting recs, vancomycin continued.  Infectious Disease added rifampin, TTE was ordered which came back negative and Infectious Disease recommended DALE if TTE negative, cardiology consulted    Interval Hx:   Patient seen at bedside, comfortably sitting in the couch, no new complaints, no acute events overnight, hemodynamics overall stable except that BP slightly got accelerated 1 time today     Objective/physical exam:  General: In no acute distress, afebrile  Chest: Clear to auscultation bilaterally  Heart: S1, S2, no appreciable murmur  Abdomen: Soft, nontender, BS +  MSK: Warm, no lower extremity edema, no clubbing or  cyanosis  Neurologic: Alert and oriented x4, moving all extremities with good strength     VITAL SIGNS: 24 HRS MIN & MAX LAST   Temp  Min: 97.9 °F (36.6 °C)  Max: 99 °F (37.2 °C) 98.1 °F (36.7 °C)   BP  Min: 111/66  Max: 136/75 111/66   Pulse  Min: 73  Max: 98  82   No data recorded 18   SpO2  Min: 94 %  Max: 95 % 95 %       Recent Labs   Lab 11/16/24  0531   WBC 3.63*   RBC 3.99*   HGB 11.5*   HCT 35.8*   MCV 89.7   MCH 28.8   MCHC 32.1*   RDW 14.9      MPV 9.8         Recent Labs   Lab 11/16/24  0531 11/17/24  0541    140   K 3.7 3.7   * 112*   CO2 21* 21*   BUN 17.8 16.9   CREATININE 0.63 0.64   CALCIUM 8.5 8.2*   MG 1.90 2.00   ALBUMIN 3.0*  --    ALKPHOS 122  --    ALT 26  --    AST 34  --    BILITOT 0.9  --           Microbiology Results (last 7 days)       Procedure Component Value Units Date/Time    Blood Culture [[id number]]  (Normal) Collected: 11/14/24 2251    Order Status: Completed Specimen: Blood from Hand, Left Updated: 11/17/24 0000     Blood Culture No Growth At 48 Hours    Blood Culture [[id number]]  (Normal) Collected: 11/14/24 2251    Order Status: Completed Specimen: Blood from Hand, Left Updated: 11/17/24 0000     Blood Culture No Growth At 48 Hours    Anaerobic Culture [facility] [[id number]] Collected: 11/12/24 2011    Order Status: Completed Specimen: Foreign Body from Mediport Updated: 11/16/24 0745     Anaerobe Culture No Anaerobes Isolated    Blood Culture [[id number]]  (Normal) Collected: 11/10/24 2357    Order Status: Completed Specimen: Blood, Peripheral Line Updated: 11/16/24 0100     Blood Culture No Growth at 5 days    Medical Device Culture [[id number]]  (Abnormal)  (Susceptibility) Collected: 11/12/24 2000    Order Status: Completed Specimen: Foreign Body from Mediport Updated: 11/15/24 0830     CULTURE, MEDICAL DEVICE ([facility]) <15 CFU/mL Staphylococcus epidermidis    Blood Culture [[id number]]  (Abnormal)  (Susceptibility) Collected: 11/10/24 4580    Order Status:  Completed Specimen: Blood, Arterial Updated: 11/14/24 0630     Blood Culture Staphylococcus hominis      Staphylococcus epidermidis     GRAM STAIN Gram Positive Cocci, probable Staphylococcus      Seen in gram stain of broth only      2 of 2 bottles positive    Mycobacteria and Nocardia Culture [[id number]] Collected: 11/12/24 2011    Order Status: Sent Specimen: Body Fluid from Chest, Right Updated: 11/13/24 1200    Fungal Culture [[id number]] Collected: 11/12/24 2011    Order Status: Sent Specimen: Mediport Tip from Chest Updated: 11/13/24 1200    Gram Stain [[id number]] Collected: 11/12/24 2000    Order Status: Completed Specimen: Foreign Body from Mediport Updated: 11/13/24 0432     GRAM STAIN No WBC seen      Rare Gram positive cocci    Anaerobic Culture [[id number]] Collected: 11/12/24 2000    Order Status: Canceled Specimen: Foreign Body from Mediport     Blood Culture [[id number]]  (Abnormal)  (Susceptibility) Collected: 11/08/24 1857    Order Status: Completed Specimen: Blood from Arm, Left Updated: 11/12/24 0705     Blood Culture Staphylococcus epidermidis     GRAM STAIN Gram Positive Cocci, probable Staphylococcus      Seen in gram stain of broth only      1 of 2 Anaerobic bottles positive    Blood Culture [[id number]]  (Abnormal)  (Susceptibility) Collected: 11/08/24 1909    Order Status: Completed Specimen: Blood from Mediport Updated: 11/11/24 0658     Blood Culture Staphylococcus epidermidis     GRAM STAIN Gram Positive Cocci, probable Staphylococcus      Seen in gram stain of broth only      2 of 2 bottles positive             Scheduled Med:   busPIRone  30 mg Oral BID    citalopram  40 mg Oral Daily    enoxparin  1 mg/kg Subcutaneous Q12H (treatment, non-standard time)    ferrous sulfate  1 tablet Oral Daily    mirtazapine  15 mg Oral QHS    phenazopyridine  100 mg Oral TID WM    rifAMpin  300 mg Oral Q12H    teduglutide  3.8 mg Subcutaneous Daily    vancomycin (VANCOCIN) 1,000 mg in D5W 250 mL IVPB  (admixture device)  1,000 mg Intravenous Q12H    warfarin  5 mg Oral Daily          Assessment/Plan:    Staph epidermidis/Staph hominis bacteremia-cleared 11/12  Infected MediPort-removed 11/12  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, 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, 2024  Suspected right-sided pneumonia on admit-completed treatment   Recent COVID-19 pneumonitis   History of short gut syndrome on TPN at home   Essential HTN-stable   HLD   History of PVD   History of clotting disorder with history of DVT on Coumadin   Rheumatoid arthritis   Cirrhosis-appears compensated   History of gallstone pancreatitis   Anxiety/depression   Prophylaxis      Appreciate infectious disease recommendation   Now on IV vancomycin and rifampin   TTE is negative   DALE planned for tomorrow   NPO after midnight  MediPort and PICC line removed, now has midline  On TPN through midline  Patient is now afebrile, hemodynamically stable with no more leukocytosis  Continue home dose Coumadin and also  full-dose anticoagulation with Lovenox until INR is therapeutic  INR is 1.9 today, will give 1 more dose of Lovenox in the evening and hopefully INR should be therapeutic by tomorrow  Continue home meds-BuSpar, citalopram, iron, Remeron, phenazopyridine,   DVT prophylaxis-Lovenox with Coumadin      DALE tomorrow   Will insert PICC line tomorrow   Plan to DC home on IV antibiotics x6 weeks after DALE      Raine Moscoso MD   11/17/2024

## 2024-11-17 NOTE — PROGRESS NOTES
[facility][1] General    Cardiology  Progress Note    Patient Name: Laura Acosta  MRN: [id number]  Admission Date: 11/7/2024  Hospital Length of Stay: 10 days  Code Status: Full Code   Attending Provider: Raine Moscoso MD   Consulting Provider: VIVI Bello  Primary Care Physician: Curt Felton MD  Principal Problem:Hypoxia    Patient information was obtained from past medical records and ER records.     Subjective:     Chief Complaint/Reason for Consult: Request for DALE    HPI: Ms. Acosta is a 79 y/o female who is known to [facility], Dr. Cheng. The patient presented to [facility] on 11.7.24 under the direction of her PCP.  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  She has had multiple line infections including her MediPort resulting in MRSA bacteremia.  Approximately 7 months ago she had a MediPort replacement for this reason.  She was hospitalized last month with fever which was attributed to COVID-19 infection and pneumonia, at this time her blood cultures were unremarkable.  For this hospitalization she presents with complaints of fever, generalized weakness and fatigue.  Initial workup revealed radiographic imaging concerning for pneumonia/enterocolitis.  She was admitted to hospital medicine services and started on broad-spectrum antibiotics.  Initial cultures revealed Staph epidermis and repeat blood cultures revealed 1/2 bottles of staph epidermis/staph hominis.  Concern for MediPort infection arose and general surgery was consulted for removal, which was removed on November 12, 2024.  ID was consulted for recommendations and Cardiology was consulted for a DALE to evaluate for endocarditis.    Hospital Course:  11.17.24: NAD Noted. DALE Tomorrow.     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), Warfarin/DVT of Iliac Vein, Depression, Fibromyalgia, Chronic  TPN/Port, NICANOR/Bilaterally Mild, Meningitis, Bacteremia, Pancreatitis, Renal Caliculi, OA, RA  PSH: Small Bowel Intestinal Resection, Breast Lumpectomy, Appendectomy, DANIAL, Shoulder Surgery, Cholecystectomy, Hernia Repair, Colonoscopy, Cystoscopy, Hysterectomy, Ventral Hernia Repair  Family History: Father, D, Brain Aneurysm, Mother, D, Breast Cancer, Uterine Cancer  Social History: Denies Illicit Drug, ETOH and Tobacco Use     Previous Cardiac Diagnostics:   ECHO 11.14.24:  Left Ventricle: The left ventricle is normal in size. Mildly increased wall thickness. There is normal systolic function with a visually estimated ejection fraction of 65 - 70%. Grade I diastolic dysfunction.  Right Ventricle: Normal right ventricular cavity size. Systolic function is normal. TAPSE is 1.92 cm.  Aortic Valve: There is mild aortic valve sclerosis.  Tricuspid Valve: There is mild regurgitation.  IVC/SVC: Intermediate venous pressure at 8 mmHg.  No vegetation seen    ECHO 6.11.24:  Left Ventricle: The left ventricle is normal in size. Normal wall thickness. There is normal systolic function with a visually estimated ejection fraction of 55 - 60%.  Right Ventricle: Normal right ventricular cavity size. Systolic function is normal.  Aortic Valve: The aortic valve is a trileaflet valve. There is aortic valve sclerosis.  Mitral Valve: There is no stenosis. The mean pressure gradient across the mitral valve is 2 mmHg at a heart rate of  bpm.  IVC/SVC: Normal venous pressure at 3 mmHg.    Coronary Calcium Score 7.25.16:  Total 1    Review of patient's allergies indicates:   Allergen Reactions    Hydromorphone Itching     Other reaction(s): itching    Opium      Other reaction(s): itching  has itching with larger doses. Smaller doses do not cause a reaction.     No current facility-administered medications on file prior to encounter.     Current Outpatient Medications on File Prior to Encounter   Medication Sig    busPIRone (BUSPAR) 10 MG  tablet Take 20 mg by mouth 2 (two) times daily.    cefdinir (OMNICEF) 300 MG capsule Take 1 capsule (300 mg total) by mouth 2 (two) times daily. for 10 days    CELEXA 40 mg tablet Take 40 mg by mouth once daily.    cholecalciferol, vitamin D3, 1,250 mcg (50,000 unit) capsule Take 50,000 Units by mouth every 7 days.    diphenoxylate-atropine 2.5-0.025 mg (LOMOTIL) 2.5-0.025 mg per tablet Take 2 tablets by mouth 2 (two) times a day.    doxycycline (VIBRAMYCIN) 100 MG Cap Take 200 mg by mouth once daily.    ferrous sulfate 325 (65 FE) MG EC tablet Take 1 tablet (325 mg total) by mouth once daily.    GATTEX 30-VIAL 5 mg Kit Inject into the skin Daily.    metoprolol tartrate (LOPRESSOR) 100 MG tablet Take 100 mg by mouth 2 (two) times daily.    mirtazapine (REMERON) 15 MG tablet Take 15 mg by mouth every evening.    multivitamin (ONE DAILY MULTIVITAMIN) per tablet Take 1 tablet by mouth once daily.    solifenacin (VESICARE) 5 MG tablet Take 5 mg by mouth once daily.    warfarin (COUMADIN) 2.5 MG tablet TAKE ONE TABLET BY MOUTH ONCE DAILY IN THE EVENING OR AS DIRECTED BY [facility] PROVIDER     Review of Systems   Respiratory:  Negative for chest tightness and shortness of breath.    Cardiovascular:  Negative for chest pain.     Objective:     Vital Signs (Most Recent):  Temp: 98.1 °F (36.7 °C) (11/17/24 0809)  Pulse: 82 (11/17/24 0809)  Resp: 18 (11/16/24 0805)  BP: 111/66 (11/17/24 0809)  SpO2: 95 % (11/17/24 0809) Vital Signs (24h Range):  Temp:  [97.9 °F (36.6 °C)-99 °F (37.2 °C)] 98.1 °F (36.7 °C)  Pulse:  [73-98] 82  SpO2:  [94 %-95 %] 95 %  BP: (111-136)/(66-77) 111/66   Weight: 71.5 kg (157 lb 10.1 oz)  Body mass index is 27.04 kg/m².  SpO2: 95 %       Intake/Output Summary (Last 24 hours) at 11/17/2024 1004  Last data filed at 11/16/2024 1646  Gross per 24 hour   Intake --   Output 600 ml   Net -600 ml     Lines/Drains/Airways       Peripheral Intravenous Line  Duration                  Peripheral IV - Single Lumen  11/11/24 1635 20 G 2 1/4 in Yes Anterior;Left Forearm 5 days         Peripheral IV - Single Lumen 11/16/24 2232 20 G 1 1/4 in Anterior;Left Forearm <1 day                  Significant Labs:   Chemistries:   Recent Labs   Lab 11/13/24  0536 11/14/24  0359 11/15/24  0518 11/16/24  0531 11/17/24  0541    139 138 138 140   K 4.0 3.5 3.7 3.7 3.7   * 108* 109* 110* 112*   CO2 21* 24 20* 21* 21*   BUN 12.5 16.9 17.0 17.8 16.9   CREATININE 0.65 0.65 0.66 0.63 0.64   CALCIUM 8.4 8.7 9.0 8.5 8.2*   BILITOT  --   --  0.5 0.9  --    ALKPHOS  --   --  117 122  --    ALT  --   --  25 26  --    AST  --   --  30 34  --    GLUCOSE 164* 130* 97 97 105   MG 1.90 1.70 1.80 1.90 2.00   PHOS 2.8 2.9 3.2 3.3 2.4        CBC/Anemia Labs: Coags:    Recent Labs   Lab 11/14/24  1009 11/15/24  0518 11/16/24  0531   WBC 6.13 5.15 3.63*   HGB 12.4 12.3 11.5*   HCT 38.7 38.6 35.8*    210 166   MCV 89.6 90.0 89.7   RDW 14.8 14.9 14.9    Recent Labs   Lab 11/15/24  0518 11/16/24  0531 11/17/24  0541   INR 1.3 1.2 1.9*        Significant Imaging:  Imaging Results              CT Chest Abdomen Pelvis With IV Contrast (XPD) NO Oral Contrast (Final result)  Result time 11/07/24 20:47:43      Final result by Isaac Richardson MD (11/07/24 20:47:43)                   Impression:      1.  Right lower lung lobe small new vague opacity may be inflammatory or infectious.  Details of other chest findings above.    2.  Mild excessive fluid within loops of small bowel and the right colon could represent enterocolitis.  Details of other abdominopelvic findings above.      Electronically signed by: Isaac Richardson  Date:    11/07/2024  Time:    20:47               Narrative:    EXAMINATION:  CT CHEST ABDOMEN PELVIS WITH IV CONTRAST (XPD)    CLINICAL HISTORY:  Sepsis;    TECHNIQUE:  Multidetector axial images were obtained from the thoracic inlet through the greater trochanters following the administration of IV contrast.    Dose length product of  785 mGycm. Automated exposure control was utilized to minimize radiation dose.    COMPARISON:  CT chest and CT abdomen pelvis October 18, 2024.    CHEST FINDINGS:    Bilateral lungs are remarkable for peripheral subpleural fine to coarse reticulations consistent with fibrosis.  Previous exam was remarkable for right middle lung lobe and lingular segment some infiltrates which show interval improvement.  There are subtle new vague opacities which involve the right right lower lung lobe on image 70 series 4 and may be inflammatory or infectious.  There is no pulmonary edema.  No cavitary abnormality.  No fluid within the pleural and pericardial spaces.  Thoracic aorta is without aneurysmal dilatation and there are no signs of dissection.  Cardiac chamber size is within normal limits.  Left chest implanted tunnel castro catheter terminates within the superior vena cava.  Right breast medial aspect rim calcified density.  Thoracic kyphoscoliosis and degenerative changes.    ABDOMINAL FINDINGS:    Liver, mildly atrophic pancreas and the spleen are without acute findings.  Gallbladder is surgically absent.  There is no apparent dilatation of ducts.  Adrenals are unremarkable.  Left kidney mild cortical scarring.  Left kidney medial cortical small hypodensity is stable and is favored to be a cyst and for this no specific follow-up imaging is recommended.  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  There are no perinephric strandings.  No retroperitoneal periaortic enlarged lymph nodes.    Stomach is mostly decompressed.  Mild excessive fluid is present within loops of small bowel without transition or bowel obstruction.  Similarly, there is some excessive fluid within the colon.  Similar appearance of ileocolic anastomosis.  No inflammatory strandings identified.  Extensive noninflamed diverticulosis coli involving the descending and the sigmoid colon.  No bowel obstruction.  No free  fluid.    PELVIC FINDINGS:    Urinary bladder wall is not thickened.  No pelvic free fluid.    Thoracolumbar degenerative changes.  Scoliosis.                                       X-Ray Chest PA And Lateral (Final result)  Result time 11/07/24 16:46:33      Final result by Isabel Brasher MD (11/07/24 16:46:33)                   Impression:      No acute abnormality of the chest.      Electronically signed by: Isabel Brasher  Date:    11/07/2024  Time:    16:46               Narrative:    EXAMINATION:  XR CHEST PA AND LATERAL    CLINICAL HISTORY:  Fever;    TECHNIQUE:  PA and lateral chest radiographs    COMPARISON:  Chest x-ray dated 11/07/2024    FINDINGS:  Left chest wall port catheter remains in place.  The heart is normal in size.  There is no focal airspace consolidation.  There is no pleural effusion or visible pneumothorax.                                        Physical Exam  Vitals and nursing note reviewed.   Constitutional:       General: She is not in acute distress.     Appearance: Normal appearance.   Cardiovascular:      Rate and Rhythm: Normal rate and regular rhythm.   Pulmonary:      Effort: Pulmonary effort is normal. No respiratory distress.   Neurological:      Mental Status: She is alert. Mental status is at baseline.   Psychiatric:         Mood and Affect: Mood normal.         Behavior: Behavior normal.       Home Medications:   No current facility-administered medications on file prior to encounter.     Current Outpatient Medications on File Prior to Encounter   Medication Sig Dispense Refill    busPIRone (BUSPAR) 10 MG tablet Take 20 mg by mouth 2 (two) times daily.      cefdinir (OMNICEF) 300 MG capsule Take 1 capsule (300 mg total) by mouth 2 (two) times daily. for 10 days 20 capsule 0    CELEXA 40 mg tablet Take 40 mg by mouth once daily.      cholecalciferol, vitamin D3, 1,250 mcg (50,000 unit) capsule Take 50,000 Units by mouth every 7 days.      diphenoxylate-atropine  2.5-0.025 mg (LOMOTIL) 2.5-0.025 mg per tablet Take 2 tablets by mouth 2 (two) times a day.      doxycycline (VIBRAMYCIN) 100 MG Cap Take 200 mg by mouth once daily.      ferrous sulfate 325 (65 FE) MG EC tablet Take 1 tablet (325 mg total) by mouth once daily. 30 tablet 0    GATTEX 30-VIAL 5 mg Kit Inject into the skin Daily.      metoprolol tartrate (LOPRESSOR) 100 MG tablet Take 100 mg by mouth 2 (two) times daily.      mirtazapine (REMERON) 15 MG tablet Take 15 mg by mouth every evening.      multivitamin (ONE DAILY MULTIVITAMIN) per tablet Take 1 tablet by mouth once daily.      solifenacin (VESICARE) 5 MG tablet Take 5 mg by mouth once daily.      warfarin (COUMADIN) 2.5 MG tablet TAKE ONE TABLET BY MOUTH ONCE DAILY IN THE EVENING OR AS DIRECTED BY CIS PROVIDER       Current Schedule Inpatient Medications:   busPIRone  30 mg Oral BID    citalopram  40 mg Oral Daily    enoxparin  1 mg/kg Subcutaneous Q12H (treatment, non-standard time)    ferrous sulfate  1 tablet Oral Daily    mirtazapine  15 mg Oral QHS    phenazopyridine  100 mg Oral TID WM    rifAMpin  300 mg Oral Q12H    teduglutide  3.8 mg Subcutaneous Daily    vancomycin (VANCOCIN) 1,000 mg in D5W 250 mL IVPB (admixture device)  1,000 mg Intravenous Q12H    warfarin  5 mg Oral Daily     Continuous Infusions:   amino acid 4.25% - dextrose 5% solution   Intravenous Continuous 50 mL/hr at 11/16/24 1621 New Bag at 11/16/24 1621     Assessment:   Bacteremia/Sepsis     - ECHO (11.14.24) - LVEF 65-70%, Grade I DD; No Vegetation Seen    - Staph Epi/Staph Hominis (Cleared)  Infected MediPort     - Explanted (11.12.24)  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 - Resolved  Recent COVID-19 Infection (2 Weeks Prior to Admission)  Hx of Short Gut Syndrome with Home TPN    - Due to GIB  HTN  HLD  Hx of Iliac Vein DVT/Warfarin  RA  Cirrhosis  Hx of Pancreatitis  Anxiety/Depression  Fibromyalgia/RA  Hx of Renal  Caliculi  NICANOR/Bilaterally Mild   Hx of GIB     Plan:   DALE Tomorrow Re: Sepsis Rule out IE  NPO at Midnight  Risks/Benefits/Alternatives of the DALE Discussed with the patient. She elects to Proceed.    David Daniels, MARCKP  Cardiology  [facility]

## 2024-11-18 LAB
ALBUMIN SERPL-MCNC: 3.1 G/DL (ref 3.4–4.8)
ALBUMIN/GLOB SERPL: 0.9 RATIO (ref 1.1–2)
ALP SERPL-CCNC: 119 UNIT/L (ref 40–150)
ALT SERPL-CCNC: 25 UNIT/L (ref 0–55)
ANION GAP SERPL CALC-SCNC: 5 MEQ/L
AST SERPL-CCNC: 25 UNIT/L (ref 5–34)
BASOPHILS # BLD AUTO: 0.03 X10(3)/MCL
BASOPHILS NFR BLD AUTO: 0.8 %
BILIRUB SERPL-MCNC: 0.6 MG/DL
BUN SERPL-MCNC: 16.2 MG/DL (ref 9.8–20.1)
CALCIUM SERPL-MCNC: 8.3 MG/DL (ref 8.4–10.2)
CHLORIDE SERPL-SCNC: 113 MMOL/L (ref 98–107)
CO2 SERPL-SCNC: 22 MMOL/L (ref 23–31)
CREAT SERPL-MCNC: 0.65 MG/DL (ref 0.55–1.02)
CREAT/UREA NIT SERPL: 25
EOSINOPHIL # BLD AUTO: 0.23 X10(3)/MCL (ref 0–0.9)
EOSINOPHIL NFR BLD AUTO: 5.8 %
ERYTHROCYTE [DISTWIDTH] IN BLOOD BY AUTOMATED COUNT: 15.3 % (ref 11.5–17)
GFR SERPLBLD CREATININE-BSD FMLA CKD-EPI: >60 ML/MIN/1.73/M2
GLOBULIN SER-MCNC: 3.3 GM/DL (ref 2.4–3.5)
GLUCOSE SERPL-MCNC: 102 MG/DL (ref 82–115)
HCT VFR BLD AUTO: 34.5 % (ref 37–47)
HGB BLD-MCNC: 10.8 G/DL (ref 12–16)
IMM GRANULOCYTES # BLD AUTO: 0.01 X10(3)/MCL (ref 0–0.04)
IMM GRANULOCYTES NFR BLD AUTO: 0.3 %
INR PPP: 2.6
LYMPHOCYTES # BLD AUTO: 1.99 X10(3)/MCL (ref 0.6–4.6)
LYMPHOCYTES NFR BLD AUTO: 49.8 %
MAGNESIUM SERPL-MCNC: 1.9 MG/DL (ref 1.6–2.6)
MCH RBC QN AUTO: 28.4 PG (ref 27–31)
MCHC RBC AUTO-ENTMCNC: 31.3 G/DL (ref 33–36)
MCV RBC AUTO: 90.8 FL (ref 80–94)
MONOCYTES # BLD AUTO: 0.49 X10(3)/MCL (ref 0.1–1.3)
MONOCYTES NFR BLD AUTO: 12.3 %
NEUTROPHILS # BLD AUTO: 1.25 X10(3)/MCL (ref 2.1–9.2)
NEUTROPHILS NFR BLD AUTO: 31 %
NRBC BLD AUTO-RTO: 0 %
PHOSPHATE SERPL-MCNC: 2.7 MG/DL (ref 2.3–4.7)
PLATELET # BLD AUTO: 168 X10(3)/MCL (ref 130–400)
PMV BLD AUTO: 9.6 FL (ref 7.4–10.4)
POTASSIUM SERPL-SCNC: 3.6 MMOL/L (ref 3.5–5.1)
PROT SERPL-MCNC: 6.4 GM/DL (ref 5.8–7.6)
PROTHROMBIN TIME: 27.3 SECONDS (ref 12.5–14.5)
PSYCHE PATHOLOGY RESULT: NORMAL
RBC # BLD AUTO: 3.8 X10(6)/MCL (ref 4.2–5.4)
SODIUM SERPL-SCNC: 140 MMOL/L (ref 136–145)
VANCOMYCIN TROUGH SERPL-MCNC: 17.8 UG/ML (ref 15–20)
WBC # BLD AUTO: 4 X10(3)/MCL (ref 4.5–11.5)

## 2024-11-18 PROCEDURE — 25000003 PHARM REV CODE 250: Performed by: INTERNAL MEDICINE

## 2024-11-18 PROCEDURE — 85610 PROTHROMBIN TIME: CPT | Performed by: INTERNAL MEDICINE

## 2024-11-18 PROCEDURE — 36415 COLL VENOUS BLD VENIPUNCTURE: CPT | Performed by: INTERNAL MEDICINE

## 2024-11-18 PROCEDURE — 80202 ASSAY OF VANCOMYCIN: CPT | Performed by: INTERNAL MEDICINE

## 2024-11-18 PROCEDURE — 25000003 PHARM REV CODE 250: Performed by: HOSPITALIST

## 2024-11-18 PROCEDURE — 11000001 HC ACUTE MED/SURG PRIVATE ROOM

## 2024-11-18 PROCEDURE — 25000003 PHARM REV CODE 250: Performed by: STUDENT IN AN ORGANIZED HEALTH CARE EDUCATION/TRAINING PROGRAM

## 2024-11-18 PROCEDURE — 63600175 PHARM REV CODE 636 W HCPCS: Performed by: INTERNAL MEDICINE

## 2024-11-18 PROCEDURE — 85025 COMPLETE CBC W/AUTO DIFF WBC: CPT | Performed by: INTERNAL MEDICINE

## 2024-11-18 PROCEDURE — 21400001 HC TELEMETRY ROOM

## 2024-11-18 PROCEDURE — 84100 ASSAY OF PHOSPHORUS: CPT | Performed by: INTERNAL MEDICINE

## 2024-11-18 PROCEDURE — 80053 COMPREHEN METABOLIC PANEL: CPT | Performed by: INTERNAL MEDICINE

## 2024-11-18 PROCEDURE — 83735 ASSAY OF MAGNESIUM: CPT | Performed by: INTERNAL MEDICINE

## 2024-11-18 RX ADMIN — BUSPIRONE HYDROCHLORIDE 30 MG: 15 TABLET ORAL at 09:11

## 2024-11-18 RX ADMIN — RIFAMPIN 300 MG: 300 CAPSULE ORAL at 09:11

## 2024-11-18 RX ADMIN — CITALOPRAM HYDROBROMIDE 40 MG: 20 TABLET ORAL at 09:11

## 2024-11-18 RX ADMIN — LEUCINE, PHENYLALANINE, LYSINE, METHIONINE, ISOLEUCINE, VALINE, HISTIDINE, THREONINE, TRYPTOPHAN, ALANINE, GLYCINE, ARGININE, PROLINE, SERINE, TYROSINE, DEXTROSE: 311; 238; 247; 170; 255; 247; 204; 179; 77; 880; 438; 489; 289; 213; 17; 5 INJECTION INTRAVENOUS at 04:11

## 2024-11-18 RX ADMIN — PHENAZOPYRIDINE HYDROCHLORIDE 100 MG: 100 TABLET ORAL at 04:11

## 2024-11-18 RX ADMIN — FERROUS SULFATE TAB 325 MG (65 MG ELEMENTAL FE) 1 EACH: 325 (65 FE) TAB at 09:11

## 2024-11-18 RX ADMIN — VANCOMYCIN HYDROCHLORIDE 1000 MG: 1 INJECTION, POWDER, LYOPHILIZED, FOR SOLUTION INTRAVENOUS at 01:11

## 2024-11-18 RX ADMIN — MIRTAZAPINE 15 MG: 15 TABLET, FILM COATED ORAL at 09:11

## 2024-11-18 RX ADMIN — PHENAZOPYRIDINE HYDROCHLORIDE 100 MG: 100 TABLET ORAL at 11:11

## 2024-11-18 RX ADMIN — WARFARIN SODIUM 5 MG: 5 TABLET ORAL at 04:11

## 2024-11-18 RX ADMIN — VANCOMYCIN HYDROCHLORIDE 1000 MG: 1 INJECTION, POWDER, LYOPHILIZED, FOR SOLUTION INTRAVENOUS at 04:11

## 2024-11-18 NOTE — PLAN OF CARE
Notified Charanjit with [employer] regarding new IV antibiotics order and resumption of TPN order.

## 2024-11-18 NOTE — PROGRESS NOTES
Pharmacokinetic Assessment Follow Up: IV Vancomycin    Vancomycin serum concentration assessment(s):    The trough level was drawn correctly and can be used to guide therapy at this time. The measurement is within the desired definitive target range of 15 to 20 mcg/mL.    Vancomycin Regimen Plan:    Continue regimen to Vancomycin 1000 mg IV every 12 hours with next serum trough concentration measured at 1300 prior to 5th dose on 11/20  Patient Stability Criteria is met; scheduled one more trough due to troughs being on the higher end    Scheduled Administration Times    0200,1400    Drug levels (last 3 results):  Recent Labs   Lab Result Units 11/18/24  1335   Vancomycin Trough ug/ml 17.8       Vancomycin Administrations:  vancomycin given in the last 96 hours                     vancomycin (VANCOCIN) 1,000 mg in D5W 250 mL IVPB (admixture device) (mg) 1,000 mg New Bag 11/18/24 0139     1,000 mg New Bag 11/17/24 1524     1,000 mg New Bag  0120     1,000 mg New Bag 11/16/24 1405     1,000 mg New Bag  0204     1,000 mg New Bag 11/15/24 1400     1,000 mg New Bag  0208                    Pharmacy will continue to follow and monitor vancomycin.    Please contact pharmacy at extension [phone] for questions regarding this assessment.    Thank you for the consult,   Rocael Marroquin       Patient brief summary:  Laura Acosta is a 78 y.o. female initiated on antimicrobial therapy with IV Vancomycin for treatment of bacteremia    The patient's current regimen is 1000 mg q12h    Drug Allergies:   Review of patient's allergies indicates:   Allergen Reactions    Hydromorphone Itching     Other reaction(s): itching    Opium      Other reaction(s): itching  has itching with larger doses. Smaller doses do not cause a reaction.       Actual Body Weight:  Wt Readings from Last 1 Encounters:   11/07/24 71.5 kg (157 lb 10.1 oz)       Renal Function:   Estimated Creatinine Clearance: 69.1 mL/min (based on SCr of 0.65 mg/dL).,     Dialysis  Method (if applicable):  N/A    CBC (last 72 hours):  Recent Labs   Lab Result Units 11/16/24  0531 11/18/24  0515   WBC x10(3)/mcL 3.63* 4.00*   Hgb g/dL 11.5* 10.8*   Hct % 35.8* 34.5*   Platelet x10(3)/mcL 166 168   Mono % % 13.8 12.3   Eos % % 5.0 5.8   Basophil % % 0.8 0.8       Metabolic Panel (last 72 hours):  Recent Labs   Lab Result Units 11/16/24  0531 11/17/24  0541 11/18/24  0515   Sodium mmol/L 138 140 140   Potassium mmol/L 3.7 3.7 3.6   Chloride mmol/L 110* 112* 113*   CO2 mmol/L 21* 21* 22*   Glucose mg/dL 97 105 102   Blood Urea Nitrogen mg/dL 17.8 16.9 16.2   Creatinine mg/dL 0.63 0.64 0.65   Albumin g/dL 3.0*  --  3.1*   Bilirubin Total mg/dL 0.9  --  0.6   ALP unit/L 122  --  119   AST unit/L 34  --  25   ALT unit/L 26  --  25   Magnesium Level mg/dL 1.90 2.00 1.90   Phosphorus Level mg/dL 3.3 2.4 2.7       Microbiologic Results:  Microbiology Results (last 7 days)       Procedure Component Value Units Date/Time    Blood Culture [[id number]]  (Normal) Collected: 11/14/24 2251    Order Status: Completed Specimen: Blood from Hand, Left Updated: 11/18/24 0000     Blood Culture No Growth At 72 Hours    Blood Culture [[id number]]  (Normal) Collected: 11/14/24 2251    Order Status: Completed Specimen: Blood from Hand, Left Updated: 11/18/24 0000     Blood Culture No Growth At 72 Hours    Anaerobic Culture [facility] [[id number]] Collected: 11/12/24 2011    Order Status: Completed Specimen: Foreign Body from Mediport Updated: 11/16/24 0745     Anaerobe Culture No Anaerobes Isolated    Blood Culture [[id number]]  (Normal) Collected: 11/10/24 2357    Order Status: Completed Specimen: Blood, Peripheral Line Updated: 11/16/24 0100     Blood Culture No Growth at 5 days    Medical Device Culture [[id number]]  (Abnormal)  (Susceptibility) Collected: 11/12/24 2000    Order Status: Completed Specimen: Foreign Body from Mediport Updated: 11/15/24 0830     CULTURE, MEDICAL DEVICE ([facility]) <15 CFU/mL Staphylococcus  epidermidis    Blood Culture [[id number]]  (Abnormal)  (Susceptibility) Collected: 11/10/24 9627    Order Status: Completed Specimen: Blood, Arterial Updated: 11/14/24 0630     Blood Culture Staphylococcus hominis      Staphylococcus epidermidis     GRAM STAIN Gram Positive Cocci, probable Staphylococcus      Seen in gram stain of broth only      2 of 2 bottles positive    Mycobacteria and Nocardia Culture [[id number]] Collected: 11/12/24 2011    Order Status: Sent Specimen: Body Fluid from Chest, Right Updated: 11/13/24 1200    Fungal Culture [[id number]] Collected: 11/12/24 2011    Order Status: Sent Specimen: Mediport Tip from Chest Updated: 11/13/24 1200    Gram Stain [[id number]] Collected: 11/12/24 2000    Order Status: Completed Specimen: Foreign Body from Mediport Updated: 11/13/24 0432     GRAM STAIN No WBC seen      Rare Gram positive cocci    Anaerobic Culture [[id number]] Collected: 11/12/24 2000    Order Status: Canceled Specimen: Foreign Body from Mediport     Blood Culture [[id number]]  (Abnormal)  (Susceptibility) Collected: 11/08/24 1857    Order Status: Completed Specimen: Blood from Arm, Left Updated: 11/12/24 0705     Blood Culture Staphylococcus epidermidis     GRAM STAIN Gram Positive Cocci, probable Staphylococcus      Seen in gram stain of broth only      1 of 2 Anaerobic bottles positive

## 2024-11-18 NOTE — PROGRESS NOTES
[facility]    Cardiology  Progress Note    Patient Name: Laura Acosta  MRN: [id number]  Admission Date: 11/7/2024  Hospital Length of Stay: 11 days  Code Status: Full Code   Attending Provider: Raine Moscoso MD   Consulting Provider: VIVI Brizuela  Primary Care Physician: Curt Felton MD  Principal Problem:Hypoxia    Patient information was obtained from past medical records and ER records.     Subjective:     Chief Complaint/Reason for Consult: Request for DALE    HPI: Ms. Acosta is a 77 y/o female who is known to [facility], Dr. Cheng. The patient presented to [facility] on 11.7.24 under the direction of her PCP.  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  She has had multiple line infections including her MediPort resulting in MRSA bacteremia.  Approximately 7 months ago she had a MediPort replacement for this reason.  She was hospitalized last month with fever which was attributed to COVID-19 infection and pneumonia, at this time her blood cultures were unremarkable.  For this hospitalization she presents with complaints of fever, generalized weakness and fatigue.  Initial workup revealed radiographic imaging concerning for pneumonia/enterocolitis.  She was admitted to hospital medicine services and started on broad-spectrum antibiotics.  Initial cultures revealed Staph epidermis and repeat blood cultures revealed 1/2 bottles of staph epidermis/staph hominis.  Concern for MediPort infection arose and general surgery was consulted for removal, which was removed on November 12, 2024.  ID was consulted for recommendations and Cardiology was consulted for a DALE to evaluate for endocarditis.    Hospital Course:  11.17.24: NAD Noted. DALE Tomorrow.   11.18.24: NAD. Denies CP, SOB, or palps. Unable to perform DALE today due to anesthesia coverage. INR  2.6. Plan for DALE tomorrow.     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), Warfarin/DVT of Iliac Vein, Depression, Fibromyalgia, Chronic TPN/Port, NICANOR/Bilaterally Mild, Meningitis, Bacteremia, Pancreatitis, Renal Caliculi, OA, RA  PSH: Small Bowel Intestinal Resection, Breast Lumpectomy, Appendectomy, DANIAL, Shoulder Surgery, Cholecystectomy, Hernia Repair, Colonoscopy, Cystoscopy, Hysterectomy, Ventral Hernia Repair  Family History: Father, D, Brain Aneurysm, Mother, D, Breast Cancer, Uterine Cancer  Social History: Denies Illicit Drug, ETOH and Tobacco Use     Previous Cardiac Diagnostics:   ECHO 11.14.24:  Left Ventricle: The left ventricle is normal in size. Mildly increased wall thickness. There is normal systolic function with a visually estimated ejection fraction of 65 - 70%. Grade I diastolic dysfunction.  Right Ventricle: Normal right ventricular cavity size. Systolic function is normal. TAPSE is 1.92 cm.  Aortic Valve: There is mild aortic valve sclerosis.  Tricuspid Valve: There is mild regurgitation.  IVC/SVC: Intermediate venous pressure at 8 mmHg.  No vegetation seen    ECHO 6.11.24:  Left Ventricle: The left ventricle is normal in size. Normal wall thickness. There is normal systolic function with a visually estimated ejection fraction of 55 - 60%.  Right Ventricle: Normal right ventricular cavity size. Systolic function is normal.  Aortic Valve: The aortic valve is a trileaflet valve. There is aortic valve sclerosis.  Mitral Valve: There is no stenosis. The mean pressure gradient across the mitral valve is 2 mmHg at a heart rate of  bpm.  IVC/SVC: Normal venous pressure at 3 mmHg.    Coronary Calcium Score 7.25.16:  Total 1    Review of patient's allergies indicates:   Allergen Reactions    Hydromorphone Itching     Other reaction(s): itching    Opium      Other reaction(s): itching  has itching with larger doses. Smaller doses do not cause a reaction.     No current facility-administered medications on  file prior to encounter.     Current Outpatient Medications on File Prior to Encounter   Medication Sig    busPIRone (BUSPAR) 10 MG tablet Take 20 mg by mouth 2 (two) times daily.    [] cefdinir (OMNICEF) 300 MG capsule Take 1 capsule (300 mg total) by mouth 2 (two) times daily. for 10 days    CELEXA 40 mg tablet Take 40 mg by mouth once daily.    cholecalciferol, vitamin D3, 1,250 mcg (50,000 unit) capsule Take 50,000 Units by mouth every 7 days.    diphenoxylate-atropine 2.5-0.025 mg (LOMOTIL) 2.5-0.025 mg per tablet Take 2 tablets by mouth 2 (two) times a day.    doxycycline (VIBRAMYCIN) 100 MG Cap Take 200 mg by mouth once daily.    ferrous sulfate 325 (65 FE) MG EC tablet Take 1 tablet (325 mg total) by mouth once daily.    GATTEX 30-VIAL 5 mg Kit Inject into the skin Daily.    metoprolol tartrate (LOPRESSOR) 100 MG tablet Take 100 mg by mouth 2 (two) times daily.    mirtazapine (REMERON) 15 MG tablet Take 15 mg by mouth every evening.    multivitamin (ONE DAILY MULTIVITAMIN) per tablet Take 1 tablet by mouth once daily.    solifenacin (VESICARE) 5 MG tablet Take 5 mg by mouth once daily.    warfarin (COUMADIN) 2.5 MG tablet TAKE ONE TABLET BY MOUTH ONCE DAILY IN THE EVENING OR AS DIRECTED BY [facility] PROVIDER     Review of Systems   Constitutional:  Negative for fatigue.   Respiratory:  Negative for chest tightness and shortness of breath.    Cardiovascular:  Negative for chest pain.   Neurological:  Negative for syncope.   All other systems reviewed and are negative.    Objective:     Vital Signs (Most Recent):  Temp: 98.8 °F (37.1 °C) (24)  Pulse: 80 (24 162)  Resp: 18 (24 0724)  BP: 124/72 (24)  SpO2: (!) 93 % (24) Vital Signs (24h Range):  Temp:  [97.7 °F (36.5 °C)-98.8 °F (37.1 °C)] 98.8 °F (37.1 °C)  Pulse:  [57-89] 80  Resp:  [18] 18  SpO2:  [93 %-95 %] 93 %  BP: (110-166)/(60-88) 124/72   Weight: 71.5 kg (157 lb 10.1 oz)  Body mass index is 27.04  kg/m².  SpO2: (!) 93 %       Intake/Output Summary (Last 24 hours) at 11/18/2024 1650  Last data filed at 11/18/2024 1317  Gross per 24 hour   Intake 360 ml   Output 600 ml   Net -240 ml     Lines/Drains/Airways       Peripherally Inserted Central Catheter Line  Duration             PICC Double Lumen 11/17/24 1356 left basilic 1 day              Peripheral Intravenous Line  Duration                  Peripheral IV - Single Lumen 11/11/24 1635 20 G 2 1/4 in Yes Anterior;Left Forearm 7 days                  Significant Labs:   Chemistries:   Recent Labs   Lab 11/14/24  0359 11/15/24  0518 11/16/24  0531 11/17/24  0541 11/18/24  0515    138 138 140 140   K 3.5 3.7 3.7 3.7 3.6   * 109* 110* 112* 113*   CO2 24 20* 21* 21* 22*   BUN 16.9 17.0 17.8 16.9 16.2   CREATININE 0.65 0.66 0.63 0.64 0.65   CALCIUM 8.7 9.0 8.5 8.2* 8.3*   BILITOT  --  0.5 0.9  --  0.6   ALKPHOS  --  117 122  --  119   ALT  --  25 26  --  25   AST  --  30 34  --  25   GLUCOSE 130* 97 97 105 102   MG 1.70 1.80 1.90 2.00 1.90   PHOS 2.9 3.2 3.3 2.4 2.7        CBC/Anemia Labs: Coags:    Recent Labs   Lab 11/15/24  0518 11/16/24  0531 11/18/24  0515   WBC 5.15 3.63* 4.00*   HGB 12.3 11.5* 10.8*   HCT 38.6 35.8* 34.5*    166 168   MCV 90.0 89.7 90.8   RDW 14.9 14.9 15.3    Recent Labs   Lab 11/16/24  0531 11/17/24  0541 11/18/24  0515   INR 1.2 1.9* 2.6*        Significant Imaging:  Imaging Results              CT Chest Abdomen Pelvis With IV Contrast (XPD) NO Oral Contrast (Final result)  Result time 11/07/24 20:47:43      Final result by Isaac Richardson MD (11/07/24 20:47:43)                   Impression:      1.  Right lower lung lobe small new vague opacity may be inflammatory or infectious.  Details of other chest findings above.    2.  Mild excessive fluid within loops of small bowel and the right colon could represent enterocolitis.  Details of other abdominopelvic findings above.      Electronically signed by: Isaac  Richardson  Date:    11/07/2024  Time:    20:47               Narrative:    EXAMINATION:  CT CHEST ABDOMEN PELVIS WITH IV CONTRAST (XPD)    CLINICAL HISTORY:  Sepsis;    TECHNIQUE:  Multidetector axial images were obtained from the thoracic inlet through the greater trochanters following the administration of IV contrast.    Dose length product of 785 mGycm. Automated exposure control was utilized to minimize radiation dose.    COMPARISON:  CT chest and CT abdomen pelvis October 18, 2024.    CHEST FINDINGS:    Bilateral lungs are remarkable for peripheral subpleural fine to coarse reticulations consistent with fibrosis.  Previous exam was remarkable for right middle lung lobe and lingular segment some infiltrates which show interval improvement.  There are subtle new vague opacities which involve the right right lower lung lobe on image 70 series 4 and may be inflammatory or infectious.  There is no pulmonary edema.  No cavitary abnormality.  No fluid within the pleural and pericardial spaces.  Thoracic aorta is without aneurysmal dilatation and there are no signs of dissection.  Cardiac chamber size is within normal limits.  Left chest implanted tunnel castro catheter terminates within the superior vena cava.  Right breast medial aspect rim calcified density.  Thoracic kyphoscoliosis and degenerative changes.    ABDOMINAL FINDINGS:    Liver, mildly atrophic pancreas and the spleen are without acute findings.  Gallbladder is surgically absent.  There is no apparent dilatation of ducts.  Adrenals are unremarkable.  Left kidney mild cortical scarring.  Left kidney medial cortical small hypodensity is stable and is favored to be a cyst and for this no specific follow-up imaging is recommended.  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  There are no perinephric strandings.  No retroperitoneal periaortic enlarged lymph nodes.    Stomach is mostly decompressed.  Mild excessive fluid is present  within loops of small bowel without transition or bowel obstruction.  Similarly, there is some excessive fluid within the colon.  Similar appearance of ileocolic anastomosis.  No inflammatory strandings identified.  Extensive noninflamed diverticulosis coli involving the descending and the sigmoid colon.  No bowel obstruction.  No free fluid.    PELVIC FINDINGS:    Urinary bladder wall is not thickened.  No pelvic free fluid.    Thoracolumbar degenerative changes.  Scoliosis.                                       X-Ray Chest PA And Lateral (Final result)  Result time 11/07/24 16:46:33      Final result by Isabel Brasher MD (11/07/24 16:46:33)                   Impression:      No acute abnormality of the chest.      Electronically signed by: Isabel Brasher  Date:    11/07/2024  Time:    16:46               Narrative:    EXAMINATION:  XR CHEST PA AND LATERAL    CLINICAL HISTORY:  Fever;    TECHNIQUE:  PA and lateral chest radiographs    COMPARISON:  Chest x-ray dated 11/07/2024    FINDINGS:  Left chest wall port catheter remains in place.  The heart is normal in size.  There is no focal airspace consolidation.  There is no pleural effusion or visible pneumothorax.                                        Physical Exam  Vitals and nursing note reviewed.   Constitutional:       General: She is not in acute distress.     Appearance: Normal appearance.   HENT:      Head: Normocephalic.      Nose: Nose normal.      Mouth/Throat:      Mouth: Mucous membranes are moist.   Eyes:      Extraocular Movements: Extraocular movements intact.   Cardiovascular:      Rate and Rhythm: Normal rate and regular rhythm.      Pulses: Normal pulses.   Pulmonary:      Effort: Pulmonary effort is normal. No respiratory distress.   Abdominal:      Palpations: Abdomen is soft.   Skin:     General: Skin is warm.   Neurological:      Mental Status: She is alert and oriented to person, place, and time. Mental status is at baseline.    Psychiatric:         Mood and Affect: Mood normal.         Behavior: Behavior normal.       Home Medications:   No current facility-administered medications on file prior to encounter.     Current Outpatient Medications on File Prior to Encounter   Medication Sig Dispense Refill    busPIRone (BUSPAR) 10 MG tablet Take 20 mg by mouth 2 (two) times daily.      [] cefdinir (OMNICEF) 300 MG capsule Take 1 capsule (300 mg total) by mouth 2 (two) times daily. for 10 days 20 capsule 0    CELEXA 40 mg tablet Take 40 mg by mouth once daily.      cholecalciferol, vitamin D3, 1,250 mcg (50,000 unit) capsule Take 50,000 Units by mouth every 7 days.      diphenoxylate-atropine 2.5-0.025 mg (LOMOTIL) 2.5-0.025 mg per tablet Take 2 tablets by mouth 2 (two) times a day.      doxycycline (VIBRAMYCIN) 100 MG Cap Take 200 mg by mouth once daily.      ferrous sulfate 325 (65 FE) MG EC tablet Take 1 tablet (325 mg total) by mouth once daily. 30 tablet 0    GATTEX 30-VIAL 5 mg Kit Inject into the skin Daily.      metoprolol tartrate (LOPRESSOR) 100 MG tablet Take 100 mg by mouth 2 (two) times daily.      mirtazapine (REMERON) 15 MG tablet Take 15 mg by mouth every evening.      multivitamin (ONE DAILY MULTIVITAMIN) per tablet Take 1 tablet by mouth once daily.      solifenacin (VESICARE) 5 MG tablet Take 5 mg by mouth once daily.      warfarin (COUMADIN) 2.5 MG tablet TAKE ONE TABLET BY MOUTH ONCE DAILY IN THE EVENING OR AS DIRECTED BY [facility] PROVIDER       Current Schedule Inpatient Medications:   busPIRone  30 mg Oral BID    citalopram  40 mg Oral Daily    ferrous sulfate  1 tablet Oral Daily    mirtazapine  15 mg Oral QHS    phenazopyridine  100 mg Oral TID WM    rifAMpin  300 mg Oral Q12H    teduglutide  3.8 mg Subcutaneous Daily    vancomycin (VANCOCIN) 1,000 mg in D5W 250 mL IVPB (admixture device)  1,000 mg Intravenous Q12H    warfarin  5 mg Oral Daily     Continuous Infusions:   amino acid 4.25% - dextrose 5% solution    Intravenous Continuous 50 mL/hr at 11/18/24 1634 New Bag at 11/18/24 1634     Assessment:   Bacteremia/Sepsis     - ECHO (11.14.24) - LVEF 65-70%, Grade I DD; No Vegetation Seen    - Staph Epi/Staph Hominis (Cleared)  Infected MediPort     - Explanted (11.12.24)  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   Reschedule for DALE Tomorrow (11.19.24) Re: Sepsis Rule out IE  NPO at Midnight  Signed consent on the chart.     María Booker, MARCKP  Cardiology  [facility]

## 2024-11-18 NOTE — PROGRESS NOTES
[facility] Hospital Medicine Progress Note        Chief Complaint: Inpatient Follow-up    HPI:     78-year-old female with significant history of short gut syndrome on TPN at home, HTN, HLD, PVD, DVT/clotting disorder on Coumadin, rheumatoid arthritis, cirrhosis, gallstone pancreatitis, anxiety/depression.  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  Last MediPort replacement was 7 months back.  Patient had a recent hospitalization in October which fever spikes which was attributed to COVID-19 infection and pneumonia, cultures were negative then and was discharged home.  Patient presented back to the ED on 11/07 with complaints of fever, generalized weakness, labs non impressive.  Imaging concerning for pneumonia/enterocolitis, patient was admitted to hospital medicine services, cultures ordered and was initiated on broad-spectrum antimicrobials for colitis/pneumonia.  Cultures from admission grew Staph epidermidis.  Repeat cultures with 1 bottle growing Staph epidermidis/Staph hominis.  Concern for MediPort infection, General surgery consulted for removal of the same.  In the meantime a midline was placed for IV access.  MediPort successfully removed 11/12.  Infectious Disease consulted, awaiting recs, vancomycin continued.  Infectious Disease added rifampin, TTE was ordered which came back negative and Infectious Disease recommended PRAKASH if TTE negative, cardiology consulted.  Prakash scheduled for 11/18    Interval Hx:   Patient seen at bedside, comfortably sitting up in bed and eating breakfast.  Prakash canceled/postponed, hemodynamics stable, no new complaints, no acute events overnight   Objective/physical exam:  General: In no acute distress, afebrile  Chest: Clear to auscultation bilaterally  Heart: S1, S2, no appreciable murmur  Abdomen: Soft, nontender, BS +  MSK: Warm, no lower extremity edema, no clubbing  or cyanosis  Neurologic: Alert and oriented x4, moving all extremities with good strength     VITAL SIGNS: 24 HRS MIN & MAX LAST   Temp  Min: 97.7 °F (36.5 °C)  Max: 98.5 °F (36.9 °C) 98.5 °F (36.9 °C)   BP  Min: 110/65  Max: 176/88 115/60   Pulse  Min: 57  Max: 87  74   No data recorded 18   SpO2  Min: 93 %  Max: 95 % (!) 93 %       Recent Labs   Lab 11/18/24  0515   WBC 4.00*   RBC 3.80*   HGB 10.8*   HCT 34.5*   MCV 90.8   MCH 28.4   MCHC 31.3*   RDW 15.3      MPV 9.6         Recent Labs   Lab 11/18/24  0515      K 3.6   *   CO2 22*   BUN 16.2   CREATININE 0.65   CALCIUM 8.3*   MG 1.90   ALBUMIN 3.1*   ALKPHOS 119   ALT 25   AST 25   BILITOT 0.6          Microbiology Results (last 7 days)       Procedure Component Value Units Date/Time    Blood Culture [[id number]]  (Normal) Collected: 11/14/24 2251    Order Status: Completed Specimen: Blood from Hand, Left Updated: 11/18/24 0000     Blood Culture No Growth At 72 Hours    Blood Culture [[id number]]  (Normal) Collected: 11/14/24 2251    Order Status: Completed Specimen: Blood from Hand, Left Updated: 11/18/24 0000     Blood Culture No Growth At 72 Hours    Anaerobic Culture [facility] [[id number]] Collected: 11/12/24 2011    Order Status: Completed Specimen: Foreign Body from [facility] Updated: 11/16/24 0745     Anaerobe Culture No Anaerobes Isolated    Blood Culture [[id number]]  (Normal) Collected: 11/10/24 2357    Order Status: Completed Specimen: Blood, Peripheral Line Updated: 11/16/24 0100     Blood Culture No Growth at 5 days    Medical Device Culture [[id number]]  (Abnormal)  (Susceptibility) Collected: 11/12/24 2000    Order Status: Completed Specimen: Foreign Body from [facility] Updated: 11/15/24 0830     CULTURE, MEDICAL DEVICE ([facility]) <15 CFU/mL Staphylococcus epidermidis    Blood Culture [[id number]]  (Abnormal)  (Susceptibility) Collected: 11/10/24 2357    Order Status: Completed Specimen: Blood, Arterial Updated: 11/14/24 2463     Blood  Culture Staphylococcus hominis      Staphylococcus epidermidis     GRAM STAIN Gram Positive Cocci, probable Staphylococcus      Seen in gram stain of broth only      2 of 2 bottles positive    Mycobacteria and Nocardia Culture [[id number]] Collected: 11/12/24 2011    Order Status: Sent Specimen: Body Fluid from Chest, Right Updated: 11/13/24 1200    Fungal Culture [[id number]] Collected: 11/12/24 2011    Order Status: Sent Specimen: Mediport Tip from Chest Updated: 11/13/24 1200    Gram Stain [[id number]] Collected: 11/12/24 2000    Order Status: Completed Specimen: Foreign Body from Mediport Updated: 11/13/24 0432     GRAM STAIN No WBC seen      Rare Gram positive cocci    Anaerobic Culture [[id number]] Collected: 11/12/24 2000    Order Status: Canceled Specimen: Foreign Body from Mediport     Blood Culture [[id number]]  (Abnormal)  (Susceptibility) Collected: 11/08/24 1857    Order Status: Completed Specimen: Blood from Arm, Left Updated: 11/12/24 0705     Blood Culture Staphylococcus epidermidis     GRAM STAIN Gram Positive Cocci, probable Staphylococcus      Seen in gram stain of broth only      1 of 2 Anaerobic bottles positive             Scheduled Med:   busPIRone  30 mg Oral BID    citalopram  40 mg Oral Daily    ferrous sulfate  1 tablet Oral Daily    mirtazapine  15 mg Oral QHS    phenazopyridine  100 mg Oral TID WM    rifAMpin  300 mg Oral Q12H    teduglutide  3.8 mg Subcutaneous Daily    vancomycin (VANCOCIN) 1,000 mg in D5W 250 mL IVPB (admixture device)  1,000 mg Intravenous Q12H    warfarin  5 mg Oral Daily          Assessment/Plan:    Staph epidermidis/Staph hominis bacteremia-cleared 11/14  Infected MediPort-removed 11/12  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, 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, 2024  Suspected right-sided pneumonia on admit-completed treatment   Recent COVID-19 pneumonitis   History of short gut syndrome on TPN at home   Essential HTN-stable   HLD   History of PVD   History of  clotting disorder with history of DVT on Coumadin   Rheumatoid arthritis   Cirrhosis-appears compensated   History of gallstone pancreatitis   Anxiety/depression   Prophylaxis      Appreciate infectious disease recommendation   Now on IV vancomycin and rifampin   TTE is negative   ANA postponed to tomorrow  NPO after midnight  MediPort and PICC line removed, now has midline  On TPN through midline  Patient is now afebrile, hemodynamically stable with no more leukocytosis  INR is therapeutic and continue current dose of Coumadin-5 mg daily   Will have to be cautious since she is on rifampin  Continue home meds-BuSpar, citalopram, iron, Remeron, phenazopyridine,   DVT prophylaxis-Coumadin with therapeutic INR      PICC line inserted   ANA postponed to tomorrow   Case management working on arranging IV vancomycin x6 weeks as outpatient  If ana negative and antibiotics arranged patient can go home tomorrow      Raine Moscoso MD   11/18/2024

## 2024-11-19 ENCOUNTER — ANESTHESIA (OUTPATIENT)
Dept: CARDIOLOGY | Facility: HOSPITAL | Age: 79
DRG: 314 | End: 2024-11-19
Payer: MEDICARE

## 2024-11-19 ENCOUNTER — ANESTHESIA EVENT (OUTPATIENT)
Dept: CARDIOLOGY | Facility: HOSPITAL | Age: 79
DRG: 314 | End: 2024-11-19
Payer: MEDICARE

## 2024-11-19 LAB
ANION GAP SERPL CALC-SCNC: 7 MEQ/L
BSA FOR ECHO PROCEDURE: 1.8 M2
BUN SERPL-MCNC: 17 MG/DL (ref 9.8–20.1)
CALCIUM SERPL-MCNC: 8.3 MG/DL (ref 8.4–10.2)
CHLORIDE SERPL-SCNC: 112 MMOL/L (ref 98–107)
CO2 SERPL-SCNC: 20 MMOL/L (ref 23–31)
CREAT SERPL-MCNC: 0.65 MG/DL (ref 0.55–1.02)
CREAT/UREA NIT SERPL: 26
GFR SERPLBLD CREATININE-BSD FMLA CKD-EPI: >60 ML/MIN/1.73/M2
GLUCOSE SERPL-MCNC: 102 MG/DL (ref 82–115)
INR PPP: 2.9
MAGNESIUM SERPL-MCNC: 1.9 MG/DL (ref 1.6–2.6)
PHOSPHATE SERPL-MCNC: 2.2 MG/DL (ref 2.3–4.7)
POTASSIUM SERPL-SCNC: 3.4 MMOL/L (ref 3.5–5.1)
PROTHROMBIN TIME: 29.7 SECONDS (ref 12.5–14.5)
SODIUM SERPL-SCNC: 139 MMOL/L (ref 136–145)

## 2024-11-19 PROCEDURE — 85610 PROTHROMBIN TIME: CPT | Performed by: INTERNAL MEDICINE

## 2024-11-19 PROCEDURE — 80048 BASIC METABOLIC PNL TOTAL CA: CPT | Performed by: INTERNAL MEDICINE

## 2024-11-19 PROCEDURE — 84100 ASSAY OF PHOSPHORUS: CPT | Performed by: INTERNAL MEDICINE

## 2024-11-19 PROCEDURE — 63600175 PHARM REV CODE 636 W HCPCS: Performed by: NURSE ANESTHETIST, CERTIFIED REGISTERED

## 2024-11-19 PROCEDURE — 21400001 HC TELEMETRY ROOM

## 2024-11-19 PROCEDURE — 63600175 PHARM REV CODE 636 W HCPCS: Performed by: INTERNAL MEDICINE

## 2024-11-19 PROCEDURE — 25000003 PHARM REV CODE 250: Performed by: INTERNAL MEDICINE

## 2024-11-19 PROCEDURE — 11000001 HC ACUTE MED/SURG PRIVATE ROOM

## 2024-11-19 PROCEDURE — 25000003 PHARM REV CODE 250: Performed by: STUDENT IN AN ORGANIZED HEALTH CARE EDUCATION/TRAINING PROGRAM

## 2024-11-19 PROCEDURE — 99233 SBSQ HOSP IP/OBS HIGH 50: CPT | Mod: FS,,, | Performed by: GENERAL PRACTICE

## 2024-11-19 PROCEDURE — 25000003 PHARM REV CODE 250

## 2024-11-19 PROCEDURE — 25000003 PHARM REV CODE 250: Performed by: HOSPITALIST

## 2024-11-19 PROCEDURE — 36415 COLL VENOUS BLD VENIPUNCTURE: CPT | Performed by: INTERNAL MEDICINE

## 2024-11-19 PROCEDURE — 83735 ASSAY OF MAGNESIUM: CPT | Performed by: INTERNAL MEDICINE

## 2024-11-19 RX ORDER — WARFARIN 2.5 MG/1
2.5 TABLET ORAL DAILY
Status: DISCONTINUED | OUTPATIENT
Start: 2024-11-19 | End: 2024-11-20 | Stop reason: HOSPADM

## 2024-11-19 RX ORDER — PHENYLEPHRINE HYDROCHLORIDE 10 MG/ML
INJECTION INTRAVENOUS
Status: DISCONTINUED | OUTPATIENT
Start: 2024-11-19 | End: 2024-11-19

## 2024-11-19 RX ORDER — LIDOCAINE HYDROCHLORIDE 20 MG/ML
INJECTION, SOLUTION EPIDURAL; INFILTRATION; INTRACAUDAL; PERINEURAL
Status: DISCONTINUED | OUTPATIENT
Start: 2024-11-19 | End: 2024-11-19

## 2024-11-19 RX ORDER — POTASSIUM CHLORIDE 20 MEQ/1
40 TABLET, EXTENDED RELEASE ORAL ONCE
Status: COMPLETED | OUTPATIENT
Start: 2024-11-19 | End: 2024-11-19

## 2024-11-19 RX ORDER — PROPOFOL 10 MG/ML
VIAL (ML) INTRAVENOUS
Status: DISCONTINUED | OUTPATIENT
Start: 2024-11-19 | End: 2024-11-19

## 2024-11-19 RX ADMIN — LEUCINE, PHENYLALANINE, LYSINE, METHIONINE, ISOLEUCINE, VALINE, HISTIDINE, THREONINE, TRYPTOPHAN, ALANINE, GLYCINE, ARGININE, PROLINE, SERINE, TYROSINE, DEXTROSE: 311; 238; 247; 170; 255; 247; 204; 179; 77; 880; 438; 489; 289; 213; 17; 5 INJECTION INTRAVENOUS at 06:11

## 2024-11-19 RX ADMIN — RIFAMPIN 300 MG: 300 CAPSULE ORAL at 08:11

## 2024-11-19 RX ADMIN — PHENAZOPYRIDINE HYDROCHLORIDE 100 MG: 100 TABLET ORAL at 05:11

## 2024-11-19 RX ADMIN — BUSPIRONE HYDROCHLORIDE 30 MG: 15 TABLET ORAL at 09:11

## 2024-11-19 RX ADMIN — WARFARIN SODIUM 2.5 MG: 2.5 TABLET ORAL at 05:11

## 2024-11-19 RX ADMIN — VANCOMYCIN HYDROCHLORIDE 1000 MG: 1 INJECTION, POWDER, LYOPHILIZED, FOR SOLUTION INTRAVENOUS at 02:11

## 2024-11-19 RX ADMIN — RIFAMPIN 300 MG: 300 CAPSULE ORAL at 09:11

## 2024-11-19 RX ADMIN — PROPOFOL 20 MG: 10 INJECTION, EMULSION INTRAVENOUS at 01:11

## 2024-11-19 RX ADMIN — PHENAZOPYRIDINE HYDROCHLORIDE 100 MG: 100 TABLET ORAL at 09:11

## 2024-11-19 RX ADMIN — CITALOPRAM HYDROBROMIDE 40 MG: 20 TABLET ORAL at 09:11

## 2024-11-19 RX ADMIN — POTASSIUM CHLORIDE 40 MEQ: 1500 TABLET, EXTENDED RELEASE ORAL at 09:11

## 2024-11-19 RX ADMIN — PHENYLEPHRINE HYDROCHLORIDE 100 MCG: 10 INJECTION INTRAVENOUS at 01:11

## 2024-11-19 RX ADMIN — PHENAZOPYRIDINE HYDROCHLORIDE 100 MG: 100 TABLET ORAL at 11:11

## 2024-11-19 RX ADMIN — MIRTAZAPINE 15 MG: 15 TABLET, FILM COATED ORAL at 08:11

## 2024-11-19 RX ADMIN — BUSPIRONE HYDROCHLORIDE 30 MG: 15 TABLET ORAL at 08:11

## 2024-11-19 RX ADMIN — PROPOFOL 60 MG: 10 INJECTION, EMULSION INTRAVENOUS at 01:11

## 2024-11-19 RX ADMIN — PROPOFOL 10 MG: 10 INJECTION, EMULSION INTRAVENOUS at 01:11

## 2024-11-19 RX ADMIN — LIDOCAINE HYDROCHLORIDE 4 ML: 20 INJECTION, SOLUTION EPIDURAL; INFILTRATION; INTRACAUDAL; PERINEURAL at 01:11

## 2024-11-19 RX ADMIN — FERROUS SULFATE TAB 325 MG (65 MG ELEMENTAL FE) 1 EACH: 325 (65 FE) TAB at 09:11

## 2024-11-19 NOTE — PROGRESS NOTES
Inpatient Nutrition Assessment    Admit Date: 2024   Total duration of encounter: 12 days   Patient Age: 78 y.o.    Nutrition Recommendation/Prescription     Regular, lactose free diet as tolerated    Continue PPN Clinimix 4.25/5 at 50 mL/hr, providin kcal (29% est needs)  51 gm AA (72% est needs)  60 gm dextrose (GIR: 0.6 mg/kg/min)  1200 mL fluid (67% est needs)    Once medically feasible, resume home TPN  D70% 120 gm (172 mL)  15%AA 60 gm (400 mL)  IL 20% 50 gm (250 mL) twice a week (home TPN: IL20% 30gm 3 times per week)  GIR: 1.17 mg/kg/min  Provides:   790 kcal (55% est needs)  60 gm protein (85% est needs)  Adjust electrolytes per pharmacy  Monitor oral intake and adjust TPN as needed    Monitor po intake, energy intake, labs and weight    Communication of Recommendations:  EMR    Nutrition Assessment     Malnutrition Assessment/Nutrition-Focused Physical Exam    Malnutrition Context: acute illness or injury (24)  Malnutrition Level: other (see comments) (does not meet criteria) (24)  Energy Intake (Malnutrition): other (see comments) (does not meet criteria) (24)  Weight Loss (Malnutrition): other (see comments) (does not meet criteria) (24)  Subcutaneous Fat (Malnutrition): other (see comments) (does not meet criteria) (24)           Muscle Mass (Malnutrition): moderate depletion (24)  Flippin Region (Muscle Loss): mild depletion     Clavicle and Acromion Bone Region (Muscle Loss): moderate depletion     Dorsal Hand (Muscle Loss): mild depletion                    A minimum of two characteristics is recommended for diagnosis of either severe or non-severe malnutrition.    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, 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, 2024  Suspected right-sided pneumonia on admit-completed treatment   Recent COVID-19 pneumonitis   History of short gut syndrome on TPN at home   Prophylaxis     Relevant Medical History: short gut syndrome on TPN, HTN, HLD, PVD, prior DVT, RA, cirrhosis, gallstone pancreatitis, anxiety, depression, previous MRSA bacteremia in the setting of retained infected MediPort     Scheduled Medications:  busPIRone, 30 mg, BID  citalopram, 40 mg, Daily  ferrous sulfate, 1 tablet, Daily  mirtazapine, 15 mg, QHS  phenazopyridine, 100 mg, TID WM  rifAMpin, 300 mg, Q12H  teduglutide, 3.8 mg, Daily  vancomycin (VANCOCIN) 1,000 mg in D5W 250 mL IVPB (admixture device), 1,000 mg, Q12H  warfarin, 5 mg, Daily    Continuous Infusions:  amino acid 4.25% - dextrose 5% solution, Last Rate: 50 mL/hr at 11/18/24 1634    PRN Medications:  acetaminophen, 650 mg, Q8H PRN  dextrose 10%, 12.5 g, PRN  dextrose 10%, 25 g, PRN  labetaloL, 10 mg, Q4H PRN  melatonin, 6 mg, Nightly PRN  sodium chloride 0.9%, 10 mL, PRN  sodium chloride 0.9%, 10 mL, Q12H PRN  sodium chloride 0.9%, 10 mL, Q12H PRN  sodium chloride 0.9%, 10 mL, Q12H PRN  traMADoL, 50 mg, Q6H PRN  vancomycin - pharmacy to dose, , pharmacy to manage frequency    Calorie Containing IV Medications: Clinimix    Recent Labs   Lab 11/13/24  0536 11/14/24  0359 11/14/24  1009 11/15/24  0518 11/16/24  0531 11/17/24  0541 11/18/24  0515 11/19/24  0545    139  --  138 138 140 140 139   K 4.0 3.5  --  3.7 3.7 3.7 3.6 3.4*   CALCIUM 8.4 8.7  --  9.0 8.5 8.2* 8.3* 8.3*   PHOS 2.8 2.9  --  3.2 3.3 2.4 2.7 2.2*   MG 1.90 1.70  --  1.80 1.90 2.00 1.90 1.90   * 108*  --  109* 110* 112* 113* 112*   CO2 21* 24  --  20* 21* 21* 22* 20*   BUN 12.5 16.9  --  17.0 17.8 16.9 16.2 17.0   CREATININE 0.65 0.65  --  0.66 0.63 0.64 0.65 0.65   EGFRNORACEVR >60 >60  --  >60  >60 >60 >60 >60   GLUCOSE 164* 130*  --  97 97 105 102 102   BILITOT  --   --   --  0.5 0.9  --  0.6  --    ALKPHOS  --   --   --  117 122  --  119  --    ALT  --   --   --  25 26  --  25  --    AST  --   --   --  30 34  --  25  --    ALBUMIN  --   --   --  3.3* 3.0*  --  3.1*  --    WBC  --   --  6.13 5.15 3.63*  --  4.00*  --    HGB  --   --  12.4 12.3 11.5*  --  10.8*  --    HCT  --   --  38.7 38.6 35.8*  --  34.5*  --      Nutrition Orders:  amino acid 4.25% - dextrose 5% solution  Diet NPO      Appetite/Oral Intake: NPO/NPO  Factors Affecting Nutritional Intake: altered gastrointestinal function, malabsorption, and NPO  Social Needs Impacting Access to Food: none identified  Food/Rastafari/Cultural Preferences:   eggs, banana, cheerios, cream of wheat, oatmeal, grits for breakfast. No raisin bran or sausage, no beans or green, leafy veggies  Food Allergies: no known food allergies  Last Bowel Movement: 11/18/24  Wound(s):  none documented    Comments    11/9/24: Received call from nurse requesting to restart home TPN; reports pt tolerating oral diet but not eating a whole lot. Plan to start PPN Clinimix 4.25/5% and resume home TPN tomorrow evening once filled; possible weight loss noted in EMR over the past few months; will attempt physical assessment on follow up      11/11/24: Pt reports fair intake, receiving food items on breakfast tray that she cannot eat. Obtained food preferences. TPN held 2/2 holding off on PICC and not using MediPort 2/2 infection. Midline placed. Recommend starting PPN Clinimix 4.25/5 as medically appropriate to supplement oral intake until TPN medically feasible. Denies n/v, +chronic diarrhea. Last BM documented 11/8. UBW reported ~165# with 5# weight loss in >1 month. 4.8% weight loss noted (insignificant). Pt with mild-moderate muscle depletion per NFPE.     11/15/24: Pt reports fair-good intake, breakfast intake improved since food preferences relayed to kitchen. PPN continues at  "50 mL/hr. No reports of n/v, LBM 11/15. Will continue to monitor.    24: Pt off floor for DALE at time of visit, NPO this AM. PICC placement noted , can resume TPN once medically feasible. No reports of GI complaints or decreased appetite per notes. LBM . Will continue to monitor.    Anthropometrics    Height: 5' 4.02" (162.6 cm), Height Method: Stated  Last Weight: 71.5 kg (157 lb 10.1 oz) (24 2300), Weight Method: Bed Scale  BMI (Calculated): 27  BMI Classification: overweight (BMI 25-29.9)     Ideal Body Weight (IBW), Female: 120.1 lb     % Ideal Body Weight, Female (lb): 131.36 %                    Usual Body Weight (UBW), k kg  % Usual Body Weight: 95.53     Usual Weight Provided By: patient and EMR weight history    Wt Readings from Last 5 Encounters:   24 71.5 kg (157 lb 10.1 oz)   24 73 kg (161 lb)   10/24/24 74.4 kg (164 lb)   10/18/24 76.1 kg (167 lb 12.8 oz)   24 75.8 kg (167 lb)     Weight Change(s) Since Admission:   Wt Readings from Last 1 Encounters:   24 2300 71.5 kg (157 lb 10.1 oz)   24 1553 74.8 kg (165 lb)   Admit Weight: 74.8 kg (165 lb) (24 1553), Weight Method: Stated    Estimated Needs    Weight Used For Calorie Calculations: 71.5 kg (157 lb 10.1 oz)  Energy Calorie Requirements (kcal): 1889-8273 kcal (20-25 kcal/kg)  Energy Need Method: Kcal/kg  Weight Used For Protein Calculations: 71.5 kg (157 lb 10.1 oz)  Protein Requirements: 71-85gm (1-1.2 gm/kg)  Fluid Requirements (mL): 1787ml (25 ml/kg)        Enteral Nutrition     Patient not receiving enteral nutrition at this time.    Parenteral Nutrition     Standard Formula: Clinimix 4.25/5  Custom Formula: not applicable  Additives: none  Rate/Volume: 50 mL/hr  Lipids: none  Total Nutrition Provided by Parenteral Nutrition:  Calories Provided  408 kcal/d, 29% needs   Protein Provided  51 g/d, 72% needs   Dextrose Provided  60 g/d, GIR 0.6 mg CHO/kg/min   Fluid Provided  1200 ml/d, " 67% needs       Evaluation of Received Nutrient Intake    Calories: meeting estimated needs  Protein: meeting estimated needs    Patient Education     Not applicable.    Nutrition Diagnosis     PES: Inadequate energy intake related to suboptimal protein/energy intake as evidenced by <80% est needs met. (resolved)     Nutrition Interventions     Intervention(s): general/healthful diet, modified composition of parenteral nutrition, modified rate of parenteral nutrition, and collaboration with other providers       Goal: Meet greater than 80% of nutritional needs by follow-up. (goal met)  Goal: Maintain weight throughout hospitalization. (goal met)    Nutrition Goals & Monitoring     Dietitian will monitor: food and beverage intake, energy intake, parenteral nutrition intake, weight, electrolyte/renal panel, glucose/endocrine profile, and gastrointestinal profile  Discharge planning: resume home regimen  Nutrition Risk/Follow-Up: moderate (follow-up in 3-5 days)   Please consult if re-assessment needed sooner.

## 2024-11-19 NOTE — ANESTHESIA RELEASE NOTE
"Anesthesia Release from PACU Note    Patient: Laura Acosta    Procedure(s) Performed: * No procedures listed *    Anesthesia type: general    Post pain: Adequate analgesia    Post assessment: no apparent anesthetic complications    Last Vitals: Visit Vitals  BP (!) 180/83 (BP Location: Left arm, Patient Position: Lying)   Pulse 66   Temp 36.5 °C (97.7 °F) (Skin)   Resp 15   Ht 5' 4.02" (1.626 m)   Wt 71.5 kg (157 lb 10.1 oz)   SpO2 99%   BMI 27.04 kg/m²       Post vital signs: stable    Level of consciousness: awake, alert , and oriented    Nausea/Vomiting: no nausea/no vomiting    Complications: none    Airway Patency: patent    Respiratory: unassisted, spontaneous ventilation, room air    Cardiovascular: stable and blood pressure at baseline    Hydration: euvolemic  "

## 2024-11-19 NOTE — PROGRESS NOTES
[facility]    Cardiology  Progress Note    Patient Name: Laura Acosta  MRN: [id number]  Admission Date: 11/7/2024  Hospital Length of Stay: 12 days  Code Status: Full Code   Attending Provider: Raine Moscoso MD   Consulting Provider: VIVI Brizuela  Primary Care Physician: Curt Felton MD  Principal Problem:Hypoxia    Patient information was obtained from past medical records and ER records.     Subjective:     Chief Complaint/Reason for Consult: Request for DALE    HPI: Ms. Acosta is a 79 y/o female who is known to [facility], Dr. Cheng. The patient presented to [facility] on 11.7.24 under the direction of her PCP.  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  She has had multiple line infections including her MediPort resulting in MRSA bacteremia.  Approximately 7 months ago she had a MediPort replacement for this reason.  She was hospitalized last month with fever which was attributed to COVID-19 infection and pneumonia, at this time her blood cultures were unremarkable.  For this hospitalization she presents with complaints of fever, generalized weakness and fatigue.  Initial workup revealed radiographic imaging concerning for pneumonia/enterocolitis.  She was admitted to hospital medicine services and started on broad-spectrum antibiotics.  Initial cultures revealed Staph epidermis and repeat blood cultures revealed 1/2 bottles of staph epidermis/staph hominis.  Concern for MediPort infection arose and general surgery was consulted for removal, which was removed on November 12, 2024.  ID was consulted for recommendations and Cardiology was consulted for a DALE to evaluate for endocarditis.    Hospital Course:  11.17.24: NAD Noted. DALE Tomorrow.   11.18.24: NAD. Denies CP, SOB, or palps. Unable to perform DALE today due to anesthesia coverage. INR  2.6. Plan for DALE tomorrow.   11.19.24: NAD. Denies Cp, SOB, or palps. NPO  for scheduled DALE today. INR 2.9.     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), Warfarin/DVT of Iliac Vein, Depression, Fibromyalgia, Chronic TPN/Port, NICANOR/Bilaterally Mild, Meningitis, Bacteremia, Pancreatitis, Renal Caliculi, OA, RA  PSH: Small Bowel Intestinal Resection, Breast Lumpectomy, Appendectomy, DANIAL, Shoulder Surgery, Cholecystectomy, Hernia Repair, Colonoscopy, Cystoscopy, Hysterectomy, Ventral Hernia Repair  Family History: Father, D, Brain Aneurysm, Mother, D, Breast Cancer, Uterine Cancer  Social History: Denies Illicit Drug, ETOH and Tobacco Use     Previous Cardiac Diagnostics:   ECHO 11.14.24:  Left Ventricle: The left ventricle is normal in size. Mildly increased wall thickness. There is normal systolic function with a visually estimated ejection fraction of 65 - 70%. Grade I diastolic dysfunction.  Right Ventricle: Normal right ventricular cavity size. Systolic function is normal. TAPSE is 1.92 cm.  Aortic Valve: There is mild aortic valve sclerosis.  Tricuspid Valve: There is mild regurgitation.  IVC/SVC: Intermediate venous pressure at 8 mmHg.  No vegetation seen    ECHO 6.11.24:  Left Ventricle: The left ventricle is normal in size. Normal wall thickness. There is normal systolic function with a visually estimated ejection fraction of 55 - 60%.  Right Ventricle: Normal right ventricular cavity size. Systolic function is normal.  Aortic Valve: The aortic valve is a trileaflet valve. There is aortic valve sclerosis.  Mitral Valve: There is no stenosis. The mean pressure gradient across the mitral valve is 2 mmHg at a heart rate of  bpm.  IVC/SVC: Normal venous pressure at 3 mmHg.    Coronary Calcium Score 7.25.16:  Total 1    Review of patient's allergies indicates:   Allergen Reactions    Hydromorphone Itching     Other reaction(s): itching    Opium      Other reaction(s): itching  has itching with larger doses. Smaller  doses do not cause a reaction.     No current facility-administered medications on file prior to encounter.     Current Outpatient Medications on File Prior to Encounter   Medication Sig    busPIRone (BUSPAR) 10 MG tablet Take 20 mg by mouth 2 (two) times daily.    CELEXA 40 mg tablet Take 40 mg by mouth once daily.    cholecalciferol, vitamin D3, 1,250 mcg (50,000 unit) capsule Take 50,000 Units by mouth every 7 days.    diphenoxylate-atropine 2.5-0.025 mg (LOMOTIL) 2.5-0.025 mg per tablet Take 2 tablets by mouth 2 (two) times a day.    doxycycline (VIBRAMYCIN) 100 MG Cap Take 200 mg by mouth once daily.    ferrous sulfate 325 (65 FE) MG EC tablet Take 1 tablet (325 mg total) by mouth once daily.    GATTEX 30-VIAL 5 mg Kit Inject into the skin Daily.    metoprolol tartrate (LOPRESSOR) 100 MG tablet Take 100 mg by mouth 2 (two) times daily.    mirtazapine (REMERON) 15 MG tablet Take 15 mg by mouth every evening.    multivitamin (ONE DAILY MULTIVITAMIN) per tablet Take 1 tablet by mouth once daily.    solifenacin (VESICARE) 5 MG tablet Take 5 mg by mouth once daily.    warfarin (COUMADIN) 2.5 MG tablet TAKE ONE TABLET BY MOUTH ONCE DAILY IN THE EVENING OR AS DIRECTED BY [facility] PROVIDER     Review of Systems   Constitutional:  Negative for fatigue.   Respiratory:  Negative for chest tightness and shortness of breath.    Cardiovascular:  Negative for chest pain.   Neurological:  Negative for syncope.   All other systems reviewed and are negative.    Objective:     Vital Signs (Most Recent):  Temp: 97.8 °F (36.6 °C) (11/19/24 1422)  Pulse: 77 (11/19/24 1422)  Resp: 15 (11/19/24 1326)  BP: 123/61 (11/19/24 1422)  SpO2: (!) 92 % (11/19/24 1422) Vital Signs (24h Range):  Temp:  [97.7 °F (36.5 °C)-98.8 °F (37.1 °C)] 97.8 °F (36.6 °C)  Pulse:  [66-80] 77  Resp:  [15-20] 15  SpO2:  [91 %-99 %] 92 %  BP: (113-180)/(61-83) 123/61   Weight: 71.5 kg (157 lb 10.1 oz)  Body mass index is 27.04 kg/m².  SpO2: (!) 92 %        Intake/Output Summary (Last 24 hours) at 11/19/2024 1523  Last data filed at 11/19/2024 1352  Gross per 24 hour   Intake 600 ml   Output 900 ml   Net -300 ml     Lines/Drains/Airways       Peripherally Inserted Central Catheter Line  Duration             PICC Double Lumen 11/17/24 1356 left basilic 2 days              Peripheral Intravenous Line  Duration                  Peripheral IV - Single Lumen 11/11/24 1635 20 G 2 1/4 in Yes Anterior;Left Forearm 7 days                  Significant Labs:   Chemistries:   Recent Labs   Lab 11/15/24  0518 11/16/24  0531 11/17/24  0541 11/18/24  0515 11/19/24  0545    138 140 140 139   K 3.7 3.7 3.7 3.6 3.4*   * 110* 112* 113* 112*   CO2 20* 21* 21* 22* 20*   BUN 17.0 17.8 16.9 16.2 17.0   CREATININE 0.66 0.63 0.64 0.65 0.65   CALCIUM 9.0 8.5 8.2* 8.3* 8.3*   BILITOT 0.5 0.9  --  0.6  --    ALKPHOS 117 122  --  119  --    ALT 25 26  --  25  --    AST 30 34  --  25  --    GLUCOSE 97 97 105 102 102   MG 1.80 1.90 2.00 1.90 1.90   PHOS 3.2 3.3 2.4 2.7 2.2*        CBC/Anemia Labs: Coags:    Recent Labs   Lab 11/15/24  0518 11/16/24  0531 11/18/24  0515   WBC 5.15 3.63* 4.00*   HGB 12.3 11.5* 10.8*   HCT 38.6 35.8* 34.5*    166 168   MCV 90.0 89.7 90.8   RDW 14.9 14.9 15.3    Recent Labs   Lab 11/17/24  0541 11/18/24  0515 11/19/24  0545   INR 1.9* 2.6* 2.9*        Significant Imaging:  Imaging Results              CT Chest Abdomen Pelvis With IV Contrast (XPD) NO Oral Contrast (Final result)  Result time 11/07/24 20:47:43      Final result by Isaac Richardson MD (11/07/24 20:47:43)                   Impression:      1.  Right lower lung lobe small new vague opacity may be inflammatory or infectious.  Details of other chest findings above.    2.  Mild excessive fluid within loops of small bowel and the right colon could represent enterocolitis.  Details of other abdominopelvic findings above.      Electronically signed by: Isaac  Richardson  Date:    11/07/2024  Time:    20:47               Narrative:    EXAMINATION:  CT CHEST ABDOMEN PELVIS WITH IV CONTRAST (XPD)    CLINICAL HISTORY:  Sepsis;    TECHNIQUE:  Multidetector axial images were obtained from the thoracic inlet through the greater trochanters following the administration of IV contrast.    Dose length product of 785 mGycm. Automated exposure control was utilized to minimize radiation dose.    COMPARISON:  CT chest and CT abdomen pelvis October 18, 2024.    CHEST FINDINGS:    Bilateral lungs are remarkable for peripheral subpleural fine to coarse reticulations consistent with fibrosis.  Previous exam was remarkable for right middle lung lobe and lingular segment some infiltrates which show interval improvement.  There are subtle new vague opacities which involve the right right lower lung lobe on image 70 series 4 and may be inflammatory or infectious.  There is no pulmonary edema.  No cavitary abnormality.  No fluid within the pleural and pericardial spaces.  Thoracic aorta is without aneurysmal dilatation and there are no signs of dissection.  Cardiac chamber size is within normal limits.  Left chest implanted tunnel castro catheter terminates within the superior vena cava.  Right breast medial aspect rim calcified density.  Thoracic kyphoscoliosis and degenerative changes.    ABDOMINAL FINDINGS:    Liver, mildly atrophic pancreas and the spleen are without acute findings.  Gallbladder is surgically absent.  There is no apparent dilatation of ducts.  Adrenals are unremarkable.  Left kidney mild cortical scarring.  Left kidney medial cortical small hypodensity is stable and is favored to be a cyst and for this no specific follow-up imaging is recommended.  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  There are no perinephric strandings.  No retroperitoneal periaortic enlarged lymph nodes.    Stomach is mostly decompressed.  Mild excessive fluid is present  within loops of small bowel without transition or bowel obstruction.  Similarly, there is some excessive fluid within the colon.  Similar appearance of ileocolic anastomosis.  No inflammatory strandings identified.  Extensive noninflamed diverticulosis coli involving the descending and the sigmoid colon.  No bowel obstruction.  No free fluid.    PELVIC FINDINGS:    Urinary bladder wall is not thickened.  No pelvic free fluid.    Thoracolumbar degenerative changes.  Scoliosis.                                       X-Ray Chest PA And Lateral (Final result)  Result time 11/07/24 16:46:33      Final result by Isabel Brasher MD (11/07/24 16:46:33)                   Impression:      No acute abnormality of the chest.      Electronically signed by: Isabel Brasher  Date:    11/07/2024  Time:    16:46               Narrative:    EXAMINATION:  XR CHEST PA AND LATERAL    CLINICAL HISTORY:  Fever;    TECHNIQUE:  PA and lateral chest radiographs    COMPARISON:  Chest x-ray dated 11/07/2024    FINDINGS:  Left chest wall port catheter remains in place.  The heart is normal in size.  There is no focal airspace consolidation.  There is no pleural effusion or visible pneumothorax.                                        Physical Exam  Vitals and nursing note reviewed.   Constitutional:       General: She is not in acute distress.     Appearance: Normal appearance.   HENT:      Head: Normocephalic.      Nose: Nose normal.      Mouth/Throat:      Mouth: Mucous membranes are moist.   Eyes:      Extraocular Movements: Extraocular movements intact.      Conjunctiva/sclera: Conjunctivae normal.   Cardiovascular:      Rate and Rhythm: Normal rate and regular rhythm.      Pulses: Normal pulses.   Pulmonary:      Effort: Pulmonary effort is normal. No respiratory distress.   Abdominal:      Palpations: Abdomen is soft.   Skin:     General: Skin is warm and dry.   Neurological:      Mental Status: She is alert and oriented to person,  place, and time. Mental status is at baseline.   Psychiatric:         Mood and Affect: Mood normal.         Behavior: Behavior normal.       Home Medications:   No current facility-administered medications on file prior to encounter.     Current Outpatient Medications on File Prior to Encounter   Medication Sig Dispense Refill    busPIRone (BUSPAR) 10 MG tablet Take 20 mg by mouth 2 (two) times daily.      CELEXA 40 mg tablet Take 40 mg by mouth once daily.      cholecalciferol, vitamin D3, 1,250 mcg (50,000 unit) capsule Take 50,000 Units by mouth every 7 days.      diphenoxylate-atropine 2.5-0.025 mg (LOMOTIL) 2.5-0.025 mg per tablet Take 2 tablets by mouth 2 (two) times a day.      doxycycline (VIBRAMYCIN) 100 MG Cap Take 200 mg by mouth once daily.      ferrous sulfate 325 (65 FE) MG EC tablet Take 1 tablet (325 mg total) by mouth once daily. 30 tablet 0    GATTEX 30-VIAL 5 mg Kit Inject into the skin Daily.      metoprolol tartrate (LOPRESSOR) 100 MG tablet Take 100 mg by mouth 2 (two) times daily.      mirtazapine (REMERON) 15 MG tablet Take 15 mg by mouth every evening.      multivitamin (ONE DAILY MULTIVITAMIN) per tablet Take 1 tablet by mouth once daily.      solifenacin (VESICARE) 5 MG tablet Take 5 mg by mouth once daily.      warfarin (COUMADIN) 2.5 MG tablet TAKE ONE TABLET BY MOUTH ONCE DAILY IN THE EVENING OR AS DIRECTED BY [facility] PROVIDER       Current Schedule Inpatient Medications:   busPIRone  30 mg Oral BID    citalopram  40 mg Oral Daily    ferrous sulfate  1 tablet Oral Daily    mirtazapine  15 mg Oral QHS    phenazopyridine  100 mg Oral TID WM    rifAMpin  300 mg Oral Q12H    teduglutide  3.8 mg Subcutaneous Daily    vancomycin (VANCOCIN) 1,000 mg in D5W 250 mL IVPB (admixture device)  1,000 mg Intravenous Q12H    warfarin  2.5 mg Oral Daily     Continuous Infusions:   amino acid 4.25% - dextrose 5% solution   Intravenous Continuous 50 mL/hr at 11/18/24 1634 New Bag at 11/18/24 1634      Assessment:   Bacteremia/Sepsis     - ECHO (11.14.24) - LVEF 65-70%, Grade I DD; No Vegetation Seen    - Staph Epi/Staph Hominis (Cleared)  Infected MediPort     - Explanted (11.12.24)  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  Keep NPO.  Signed consent on the chart.  Decrease to home dose of warfarin 2.5 mg PO daily. INR is 2.9 today and has been increasing daily.   Will have  arrange to have home health draw INR on Thursday.     Addendum:  DALE performed without incident  No evidence of IE. Official report to follow.    Will be available as needed.       María Booker, VIVI  Cardiology  [facility]

## 2024-11-19 NOTE — ANESTHESIA POSTPROCEDURE EVALUATION
Anesthesia Post Evaluation    Patient: Laura Acosta    Procedure(s) Performed: * No procedures listed *    Final Anesthesia Type: general      Patient location during evaluation: Cath Lab  Patient participation: Yes- Able to Participate  Level of consciousness: awake and alert  Post-procedure vital signs: reviewed and stable  Pain management: adequate  Airway patency: patent  TATYANA mitigation strategies: Multimodal analgesia  PONV status at discharge: No PONV  Anesthetic complications: no      Cardiovascular status: blood pressure returned to baseline and hemodynamically stable  Respiratory status: unassisted and spontaneous ventilation  Hydration status: euvolemic  Follow-up not needed.              Vitals Value Taken Time   /83 11/19/24 1326   Temp 36.5 °C (97.7 °F) 11/19/24 1326   Pulse 66 11/19/24 1326   Resp 15 11/19/24 1326   SpO2 99 % 11/19/24 1326         No case tracking events are documented in the log.      Pain/Chuy Score: No data recorded

## 2024-11-19 NOTE — PROGRESS NOTES
[facility] Hospital Medicine Progress Note        Chief Complaint: Inpatient Follow-up    HPI:     78-year-old female with significant history of short gut syndrome on TPN at home, HTN, HLD, PVD, DVT/clotting disorder on Coumadin, rheumatoid arthritis, cirrhosis, gallstone pancreatitis, anxiety/depression.  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  Last MediPort replacement was 7 months back.  Patient had a recent hospitalization in October which fever spikes which was attributed to COVID-19 infection and pneumonia, cultures were negative then and was discharged home.  Patient presented back to the ED on 11/07 with complaints of fever, generalized weakness, labs non impressive.  Imaging concerning for pneumonia/enterocolitis, patient was admitted to hospital medicine services, cultures ordered and was initiated on broad-spectrum antimicrobials for colitis/pneumonia.  Cultures from admission grew Staph epidermidis.  Repeat cultures with 1 bottle growing Staph epidermidis/Staph hominis.  Concern for MediPort infection, General surgery consulted for removal of the same.  In the meantime a midline was placed for IV access.  MediPort successfully removed 11/12.  Infectious Disease consulted, awaiting recs, vancomycin continued.  Infectious Disease added rifampin, TTE was ordered which came back negative and Infectious Disease recommended PRAKASH if TTE negative, cardiology consulted.  Prakash scheduled for 11/18.  Prakash postponed to 11/19.  PICC line placed 11/17, plan for IV vancomycin as outpatient, case management working on it    Interval Hx:   Patient seen at bedside, I saw her in the morning and again after PRAKASH, comfortable, no new complaints, no acute events overnight, hemodynamics stable, afebrile Objective/physical exam:  General: In no acute distress, afebrile  Chest: Clear to auscultation bilaterally  Heart:  S1, S2, no appreciable murmur  Abdomen: Soft, nontender, BS +  MSK: Warm, no lower extremity edema, no clubbing or cyanosis  Neurologic: Alert and oriented x4, moving all extremities with good strength     VITAL SIGNS: 24 HRS MIN & MAX LAST   Temp  Min: 97.9 °F (36.6 °C)  Max: 98.8 °F (37.1 °C) 98.2 °F (36.8 °C)   BP  Min: 113/66  Max: 132/76 113/66   Pulse  Min: 70  Max: 89  70   Resp  Min: 18  Max: 18 18   SpO2  Min: 91 %  Max: 95 % (!) 93 %       Recent Labs   Lab 11/18/24  0515   WBC 4.00*   RBC 3.80*   HGB 10.8*   HCT 34.5*   MCV 90.8   MCH 28.4   MCHC 31.3*   RDW 15.3      MPV 9.6         Recent Labs   Lab 11/18/24  0515 11/19/24  0545    139   K 3.6 3.4*   * 112*   CO2 22* 20*   BUN 16.2 17.0   CREATININE 0.65 0.65   CALCIUM 8.3* 8.3*   MG 1.90 1.90   ALBUMIN 3.1*  --    ALKPHOS 119  --    ALT 25  --    AST 25  --    BILITOT 0.6  --           Microbiology Results (last 7 days)       Procedure Component Value Units Date/Time    Blood Culture [[id number]]  (Normal) Collected: 11/14/24 2251    Order Status: Completed Specimen: Blood from Hand, Left Updated: 11/19/24 0005     Blood Culture No Growth At 96 Hours    Blood Culture [[id number]]  (Normal) Collected: 11/14/24 2251    Order Status: Completed Specimen: Blood from Hand, Left Updated: 11/19/24 0005     Blood Culture No Growth At 96 Hours    Anaerobic Culture [facility] [[id number]] Collected: 11/12/24 2011    Order Status: Completed Specimen: Foreign Body from Mediport Updated: 11/16/24 0745     Anaerobe Culture No Anaerobes Isolated    Blood Culture [[id number]]  (Normal) Collected: 11/10/24 0957    Order Status: Completed Specimen: Blood, Peripheral Line Updated: 11/16/24 0100     Blood Culture No Growth at 5 days    Medical Device Culture [[id number]]  (Abnormal)  (Susceptibility) Collected: 11/12/24 2000    Order Status: Completed Specimen: Foreign Body from Mediport Updated: 11/15/24 0830     CULTURE, MEDICAL DEVICE ([facility]) <15 CFU/mL  Staphylococcus epidermidis    Blood Culture [[id number]]  (Abnormal)  (Susceptibility) Collected: 11/10/24 4610    Order Status: Completed Specimen: Blood, Arterial Updated: 11/14/24 0630     Blood Culture Staphylococcus hominis      Staphylococcus epidermidis     GRAM STAIN Gram Positive Cocci, probable Staphylococcus      Seen in gram stain of broth only      2 of 2 bottles positive    Mycobacteria and Nocardia Culture [[id number]] Collected: 11/12/24 2011    Order Status: Sent Specimen: Body Fluid from Chest, Right Updated: 11/13/24 1200    Fungal Culture [[id number]] Collected: 11/12/24 2011    Order Status: Sent Specimen: Mediport Tip from Chest Updated: 11/13/24 1200    Gram Stain [[id number]] Collected: 11/12/24 2000    Order Status: Completed Specimen: Foreign Body from Mediport Updated: 11/13/24 0432     GRAM STAIN No WBC seen      Rare Gram positive cocci    Anaerobic Culture [[id number]] Collected: 11/12/24 2000    Order Status: Canceled Specimen: Foreign Body from Mediport              Scheduled Med:   busPIRone  30 mg Oral BID    citalopram  40 mg Oral Daily    ferrous sulfate  1 tablet Oral Daily    mirtazapine  15 mg Oral QHS    phenazopyridine  100 mg Oral TID WM    potassium chloride  40 mEq Oral Once    rifAMpin  300 mg Oral Q12H    teduglutide  3.8 mg Subcutaneous Daily    vancomycin (VANCOCIN) 1,000 mg in D5W 250 mL IVPB (admixture device)  1,000 mg Intravenous Q12H    warfarin  5 mg Oral Daily          Assessment/Plan:    Staph epidermidis/Staph hominis bacteremia-cleared 11/14  Infected MediPort-removed 11/12  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, 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, 2024  Suspected right-sided pneumonia on admit-completed treatment   Recent COVID-19 pneumonitis   History of short gut syndrome on TPN at home   Essential HTN-stable   HLD   History of PVD   History of clotting disorder with history of DVT on Coumadin   Rheumatoid arthritis   Cirrhosis-appears compensated   History  of gallstone pancreatitis   Anxiety/depression   Prophylaxis      Appreciate infectious disease recommendation   Now on IV vancomycin and rifampin   TTE is negative   DALE done today, per unofficial report negative, awaiting official report  MediPort and PICC line removed, now has midline  On TPN through midline  Patient is now afebrile, hemodynamically stable with no more leukocytosis  INR is therapeutic and continue current dose of Coumadin-5 mg daily   Will have to be cautious since she is on rifampin, INR is 2.9 today  Continue home meds-BuSpar, citalopram, iron, Remeron, phenazopyridine,   DVT prophylaxis-Coumadin with therapeutic INR      Patient has PICC line, plan is to DC home once arrangements for IV antibiotics made, case management is working on it  Hopefully later today or tomorrow      Raine Moscoso MD   11/19/2024

## 2024-11-19 NOTE — TRANSFER OF CARE
"Anesthesia Transfer of Care Note    Patient: Laura Acosta    Procedure(s) Performed: * No procedures listed *    Patient location: Cath Lab    Anesthesia Type: general    Transport from OR: Transported from OR on room air with adequate spontaneous ventilation    Post pain: adequate analgesia    Post assessment: no apparent anesthetic complications    Post vital signs: stable    Level of consciousness: awake and alert    Nausea/Vomiting: no nausea/vomiting    Complications: none    Transfer of care protocol was followed      Last vitals: Visit Vitals  BP (!) 180/83 (BP Location: Left arm, Patient Position: Lying)   Pulse 66   Temp 36.5 °C (97.7 °F) (Skin)   Resp 15   Ht 5' 4.02" (1.626 m)   Wt 71.5 kg (157 lb 10.1 oz)   SpO2 99%   BMI 27.04 kg/m²     "

## 2024-11-19 NOTE — ANESTHESIA PREPROCEDURE EVALUATION
11/19/2024  Laura Acosta is a 78 y.o., female.  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 She has had multiple line infections including her MediPort resulting in MRSA bacteremia. Approximately 7 months ago she had a MediPort replacement for this reason. She was hospitalized last month with fever which was attributed to COVID-19 infection and pneumonia, at this time her blood cultures were unremarkable. For this hospitalization she presents with complaints of fever, generalized weakness and fatigue. Initial workup revealed radiographic imaging concerning for pneumonia/enterocolitis. She was admitted to hospital medicine services and started on broad-spectrum antibiotics. Initial cultures revealed Staph epidermis and repeat blood cultures revealed 1/2 bottles of staph epidermis/staph hominis. Concern for MediPort infection arose and general surgery was consulted for removal, which was removed on November 12, 2024. ID was consulted for recommendations and Cardiology was consulted for a DALE to evaluate for endocarditis.     Pre-op Assessment    I have reviewed the Patient Summary Reports.     I have reviewed the Nursing Notes. I have reviewed the NPO Status.   I have reviewed the Medications.     Review of Systems  Cardiovascular:     Hypertension                                    Hypertension         Pulmonary:  Pneumonia                  Pulmonary Infection:  Pneumonia.     Renal/:  Chronic Renal Disease        Kidney Function/Disease             Hepatic/GI:      Liver Disease,            Liver Disease        Musculoskeletal:  Arthritis        Arthritis          Neurological:      Arthritis                           Psych:  Psychiatric History                  Physical Exam  General: Well nourished, Cooperative, Alert and  Oriented    Airway:  Mallampati: II   Mouth Opening: Normal  TM Distance: Normal  Tongue: Normal  Neck ROM: Normal ROM    Dental:  Upper dentures      Anesthesia Plan  Type of Anesthesia, risks & benefits discussed:    Anesthesia Type: Gen Natural Airway  Intra-op Monitoring Plan: Standard ASA Monitors  Post Op Pain Control Plan: multimodal analgesia  Induction:  IV  Informed Consent: Informed consent signed with the Patient and all parties understand the risks and agree with anesthesia plan.  All questions answered. Patient consented to blood products? Yes  ASA Score: 3  Day of Surgery Review of History & Physical: H&P Update referred to the surgeon/provider.    Ready For Surgery From Anesthesia Perspective.     .

## 2024-11-19 NOTE — PLAN OF CARE
Problem: Adult Inpatient Plan of Care  Goal: Plan of Care Review  Outcome: Progressing  Goal: Patient-Specific Goal (Individualized)  Outcome: Progressing  Goal: Absence of Hospital-Acquired Illness or Injury  Outcome: Progressing  Goal: Optimal Comfort and Wellbeing  Outcome: Progressing  Goal: Readiness for Transition of Care  Outcome: Progressing     Problem: Pneumonia  Goal: Fluid Balance  Outcome: Progressing  Goal: Resolution of Infection Signs and Symptoms  Outcome: Progressing  Goal: Effective Oxygenation and Ventilation  Outcome: Progressing     Problem: Infection  Goal: Absence of Infection Signs and Symptoms  Outcome: Progressing     Problem: Wound  Goal: Optimal Coping  Outcome: Progressing  Goal: Optimal Functional Ability  Outcome: Progressing  Goal: Absence of Infection Signs and Symptoms  Outcome: Progressing  Goal: Improved Oral Intake  Outcome: Progressing  Goal: Optimal Pain Control and Function  Outcome: Progressing  Goal: Skin Health and Integrity  Outcome: Progressing  Goal: Optimal Wound Healing  Outcome: Progressing     Problem: Skin Injury Risk Increased  Goal: Skin Health and Integrity  Outcome: Progressing     Problem: Fall Injury Risk  Goal: Absence of Fall and Fall-Related Injury  Outcome: Progressing      ED Record Reviewed

## 2024-11-19 NOTE — PLAN OF CARE
Received message from JAYCE Hinson for Dr. Mcfadden stating she would not be following patient outpatient and would need to sent referral to [facility]. Ambulatory referral sent [facility] and notified Charanjit. Charanjit started working on getting infectious disease at [facility] to sign so patient could discharge today. Unfortunately, unable to be set up today due to outpatient infusion nurse is off today. Will plan for DC tomorrow. Notified Dr. Moscoso.

## 2024-11-20 VITALS
WEIGHT: 157.63 LBS | RESPIRATION RATE: 15 BRPM | HEART RATE: 84 BPM | SYSTOLIC BLOOD PRESSURE: 106 MMHG | HEIGHT: 64 IN | OXYGEN SATURATION: 94 % | TEMPERATURE: 98 F | BODY MASS INDEX: 26.91 KG/M2 | DIASTOLIC BLOOD PRESSURE: 66 MMHG

## 2024-11-20 LAB
ANION GAP SERPL CALC-SCNC: 10 MEQ/L
BACTERIA BLD CULT: NORMAL
BACTERIA BLD CULT: NORMAL
BUN SERPL-MCNC: 15 MG/DL (ref 9.8–20.1)
CALCIUM SERPL-MCNC: 7.8 MG/DL (ref 8.4–10.2)
CHLORIDE SERPL-SCNC: 113 MMOL/L (ref 98–107)
CO2 SERPL-SCNC: 19 MMOL/L (ref 23–31)
CREAT SERPL-MCNC: 0.59 MG/DL (ref 0.55–1.02)
CREAT/UREA NIT SERPL: 25
GFR SERPLBLD CREATININE-BSD FMLA CKD-EPI: >60 ML/MIN/1.73/M2
GLUCOSE SERPL-MCNC: 100 MG/DL (ref 82–115)
INR PPP: 2.4
MAGNESIUM SERPL-MCNC: 2 MG/DL (ref 1.6–2.6)
PHOSPHATE SERPL-MCNC: 2 MG/DL (ref 2.3–4.7)
POTASSIUM SERPL-SCNC: 3.7 MMOL/L (ref 3.5–5.1)
PROTHROMBIN TIME: 25.9 SECONDS (ref 12.5–14.5)
SODIUM SERPL-SCNC: 142 MMOL/L (ref 136–145)
VANCOMYCIN TROUGH SERPL-MCNC: 19.2 UG/ML (ref 15–20)

## 2024-11-20 PROCEDURE — 63600175 PHARM REV CODE 636 W HCPCS: Performed by: INTERNAL MEDICINE

## 2024-11-20 PROCEDURE — 80202 ASSAY OF VANCOMYCIN: CPT | Performed by: INTERNAL MEDICINE

## 2024-11-20 PROCEDURE — 25000003 PHARM REV CODE 250: Performed by: INTERNAL MEDICINE

## 2024-11-20 PROCEDURE — 83735 ASSAY OF MAGNESIUM: CPT | Performed by: INTERNAL MEDICINE

## 2024-11-20 PROCEDURE — 85610 PROTHROMBIN TIME: CPT | Performed by: INTERNAL MEDICINE

## 2024-11-20 PROCEDURE — 25000003 PHARM REV CODE 250: Performed by: STUDENT IN AN ORGANIZED HEALTH CARE EDUCATION/TRAINING PROGRAM

## 2024-11-20 PROCEDURE — 84100 ASSAY OF PHOSPHORUS: CPT | Performed by: INTERNAL MEDICINE

## 2024-11-20 PROCEDURE — 80048 BASIC METABOLIC PNL TOTAL CA: CPT | Performed by: INTERNAL MEDICINE

## 2024-11-20 PROCEDURE — 36415 COLL VENOUS BLD VENIPUNCTURE: CPT | Performed by: INTERNAL MEDICINE

## 2024-11-20 PROCEDURE — 25000003 PHARM REV CODE 250: Performed by: HOSPITALIST

## 2024-11-20 RX ORDER — SODIUM,POTASSIUM PHOSPHATES 280-250MG
1 POWDER IN PACKET (EA) ORAL ONCE
Status: COMPLETED | OUTPATIENT
Start: 2024-11-20 | End: 2024-11-20

## 2024-11-20 RX ORDER — RIFAMPIN 300 MG/1
300 CAPSULE ORAL EVERY 12 HOURS
Qty: 72 CAPSULE | Refills: 0 | Status: SHIPPED | OUTPATIENT
Start: 2024-11-20 | End: 2024-12-26

## 2024-11-20 RX ORDER — SODIUM BICARBONATE 650 MG/1
1300 TABLET ORAL ONCE
Status: COMPLETED | OUTPATIENT
Start: 2024-11-20 | End: 2024-11-20

## 2024-11-20 RX ADMIN — PHENAZOPYRIDINE HYDROCHLORIDE 100 MG: 100 TABLET ORAL at 11:11

## 2024-11-20 RX ADMIN — VANCOMYCIN HYDROCHLORIDE 1000 MG: 1 INJECTION, POWDER, LYOPHILIZED, FOR SOLUTION INTRAVENOUS at 02:11

## 2024-11-20 RX ADMIN — PHENAZOPYRIDINE HYDROCHLORIDE 100 MG: 100 TABLET ORAL at 08:11

## 2024-11-20 RX ADMIN — FERROUS SULFATE TAB 325 MG (65 MG ELEMENTAL FE) 1 EACH: 325 (65 FE) TAB at 08:11

## 2024-11-20 RX ADMIN — BUSPIRONE HYDROCHLORIDE 30 MG: 15 TABLET ORAL at 09:11

## 2024-11-20 RX ADMIN — CITALOPRAM HYDROBROMIDE 40 MG: 20 TABLET ORAL at 08:11

## 2024-11-20 RX ADMIN — RIFAMPIN 300 MG: 300 CAPSULE ORAL at 08:11

## 2024-11-20 RX ADMIN — SODIUM BICARBONATE 650 MG TABLET 1300 MG: at 11:11

## 2024-11-20 RX ADMIN — POTASSIUM & SODIUM PHOSPHATES POWDER PACK 280-160-250 MG 1 PACKET: 280-160-250 PACK at 11:11

## 2024-11-20 NOTE — PLAN OF CARE
11/20/24 1115   Final Note   Assessment Type Final Discharge Note   Anticipated Discharge Disposition Home-Health   Post-Acute Status   Post-Acute Authorization Home Health;IV Infusion   Home Health Status Set-up Complete/Auth obtained  (Resume with [facility])   IV Infusion Status Set-up Complete/Auth obtained  ([facility])     Charanjit with [employer] reports JAYCE Hinson will follow patient's labs until appointment with [facility] ID on 12/5. Patient can discharge now and vancomycin will be administered at home this afternoon. INR orders and DC information sent to [facility].

## 2024-11-20 NOTE — PROGRESS NOTES
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 Glynn unclear treatment history, cirrhosis, gallstone pancreatitis, breast cancer, recurrent admissions to the hospital with CoNS bacteremia and MediPort infections who was admitted on 11/7 with fever and malaise for several days. Patient reports recent COVID infection in October with brief admission. She was given oral abx by her PCP but did not improve. Upon evaluation patient with fever, hypoxia, and evidence of RLL opacity and enterocolitis. She was started on empiric antimicrobials. Blood cx isolated CONS and patient had Mediport removed on 11/12. Patient has midline in place for access. Infectious diseases consulted for evaluation and management.     Sepsis  Mediport infection  Persistent CoNS bacteremia  Cirrhosis  CGD  Short gut syndrome on TPN  RA  Immunocompromised     Recommendations:  -Prakash negative for vegetations  -Vancomycin dosing per pharmacy for goal trough 15-20  -Rifampin 300mg PO q12h   -Plan for 6 weeks from negative culture 11/14  -anticipated end date 12/26  -Please monitor weekly CBC, CMP, ESR, CRP and Vancomycin trough  -My office will unfortunately not be able to order antibiotics as outpatient or monitor those labs however, patient should have ID follow up after discharge and have available physician that can continue ordering  and monitoring antibiotics   -once IV antibiotics completed, patient will likely need lifelong suppression as Doxycycline is not a good option at this time, could consider TMP/SMX but will defer to the evaluation of the outpatient treating physician at that time    ID will sign off. Please contact us with any questions or concerns.      Portions of this note dictated using EMR integrated voice recognition software, and may be subject to voice recognition errors not corrected at proofreading. Please contact writer for clarification if needed.    Subjective:     Principal Problem: Hypoxia     Interval History:   Feeling well, no fevers, no chills, had DALE today, no vegetations    Review of Systems   Review of Systems   All other systems reviewed and are negative.       Objective:     Vital Signs (Most Recent):  Temp: 98.2 °F (36.8 °C) (11/19/24 1849)  Pulse: 77 (11/19/24 1849)  Resp: 15 (11/19/24 1326)  BP: 116/74 (11/19/24 1849)  SpO2: (!) 93 % (11/19/24 1849)  Vital Signs (24h Range):  Temp:  [97.7 °F (36.5 °C)-98.2 °F (36.8 °C)] 98.2 °F (36.8 °C)  Pulse:  [66-87] 77  Resp:  [15-20] 15  SpO2:  [91 %-99 %] 93 %  BP: (113-180)/(61-83) 116/74      Weight:   Wt Readings from Last 1 Encounters:   11/07/24 71.5 kg (157 lb 10.1 oz)      Body mass index is Body mass index is 27.04 kg/m².     Estimated Creatinine Clearance: Estimated Creatinine Clearance: 69.1 mL/min (based on SCr of 0.65 mg/dL).     Lines/Drains/Airways       Peripherally Inserted Central Catheter Line  Duration             PICC Double Lumen 11/17/24 1356 left basilic 2 days              Peripheral Intravenous Line  Duration                  Peripheral IV - Single Lumen 11/11/24 1635 20 G 2 1/4 in Yes Anterior;Left Forearm 8 days                     Physical Exam  Physical Exam  Constitutional:       Appearance: Normal appearance.   HENT:      Head: Normocephalic and atraumatic.      Mouth/Throat:      Pharynx: No oropharyngeal exudate or posterior  "oropharyngeal erythema.   Eyes:      Extraocular Movements: Extraocular movements intact.      Pupils: Pupils are equal, round, and reactive to light.   Cardiovascular:      Rate and Rhythm: Normal rate and regular rhythm.      Heart sounds: Murmur (pre-existing) heard.   Pulmonary:      Effort: No respiratory distress.      Breath sounds: No wheezing, rhonchi or rales.   Abdominal:      General: Bowel sounds are normal. There is no distension.      Palpations: Abdomen is soft.      Tenderness: There is no abdominal tenderness. There is no right CVA tenderness or left CVA tenderness.   Musculoskeletal:         General: No swelling or tenderness.      Cervical back: Neck supple. No rigidity or tenderness.   Lymphadenopathy:      Cervical: No cervical adenopathy.   Skin:     Findings: No lesion or rash.   Neurological:      General: No focal deficit present.      Mental Status: She is alert and oriented to person, place, and time. Mental status is at baseline.      Cranial Nerves: No cranial nerve deficit.      Motor: No weakness.   Psychiatric:         Mood and Affect: Mood normal.         Behavior: Behavior normal.          Significant Labs: CBC: No results for input(s): "WBC", "HGB", "HCT", "PLT" in the last 48 hours.  CMP:   Recent Labs   Lab 11/19/24  0545 11/20/24  0547    142   K 3.4* 3.7   * 113*   CO2 20* 19*   BUN 17.0 15.0   CREATININE 0.65 0.59   CALCIUM 8.3* 7.8*       Microbiology Results (last 7 days)       Procedure Component Value Units Date/Time    Blood Culture [[id number]]  (Normal) Collected: 11/14/24 2251    Order Status: Completed Specimen: Blood from Hand, Left Updated: 11/19/24 0005     Blood Culture No Growth At 96 Hours    Blood Culture [[id number]]  (Normal) Collected: 11/14/24 2251    Order Status: Completed Specimen: Blood from Hand, Left Updated: 11/19/24 0005     Blood Culture No Growth At 96 Hours    Anaerobic Culture [facility] [[id number]] Collected: 11/12/24 2011    Order " Status: Completed Specimen: Foreign Body from Mediport Updated: 11/16/24 0745     Anaerobe Culture No Anaerobes Isolated    Blood Culture [[id number]]  (Normal) Collected: 11/10/24 2357    Order Status: Completed Specimen: Blood, Peripheral Line Updated: 11/16/24 0100     Blood Culture No Growth at 5 days    Medical Device Culture [[id number]]  (Abnormal)  (Susceptibility) Collected: 11/12/24 2000    Order Status: Completed Specimen: Foreign Body from Mediport Updated: 11/15/24 0830     CULTURE, MEDICAL DEVICE ([facility]) <15 CFU/mL Staphylococcus epidermidis    Blood Culture [[id number]]  (Abnormal)  (Susceptibility) Collected: 11/10/24 2357    Order Status: Completed Specimen: Blood, Arterial Updated: 11/14/24 0630     Blood Culture Staphylococcus hominis      Staphylococcus epidermidis     GRAM STAIN Gram Positive Cocci, probable Staphylococcus      Seen in gram stain of broth only      2 of 2 bottles positive    Mycobacteria and Nocardia Culture [[id number]] Collected: 11/12/24 2011    Order Status: Sent Specimen: Body Fluid from Chest, Right Updated: 11/13/24 1200    Fungal Culture [[id number]] Collected: 11/12/24 2011    Order Status: Sent Specimen: Mediport Tip from Chest Updated: 11/13/24 1200    Gram Stain [[id number]] Collected: 11/12/24 2000    Order Status: Completed Specimen: Foreign Body from Mediport Updated: 11/13/24 0432     GRAM STAIN No WBC seen      Rare Gram positive cocci             Significant Imaging: I have reviewed all pertinent imaging results/findings within the past 24 hours.      Eder Mcfadden MD  Infectious Disease  [facility]

## 2024-11-20 NOTE — PLAN OF CARE
Charanjit with [employer] notified patient's first appointment with [facility] will be 12/5. Will need physician to monitor lab work until this time. Charanjit is reaching out to Sravan with ID to see if she will monitor. If Sravan is unable to monitor, Charanjit with reach out to PCP. Will continue to follow-up.

## 2024-11-20 NOTE — CARE UPDATE
Patient seen and examined, chart reviewed, full note to follow.     -Prakash negative for vegetations  -Vancomycin dosing per pharmacy for goal trough 15-20  -Rifampin 300mg PO q12h   -Plan for 6 weeks from negative culture 11/14  -anticipated end date 12/26  -Please monitor weekly CBC, CMP, ESR, CRP and Vancomycin trough  -My office will unfortunately be unable to order or monitor those labs however, patient should have ID follow up after discharge and have available physician that can continue ordering and monitoring antibiotics   -once IV antibiotics completed, patient will likely need lifelong suppression as Doxycycline is not a good option at this time, could consider TMP/SMX but will defer to the evaluation of the outpatient treating physician at that time    Eder Mcfadden MD  Infectious Disease  [facility]

## 2024-11-20 NOTE — DISCHARGE SUMMARY
[facility] Medicine Discharge Summary    Admit Date: 11/7/2024  Discharge Date and Time: 11/20/202410:40 AM  Admitting Physician:  Team  Discharging Physician: Raine Moscoso MD.  Primary Care Physician: Curt Felton MD  Consults: {consultation: [id number]}    Discharge Diagnoses:  ***    Hospital Course:   ***  Pt was seen and examined on the day of discharge  Vitals:  VITAL SIGNS: 24 HRS MIN & MAX LAST   Temp  Min: 97.7 °F (36.5 °C)  Max: 98.2 °F (36.8 °C) 98.2 °F (36.8 °C)   BP  Min: 106/66  Max: 180/83 106/66   Pulse  Min: 66  Max: 87  84   Resp  Min: 15  Max: 20 15   SpO2  Min: 92 %  Max: 99 % (!) 94 %       Physical Exam:  ***    Procedures Performed: No admission procedures for hospital encounter.     Significant Diagnostic Studies: See Full reports for all details    Recent Labs   Lab 11/15/24  0518 11/16/24 0531 11/18/24 0515   WBC 5.15 3.63* 4.00*   RBC 4.29 3.99* 3.80*   HGB 12.3 11.5* 10.8*   HCT 38.6 35.8* 34.5*   MCV 90.0 89.7 90.8   MCH 28.7 28.8 28.4   MCHC 31.9* 32.1* 31.3*   RDW 14.9 14.9 15.3    166 168   MPV 9.0 9.8 9.6       Recent Labs   Lab 11/15/24  0518 11/16/24  0531 11/17/24  0541 11/18/24  0515 11/19/24  0545 11/20/24  0547    138   < > 140 139 142   K 3.7 3.7   < > 3.6 3.4* 3.7   * 110*   < > 113* 112* 113*   CO2 20* 21*   < > 22* 20* 19*   BUN 17.0 17.8   < > 16.2 17.0 15.0   CREATININE 0.66 0.63   < > 0.65 0.65 0.59   CALCIUM 9.0 8.5   < > 8.3* 8.3* 7.8*   MG 1.80 1.90   < > 1.90 1.90 2.00   ALBUMIN 3.3* 3.0*  --  3.1*  --   --    ALKPHOS 117 122  --  119  --   --    ALT 25 26  --  25  --   --    AST 30 34  --  25  --   --    BILITOT 0.5 0.9  --  0.6  --   --     < > = values in this interval not displayed.        Microbiology Results (last 7 days)       Procedure Component Value Units Date/Time    Blood Culture [[id number]]  (Normal) Collected: 11/14/24 3345    Order Status: Completed Specimen: Blood from Hand, Left  Updated: 11/20/24 0002     Blood Culture No Growth at 5 days    Blood Culture [[id number]]  (Normal) Collected: 11/14/24 2251    Order Status: Completed Specimen: Blood from Hand, Left Updated: 11/20/24 0002     Blood Culture No Growth at 5 days    Anaerobic Culture [facility] [[id number]] Collected: 11/12/24 2011    Order Status: Completed Specimen: Foreign Body from Mediport Updated: 11/16/24 0745     Anaerobe Culture No Anaerobes Isolated    Blood Culture [[id number]]  (Normal) Collected: 11/10/24 2357    Order Status: Completed Specimen: Blood, Peripheral Line Updated: 11/16/24 0100     Blood Culture No Growth at 5 days    Medical Device Culture [[id number]]  (Abnormal)  (Susceptibility) Collected: 11/12/24 2000    Order Status: Completed Specimen: Foreign Body from Mediport Updated: 11/15/24 0830     CULTURE, MEDICAL DEVICE ([facility]) <15 CFU/mL Staphylococcus epidermidis    Blood Culture [[id number]]  (Abnormal)  (Susceptibility) Collected: 11/10/24 2357    Order Status: Completed Specimen: Blood, Arterial Updated: 11/14/24 0630     Blood Culture Staphylococcus hominis      Staphylococcus epidermidis     GRAM STAIN Gram Positive Cocci, probable Staphylococcus      Seen in gram stain of broth only      2 of 2 bottles positive    Mycobacteria and Nocardia Culture [[id number]] Collected: 11/12/24 2011    Order Status: Sent Specimen: Body Fluid from Chest, Right Updated: 11/13/24 1200    Fungal Culture [[id number]] Collected: 11/12/24 2011    Order Status: Sent Specimen: Mediport Tip from Chest Updated: 11/13/24 1200             Transesophageal echo (DALE)    No evidence of valvular vegetations.    Left Ventricle: The left ventricle is normal in size. There is normal   systolic function.    Right Ventricle: Normal right ventricular cavity size. Wall thickness   is normal. Systolic function is normal.    Left Atrium: The left atrial appendage appears normal. No TAYLOR thrombus.   There is no thrombus in the cavity.    Tricuspid  Valve: There is mild regurgitation.    Aorta: Grade 3 atherosclerosis of the ascending aorta and in the aortic   arch.    No patent foramen ovale confirmed by agitated saline contrast. The left   atrial appendage appears normal. No TAYLOR thrombus.    The aortic valve is trileaflet. Leaflets are midly thickened. There is   normal leaflet mobility. There is no mass/vegetation present. There is no   significant regurgitation.    The mitral valve is structurally normal. There is normal leaflet   mobility. There is no mass/vegetation present. There is no stenosis. There   is trace regurgitation.    The tricuspid valve is structurally normal. There is no mass/vegetation   present. There is normal leaflet mobility. There is no stenosis. There is   mild regurgitation.         Medication List        START taking these medications      chlorhexidine 4 % external liquid  Commonly known as: HIBICLENS  Apply topically 3 (three) times a week. Daily showers for 5 days every other week for 3-6 months     D5W SolP 250 mL with vancomycin 1,000 mg SolR  IV vanc until 12/26, dosing based on trough     rifAMpin 300 MG capsule  Commonly known as: RIFADIN  Take 1 capsule (300 mg total) by mouth every 12 (twelve) hours.            CONTINUE taking these medications      busPIRone 10 MG tablet  Commonly known as: BUSPAR     CeleXA 40 mg tablet  Generic drug: citalopram     cholecalciferol (vitamin D3) 1,250 mcg (50,000 unit) capsule     diphenoxylate-atropine 2.5-0.025 mg 2.5-0.025 mg per tablet  Commonly known as: LOMOTIL     ferrous sulfate 325 (65 FE) MG EC tablet  Take 1 tablet (325 mg total) by mouth once daily.     GATTEX 30-VIAL 5 mg Kit  Generic drug: teduglutide     metoprolol tartrate 100 MG tablet  Commonly known as: LOPRESSOR     mirtazapine 15 MG tablet  Commonly known as: REMERON     ONE DAILY MULTIVITAMIN per tablet  Generic drug: multivitamin     solifenacin 5 MG tablet  Commonly known as: VESICARE     warfarin 2.5 MG  tablet  Commonly known as: COUMADIN            STOP taking these medications      cefdinir 300 MG capsule  Commonly known as: OMNICEF     doxycycline 100 MG Cap  Commonly known as: VIBRAMYCIN               Where to Get Your Medications        These medications were sent to [facility] [address]  [address], [facility] [address]      Phone: [phone]   rifAMpin 300 MG capsule       These medications were sent to [facility] [address]  [address] [address]      Phone: [phone]   chlorhexidine 4 % external liquid       Information about where to get these medications is not yet available    Ask your nurse or doctor about these medications  D5W SolP 250 mL with vancomycin 1,000 mg SolR          Explained in detail to the patient about the discharge plan, medications, and follow-up visits. Pt understands and agrees with the treatment plan  Discharge Disposition: Home-Health Care [facility] [place] [facility]   Discharged Condition: stable  Diet-   Dietary Orders (From admission, onward)       Start     Ordered    11/19/24 1448  Diet Adult Regular Lactose Free  Diet effective now        Comments: No raisin bran cereal.   Question:  Diet Modifier:  Answer:  Lactose Free    11/19/24 1447                   Medications Per DC med rec  Activities as tolerated   Follow-up Information       [employer] [facility] Follow up.    Specialties: Home Health Services, Home Therapy Services, Home Living Aide Services  Why: This is your current home health care provider.  They will contact you after discharge.  You may call them for any questions regarding home health.  Contact information:  [address] [address]  [phone]             Natan Rowley MD Follow up.    Specialty: Infectious Diseases  Contact information:  [address] [facility] [address]  [phone]                Irena Cheng MD Follow up.    Specialties: Cardiovascular Disease, Cardiology  Why: 1-2 weeks  Contact information:  [address]  [place] [address]  [phone]               Curt Felton MD Follow up in 1 week(s).    Specialty: Family Medicine  Contact information:  [address] [place] [address]  [phone]               IV antibiotic orders until 12/04/2024 will be from Ms. Hinson Follow up.                           For further questions contact hospitalist office    Discharge time 33 minutes    For worsening symptoms, chest pain, shortness of breath, increased abdominal pain, high grade fever, stroke or stroke like symptoms, immediately go to the nearest Emergency Room or call 911 as soon as possible.      Raine Anthony M.D on 11/20/2024. at 10:40 AM.

## 2024-11-21 PROCEDURE — G0180 MD CERTIFICATION HHA PATIENT: HCPCS | Mod: ,,,

## 2024-11-22 ENCOUNTER — DOCUMENT SCAN (OUTPATIENT)
Dept: HOME HEALTH SERVICES | Facility: HOSPITAL | Age: 79
End: 2024-11-22
Payer: MEDICARE

## 2024-11-23 ENCOUNTER — NURSE TRIAGE (OUTPATIENT)
Dept: ADMINISTRATIVE | Facility: CLINIC | Age: 79
End: 2024-11-23
Payer: MEDICARE

## 2024-11-23 NOTE — TELEPHONE ENCOUNTER
Laura reports she is on TPN, connected it  last night but forgot to turn it on. Pt asking if TPN is still good and if ok to turn on the pump now. Instructed pt to contact pharmacy/infusion company now for this information. V/u.   Reason for Disposition   Health information question, no triage required and triager able to answer question    Protocols used: Information Only Call - No Triage-A-

## 2024-11-25 ENCOUNTER — TELEPHONE (OUTPATIENT)
Dept: ADMINISTRATIVE | Facility: CLINIC | Age: 79
End: 2024-11-25
Payer: MEDICARE

## 2024-11-25 ENCOUNTER — LAB REQUISITION (OUTPATIENT)
Dept: LAB | Facility: HOSPITAL | Age: 79
End: 2024-11-25
Payer: MEDICARE

## 2024-11-25 DIAGNOSIS — J18.9 PNEUMONIA, UNSPECIFIED ORGANISM: ICD-10-CM

## 2024-11-25 DIAGNOSIS — B95.8 UNSPECIFIED STAPHYLOCOCCUS AS THE CAUSE OF DISEASES CLASSIFIED ELSEWHERE: ICD-10-CM

## 2024-11-25 LAB
ALBUMIN SERPL-MCNC: 3.3 G/DL (ref 3.4–4.8)
ALBUMIN/GLOB SERPL: 1.1 RATIO (ref 1.1–2)
ALP SERPL-CCNC: 128 UNIT/L (ref 40–150)
ALT SERPL-CCNC: 35 UNIT/L (ref 0–55)
ANION GAP SERPL CALC-SCNC: 7 MEQ/L
AST SERPL-CCNC: 33 UNIT/L (ref 5–34)
BASOPHILS # BLD AUTO: 0.03 X10(3)/MCL
BASOPHILS NFR BLD AUTO: 0.7 %
BILIRUB SERPL-MCNC: 0.7 MG/DL
BUN SERPL-MCNC: 13 MG/DL (ref 9.8–20.1)
CALCIUM SERPL-MCNC: 8.5 MG/DL (ref 8.4–10.2)
CHLORIDE SERPL-SCNC: 107 MMOL/L (ref 98–107)
CO2 SERPL-SCNC: 26 MMOL/L (ref 23–31)
CREAT SERPL-MCNC: 0.62 MG/DL (ref 0.55–1.02)
CREAT/UREA NIT SERPL: 21
CRP SERPL-MCNC: 15.8 MG/L
EOSINOPHIL # BLD AUTO: 0.25 X10(3)/MCL (ref 0–0.9)
EOSINOPHIL NFR BLD AUTO: 5.4 %
ERYTHROCYTE [DISTWIDTH] IN BLOOD BY AUTOMATED COUNT: 15.1 % (ref 11.5–17)
ERYTHROCYTE [SEDIMENTATION RATE] IN BLOOD: 34 MM/HR (ref 0–20)
GFR SERPLBLD CREATININE-BSD FMLA CKD-EPI: >60 ML/MIN/1.73/M2
GLOBULIN SER-MCNC: 3.1 GM/DL (ref 2.4–3.5)
GLUCOSE SERPL-MCNC: 89 MG/DL (ref 82–115)
HCT VFR BLD AUTO: 35.7 % (ref 37–47)
HGB BLD-MCNC: 11.7 G/DL (ref 12–16)
IMM GRANULOCYTES # BLD AUTO: 0 X10(3)/MCL (ref 0–0.04)
IMM GRANULOCYTES NFR BLD AUTO: 0 %
INR PPP: 1.2
LYMPHOCYTES # BLD AUTO: 1.97 X10(3)/MCL (ref 0.6–4.6)
LYMPHOCYTES NFR BLD AUTO: 42.8 %
MCH RBC QN AUTO: 29.2 PG (ref 27–31)
MCHC RBC AUTO-ENTMCNC: 32.8 G/DL (ref 33–36)
MCV RBC AUTO: 89 FL (ref 80–94)
MONOCYTES # BLD AUTO: 0.46 X10(3)/MCL (ref 0.1–1.3)
MONOCYTES NFR BLD AUTO: 10 %
NEUTROPHILS # BLD AUTO: 1.89 X10(3)/MCL (ref 2.1–9.2)
NEUTROPHILS NFR BLD AUTO: 41.1 %
NRBC BLD AUTO-RTO: 0 %
PLATELET # BLD AUTO: 155 X10(3)/MCL (ref 130–400)
PMV BLD AUTO: 10 FL (ref 7.4–10.4)
POTASSIUM SERPL-SCNC: 4.2 MMOL/L (ref 3.5–5.1)
PROT SERPL-MCNC: 6.4 GM/DL (ref 5.8–7.6)
PROTHROMBIN TIME: 15.4 SECONDS (ref 12.5–14.5)
RBC # BLD AUTO: 4.01 X10(6)/MCL (ref 4.2–5.4)
SODIUM SERPL-SCNC: 140 MMOL/L (ref 136–145)
VANCOMYCIN TROUGH SERPL-MCNC: 15.1 UG/ML (ref 15–20)
WBC # BLD AUTO: 4.6 X10(3)/MCL (ref 4.5–11.5)

## 2024-11-25 PROCEDURE — 85610 PROTHROMBIN TIME: CPT

## 2024-11-25 PROCEDURE — 80053 COMPREHEN METABOLIC PANEL: CPT

## 2024-11-25 PROCEDURE — 85025 COMPLETE CBC W/AUTO DIFF WBC: CPT

## 2024-11-25 PROCEDURE — 80202 ASSAY OF VANCOMYCIN: CPT

## 2024-11-25 PROCEDURE — 85652 RBC SED RATE AUTOMATED: CPT

## 2024-11-25 PROCEDURE — 86140 C-REACTIVE PROTEIN: CPT

## 2024-11-26 ENCOUNTER — DOCUMENTATION ONLY (OUTPATIENT)
Dept: INFECTIOUS DISEASES | Facility: HOSPITAL | Age: 79
End: 2024-11-26
Payer: MEDICARE

## 2024-11-26 NOTE — PROGRESS NOTES
[facility]  Infectious Diseases OPAT Lab Review Note      Medication:  Vancomycin IV (trough 15-20) to complete a 42 day course, along with Rifampin 300 mg po q 12 hours    Infusion Company: [employer]    Outpatient start date:  11/14/2024  Outpatient stop date:  12/26/2024      Labs reviewed:     Lab Results   Component Value Date    WBC 4.60 11/25/2024    HGB 11.7 (L) 11/25/2024    HCT 35.7 (L) 11/25/2024    MCV 89.0 11/25/2024     11/25/2024      CMP  Sodium   Date Value Ref Range Status   11/25/2024 140 136 - 145 mmol/L Final   02/08/2024 142 136 - 145 mmol/L Final     Potassium   Date Value Ref Range Status   11/25/2024 4.2 3.5 - 5.1 mmol/L Final   02/08/2024 4.5 3.5 - 5.1 mmol/L Final     Chloride   Date Value Ref Range Status   11/25/2024 107 98 - 107 mmol/L Final   02/08/2024 103 98 - 107 mmol/L Final     CO2   Date Value Ref Range Status   11/25/2024 26 23 - 31 mmol/L Final   02/08/2024 28 22 - 32 mmol/L Final     BUN   Date Value Ref Range Status   02/08/2024 23 (A) 6 - 20 mg/dL Final     Blood Urea Nitrogen   Date Value Ref Range Status   11/25/2024 13.0 9.8 - 20.1 mg/dL Final     Creatinine   Date Value Ref Range Status   11/25/2024 0.62 0.55 - 1.02 mg/dL Final     Calcium   Date Value Ref Range Status   11/25/2024 8.5 8.4 - 10.2 mg/dL Final   02/08/2024 9.3 8.6 - 10.2 mg/dL Final     Total Protein   Date Value Ref Range Status   02/08/2024 7.1 6.6 - 8.7 G/DL Final     Albumin   Date Value Ref Range Status   11/25/2024 3.3 (L) 3.4 - 4.8 g/dL Final   02/08/2024 4.0 0.05 - 5.2 Final     Bilirubin Total   Date Value Ref Range Status   11/25/2024 0.7 <=1.5 mg/dL Final     ALP   Date Value Ref Range Status   11/25/2024 128 40 - 150 unit/L Final     AST   Date Value Ref Range Status   11/25/2024 33 5 - 34 unit/L Final     ALT   Date Value Ref Range Status   11/25/2024 35 0 - 55 unit/L Final   02/08/2024 21 0 - 41 Final     eGFR   Date Value Ref Range Status    11/25/2024 >60 mL/min/1.73/m2 Final         Lab Results   Component Value Date    SEDRATE 34 (H) 11/25/2024      Lab Results   Component Value Date    CRP 15.80 (H) 11/25/2024 11/18/2024 vancomycin trough 19.2   11/25/2024 vancomycin trough 15.1      Assessment / Plan:   Sepsis   Persistent coag-negative bacteremia  MediPort infection with removal 11/12/2024  Cirrhosis   CGD   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  Inflammatory markers are elevated, but there are no previous ones to trend  Vancomycin trough in goal  Continue current therapy  Weekly labs to monitor  Patient will likely need lifelong suppression with Doxycycline versus Bactrim DS following completion of IV antibiotics        Follow up ID appointment:  12/05/2024        ERWIN Grey  [facility] Infectious Diseases    *Portions of this note dictated using EMR integrated voice recognition software, and may be subject to voice recognition errors not corrected at proofreading. Please contact writer for clarification if needed. *

## 2024-11-27 ENCOUNTER — PATIENT OUTREACH (OUTPATIENT)
Dept: ADMINISTRATIVE | Facility: CLINIC | Age: 79
End: 2024-11-27
Payer: MEDICARE

## 2024-11-27 LAB — FUNGUS SPEC CULT: NORMAL

## 2024-11-27 NOTE — PROGRESS NOTES
C3 nurse attempted to contact Laura Acosta  for a TCC post hospital discharge follow up call. The patient is unable to conduct the call @ this time. The patient requested a callback.    The patient has a scheduled HOSFU appointment with Curt Felton MD 12/02/2024 @ 3:30pm per chart.

## 2024-11-29 NOTE — PROGRESS NOTES
3rd attempt made to reach patient for TCC call. Left voicemail please call [phone] leave first name, last, and date of birth for Jeremy. I will return your call.

## 2024-11-29 NOTE — PROGRESS NOTES
2nd attempt made to reach patient for TCC call. Left voicemail please call [phone] leave first name, last, and date of birth for Jeremy. I will return your call.

## 2024-12-02 ENCOUNTER — DOCUMENTATION ONLY (OUTPATIENT)
Dept: INFECTIOUS DISEASES | Facility: HOSPITAL | Age: 79
End: 2024-12-02
Payer: MEDICARE

## 2024-12-02 ENCOUNTER — LAB REQUISITION (OUTPATIENT)
Dept: LAB | Facility: HOSPITAL | Age: 79
End: 2024-12-02
Payer: MEDICARE

## 2024-12-02 ENCOUNTER — TELEPHONE (OUTPATIENT)
Dept: INFECTIOUS DISEASES | Facility: HOSPITAL | Age: 79
End: 2024-12-02
Payer: MEDICARE

## 2024-12-02 DIAGNOSIS — J18.9 PNEUMONIA, UNSPECIFIED ORGANISM: ICD-10-CM

## 2024-12-02 DIAGNOSIS — T80.211A BLOODSTREAM INFECTION DUE TO CENTRAL VENOUS CATHETER, INITIAL ENCOUNTER: ICD-10-CM

## 2024-12-02 PROBLEM — Z79.01 CHRONIC ANTICOAGULATION: Status: ACTIVE | Noted: 2024-12-02

## 2024-12-02 LAB
ALBUMIN SERPL-MCNC: 2.8 G/DL (ref 3.4–4.8)
ALBUMIN/GLOB SERPL: 1 RATIO (ref 1.1–2)
ALP SERPL-CCNC: 97 UNIT/L (ref 40–150)
ALT SERPL-CCNC: 18 UNIT/L (ref 0–55)
ANION GAP SERPL CALC-SCNC: 58 MEQ/L
AST SERPL-CCNC: 25 UNIT/L (ref 5–34)
BASOPHILS # BLD AUTO: 0.03 X10(3)/MCL
BASOPHILS NFR BLD AUTO: 0.6 %
BILIRUB SERPL-MCNC: 0.5 MG/DL
BUN SERPL-MCNC: 9 MG/DL (ref 9.8–20.1)
CALCIUM SERPL-MCNC: 7.1 MG/DL (ref 8.4–10.2)
CHLORIDE SERPL-SCNC: 90 MMOL/L (ref 98–107)
CO2 SERPL-SCNC: 19 MMOL/L (ref 23–31)
CREAT SERPL-MCNC: 0.6 MG/DL (ref 0.55–1.02)
CREAT/UREA NIT SERPL: 15
CRP SERPL-MCNC: 5.4 MG/L
EOSINOPHIL # BLD AUTO: 0.52 X10(3)/MCL (ref 0–0.9)
EOSINOPHIL NFR BLD AUTO: 11.1 %
ERYTHROCYTE [DISTWIDTH] IN BLOOD BY AUTOMATED COUNT: 15.4 % (ref 11.5–17)
ERYTHROCYTE [SEDIMENTATION RATE] IN BLOOD: 24 MM/HR (ref 0–20)
GFR SERPLBLD CREATININE-BSD FMLA CKD-EPI: >60 ML/MIN/1.73/M2
GLOBULIN SER-MCNC: 2.9 GM/DL (ref 2.4–3.5)
GLUCOSE SERPL-MCNC: 120 MG/DL (ref 82–115)
HCT VFR BLD AUTO: 36.7 % (ref 37–47)
HGB BLD-MCNC: 11.6 G/DL (ref 12–16)
IMM GRANULOCYTES # BLD AUTO: 0.01 X10(3)/MCL (ref 0–0.04)
IMM GRANULOCYTES NFR BLD AUTO: 0.2 %
LYMPHOCYTES # BLD AUTO: 1.75 X10(3)/MCL (ref 0.6–4.6)
LYMPHOCYTES NFR BLD AUTO: 37.5 %
MCH RBC QN AUTO: 28.7 PG (ref 27–31)
MCHC RBC AUTO-ENTMCNC: 31.6 G/DL (ref 33–36)
MCV RBC AUTO: 90.8 FL (ref 80–94)
MONOCYTES # BLD AUTO: 0.4 X10(3)/MCL (ref 0.1–1.3)
MONOCYTES NFR BLD AUTO: 8.6 %
NEUTROPHILS # BLD AUTO: 1.96 X10(3)/MCL (ref 2.1–9.2)
NEUTROPHILS NFR BLD AUTO: 42 %
NRBC BLD AUTO-RTO: 0 %
PLATELET # BLD AUTO: 135 X10(3)/MCL (ref 130–400)
PMV BLD AUTO: 10.1 FL (ref 7.4–10.4)
POTASSIUM SERPL-SCNC: 3.7 MMOL/L (ref 3.5–5.1)
PROT SERPL-MCNC: 5.7 GM/DL (ref 5.8–7.6)
RBC # BLD AUTO: 4.04 X10(6)/MCL (ref 4.2–5.4)
SODIUM SERPL-SCNC: 167 MMOL/L (ref 136–145)
VANCOMYCIN TROUGH SERPL-MCNC: 15.6 UG/ML (ref 15–20)
WBC # BLD AUTO: 4.67 X10(3)/MCL (ref 4.5–11.5)

## 2024-12-02 PROCEDURE — 86140 C-REACTIVE PROTEIN: CPT | Performed by: GENERAL PRACTICE

## 2024-12-02 PROCEDURE — 80202 ASSAY OF VANCOMYCIN: CPT | Performed by: GENERAL PRACTICE

## 2024-12-02 PROCEDURE — 85025 COMPLETE CBC W/AUTO DIFF WBC: CPT | Performed by: GENERAL PRACTICE

## 2024-12-02 PROCEDURE — 80053 COMPREHEN METABOLIC PANEL: CPT | Performed by: GENERAL PRACTICE

## 2024-12-02 PROCEDURE — 85652 RBC SED RATE AUTOMATED: CPT | Performed by: GENERAL PRACTICE

## 2024-12-02 NOTE — PROGRESS NOTES
[facility]  Infectious Diseases OPAT Lab Review Note      Medication:  Vancomycin IV (trough 15-20) to complete a 42 day course, along with Rifampin 300 mg po q 12 hours    Infusion Company: [employer]    Outpatient start date:  11/14/2024  Outpatient stop date:  12/26/2024      Labs reviewed:     Lab Results   Component Value Date    WBC 4.67 12/02/2024    HGB 11.6 (L) 12/02/2024    HCT 36.7 (L) 12/02/2024    MCV 90.8 12/02/2024     12/02/2024      CMP  Sodium   Date Value Ref Range Status   12/02/2024 167 (HH) 136 - 145 mmol/L Final     Comment:     Critical Result called and verified by verbal readback to: ronald villanueva on 12/02/2024 at 10:50. Reported by [id number].   02/08/2024 142 136 - 145 mmol/L Final     Potassium   Date Value Ref Range Status   12/02/2024 3.7 3.5 - 5.1 mmol/L Final   02/08/2024 4.5 3.5 - 5.1 mmol/L Final     Chloride   Date Value Ref Range Status   12/02/2024 90 (L) 98 - 107 mmol/L Final   02/08/2024 103 98 - 107 mmol/L Final     CO2   Date Value Ref Range Status   12/02/2024 19 (L) 23 - 31 mmol/L Final   02/08/2024 28 22 - 32 mmol/L Final     BUN   Date Value Ref Range Status   02/08/2024 23 (A) 6 - 20 mg/dL Final     Blood Urea Nitrogen   Date Value Ref Range Status   12/02/2024 9.0 (L) 9.8 - 20.1 mg/dL Final     Creatinine   Date Value Ref Range Status   12/02/2024 0.60 0.55 - 1.02 mg/dL Final     Calcium   Date Value Ref Range Status   12/02/2024 7.1 (L) 8.4 - 10.2 mg/dL Final   02/08/2024 9.3 8.6 - 10.2 mg/dL Final     Total Protein   Date Value Ref Range Status   02/08/2024 7.1 6.6 - 8.7 G/DL Final     Albumin   Date Value Ref Range Status   12/02/2024 2.8 (L) 3.4 - 4.8 g/dL Final   02/08/2024 4.0 0.05 - 5.2 Final     Bilirubin Total   Date Value Ref Range Status   12/02/2024 0.5 <=1.5 mg/dL Final     ALP   Date Value Ref Range Status   12/02/2024 97 40 - 150 unit/L Final     AST   Date Value Ref Range Status   12/02/2024 25 5 - 34 unit/L  Final     ALT   Date Value Ref Range Status   12/02/2024 18 0 - 55 unit/L Final   02/08/2024 21 0 - 41 Final     eGFR   Date Value Ref Range Status   12/02/2024 >60 mL/min/1.73/m2 Final         Lab Results   Component Value Date    SEDRATE 24 (H) 12/02/2024      Lab Results   Component Value Date    CRP 5.40 (H) 12/02/2024 11/18/2024 vancomycin trough 19.2   11/25/2024 vancomycin trough 15.1  12/02/2024 vancomycin trough 15.6      Assessment / Plan:   Sepsis   Persistent coag-negative bacteremia  MediPort infection with removal 11/12/2024  Cirrhosis   CGD   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  Inflammatory markers are improving  Vancomycin trough in goal  Patient noted with severe electrolyte derangement.  Concerned about possible lab error?? Attempted to call patient's mobile phone/home phone; no answer and left message for patient to return call on both.  Called alternate contact, daughter Munira, and got in touch with her.  Ms Sanchez was not with her.  Explained mother had critical lab results and needed to go to ER for further evaluation and lab repeat ASAP.  Daughter will try to reach mother  Continue current therapy  Weekly labs to monitor  Patient will likely need lifelong suppression with Doxycycline versus Bactrim DS following completion of IV antibiotics        Follow up ID appointment:  12/05/2024        ERWIN Grey  [facility] Infectious Diseases    *Portions of this note dictated using EMR integrated voice recognition software, and may be subject to voice recognition errors not corrected at proofreading. Please contact writer for clarification if needed. *

## 2024-12-02 NOTE — TELEPHONE ENCOUNTER
Reviewed labs  Patient is noted with several critical electrolyte derangements.  Concerned about possible lab area.  Multiple attempts to call patient with no answer; voicemail left   Finally gotten touch with daughter.  She will try to reach her mother.  Advised for them to go to the ER for further eval/repeat of labs ASAP  Please follow through to make sure patient makes it to the ER  Thank you

## 2024-12-03 ENCOUNTER — TELEPHONE (OUTPATIENT)
Dept: INFECTIOUS DISEASES | Facility: CLINIC | Age: 79
End: 2024-12-03
Payer: MEDICARE

## 2024-12-03 DIAGNOSIS — B99.9 RECURRENT INFECTIONS: Primary | ICD-10-CM

## 2024-12-03 NOTE — TELEPHONE ENCOUNTER
Pt had appointment yesterday with Dr Curt Felton. They reviewed labs and repeated them on her visit. Indeed was lab error as values previously critical were normalized.   So no further need for pt to go to ER.   Thank you

## 2024-12-03 NOTE — TELEPHONE ENCOUNTER
HH CALLED WITH A CRITICAL LAB .. PT SODIUM     Mon 10:59  hh called again asking what do we want them to do.    11:13  VERONIQUE Moss, VIVI de anda at [facility] called the family yesterday and asked them to present to the ER. I cannot give any orders at this time     11:14

## 2024-12-04 ENCOUNTER — TELEPHONE (OUTPATIENT)
Dept: INFECTIOUS DISEASES | Facility: CLINIC | Age: 79
End: 2024-12-04
Payer: MEDICARE

## 2024-12-05 ENCOUNTER — OFFICE VISIT (OUTPATIENT)
Dept: INFECTIOUS DISEASES | Facility: CLINIC | Age: 79
End: 2024-12-05
Payer: MEDICARE

## 2024-12-05 VITALS
WEIGHT: 165.25 LBS | HEART RATE: 60 BPM | BODY MASS INDEX: 28.21 KG/M2 | RESPIRATION RATE: 16 BRPM | HEIGHT: 64 IN | TEMPERATURE: 98 F

## 2024-12-05 DIAGNOSIS — T82.7XXS VASCULAR DEVICE, IMPLANT, OR GRAFT INFECTION OR INFLAMMATION, SEQUELA: ICD-10-CM

## 2024-12-05 DIAGNOSIS — R78.81 COAGULASE NEGATIVE STAPHYLOCOCCUS BACTEREMIA: Primary | ICD-10-CM

## 2024-12-05 DIAGNOSIS — J18.9 PNEUMONIA OF RIGHT LOWER LOBE DUE TO INFECTIOUS ORGANISM: ICD-10-CM

## 2024-12-05 DIAGNOSIS — B99.9 RECURRENT INFECTIONS: ICD-10-CM

## 2024-12-05 DIAGNOSIS — B95.7 COAGULASE NEGATIVE STAPHYLOCOCCUS BACTEREMIA: Primary | ICD-10-CM

## 2024-12-05 PROCEDURE — 99214 OFFICE O/P EST MOD 30 MIN: CPT | Mod: PBBFAC

## 2024-12-05 RX ORDER — CEFADROXIL 500 MG/1
500 CAPSULE ORAL EVERY 12 HOURS
Qty: 60 CAPSULE | Refills: 5 | Status: SHIPPED | OUTPATIENT
Start: 2024-12-27

## 2024-12-05 RX ORDER — RIFAMPIN 300 MG/1
300 CAPSULE ORAL EVERY 12 HOURS
Qty: 120 CAPSULE | Refills: 0 | Status: SHIPPED | OUTPATIENT
Start: 2024-12-05 | End: 2025-02-03

## 2024-12-05 NOTE — PROGRESS NOTES
[facility] Outpatient INFECTIOUS DISEASES Clinic Note    HISTORY OF PRESENT ILLNESS:   Patient was a 70-year-old female with a past medical history of short gut syndrome currently on TPN at home, hypertension, hyperlipidemia, PVD, DVT currently on home Coumadin, RA, cirrhosis, gallstone pancreatitis, and anxiety/depression who presents to ID clinic today as a new patient to establish care.    Patient was recently admitted to [facility] on 11/07 where she initially presented with complaints of fever, generalized weakness.  She was initially admitted for pneumonia/enterocolitis.  Blood cultures obtained on this admission positive in 2/2 vials for staph epidermidis.  Due to history of MediPort currently receiving TPN and history of multiple MediPort infections, patient was treated for current MediPort infection with culture results.  MediPort was removed on 11/02.  She was started on vancomycin.  TTE negative.  Cardiology was consult for DALE which was completed on 11/19 and negative.  Patient was discharged with 6 weeks of vancomycin via OPAT.  Due to patient's indwelling shoulder hardware, she was also started on rifampin 300 mg b.i.d..    Since admission, patient reports that she is doing well.  MediPort site remains without significant erythema, swelling, or drainage.  PICC well-maintained and well dressed.  She reports diarrhea baseline that is slightly worse since antibiotic administration has began.      REVIEW OF SYSTEMS:  Review of Systems   Constitutional:  Negative for chills, fever and malaise/fatigue.   Respiratory:  Negative for cough, hemoptysis, sputum production and shortness of breath.    Cardiovascular:  Negative for chest pain, palpitations and leg swelling.   Gastrointestinal:  Positive for diarrhea. Negative for abdominal pain, nausea and vomiting.   Genitourinary: Negative.    Musculoskeletal: Negative.    Neurological: Negative.        PREVIOUS MEDICAL HISTORY:  Past Medical History:   Diagnosis Date    Acute  URI     Anxiety and depression     Back pain     Bacteremia 04/29/2024    Breast cancer     Cholelithiasis     Contaminated small bowel syndrome     DVT (deep venous thrombosis)     on coumadin    Edema, lower extremity     Gallstone pancreatitis     Heart murmur     HTN (hypertension)     Insomnia     Irregular heart beat     Ischemic disease of gut     Kidney stones     Laryngitis     Malabsorption     Malnutrition, unspecified type     Meningitis, unspecified     OA (osteoarthritis)     PVD (peripheral vascular disease)     Rheumatoid arthritis, unspecified     Staph infection     infected mediport -- on doxycycline daily for prevention    Tremor     Unspecified cataract     Unspecified cirrhosis of liver 06/20/2023    Dr Carroll       PREVIOUS SURGICAL HISTORY:  Past Surgical History:   Procedure Laterality Date    APPENDECTOMY      BOWEL RESECTION      BREAST LUMPECTOMY Right     CHOLECYSTECTOMY  05/2015    COLONOSCOPY  02/2022    repeat 3 years    CYST REMOVAL Right     breast    CYSTOSCOPY W/ STONE MANIPULATION      CYSTOSCOPY W/ URETERAL STENT PLACEMENT  12/2020    CYSTOSCOPY W/ URETERAL STENT REMOVAL  04/2021    CYSTOURETEROSCOPY, WITH HOLMIUM LASER LITHOTRIPSY OF URETERAL CALCULUS AND STENT INSERTION Left 05/02/2023    Procedure: CYSTOSCOPY, WITH URETERAL STENT INSERTION Left;  Surgeon: Jared Felix MD;  Location: [facility] OR;  Service: Urology;  Laterality: Left;    EGD, WITH CLOSED BIOPSY N/A 6/20/2023    Procedure: EGD;  Surgeon: Aashish Carroll MD;  Location: [facility] ENDOSCOPY;  Service: Gastroenterology;  Laterality: N/A;    ENDOSCOPIC RETROGRADE CHOLANGIOPANCREATOGRAPHY W/ SPHINCTEROTOMY AND STONE REMOVAL  04/2015    ESOPHAGOGASTRODUODENOSCOPY  06/20/2023    Dr Carroll - no signs esophageal varicies --repeat 3 yrs    GI Biopsy  02/15/2022    GI Biopsy  12/2018    HYSTERECTOMY      INSERTION OF TUNNELED CENTRAL VENOUS CATHETER (CVC) WITH SUBCUTANEOUS PORT N/A 7/18/2024    Procedure:  [id number];  Surgeon: Ariel Welsh Jr., MD;  Location: [facility] OR;  Service: General;  Laterality: N/A;    LAPAROTOMY, EXPLORATORY  06/2015    LITHOTRIPSY Left 04/2021    LITHOTRIPSY Left 03/2021    MEDIPORT INSERTION, SINGLE  06/2021    MEDIPORT INSERTION, SINGLE  07/2020    MEDIPORT INSERTION, SINGLE  12/2018    MEDIPORT REMOVAL  07/2020    PHACOEMULSIFICATION OF CATARACT Left 12/2016    PHACOEMULSIFICATION OF CATARACT Right 11/2016    PICC line Removal Right 06/2021    POLYPECTOMY  02/2022    PVD surgery      REMOVAL OF CATHETER  12/2020    REMOVAL OF VASCULAR ACCESS CATHETER Right 4/24/2024    Procedure: Removal, Vascular Access Catheter;  Surgeon: Reed Ghosh MD;  Location: [facility] OR;  Service: General;  Laterality: Right;    REMOVAL OF VASCULAR ACCESS CATHETER N/A 11/12/2024    Procedure: Removal, Vascular Access Catheter;  Surgeon: Ariel Welsh Jr., MD;  Location: [facility] OR;  Service: General;  Laterality: N/A;  removal mediport left chest wall    SHOULDER SURGERY Right     shoulder replacement    SPHINCTEROTOMY OF URETHRA      VENTRAL HERNIA REPAIR  04/2016         ALLERGIES:  Review of patient's allergies indicates:   Allergen Reactions    Hydromorphone Itching     Other reaction(s): itching    Opium      Other reaction(s): itching  has itching with larger doses. Smaller doses do not cause a reaction.         MEDICATIONS:  Current Outpatient Medications on File Prior to Visit   Medication Sig Dispense Refill    busPIRone (BUSPAR) 15 MG tablet Take 15 mg by mouth 3 (three) times daily.      CELEXA 40 mg tablet Take 40 mg by mouth once daily.      chlorhexidine (HIBICLENS) 4 % external liquid Apply topically 3 (three) times a week. Daily showers for 5 days every other week for 3-6 months 3785 mL 4    cholecalciferol, vitamin D3, 1,250 mcg (50,000 unit) capsule Take 50,000 Units by mouth every 7 days.      D5W SolP 250 mL with vancomycin 1,000 mg SolR IV vanc until 12/26, dosing based on  trough      diphenoxylate-atropine 2.5-0.025 mg (LOMOTIL) 2.5-0.025 mg per tablet Take 2 tablets by mouth 2 (two) times a day.      ferrous sulfate 325 (65 FE) MG EC tablet Take 1 tablet (325 mg total) by mouth once daily. 30 tablet 0    GATTEX 30-VIAL 5 mg Kit Inject into the skin Daily.      metoprolol tartrate (LOPRESSOR) 100 MG tablet Take 100 mg by mouth 2 (two) times daily.      mirtazapine (REMERON) 15 MG tablet Take 15 mg by mouth every evening.      rifAMpin (RIFADIN) 300 MG capsule Take 1 capsule (300 mg total) by mouth every 12 (twelve) hours. 72 capsule 0    solifenacin (VESICARE) 5 MG tablet Take 5 mg by mouth once daily.      warfarin (COUMADIN) 2.5 MG tablet TAKE ONE TABLET BY MOUTH ONCE DAILY IN THE EVENING OR AS DIRECTED BY CIS PROVIDER      multivitamin (ONE DAILY MULTIVITAMIN) per tablet Take 1 tablet by mouth once daily. (Patient not taking: Reported on 12/5/2024)      vancomycin (VANCOCIN) 10 gram SolR Inject into the vein. (Patient not taking: Reported on 12/5/2024)       No current facility-administered medications on file prior to visit.          SOCIAL HISTORY:    reports that she has never smoked. She has been exposed to tobacco smoke. She has never used smokeless tobacco. She reports current alcohol use. She reports that she does not use drugs.      FAMILY HISTORY:   Family History   Problem Relation Name Age of Onset    Pneumonia Mother      Depression Mother      Osteoarthritis Mother      Breast cancer Mother  70 - 75    Uterine cancer Mother  40 - 45    Bone cancer Mother  60 - 69    Heart attack Father      Aneurysm Father          brain    Diabetes Father      Hyperlipidemia Father      Hypertension Father      Hyperlipidemia Sister      Hypertension Sister      Kidney cancer Sister  55 - 56    Osteoarthritis Sister      Breast cancer Sister  52 - 53    Diabetes Mellitus Sister      Heart failure Sister      Kidney disease Sister      Migraines Sister      Arthritis Sister       "Arthritis Sister      Parkinsonism Sister      Heart disease Sister      Hyperlipidemia Brother      Hypertension Brother      Coronary artery disease Brother      Coronary artery disease Brother          CABG x3    Hyperlipidemia Brother      Hypertension Brother      Kidney cancer Paternal Grandmother  60 - 69       PHYSICAL EXAMINATION:  Pulse 60   Temp 97.8 °F (36.6 °C) (Oral)   Resp 16   Ht 5' 4" (1.626 m)   Wt 75 kg (165 lb 3.8 oz)   BMI 28.36 kg/m²  Body mass index is 28.36 kg/m².    Gen: awake, alert, NAD  HEENT: NC/AT, EOMi, anicteric sclera, no rhinorrhea, OP clear  Neck: no cervical LAD  CV: regular rate normal rhythm no murmur  Resp: easy WOB on RA CTABL no w/r/r  Abd: +BS, soft, NTTP, no HSM  Ext: warm, no LE edema. Previous mediport site clean and intact. PICC site well dressed.    Skin: no rash  Neuro: CN 2-12 grossly intact, UE and LE anti-gravity, no focal deficits     LABORATORY DATA:  Lab Results   Component Value Date    WBC 4.67 12/02/2024    WBC 4.60 11/25/2024    WBC 4.00 (L) 11/18/2024    HGB 11.6 (L) 12/02/2024    HGB 11.7 (L) 11/25/2024    HGB 10.8 (L) 11/18/2024     12/02/2024     11/25/2024     11/18/2024    CREATININE 0.48 (L) 12/02/2024    CREATININE 0.60 12/02/2024    CREATININE 0.62 11/25/2024    ALT 18 12/02/2024    ALT 35 11/25/2024    ALT 25 11/18/2024    AST 25 12/02/2024    AST 33 11/25/2024    AST 25 11/18/2024    ALKPHOS 97 12/02/2024    ALKPHOS 128 11/25/2024    ALKPHOS 119 11/18/2024    BILITOT 0.5 12/02/2024    BILITOT 0.7 11/25/2024    BILITOT 0.6 11/18/2024    INR 1.2 12/02/2024    INR 1.2 11/25/2024    INR 2.4 (H) 11/20/2024       MICROBIOLOGY DATA:        ASSESSMENT:    Recurrent Mediport infection with resultant bacteremia    - most recent blood cultures on 11/8 displaying growth of Staph epidermidis 2/2   - currently completing 6 weeks of Vancomycin with OPAT  History of shoulder replacement with prosthetic joint   - currently on Rifampin for " [employer] with bacteremia as listed above  Short gut syndrome currently TPN dependent  - Mediport removal on 11/12 secondary to above, currently receiving TPN via PICC   History of DVT on Coumadin  History of RA  History of cirrhosis  History of HTN, HLD    PLAN:    - continue Vancomycin for a 6 week total course (trough 15-20)  with set end date of 12/26.   - continue Rifampin 300 mg PO BID for a total course of 3 months.  End date 2/12/25.  - Plan to start suppressive Cefadroxil 500 mg BID after Vancomycin therapy ends. Start date 12/27. Will reconvene timeline of suppressive therapy needed. Patient may need lifelong therapy.   - defer future Mediport placement to patient's GI physician managing short bowel syndrome. Would recommend Mediport over PICC.     RTC 6 weeks    Tristan Thompson DO  [facility] PGY-2

## 2024-12-09 ENCOUNTER — LAB REQUISITION (OUTPATIENT)
Dept: LAB | Facility: HOSPITAL | Age: 79
End: 2024-12-09
Payer: MEDICARE

## 2024-12-09 DIAGNOSIS — J18.9 PNEUMONIA, UNSPECIFIED ORGANISM: ICD-10-CM

## 2024-12-09 LAB
ALBUMIN SERPL-MCNC: 3.3 G/DL (ref 3.4–4.8)
ALBUMIN/GLOB SERPL: 1 RATIO (ref 1.1–2)
ALP SERPL-CCNC: 113 UNIT/L (ref 40–150)
ALT SERPL-CCNC: 22 UNIT/L (ref 0–55)
ANION GAP SERPL CALC-SCNC: 6 MEQ/L
AST SERPL-CCNC: 32 UNIT/L (ref 5–34)
BASOPHILS # BLD AUTO: 0.03 X10(3)/MCL
BASOPHILS NFR BLD AUTO: 0.6 %
BILIRUB SERPL-MCNC: 0.4 MG/DL
BUN SERPL-MCNC: 12.5 MG/DL (ref 9.8–20.1)
CALCIUM SERPL-MCNC: 7.6 MG/DL (ref 8.4–10.2)
CHLORIDE SERPL-SCNC: 108 MMOL/L (ref 98–107)
CO2 SERPL-SCNC: 27 MMOL/L (ref 23–31)
CREAT SERPL-MCNC: 0.47 MG/DL (ref 0.55–1.02)
CREAT/UREA NIT SERPL: 27
CRP SERPL-MCNC: 6.8 MG/L
EOSINOPHIL # BLD AUTO: 0.47 X10(3)/MCL (ref 0–0.9)
EOSINOPHIL NFR BLD AUTO: 10.1 %
ERYTHROCYTE [DISTWIDTH] IN BLOOD BY AUTOMATED COUNT: 14.9 % (ref 11.5–17)
ERYTHROCYTE [SEDIMENTATION RATE] IN BLOOD: 25 MM/HR (ref 0–20)
GFR SERPLBLD CREATININE-BSD FMLA CKD-EPI: >60 ML/MIN/1.73/M2
GLOBULIN SER-MCNC: 3.3 GM/DL (ref 2.4–3.5)
GLUCOSE SERPL-MCNC: 80 MG/DL (ref 82–115)
HCT VFR BLD AUTO: 36.1 % (ref 37–47)
HGB BLD-MCNC: 12 G/DL (ref 12–16)
IMM GRANULOCYTES # BLD AUTO: 0 X10(3)/MCL (ref 0–0.04)
IMM GRANULOCYTES NFR BLD AUTO: 0 %
INR PPP: >15
LYMPHOCYTES # BLD AUTO: 2.18 X10(3)/MCL (ref 0.6–4.6)
LYMPHOCYTES NFR BLD AUTO: 46.9 %
MAGNESIUM SERPL-MCNC: 2 MG/DL (ref 1.6–2.6)
MCH RBC QN AUTO: 29.3 PG (ref 27–31)
MCHC RBC AUTO-ENTMCNC: 33.2 G/DL (ref 33–36)
MCV RBC AUTO: 88.3 FL (ref 80–94)
MONOCYTES # BLD AUTO: 0.43 X10(3)/MCL (ref 0.1–1.3)
MONOCYTES NFR BLD AUTO: 9.2 %
NEUTROPHILS # BLD AUTO: 1.54 X10(3)/MCL (ref 2.1–9.2)
NEUTROPHILS NFR BLD AUTO: 33.2 %
NRBC BLD AUTO-RTO: 0 %
PLATELET # BLD AUTO: 145 X10(3)/MCL (ref 130–400)
PLATELETS.RETICULATED NFR BLD AUTO: 2 % (ref 0.9–11.2)
PMV BLD AUTO: 9.7 FL (ref 7.4–10.4)
POTASSIUM SERPL-SCNC: 3.4 MMOL/L (ref 3.5–5.1)
PROT SERPL-MCNC: 6.6 GM/DL (ref 5.8–7.6)
PROTHROMBIN TIME: >120 SECONDS (ref 12.5–14.5)
RBC # BLD AUTO: 4.09 X10(6)/MCL (ref 4.2–5.4)
SODIUM SERPL-SCNC: 141 MMOL/L (ref 136–145)
TRIGL SERPL-MCNC: 83 MG/DL (ref 37–140)
VANCOMYCIN TROUGH SERPL-MCNC: 14 UG/ML (ref 15–20)
WBC # BLD AUTO: 4.65 X10(3)/MCL (ref 4.5–11.5)

## 2024-12-09 PROCEDURE — 84478 ASSAY OF TRIGLYCERIDES: CPT

## 2024-12-09 PROCEDURE — 85025 COMPLETE CBC W/AUTO DIFF WBC: CPT

## 2024-12-09 PROCEDURE — 86140 C-REACTIVE PROTEIN: CPT

## 2024-12-09 PROCEDURE — 80202 ASSAY OF VANCOMYCIN: CPT

## 2024-12-09 PROCEDURE — 80053 COMPREHEN METABOLIC PANEL: CPT

## 2024-12-09 PROCEDURE — 85610 PROTHROMBIN TIME: CPT

## 2024-12-09 PROCEDURE — 83735 ASSAY OF MAGNESIUM: CPT

## 2024-12-09 PROCEDURE — 85652 RBC SED RATE AUTOMATED: CPT

## 2024-12-10 ENCOUNTER — DOCUMENTATION ONLY (OUTPATIENT)
Dept: INFECTIOUS DISEASES | Facility: HOSPITAL | Age: 79
End: 2024-12-10
Payer: MEDICARE

## 2024-12-10 ENCOUNTER — LAB VISIT (OUTPATIENT)
Dept: LAB | Facility: HOSPITAL | Age: 79
End: 2024-12-10
Attending: NURSE PRACTITIONER
Payer: MEDICARE

## 2024-12-10 DIAGNOSIS — Z51.81 ENCOUNTER FOR THERAPEUTIC DRUG MONITORING: Primary | ICD-10-CM

## 2024-12-10 LAB
INR PPP: 1.4
PROTHROMBIN TIME: 17.1 SECONDS (ref 12.5–14.5)

## 2024-12-10 PROCEDURE — 36415 COLL VENOUS BLD VENIPUNCTURE: CPT

## 2024-12-10 PROCEDURE — 85610 PROTHROMBIN TIME: CPT

## 2024-12-10 NOTE — PROGRESS NOTES
[facility]  Infectious Diseases OPAT Lab Review Note      Medication:  Vancomycin IV (trough 15-20) to complete a 42 day course, along with Rifampin 300 mg po q 12 hours (to complete a 3 month course)    Infusion Company: [employer]    Outpatient start date:  11/14/2024  Outpatient stop date:  12/26/2024 for Vancomycin and 02/12/2026 for Rifampin       Labs reviewed:     Lab Results   Component Value Date    WBC 4.65 12/09/2024    HGB 12.0 12/09/2024    HCT 36.1 (L) 12/09/2024    MCV 88.3 12/09/2024     12/09/2024      CMP  Sodium   Date Value Ref Range Status   12/09/2024 141 136 - 145 mmol/L Final   02/08/2024 142 136 - 145 mmol/L Final     Potassium   Date Value Ref Range Status   12/09/2024 3.4 (L) 3.5 - 5.1 mmol/L Final   02/08/2024 4.5 3.5 - 5.1 mmol/L Final     Chloride   Date Value Ref Range Status   12/09/2024 108 (H) 98 - 107 mmol/L Final   02/08/2024 103 98 - 107 mmol/L Final     CO2   Date Value Ref Range Status   12/09/2024 27 23 - 31 mmol/L Final   02/08/2024 28 22 - 32 mmol/L Final     BUN   Date Value Ref Range Status   02/08/2024 23 (A) 6 - 20 mg/dL Final     Blood Urea Nitrogen   Date Value Ref Range Status   12/09/2024 12.5 9.8 - 20.1 mg/dL Final     Creatinine   Date Value Ref Range Status   12/09/2024 0.47 (L) 0.55 - 1.02 mg/dL Final     Calcium   Date Value Ref Range Status   12/09/2024 7.6 (L) 8.4 - 10.2 mg/dL Final   02/08/2024 9.3 8.6 - 10.2 mg/dL Final     Total Protein   Date Value Ref Range Status   02/08/2024 7.1 6.6 - 8.7 G/DL Final     Albumin   Date Value Ref Range Status   12/09/2024 3.3 (L) 3.4 - 4.8 g/dL Final   02/08/2024 4.0 0.05 - 5.2 Final     Bilirubin Total   Date Value Ref Range Status   12/09/2024 0.4 <=1.5 mg/dL Final     ALP   Date Value Ref Range Status   12/09/2024 113 40 - 150 unit/L Final     AST   Date Value Ref Range Status   12/09/2024 32 5 - 34 unit/L Final     ALT   Date Value Ref Range Status   12/09/2024 22 0 - 55  unit/L Final   02/08/2024 21 0 - 41 Final     eGFR   Date Value Ref Range Status   12/09/2024 >60 mL/min/1.73/m2 Final         Lab Results   Component Value Date    SEDRATE 25 (H) 12/09/2024      Lab Results   Component Value Date    CRP 6.80 (H) 12/09/2024 11/18/2024 vancomycin trough 19.2   11/25/2024 vancomycin trough 15.1  12/02/2024 vancomycin trough 15.6  12/09/2024 vancomycin trough 14.0      Assessment / Plan:   Sepsis   Persistent coag-negative bacteremia  MediPort infection with removal 11/12/2024  LT suspected hardware infection suspected  Cirrhosis   CGD   Short gut syndrome on TPN   RA   Immunocompromised      No leukocytosis.  Inflammatory markers with slight increase Vancomycin trough mildly low   Renal indices stable  Current Vancomycin dosing: Vancomycin 1 gram IV q 12 hours  Will adjust Vancomycin dosing to 1.25 g IV q 12 hours with repeat labs on 12/13/2024  Previous erroneous electrolyte derangements d/t lab error   Weekly labs to monitor  Patient will need lifelong suppression with Cefadroxil following completion of IV antibiotics        Follow up ID appointment:  01/16/2024        ERWIN Grey  [facility] Infectious Diseases    *Portions of this note dictated using EMR integrated voice recognition software, and may be subject to voice recognition errors not corrected at proofreading. Please contact writer for clarification if needed. *

## 2024-12-12 ENCOUNTER — TELEPHONE (OUTPATIENT)
Dept: ADMINISTRATIVE | Facility: CLINIC | Age: 79
End: 2024-12-12
Payer: MEDICARE

## 2024-12-13 ENCOUNTER — LAB REQUISITION (OUTPATIENT)
Dept: LAB | Facility: HOSPITAL | Age: 79
End: 2024-12-13
Payer: MEDICARE

## 2024-12-13 ENCOUNTER — DOCUMENTATION ONLY (OUTPATIENT)
Dept: INFECTIOUS DISEASES | Facility: HOSPITAL | Age: 79
End: 2024-12-13
Payer: MEDICARE

## 2024-12-13 DIAGNOSIS — A41.1 SEPSIS DUE TO OTHER SPECIFIED STAPHYLOCOCCUS: ICD-10-CM

## 2024-12-13 DIAGNOSIS — T80.211A BLOODSTREAM INFECTION DUE TO CENTRAL VENOUS CATHETER, INITIAL ENCOUNTER: ICD-10-CM

## 2024-12-13 DIAGNOSIS — J18.9 PNEUMONIA, UNSPECIFIED ORGANISM: ICD-10-CM

## 2024-12-13 LAB
ANION GAP SERPL CALC-SCNC: 7 MEQ/L
BUN SERPL-MCNC: 11.7 MG/DL (ref 9.8–20.1)
CALCIUM SERPL-MCNC: 7.9 MG/DL (ref 8.4–10.2)
CHLORIDE SERPL-SCNC: 108 MMOL/L (ref 98–107)
CO2 SERPL-SCNC: 26 MMOL/L (ref 23–31)
CREAT SERPL-MCNC: 0.55 MG/DL (ref 0.55–1.02)
CREAT/UREA NIT SERPL: 21
GFR SERPLBLD CREATININE-BSD FMLA CKD-EPI: >60 ML/MIN/1.73/M2
GLUCOSE SERPL-MCNC: 82 MG/DL (ref 82–115)
INR PPP: 1.3
POTASSIUM SERPL-SCNC: 4.1 MMOL/L (ref 3.5–5.1)
PROTHROMBIN TIME: 15.7 SECONDS (ref 12.5–14.5)
SODIUM SERPL-SCNC: 141 MMOL/L (ref 136–145)
VANCOMYCIN TROUGH SERPL-MCNC: 15.2 UG/ML (ref 15–20)

## 2024-12-13 PROCEDURE — 85610 PROTHROMBIN TIME: CPT

## 2024-12-13 PROCEDURE — 80202 ASSAY OF VANCOMYCIN: CPT

## 2024-12-13 PROCEDURE — 80048 BASIC METABOLIC PNL TOTAL CA: CPT

## 2024-12-13 NOTE — PROGRESS NOTES
[facility]  Infectious Diseases OPAT Lab Review Note      Medication:  Vancomycin IV (trough 15-20) to complete a 42 day course, along with Rifampin 300 mg po q 12 hours (to complete a 3 month course)    Infusion Company: [employer]    Outpatient start date:  11/14/2024  Outpatient stop date:  12/26/2024 for Vancomycin and 02/12/2026 for Rifampin       Labs reviewed:     Lab Results   Component Value Date    WBC 4.65 12/09/2024    HGB 12.0 12/09/2024    HCT 36.1 (L) 12/09/2024    MCV 88.3 12/09/2024     12/09/2024      CMP  Sodium   Date Value Ref Range Status   12/13/2024 141 136 - 145 mmol/L Final   02/08/2024 142 136 - 145 mmol/L Final     Potassium   Date Value Ref Range Status   12/13/2024 4.1 3.5 - 5.1 mmol/L Final   02/08/2024 4.5 3.5 - 5.1 mmol/L Final     Chloride   Date Value Ref Range Status   12/13/2024 108 (H) 98 - 107 mmol/L Final   02/08/2024 103 98 - 107 mmol/L Final     CO2   Date Value Ref Range Status   12/13/2024 26 23 - 31 mmol/L Final   02/08/2024 28 22 - 32 mmol/L Final     BUN   Date Value Ref Range Status   02/08/2024 23 (A) 6 - 20 mg/dL Final     Blood Urea Nitrogen   Date Value Ref Range Status   12/13/2024 11.7 9.8 - 20.1 mg/dL Final     Creatinine   Date Value Ref Range Status   12/13/2024 0.55 0.55 - 1.02 mg/dL Final     Calcium   Date Value Ref Range Status   12/13/2024 7.9 (L) 8.4 - 10.2 mg/dL Final   02/08/2024 9.3 8.6 - 10.2 mg/dL Final     Total Protein   Date Value Ref Range Status   02/08/2024 7.1 6.6 - 8.7 G/DL Final     Albumin   Date Value Ref Range Status   12/09/2024 3.3 (L) 3.4 - 4.8 g/dL Final   02/08/2024 4.0 0.05 - 5.2 Final     Bilirubin Total   Date Value Ref Range Status   12/09/2024 0.4 <=1.5 mg/dL Final     ALP   Date Value Ref Range Status   12/09/2024 113 40 - 150 unit/L Final     AST   Date Value Ref Range Status   12/09/2024 32 5 - 34 unit/L Final     ALT   Date Value Ref Range Status   12/09/2024 22 0 - 55 unit/L Final    02/08/2024 21 0 - 41 Final     eGFR   Date Value Ref Range Status   12/13/2024 >60 mL/min/1.73/m2 Final         Lab Results   Component Value Date    SEDRATE 25 (H) 12/09/2024      Lab Results   Component Value Date    CRP 6.80 (H) 12/09/2024 11/18/2024 vancomycin trough 19.2   11/25/2024 vancomycin trough 15.1  12/02/2024 vancomycin trough 15.6  12/09/2024 vancomycin trough 14.0  12/13/2024 vancomycin trough 15.2      Assessment / Plan:   Sepsis   Persistent coag-negative bacteremia  MediPort infection with removal 11/12/2024  LT suspected hardware infection suspected  Cirrhosis   CGD   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 Please contact writer for clarification if needed. *

## 2024-12-16 ENCOUNTER — DOCUMENTATION ONLY (OUTPATIENT)
Dept: INFECTIOUS DISEASES | Facility: HOSPITAL | Age: 79
End: 2024-12-16
Payer: MEDICARE

## 2024-12-16 ENCOUNTER — LAB REQUISITION (OUTPATIENT)
Dept: LAB | Facility: HOSPITAL | Age: 79
End: 2024-12-16
Payer: MEDICARE

## 2024-12-16 DIAGNOSIS — A41.1 SEPSIS DUE TO OTHER SPECIFIED STAPHYLOCOCCUS: ICD-10-CM

## 2024-12-16 DIAGNOSIS — T80.211A BLOODSTREAM INFECTION DUE TO CENTRAL VENOUS CATHETER, INITIAL ENCOUNTER: ICD-10-CM

## 2024-12-16 DIAGNOSIS — J18.9 PNEUMONIA, UNSPECIFIED ORGANISM: ICD-10-CM

## 2024-12-16 LAB
ALBUMIN SERPL-MCNC: 3.3 G/DL (ref 3.4–4.8)
ALBUMIN/GLOB SERPL: 1 RATIO (ref 1.1–2)
ALP SERPL-CCNC: 123 UNIT/L (ref 40–150)
ALT SERPL-CCNC: 23 UNIT/L (ref 0–55)
ANION GAP SERPL CALC-SCNC: 8 MEQ/L
AST SERPL-CCNC: 28 UNIT/L (ref 5–34)
BASOPHILS # BLD AUTO: 0.03 X10(3)/MCL
BASOPHILS NFR BLD AUTO: 0.8 %
BILIRUB SERPL-MCNC: 0.7 MG/DL
BUN SERPL-MCNC: 11.9 MG/DL (ref 9.8–20.1)
CALCIUM SERPL-MCNC: 8.3 MG/DL (ref 8.4–10.2)
CHLORIDE SERPL-SCNC: 107 MMOL/L (ref 98–107)
CO2 SERPL-SCNC: 28 MMOL/L (ref 23–31)
CREAT SERPL-MCNC: 0.58 MG/DL (ref 0.55–1.02)
CREAT/UREA NIT SERPL: 21
CRP SERPL-MCNC: 18.7 MG/L
EOSINOPHIL # BLD AUTO: 0.46 X10(3)/MCL (ref 0–0.9)
EOSINOPHIL NFR BLD AUTO: 12.3 %
ERYTHROCYTE [DISTWIDTH] IN BLOOD BY AUTOMATED COUNT: 14.8 % (ref 11.5–17)
ERYTHROCYTE [SEDIMENTATION RATE] IN BLOOD: 31 MM/HR (ref 0–20)
GFR SERPLBLD CREATININE-BSD FMLA CKD-EPI: >60 ML/MIN/1.73/M2
GLOBULIN SER-MCNC: 3.2 GM/DL (ref 2.4–3.5)
GLUCOSE SERPL-MCNC: 80 MG/DL (ref 82–115)
HCT VFR BLD AUTO: 35.3 % (ref 37–47)
HGB BLD-MCNC: 11.4 G/DL (ref 12–16)
IMM GRANULOCYTES # BLD AUTO: 0 X10(3)/MCL (ref 0–0.04)
IMM GRANULOCYTES NFR BLD AUTO: 0 %
INR PPP: 1.3
LYMPHOCYTES # BLD AUTO: 1.85 X10(3)/MCL (ref 0.6–4.6)
LYMPHOCYTES NFR BLD AUTO: 49.3 %
MCH RBC QN AUTO: 28.9 PG (ref 27–31)
MCHC RBC AUTO-ENTMCNC: 32.3 G/DL (ref 33–36)
MCV RBC AUTO: 89.4 FL (ref 80–94)
MONOCYTES # BLD AUTO: 0.38 X10(3)/MCL (ref 0.1–1.3)
MONOCYTES NFR BLD AUTO: 10.1 %
NEUTROPHILS # BLD AUTO: 1.03 X10(3)/MCL (ref 2.1–9.2)
NEUTROPHILS NFR BLD AUTO: 27.5 %
NRBC BLD AUTO-RTO: 0 %
PLATELET # BLD AUTO: 115 X10(3)/MCL (ref 130–400)
PMV BLD AUTO: 10.9 FL (ref 7.4–10.4)
POTASSIUM SERPL-SCNC: 4.2 MMOL/L (ref 3.5–5.1)
PROT SERPL-MCNC: 6.5 GM/DL (ref 5.8–7.6)
PROTHROMBIN TIME: 16.1 SECONDS (ref 12.5–14.5)
RBC # BLD AUTO: 3.95 X10(6)/MCL (ref 4.2–5.4)
SODIUM SERPL-SCNC: 143 MMOL/L (ref 136–145)
VANCOMYCIN TROUGH SERPL-MCNC: 17.8 UG/ML (ref 15–20)
WBC # BLD AUTO: 3.75 X10(3)/MCL (ref 4.5–11.5)

## 2024-12-16 PROCEDURE — 80202 ASSAY OF VANCOMYCIN: CPT | Performed by: STUDENT IN AN ORGANIZED HEALTH CARE EDUCATION/TRAINING PROGRAM

## 2024-12-16 PROCEDURE — 86140 C-REACTIVE PROTEIN: CPT | Performed by: STUDENT IN AN ORGANIZED HEALTH CARE EDUCATION/TRAINING PROGRAM

## 2024-12-16 PROCEDURE — 85652 RBC SED RATE AUTOMATED: CPT | Performed by: STUDENT IN AN ORGANIZED HEALTH CARE EDUCATION/TRAINING PROGRAM

## 2024-12-16 PROCEDURE — 85610 PROTHROMBIN TIME: CPT | Performed by: STUDENT IN AN ORGANIZED HEALTH CARE EDUCATION/TRAINING PROGRAM

## 2024-12-16 PROCEDURE — 80053 COMPREHEN METABOLIC PANEL: CPT | Performed by: STUDENT IN AN ORGANIZED HEALTH CARE EDUCATION/TRAINING PROGRAM

## 2024-12-16 PROCEDURE — 85025 COMPLETE CBC W/AUTO DIFF WBC: CPT | Performed by: STUDENT IN AN ORGANIZED HEALTH CARE EDUCATION/TRAINING PROGRAM

## 2024-12-16 NOTE — PROGRESS NOTES
[facility]  Infectious Diseases OPAT Lab Review Note      Medication:  Vancomycin IV (trough 15-20) to complete a 42 day course, along with Rifampin 300 mg po q 12 hours (to complete a 3 month course)    Infusion Company: [employer]    Outpatient start date:  11/14/2024  Outpatient stop date:  12/26/2024 for end date for Vancomycin and 02/12/2026 end date for Rifampin       Labs reviewed:     Lab Results   Component Value Date    WBC 3.75 (L) 12/16/2024    HGB 11.4 (L) 12/16/2024    HCT 35.3 (L) 12/16/2024    MCV 89.4 12/16/2024     (L) 12/16/2024      CMP  Sodium   Date Value Ref Range Status   12/16/2024 143 136 - 145 mmol/L Final   02/08/2024 142 136 - 145 mmol/L Final     Potassium   Date Value Ref Range Status   12/16/2024 4.2 3.5 - 5.1 mmol/L Final   02/08/2024 4.5 3.5 - 5.1 mmol/L Final     Chloride   Date Value Ref Range Status   12/16/2024 107 98 - 107 mmol/L Final   02/08/2024 103 98 - 107 mmol/L Final     CO2   Date Value Ref Range Status   12/16/2024 28 23 - 31 mmol/L Final   02/08/2024 28 22 - 32 mmol/L Final     BUN   Date Value Ref Range Status   02/08/2024 23 (A) 6 - 20 mg/dL Final     Blood Urea Nitrogen   Date Value Ref Range Status   12/16/2024 11.9 9.8 - 20.1 mg/dL Final     Creatinine   Date Value Ref Range Status   12/16/2024 0.58 0.55 - 1.02 mg/dL Final     Calcium   Date Value Ref Range Status   12/16/2024 8.3 (L) 8.4 - 10.2 mg/dL Final   02/08/2024 9.3 8.6 - 10.2 mg/dL Final     Total Protein   Date Value Ref Range Status   02/08/2024 7.1 6.6 - 8.7 G/DL Final     Albumin   Date Value Ref Range Status   12/16/2024 3.3 (L) 3.4 - 4.8 g/dL Final   02/08/2024 4.0 0.05 - 5.2 Final     Bilirubin Total   Date Value Ref Range Status   12/16/2024 0.7 <=1.5 mg/dL Final     ALP   Date Value Ref Range Status   12/16/2024 123 40 - 150 unit/L Final     AST   Date Value Ref Range Status   12/16/2024 28 5 - 34 unit/L Final     ALT   Date Value Ref Range Status    12/16/2024 23 0 - 55 unit/L Final   02/08/2024 21 0 - 41 Final     eGFR   Date Value Ref Range Status   12/16/2024 >60 mL/min/1.73/m2 Final         Lab Results   Component Value Date    SEDRATE 31 (H) 12/16/2024      Lab Results   Component Value Date    CRP 18.70 (H) 12/16/2024 11/18/2024 vancomycin trough 19.2   11/25/2024 vancomycin trough 15.1  12/02/2024 vancomycin trough 15.6  12/09/2024 vancomycin trough 14.0  12/13/2024 vancomycin trough 15.2  12/16/2024 vancomycin trough 17.8      Assessment / Plan:   Sepsis   Persistent coag-negative bacteremia  MediPort infection with removal 11/12/2024  LT suspected hardware infection suspected  Cirrhosis   CGD   Short gut syndrome on TPN   RA   Immunocompromised      No leukocytosis noted; mild leukopenia.  Inflammatory markers with slight upward trend.  Vancomycin trough remains in goal  Renal indices stable  Continue current Vancomycin dosing at 1.25 grams IV q 12 hours  Weekly labs to monitor and will trend inflammatory markers  Patient will need lifelong suppression with Cefadroxil following completion of IV antibiotics        Follow up ID appointment:  01/16/2024        ERWIN Grey  [facility] Infectious Diseases    *Portions of this note dictated using EMR integrated voice recognition software, and may be subject to voice recognition errors not corrected at proofreading. Please contact writer for clarification if needed. *

## 2024-12-23 ENCOUNTER — LAB REQUISITION (OUTPATIENT)
Dept: LAB | Facility: HOSPITAL | Age: 79
End: 2024-12-23
Payer: MEDICARE

## 2024-12-23 DIAGNOSIS — T80.211A BLOODSTREAM INFECTION DUE TO CENTRAL VENOUS CATHETER, INITIAL ENCOUNTER: ICD-10-CM

## 2024-12-23 DIAGNOSIS — J18.9 PNEUMONIA, UNSPECIFIED ORGANISM: ICD-10-CM

## 2024-12-23 LAB
ALBUMIN SERPL-MCNC: 3.4 G/DL (ref 3.4–4.8)
ALBUMIN/GLOB SERPL: 1 RATIO (ref 1.1–2)
ALP SERPL-CCNC: 121 UNIT/L (ref 40–150)
ALT SERPL-CCNC: 25 UNIT/L (ref 0–55)
ANION GAP SERPL CALC-SCNC: 8 MEQ/L
AST SERPL-CCNC: 32 UNIT/L (ref 5–34)
BASOPHILS # BLD AUTO: 0.03 X10(3)/MCL
BASOPHILS NFR BLD AUTO: 0.7 %
BILIRUB SERPL-MCNC: 0.4 MG/DL
BUN SERPL-MCNC: 13.8 MG/DL (ref 9.8–20.1)
CALCIUM SERPL-MCNC: 8.6 MG/DL (ref 8.4–10.2)
CHLORIDE SERPL-SCNC: 107 MMOL/L (ref 98–107)
CO2 SERPL-SCNC: 25 MMOL/L (ref 23–31)
CREAT SERPL-MCNC: 0.59 MG/DL (ref 0.55–1.02)
CREAT/UREA NIT SERPL: 23
CRP SERPL-MCNC: 10.8 MG/L
EOSINOPHIL # BLD AUTO: 0.51 X10(3)/MCL (ref 0–0.9)
EOSINOPHIL NFR BLD AUTO: 11.1 %
ERYTHROCYTE [DISTWIDTH] IN BLOOD BY AUTOMATED COUNT: 14.6 % (ref 11.5–17)
ERYTHROCYTE [SEDIMENTATION RATE] IN BLOOD: 27 MM/HR (ref 0–20)
GFR SERPLBLD CREATININE-BSD FMLA CKD-EPI: >60 ML/MIN/1.73/M2
GLOBULIN SER-MCNC: 3.3 GM/DL (ref 2.4–3.5)
GLUCOSE SERPL-MCNC: 98 MG/DL (ref 82–115)
HCT VFR BLD AUTO: 37.9 % (ref 37–47)
HGB BLD-MCNC: 12.4 G/DL (ref 12–16)
IMM GRANULOCYTES # BLD AUTO: 0.01 X10(3)/MCL (ref 0–0.04)
IMM GRANULOCYTES NFR BLD AUTO: 0.2 %
LYMPHOCYTES # BLD AUTO: 1.81 X10(3)/MCL (ref 0.6–4.6)
LYMPHOCYTES NFR BLD AUTO: 39.4 %
MCH RBC QN AUTO: 29 PG (ref 27–31)
MCHC RBC AUTO-ENTMCNC: 32.7 G/DL (ref 33–36)
MCV RBC AUTO: 88.6 FL (ref 80–94)
MONOCYTES # BLD AUTO: 0.42 X10(3)/MCL (ref 0.1–1.3)
MONOCYTES NFR BLD AUTO: 9.2 %
NEUTROPHILS # BLD AUTO: 1.81 X10(3)/MCL (ref 2.1–9.2)
NEUTROPHILS NFR BLD AUTO: 39.4 %
NRBC BLD AUTO-RTO: 0 %
PLATELET # BLD AUTO: 143 X10(3)/MCL (ref 130–400)
PMV BLD AUTO: 10 FL (ref 7.4–10.4)
POTASSIUM SERPL-SCNC: 4 MMOL/L (ref 3.5–5.1)
PROT SERPL-MCNC: 6.7 GM/DL (ref 5.8–7.6)
RBC # BLD AUTO: 4.28 X10(6)/MCL (ref 4.2–5.4)
SODIUM SERPL-SCNC: 140 MMOL/L (ref 136–145)
VANCOMYCIN TROUGH SERPL-MCNC: 19.9 UG/ML (ref 15–20)
WBC # BLD AUTO: 4.59 X10(3)/MCL (ref 4.5–11.5)

## 2024-12-23 PROCEDURE — 85652 RBC SED RATE AUTOMATED: CPT | Performed by: STUDENT IN AN ORGANIZED HEALTH CARE EDUCATION/TRAINING PROGRAM

## 2024-12-23 PROCEDURE — 80053 COMPREHEN METABOLIC PANEL: CPT | Performed by: STUDENT IN AN ORGANIZED HEALTH CARE EDUCATION/TRAINING PROGRAM

## 2024-12-23 PROCEDURE — 85025 COMPLETE CBC W/AUTO DIFF WBC: CPT | Performed by: STUDENT IN AN ORGANIZED HEALTH CARE EDUCATION/TRAINING PROGRAM

## 2024-12-23 PROCEDURE — 86140 C-REACTIVE PROTEIN: CPT | Performed by: STUDENT IN AN ORGANIZED HEALTH CARE EDUCATION/TRAINING PROGRAM

## 2024-12-23 PROCEDURE — 80202 ASSAY OF VANCOMYCIN: CPT | Performed by: STUDENT IN AN ORGANIZED HEALTH CARE EDUCATION/TRAINING PROGRAM

## 2024-12-24 ENCOUNTER — TELEPHONE (OUTPATIENT)
Dept: INFECTIOUS DISEASES | Facility: CLINIC | Age: 79
End: 2024-12-24
Payer: MEDICARE

## 2024-12-24 ENCOUNTER — DOCUMENTATION ONLY (OUTPATIENT)
Dept: INFECTIOUS DISEASES | Facility: CLINIC | Age: 79
End: 2024-12-24
Payer: MEDICARE

## 2024-12-25 NOTE — TELEPHONE ENCOUNTER
Reviewing labs  Pt completes IV therapy with Vancomycin on 12/26/2024  Okay to pull PICC  Please remind her to start Cefadroxil in chronic suppression when Vancomycin completes and she should also be on Rifampin  Thank you

## 2024-12-25 NOTE — PROGRESS NOTES
[facility]  Infectious Diseases OPAT Lab Review Note      Medication:  Vancomycin IV (trough 15-20) to complete a 42 day course, along with Rifampin 300 mg po q 12 hours (to complete a 3 month course)    Infusion Company: [employer]    Outpatient start date:  11/14/2024  Outpatient stop date:  12/26/2024 for end date for Vancomycin and 02/12/2026 end date for Rifampin       Labs reviewed:     Lab Results   Component Value Date    WBC 4.59 12/23/2024    HGB 12.4 12/23/2024    HCT 37.9 12/23/2024    MCV 88.6 12/23/2024     12/23/2024      CMP  Sodium   Date Value Ref Range Status   12/23/2024 140 136 - 145 mmol/L Final   02/08/2024 142 136 - 145 mmol/L Final     Potassium   Date Value Ref Range Status   12/23/2024 4.0 3.5 - 5.1 mmol/L Final   02/08/2024 4.5 3.5 - 5.1 mmol/L Final     Chloride   Date Value Ref Range Status   12/23/2024 107 98 - 107 mmol/L Final   02/08/2024 103 98 - 107 mmol/L Final     CO2   Date Value Ref Range Status   12/23/2024 25 23 - 31 mmol/L Final   02/08/2024 28 22 - 32 mmol/L Final     BUN   Date Value Ref Range Status   02/08/2024 23 (A) 6 - 20 mg/dL Final     Blood Urea Nitrogen   Date Value Ref Range Status   12/23/2024 13.8 9.8 - 20.1 mg/dL Final     Creatinine   Date Value Ref Range Status   12/23/2024 0.59 0.55 - 1.02 mg/dL Final     Calcium   Date Value Ref Range Status   12/23/2024 8.6 8.4 - 10.2 mg/dL Final   02/08/2024 9.3 8.6 - 10.2 mg/dL Final     Total Protein   Date Value Ref Range Status   02/08/2024 7.1 6.6 - 8.7 G/DL Final     Albumin   Date Value Ref Range Status   12/23/2024 3.4 3.4 - 4.8 g/dL Final   02/08/2024 4.0 0.05 - 5.2 Final     Bilirubin Total   Date Value Ref Range Status   12/23/2024 0.4 <=1.5 mg/dL Final     ALP   Date Value Ref Range Status   12/23/2024 121 40 - 150 unit/L Final     AST   Date Value Ref Range Status   12/23/2024 32 5 - 34 unit/L Final     ALT   Date Value Ref Range Status   12/23/2024 25 0 - 55 unit/L  Final   02/08/2024 21 0 - 41 Final     eGFR   Date Value Ref Range Status   12/23/2024 >60 mL/min/1.73/m2 Final         Lab Results   Component Value Date    SEDRATE 27 (H) 12/23/2024      Lab Results   Component Value Date    CRP 10.80 (H) 12/23/2024 11/18/2024 vancomycin trough 19.2   11/25/2024 vancomycin trough 15.1  12/02/2024 vancomycin trough 15.6  12/09/2024 vancomycin trough 14.0  12/13/2024 vancomycin trough 15.2  12/16/2024 vancomycin trough 17.8  12/23/2024 vancomycin trough 19.9      Assessment / Plan:   Sepsis   Persistent coag-negative bacteremia  MediPort infection with removal 11/12/2024  LT suspected hardware infection suspected  Cirrhosis   CGD   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 Leukopenia resolved. Inflammatory markers downward trending  Vancomycin trough remains in goal  Renal indices stable  Continue current Vancomycin dosing at 1.25 grams IV q 12 hours  Vancomycin end date 12/26/2024  Okay to pull PICC upon IV therapy completion  She will need lifelong suppression with Cefadroxil following completion of IV antibiotics  Patient to continue Rifampin until 02/16/2026        Follow up ID appointment:  01/16/2024        ERWIN Grey  [facility] Infectious Diseases    *Portions of this note dictated using EMR integrated voice recognition software, and may be subject to voice recognition errors not corrected at proofreading. Please contact writer for clarification if needed. *

## 2024-12-30 ENCOUNTER — LAB REQUISITION (OUTPATIENT)
Dept: LAB | Facility: HOSPITAL | Age: 79
End: 2024-12-30
Payer: MEDICARE

## 2024-12-30 DIAGNOSIS — T80.211A BLOODSTREAM INFECTION DUE TO CENTRAL VENOUS CATHETER, INITIAL ENCOUNTER: ICD-10-CM

## 2024-12-30 DIAGNOSIS — J18.9 PNEUMONIA, UNSPECIFIED ORGANISM: ICD-10-CM

## 2024-12-30 LAB
ABS NEUT (OLG): 1.26 X10(3)/MCL (ref 2.1–9.2)
ALBUMIN SERPL-MCNC: 3.4 G/DL (ref 3.4–4.8)
ALBUMIN/GLOB SERPL: 1.1 RATIO (ref 1.1–2)
ALP SERPL-CCNC: 106 UNIT/L (ref 40–150)
ALT SERPL-CCNC: 23 UNIT/L (ref 0–55)
ANION GAP SERPL CALC-SCNC: 8 MEQ/L
AST SERPL-CCNC: 36 UNIT/L (ref 5–34)
BASOPHILS NFR BLD MANUAL: 0.06 X10(3)/MCL (ref 0–0.2)
BASOPHILS NFR BLD MANUAL: 2 %
BILIRUB SERPL-MCNC: 0.6 MG/DL
BUN SERPL-MCNC: 14.9 MG/DL (ref 9.8–20.1)
CALCIUM SERPL-MCNC: 8.8 MG/DL (ref 8.4–10.2)
CHLORIDE SERPL-SCNC: 108 MMOL/L (ref 98–107)
CO2 SERPL-SCNC: 27 MMOL/L (ref 23–31)
CREAT SERPL-MCNC: 0.61 MG/DL (ref 0.55–1.02)
CREAT/UREA NIT SERPL: 24
CRP SERPL-MCNC: 7.2 MG/L
EOSINOPHIL NFR BLD MANUAL: 0.41 X10(3)/MCL (ref 0–0.9)
EOSINOPHIL NFR BLD MANUAL: 13 %
ERYTHROCYTE [DISTWIDTH] IN BLOOD BY AUTOMATED COUNT: 14.5 % (ref 11.5–17)
ERYTHROCYTE [SEDIMENTATION RATE] IN BLOOD: 22 MM/HR (ref 0–20)
GFR SERPLBLD CREATININE-BSD FMLA CKD-EPI: >60 ML/MIN/1.73/M2
GLOBULIN SER-MCNC: 3.2 GM/DL (ref 2.4–3.5)
GLUCOSE SERPL-MCNC: 126 MG/DL (ref 82–115)
HCT VFR BLD AUTO: 36.6 % (ref 37–47)
HGB BLD-MCNC: 12.2 G/DL (ref 12–16)
INR PPP: 1.3
INSTRUMENT WBC (OLG): 3.15 X10(3)/MCL
LYMPHOCYTES NFR BLD MANUAL: 1.23 X10(3)/MCL
LYMPHOCYTES NFR BLD MANUAL: 39 %
MCH RBC QN AUTO: 29.3 PG (ref 27–31)
MCHC RBC AUTO-ENTMCNC: 33.3 G/DL (ref 33–36)
MCV RBC AUTO: 87.8 FL (ref 80–94)
MONOCYTES NFR BLD MANUAL: 0.19 X10(3)/MCL (ref 0.1–1.3)
MONOCYTES NFR BLD MANUAL: 6 %
NEUTROPHILS NFR BLD MANUAL: 40 %
NRBC BLD AUTO-RTO: 0 %
PLATELET # BLD AUTO: 126 X10(3)/MCL (ref 130–400)
PLATELET # BLD EST: ABNORMAL 10*3/UL
PMV BLD AUTO: 10.8 FL (ref 7.4–10.4)
POTASSIUM SERPL-SCNC: 4 MMOL/L (ref 3.5–5.1)
PROT SERPL-MCNC: 6.6 GM/DL (ref 5.8–7.6)
PROTHROMBIN TIME: 16.7 SECONDS (ref 12.5–14.5)
RBC # BLD AUTO: 4.17 X10(6)/MCL (ref 4.2–5.4)
RBC MORPH BLD: NORMAL
SODIUM SERPL-SCNC: 143 MMOL/L (ref 136–145)
WBC # BLD AUTO: 3.15 X10(3)/MCL (ref 4.5–11.5)

## 2024-12-30 PROCEDURE — 85610 PROTHROMBIN TIME: CPT

## 2024-12-30 PROCEDURE — 85025 COMPLETE CBC W/AUTO DIFF WBC: CPT

## 2024-12-30 PROCEDURE — 86140 C-REACTIVE PROTEIN: CPT

## 2024-12-30 PROCEDURE — 80053 COMPREHEN METABOLIC PANEL: CPT

## 2024-12-30 PROCEDURE — 85652 RBC SED RATE AUTOMATED: CPT

## 2024-12-30 NOTE — TELEPHONE ENCOUNTER
Spoke to Laura she is taking the Rifampin but forgot to start the Cefadroxil she will start it today.   Explained to her someone will call her to schedule for picc to be removed.     Sent Charanjit with [employer] message ok to pull picc

## 2024-12-31 ENCOUNTER — EXTERNAL HOME HEALTH (OUTPATIENT)
Dept: HOME HEALTH SERVICES | Facility: HOSPITAL | Age: 79
End: 2024-12-31
Payer: MEDICARE

## 2025-01-02 ENCOUNTER — DOCUMENT SCAN (OUTPATIENT)
Dept: HOME HEALTH SERVICES | Facility: HOSPITAL | Age: 80
End: 2025-01-02
Payer: MEDICARE

## 2025-01-02 ENCOUNTER — LAB REQUISITION (OUTPATIENT)
Dept: LAB | Facility: HOSPITAL | Age: 80
End: 2025-01-02
Payer: MEDICARE

## 2025-01-02 DIAGNOSIS — J18.9 PNEUMONIA, UNSPECIFIED ORGANISM: ICD-10-CM

## 2025-01-02 DIAGNOSIS — Z79.01 LONG TERM (CURRENT) USE OF ANTICOAGULANTS: ICD-10-CM

## 2025-01-02 LAB
INR PPP: 1.3
PROTHROMBIN TIME: 16.8 SECONDS (ref 12.5–14.5)

## 2025-01-02 PROCEDURE — 85610 PROTHROMBIN TIME: CPT | Performed by: INTERNAL MEDICINE

## 2025-01-04 ENCOUNTER — DOCUMENT SCAN (OUTPATIENT)
Dept: HOME HEALTH SERVICES | Facility: HOSPITAL | Age: 80
End: 2025-01-04
Payer: MEDICARE

## 2025-01-13 ENCOUNTER — LAB REQUISITION (OUTPATIENT)
Dept: LAB | Facility: HOSPITAL | Age: 80
End: 2025-01-13
Payer: MEDICARE

## 2025-01-13 DIAGNOSIS — Z79.01 LONG TERM (CURRENT) USE OF ANTICOAGULANTS: ICD-10-CM

## 2025-01-13 DIAGNOSIS — J18.9 PNEUMONIA, UNSPECIFIED ORGANISM: ICD-10-CM

## 2025-01-13 LAB
ALBUMIN SERPL-MCNC: 3.6 G/DL (ref 3.4–4.8)
ALBUMIN/GLOB SERPL: 1 RATIO (ref 1.1–2)
ALP SERPL-CCNC: 112 UNIT/L (ref 40–150)
ALT SERPL-CCNC: 29 UNIT/L (ref 0–55)
ANION GAP SERPL CALC-SCNC: 9 MEQ/L
AST SERPL-CCNC: 34 UNIT/L (ref 5–34)
BASOPHILS # BLD AUTO: 0.02 X10(3)/MCL
BASOPHILS NFR BLD AUTO: 0.5 %
BILIRUB SERPL-MCNC: 0.4 MG/DL
BUN SERPL-MCNC: 14.7 MG/DL (ref 9.8–20.1)
CALCIUM SERPL-MCNC: 9.1 MG/DL (ref 8.4–10.2)
CHLORIDE SERPL-SCNC: 104 MMOL/L (ref 98–107)
CO2 SERPL-SCNC: 27 MMOL/L (ref 23–31)
CREAT SERPL-MCNC: 0.61 MG/DL (ref 0.55–1.02)
CREAT/UREA NIT SERPL: 24
EOSINOPHIL # BLD AUTO: 0.24 X10(3)/MCL (ref 0–0.9)
EOSINOPHIL NFR BLD AUTO: 6.5 %
ERYTHROCYTE [DISTWIDTH] IN BLOOD BY AUTOMATED COUNT: 14.2 % (ref 11.5–17)
GFR SERPLBLD CREATININE-BSD FMLA CKD-EPI: >60 ML/MIN/1.73/M2
GLOBULIN SER-MCNC: 3.5 GM/DL (ref 2.4–3.5)
GLUCOSE SERPL-MCNC: 100 MG/DL (ref 82–115)
HCT VFR BLD AUTO: 37.8 % (ref 37–47)
HGB BLD-MCNC: 12.3 G/DL (ref 12–16)
IMM GRANULOCYTES # BLD AUTO: 0 X10(3)/MCL (ref 0–0.04)
IMM GRANULOCYTES NFR BLD AUTO: 0 %
INR PPP: 1.8
LYMPHOCYTES # BLD AUTO: 1.85 X10(3)/MCL (ref 0.6–4.6)
LYMPHOCYTES NFR BLD AUTO: 50.3 %
MAGNESIUM SERPL-MCNC: 2.1 MG/DL (ref 1.6–2.6)
MCH RBC QN AUTO: 28.5 PG (ref 27–31)
MCHC RBC AUTO-ENTMCNC: 32.5 G/DL (ref 33–36)
MCV RBC AUTO: 87.7 FL (ref 80–94)
MONOCYTES # BLD AUTO: 0.36 X10(3)/MCL (ref 0.1–1.3)
MONOCYTES NFR BLD AUTO: 9.8 %
NEUTROPHILS # BLD AUTO: 1.21 X10(3)/MCL (ref 2.1–9.2)
NEUTROPHILS NFR BLD AUTO: 32.9 %
NRBC BLD AUTO-RTO: 0 %
PLATELET # BLD AUTO: 148 X10(3)/MCL (ref 130–400)
PMV BLD AUTO: 10.3 FL (ref 7.4–10.4)
POTASSIUM SERPL-SCNC: 3.8 MMOL/L (ref 3.5–5.1)
PROT SERPL-MCNC: 7.1 GM/DL (ref 5.8–7.6)
PROTHROMBIN TIME: 20.9 SECONDS (ref 12.5–14.5)
RBC # BLD AUTO: 4.31 X10(6)/MCL (ref 4.2–5.4)
SODIUM SERPL-SCNC: 140 MMOL/L (ref 136–145)
TRIGL SERPL-MCNC: 70 MG/DL (ref 37–140)
WBC # BLD AUTO: 3.68 X10(3)/MCL (ref 4.5–11.5)

## 2025-01-13 PROCEDURE — 80053 COMPREHEN METABOLIC PANEL: CPT

## 2025-01-13 PROCEDURE — 83735 ASSAY OF MAGNESIUM: CPT

## 2025-01-13 PROCEDURE — 84478 ASSAY OF TRIGLYCERIDES: CPT

## 2025-01-13 PROCEDURE — 85025 COMPLETE CBC W/AUTO DIFF WBC: CPT

## 2025-01-13 PROCEDURE — 85610 PROTHROMBIN TIME: CPT

## 2025-01-15 ENCOUNTER — TELEPHONE (OUTPATIENT)
Dept: ADMINISTRATIVE | Facility: CLINIC | Age: 80
End: 2025-01-15
Payer: MEDICARE

## 2025-01-16 ENCOUNTER — OFFICE VISIT (OUTPATIENT)
Dept: INFECTIOUS DISEASES | Facility: CLINIC | Age: 80
End: 2025-01-16
Payer: MEDICARE

## 2025-01-16 VITALS
HEART RATE: 63 BPM | RESPIRATION RATE: 16 BRPM | HEIGHT: 64 IN | SYSTOLIC BLOOD PRESSURE: 133 MMHG | DIASTOLIC BLOOD PRESSURE: 69 MMHG | WEIGHT: 164.56 LBS | TEMPERATURE: 98 F | BODY MASS INDEX: 28.09 KG/M2

## 2025-01-16 DIAGNOSIS — B95.7 COAGULASE NEGATIVE STAPHYLOCOCCUS BACTEREMIA: ICD-10-CM

## 2025-01-16 DIAGNOSIS — T82.7XXS VASCULAR DEVICE, IMPLANT, OR GRAFT INFECTION OR INFLAMMATION, SEQUELA: ICD-10-CM

## 2025-01-16 DIAGNOSIS — B95.8 STAPHYLOCOCCUS INFECTION: Primary | ICD-10-CM

## 2025-01-16 DIAGNOSIS — R78.81 BACTEREMIA: ICD-10-CM

## 2025-01-16 DIAGNOSIS — K90.829 ACQUIRED SHORT BOWEL SYNDROME: ICD-10-CM

## 2025-01-16 DIAGNOSIS — R78.81 COAGULASE NEGATIVE STAPHYLOCOCCUS BACTEREMIA: ICD-10-CM

## 2025-01-16 DIAGNOSIS — B99.9 RECURRENT INFECTIONS: ICD-10-CM

## 2025-01-16 PROCEDURE — 99214 OFFICE O/P EST MOD 30 MIN: CPT | Mod: PBBFAC

## 2025-01-16 RX ORDER — CEFADROXIL 500 MG/1
500 CAPSULE ORAL EVERY 12 HOURS
Qty: 60 CAPSULE | Refills: 5 | Status: SHIPPED | OUTPATIENT
Start: 2025-01-16

## 2025-01-16 RX ORDER — CHOLESTYRAMINE 4 G/5.5G
4 POWDER, FOR SUSPENSION ORAL 2 TIMES DAILY
COMMUNITY
Start: 2025-01-13

## 2025-01-16 NOTE — PROGRESS NOTES
[facility] Outpatient INFECTIOUS DISEASES Clinic Note    HISTORY OF PRESENT ILLNESS:     Initial visit 12/5/24  70-year-old female with a past medical history of short gut syndrome currently on TPN at home, hypertension, hyperlipidemia, PVD, DVT currently on home Coumadin, RA, cirrhosis, gallstone pancreatitis, and anxiety/depression who presents to ID clinic today as a new patient to establish care.    Patient was recently admitted to [facility] on 11/07 where she initially presented with complaints of fever, generalized weakness.  She was initially admitted for pneumonia/enterocolitis.  Blood cultures obtained on this admission positive in 2/2 vials for staph epidermidis.  Due to history of MediPort currently receiving TPN and history of multiple MediPort infections, patient was treated for current MediPort infection with culture results.  MediPort was removed on 11/02.  She was started on vancomycin.  TTE negative.  Cardiology was consult for DALE which was completed on 11/19 and negative.  Patient was discharged with 6 weeks of vancomycin via OPAT.  Due to patient's indwelling shoulder hardware, she was also started on rifampin 300 mg b.i.d..    Since admission, patient reports that she is doing well.  MediPort site remains without significant erythema, swelling, or drainage.  PICC well-maintained and well dressed.  She reports diarrhea baseline that is slightly worse since antibiotic administration has began.    Interval history:  1/16/25  Presents for follow up today, reports feeling well and denies any R shoulder pain. Vancomycin has completed and she is now on cefadroxil for suppression. She continues to take rifampin with planned end date of 2/12/25. She does endorse some diarrhea which she feels is due to her short gut syndrome. She saw GI recently who is planning to start a new medication to help with this. Denies fever, chills, N/V, night sweats, chest pain, SOB. She reprots she has 3-10 watery BM per day.    REVIEW  OF SYSTEMS:  Review of Systems   Constitutional:  Negative for chills, fever and malaise/fatigue.   Respiratory:  Negative for cough, hemoptysis, sputum production and shortness of breath.    Cardiovascular:  Negative for chest pain, palpitations and leg swelling.   Gastrointestinal:  Positive for diarrhea. Negative for abdominal pain, nausea and vomiting.   Genitourinary: Negative.    Musculoskeletal: Negative.    Neurological: Negative.        PREVIOUS MEDICAL HISTORY:  Past Medical History:   Diagnosis Date    Acute URI     Anxiety and depression     Back pain     Bacteremia 04/29/2024    Breast cancer     Cholelithiasis     Contaminated small bowel syndrome     DVT (deep venous thrombosis)     on coumadin    Edema, lower extremity     Gallstone pancreatitis     Heart murmur     HTN (hypertension)     Insomnia     Irregular heart beat     Ischemic disease of gut     Kidney stones     Laryngitis     Malabsorption     Malnutrition, unspecified type     Meningitis, unspecified     OA (osteoarthritis)     PVD (peripheral vascular disease)     Rheumatoid arthritis, unspecified     Staph infection     infected mediport -- on doxycycline daily for prevention    Tremor     Unspecified cataract     Unspecified cirrhosis of liver 06/20/2023    Dr Carroll       PREVIOUS SURGICAL HISTORY:  Past Surgical History:   Procedure Laterality Date    APPENDECTOMY      BOWEL RESECTION      BREAST LUMPECTOMY Right     CHOLECYSTECTOMY  05/2015    COLONOSCOPY  02/2022    repeat 3 years    CYST REMOVAL Right     breast    CYSTOSCOPY W/ STONE MANIPULATION      CYSTOSCOPY W/ URETERAL STENT PLACEMENT  12/2020    CYSTOSCOPY W/ URETERAL STENT REMOVAL  04/2021    CYSTOURETEROSCOPY, WITH HOLMIUM LASER LITHOTRIPSY OF URETERAL CALCULUS AND STENT INSERTION Left 05/02/2023    Procedure: CYSTOSCOPY, WITH URETERAL STENT INSERTION Left;  Surgeon: Jared Felix MD;  Location: [facility];  Service: Urology;  Laterality: Left;    EGD, WITH CLOSED  BIOPSY N/A 6/20/2023    Procedure: EGD;  Surgeon: Aashish Carroll MD;  Location: [facility] ENDOSCOPY;  Service: Gastroenterology;  Laterality: N/A;    ENDOSCOPIC RETROGRADE CHOLANGIOPANCREATOGRAPHY W/ SPHINCTEROTOMY AND STONE REMOVAL  04/2015    ESOPHAGOGASTRODUODENOSCOPY  06/20/2023    Dr Carroll - no signs esophageal varicies --repeat 3 yrs    GI Biopsy  02/15/2022    GI Biopsy  12/2018    HYSTERECTOMY      INSERTION OF TUNNELED CENTRAL VENOUS CATHETER (CVC) WITH SUBCUTANEOUS PORT N/A 7/18/2024    Procedure: [id number];  Surgeon: Ariel Welsh Jr., MD;  Location: [facility] OR;  Service: General;  Laterality: N/A;    LAPAROTOMY, EXPLORATORY  06/2015    LITHOTRIPSY Left 04/2021    LITHOTRIPSY Left 03/2021    MEDIPORT INSERTION, SINGLE  06/2021    MEDIPORT INSERTION, SINGLE  07/2020    MEDIPORT INSERTION, SINGLE  12/2018    MEDIPORT REMOVAL  07/2020    PHACOEMULSIFICATION OF CATARACT Left 12/2016    PHACOEMULSIFICATION OF CATARACT Right 11/2016    PICC line Removal Right 06/2021    POLYPECTOMY  02/2022    PVD surgery      REMOVAL OF CATHETER  12/2020    REMOVAL OF VASCULAR ACCESS CATHETER Right 4/24/2024    Procedure: Removal, Vascular Access Catheter;  Surgeon: Reed Ghosh MD;  Location: [facility];  Service: General;  Laterality: Right;    REMOVAL OF VASCULAR ACCESS CATHETER N/A 11/12/2024    Procedure: Removal, Vascular Access Catheter;  Surgeon: Ariel Welsh Jr., MD;  Location: [facility];  Service: General;  Laterality: N/A;  removal mediport left chest wall    SHOULDER SURGERY Right     shoulder replacement    SPHINCTEROTOMY OF URETHRA      VENTRAL HERNIA REPAIR  04/2016         ALLERGIES:  Review of patient's allergies indicates:   Allergen Reactions    Hydromorphone Itching     Other reaction(s): itching    Opium      Other reaction(s): itching  has itching with larger doses. Smaller doses do not cause a reaction.         MEDICATIONS:  Current Outpatient Medications on File Prior to Visit    Medication Sig Dispense Refill    busPIRone (BUSPAR) 15 MG tablet Take 15 mg by mouth 3 (three) times daily.      cefadroxil (DURICEF) 500 MG Cap Take 1 capsule (500 mg total) by mouth every 12 (twelve) hours. 60 capsule 5    CELEXA 40 mg tablet Take 40 mg by mouth once daily.      chlorhexidine (HIBICLENS) 4 % external liquid Apply topically 3 (three) times a week. Daily showers for 5 days every other week for 3-6 months 3785 mL 4    cholecalciferol, vitamin D3, 1,250 mcg (50,000 unit) capsule Take 50,000 Units by mouth every 7 days.      D5W SolP 250 mL with vancomycin 1,000 mg SolR IV vanc until 12/26, dosing based on trough      diphenoxylate-atropine 2.5-0.025 mg (LOMOTIL) 2.5-0.025 mg per tablet Take 2 tablets by mouth 2 (two) times a day.      ferrous sulfate 325 (65 FE) MG EC tablet Take 1 tablet (325 mg total) by mouth once daily. 30 tablet 0    GATTEX 30-VIAL 5 mg Kit Inject into the skin Daily.      metoprolol tartrate (LOPRESSOR) 100 MG tablet Take 100 mg by mouth 2 (two) times daily.      mirtazapine (REMERON) 15 MG tablet Take 15 mg by mouth every evening.      PREVALITE 4 gram PwPk Take 4 grams of anhydrous cholestyramine by mouth 2 (two) times daily.      rifAMpin (RIFADIN) 300 MG capsule Take 1 capsule (300 mg total) by mouth every 12 (twelve) hours. 120 capsule 0    solifenacin (VESICARE) 5 MG tablet Take 5 mg by mouth once daily.      warfarin (COUMADIN) 2.5 MG tablet TAKE ONE TABLET BY MOUTH ONCE DAILY IN THE EVENING OR AS DIRECTED BY CIS PROVIDER      multivitamin (ONE DAILY MULTIVITAMIN) per tablet Take 1 tablet by mouth once daily.      vancomycin (VANCOCIN) 10 gram SolR Inject into the vein.       No current facility-administered medications on file prior to visit.          SOCIAL HISTORY:    reports that she has never smoked. She has been exposed to tobacco smoke. She has never used smokeless tobacco. She reports current alcohol use. She reports that she does not use drugs.      FAMILY  "HISTORY:   Family History   Problem Relation Name Age of Onset    Pneumonia Mother      Depression Mother      Osteoarthritis Mother      Breast cancer Mother  70 - 75    Uterine cancer Mother  40 - 45    Bone cancer Mother  60 - 69    Heart attack Father      Aneurysm Father          brain    Diabetes Father      Hyperlipidemia Father      Hypertension Father      Hyperlipidemia Sister      Hypertension Sister      Kidney cancer Sister  55 - 56    Osteoarthritis Sister      Breast cancer Sister  52 - 53    Diabetes Mellitus Sister      Heart failure Sister      Kidney disease Sister      Migraines Sister      Arthritis Sister      Arthritis Sister      Parkinsonism Sister      Heart disease Sister      Hyperlipidemia Brother      Hypertension Brother      Coronary artery disease Brother      Coronary artery disease Brother          CABG x3    Hyperlipidemia Brother      Hypertension Brother      Kidney cancer Paternal Grandmother  60 - 69       PHYSICAL EXAMINATION:  /69 (BP Location: Left arm, Patient Position: Sitting)   Pulse 63   Temp 97.8 °F (36.6 °C) (Oral)   Resp 16   Ht 5' 4" (1.626 m)   Wt 74.6 kg (164 lb 9.2 oz)   BMI 28.25 kg/m²  Body mass index is 28.25 kg/m².    Gen: awake, alert, NAD  HEENT: NC/AT, EOMi, anicteric sclera, no rhinorrhea, OP clear  Neck: no cervical LAD  CV: regular rate normal rhythm no murmur  Resp: easy WOB on RA CTABL no w/r/r  Abd: +BS, soft, NTTP, no HSM  Ext: warm, no LE edema. Previous mediport site clean and intact. PICC site well dressed.    Skin: no rash  Neuro: no focal deficits     LABORATORY DATA:  Lab Results   Component Value Date    WBC 3.68 (L) 01/13/2025    WBC 3.15 (L) 12/30/2024    WBC 3.15 12/30/2024    HGB 12.3 01/13/2025    HGB 12.2 12/30/2024    HGB 12.4 12/23/2024     01/13/2025     (L) 12/30/2024     12/23/2024    CREATININE 0.61 01/13/2025    CREATININE 0.61 12/30/2024    CREATININE 0.59 12/23/2024    ALT 29 01/13/2025    ALT " 23 12/30/2024    ALT 25 12/23/2024    AST 34 01/13/2025    AST 36 (H) 12/30/2024    AST 32 12/23/2024    ALKPHOS 112 01/13/2025    ALKPHOS 106 12/30/2024    ALKPHOS 121 12/23/2024    BILITOT 0.4 01/13/2025    BILITOT 0.6 12/30/2024    BILITOT 0.4 12/23/2024    INR 1.8 (H) 01/13/2025    INR 1.3 01/02/2025    INR 1.3 12/30/2024       MICROBIOLOGY DATA:        ASSESSMENT:    70-year-old female with a past medical history of 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   - most recent blood cultures on 11/8 displaying growth of Staph epidermidis 2/2   - s/p  6 weeks of Vancomycin with OPAT  2) History of shoulder replacement with prosthetic joint   - currently on Rifampin for Biofilm production with bacteremia as listed above   - Currently on cefadroxil for chronic suppression  3) Short gut syndrome currently TPN dependent  - Mediport removal on 11/12 secondary to above, currently receiving TPN via PICC   4) History of DVT on Coumadin  5) History of RA  6) History of cirrhosis  7) History of HTN, HLD    PLAN:    - Plan to continue lifelong suppressive therapy with cefadroxil 500mg PO BID due to concern for R shoulder PJI  - continue Rifampin 300 mg PO BID for a total course of 3 months.  End date 2/12/25.  - defer future Mediport placement to patient's GI physician managing short bowel syndrome. Would recommend Mediport over PICC.   - PICC will  need to be exchanged at the 6 month roman if this still remains in place at that time  - Will check labs at next visit (CBC, CMP, CRP, ESR)  - If diarrhea worsens will need to rule out CDI    RTC 3 months    Natan Rowley MD  Infectious Diseases Faculty   of Medicine

## 2025-01-18 ENCOUNTER — DOCUMENT SCAN (OUTPATIENT)
Dept: HOME HEALTH SERVICES | Facility: HOSPITAL | Age: 80
End: 2025-01-18
Payer: MEDICARE

## 2025-01-21 ENCOUNTER — DOCUMENT SCAN (OUTPATIENT)
Dept: HOME HEALTH SERVICES | Facility: HOSPITAL | Age: 80
End: 2025-01-21
Payer: MEDICARE

## 2025-01-22 ENCOUNTER — DOCUMENT SCAN (OUTPATIENT)
Dept: HOME HEALTH SERVICES | Facility: HOSPITAL | Age: 80
End: 2025-01-22
Payer: MEDICARE

## 2025-01-23 ENCOUNTER — DOCUMENT SCAN (OUTPATIENT)
Dept: HOME HEALTH SERVICES | Facility: HOSPITAL | Age: 80
End: 2025-01-23
Payer: MEDICARE

## 2025-01-27 ENCOUNTER — DOCUMENT SCAN (OUTPATIENT)
Dept: HOME HEALTH SERVICES | Facility: HOSPITAL | Age: 80
End: 2025-01-27
Payer: MEDICARE

## 2025-01-28 ENCOUNTER — DOCUMENT SCAN (OUTPATIENT)
Dept: HOME HEALTH SERVICES | Facility: HOSPITAL | Age: 80
End: 2025-01-28
Payer: MEDICARE

## 2025-01-28 ENCOUNTER — TELEPHONE (OUTPATIENT)
Dept: SURGERY | Facility: CLINIC | Age: 80
End: 2025-01-28
Payer: MEDICARE

## 2025-01-29 ENCOUNTER — DOCUMENT SCAN (OUTPATIENT)
Dept: HOME HEALTH SERVICES | Facility: HOSPITAL | Age: 80
End: 2025-01-29
Payer: MEDICARE

## 2025-01-29 ENCOUNTER — TELEPHONE (OUTPATIENT)
Dept: INFECTIOUS DISEASES | Facility: CLINIC | Age: 80
End: 2025-01-29
Payer: MEDICARE

## 2025-01-29 NOTE — TELEPHONE ENCOUNTER
----- Message from Ewelina sent at 1/29/2025  7:59 AM CST -----  Patient of Amrik    Patient left message stating Dr. Welsh does not have the order to put port.      8:00  Thanks,

## 2025-01-29 NOTE — TELEPHONE ENCOUNTER
After reading Dr Rowley's last office note, called Ms Sanchez. Instructed her the mediport order will need to come from GI doctor. She verblaied understanding she will call our clinic once she is scheduled for mediport for us to have picc pulled.

## 2025-02-03 ENCOUNTER — DOCUMENT SCAN (OUTPATIENT)
Dept: HOME HEALTH SERVICES | Facility: HOSPITAL | Age: 80
End: 2025-02-03
Payer: MEDICARE

## 2025-02-04 ENCOUNTER — DOCUMENT SCAN (OUTPATIENT)
Dept: HOME HEALTH SERVICES | Facility: HOSPITAL | Age: 80
End: 2025-02-04
Payer: MEDICARE

## 2025-02-04 NOTE — PROGRESS NOTES
HISTORY & PHYSICAL  General Surgery    Patient Name: Laura Acosta  YOB: 1945    Date: 02/10/2025                   SUBJECTIVE:     Chief Complaint/Reason for Admission:   Chief Complaint   Patient presents with    Consult     *Mediport placement - orders coming from Dr. Natan Rowley          History of Present Illness:  Ms. Laura Acosta is a 79 y.o. female who is in need of Mediport for nutritional access.  She recently has a port removed secondary to infection and now referred for placement for continued nutritional access.    Review of Systems:  12 point ROS negative except as stated in HPI    PAST HISTORY:     Past Medical History:   Diagnosis Date    Acute URI     Anxiety and depression     Back pain     Bacteremia 04/29/2024    Breast cancer     Cholelithiasis     Contaminated small bowel syndrome     DVT (deep venous thrombosis)     on coumadin    Edema, lower extremity     Gallstone pancreatitis     Heart murmur     HTN (hypertension)     Insomnia     Irregular heart beat     Ischemic disease of gut     Kidney stones     Laryngitis     Malabsorption     Malnutrition, unspecified type     Meningitis, unspecified     OA (osteoarthritis)     PVD (peripheral vascular disease)     Rheumatoid arthritis, unspecified     Staph infection     infected mediport -- on doxycycline daily for prevention    Tremor     Unspecified cataract     Unspecified cirrhosis of liver 06/20/2023    Dr Carroll     Past Surgical History:   Procedure Laterality Date    APPENDECTOMY      BOWEL RESECTION      BREAST LUMPECTOMY Right     CHOLECYSTECTOMY  05/2015    COLONOSCOPY  02/2022    repeat 3 years    CYST REMOVAL Right     breast    CYSTOSCOPY W/ STONE MANIPULATION      CYSTOSCOPY W/ URETERAL STENT PLACEMENT  12/2020    CYSTOSCOPY W/ URETERAL STENT REMOVAL  04/2021    CYSTOURETEROSCOPY, WITH HOLMIUM LASER LITHOTRIPSY OF URETERAL CALCULUS AND STENT INSERTION Left 05/02/2023    Procedure: CYSTOSCOPY, WITH URETERAL  STENT INSERTION Left;  Surgeon: Jared Felix MD;  Location: [facility] OR;  Service: Urology;  Laterality: Left;    EGD, WITH CLOSED BIOPSY N/A 6/20/2023    Procedure: EGD;  Surgeon: Aashish Carroll MD;  Location: [facility] ENDOSCOPY;  Service: Gastroenterology;  Laterality: N/A;    ENDOSCOPIC RETROGRADE CHOLANGIOPANCREATOGRAPHY W/ SPHINCTEROTOMY AND STONE REMOVAL  04/2015    ESOPHAGOGASTRODUODENOSCOPY  06/20/2023    Dr Carroll - no signs esophageal varicies --repeat 3 yrs    GI Biopsy  02/15/2022    GI Biopsy  12/2018    HYSTERECTOMY      INSERTION OF TUNNELED CENTRAL VENOUS CATHETER (CVC) WITH SUBCUTANEOUS PORT N/A 7/18/2024    Procedure: [id number];  Surgeon: Ariel Welsh Jr., MD;  Location: [facility] OR;  Service: General;  Laterality: N/A;    LAPAROTOMY, EXPLORATORY  06/2015    LITHOTRIPSY Left 04/2021    LITHOTRIPSY Left 03/2021    MEDIPORT INSERTION, SINGLE  06/2021    MEDIPORT INSERTION, SINGLE  07/2020    MEDIPORT INSERTION, SINGLE  12/2018    MEDIPORT REMOVAL  07/2020    PHACOEMULSIFICATION OF CATARACT Left 12/2016    PHACOEMULSIFICATION OF CATARACT Right 11/2016    PICC line Removal Right 06/2021    POLYPECTOMY  02/2022    PVD surgery      REMOVAL OF CATHETER  12/2020    REMOVAL OF VASCULAR ACCESS CATHETER Right 4/24/2024    Procedure: Removal, Vascular Access Catheter;  Surgeon: Reed Ghosh MD;  Location: [facility];  Service: General;  Laterality: Right;    REMOVAL OF VASCULAR ACCESS CATHETER N/A 11/12/2024    Procedure: Removal, Vascular Access Catheter;  Surgeon: Ariel Welsh Jr., MD;  Location: [facility];  Service: General;  Laterality: N/A;  removal mediport left chest wall    SHOULDER SURGERY Right     shoulder replacement    SPHINCTEROTOMY OF URETHRA      VENTRAL HERNIA REPAIR  04/2016     Family History   Problem Relation Name Age of Onset    Pneumonia Mother      Depression Mother      Osteoarthritis Mother      Breast cancer Mother  70 - 75    Uterine cancer Mother  40 - 45     Bone cancer Mother  60 - 69    Heart attack Father      Aneurysm Father          brain    Diabetes Father      Hyperlipidemia Father      Hypertension Father      Hyperlipidemia Sister      Hypertension Sister      Kidney cancer Sister  55 - 56    Osteoarthritis Sister      Breast cancer Sister  52 - 53    Diabetes Mellitus Sister      Heart failure Sister      Kidney disease Sister      Migraines Sister      Arthritis Sister      Arthritis Sister      Parkinsonism Sister      Heart disease Sister      Hyperlipidemia Brother      Hypertension Brother      Coronary artery disease Brother      Coronary artery disease Brother          CABG x3    Hyperlipidemia Brother      Hypertension Brother      Kidney cancer Paternal Grandmother  60 - 69     Social History     Socioeconomic History    Marital status:     Number of children: 2   Occupational History    Occupation: Retired   Tobacco Use    Smoking status: Never     Passive exposure: Past    Smokeless tobacco: Never   Substance and Sexual Activity    Alcohol use: Yes     Comment: twice a year    Drug use: Never    Sexual activity: Not Currently     Social Drivers of Health     Financial Resource Strain: Low Risk  (11/8/2024)    Overall Financial Resource Strain (CARDIA)     Difficulty of Paying Living Expenses: Not hard at all   Food Insecurity: No Food Insecurity (11/8/2024)    Hunger Vital Sign     Worried About Running Out of Food in the Last Year: Never true     Ran Out of Food in the Last Year: Never true   Transportation Needs: No Transportation Needs (11/8/2024)    TRANSPORTATION NEEDS     Transportation : No   Stress: No Stress Concern Present (11/8/2024)    [employer] [place] [employer] - Occupational Stress Questionnaire     Feeling of Stress : Not at all   Housing Stability: Low Risk  (11/8/2024)    Housing Stability Vital Sign     Unable to Pay for Housing in the Last Year: No     Homeless in the Last Year: No       MEDICATIONS &  ALLERGIES:     Current Outpatient Medications on File Prior to Visit   Medication Sig    busPIRone (BUSPAR) 15 MG tablet Take 15 mg by mouth 3 (three) times daily.    cefadroxil (DURICEF) 500 MG Cap Take 1 capsule (500 mg total) by mouth every 12 (twelve) hours.    CELEXA 40 mg tablet Take 40 mg by mouth once daily.    chlorhexidine (HIBICLENS) 4 % external liquid Apply topically 3 (three) times a week. Daily showers for 5 days every other week for 3-6 months    cholecalciferol, vitamin D3, 1,250 mcg (50,000 unit) capsule Take 50,000 Units by mouth every 7 days.    D5W SolP 250 mL with vancomycin 1,000 mg SolR IV vanc until , dosing based on trough    diphenoxylate-atropine 2.5-0.025 mg (LOMOTIL) 2.5-0.025 mg per tablet Take 2 tablets by mouth 2 (two) times a day.    ferrous sulfate 325 (65 FE) MG EC tablet Take 1 tablet (325 mg total) by mouth once daily.    GATTEX 30-VIAL 5 mg Kit Inject into the skin Daily.    methocarbamoL (ROBAXIN) 750 MG Tab Take 750 mg by mouth 2 (two) times daily.    metoprolol tartrate (LOPRESSOR) 100 MG tablet Take 100 mg by mouth 2 (two) times daily.    mirtazapine (REMERON) 15 MG tablet Take 15 mg by mouth every evening.    multivitamin (ONE DAILY MULTIVITAMIN) per tablet Take 1 tablet by mouth once daily.    PREVALITE 4 gram PwPk Take 4 grams of anhydrous cholestyramine by mouth 2 (two) times daily.    rifAMpin (RIFADIN) 300 MG capsule Take 300 mg by mouth every 12 (twelve) hours.    solifenacin (VESICARE) 5 MG tablet Take 5 mg by mouth once daily.    warfarin (COUMADIN) 2.5 MG tablet TAKE ONE TABLET BY MOUTH ONCE DAILY IN THE EVENING OR AS DIRECTED BY CIS PROVIDER    [] methocarbamoL (ROBAXIN) 750 MG Tab Take 1 tablet (750 mg total) by mouth 3 (three) times daily. for 10 days     No current facility-administered medications on file prior to visit.     Review of patient's allergies indicates:   Allergen Reactions    Hydromorphone Itching     Other reaction(s): itching     "Opium      Other reaction(s): itching  has itching with larger doses. Smaller doses do not cause a reaction.       OBJECTIVE:     Vitals:    02/10/25 1459   BP: (!) 153/82   Pulse: 61   Weight: 71.2 kg (157 lb)   Height: 5' 4" (1.626 m)       Body mass index is 26.95 kg/m².    Physical Exam:  General:  Well developed, well nourished, no acute distress  HEENT:  Normocephalic, atraumatic, PERRL, EOMI, clear sclera, ears normal, neck supple, throat clear without erythema or exudates  CVS:  RRR, S1 and S2 normal, no murmurs, rubs, gallops  Resp:  Lungs clear to auscultation, no wheezes, rales, rhonchi, cough  GI:  Abdomen soft, non-tender, non-distended, normoactive bowel sounds, no masses  :  Deferred  MSK:  No muscle atrophy, cyanosis, peripheral edema, full range of motion  Skin:  No rashes, ulcers, erythema  Neuro:  CNII-XII grossly intact  Psych:  Alert and oriented to person, place, and time    Results:  I have independently reviewed all pertinent lab and radiologic studies relevant to general/bariatric surgery.      VISIT DIAGNOSES:       ICD-10-CM ICD-9-CM   1. Pre-op examination  Z01.818 V72.84   2. Malnutrition, unspecified type  E46 263.9       ASSESSMENT/PLAN:     77 yo female with Chronic Malnutrition     -  Plan for Mediport placement  -  Pre-operative workup            "

## 2025-02-04 NOTE — H&P (VIEW-ONLY)
HISTORY & PHYSICAL  General Surgery    Patient Name: Laura Acosta  YOB: 1945    Date: 02/10/2025                   SUBJECTIVE:     Chief Complaint/Reason for Admission:   Chief Complaint   Patient presents with    Consult     *Mediport placement - orders coming from Dr. Natan Rowley          History of Present Illness:  Ms. Laura Acosta is a 79 y.o. female who is in need of Mediport for nutritional access.  She recently has a port removed secondary to infection and now referred for placement for continued nutritional access.    Review of Systems:  12 point ROS negative except as stated in HPI    PAST HISTORY:     Past Medical History:   Diagnosis Date    Acute URI     Anxiety and depression     Back pain     Bacteremia 04/29/2024    Breast cancer     Cholelithiasis     Contaminated small bowel syndrome     DVT (deep venous thrombosis)     on coumadin    Edema, lower extremity     Gallstone pancreatitis     Heart murmur     HTN (hypertension)     Insomnia     Irregular heart beat     Ischemic disease of gut     Kidney stones     Laryngitis     Malabsorption     Malnutrition, unspecified type     Meningitis, unspecified     OA (osteoarthritis)     PVD (peripheral vascular disease)     Rheumatoid arthritis, unspecified     Staph infection     infected mediport -- on doxycycline daily for prevention    Tremor     Unspecified cataract     Unspecified cirrhosis of liver 06/20/2023    Dr Carroll     Past Surgical History:   Procedure Laterality Date    APPENDECTOMY      BOWEL RESECTION      BREAST LUMPECTOMY Right     CHOLECYSTECTOMY  05/2015    COLONOSCOPY  02/2022    repeat 3 years    CYST REMOVAL Right     breast    CYSTOSCOPY W/ STONE MANIPULATION      CYSTOSCOPY W/ URETERAL STENT PLACEMENT  12/2020    CYSTOSCOPY W/ URETERAL STENT REMOVAL  04/2021    CYSTOURETEROSCOPY, WITH HOLMIUM LASER LITHOTRIPSY OF URETERAL CALCULUS AND STENT INSERTION Left 05/02/2023    Procedure: CYSTOSCOPY, WITH URETERAL  STENT INSERTION Left;  Surgeon: Jared Felix MD;  Location: [facility] OR;  Service: Urology;  Laterality: Left;    EGD, WITH CLOSED BIOPSY N/A 6/20/2023    Procedure: EGD;  Surgeon: Aashish Carroll MD;  Location: [facility] ENDOSCOPY;  Service: Gastroenterology;  Laterality: N/A;    ENDOSCOPIC RETROGRADE CHOLANGIOPANCREATOGRAPHY W/ SPHINCTEROTOMY AND STONE REMOVAL  04/2015    ESOPHAGOGASTRODUODENOSCOPY  06/20/2023    Dr Carroll - no signs esophageal varicies --repeat 3 yrs    GI Biopsy  02/15/2022    GI Biopsy  12/2018    HYSTERECTOMY      INSERTION OF TUNNELED CENTRAL VENOUS CATHETER (CVC) WITH SUBCUTANEOUS PORT N/A 7/18/2024    Procedure: [id number];  Surgeon: Ariel Welsh Jr., MD;  Location: [facility] OR;  Service: General;  Laterality: N/A;    LAPAROTOMY, EXPLORATORY  06/2015    LITHOTRIPSY Left 04/2021    LITHOTRIPSY Left 03/2021    MEDIPORT INSERTION, SINGLE  06/2021    MEDIPORT INSERTION, SINGLE  07/2020    MEDIPORT INSERTION, SINGLE  12/2018    MEDIPORT REMOVAL  07/2020    PHACOEMULSIFICATION OF CATARACT Left 12/2016    PHACOEMULSIFICATION OF CATARACT Right 11/2016    PICC line Removal Right 06/2021    POLYPECTOMY  02/2022    PVD surgery      REMOVAL OF CATHETER  12/2020    REMOVAL OF VASCULAR ACCESS CATHETER Right 4/24/2024    Procedure: Removal, Vascular Access Catheter;  Surgeon: Reed Ghosh MD;  Location: [facility];  Service: General;  Laterality: Right;    REMOVAL OF VASCULAR ACCESS CATHETER N/A 11/12/2024    Procedure: Removal, Vascular Access Catheter;  Surgeon: Ariel Welsh Jr., MD;  Location: [facility];  Service: General;  Laterality: N/A;  removal mediport left chest wall    SHOULDER SURGERY Right     shoulder replacement    SPHINCTEROTOMY OF URETHRA      VENTRAL HERNIA REPAIR  04/2016     Family History   Problem Relation Name Age of Onset    Pneumonia Mother      Depression Mother      Osteoarthritis Mother      Breast cancer Mother  70 - 75    Uterine cancer Mother  40 - 45     Bone cancer Mother  60 - 69    Heart attack Father      Aneurysm Father          brain    Diabetes Father      Hyperlipidemia Father      Hypertension Father      Hyperlipidemia Sister      Hypertension Sister      Kidney cancer Sister  55 - 56    Osteoarthritis Sister      Breast cancer Sister  52 - 53    Diabetes Mellitus Sister      Heart failure Sister      Kidney disease Sister      Migraines Sister      Arthritis Sister      Arthritis Sister      Parkinsonism Sister      Heart disease Sister      Hyperlipidemia Brother      Hypertension Brother      Coronary artery disease Brother      Coronary artery disease Brother          CABG x3    Hyperlipidemia Brother      Hypertension Brother      Kidney cancer Paternal Grandmother  60 - 69     Social History     Socioeconomic History    Marital status:     Number of children: 2   Occupational History    Occupation: Retired   Tobacco Use    Smoking status: Never     Passive exposure: Past    Smokeless tobacco: Never   Substance and Sexual Activity    Alcohol use: Yes     Comment: twice a year    Drug use: Never    Sexual activity: Not Currently     Social Drivers of Health     Financial Resource Strain: Low Risk  (11/8/2024)    Overall Financial Resource Strain (CARDIA)     Difficulty of Paying Living Expenses: Not hard at all   Food Insecurity: No Food Insecurity (11/8/2024)    Hunger Vital Sign     Worried About Running Out of Food in the Last Year: Never true     Ran Out of Food in the Last Year: Never true   Transportation Needs: No Transportation Needs (11/8/2024)    TRANSPORTATION NEEDS     Transportation : No   Stress: No Stress Concern Present (11/8/2024)    [employer] [employer] - Occupational Stress Questionnaire     Feeling of Stress : Not at all   Housing Stability: Low Risk  (11/8/2024)    Housing Stability Vital Sign     Unable to Pay for Housing in the Last Year: No     Homeless in the Last Year: No       MEDICATIONS &  ALLERGIES:     Current Outpatient Medications on File Prior to Visit   Medication Sig    busPIRone (BUSPAR) 15 MG tablet Take 15 mg by mouth 3 (three) times daily.    cefadroxil (DURICEF) 500 MG Cap Take 1 capsule (500 mg total) by mouth every 12 (twelve) hours.    CELEXA 40 mg tablet Take 40 mg by mouth once daily.    chlorhexidine (HIBICLENS) 4 % external liquid Apply topically 3 (three) times a week. Daily showers for 5 days every other week for 3-6 months    cholecalciferol, vitamin D3, 1,250 mcg (50,000 unit) capsule Take 50,000 Units by mouth every 7 days.    D5W SolP 250 mL with vancomycin 1,000 mg SolR IV vanc until , dosing based on trough    diphenoxylate-atropine 2.5-0.025 mg (LOMOTIL) 2.5-0.025 mg per tablet Take 2 tablets by mouth 2 (two) times a day.    ferrous sulfate 325 (65 FE) MG EC tablet Take 1 tablet (325 mg total) by mouth once daily.    GATTEX 30-VIAL 5 mg Kit Inject into the skin Daily.    methocarbamoL (ROBAXIN) 750 MG Tab Take 750 mg by mouth 2 (two) times daily.    metoprolol tartrate (LOPRESSOR) 100 MG tablet Take 100 mg by mouth 2 (two) times daily.    mirtazapine (REMERON) 15 MG tablet Take 15 mg by mouth every evening.    multivitamin (ONE DAILY MULTIVITAMIN) per tablet Take 1 tablet by mouth once daily.    PREVALITE 4 gram PwPk Take 4 grams of anhydrous cholestyramine by mouth 2 (two) times daily.    rifAMpin (RIFADIN) 300 MG capsule Take 300 mg by mouth every 12 (twelve) hours.    solifenacin (VESICARE) 5 MG tablet Take 5 mg by mouth once daily.    warfarin (COUMADIN) 2.5 MG tablet TAKE ONE TABLET BY MOUTH ONCE DAILY IN THE EVENING OR AS DIRECTED BY CIS PROVIDER    [] methocarbamoL (ROBAXIN) 750 MG Tab Take 1 tablet (750 mg total) by mouth 3 (three) times daily. for 10 days     No current facility-administered medications on file prior to visit.     Review of patient's allergies indicates:   Allergen Reactions    Hydromorphone Itching     Other reaction(s): itching     "Opium      Other reaction(s): itching  has itching with larger doses. Smaller doses do not cause a reaction.       OBJECTIVE:     Vitals:    02/10/25 1459   BP: (!) 153/82   Pulse: 61   Weight: 71.2 kg (157 lb)   Height: 5' 4" (1.626 m)       Body mass index is 26.95 kg/m².    Physical Exam:  General:  Well developed, well nourished, no acute distress  HEENT:  Normocephalic, atraumatic, PERRL, EOMI, clear sclera, ears normal, neck supple, throat clear without erythema or exudates  CVS:  RRR, S1 and S2 normal, no murmurs, rubs, gallops  Resp:  Lungs clear to auscultation, no wheezes, rales, rhonchi, cough  GI:  Abdomen soft, non-tender, non-distended, normoactive bowel sounds, no masses  :  Deferred  MSK:  No muscle atrophy, cyanosis, peripheral edema, full range of motion  Skin:  No rashes, ulcers, erythema  Neuro:  CNII-XII grossly intact  Psych:  Alert and oriented to person, place, and time    Results:  I have independently reviewed all pertinent lab and radiologic studies relevant to general/bariatric surgery.      VISIT DIAGNOSES:       ICD-10-CM ICD-9-CM   1. Pre-op examination  Z01.818 V72.84   2. Malnutrition, unspecified type  E46 263.9       ASSESSMENT/PLAN:     77 yo female with Chronic Malnutrition     -  Plan for Mediport placement  -  Pre-operative workup            "

## 2025-02-05 NOTE — TELEPHONE ENCOUNTER
I looked into Dr Rowley's OV note- he recommends that GI send referral for Mediport placement if they feel it is warranted. I called and left a msg for Dr Carroll's nurse to discuss

## 2025-02-10 ENCOUNTER — OFFICE VISIT (OUTPATIENT)
Dept: SURGERY | Facility: CLINIC | Age: 80
End: 2025-02-10
Payer: MEDICARE

## 2025-02-10 VITALS
HEIGHT: 64 IN | HEART RATE: 61 BPM | BODY MASS INDEX: 26.8 KG/M2 | DIASTOLIC BLOOD PRESSURE: 82 MMHG | WEIGHT: 157 LBS | SYSTOLIC BLOOD PRESSURE: 153 MMHG

## 2025-02-10 DIAGNOSIS — Z01.818 PRE-OP EXAMINATION: Primary | ICD-10-CM

## 2025-02-10 DIAGNOSIS — E46 MALNUTRITION, UNSPECIFIED TYPE: ICD-10-CM

## 2025-02-10 RX ORDER — RIFAMPIN 300 MG/1
300 CAPSULE ORAL EVERY 12 HOURS
COMMUNITY
Start: 2024-12-16

## 2025-02-10 NOTE — LETTER
PRE-OPERATIVE CARDIAC RISK ASSESSMENT                                                REQUEST FORM    Date: 02/10/2025     Patient:  Laura Acosta      : 1945   Date of Procedure: 3/6/25    Surgeon: Dr. Ariel Welsh  Diagnosis: short gut syndrome    Type of Anesthesia:    [x] General    []  Local/Regional     []  MAC      [] Conscious Sedation  Surgery (No Abbreviations): Mediport Placement  (Name of Specific Procedure - Please do not abbreviate)  Phone number: [phone]      Fax Number: [phone]   ___________________________________________________________________________  Check all that apply and complete the blanks:     [x] Request for cardiac risk assessment for procedure    [x]  Request to hold     Warfarin     (medication) for    days prior to procedure      [] Prere-procedure antibiotics: Cardiac Status Information     [] Major dental procedure      [] Additional records requested:  _____________________________________________________________________  Please fax this document back to [phone] or [phone]  ATTN: [facility] ([facility])  Allow at least 3 business days to receive a response from [facility].  According to American College of Cardiology cardiac risk assessment guidelines, low risk surgeries do not need cardiac testing or risk stratification. Patients that are asymptomatic should safely proceed with low risk surgeries with no or low risk for cardiac event. Low risk procedures may include, but are not limited to, dental procedure, minor skin procedures, EGD, colonoscopy or cataracts.  Symptomatic patients should make an appointment with [facility].  [facility] will determine the need for pre-procedure antibiotics according to the American Dental Association guidelines based on the patient's cardiac history. Dental providers shall be the prescribing provider of the prophylactic antibiotic if deemed necessary.    http://circ.ahajournals.org/cgi/content/full/118/8/887 [email or web address]://[email or web address].org/[email or web address]/fulltext

## 2025-02-11 ENCOUNTER — TELEPHONE (OUTPATIENT)
Dept: SURGERY | Facility: CLINIC | Age: 80
End: 2025-02-11
Payer: MEDICARE

## 2025-02-11 DIAGNOSIS — E46 MALNUTRITION, UNSPECIFIED TYPE: Primary | ICD-10-CM

## 2025-02-11 RX ORDER — SODIUM CHLORIDE, SODIUM LACTATE, POTASSIUM CHLORIDE, CALCIUM CHLORIDE 600; 310; 30; 20 MG/100ML; MG/100ML; MG/100ML; MG/100ML
INJECTION, SOLUTION INTRAVENOUS CONTINUOUS
OUTPATIENT
Start: 2025-02-11

## 2025-02-11 NOTE — TELEPHONE ENCOUNTER
Spoke to patient during clinic regarding holding Coumadin prior to procedure, pending Cardiac Clearance. Verbalized understanding.

## 2025-02-13 ENCOUNTER — TELEPHONE (OUTPATIENT)
Dept: ADMINISTRATIVE | Facility: CLINIC | Age: 80
End: 2025-02-13
Payer: MEDICARE

## 2025-02-17 ENCOUNTER — TELEPHONE (OUTPATIENT)
Dept: SURGERY | Facility: CLINIC | Age: 80
End: 2025-02-17
Payer: MEDICARE

## 2025-02-17 NOTE — TELEPHONE ENCOUNTER
Called [facility] to follow up on cardio clearance  CRA team has request and will be working on clearance today

## 2025-02-24 ENCOUNTER — DOCUMENT SCAN (OUTPATIENT)
Dept: HOME HEALTH SERVICES | Facility: HOSPITAL | Age: 80
End: 2025-02-24
Payer: MEDICARE

## 2025-02-28 ENCOUNTER — EXTERNAL HOME HEALTH (OUTPATIENT)
Dept: HOME HEALTH SERVICES | Facility: HOSPITAL | Age: 80
End: 2025-02-28
Payer: MEDICARE

## 2025-02-28 ENCOUNTER — TELEPHONE (OUTPATIENT)
Dept: INFECTIOUS DISEASES | Facility: CLINIC | Age: 80
End: 2025-02-28
Payer: MEDICARE

## 2025-02-28 NOTE — TELEPHONE ENCOUNTER
14:20 Pt came into office asking if she needed to have her PICC line removed prior to sx on 03/06/25. Spoke with Taylor (NP) regarding this and she asked that I call Dr. Welsh's office, called his office and left a message with on call service for the on call dr.    14:27 Received a call back from Dr. Ghosh who stated being that pt is still receiving TPN on today, Sunday, and Wed Picc line could be taken out after surgery. Pt informed and verbalized understanding.

## 2025-03-03 ENCOUNTER — TELEPHONE (OUTPATIENT)
Dept: SURGERY | Facility: CLINIC | Age: 80
End: 2025-03-03
Payer: MEDICARE

## 2025-03-06 ENCOUNTER — ANESTHESIA EVENT (OUTPATIENT)
Dept: SURGERY | Facility: HOSPITAL | Age: 80
End: 2025-03-06
Payer: MEDICARE

## 2025-03-06 ENCOUNTER — TELEPHONE (OUTPATIENT)
Dept: ADMINISTRATIVE | Facility: CLINIC | Age: 80
End: 2025-03-06
Payer: MEDICARE

## 2025-03-06 ENCOUNTER — ANESTHESIA (OUTPATIENT)
Dept: SURGERY | Facility: HOSPITAL | Age: 80
End: 2025-03-06
Payer: MEDICARE

## 2025-03-06 ENCOUNTER — HOSPITAL ENCOUNTER (OUTPATIENT)
Facility: HOSPITAL | Age: 80
Discharge: HOME | End: 2025-03-06
Attending: SURGERY | Admitting: SURGERY
Payer: MEDICARE

## 2025-03-06 DIAGNOSIS — E46 MALNUTRITION, UNSPECIFIED TYPE: Primary | ICD-10-CM

## 2025-03-06 PROCEDURE — 36561 INSERT TUNNELED CV CATH: CPT | Mod: AS,RT,,

## 2025-03-06 PROCEDURE — 71000015 HC POSTOP RECOV 1ST HR: Performed by: SURGERY

## 2025-03-06 PROCEDURE — 25000003 PHARM REV CODE 250: Performed by: NURSE ANESTHETIST, CERTIFIED REGISTERED

## 2025-03-06 PROCEDURE — 63600175 PHARM REV CODE 636 W HCPCS: Performed by: NURSE ANESTHETIST, CERTIFIED REGISTERED

## 2025-03-06 PROCEDURE — C1788 PORT, INDWELLING, IMP: HCPCS | Performed by: SURGERY

## 2025-03-06 PROCEDURE — 37000008 HC ANESTHESIA 1ST 15 MINUTES: Performed by: SURGERY

## 2025-03-06 PROCEDURE — C1769 GUIDE WIRE: HCPCS | Performed by: SURGERY

## 2025-03-06 PROCEDURE — 36000706: Performed by: SURGERY

## 2025-03-06 PROCEDURE — 36000707: Performed by: SURGERY

## 2025-03-06 PROCEDURE — 36561 INSERT TUNNELED CV CATH: CPT | Mod: RT,,, | Performed by: SURGERY

## 2025-03-06 PROCEDURE — 71000033 HC RECOVERY, INTIAL HOUR: Performed by: SURGERY

## 2025-03-06 PROCEDURE — 63600175 PHARM REV CODE 636 W HCPCS: Performed by: SURGERY

## 2025-03-06 PROCEDURE — 71000016 HC POSTOP RECOV ADDL HR: Performed by: SURGERY

## 2025-03-06 PROCEDURE — 77001 FLUOROGUIDE FOR VEIN DEVICE: CPT | Mod: 26,,, | Performed by: SURGERY

## 2025-03-06 PROCEDURE — 25000003 PHARM REV CODE 250: Performed by: SURGERY

## 2025-03-06 PROCEDURE — 37000009 HC ANESTHESIA EA ADD 15 MINS: Performed by: SURGERY

## 2025-03-06 DEVICE — SMART PORT CT LOW PROFILE TITANIUM PORT SYSTEM WITH ATTACHABLE  6.6F X 55CM POLYURETHANE CATHETER AND 7F INTRODUCER KIT
Type: IMPLANTABLE DEVICE | Site: CHEST | Status: FUNCTIONAL
Brand: SMART PORT CT

## 2025-03-06 RX ORDER — PROPOFOL 10 MG/ML
VIAL (ML) INTRAVENOUS
Status: DISCONTINUED | OUTPATIENT
Start: 2025-03-06 | End: 2025-03-06

## 2025-03-06 RX ORDER — BUPIVACAINE HYDROCHLORIDE AND EPINEPHRINE 2.5; 5 MG/ML; UG/ML
INJECTION, SOLUTION EPIDURAL; INFILTRATION; INTRACAUDAL; PERINEURAL
Status: DISCONTINUED
Start: 2025-03-06 | End: 2025-03-06 | Stop reason: HOSPADM

## 2025-03-06 RX ORDER — EPHEDRINE SULFATE 50 MG/ML
INJECTION, SOLUTION INTRAVENOUS
Status: DISCONTINUED | OUTPATIENT
Start: 2025-03-06 | End: 2025-03-06

## 2025-03-06 RX ORDER — FENTANYL CITRATE 50 UG/ML
INJECTION, SOLUTION INTRAMUSCULAR; INTRAVENOUS
Status: DISCONTINUED | OUTPATIENT
Start: 2025-03-06 | End: 2025-03-06

## 2025-03-06 RX ORDER — SODIUM CHLORIDE 0.9 % (FLUSH) 0.9 %
10 SYRINGE (ML) INJECTION
Status: DISCONTINUED | OUTPATIENT
Start: 2025-03-06 | End: 2025-03-06 | Stop reason: HOSPADM

## 2025-03-06 RX ORDER — ATROPINE SULFATE 0.4 MG/ML
INJECTION, SOLUTION ENDOTRACHEAL; INTRAMEDULLARY; INTRAMUSCULAR; INTRAVENOUS; SUBCUTANEOUS
Status: DISCONTINUED | OUTPATIENT
Start: 2025-03-06 | End: 2025-03-06

## 2025-03-06 RX ORDER — HEPARIN SODIUM 5000 [USP'U]/ML
INJECTION, SOLUTION INTRAVENOUS; SUBCUTANEOUS
Status: DISCONTINUED | OUTPATIENT
Start: 2025-03-06 | End: 2025-03-06 | Stop reason: HOSPADM

## 2025-03-06 RX ORDER — ENOXAPARIN SODIUM 100 MG/ML
40 INJECTION SUBCUTANEOUS
Status: DISCONTINUED | OUTPATIENT
Start: 2025-03-06 | End: 2025-03-06 | Stop reason: HOSPADM

## 2025-03-06 RX ORDER — ONDANSETRON HYDROCHLORIDE 2 MG/ML
INJECTION, SOLUTION INTRAMUSCULAR; INTRAVENOUS
Status: DISCONTINUED | OUTPATIENT
Start: 2025-03-06 | End: 2025-03-06

## 2025-03-06 RX ORDER — TRAMADOL HYDROCHLORIDE 50 MG/1
50 TABLET ORAL EVERY 6 HOURS PRN
Qty: 20 TABLET | Refills: 0 | Status: SHIPPED | OUTPATIENT
Start: 2025-03-06

## 2025-03-06 RX ORDER — BUPIVACAINE HYDROCHLORIDE AND EPINEPHRINE 2.5; 5 MG/ML; UG/ML
INJECTION, SOLUTION EPIDURAL; INFILTRATION; INTRACAUDAL; PERINEURAL
Status: DISCONTINUED | OUTPATIENT
Start: 2025-03-06 | End: 2025-03-06 | Stop reason: HOSPADM

## 2025-03-06 RX ORDER — TRAMADOL HYDROCHLORIDE 50 MG/1
50 TABLET ORAL EVERY 4 HOURS PRN
Status: DISCONTINUED | OUTPATIENT
Start: 2025-03-06 | End: 2025-03-06 | Stop reason: HOSPADM

## 2025-03-06 RX ORDER — CEFAZOLIN SODIUM 1 G/3ML
2 INJECTION, POWDER, FOR SOLUTION INTRAMUSCULAR; INTRAVENOUS
Status: DISCONTINUED | OUTPATIENT
Start: 2025-03-06 | End: 2025-03-06 | Stop reason: HOSPADM

## 2025-03-06 RX ORDER — HEPARIN SODIUM 5000 [USP'U]/ML
INJECTION, SOLUTION INTRAVENOUS; SUBCUTANEOUS
Status: DISCONTINUED
Start: 2025-03-06 | End: 2025-03-06 | Stop reason: HOSPADM

## 2025-03-06 RX ORDER — GLUCAGON 1 MG
1 KIT INJECTION
Status: DISCONTINUED | OUTPATIENT
Start: 2025-03-06 | End: 2025-03-06 | Stop reason: HOSPADM

## 2025-03-06 RX ORDER — CEFAZOLIN SODIUM 1 G/3ML
INJECTION, POWDER, FOR SOLUTION INTRAMUSCULAR; INTRAVENOUS
Status: DISCONTINUED | OUTPATIENT
Start: 2025-03-06 | End: 2025-03-06

## 2025-03-06 RX ORDER — ENOXAPARIN SODIUM 150 MG/ML
70 INJECTION SUBCUTANEOUS 2 TIMES DAILY
COMMUNITY

## 2025-03-06 RX ORDER — ONDANSETRON HYDROCHLORIDE 2 MG/ML
4 INJECTION, SOLUTION INTRAVENOUS EVERY 4 HOURS PRN
Status: DISCONTINUED | OUTPATIENT
Start: 2025-03-06 | End: 2025-03-06 | Stop reason: HOSPADM

## 2025-03-06 RX ORDER — LIDOCAINE HYDROCHLORIDE 10 MG/ML
INJECTION, SOLUTION EPIDURAL; INFILTRATION; INTRACAUDAL; PERINEURAL
Status: DISCONTINUED | OUTPATIENT
Start: 2025-03-06 | End: 2025-03-06

## 2025-03-06 RX ADMIN — EPHEDRINE SULFATE 10 MG: 50 INJECTION INTRAVENOUS at 07:03

## 2025-03-06 RX ADMIN — FENTANYL CITRATE 50 MCG: 50 INJECTION, SOLUTION INTRAMUSCULAR; INTRAVENOUS at 07:03

## 2025-03-06 RX ADMIN — EPHEDRINE SULFATE 10 MG: 50 INJECTION INTRAVENOUS at 08:03

## 2025-03-06 RX ADMIN — ATROPINE SULFATE 0.4 MG: 0.4 INJECTION, SOLUTION INTRAVENOUS at 08:03

## 2025-03-06 RX ADMIN — LIDOCAINE HYDROCHLORIDE 50 MG: 10 INJECTION, SOLUTION EPIDURAL; INFILTRATION; INTRACAUDAL; PERINEURAL at 07:03

## 2025-03-06 RX ADMIN — SODIUM CHLORIDE 20 ML/HR: 9 INJECTION, SOLUTION INTRAVENOUS at 06:03

## 2025-03-06 RX ADMIN — ONDANSETRON 4 MG: 2 INJECTION INTRAMUSCULAR; INTRAVENOUS at 07:03

## 2025-03-06 RX ADMIN — PROPOFOL 175 MG: 10 INJECTION, EMULSION INTRAVENOUS at 07:03

## 2025-03-06 RX ADMIN — SODIUM CHLORIDE: 9 INJECTION, SOLUTION INTRAVENOUS at 07:03

## 2025-03-06 RX ADMIN — CEFAZOLIN 2 G: 330 INJECTION, POWDER, FOR SOLUTION INTRAMUSCULAR; INTRAVENOUS at 07:03

## 2025-03-06 RX ADMIN — ENOXAPARIN SODIUM 40 MG: 40 INJECTION SUBCUTANEOUS at 06:03

## 2025-03-06 NOTE — OP NOTE
PATIENT:  Laura Acosta      : 1945       DATE OF SURGERY:   3/6/2025          SURGEON:  Ariel Welsh MD       ASSISTANT:  María Ballesteros NP (First Assistant)          PREOPERATIVE DIAGNOSIS:  Poor Venous Access           POSTOPERATIVE DIAGNOSIS:  Same.           OPERATIONS: Right Internal Jugular Mediport Placement using Fluoroscopic and Ultrasound Guidance            Anesthesia:  LMA     Estimated blood loss:  Less than 50 cc.      Blood administered:  None.      Lap and instrument counts correct x 2 at the end of the case.     Specimen: None           INDICATIONS/SIGNIFICANT HISTORY:  The patient is a 79 y.o. year old female who was diagnosed with poor venous access and referred for Mediport placement.  Risks and Benefits of surgery was discussed with the patient, who voiced understanding of risks and benefits and elected to proceed with surgery.           PROCEDURE IN DETAIL:  Once informed consents were obtained, the patient was taken to the operating room and placed supine on the operating table.  After anesthesia was induced, the chest was prepped and draped in a standard surgical fashion.  1% lidocaine with epinephrine was used for local anesthesia in the subcutaneous tissue for placement of the port.  Using the Seldinger technique, the Right subclavian vein was attempted but due to her previous multiple attempts was unable to locate the vein after 2 attempts.  Attention was directed to the Right Internal Jugular vein which was easily viewed on ultrasound.  The needle was used to access the internal jugular vein on the first attempt and the guidewire easily passed under direct visualization.  The needle was removed and the guidewire secured.  An incision was made along the Right Chest and dissection carried out to the pectoralis fascia.  A pocket was formed with blunt dissection for the port.  Attention was then directed  to the guidewire.  Using fluoroscopy the dilator with sheath was placed  over the guidewire to the proper position then the dilator along with guidewire was removed.  The catheter was then tunneled and placed into the sheath under fluoroscopic guidance to the appropriate position and attached to the port.  The peal away sheath was removed.  The port was secured to the pectoralis fascia using 2-0 Vicryl suture in an interrupted fashion.  The port was then accessed that showed easy aspiration of blood and flow and heparin locked appropriately.  Incisions were closed with a 3-0 Vicryl followed by a 4-0 Vicryl stitch.  The wounds were cleaned and dried and steri-strips were applied.  A post-procedure CXR was obtained and reviewed by a radiologist. María Ballesteros was present during the entirety of the case and was critical in retraction for proper dissection.  There was no qualified resident available for assisting during this procedure.  The patient tolerated the procedure well and was transported to recovery room in good condition.

## 2025-03-06 NOTE — ANESTHESIA PROCEDURE NOTES
Intubation    Date/Time: 3/6/2025 7:43 AM    Performed by: Nolberto Larsen CRNA  Authorized by: Preston Trujillo MD    Intubation:     Induction:  Intravenous    Mask Ventilation:  Easy mask    Attempted By:  CRNA    Difficult Airway Encountered?: No      Airway Device:  Supraglottic airway/LMA    Airway Device Size:  3.0    Placement Verified By:  Capnometry  Notes:      Atraumatic insertion

## 2025-03-06 NOTE — DISCHARGE SUMMARY
[facility] Surgical - Periop Services  Discharge Note  Short Stay    Procedure(s) (LRB):  [id number] / Placement of Tunneled Venous Access Port (N/A)      OUTCOME: Patient tolerated treatment/procedure well without complication and is now ready for discharge.    DISPOSITION: Home or Self Care    FINAL DIAGNOSIS:  <principal problem not specified>    FOLLOWUP: In clinic    DISCHARGE INSTRUCTIONS:  No discharge procedures on file.     TIME SPENT ON DISCHARGE:      minutes

## 2025-03-06 NOTE — CARE UPDATE
Received patient from the OR, she is arousing upon pacu arrival, sabillon, rejected oral airway, oriented/reassured her, she denied c/o, respirations full-regular-deep-clear,hob up 30 degrees.

## 2025-03-06 NOTE — ANESTHESIA PREPROCEDURE EVALUATION
"                                                                                                             03/06/2025  Laura TORRES Use is a 79 y.o., female with hx of malnutrition for mediport placement.  She has done well recently under general anesthesia for DALE.  No significant changes in health since that time.    "History of Present Illness:  Ms. Laura Acosta is a 79 y.o. female who is in need of Mediport for nutritional access.  She recently has a port removed secondary to infection and now referred for placement for continued nutritional access.     Review of Systems:  12 point ROS negative except as stated in HPI     PAST HISTORY:           Past Medical History:   Diagnosis Date    Acute URI      Anxiety and depression      Back pain      Bacteremia 04/29/2024    Breast cancer      Cholelithiasis      Contaminated small bowel syndrome      DVT (deep venous thrombosis)       on coumadin    Edema, lower extremity      Gallstone pancreatitis      Heart murmur      HTN (hypertension)      Insomnia      Irregular heart beat      Ischemic disease of gut      Kidney stones      Laryngitis      Malabsorption      Malnutrition, unspecified type      Meningitis, unspecified      OA (osteoarthritis)      PVD (peripheral vascular disease)      Rheumatoid arthritis, unspecified      Staph infection       infected mediport -- on doxycycline daily for prevention    Tremor      Unspecified cataract      Unspecified cirrhosis of liver 06/20/2023     Dr Carroll            Past Surgical History:   Procedure Laterality Date    APPENDECTOMY        BOWEL RESECTION        BREAST LUMPECTOMY Right      CHOLECYSTECTOMY   05/2015    COLONOSCOPY   02/2022     repeat 3 years    CYST REMOVAL Right       breast    CYSTOSCOPY W/ STONE MANIPULATION        CYSTOSCOPY W/ URETERAL STENT PLACEMENT   12/2020    CYSTOSCOPY W/ URETERAL STENT REMOVAL   04/2021    CYSTOURETEROSCOPY, WITH HOLMIUM LASER LITHOTRIPSY OF URETERAL CALCULUS AND STENT " "INSERTION Left 05/02/2023     Procedure: CYSTOSCOPY, WITH URETERAL STENT INSERTION Left;  Surgeon: Jared Felix MD;  Location: [facility] OR;  Service: Urology;  Laterality: Left;    EGD, WITH CLOSED BIOPSY N/A 6/20/2023     Procedure: EGD;  Surgeon: Aashish Carroll MD;  Location: [facility] ENDOSCOPY;  Service: Gastroenterology;  Laterality: N/A;    ENDOSCOPIC RETROGRADE CHOLANGIOPANCREATOGRAPHY W/ SPHINCTEROTOMY AND STONE REMOVAL   04/2015    ESOPHAGOGASTRODUODENOSCOPY   06/20/2023     Dr Carroll - no signs esophageal varicies --repeat 3 yrs    GI Biopsy   02/15/2022    GI Biopsy   12/2018    HYSTERECTOMY        INSERTION OF TUNNELED CENTRAL VENOUS CATHETER (CVC) WITH SUBCUTANEOUS PORT N/A 7/18/2024     Procedure: [id number];  Surgeon: Ariel Welsh Jr., MD;  Location: [facility] OR;  Service: General;  Laterality: N/A;    LAPAROTOMY, EXPLORATORY   06/2015    LITHOTRIPSY Left 04/2021    LITHOTRIPSY Left 03/2021    MEDIPORT INSERTION, SINGLE   06/2021    MEDIPORT INSERTION, SINGLE   07/2020    MEDIPORT INSERTION, SINGLE   12/2018    MEDIPORT REMOVAL   07/2020    PHACOEMULSIFICATION OF CATARACT Left 12/2016    PHACOEMULSIFICATION OF CATARACT Right 11/2016    PICC line Removal Right 06/2021    POLYPECTOMY   02/2022    PVD surgery        REMOVAL OF CATHETER   12/2020    REMOVAL OF VASCULAR ACCESS CATHETER Right 4/24/2024     Procedure: Removal, Vascular Access Catheter;  Surgeon: Reed Ghosh MD;  Location: [facility];  Service: General;  Laterality: Right;    REMOVAL OF VASCULAR ACCESS CATHETER N/A 11/12/2024     Procedure: Removal, Vascular Access Catheter;  Surgeon: Ariel Welsh Jr., MD;  Location: [facility] OR;  Service: General;  Laterality: N/A;  removal mediport left chest wall    SHOULDER SURGERY Right       shoulder replacement    SPHINCTEROTOMY OF URETHRA        VENTRAL HERNIA REPAIR"       Pre-op Assessment    I have reviewed the Patient Summary Reports.     I have reviewed the Nursing Notes. " I have reviewed the NPO Status.   I have reviewed the Medications.     Review of Systems  Cardiovascular:     Hypertension                                    Hypertension         Pulmonary:  Pneumonia                  Pulmonary Infection:  Pneumonia.     Renal/:  Chronic Renal Disease        Kidney Function/Disease             Hepatic/GI:      Liver Disease,            Liver Disease        Musculoskeletal:  Arthritis               Psych:  Psychiatric History                  Physical Exam  General: Well nourished, Cooperative, Alert and Oriented    Airway:  Mallampati: II   Mouth Opening: Normal  TM Distance: Normal  Tongue: Normal  Neck ROM: Normal ROM    Dental:  Upper dentures      Anesthesia Plan  Type of Anesthesia, risks & benefits discussed:    Anesthesia Type: Gen Supraglottic Airway  Intra-op Monitoring Plan: Standard ASA Monitors  Post Op Pain Control Plan: multimodal analgesia  Induction:  IV  Airway Plan: , Post-Induction  Informed Consent: Informed consent signed with the Patient and all parties understand the risks and agree with anesthesia plan.  All questions answered.   ASA Score: 3  Day of Surgery Review of History & Physical: H&P Update referred to the surgeon/provider.    Ready For Surgery From Anesthesia Perspective.     .

## 2025-03-06 NOTE — ANESTHESIA POSTPROCEDURE EVALUATION
Anesthesia Post Evaluation    Patient: Laura Acosta    Procedure(s) Performed: Procedure(s) (LRB):  [id number] / Placement of Tunneled Venous Access Port (N/A)    Final Anesthesia Type: general      Patient location during evaluation: PACU  Patient participation: No - Unable to Participate, Sedation  Level of consciousness: sedated  Post-procedure vital signs: reviewed and stable  Pain management: adequate  Airway patency: patent  TATYANA mitigation strategies: Multimodal analgesia  PONV status at discharge: No PONV  Anesthetic complications: no      Cardiovascular status: stable  Respiratory status: nasal cannula  Hydration status: euvolemic  Follow-up not needed.              Vitals Value Taken Time   /69 03/06/25 06:20   Temp 36.8 °C (98.2 °F) 03/06/25 06:20   Pulse 69 03/06/25 06:20   Resp 16 03/06/25 06:30   SpO2 93 % 03/06/25 06:20         No case tracking events are documented in the log.      Pain/Chuy Score: No data recorded

## 2025-03-07 VITALS
HEIGHT: 64 IN | DIASTOLIC BLOOD PRESSURE: 71 MMHG | RESPIRATION RATE: 18 BRPM | SYSTOLIC BLOOD PRESSURE: 111 MMHG | BODY MASS INDEX: 27.1 KG/M2 | HEART RATE: 61 BPM | OXYGEN SATURATION: 95 % | WEIGHT: 158.75 LBS | TEMPERATURE: 98 F

## 2025-03-08 ENCOUNTER — NURSE TRIAGE (OUTPATIENT)
Dept: ADMINISTRATIVE | Facility: CLINIC | Age: 80
End: 2025-03-08
Payer: MEDICARE

## 2025-03-08 NOTE — TELEPHONE ENCOUNTER
Pt called and stated she had a PICC line removed and she wanted to know if she can remove the dressing to take a shower. Pt advised she can remove dressing after 48 hours. Pt verbalized understanding.  Reason for Disposition   General information question, no triage required and triager able to answer question    Protocols used: Information Only Call - No Triage-A-

## 2025-03-10 ENCOUNTER — TELEPHONE (OUTPATIENT)
Dept: SURGERY | Facility: CLINIC | Age: 80
End: 2025-03-10
Payer: MEDICARE

## 2025-03-10 NOTE — TELEPHONE ENCOUNTER
----- Message from Med Assistant Kyle sent at 3/10/2025  3:58 PM CDT -----  Regarding: VOICEMAIL  Mrs Valentino w/ [facility] called requesting orders to access Troppin . Stated pt gets TPN 3-4 xs /week. Hasn't recd since 3.6.25. please call [phone]

## 2025-03-12 ENCOUNTER — DOCUMENT SCAN (OUTPATIENT)
Dept: HOME HEALTH SERVICES | Facility: HOSPITAL | Age: 80
End: 2025-03-12
Payer: MEDICARE

## 2025-03-14 ENCOUNTER — LAB REQUISITION (OUTPATIENT)
Dept: LAB | Facility: HOSPITAL | Age: 80
End: 2025-03-14
Payer: MEDICARE

## 2025-03-14 DIAGNOSIS — I73.9 PERIPHERAL VASCULAR DISEASE, UNSPECIFIED: ICD-10-CM

## 2025-03-14 DIAGNOSIS — Z79.01 LONG TERM (CURRENT) USE OF ANTICOAGULANTS: ICD-10-CM

## 2025-03-14 LAB
INR PPP: 2.8
PROTHROMBIN TIME: 29.4 SECONDS (ref 12.5–14.5)

## 2025-03-14 PROCEDURE — 85610 PROTHROMBIN TIME: CPT | Performed by: INTERNAL MEDICINE

## 2025-03-17 ENCOUNTER — TELEPHONE (OUTPATIENT)
Dept: SURGERY | Facility: CLINIC | Age: 80
End: 2025-03-17
Payer: MEDICARE

## 2025-03-17 ENCOUNTER — DOCUMENT SCAN (OUTPATIENT)
Dept: HOME HEALTH SERVICES | Facility: HOSPITAL | Age: 80
End: 2025-03-17
Payer: MEDICARE

## 2025-03-17 NOTE — TELEPHONE ENCOUNTER
"1030 Received call from Carol, [facility] nurse regarding patient mediport site.  States removed steri strips from incision today.  Reports swelling to site and inability to "feel base of mediport or access point".  States "I am not usually the first person to access a mediport", requesting when patient comes for post-op clinic appointment that her port be accessed. Informed that is not usually a task done in the clinic, but would check with the NP and call her back.      1051 After further investigation, returned call to  nurse.  Orders sent 3/10, ok to access mediport.  On 3/11, a request was received requesting further orders for maintenance of mediport, including flushes, needle size, etc.  Message sent stating those specific orders must come from Dr. Carroll.  She states that Dr. Carroll's office is requesting orders from Dr. Welsh for access of port.  We further discussed that she is coming for a post -op visit from the insertion of the mediport and that this clinic would not be managing the treatments she would be getting/use of the mediport.  Informed her we are lacking the supplies (needles) to access the port in the clinic.  Spoke to JAYCE Phillips, states she can attempt access if the  sends the needles.  Patient has appointment 3/19.  The nurse is insistent that we supply her with the orders for maintenance, including needle size and length, flushes, etc.      1300 Spoke to Agueda  supervisor regarding orders for patient.  States will speak to the  nurse.  Reports they have sent a request for TPN orders, including flushes and maintenance of port to Dr. Carroll and is awaiting response.  Informed her that the patient may have an information packet with instructions for access, which may include needle size recommendations for the 6fr port.  from surgery center ([facility]).  Discussed patient's history of infections, expressing concern for the surgical site.  Patient has appointment in the " clinic Wednesday 3/19 for post op check and incision will be assessed at that time.

## 2025-03-18 ENCOUNTER — TELEPHONE (OUTPATIENT)
Dept: SURGERY | Facility: CLINIC | Age: 80
End: 2025-03-18
Payer: MEDICARE

## 2025-03-18 ENCOUNTER — DOCUMENT SCAN (OUTPATIENT)
Dept: HOME HEALTH SERVICES | Facility: HOSPITAL | Age: 80
End: 2025-03-18
Payer: MEDICARE

## 2025-03-18 NOTE — TELEPHONE ENCOUNTER
"Returned call to patient.  Reports swelling and enduration at mediport site.  Denies pain, redness or drainage.  Reports incision is closed and "healed".  Denies fever, nausea, vomiting.  Reports good appetite, drinking fluids/sports drinks as recommended by dietician.  Instructed to take Tylenol or Advil as needed for pain, dry warm or cool compresses (whichever helps), avoid palpating or massaging site.  Instructed to call office PRN, keep appointment for tomorrow, ED precautions given.  Verbalized understanding.   "

## 2025-03-19 ENCOUNTER — OFFICE VISIT (OUTPATIENT)
Dept: SURGERY | Facility: CLINIC | Age: 80
End: 2025-03-19
Payer: MEDICARE

## 2025-03-19 VITALS
BODY MASS INDEX: 27.04 KG/M2 | DIASTOLIC BLOOD PRESSURE: 64 MMHG | HEART RATE: 55 BPM | WEIGHT: 158.38 LBS | HEIGHT: 64 IN | SYSTOLIC BLOOD PRESSURE: 149 MMHG

## 2025-03-19 DIAGNOSIS — Z98.890 POST-OPERATIVE STATE: Primary | ICD-10-CM

## 2025-03-19 RX ORDER — SULFAMETHOXAZOLE AND TRIMETHOPRIM 800; 160 MG/1; MG/1
1 TABLET ORAL 2 TIMES DAILY
Qty: 14 TABLET | Refills: 0 | Status: SHIPPED | OUTPATIENT
Start: 2025-03-19 | End: 2025-03-26

## 2025-03-20 ENCOUNTER — LAB REQUISITION (OUTPATIENT)
Dept: LAB | Facility: HOSPITAL | Age: 80
End: 2025-03-20
Payer: MEDICARE

## 2025-03-20 DIAGNOSIS — K91.2 POSTSURGICAL MALABSORPTION, NOT ELSEWHERE CLASSIFIED: ICD-10-CM

## 2025-03-20 DIAGNOSIS — K74.60 UNSPECIFIED CIRRHOSIS OF LIVER: ICD-10-CM

## 2025-03-20 LAB
ALBUMIN SERPL-MCNC: 3.4 G/DL (ref 3.4–4.8)
ALBUMIN/GLOB SERPL: 1.1 RATIO (ref 1.1–2)
ALP SERPL-CCNC: 103 UNIT/L (ref 40–150)
ALT SERPL-CCNC: 25 UNIT/L (ref 0–55)
ANION GAP SERPL CALC-SCNC: 7 MEQ/L
AST SERPL-CCNC: 31 UNIT/L (ref 5–34)
BASOPHILS # BLD AUTO: 0.03 X10(3)/MCL
BASOPHILS NFR BLD AUTO: 0.7 %
BILIRUB SERPL-MCNC: 0.4 MG/DL
BUN SERPL-MCNC: 15.4 MG/DL (ref 9.8–20.1)
CALCIUM SERPL-MCNC: 8.6 MG/DL (ref 8.4–10.2)
CHLORIDE SERPL-SCNC: 107 MMOL/L (ref 98–107)
CO2 SERPL-SCNC: 27 MMOL/L (ref 23–31)
CREAT SERPL-MCNC: 0.58 MG/DL (ref 0.55–1.02)
CREAT/UREA NIT SERPL: 27
EOSINOPHIL # BLD AUTO: 0.21 X10(3)/MCL (ref 0–0.9)
EOSINOPHIL NFR BLD AUTO: 4.7 %
ERYTHROCYTE [DISTWIDTH] IN BLOOD BY AUTOMATED COUNT: 13.8 % (ref 11.5–17)
GFR SERPLBLD CREATININE-BSD FMLA CKD-EPI: >60 ML/MIN/1.73/M2
GLOBULIN SER-MCNC: 3.1 GM/DL (ref 2.4–3.5)
GLUCOSE SERPL-MCNC: 117 MG/DL (ref 82–115)
HCT VFR BLD AUTO: 36.5 % (ref 37–47)
HGB BLD-MCNC: 11.9 G/DL (ref 12–16)
IMM GRANULOCYTES # BLD AUTO: 0.01 X10(3)/MCL (ref 0–0.04)
IMM GRANULOCYTES NFR BLD AUTO: 0.2 %
LYMPHOCYTES # BLD AUTO: 1.82 X10(3)/MCL (ref 0.6–4.6)
LYMPHOCYTES NFR BLD AUTO: 41 %
MAGNESIUM SERPL-MCNC: 2.1 MG/DL (ref 1.6–2.6)
MCH RBC QN AUTO: 28.3 PG (ref 27–31)
MCHC RBC AUTO-ENTMCNC: 32.6 G/DL (ref 33–36)
MCV RBC AUTO: 86.9 FL (ref 80–94)
MONOCYTES # BLD AUTO: 0.42 X10(3)/MCL (ref 0.1–1.3)
MONOCYTES NFR BLD AUTO: 9.5 %
NEUTROPHILS # BLD AUTO: 1.95 X10(3)/MCL (ref 2.1–9.2)
NEUTROPHILS NFR BLD AUTO: 43.9 %
NRBC BLD AUTO-RTO: 0 %
PHOSPHATE SERPL-MCNC: 4.8 MG/DL (ref 2.3–4.7)
PLATELET # BLD AUTO: 134 X10(3)/MCL (ref 130–400)
PLATELETS.RETICULATED NFR BLD AUTO: 2.8 % (ref 0.9–11.2)
PMV BLD AUTO: 10.2 FL (ref 7.4–10.4)
POTASSIUM SERPL-SCNC: 4.3 MMOL/L (ref 3.5–5.1)
PROT SERPL-MCNC: 6.5 GM/DL (ref 5.8–7.6)
RBC # BLD AUTO: 4.2 X10(6)/MCL (ref 4.2–5.4)
SODIUM SERPL-SCNC: 141 MMOL/L (ref 136–145)
TRIGL SERPL-MCNC: 84 MG/DL (ref 37–140)
WBC # BLD AUTO: 4.44 X10(3)/MCL (ref 4.5–11.5)

## 2025-03-20 PROCEDURE — 84100 ASSAY OF PHOSPHORUS: CPT | Performed by: INTERNAL MEDICINE

## 2025-03-20 PROCEDURE — 84478 ASSAY OF TRIGLYCERIDES: CPT | Performed by: INTERNAL MEDICINE

## 2025-03-20 PROCEDURE — 80053 COMPREHEN METABOLIC PANEL: CPT | Performed by: INTERNAL MEDICINE

## 2025-03-20 PROCEDURE — 83735 ASSAY OF MAGNESIUM: CPT | Performed by: INTERNAL MEDICINE

## 2025-03-20 PROCEDURE — 85025 COMPLETE CBC W/AUTO DIFF WBC: CPT | Performed by: INTERNAL MEDICINE

## 2025-03-21 ENCOUNTER — DOCUMENT SCAN (OUTPATIENT)
Dept: HOME HEALTH SERVICES | Facility: HOSPITAL | Age: 80
End: 2025-03-21
Payer: MEDICARE

## 2025-03-24 ENCOUNTER — LAB REQUISITION (OUTPATIENT)
Dept: LAB | Facility: HOSPITAL | Age: 80
End: 2025-03-24
Payer: MEDICARE

## 2025-03-24 DIAGNOSIS — E46 UNSPECIFIED PROTEIN-CALORIE MALNUTRITION: ICD-10-CM

## 2025-03-24 DIAGNOSIS — K91.2 POSTSURGICAL MALABSORPTION, NOT ELSEWHERE CLASSIFIED: ICD-10-CM

## 2025-03-24 LAB
ALBUMIN SERPL-MCNC: 3.5 G/DL (ref 3.4–4.8)
ALBUMIN/GLOB SERPL: 1.1 RATIO (ref 1.1–2)
ALP SERPL-CCNC: 107 UNIT/L (ref 40–150)
ALT SERPL-CCNC: 24 UNIT/L (ref 0–55)
ANION GAP SERPL CALC-SCNC: 6 MEQ/L
AST SERPL-CCNC: 27 UNIT/L (ref 11–45)
BASOPHILS # BLD AUTO: 0.04 X10(3)/MCL
BASOPHILS NFR BLD AUTO: 0.9 %
BILIRUB SERPL-MCNC: 0.4 MG/DL
BUN SERPL-MCNC: 15.8 MG/DL (ref 9.8–20.1)
CALCIUM SERPL-MCNC: 8.5 MG/DL (ref 8.4–10.2)
CHLORIDE SERPL-SCNC: 103 MMOL/L (ref 98–107)
CO2 SERPL-SCNC: 28 MMOL/L (ref 23–31)
CREAT SERPL-MCNC: 0.66 MG/DL (ref 0.55–1.02)
CREAT/UREA NIT SERPL: 24
EOSINOPHIL # BLD AUTO: 0.22 X10(3)/MCL (ref 0–0.9)
EOSINOPHIL NFR BLD AUTO: 4.7 %
ERYTHROCYTE [DISTWIDTH] IN BLOOD BY AUTOMATED COUNT: 13.9 % (ref 11.5–17)
GFR SERPLBLD CREATININE-BSD FMLA CKD-EPI: >60 ML/MIN/1.73/M2
GLOBULIN SER-MCNC: 3.3 GM/DL (ref 2.4–3.5)
GLUCOSE SERPL-MCNC: 131 MG/DL (ref 82–115)
HCT VFR BLD AUTO: 36.9 % (ref 37–47)
HGB BLD-MCNC: 12.1 G/DL (ref 12–16)
IMM GRANULOCYTES # BLD AUTO: 0.01 X10(3)/MCL (ref 0–0.04)
IMM GRANULOCYTES NFR BLD AUTO: 0.2 %
LYMPHOCYTES # BLD AUTO: 1.85 X10(3)/MCL (ref 0.6–4.6)
LYMPHOCYTES NFR BLD AUTO: 39.9 %
MCH RBC QN AUTO: 28.3 PG (ref 27–31)
MCHC RBC AUTO-ENTMCNC: 32.8 G/DL (ref 33–36)
MCV RBC AUTO: 86.4 FL (ref 80–94)
MONOCYTES # BLD AUTO: 0.38 X10(3)/MCL (ref 0.1–1.3)
MONOCYTES NFR BLD AUTO: 8.2 %
NEUTROPHILS # BLD AUTO: 2.14 X10(3)/MCL (ref 2.1–9.2)
NEUTROPHILS NFR BLD AUTO: 46.1 %
NRBC BLD AUTO-RTO: 0 %
PHOSPHATE SERPL-MCNC: 4.3 MG/DL (ref 2.3–4.7)
PLATELET # BLD AUTO: 142 X10(3)/MCL (ref 130–400)
PMV BLD AUTO: 10.2 FL (ref 7.4–10.4)
POTASSIUM SERPL-SCNC: 4.3 MMOL/L (ref 3.5–5.1)
PROT SERPL-MCNC: 6.8 GM/DL (ref 5.8–7.6)
RBC # BLD AUTO: 4.27 X10(6)/MCL (ref 4.2–5.4)
SODIUM SERPL-SCNC: 137 MMOL/L (ref 136–145)
TRIGL SERPL-MCNC: 57 MG/DL (ref 37–140)
WBC # BLD AUTO: 4.64 X10(3)/MCL (ref 4.5–11.5)

## 2025-03-24 PROCEDURE — 85025 COMPLETE CBC W/AUTO DIFF WBC: CPT | Performed by: INTERNAL MEDICINE

## 2025-03-24 PROCEDURE — 84100 ASSAY OF PHOSPHORUS: CPT | Performed by: INTERNAL MEDICINE

## 2025-03-24 PROCEDURE — 84478 ASSAY OF TRIGLYCERIDES: CPT | Performed by: INTERNAL MEDICINE

## 2025-03-24 PROCEDURE — 80053 COMPREHEN METABOLIC PANEL: CPT | Performed by: INTERNAL MEDICINE

## 2025-03-27 ENCOUNTER — DOCUMENT SCAN (OUTPATIENT)
Dept: HOME HEALTH SERVICES | Facility: HOSPITAL | Age: 80
End: 2025-03-27
Payer: MEDICARE

## 2025-03-31 ENCOUNTER — LAB REQUISITION (OUTPATIENT)
Dept: LAB | Facility: HOSPITAL | Age: 80
End: 2025-03-31
Payer: MEDICARE

## 2025-03-31 ENCOUNTER — TELEPHONE (OUTPATIENT)
Dept: ADMINISTRATIVE | Facility: CLINIC | Age: 80
End: 2025-03-31
Payer: MEDICARE

## 2025-03-31 DIAGNOSIS — I10 ESSENTIAL (PRIMARY) HYPERTENSION: ICD-10-CM

## 2025-03-31 LAB
INR PPP: 4.4
PROTHROMBIN TIME: 41.7 SECONDS (ref 12.5–14.5)

## 2025-03-31 PROCEDURE — 85610 PROTHROMBIN TIME: CPT | Performed by: INTERNAL MEDICINE

## 2025-04-01 ENCOUNTER — DOCUMENT SCAN (OUTPATIENT)
Dept: HOME HEALTH SERVICES | Facility: HOSPITAL | Age: 80
End: 2025-04-01
Payer: MEDICARE

## 2025-04-10 ENCOUNTER — TELEPHONE (OUTPATIENT)
Dept: ADMINISTRATIVE | Facility: CLINIC | Age: 80
End: 2025-04-10
Payer: MEDICARE

## 2025-04-11 ENCOUNTER — DOCUMENT SCAN (OUTPATIENT)
Dept: HOME HEALTH SERVICES | Facility: HOSPITAL | Age: 80
End: 2025-04-11
Payer: MEDICARE

## 2025-04-17 ENCOUNTER — DOCUMENT SCAN (OUTPATIENT)
Dept: HOME HEALTH SERVICES | Facility: HOSPITAL | Age: 80
End: 2025-04-17
Payer: MEDICARE

## 2025-04-18 ENCOUNTER — OFFICE VISIT (OUTPATIENT)
Dept: URGENT CARE | Facility: CLINIC | Age: 80
End: 2025-04-18
Payer: MEDICARE

## 2025-04-18 VITALS
OXYGEN SATURATION: 97 % | BODY MASS INDEX: 26.98 KG/M2 | HEIGHT: 64 IN | RESPIRATION RATE: 17 BRPM | SYSTOLIC BLOOD PRESSURE: 122 MMHG | TEMPERATURE: 98 F | DIASTOLIC BLOOD PRESSURE: 68 MMHG | WEIGHT: 158 LBS | HEART RATE: 64 BPM

## 2025-04-18 DIAGNOSIS — J11.1 INFLUENZA: Primary | ICD-10-CM

## 2025-04-18 DIAGNOSIS — J02.9 SORE THROAT: ICD-10-CM

## 2025-04-18 DIAGNOSIS — R32 URINARY INCONTINENCE, UNSPECIFIED TYPE: ICD-10-CM

## 2025-04-18 LAB
BILIRUB UR QL STRIP: NEGATIVE
CTP QC/QA: YES
GLUCOSE UR QL STRIP: NEGATIVE
KETONES UR QL STRIP: NEGATIVE
LEUKOCYTE ESTERASE UR QL STRIP: NEGATIVE
MOLECULAR STREP A: NEGATIVE
PH, POC UA: 6
POC BLOOD, URINE: NEGATIVE
POC MOLECULAR INFLUENZA A AGN: POSITIVE
POC MOLECULAR INFLUENZA B AGN: NEGATIVE
POC NITRATES, URINE: NEGATIVE
PROT UR QL STRIP: NEGATIVE
SARS CORONAVIRUS 2 ANTIGEN: NEGATIVE
SP GR UR STRIP: 1.01 (ref 1–1.03)
UROBILINOGEN UR STRIP-ACNC: NORMAL (ref 0.1–1.1)

## 2025-04-18 RX ORDER — BENZONATATE 100 MG/1
100 CAPSULE ORAL 3 TIMES DAILY PRN
Qty: 30 CAPSULE | Refills: 0 | Status: SHIPPED | OUTPATIENT
Start: 2025-04-18 | End: 2025-04-28

## 2025-04-18 RX ORDER — BALOXAVIR MARBOXIL 40 MG/1
40 TABLET, FILM COATED ORAL ONCE
Qty: 1 TABLET | Refills: 0 | Status: SHIPPED | OUTPATIENT
Start: 2025-04-18 | End: 2025-04-18

## 2025-04-18 RX ORDER — OSELTAMIVIR PHOSPHATE 75 MG/1
75 CAPSULE ORAL 2 TIMES DAILY
Qty: 10 CAPSULE | Refills: 0 | Status: SHIPPED | OUTPATIENT
Start: 2025-04-18 | End: 2025-04-23

## 2025-04-18 NOTE — PROGRESS NOTES
"Subjective:      Patient ID: Laura Acosta is a 79 y.o. female.    Vitals:  height is 5' 4" (1.626 m) and weight is 71.7 kg (158 lb). Her temperature is 98.4 °F (36.9 °C). Her blood pressure is 122/68 and her pulse is 64. Her respiration is 17 and oxygen saturation is 97%.     Chief Complaint: Cough     Patient is a 79 y.o. female who presents to urgent care with complaints of sore throat, cough, congestion, weakness, fever , headache, body aches x since yesterday.      After patient was being discharged from the clinic she mentioned that she has had accidents with her bladder the last few days in is concerned she is having a UTI.  Denies any frequency urgency or burning.    Cough  Associated symptoms include a fever.     Constitution: Positive for fatigue and fever.   HENT:  Positive for congestion.    Cardiovascular: Negative.    Eyes: Negative.    Respiratory:  Positive for cough.    Gastrointestinal: Negative.    Genitourinary:  Positive for bladder incontinence.   Musculoskeletal: Negative.    Skin: Negative.    Allergic/Immunologic: Negative.    Neurological: Negative.    Hematologic/Lymphatic: Negative.       Objective:     Physical Exam   Constitutional: She is oriented to person, place, and time. She appears well-developed. She is cooperative.  Non-toxic appearance. She does not appear ill. No distress.   HENT:   Head: Normocephalic and atraumatic.   Ears:   Right Ear: Hearing and external ear normal.   Left Ear: Hearing and external ear normal.   Mouth/Throat: Oropharynx is clear and moist and mucous membranes are normal.   Eyes: Conjunctivae and lids are normal.   Neck: Trachea normal and phonation normal. Neck supple. No edema present. No erythema present. No neck rigidity present.   Cardiovascular: Normal rate.   Pulmonary/Chest: Effort normal and breath sounds normal. No stridor. No respiratory distress. She has no decreased breath sounds. She has no wheezes. She has no rhonchi. She has no rales. "   Abdominal: Normal appearance.   Neurological: She is alert and oriented to person, place, and time. She exhibits normal muscle tone. Coordination normal.   Skin: Skin is warm, dry, intact, not diaphoretic and no rash.   Psychiatric: Her speech is normal and behavior is normal. Mood, judgment and thought content normal.   Nursing note and vitals reviewed.         Previous History      Review of patient's allergies indicates:   Allergen Reactions    Hydromorphone Itching     Other reaction(s): itching    Opium      Other reaction(s): itching  has itching with larger doses. Smaller doses do not cause a reaction.       Past Medical History:   Diagnosis Date    Acute URI     Anxiety and depression     Back pain     Bacteremia 04/29/2024    Breast cancer     Cholelithiasis     DVT (deep venous thrombosis)     on coumadin    Edema, lower extremity     Gallstone pancreatitis     Heart murmur     HTN (hypertension)     Insomnia     Irregular heart beat     Ischemic disease of gut     Kidney stones     Laryngitis     Malabsorption     Malnutrition, unspecified type     Meningitis, unspecified     OA (osteoarthritis)     PVD (peripheral vascular disease)     Rheumatoid arthritis, unspecified     Staph infection     infected mediport -- on doxycycline daily for prevention    Tremor     Unspecified cataract     Unspecified cirrhosis of liver 06/20/2023    Dr Carroll     Current Outpatient Medications   Medication Instructions    benzonatate (TESSALON) 100 mg, Oral, 3 times daily PRN    busPIRone (BUSPAR) 15 mg, 3 times daily    cefadroxil (DURICEF) 500 mg, Oral, Every 12 hours    CeleXA 40 mg, Daily    chlorhexidine (HIBICLENS) 4 % external liquid Topical (Top), Three times weekly, Daily showers for 5 days every other week for 3-6 months    cholecalciferol (vitamin D3) 50,000 Units, Every 7 days    D5W SolP 250 mL with vancomycin 1,000 mg SolR IV vanc until 12/26, dosing based on trough    diphenoxylate-atropine 2.5-0.025 mg  (LOMOTIL) 2.5-0.025 mg per tablet 2 tablets, 2 times daily    enoxaparin (LOVENOX) 70 mg, Subcutaneous, 2 times daily    ferrous sulfate 325 mg, Oral, Daily    GATTEX 30-VIAL 5 mg Kit Daily    methocarbamoL (ROBAXIN) 750 mg, 2 times daily    metoprolol tartrate (LOPRESSOR) 100 mg, 2 times daily    mirtazapine (REMERON) 15 mg, Nightly    multivitamin (ONE DAILY MULTIVITAMIN) per tablet 1 tablet, Daily    oseltamivir (TAMIFLU) 75 mg, Oral, 2 times daily    PREVALITE 4 gram PwPk 4 grams of anhydrous cholestyramine, 2 times daily    rifAMpin (RIFADIN) 300 mg, Every 12 hours    solifenacin (VESICARE) 5 mg, Daily    traMADoL (ULTRAM) 50 mg, Oral, Every 6 hours PRN    warfarin (COUMADIN) 2.5 MG tablet TAKE ONE TABLET BY MOUTH ONCE DAILY IN THE EVENING OR AS DIRECTED BY CIS PROVIDER    XOFLUZA 40 mg, Oral, Once     Past Surgical History:   Procedure Laterality Date    APPENDECTOMY      BOWEL RESECTION      BREAST LUMPECTOMY Right     CHOLECYSTECTOMY  05/2015    COLONOSCOPY  02/2022    repeat 3 years    CYST REMOVAL Right     breast    CYSTOSCOPY W/ STONE MANIPULATION      CYSTOSCOPY W/ URETERAL STENT PLACEMENT  12/2020    CYSTOSCOPY W/ URETERAL STENT REMOVAL  04/2021    CYSTOURETEROSCOPY, WITH HOLMIUM LASER LITHOTRIPSY OF URETERAL CALCULUS AND STENT INSERTION Left 05/02/2023    Procedure: CYSTOSCOPY, WITH URETERAL STENT INSERTION Left;  Surgeon: Jared Felix MD;  Location: [facility] OR;  Service: Urology;  Laterality: Left;    EGD, WITH CLOSED BIOPSY N/A 6/20/2023    Procedure: EGD;  Surgeon: Aashish Carroll MD;  Location: [facility] ENDOSCOPY;  Service: Gastroenterology;  Laterality: N/A;    ENDOSCOPIC RETROGRADE CHOLANGIOPANCREATOGRAPHY W/ SPHINCTEROTOMY AND STONE REMOVAL  04/2015    ESOPHAGOGASTRODUODENOSCOPY  06/20/2023    Dr Carroll - no signs esophageal varicies --repeat 3 yrs    GI Biopsy  02/15/2022    GI Biopsy  12/2018    HYSTERECTOMY      INSERTION OF TUNNELED CENTRAL VENOUS CATHETER (CVC) WITH  SUBCUTANEOUS PORT N/A 7/18/2024    Procedure: [id number];  Surgeon: Ariel Welsh Jr., MD;  Location: [facility] OR;  Service: General;  Laterality: N/A;    INSERTION OF TUNNELED CENTRAL VENOUS CATHETER (CVC) WITH SUBCUTANEOUS PORT N/A 3/6/2025    Procedure: [id number] / Placement of Tunneled Venous Access Port;  Surgeon: Ariel Welsh Jr., MD;  Location: [facility] OR;  Service: General;  Laterality: N/A;  Placement of Tunneled Venous Access Port    LAPAROTOMY, EXPLORATORY  06/2015    LITHOTRIPSY Left 04/2021    LITHOTRIPSY Left 03/2021    MEDIPORT INSERTION, SINGLE  06/2021    MEDIPORT INSERTION, SINGLE  07/2020    MEDIPORT INSERTION, SINGLE  12/2018    MEDIPORT REMOVAL  07/2020    PHACOEMULSIFICATION OF CATARACT Left 12/2016    PHACOEMULSIFICATION OF CATARACT Right 11/2016    PICC line Removal Right 06/2021    POLYPECTOMY  02/2022    PVD surgery      REMOVAL OF CATHETER  12/2020    REMOVAL OF VASCULAR ACCESS CATHETER Right 4/24/2024    Procedure: Removal, Vascular Access Catheter;  Surgeon: Reed Ghosh MD;  Location: [facility] OR;  Service: General;  Laterality: Right;    REMOVAL OF VASCULAR ACCESS CATHETER N/A 11/12/2024    Procedure: Removal, Vascular Access Catheter;  Surgeon: Ariel Welsh Jr., MD;  Location: [facility] OR;  Service: General;  Laterality: N/A;  removal mediport left chest wall    SHOULDER SURGERY Right     shoulder replacement    SPHINCTEROTOMY OF URETHRA      VENTRAL HERNIA REPAIR  04/2016     Family History   Problem Relation Name Age of Onset    Pneumonia Mother      Depression Mother      Osteoarthritis Mother      Breast cancer Mother  70 - 75    Uterine cancer Mother  40 - 45    Bone cancer Mother  60 - 69    Heart attack Father      Aneurysm Father          brain    Diabetes Father      Hyperlipidemia Father      Hypertension Father      Hyperlipidemia Sister      Hypertension Sister      Kidney cancer Sister  55 - 56    Osteoarthritis Sister      Breast cancer Sister  52  "- 53    Diabetes Mellitus Sister      Heart failure Sister      Kidney disease Sister      Migraines Sister      Arthritis Sister      Arthritis Sister      Parkinsonism Sister      Heart disease Sister      Hyperlipidemia Brother      Hypertension Brother      Coronary artery disease Brother      Coronary artery disease Brother          CABG x3    Hyperlipidemia Brother      Hypertension Brother      Kidney cancer Paternal Grandmother  60 - 69       Social History[1]     Physical Exam      Vital Signs Reviewed   /68   Pulse 64   Temp 98.4 °F (36.9 °C)   Resp 17   Ht 5' 4" (1.626 m)   Wt 71.7 kg (158 lb)   SpO2 97%   BMI 27.12 kg/m²        Procedures    Procedures     Labs     Results for orders placed or performed in visit on 04/18/25   POCT Influenza A/B Molecular    Collection Time: 04/18/25  3:13 PM   Result Value Ref Range    POC Molecular Influenza A Ag Positive (A) Negative    POC Molecular Influenza B Ag Negative Negative     Acceptable Yes    POCT Strep A, Molecular    Collection Time: 04/18/25  3:15 PM   Result Value Ref Range    Molecular Strep A, POC Negative Negative     Acceptable Yes    SARS Coronavirus 2 Antigen, POCT Manual Read    Collection Time: 04/18/25  3:23 PM   Result Value Ref Range    SARS Coronavirus 2 Antigen Negative Negative, Presumptive Negative     Acceptable Yes    POCT Urinalysis, Dipstick, Manual, W/O Scope    Collection Time: 04/18/25  3:45 PM   Result Value Ref Range    POC Blood, Urine Negative Negative    POC Bilirubin, Urine Negative Negative    POC Urobilinogen, Urine normal 0.1 - 1.1    POC Ketones, Urine Negative Negative    POC Protein, Urine Negative Negative    POC Nitrates, Urine Negative Negative    POC Glucose, Urine Negative Negative    pH, UA 6     POC Specific Gravity, Urine 1.015 1.003 - 1.029    POC Leukocytes, Urine Negative Negative       Assessment:     1. Influenza    2. Sore throat    3. Urinary " incontinence, unspecified type        Plan:   Flu A positive  Medications sent to pharmacy  Xofluza is preferred flu medication to take if covered by insurance  Increase fluid intake. Monitor for fever. Take tylenol/acetaminophen or advil/ibuprofen as needed for headache, bodyaches or fever.   Treat your symptoms like the common cold, take Delysm/dimetapp/robitussin as needed for cough, claritin, flonase, mucinex for congestion, for example.   Complications for flu include pneumonia, bronchitis, and sinusitis.   Stay home until you are fever free for at least 24 hours without the use of fever reducing medication and your symptoms have improved.   If your symptoms worsen, or you develop shortness of breath, worsening of cough, or fever over 103, seek medical attention immediately.     Urine dip is negative.  Accidents are likely due to stress incontinence after coughing    Influenza    Sore throat  -     POCT Strep A, Molecular  -     SARS Coronavirus 2 Antigen, POCT Manual Read  -     POCT Influenza A/B Molecular    Urinary incontinence, unspecified type  -     POCT Urinalysis, Dipstick, Manual, W/O Scope    Other orders  -     oseltamivir (TAMIFLU) 75 MG capsule; Take 1 capsule (75 mg total) by mouth 2 (two) times daily. for 5 days  Dispense: 10 capsule; Refill: 0  -     baloxavir marboxiL (XOFLUZA) 40 mg tablet; Take 1 tablet (40 mg total) by mouth once. for 1 dose  Dispense: 1 tablet; Refill: 0  -     benzonatate (TESSALON) 100 MG capsule; Take 1 capsule (100 mg total) by mouth 3 (three) times daily as needed for Cough.  Dispense: 30 capsule; Refill: 0                         [1]   Social History  Tobacco Use    Smoking status: Never     Passive exposure: Past    Smokeless tobacco: Never   Substance Use Topics    Alcohol use: Not Currently    Drug use: Never

## 2025-04-21 ENCOUNTER — DOCUMENT SCAN (OUTPATIENT)
Dept: HOME HEALTH SERVICES | Facility: HOSPITAL | Age: 80
End: 2025-04-21
Payer: MEDICARE

## 2025-04-23 ENCOUNTER — HOSPITAL ENCOUNTER (EMERGENCY)
Facility: HOSPITAL | Age: 80
Discharge: HOME | End: 2025-04-23
Attending: EMERGENCY MEDICINE
Payer: MEDICARE

## 2025-04-23 VITALS
BODY MASS INDEX: 26.8 KG/M2 | RESPIRATION RATE: 20 BRPM | WEIGHT: 157 LBS | TEMPERATURE: 99 F | SYSTOLIC BLOOD PRESSURE: 155 MMHG | OXYGEN SATURATION: 96 % | HEIGHT: 64 IN | DIASTOLIC BLOOD PRESSURE: 70 MMHG | HEART RATE: 60 BPM

## 2025-04-23 DIAGNOSIS — Z97.8: Primary | ICD-10-CM

## 2025-04-23 LAB
ALBUMIN SERPL-MCNC: 3.3 G/DL (ref 3.4–4.8)
ALBUMIN/GLOB SERPL: 0.9 RATIO (ref 1.1–2)
ALP SERPL-CCNC: 131 UNIT/L (ref 40–150)
ALT SERPL-CCNC: 45 UNIT/L (ref 0–55)
ANION GAP SERPL CALC-SCNC: 8 MEQ/L
AST SERPL-CCNC: 54 UNIT/L (ref 11–45)
BASOPHILS # BLD AUTO: 0.01 X10(3)/MCL
BASOPHILS NFR BLD AUTO: 0.2 %
BILIRUB SERPL-MCNC: 0.5 MG/DL
BUN SERPL-MCNC: 18.2 MG/DL (ref 9.8–20.1)
CALCIUM SERPL-MCNC: 8.7 MG/DL (ref 8.4–10.2)
CHLORIDE SERPL-SCNC: 113 MMOL/L (ref 98–107)
CO2 SERPL-SCNC: 23 MMOL/L (ref 23–31)
CREAT SERPL-MCNC: 0.67 MG/DL (ref 0.55–1.02)
CREAT/UREA NIT SERPL: 27
EOSINOPHIL # BLD AUTO: 0.19 X10(3)/MCL (ref 0–0.9)
EOSINOPHIL NFR BLD AUTO: 3.9 %
ERYTHROCYTE [DISTWIDTH] IN BLOOD BY AUTOMATED COUNT: 15.5 % (ref 11.5–17)
GFR SERPLBLD CREATININE-BSD FMLA CKD-EPI: >60 ML/MIN/1.73/M2
GLOBULIN SER-MCNC: 3.6 GM/DL (ref 2.4–3.5)
GLUCOSE SERPL-MCNC: 97 MG/DL (ref 82–115)
HCT VFR BLD AUTO: 38.1 % (ref 37–47)
HGB BLD-MCNC: 12.1 G/DL (ref 12–16)
IMM GRANULOCYTES # BLD AUTO: 0.01 X10(3)/MCL (ref 0–0.04)
IMM GRANULOCYTES NFR BLD AUTO: 0.2 %
LYMPHOCYTES # BLD AUTO: 2.71 X10(3)/MCL (ref 0.6–4.6)
LYMPHOCYTES NFR BLD AUTO: 55.2 %
MCH RBC QN AUTO: 28.1 PG (ref 27–31)
MCHC RBC AUTO-ENTMCNC: 31.8 G/DL (ref 33–36)
MCV RBC AUTO: 88.6 FL (ref 80–94)
MONOCYTES # BLD AUTO: 0.3 X10(3)/MCL (ref 0.1–1.3)
MONOCYTES NFR BLD AUTO: 6.1 %
NEUTROPHILS # BLD AUTO: 1.69 X10(3)/MCL (ref 2.1–9.2)
NEUTROPHILS NFR BLD AUTO: 34.4 %
NRBC BLD AUTO-RTO: 0 %
PLATELET # BLD AUTO: 136 X10(3)/MCL (ref 130–400)
PLATELETS.RETICULATED NFR BLD AUTO: 1.1 % (ref 0.9–11.2)
PMV BLD AUTO: 9.7 FL (ref 7.4–10.4)
POTASSIUM SERPL-SCNC: 4.1 MMOL/L (ref 3.5–5.1)
PROT SERPL-MCNC: 6.9 GM/DL (ref 5.8–7.6)
RBC # BLD AUTO: 4.3 X10(6)/MCL (ref 4.2–5.4)
SODIUM SERPL-SCNC: 144 MMOL/L (ref 136–145)
WBC # BLD AUTO: 4.91 X10(3)/MCL (ref 4.5–11.5)

## 2025-04-23 PROCEDURE — 87040 BLOOD CULTURE FOR BACTERIA: CPT | Performed by: EMERGENCY MEDICINE

## 2025-04-23 PROCEDURE — 85025 COMPLETE CBC W/AUTO DIFF WBC: CPT | Performed by: NURSE PRACTITIONER

## 2025-04-23 PROCEDURE — 25000003 PHARM REV CODE 250: Performed by: EMERGENCY MEDICINE

## 2025-04-23 PROCEDURE — 80053 COMPREHEN METABOLIC PANEL: CPT | Performed by: NURSE PRACTITIONER

## 2025-04-23 PROCEDURE — 99284 EMERGENCY DEPT VISIT MOD MDM: CPT | Mod: 25

## 2025-04-23 RX ADMIN — SODIUM CHLORIDE 250 ML: 9 INJECTION, SOLUTION INTRAVENOUS at 10:04

## 2025-04-23 NOTE — FIRST PROVIDER EVALUATION
Medical screening examination initiated.  I have conducted a focused provider triage encounter, findings are as follows:    Brief history of present illness:  Patient states that she has a mediport for TPN. States that her home health nurse was unable to access her port. States that last night the area surrounding her mediport was swollen after her TPN infusion.     There were no vitals filed for this visit.    Pertinent physical exam:  Awake, alert, ambulatory      Brief workup plan:  Labs, Imaging    Preliminary workup initiated; this workup will be continued and followed by the physician or advanced practice provider that is assigned to the patient when roomed.

## 2025-04-24 ENCOUNTER — DOCUMENT SCAN (OUTPATIENT)
Dept: HOME HEALTH SERVICES | Facility: HOSPITAL | Age: 80
End: 2025-04-24
Payer: MEDICARE

## 2025-04-24 NOTE — ED PROVIDER NOTES
Encounter Date: 4/23/2025    SCRIBE #1 NOTE: I, La Bishop, am scribing for, and in the presence of,  Brad Lombardi MD. I have scribed the following portions of the note - Other sections scribed: HPI, ROS, PE.       History     Chief Complaint   Patient presents with    mediport problem      States that she is fed TPN through mediport which has been unsuccessfully accessed since Monday. Pt receives 3 TPN per week. During her last feeding states began to feel pain at the site and noticed swelling.      Patient is a 79-year-old female with a history of HTN, breast cancer, PVD, and RA presenting to the ED with c/o Mediport malfunction. The pt has a Mediport through which she receives TPN three times a week on Sundays, Tuesdays, and Thursdays due to having short bowel syndrome. She states her home health nurse has been unsuccessful in accessing the Mediport since Monday of this week. She states she attempted to use it last night; however, she states the area around the Mediport began to swell. She states she has had to have the port replaced four times in the past due to multiple issues. The pt is on Coumadin.    The history is provided by the patient. No  was used.     Review of patient's allergies indicates:   Allergen Reactions    Hydromorphone Itching     Other reaction(s): itching    Opium      Other reaction(s): itching  has itching with larger doses. Smaller doses do not cause a reaction.     Past Medical History:   Diagnosis Date    Acute URI     Anxiety and depression     Back pain     Bacteremia 04/29/2024    Breast cancer     Cholelithiasis     DVT (deep venous thrombosis)     on coumadin    Edema, lower extremity     Gallstone pancreatitis     Heart murmur     HTN (hypertension)     Insomnia     Irregular heart beat     Ischemic disease of gut     Kidney stones     Laryngitis     Malabsorption     Malnutrition, unspecified type     Meningitis, unspecified     OA (osteoarthritis)      PVD (peripheral vascular disease)     Rheumatoid arthritis, unspecified     Staph infection     infected mediport -- on doxycycline daily for prevention    Tremor     Unspecified cataract     Unspecified cirrhosis of liver 06/20/2023    Dr Carroll     Past Surgical History:   Procedure Laterality Date    APPENDECTOMY      BOWEL RESECTION      BREAST LUMPECTOMY Right     CHOLECYSTECTOMY  05/2015    COLONOSCOPY  02/2022    repeat 3 years    CYST REMOVAL Right     breast    CYSTOSCOPY W/ STONE MANIPULATION      CYSTOSCOPY W/ URETERAL STENT PLACEMENT  12/2020    CYSTOSCOPY W/ URETERAL STENT REMOVAL  04/2021    CYSTOURETEROSCOPY, WITH HOLMIUM LASER LITHOTRIPSY OF URETERAL CALCULUS AND STENT INSERTION Left 05/02/2023    Procedure: CYSTOSCOPY, WITH URETERAL STENT INSERTION Left;  Surgeon: Jared Felix MD;  Location: [facility] OR;  Service: Urology;  Laterality: Left;    EGD, WITH CLOSED BIOPSY N/A 6/20/2023    Procedure: EGD;  Surgeon: Aashish Carroll MD;  Location: [facility] ENDOSCOPY;  Service: Gastroenterology;  Laterality: N/A;    ENDOSCOPIC RETROGRADE CHOLANGIOPANCREATOGRAPHY W/ SPHINCTEROTOMY AND STONE REMOVAL  04/2015    ESOPHAGOGASTRODUODENOSCOPY  06/20/2023    Dr Carroll - no signs esophageal varicies --repeat 3 yrs    GI Biopsy  02/15/2022    GI Biopsy  12/2018    HYSTERECTOMY      INSERTION OF TUNNELED CENTRAL VENOUS CATHETER (CVC) WITH SUBCUTANEOUS PORT N/A 7/18/2024    Procedure: [id number];  Surgeon: Ariel Welsh Jr., MD;  Location: [facility] OR;  Service: General;  Laterality: N/A;    INSERTION OF TUNNELED CENTRAL VENOUS CATHETER (CVC) WITH SUBCUTANEOUS PORT N/A 3/6/2025    Procedure: [id number] / Placement of Tunneled Venous Access Port;  Surgeon: Ariel Welsh Jr., MD;  Location: [facility] OR;  Service: General;  Laterality: N/A;  Placement of Tunneled Venous Access Port    LAPAROTOMY, EXPLORATORY  06/2015    LITHOTRIPSY Left 04/2021    LITHOTRIPSY Left 03/2021    [facility]  INSERTION, SINGLE  06/2021    MEDIPORT INSERTION, SINGLE  07/2020    MEDIPORT INSERTION, SINGLE  12/2018    MEDIPORT REMOVAL  07/2020    PHACOEMULSIFICATION OF CATARACT Left 12/2016    PHACOEMULSIFICATION OF CATARACT Right 11/2016    PICC line Removal Right 06/2021    POLYPECTOMY  02/2022    PVD surgery      REMOVAL OF CATHETER  12/2020    REMOVAL OF VASCULAR ACCESS CATHETER Right 4/24/2024    Procedure: Removal, Vascular Access Catheter;  Surgeon: Reed Ghosh MD;  Location: [facility] OR;  Service: General;  Laterality: Right;    REMOVAL OF VASCULAR ACCESS CATHETER N/A 11/12/2024    Procedure: Removal, Vascular Access Catheter;  Surgeon: Ariel Welsh Jr., MD;  Location: [facility] OR;  Service: General;  Laterality: N/A;  removal mediport left chest wall    SHOULDER SURGERY Right     shoulder replacement    SPHINCTEROTOMY OF URETHRA      VENTRAL HERNIA REPAIR  04/2016     Family History   Problem Relation Name Age of Onset    Pneumonia Mother      Depression Mother      Osteoarthritis Mother      Breast cancer Mother  70 - 75    Uterine cancer Mother  40 - 45    Bone cancer Mother  60 - 69    Heart attack Father      Aneurysm Father          brain    Diabetes Father      Hyperlipidemia Father      Hypertension Father      Hyperlipidemia Sister      Hypertension Sister      Kidney cancer Sister  55 - 56    Osteoarthritis Sister      Breast cancer Sister  52 - 53    Diabetes Mellitus Sister      Heart failure Sister      Kidney disease Sister      Migraines Sister      Arthritis Sister      Arthritis Sister      Parkinsonism Sister      Heart disease Sister      Hyperlipidemia Brother      Hypertension Brother      Coronary artery disease Brother      Coronary artery disease Brother          CABG x3    Hyperlipidemia Brother      Hypertension Brother      Kidney cancer Paternal Grandmother  60 - 69     Social History[1]  Review of Systems   Constitutional:  Negative for chills and fever.   Respiratory:  Negative for  cough and shortness of breath.    Cardiovascular:  Negative for chest pain.   Gastrointestinal:  Negative for abdominal pain, nausea and vomiting.   Musculoskeletal:  Negative for myalgias.   All other systems reviewed and are negative.      Physical Exam     Initial Vitals [04/23/25 1700]   BP Pulse Resp Temp SpO2   (!) 145/75 (!) 55 18 98.6 °F (37 °C) 96 %      MAP       --         Physical Exam    Nursing note and vitals reviewed.  Constitutional: She appears well-developed and well-nourished. No distress.   HENT:   Head: Normocephalic and atraumatic.   Eyes: Conjunctivae are normal.   Cardiovascular:  Normal rate and intact distal pulses.           Pulmonary/Chest: No respiratory distress. She has no rhonchi.   That has no swelling around Mediport to the right chest wall nor of the breast.  She is wall symmetrical flank session with the port is present of course.  There is no erythema, induration, or purulent drainage.   Abdominal: Abdomen is soft. Bowel sounds are normal. There is no abdominal tenderness. There is no rebound and no guarding.   Musculoskeletal:         General: No edema.     Neurological: She is alert. She has normal strength.   Skin: Skin is warm and dry.   Psychiatric: She has a normal mood and affect.         ED Course   Procedures  Labs Reviewed   COMPREHENSIVE METABOLIC PANEL - Abnormal       Result Value    Sodium 144      Potassium 4.1      Chloride 113 (*)     CO2 23      Glucose 97      Blood Urea Nitrogen 18.2      Creatinine 0.67      Calcium 8.7      Protein Total 6.9      Albumin 3.3 (*)     Globulin 3.6 (*)     Albumin/Globulin Ratio 0.9 (*)     Bilirubin Total 0.5            ALT 45      AST 54 (*)     eGFR >60      Anion Gap 8.0      BUN/Creatinine Ratio 27     CBC WITH DIFFERENTIAL - Abnormal    WBC 4.91      RBC 4.30      Hgb 12.1      Hct 38.1      MCV 88.6      MCH 28.1      MCHC 31.8 (*)     RDW 15.5      Platelet 136      MPV 9.7      IPF 1.1      Neut % 34.4       Lymph % 55.2      Mono % 6.1      Eos % 3.9      Basophil % 0.2      Imm Grans % 0.2      Neut # 1.69 (*)     Lymph # 2.71      Mono # 0.30      Eos # 0.19      Baso # 0.01      Imm Gran # 0.01      NRBC% 0.0     BLOOD CULTURE OLG   BLOOD CULTURE [facility]   CBC W/ AUTO DIFFERENTIAL    Narrative:     The following orders were created for panel order CBC Auto Differential.  Procedure                               Abnormality         Status                     ---------                               -----------         ------                     CBC with Differential[[id number]]       Abnormal            Final result                 Please view results for these tests on the individual orders.          Imaging Results              X-Ray Chest PA And Lateral (Final result)  Result time 04/23/25 17:19:35      Final result by Tee Lemus MD (04/23/25 17:19:35)                   Impression:      Stable chronic findings seen no acute process      Electronically signed by: Carlos Lemus  Date:    04/23/2025  Time:    17:19               Narrative:    EXAMINATION:  XR CHEST PA AND LATERAL    CLINICAL HISTORY:  chest pain;    TECHNIQUE:  PA and lateral views of the chest were performed.    COMPARISON:  The 03/06/2025    FINDINGS:  There are chronic appearing lung changes seen bilaterally. No evidence of acute infiltrate is seen. No mass is seen. No lesion is seen. No pleural effusion is seen. The heart appears normal. The aorta appears normal. The bones and joints show no acute abnormality.  There is an area of fat necrosis in the right breast which is stable since prior examination.  There is a port in the right anterior chest wall with the tip in region of the SVC..                                       Medications - No data to display  Medical Decision Making  Differential diagnosis include but are not limited to: Mediport malfunction and Mediport infection.    I explained I do not feel any swelling certainly no  fluid collections on exam or on reexam.  They report that has some swelling earlier when they tried to flush it likely from not accessing the port [place] or at all.  I explained the body will resorb fluid like that which maybe why I do not see any at this time and why they reported that has better.    Amount and/or Complexity of Data Reviewed  Labs: ordered. Decision-making details documented in ED Course.  Radiology: ordered and independent interpretation performed. Decision-making details documented in ED Course.            Scribe Attestation:   Scribe #1: I performed the above scribed service and the documentation accurately describes the services I performed. I attest to the accuracy of the note.    Attending Attestation:           Physician Attestation for Scribe:  Physician Attestation Statement for Scribe #1: I, Brad Lombardi MD, reviewed documentation, as scribed by La Bishop in my presence, and it is both accurate and complete.             ED Course as of 04/23/25 2159 Wed Apr 23, 2025 2147 Discussed with Dr Welsh he is familiar with the patient's saw in the office recently and that has able to be accessed and functioned well then.  In his flushing well from nurses here as well.  I do not see any signs of infection on exam and labs are reassuring have sent blood cultures.  Nonunion 250 cc of saline here to ensure that has flowing.  Suspect it was probably just a probably with the attempts at access.  Okay to follow up as an outpatient [LF]   2157 Patient is easily received the 250 cc bag of fluid here okay to discharge home can continue using.  They questioned whether they should be using longer needles at high him RN here informed them of which needle we used to access the port [LF]      ED Course User Index  [LF] Brad Lombardi MD                           Clinical Impression:  Final diagnoses:  [Z97.8] Tunneled central venous catheter present (Primary)          ED Disposition Condition     Discharge Good          ED Prescriptions    None       Follow-up Information       Follow up With Specialties Details Why Contact Info    Curt Felton MD Family Medicine   [address] [facility] [address]  [phone]      Ariel Welsh Jr., MD General Surgery, Bariatrics   [address] [facility]  [address]  [place] [address]  [phone]                 [1]   Social History  Tobacco Use    Smoking status: Never     Passive exposure: Past    Smokeless tobacco: Never   Substance Use Topics    Alcohol use: Not Currently    Drug use: Never        Brad Lombardi MD  04/23/25 [id number]

## 2025-04-28 ENCOUNTER — LAB REQUISITION (OUTPATIENT)
Dept: LAB | Facility: HOSPITAL | Age: 80
End: 2025-04-28
Payer: MEDICARE

## 2025-04-28 DIAGNOSIS — K91.2 POSTSURGICAL MALABSORPTION, NOT ELSEWHERE CLASSIFIED: ICD-10-CM

## 2025-04-28 DIAGNOSIS — Z51.81 ENCOUNTER FOR THERAPEUTIC DRUG LEVEL MONITORING: ICD-10-CM

## 2025-04-28 LAB
ALBUMIN SERPL-MCNC: 3.3 G/DL (ref 3.4–4.8)
ALBUMIN/GLOB SERPL: 0.9 RATIO (ref 1.1–2)
ALP SERPL-CCNC: 159 UNIT/L (ref 40–150)
ALT SERPL-CCNC: 41 UNIT/L (ref 0–55)
ANION GAP SERPL CALC-SCNC: 6 MEQ/L
AST SERPL-CCNC: 45 UNIT/L (ref 11–45)
BACTERIA BLD CULT: NORMAL
BACTERIA BLD CULT: NORMAL
BASOPHILS # BLD AUTO: 0.03 X10(3)/MCL
BASOPHILS NFR BLD AUTO: 0.6 %
BILIRUB SERPL-MCNC: 0.6 MG/DL
BUN SERPL-MCNC: 12.6 MG/DL (ref 9.8–20.1)
CALCIUM SERPL-MCNC: 8.7 MG/DL (ref 8.4–10.2)
CHLORIDE SERPL-SCNC: 108 MMOL/L (ref 98–107)
CO2 SERPL-SCNC: 24 MMOL/L (ref 23–31)
CREAT SERPL-MCNC: 0.65 MG/DL (ref 0.55–1.02)
CREAT/UREA NIT SERPL: 19
EOSINOPHIL # BLD AUTO: 0.19 X10(3)/MCL (ref 0–0.9)
EOSINOPHIL NFR BLD AUTO: 3.7 %
ERYTHROCYTE [DISTWIDTH] IN BLOOD BY AUTOMATED COUNT: 14.9 % (ref 11.5–17)
GFR SERPLBLD CREATININE-BSD FMLA CKD-EPI: >60 ML/MIN/1.73/M2
GLOBULIN SER-MCNC: 3.6 GM/DL (ref 2.4–3.5)
GLUCOSE SERPL-MCNC: 91 MG/DL (ref 82–115)
HCT VFR BLD AUTO: 37.8 % (ref 37–47)
HGB BLD-MCNC: 12.1 G/DL (ref 12–16)
IMM GRANULOCYTES # BLD AUTO: 0.01 X10(3)/MCL (ref 0–0.04)
IMM GRANULOCYTES NFR BLD AUTO: 0.2 %
INR PPP: 5.3
LYMPHOCYTES # BLD AUTO: 2.53 X10(3)/MCL (ref 0.6–4.6)
LYMPHOCYTES NFR BLD AUTO: 49 %
MCH RBC QN AUTO: 28.4 PG (ref 27–31)
MCHC RBC AUTO-ENTMCNC: 32 G/DL (ref 33–36)
MCV RBC AUTO: 88.7 FL (ref 80–94)
MONOCYTES # BLD AUTO: 0.43 X10(3)/MCL (ref 0.1–1.3)
MONOCYTES NFR BLD AUTO: 8.3 %
NEUTROPHILS # BLD AUTO: 1.97 X10(3)/MCL (ref 2.1–9.2)
NEUTROPHILS NFR BLD AUTO: 38.2 %
NRBC BLD AUTO-RTO: 0 %
PHOSPHATE SERPL-MCNC: 3.1 MG/DL (ref 2.3–4.7)
PLATELET # BLD AUTO: 186 X10(3)/MCL (ref 130–400)
PMV BLD AUTO: 10.1 FL (ref 7.4–10.4)
POTASSIUM SERPL-SCNC: 4.1 MMOL/L (ref 3.5–5.1)
PROT SERPL-MCNC: 6.9 GM/DL (ref 5.8–7.6)
PROTHROMBIN TIME: 48.7 SECONDS (ref 12.5–14.5)
RBC # BLD AUTO: 4.26 X10(6)/MCL (ref 4.2–5.4)
SODIUM SERPL-SCNC: 138 MMOL/L (ref 136–145)
TRIGL SERPL-MCNC: 93 MG/DL (ref 37–140)
WBC # BLD AUTO: 5.16 X10(3)/MCL (ref 4.5–11.5)

## 2025-04-28 PROCEDURE — 85610 PROTHROMBIN TIME: CPT | Performed by: INTERNAL MEDICINE

## 2025-04-28 PROCEDURE — 84478 ASSAY OF TRIGLYCERIDES: CPT | Performed by: INTERNAL MEDICINE

## 2025-04-28 PROCEDURE — 85025 COMPLETE CBC W/AUTO DIFF WBC: CPT | Performed by: INTERNAL MEDICINE

## 2025-04-28 PROCEDURE — 84100 ASSAY OF PHOSPHORUS: CPT | Performed by: INTERNAL MEDICINE

## 2025-04-28 PROCEDURE — 80053 COMPREHEN METABOLIC PANEL: CPT | Performed by: INTERNAL MEDICINE

## 2025-05-01 ENCOUNTER — TELEPHONE (OUTPATIENT)
Dept: SURGERY | Facility: CLINIC | Age: 80
End: 2025-05-01
Payer: MEDICARE

## 2025-05-01 NOTE — TELEPHONE ENCOUNTER
Pt called concerned that she is still have issues with her mediport  Per Dr Welsh- sending referral to Dr. Tristan Brown  Pt aware and voiced understanding

## 2025-05-01 NOTE — LETTER
General and Bariatric Surgery  [address] [facility], [address]  [place] [address]  Phone: [phone] - Fax: [phone]    Referral for Vascular Surgery      Date: 2025     ATTN/Doctor: Tristan Brown    Sent By: Asaf Drummond MA      Patient:  Laura D Use     : 1945  Patient Contact: [phone]  Address:  [address] [place] [address]    Insurance:   Payer/Plan Subscr  Sex Relation Sub. Ins. ID Effective Group Num   1. MEDICARE - ME* USE,LAURA D 1945 Female Self [id number] 12/1/10                                    [address]   2. MEDICARE - ME* USE,LAURA D 1945 Female Self [id number] 12/1/10                                    [address]           Reason for Referral:   [x]Poor venous access- pt needs eval for possible mediport removal/replacement        Surgeon: Dr. Ariel Welsh

## 2025-05-02 ENCOUNTER — DOCUMENT SCAN (OUTPATIENT)
Dept: HOME HEALTH SERVICES | Facility: HOSPITAL | Age: 80
End: 2025-05-02
Payer: MEDICARE

## 2025-05-05 ENCOUNTER — OFFICE VISIT (OUTPATIENT)
Dept: VASCULAR SURGERY | Facility: CLINIC | Age: 80
End: 2025-05-05
Payer: MEDICARE

## 2025-05-05 VITALS
HEART RATE: 53 BPM | WEIGHT: 155 LBS | DIASTOLIC BLOOD PRESSURE: 60 MMHG | BODY MASS INDEX: 26.46 KG/M2 | SYSTOLIC BLOOD PRESSURE: 121 MMHG | HEIGHT: 64 IN

## 2025-05-05 DIAGNOSIS — Z95.828 PORT-A-CATH IN PLACE: Primary | ICD-10-CM

## 2025-05-05 PROCEDURE — 99204 OFFICE O/P NEW MOD 45 MIN: CPT | Mod: ,,, | Performed by: SURGERY

## 2025-05-05 NOTE — PROGRESS NOTES
"    [facility] - Clinic Note  Tristan Brown MD      Patient Name: Laura Acosta                   : 1945      MRN: [id number]   Visit Date: 2025       History Present Illness     Reason for Visit: Mediport Evaluation    Ms. Acosta presents to the clinic for MediPort evaluation.  She is a pleasant 79-year-old female referred over by Dr. Welsh evaluation of the nonfunctioning MediPort.  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  She has been on TPN since about 2018.  She reports she has had multiple ports placed in the past both on the right and the left.  Most recently she had a port placed on the right in March of this year.  She says she has had trouble with this port.  She endorses pain in the right axilla and pain at the port site.  She also endorses what sounds like some leaking of fluid from around the port.  She states support as always accessed and she does TPN 3 times per week.  She also has a history of a right-sided lumpectomy with radiation for breast cancer.  She denies any swelling to the right arm.  I reviewed Dr. Welsh his from clinic and the OR          REVIEW OF SYSTEMS:  12 point review of systems conducted, negative except as stated in the history of present illness. See HPI for details.        Physical Exam      Vitals:    25 0847   BP: 121/60   BP Location: Right arm   Patient Position: Sitting   Pulse: (!) 53   Weight: 70.3 kg (155 lb)   Height: 5' 4" (1.626 m)          General: well-nourished, no acute distress, and healthy appearing, alert, pleasant, conversant, and oriented  Neurologic: cranial nerves are grossly intact, no neurologic deficits, no motor deficits, and no sensory deficits  Neck/Chest: normal , soft without lymphadenopathy, and no carotid bruits noted  Respiratory: breathing easily, without respiratory distress, and normal breath sounds  Abdomen: normal and soft  Cardiology: regular rate and rhythm and no audible " murmur    Upper Extremity Arterial Exam:   Right - radial is palpable and brachial is palpable  Left - radial is palpable and brachial is palpable      Musculoskeletal:   Upper Extremity: normal bilateral hand function   Lower Extremity: no edema present to bilateral lower extremities     Skin: has no obvious skin abnormality to right chest wall mediport site          Assessment and Plan     Ms. Acosta is a 79 y.o. female with malfunctioning MediPort.  Her port was able to be accessed in the general surgery office.  I was able to visualize the catheter in at least the jugular vein traveling towards the central venous system before visualization was lost at the clavicle.  At this point I would like to get a port study performed in radiology.  I discussed with her that if this is a functioning poor I would like her to try to use it.  She has had multiple previous ports, and I would like to see if this is usable before we remove it and place a new port.  I did discuss if there are any issues with the port study we will schedule her for removal and replacement of this port.              Imaging Obtained/Reviewed   Study:  Chest x-rays postprocedure as well as chest x-rays when seen in the office with her initial problems with this port were all reviewed.  This does show that the catheter does appear somewhat high in the chest.  I also placed a SonoSite on her IJ on the right which does note that it is patent.          Medical History     Past Medical History:   Diagnosis Date    Acute URI     Anxiety and depression     Back pain     Bacteremia 04/29/2024    Breast cancer     Cholelithiasis     DVT (deep venous thrombosis)     on coumadin    Edema, lower extremity     Gallstone pancreatitis     Heart murmur     HTN (hypertension)     Insomnia     Irregular heart beat     Ischemic disease of gut     Kidney stones     Laryngitis     Malabsorption     Malnutrition, unspecified type     Meningitis, unspecified     OA  (osteoarthritis)     PVD (peripheral vascular disease)     Rheumatoid arthritis, unspecified     Staph infection     infected mediport -- on doxycycline daily for prevention    Tremor     Unspecified cataract     Unspecified cirrhosis of liver 06/20/2023    Dr Carroll     Past Surgical History:   Procedure Laterality Date    APPENDECTOMY      BOWEL RESECTION      BREAST LUMPECTOMY Right     CHOLECYSTECTOMY  05/2015    COLONOSCOPY  02/2022    repeat 3 years    CYST REMOVAL Right     breast    CYSTOSCOPY W/ STONE MANIPULATION      CYSTOSCOPY W/ URETERAL STENT PLACEMENT  12/2020    CYSTOSCOPY W/ URETERAL STENT REMOVAL  04/2021    CYSTOURETEROSCOPY, WITH HOLMIUM LASER LITHOTRIPSY OF URETERAL CALCULUS AND STENT INSERTION Left 05/02/2023    Procedure: CYSTOSCOPY, WITH URETERAL STENT INSERTION Left;  Surgeon: Jared Felix MD;  Location: [facility] OR;  Service: Urology;  Laterality: Left;    EGD, WITH CLOSED BIOPSY N/A 6/20/2023    Procedure: EGD;  Surgeon: Aashish Carroll MD;  Location: [facility] ENDOSCOPY;  Service: Gastroenterology;  Laterality: N/A;    ENDOSCOPIC RETROGRADE CHOLANGIOPANCREATOGRAPHY W/ SPHINCTEROTOMY AND STONE REMOVAL  04/2015    ESOPHAGOGASTRODUODENOSCOPY  06/20/2023    Dr Carroll - no signs esophageal varicies --repeat 3 yrs    GI Biopsy  02/15/2022    GI Biopsy  12/2018    HYSTERECTOMY      INSERTION OF TUNNELED CENTRAL VENOUS CATHETER (CVC) WITH SUBCUTANEOUS PORT N/A 7/18/2024    Procedure: [id number];  Surgeon: Ariel Welsh Jr., MD;  Location: [facility] OR;  Service: General;  Laterality: N/A;    INSERTION OF TUNNELED CENTRAL VENOUS CATHETER (CVC) WITH SUBCUTANEOUS PORT N/A 3/6/2025    Procedure: [id number] / Placement of Tunneled Venous Access Port;  Surgeon: Ariel Welsh Jr., MD;  Location: [facility] OR;  Service: General;  Laterality: N/A;  Placement of Tunneled Venous Access Port    LAPAROTOMY, EXPLORATORY  06/2015    LITHOTRIPSY Left 04/2021    LITHOTRIPSY Left  03/2021    MEDIPORT INSERTION, SINGLE  06/2021    MEDIPORT INSERTION, SINGLE  07/2020    MEDIPORT INSERTION, SINGLE  12/2018    MEDIPORT REMOVAL  07/2020    PHACOEMULSIFICATION OF CATARACT Left 12/2016    PHACOEMULSIFICATION OF CATARACT Right 11/2016    PICC line Removal Right 06/2021    POLYPECTOMY  02/2022    PVD surgery      REMOVAL OF CATHETER  12/2020    REMOVAL OF VASCULAR ACCESS CATHETER Right 4/24/2024    Procedure: Removal, Vascular Access Catheter;  Surgeon: Reed Ghosh MD;  Location: [facility] OR;  Service: General;  Laterality: Right;    REMOVAL OF VASCULAR ACCESS CATHETER N/A 11/12/2024    Procedure: Removal, Vascular Access Catheter;  Surgeon: Ariel Welsh Jr., MD;  Location: [facility] OR;  Service: General;  Laterality: N/A;  removal mediport left chest wall    SHOULDER SURGERY Right     shoulder replacement    SPHINCTEROTOMY OF URETHRA      VENTRAL HERNIA REPAIR  04/2016     Family History   Problem Relation Name Age of Onset    Pneumonia Mother      Depression Mother      Osteoarthritis Mother      Breast cancer Mother  70 - 75    Uterine cancer Mother  40 - 45    Bone cancer Mother  60 - 69    Heart attack Father      Aneurysm Father          brain    Diabetes Father      Hyperlipidemia Father      Hypertension Father      Hyperlipidemia Sister      Hypertension Sister      Kidney cancer Sister  55 - 56    Osteoarthritis Sister      Breast cancer Sister  52 - 53    Diabetes Mellitus Sister      Heart failure Sister      Kidney disease Sister      Migraines Sister      Arthritis Sister      Arthritis Sister      Parkinsonism Sister      Heart disease Sister      Hyperlipidemia Brother      Hypertension Brother      Coronary artery disease Brother      Coronary artery disease Brother          CABG x3    Hyperlipidemia Brother      Hypertension Brother      Kidney cancer Paternal Grandmother  60 - 69     Social History[1]  Current Outpatient Medications   Medication Instructions    busPIRone (BUSPAR) 15  mg, 3 times daily    cefadroxil (DURICEF) 500 mg, Oral, Every 12 hours    CeleXA 40 mg, Daily    chlorhexidine (HIBICLENS) 4 % external liquid Topical (Top), Three times weekly, Daily showers for 5 days every other week for 3-6 months    cholecalciferol (vitamin D3) 50,000 Units, Every 7 days    D5W SolP 250 mL with vancomycin 1,000 mg SolR IV vanc until 12/26, dosing based on trough    diphenoxylate-atropine 2.5-0.025 mg (LOMOTIL) 2.5-0.025 mg per tablet 2 tablets, 2 times daily    enoxaparin (LOVENOX) 70 mg, Subcutaneous, 2 times daily    ferrous sulfate 325 mg, Oral, Daily    GATTEX 30-VIAL 5 mg Kit Daily    methocarbamoL (ROBAXIN) 750 mg, 2 times daily    metoprolol tartrate (LOPRESSOR) 100 mg, 2 times daily    mirtazapine (REMERON) 15 mg, Nightly    multivitamin (ONE DAILY MULTIVITAMIN) per tablet 1 tablet, Daily    PREVALITE 4 gram PwPk 4 grams of anhydrous cholestyramine, 2 times daily    rifAMpin (RIFADIN) 300 mg, Every 12 hours    solifenacin (VESICARE) 5 mg, Daily    traMADoL (ULTRAM) 50 mg, Oral, Every 6 hours PRN    warfarin (COUMADIN) 2.5 MG tablet TAKE ONE TABLET BY MOUTH ONCE DAILY IN THE EVENING OR AS DIRECTED BY CIS PROVIDER     Review of patient's allergies indicates:   Allergen Reactions    Hydromorphone Itching     Other reaction(s): itching    Opium      Other reaction(s): itching  has itching with larger doses. Smaller doses do not cause a reaction.       Patient Care Team:  Curt Felton MD as PCP - General (Family Medicine)  Aashish Carroll MD as Consulting Physician (Gastroenterology)  Eder Mcfadden MD as Consulting Physician (Infectious Diseases)  Loida Gonzalez MD as Consulting Physician (Breast Surgery)  Irena Cheng MD (Cardiovascular Disease)  Tristan Brown MD as Vascular Surgery (Vascular Surgery)  Ariel Welsh Jr., MD as Consulting Physician (General Surgery)        No follow-ups on file. In addition to their scheduled follow up, the patient has also  been instructed to follow up on as needed basis.     Future Appointments   Date Time Provider Department Center   5/6/2025 10:40 AM RESIDENT, [facility] INFECTIOUS DISEASE [facility] [place]    5/12/2025  1:30 PM Curt Felton MD [facility]             [1]   Social History  Socioeconomic History    Marital status:     Number of children: 2   Occupational History    Occupation: Retired   Tobacco Use    Smoking status: Never     Passive exposure: Past    Smokeless tobacco: Never   Substance and Sexual Activity    Alcohol use: Not Currently    Drug use: Never    Sexual activity: Not Currently     Social Drivers of Health     Financial Resource Strain: Low Risk  (11/8/2024)    Overall Financial Resource Strain (CARDIA)     Difficulty of Paying Living Expenses: Not hard at all   Food Insecurity: No Food Insecurity (11/8/2024)    Hunger Vital Sign     Worried About Running Out of Food in the Last Year: Never true     Ran Out of Food in the Last Year: Never true   Transportation Needs: No Transportation Needs (11/8/2024)    TRANSPORTATION NEEDS     Transportation : No   Stress: No Stress Concern Present (11/8/2024)    [employer] [place] [employer] - Occupational Stress Questionnaire     Feeling of Stress : Not at all   Housing Stability: Unknown (11/8/2024)    Housing Stability Vital Sign     Unable to Pay for Housing in the Last Year: No     Homeless in the Last Year: No

## 2025-05-06 ENCOUNTER — OFFICE VISIT (OUTPATIENT)
Dept: INFECTIOUS DISEASES | Facility: CLINIC | Age: 80
End: 2025-05-06
Payer: MEDICARE

## 2025-05-06 ENCOUNTER — DOCUMENT SCAN (OUTPATIENT)
Dept: HOME HEALTH SERVICES | Facility: HOSPITAL | Age: 80
End: 2025-05-06
Payer: MEDICARE

## 2025-05-06 VITALS
HEIGHT: 64 IN | HEART RATE: 62 BPM | BODY MASS INDEX: 27.23 KG/M2 | WEIGHT: 159.5 LBS | RESPIRATION RATE: 16 BRPM | DIASTOLIC BLOOD PRESSURE: 71 MMHG | TEMPERATURE: 98 F | SYSTOLIC BLOOD PRESSURE: 122 MMHG

## 2025-05-06 DIAGNOSIS — B99.9 RECURRENT INFECTIONS: Primary | ICD-10-CM

## 2025-05-06 PROCEDURE — 99214 OFFICE O/P EST MOD 30 MIN: CPT | Mod: PBBFAC

## 2025-05-06 RX ORDER — CEFADROXIL 500 MG/1
500 CAPSULE ORAL EVERY 12 HOURS
Qty: 60 CAPSULE | Refills: 5 | Status: SHIPPED | OUTPATIENT
Start: 2025-05-06

## 2025-05-06 NOTE — PROGRESS NOTES
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    Patient was admitted to [facility] on 11/07 where she initially presented with complaints of fever, generalized weakness.  She was admitted for pneumonia/enterocolitis.  Blood cultures obtained on this admission positive in 2/2 vials for staph epidermidis.  Due to history of MediPort currently receiving TPN and history of multiple MediPort infections, patient was treated for current MediPort infection with culture results.  MediPort was removed on 11/02.  She was started on vancomycin.  TTE negative.  Cardiology was consult for DALE which was completed on 11/19 and negative.  Patient was discharged with 6 weeks of vancomycin via OPAT.  Due to patient's indwelling shoulder hardware, she was also started on rifampin 300 mg b.i.d.    Since admission, patient reports that she is doing well.  MediPort site remains without significant erythema, swelling, or drainage.  PICC well-maintained and well dressed.  She reports diarrhea baseline that is slightly worse since antibiotic administration has began.    Interval history:  1/16/25  Presents for follow up today, reports feeling well and denies any R shoulder pain. Vancomycin has completed and she is now on cefadroxil for suppression. She continues to take rifampin with planned end date of 2/12/25. She does endorse some diarrhea which she feels is due to her short gut syndrome. She saw GI recently who is planning to start a new medication to help with this. Denies fever, chills, N/V, night sweats, chest pain, SOB. She reprots she has 3-10 watery BM per day.    05/06/2025  Presents for  follow up today. States that she had PICC line removed and new mediport placed during interim but that mediport is now malfunctioning as she reports profound swelling to her right axilla whenever she attempted administer TPN through new mediport. Has appointment with vascular surgery later this week to evaluate mediport as it may need to be replaced. She other continues to endorse baseline diarrhea (3-10 watery BM per day) related to her short gut syndrome but denies any other acute complaints. Denies any right shoulder pain. Completed Rifampin therapy 02/12/25. Endorses adherence to cefadroxil therapy for prosthetic joint infection suppression. Denies any symptoms concerning for side effects to medication. Reviewed lab work.    REVIEW OF SYSTEMS:  Review of Systems   Constitutional:  Negative for chills, fever and malaise/fatigue.   Respiratory:  Negative for cough, hemoptysis, sputum production and shortness of breath.    Cardiovascular:  Negative for chest pain, palpitations and leg swelling.   Gastrointestinal:  Positive for diarrhea. Negative for abdominal pain, nausea and vomiting.   Genitourinary: Negative.    Musculoskeletal: Negative.    Neurological: Negative.        PREVIOUS MEDICAL HISTORY:  Past Medical History:   Diagnosis Date    Acute URI     Anxiety and depression     Back pain     Bacteremia 04/29/2024    Breast cancer     Cholelithiasis     DVT (deep venous thrombosis)     on coumadin    Edema, lower extremity     Gallstone pancreatitis     Heart murmur     HTN (hypertension)     Insomnia     Irregular heart beat     Ischemic disease of gut     Kidney stones     Laryngitis     Malabsorption     Malnutrition, unspecified type     Meningitis, unspecified     OA (osteoarthritis)     PVD (peripheral vascular disease)     Rheumatoid arthritis, unspecified     Staph infection     infected mediport -- on doxycycline daily for prevention    Tremor     Unspecified cataract     Unspecified cirrhosis of  liver 06/20/2023    Dr Carroll       PREVIOUS SURGICAL HISTORY:  Past Surgical History:   Procedure Laterality Date    APPENDECTOMY      BOWEL RESECTION      BREAST LUMPECTOMY Right     CHOLECYSTECTOMY  05/2015    COLONOSCOPY  02/2022    repeat 3 years    CYST REMOVAL Right     breast    CYSTOSCOPY W/ STONE MANIPULATION      CYSTOSCOPY W/ URETERAL STENT PLACEMENT  12/2020    CYSTOSCOPY W/ URETERAL STENT REMOVAL  04/2021    CYSTOURETEROSCOPY, WITH HOLMIUM LASER LITHOTRIPSY OF URETERAL CALCULUS AND STENT INSERTION Left 05/02/2023    Procedure: CYSTOSCOPY, WITH URETERAL STENT INSERTION Left;  Surgeon: Jared Felix MD;  Location: [facility] OR;  Service: Urology;  Laterality: Left;    EGD, WITH CLOSED BIOPSY N/A 6/20/2023    Procedure: EGD;  Surgeon: Aashish Carroll MD;  Location: [facility] ENDOSCOPY;  Service: Gastroenterology;  Laterality: N/A;    ENDOSCOPIC RETROGRADE CHOLANGIOPANCREATOGRAPHY W/ SPHINCTEROTOMY AND STONE REMOVAL  04/2015    ESOPHAGOGASTRODUODENOSCOPY  06/20/2023    Dr Carroll - no signs esophageal varicies --repeat 3 yrs    GI Biopsy  02/15/2022    GI Biopsy  12/2018    HYSTERECTOMY      INSERTION OF TUNNELED CENTRAL VENOUS CATHETER (CVC) WITH SUBCUTANEOUS PORT N/A 7/18/2024    Procedure: [id number];  Surgeon: Ariel Welsh Jr., MD;  Location: [facility] OR;  Service: General;  Laterality: N/A;    INSERTION OF TUNNELED CENTRAL VENOUS CATHETER (CVC) WITH SUBCUTANEOUS PORT N/A 3/6/2025    Procedure: [id number] / Placement of Tunneled Venous Access Port;  Surgeon: Ariel Welsh Jr., MD;  Location: [facility] OR;  Service: General;  Laterality: N/A;  Placement of Tunneled Venous Access Port    LAPAROTOMY, EXPLORATORY  06/2015    LITHOTRIPSY Left 04/2021    LITHOTRIPSY Left 03/2021    MEDIPORT INSERTION, SINGLE  06/2021    MEDIPORT INSERTION, SINGLE  07/2020    MEDIPORT INSERTION, SINGLE  12/2018    MEDIPORT REMOVAL  07/2020    PHACOEMULSIFICATION OF CATARACT Left 12/2016     PHACOEMULSIFICATION OF CATARACT Right 11/2016    PICC line Removal Right 06/2021    POLYPECTOMY  02/2022    PVD surgery      REMOVAL OF CATHETER  12/2020    REMOVAL OF VASCULAR ACCESS CATHETER Right 4/24/2024    Procedure: Removal, Vascular Access Catheter;  Surgeon: Reed Ghosh MD;  Location: [facility] OR;  Service: General;  Laterality: Right;    REMOVAL OF VASCULAR ACCESS CATHETER N/A 11/12/2024    Procedure: Removal, Vascular Access Catheter;  Surgeon: Ariel Welsh Jr., MD;  Location: [facility] OR;  Service: General;  Laterality: N/A;  removal mediport left chest wall    SHOULDER SURGERY Right     shoulder replacement    SPHINCTEROTOMY OF URETHRA      VENTRAL HERNIA REPAIR  04/2016         ALLERGIES:  Review of patient's allergies indicates:   Allergen Reactions    Hydromorphone Itching     Other reaction(s): itching    Opium      Other reaction(s): itching  has itching with larger doses. Smaller doses do not cause a reaction.         MEDICATIONS:  Current Outpatient Medications on File Prior to Visit   Medication Sig Dispense Refill    busPIRone (BUSPAR) 15 MG tablet Take 15 mg by mouth 3 (three) times daily.      cefadroxil (DURICEF) 500 MG Cap Take 1 capsule (500 mg total) by mouth every 12 (twelve) hours. 60 capsule 5    CELEXA 40 mg tablet Take 40 mg by mouth once daily.      chlorhexidine (HIBICLENS) 4 % external liquid Apply topically 3 (three) times a week. Daily showers for 5 days every other week for 3-6 months 3785 mL 4    cholecalciferol, vitamin D3, 1,250 mcg (50,000 unit) capsule Take 50,000 Units by mouth every 7 days.      D5W SolP 250 mL with vancomycin 1,000 mg SolR IV vanc until 12/26, dosing based on trough      diphenoxylate-atropine 2.5-0.025 mg (LOMOTIL) 2.5-0.025 mg per tablet Take 2 tablets by mouth 2 (two) times a day.      enoxaparin (LOVENOX) 150 mg/mL Syrg Inject 70 mg into the skin 2 (two) times a day.      ferrous sulfate 325 (65 FE) MG EC tablet Take 1 tablet (325 mg total) by mouth  once daily. 30 tablet 0    GATTEX 30-VIAL 5 mg Kit Inject into the skin Daily.      methocarbamoL (ROBAXIN) 750 MG Tab Take 750 mg by mouth 2 (two) times daily.      metoprolol tartrate (LOPRESSOR) 100 MG tablet Take 100 mg by mouth 2 (two) times daily.      mirtazapine (REMERON) 15 MG tablet Take 15 mg by mouth every evening.      multivitamin (ONE DAILY MULTIVITAMIN) per tablet Take 1 tablet by mouth once daily.      PREVALITE 4 gram PwPk Take 4 grams of anhydrous cholestyramine by mouth 2 (two) times daily.      rifAMpin (RIFADIN) 300 MG capsule Take 300 mg by mouth every 12 (twelve) hours.      solifenacin (VESICARE) 5 MG tablet Take 5 mg by mouth once daily.      traMADoL (ULTRAM) 50 mg tablet Take 1 tablet (50 mg total) by mouth every 6 (six) hours as needed for Pain. 20 tablet 0    warfarin (COUMADIN) 2.5 MG tablet TAKE ONE TABLET BY MOUTH ONCE DAILY IN THE EVENING OR AS DIRECTED BY CIS PROVIDER       No current facility-administered medications on file prior to visit.          SOCIAL HISTORY:    reports that she has never smoked. She has been exposed to tobacco smoke. She has never used smokeless tobacco. She reports that she does not currently use alcohol. She reports that she does not use drugs.      FAMILY HISTORY:   Family History   Problem Relation Name Age of Onset    Pneumonia Mother      Depression Mother      Osteoarthritis Mother      Breast cancer Mother  70 - 75    Uterine cancer Mother  40 - 45    Bone cancer Mother  60 - 69    Heart attack Father      Aneurysm Father          brain    Diabetes Father      Hyperlipidemia Father      Hypertension Father      Hyperlipidemia Sister      Hypertension Sister      Kidney cancer Sister  55 - 56    Osteoarthritis Sister      Breast cancer Sister  52 - 53    Diabetes Mellitus Sister      Heart failure Sister      Kidney disease Sister      Migraines Sister      Arthritis Sister      Arthritis Sister      Parkinsonism Sister      Heart disease Sister       Hyperlipidemia Brother      Hypertension Brother      Coronary artery disease Brother      Coronary artery disease Brother          CABG x3    Hyperlipidemia Brother      Hypertension Brother      Kidney cancer Paternal Grandmother  60 - 69       PHYSICAL EXAMINATION:  There were no vitals taken for this visit. There is no height or weight on file to calculate BMI.    Gen: awake, alert, NAD  HEENT: NC/AT, EOMi, anicteric sclera, no rhinorrhea, OP clear  Neck: no cervical LAD  CV: regular rate normal rhythm no murmur  Resp: easy WOB on RA CTABL no w/r/r  Abd: +BS, soft, NTTP, no HSM  Ext: warm, no LE edema. Mediport to right chest wall without erythema, edema, or tenderness to palpation.  Skin: no rash  Neuro: no focal deficits     LABORATORY DATA:  Lab Results   Component Value Date    WBC 5.16 04/28/2025    WBC 4.91 04/23/2025    WBC 4.64 03/24/2025    HGB 12.1 04/28/2025    HGB 12.1 04/23/2025    HGB 12.1 03/24/2025     04/28/2025     04/23/2025     03/24/2025    CREATININE 0.65 04/28/2025    CREATININE 0.67 04/23/2025    CREATININE 0.66 03/24/2025    ALT 41 04/28/2025    ALT 45 04/23/2025    ALT 24 03/24/2025    AST 45 04/28/2025    AST 54 (H) 04/23/2025    AST 27 03/24/2025    ALKPHOS 159 (H) 04/28/2025    ALKPHOS 131 04/23/2025    ALKPHOS 107 03/24/2025    BILITOT 0.6 04/28/2025    BILITOT 0.5 04/23/2025    BILITOT 0.4 03/24/2025    INR 5.3 (HH) 04/28/2025    INR 4.4 (H) 03/31/2025    INR 2.8 (H) 03/14/2025       MICROBIOLOGY DATA:        ASSESSMENT:    70-year-old female with a past medical history of 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   - most recent blood cultures on 11/8 displaying growth of Staph epidermidis 2/2   - s/p  6 weeks of Vancomycin with OPAT  2) History of shoulder replacement with prosthetic joint   - currently on Rifampin for Biofilm production with bacteremia as listed above   - Currently on cefadroxil for chronic suppression  3) Short gut syndrome currently TPN dependent  - Mediport removal on 11/12 secondary to above, currently receiving TPN via PICC   4) History of DVT on Coumadin  5) History of RA  6) History of cirrhosis  7) History of HTN, HLD    PLAN:    - continue lifelong suppressive therapy with cefadroxil 500mg PO BID due to concern for R shoulder PJI  - completed Rifampin 300 mg PO BID on 2/12/25  - patient will plans to follow up with vascular surgery later this week for possible PICC line placement versus mediport replacement due to malfunctioning mediport  - Will check labs at next visit (CBC, CMP, CRP, ESR)  - If diarrhea worsens will need to rule out CDI    RTC 3 months    Miller De Anda MD  [facility] Internal Medicine [facility]

## 2025-05-07 ENCOUNTER — HOSPITAL ENCOUNTER (OUTPATIENT)
Dept: RADIOLOGY | Facility: HOSPITAL | Age: 80
Discharge: HOME | End: 2025-05-07
Attending: SURGERY
Payer: MEDICARE

## 2025-05-07 DIAGNOSIS — Z95.828 PORT-A-CATH IN PLACE: ICD-10-CM

## 2025-05-07 PROCEDURE — 36598 INJ W/FLUOR EVAL CV DEVICE: CPT

## 2025-05-07 PROCEDURE — 25500020 PHARM REV CODE 255: Performed by: SURGERY

## 2025-05-07 RX ORDER — IOPAMIDOL 612 MG/ML
100 INJECTION, SOLUTION INTRAVASCULAR
Status: DISCONTINUED | OUTPATIENT
Start: 2025-05-07 | End: 2025-05-07

## 2025-05-07 RX ORDER — IOPAMIDOL 612 MG/ML
10 INJECTION, SOLUTION INTRAVASCULAR
Status: COMPLETED | OUTPATIENT
Start: 2025-05-07 | End: 2025-05-07

## 2025-05-07 RX ADMIN — IOPAMIDOL 10 ML: 612 INJECTION, SOLUTION INTRAVENOUS at 09:05

## 2025-05-07 NOTE — PROGRESS NOTES
Pt's Mediport accessed for flow study in Xray and left accessed after placement. Patency was confirmed by radiologist

## 2025-05-08 ENCOUNTER — TELEPHONE (OUTPATIENT)
Dept: ADMINISTRATIVE | Facility: CLINIC | Age: 80
End: 2025-05-08
Payer: MEDICARE

## 2025-05-14 ENCOUNTER — DOCUMENT SCAN (OUTPATIENT)
Dept: HOME HEALTH SERVICES | Facility: HOSPITAL | Age: 80
End: 2025-05-14
Payer: MEDICARE

## 2025-05-16 ENCOUNTER — DOCUMENT SCAN (OUTPATIENT)
Dept: HOME HEALTH SERVICES | Facility: HOSPITAL | Age: 80
End: 2025-05-16
Payer: MEDICARE

## 2025-05-20 ENCOUNTER — EXTERNAL HOME HEALTH (OUTPATIENT)
Dept: HOME HEALTH SERVICES | Facility: HOSPITAL | Age: 80
End: 2025-05-20
Payer: MEDICARE

## 2025-05-20 ENCOUNTER — DOCUMENT SCAN (OUTPATIENT)
Dept: HOME HEALTH SERVICES | Facility: HOSPITAL | Age: 80
End: 2025-05-20
Payer: MEDICARE

## 2025-05-22 ENCOUNTER — DOCUMENT SCAN (OUTPATIENT)
Dept: HOME HEALTH SERVICES | Facility: HOSPITAL | Age: 80
End: 2025-05-22
Payer: MEDICARE

## 2025-06-06 ENCOUNTER — DOCUMENT SCAN (OUTPATIENT)
Dept: HOME HEALTH SERVICES | Facility: HOSPITAL | Age: 80
End: 2025-06-06
Payer: MEDICARE

## 2025-06-09 ENCOUNTER — PATIENT OUTREACH (OUTPATIENT)
Facility: HOSPITAL | Age: 80
End: 2025-06-09
Payer: MEDICARE

## 2025-06-10 ENCOUNTER — PATIENT OUTREACH (OUTPATIENT)
Facility: HOSPITAL | Age: 80
End: 2025-06-10
Payer: MEDICARE

## 2025-06-10 ENCOUNTER — LAB REQUISITION (OUTPATIENT)
Dept: LAB | Facility: HOSPITAL | Age: 80
End: 2025-06-10
Payer: MEDICARE

## 2025-06-10 DIAGNOSIS — K91.2 POSTSURGICAL MALABSORPTION, NOT ELSEWHERE CLASSIFIED: ICD-10-CM

## 2025-06-10 DIAGNOSIS — I11.9 HYPERTENSIVE HEART DISEASE WITHOUT HEART FAILURE: ICD-10-CM

## 2025-06-10 DIAGNOSIS — E46 UNSPECIFIED PROTEIN-CALORIE MALNUTRITION: ICD-10-CM

## 2025-06-10 LAB
ALBUMIN SERPL-MCNC: 3.6 G/DL (ref 3.4–4.8)
ALBUMIN/GLOB SERPL: 0.9 RATIO (ref 1.1–2)
ALP SERPL-CCNC: 123 UNIT/L (ref 40–150)
ALT SERPL-CCNC: 37 UNIT/L (ref 0–55)
ANION GAP SERPL CALC-SCNC: 10 MEQ/L
AST SERPL-CCNC: 40 UNIT/L (ref 11–45)
BASOPHILS # BLD AUTO: 0.05 X10(3)/MCL
BASOPHILS NFR BLD AUTO: 1.1 %
BILIRUB SERPL-MCNC: 0.5 MG/DL
BUN SERPL-MCNC: 17.7 MG/DL (ref 9.8–20.1)
CALCIUM SERPL-MCNC: 8.8 MG/DL (ref 8.4–10.2)
CHLORIDE SERPL-SCNC: 110 MMOL/L (ref 98–107)
CO2 SERPL-SCNC: 22 MMOL/L (ref 23–31)
CREAT SERPL-MCNC: 0.67 MG/DL (ref 0.55–1.02)
CREAT/UREA NIT SERPL: 26
EOSINOPHIL # BLD AUTO: 0.2 X10(3)/MCL (ref 0–0.9)
EOSINOPHIL NFR BLD AUTO: 4.2 %
ERYTHROCYTE [DISTWIDTH] IN BLOOD BY AUTOMATED COUNT: 14.9 % (ref 11.5–17)
GFR SERPLBLD CREATININE-BSD FMLA CKD-EPI: >60 ML/MIN/1.73/M2
GLOBULIN SER-MCNC: 3.8 GM/DL (ref 2.4–3.5)
GLUCOSE SERPL-MCNC: 106 MG/DL (ref 82–115)
HCT VFR BLD AUTO: 38.7 % (ref 37–47)
HGB BLD-MCNC: 12.5 G/DL (ref 12–16)
IMM GRANULOCYTES # BLD AUTO: 0.02 X10(3)/MCL (ref 0–0.04)
IMM GRANULOCYTES NFR BLD AUTO: 0.4 %
LYMPHOCYTES # BLD AUTO: 2.02 X10(3)/MCL (ref 0.6–4.6)
LYMPHOCYTES NFR BLD AUTO: 42.9 %
MAGNESIUM SERPL-MCNC: 1.7 MG/DL (ref 1.6–2.6)
MCH RBC QN AUTO: 28.5 PG (ref 27–31)
MCHC RBC AUTO-ENTMCNC: 32.3 G/DL (ref 33–36)
MCV RBC AUTO: 88.2 FL (ref 80–94)
MONOCYTES # BLD AUTO: 0.46 X10(3)/MCL (ref 0.1–1.3)
MONOCYTES NFR BLD AUTO: 9.8 %
NEUTROPHILS # BLD AUTO: 1.96 X10(3)/MCL (ref 2.1–9.2)
NEUTROPHILS NFR BLD AUTO: 41.6 %
NRBC BLD AUTO-RTO: 0 %
PHOSPHATE SERPL-MCNC: 3.6 MG/DL (ref 2.3–4.7)
PLATELET # BLD AUTO: 143 X10(3)/MCL (ref 130–400)
PMV BLD AUTO: 10 FL (ref 7.4–10.4)
POTASSIUM SERPL-SCNC: 4.2 MMOL/L (ref 3.5–5.1)
PROT SERPL-MCNC: 7.4 GM/DL (ref 5.8–7.6)
RBC # BLD AUTO: 4.39 X10(6)/MCL (ref 4.2–5.4)
SODIUM SERPL-SCNC: 142 MMOL/L (ref 136–145)
TRIGL SERPL-MCNC: 82 MG/DL (ref 37–140)
WBC # BLD AUTO: 4.71 X10(3)/MCL (ref 4.5–11.5)

## 2025-06-10 PROCEDURE — 84100 ASSAY OF PHOSPHORUS: CPT | Performed by: INTERNAL MEDICINE

## 2025-06-10 PROCEDURE — 84478 ASSAY OF TRIGLYCERIDES: CPT | Performed by: INTERNAL MEDICINE

## 2025-06-10 PROCEDURE — 83735 ASSAY OF MAGNESIUM: CPT | Performed by: INTERNAL MEDICINE

## 2025-06-10 PROCEDURE — 80053 COMPREHEN METABOLIC PANEL: CPT | Performed by: INTERNAL MEDICINE

## 2025-06-10 PROCEDURE — 85025 COMPLETE CBC W/AUTO DIFF WBC: CPT | Performed by: INTERNAL MEDICINE

## 2025-06-12 ENCOUNTER — DOCUMENT SCAN (OUTPATIENT)
Dept: HOME HEALTH SERVICES | Facility: HOSPITAL | Age: 80
End: 2025-06-12
Payer: MEDICARE

## 2025-06-16 ENCOUNTER — DOCUMENT SCAN (OUTPATIENT)
Dept: HOME HEALTH SERVICES | Facility: HOSPITAL | Age: 80
End: 2025-06-16
Payer: MEDICARE

## 2025-06-26 ENCOUNTER — DOCUMENT SCAN (OUTPATIENT)
Dept: HOME HEALTH SERVICES | Facility: HOSPITAL | Age: 80
End: 2025-06-26
Payer: MEDICARE

## 2025-06-30 ENCOUNTER — DOCUMENT SCAN (OUTPATIENT)
Dept: HOME HEALTH SERVICES | Facility: HOSPITAL | Age: 80
End: 2025-06-30
Payer: MEDICARE

## 2025-07-01 ENCOUNTER — PATIENT OUTREACH (OUTPATIENT)
Facility: HOSPITAL | Age: 80
End: 2025-07-01
Payer: MEDICARE

## 2025-07-10 ENCOUNTER — LAB REQUISITION (OUTPATIENT)
Dept: LAB | Facility: HOSPITAL | Age: 80
End: 2025-07-10
Payer: MEDICARE

## 2025-07-10 DIAGNOSIS — K91.2 POSTSURGICAL MALABSORPTION, NOT ELSEWHERE CLASSIFIED: ICD-10-CM

## 2025-07-10 DIAGNOSIS — I49.9 CARDIAC ARRHYTHMIA, UNSPECIFIED: ICD-10-CM

## 2025-07-10 LAB
ALBUMIN SERPL-MCNC: 3.5 G/DL (ref 3.4–4.8)
ALBUMIN/GLOB SERPL: 1 RATIO (ref 1.1–2)
ALP SERPL-CCNC: 115 UNIT/L (ref 40–150)
ALT SERPL-CCNC: 32 UNIT/L (ref 0–55)
ANION GAP SERPL CALC-SCNC: 10 MEQ/L
AST SERPL-CCNC: 36 UNIT/L (ref 11–45)
BASOPHILS # BLD AUTO: 0.03 X10(3)/MCL
BASOPHILS NFR BLD AUTO: 0.7 %
BILIRUB SERPL-MCNC: 0.5 MG/DL
BUN SERPL-MCNC: 12.3 MG/DL (ref 9.8–20.1)
CALCIUM SERPL-MCNC: 8.9 MG/DL (ref 8.4–10.2)
CHLORIDE SERPL-SCNC: 110 MMOL/L (ref 98–107)
CO2 SERPL-SCNC: 24 MMOL/L (ref 23–31)
CREAT SERPL-MCNC: 0.65 MG/DL (ref 0.55–1.02)
CREAT/UREA NIT SERPL: 19
EOSINOPHIL # BLD AUTO: 0.22 X10(3)/MCL (ref 0–0.9)
EOSINOPHIL NFR BLD AUTO: 5.1 %
ERYTHROCYTE [DISTWIDTH] IN BLOOD BY AUTOMATED COUNT: 14.4 % (ref 11.5–17)
GFR SERPLBLD CREATININE-BSD FMLA CKD-EPI: >60 ML/MIN/1.73/M2
GLOBULIN SER-MCNC: 3.6 GM/DL (ref 2.4–3.5)
GLUCOSE SERPL-MCNC: 119 MG/DL (ref 82–115)
HCT VFR BLD AUTO: 38 % (ref 37–47)
HGB BLD-MCNC: 12.2 G/DL (ref 12–16)
IMM GRANULOCYTES # BLD AUTO: 0 X10(3)/MCL (ref 0–0.04)
IMM GRANULOCYTES NFR BLD AUTO: 0 %
INR PPP: 1.9
LYMPHOCYTES # BLD AUTO: 2.02 X10(3)/MCL (ref 0.6–4.6)
LYMPHOCYTES NFR BLD AUTO: 46.8 %
MAGNESIUM SERPL-MCNC: 1.8 MG/DL (ref 1.6–2.6)
MCH RBC QN AUTO: 28.2 PG (ref 27–31)
MCHC RBC AUTO-ENTMCNC: 32.1 G/DL (ref 33–36)
MCV RBC AUTO: 88 FL (ref 80–94)
MONOCYTES # BLD AUTO: 0.37 X10(3)/MCL (ref 0.1–1.3)
MONOCYTES NFR BLD AUTO: 8.6 %
NEUTROPHILS # BLD AUTO: 1.68 X10(3)/MCL (ref 2.1–9.2)
NEUTROPHILS NFR BLD AUTO: 38.8 %
NRBC BLD AUTO-RTO: 0 %
PHOSPHATE SERPL-MCNC: 3.6 MG/DL (ref 2.3–4.7)
PLATELET # BLD AUTO: 132 X10(3)/MCL (ref 130–400)
PMV BLD AUTO: 10.2 FL (ref 7.4–10.4)
POTASSIUM SERPL-SCNC: 4.6 MMOL/L (ref 3.5–5.1)
PROT SERPL-MCNC: 7.1 GM/DL (ref 5.8–7.6)
PROTHROMBIN TIME: 21.9 SECONDS (ref 12.5–14.5)
RBC # BLD AUTO: 4.32 X10(6)/MCL (ref 4.2–5.4)
SODIUM SERPL-SCNC: 144 MMOL/L (ref 136–145)
TRIGL SERPL-MCNC: 87 MG/DL (ref 37–140)
WBC # BLD AUTO: 4.32 X10(3)/MCL (ref 4.5–11.5)

## 2025-07-10 PROCEDURE — 84100 ASSAY OF PHOSPHORUS: CPT | Performed by: INTERNAL MEDICINE

## 2025-07-10 PROCEDURE — 84478 ASSAY OF TRIGLYCERIDES: CPT | Performed by: INTERNAL MEDICINE

## 2025-07-10 PROCEDURE — 85025 COMPLETE CBC W/AUTO DIFF WBC: CPT | Performed by: INTERNAL MEDICINE

## 2025-07-10 PROCEDURE — 83735 ASSAY OF MAGNESIUM: CPT | Performed by: INTERNAL MEDICINE

## 2025-07-10 PROCEDURE — 80053 COMPREHEN METABOLIC PANEL: CPT | Performed by: INTERNAL MEDICINE

## 2025-07-10 PROCEDURE — 85610 PROTHROMBIN TIME: CPT | Performed by: INTERNAL MEDICINE

## 2025-07-15 ENCOUNTER — DOCUMENT SCAN (OUTPATIENT)
Dept: HOME HEALTH SERVICES | Facility: HOSPITAL | Age: 80
End: 2025-07-15
Payer: MEDICARE

## 2025-07-17 ENCOUNTER — DOCUMENT SCAN (OUTPATIENT)
Dept: HOME HEALTH SERVICES | Facility: HOSPITAL | Age: 80
End: 2025-07-17
Payer: MEDICARE

## 2025-07-25 ENCOUNTER — EXTERNAL HOME HEALTH (OUTPATIENT)
Dept: HOME HEALTH SERVICES | Facility: HOSPITAL | Age: 80
End: 2025-07-25
Payer: MEDICARE

## 2025-07-27 ENCOUNTER — DOCUMENT SCAN (OUTPATIENT)
Dept: HOME HEALTH SERVICES | Facility: HOSPITAL | Age: 80
End: 2025-07-27
Payer: MEDICARE

## 2025-07-28 ENCOUNTER — EXTERNAL HOME HEALTH (OUTPATIENT)
Dept: HOME HEALTH SERVICES | Facility: HOSPITAL | Age: 80
End: 2025-07-28
Payer: MEDICARE

## 2025-07-29 ENCOUNTER — DOCUMENT SCAN (OUTPATIENT)
Dept: HOME HEALTH SERVICES | Facility: HOSPITAL | Age: 80
End: 2025-07-29
Payer: MEDICARE

## 2025-07-31 ENCOUNTER — PATIENT OUTREACH (OUTPATIENT)
Facility: HOSPITAL | Age: 80
End: 2025-07-31
Payer: MEDICARE

## 2025-07-31 PROBLEM — F33.2 MAJOR DEPRESSIVE DISORDER, RECURRENT SEVERE WITHOUT PSYCHOTIC FEATURES: Status: ACTIVE | Noted: 2025-07-31

## 2025-08-04 PROCEDURE — 83735 ASSAY OF MAGNESIUM: CPT | Performed by: FAMILY MEDICINE

## 2025-08-05 ENCOUNTER — PATIENT OUTREACH (OUTPATIENT)
Facility: HOSPITAL | Age: 80
End: 2025-08-05
Payer: MEDICARE

## 2025-08-08 ENCOUNTER — DOCUMENT SCAN (OUTPATIENT)
Dept: HOME HEALTH SERVICES | Facility: HOSPITAL | Age: 80
End: 2025-08-08
Payer: MEDICARE

## 2025-08-11 ENCOUNTER — LAB REQUISITION (OUTPATIENT)
Dept: LAB | Facility: HOSPITAL | Age: 80
End: 2025-08-11
Payer: MEDICARE

## 2025-08-11 DIAGNOSIS — I73.9 PERIPHERAL VASCULAR DISEASE, UNSPECIFIED: ICD-10-CM

## 2025-08-11 DIAGNOSIS — K91.2 POSTSURGICAL MALABSORPTION, NOT ELSEWHERE CLASSIFIED: ICD-10-CM

## 2025-08-11 LAB
ALBUMIN SERPL-MCNC: 3.5 G/DL (ref 3.4–4.8)
ALBUMIN/GLOB SERPL: 1 RATIO (ref 1.1–2)
ALP SERPL-CCNC: 104 UNIT/L (ref 40–150)
ALT SERPL-CCNC: 27 UNIT/L (ref 0–55)
ANION GAP SERPL CALC-SCNC: 8 MEQ/L
AST SERPL-CCNC: 30 UNIT/L (ref 11–45)
BASOPHILS # BLD AUTO: 0.02 X10(3)/MCL
BASOPHILS NFR BLD AUTO: 0.4 %
BILIRUB SERPL-MCNC: 0.5 MG/DL
BUN SERPL-MCNC: 19.9 MG/DL (ref 9.8–20.1)
CALCIUM SERPL-MCNC: 8.6 MG/DL (ref 8.4–10.2)
CHLORIDE SERPL-SCNC: 106 MMOL/L (ref 98–107)
CO2 SERPL-SCNC: 27 MMOL/L (ref 23–31)
CREAT SERPL-MCNC: 0.63 MG/DL (ref 0.55–1.02)
CREAT/UREA NIT SERPL: 32
EOSINOPHIL # BLD AUTO: 0.23 X10(3)/MCL (ref 0–0.9)
EOSINOPHIL NFR BLD AUTO: 4.9 %
ERYTHROCYTE [DISTWIDTH] IN BLOOD BY AUTOMATED COUNT: 13.9 % (ref 11.5–17)
GFR SERPLBLD CREATININE-BSD FMLA CKD-EPI: >60 ML/MIN/1.73/M2
GLOBULIN SER-MCNC: 3.4 GM/DL (ref 2.4–3.5)
GLUCOSE SERPL-MCNC: 87 MG/DL (ref 82–115)
HCT VFR BLD AUTO: 39.5 % (ref 37–47)
HGB BLD-MCNC: 12.6 G/DL (ref 12–16)
IMM GRANULOCYTES # BLD AUTO: 0.01 X10(3)/MCL (ref 0–0.04)
IMM GRANULOCYTES NFR BLD AUTO: 0.2 %
INR PPP: 1.8
LYMPHOCYTES # BLD AUTO: 2.24 X10(3)/MCL (ref 0.6–4.6)
LYMPHOCYTES NFR BLD AUTO: 47.3 %
MAGNESIUM SERPL-MCNC: 2.1 MG/DL (ref 1.6–2.6)
MCH RBC QN AUTO: 28.4 PG (ref 27–31)
MCHC RBC AUTO-ENTMCNC: 31.9 G/DL (ref 33–36)
MCV RBC AUTO: 89 FL (ref 80–94)
MONOCYTES # BLD AUTO: 0.47 X10(3)/MCL (ref 0.1–1.3)
MONOCYTES NFR BLD AUTO: 9.9 %
NEUTROPHILS # BLD AUTO: 1.77 X10(3)/MCL (ref 2.1–9.2)
NEUTROPHILS NFR BLD AUTO: 37.3 %
NRBC BLD AUTO-RTO: 0 %
PHOSPHATE SERPL-MCNC: 4.2 MG/DL (ref 2.3–4.7)
PLATELET # BLD AUTO: 152 X10(3)/MCL (ref 130–400)
PMV BLD AUTO: 10.3 FL (ref 7.4–10.4)
POTASSIUM SERPL-SCNC: 4.5 MMOL/L (ref 3.5–5.1)
PROT SERPL-MCNC: 6.9 GM/DL (ref 5.8–7.6)
PROTHROMBIN TIME: 20.4 SECONDS (ref 12.5–14.5)
RBC # BLD AUTO: 4.44 X10(6)/MCL (ref 4.2–5.4)
SODIUM SERPL-SCNC: 141 MMOL/L (ref 136–145)
TRIGL SERPL-MCNC: 76 MG/DL (ref 37–140)
WBC # BLD AUTO: 4.74 X10(3)/MCL (ref 4.5–11.5)

## 2025-08-11 PROCEDURE — 84478 ASSAY OF TRIGLYCERIDES: CPT | Performed by: INTERNAL MEDICINE

## 2025-08-11 PROCEDURE — 85025 COMPLETE CBC W/AUTO DIFF WBC: CPT | Performed by: INTERNAL MEDICINE

## 2025-08-11 PROCEDURE — 83735 ASSAY OF MAGNESIUM: CPT | Performed by: INTERNAL MEDICINE

## 2025-08-11 PROCEDURE — 85610 PROTHROMBIN TIME: CPT | Performed by: INTERNAL MEDICINE

## 2025-08-11 PROCEDURE — 84100 ASSAY OF PHOSPHORUS: CPT | Performed by: INTERNAL MEDICINE

## 2025-08-11 PROCEDURE — 80053 COMPREHEN METABOLIC PANEL: CPT | Performed by: INTERNAL MEDICINE

## 2025-08-20 ENCOUNTER — DOCUMENT SCAN (OUTPATIENT)
Dept: HOME HEALTH SERVICES | Facility: HOSPITAL | Age: 80
End: 2025-08-20
Payer: MEDICARE

## 2025-08-21 ENCOUNTER — LAB VISIT (OUTPATIENT)
Dept: LAB | Facility: HOSPITAL | Age: 80
End: 2025-08-21
Payer: MEDICARE

## 2025-08-21 ENCOUNTER — EXTERNAL HOME HEALTH (OUTPATIENT)
Dept: HOME HEALTH SERVICES | Facility: HOSPITAL | Age: 80
End: 2025-08-21
Payer: MEDICARE

## 2025-08-21 ENCOUNTER — OFFICE VISIT (OUTPATIENT)
Dept: INFECTIOUS DISEASES | Facility: CLINIC | Age: 80
End: 2025-08-21
Payer: MEDICARE

## 2025-08-21 VITALS
BODY MASS INDEX: 28.04 KG/M2 | RESPIRATION RATE: 16 BRPM | WEIGHT: 164.25 LBS | HEART RATE: 106 BPM | DIASTOLIC BLOOD PRESSURE: 67 MMHG | HEIGHT: 64 IN | SYSTOLIC BLOOD PRESSURE: 125 MMHG | TEMPERATURE: 98 F

## 2025-08-21 DIAGNOSIS — B99.9 RECURRENT INFECTIONS: ICD-10-CM

## 2025-08-21 LAB
ALBUMIN SERPL-MCNC: 3.4 G/DL (ref 3.4–4.8)
ALBUMIN/GLOB SERPL: 0.9 RATIO (ref 1.1–2)
ALP SERPL-CCNC: 117 UNIT/L (ref 40–150)
ALT SERPL-CCNC: 30 UNIT/L (ref 0–55)
ANION GAP SERPL CALC-SCNC: 5 MEQ/L
AST SERPL-CCNC: 27 UNIT/L (ref 11–45)
BASOPHILS # BLD AUTO: 0.03 X10(3)/MCL
BASOPHILS NFR BLD AUTO: 0.6 %
BILIRUB SERPL-MCNC: 0.5 MG/DL
BUN SERPL-MCNC: 20.9 MG/DL (ref 9.8–20.1)
CALCIUM SERPL-MCNC: 8.9 MG/DL (ref 8.4–10.2)
CHLORIDE SERPL-SCNC: 110 MMOL/L (ref 98–107)
CO2 SERPL-SCNC: 27 MMOL/L (ref 23–31)
CREAT SERPL-MCNC: 0.64 MG/DL (ref 0.55–1.02)
CREAT/UREA NIT SERPL: 33
CRP SERPL-MCNC: 6.6 MG/L
EOSINOPHIL # BLD AUTO: 0.24 X10(3)/MCL (ref 0–0.9)
EOSINOPHIL NFR BLD AUTO: 4.6 %
ERYTHROCYTE [DISTWIDTH] IN BLOOD BY AUTOMATED COUNT: 13.9 % (ref 11.5–17)
ERYTHROCYTE [SEDIMENTATION RATE] IN BLOOD: 11 MM/HR (ref 0–20)
GFR SERPLBLD CREATININE-BSD FMLA CKD-EPI: >60 ML/MIN/1.73/M2
GLOBULIN SER-MCNC: 4 GM/DL (ref 2.4–3.5)
GLUCOSE SERPL-MCNC: 91 MG/DL (ref 82–115)
HCT VFR BLD AUTO: 40 % (ref 37–47)
HGB BLD-MCNC: 12.7 G/DL (ref 12–16)
IMM GRANULOCYTES # BLD AUTO: 0 X10(3)/MCL (ref 0–0.04)
IMM GRANULOCYTES NFR BLD AUTO: 0 %
LYMPHOCYTES # BLD AUTO: 2.41 X10(3)/MCL (ref 0.6–4.6)
LYMPHOCYTES NFR BLD AUTO: 46.3 %
MCH RBC QN AUTO: 28.6 PG (ref 27–31)
MCHC RBC AUTO-ENTMCNC: 31.8 G/DL (ref 33–36)
MCV RBC AUTO: 90.1 FL (ref 80–94)
MONOCYTES # BLD AUTO: 0.49 X10(3)/MCL (ref 0.1–1.3)
MONOCYTES NFR BLD AUTO: 9.4 %
NEUTROPHILS # BLD AUTO: 2.04 X10(3)/MCL (ref 2.1–9.2)
NEUTROPHILS NFR BLD AUTO: 39.1 %
NRBC BLD AUTO-RTO: 0 %
PLATELET # BLD AUTO: 146 X10(3)/MCL (ref 130–400)
PMV BLD AUTO: 10.1 FL (ref 7.4–10.4)
POTASSIUM SERPL-SCNC: 4.6 MMOL/L (ref 3.5–5.1)
PROT SERPL-MCNC: 7.4 GM/DL (ref 5.8–7.6)
RBC # BLD AUTO: 4.44 X10(6)/MCL (ref 4.2–5.4)
SODIUM SERPL-SCNC: 142 MMOL/L (ref 136–145)
WBC # BLD AUTO: 5.21 X10(3)/MCL (ref 4.5–11.5)

## 2025-08-21 PROCEDURE — 99214 OFFICE O/P EST MOD 30 MIN: CPT | Mod: PBBFAC

## 2025-08-21 PROCEDURE — 85652 RBC SED RATE AUTOMATED: CPT

## 2025-08-21 PROCEDURE — 36415 COLL VENOUS BLD VENIPUNCTURE: CPT

## 2025-08-21 PROCEDURE — 86140 C-REACTIVE PROTEIN: CPT

## 2025-08-21 PROCEDURE — 85025 COMPLETE CBC W/AUTO DIFF WBC: CPT

## 2025-08-21 PROCEDURE — 80053 COMPREHEN METABOLIC PANEL: CPT

## 2025-08-21 RX ORDER — CEFADROXIL 500 MG/1
500 CAPSULE ORAL EVERY 12 HOURS
Qty: 60 CAPSULE | Refills: 5 | Status: SHIPPED | OUTPATIENT
Start: 2025-08-21

## 2025-08-25 ENCOUNTER — LAB REQUISITION (OUTPATIENT)
Dept: LAB | Facility: HOSPITAL | Age: 80
End: 2025-08-25
Payer: MEDICARE

## 2025-08-25 DIAGNOSIS — K91.2 POSTSURGICAL MALABSORPTION, NOT ELSEWHERE CLASSIFIED: ICD-10-CM

## 2025-08-25 DIAGNOSIS — E46 UNSPECIFIED PROTEIN-CALORIE MALNUTRITION: ICD-10-CM

## 2025-08-25 LAB
25(OH)D3+25(OH)D2 SERPL-MCNC: 30 NG/ML (ref 30–80)
ALBUMIN SERPL-MCNC: 3.3 G/DL (ref 3.4–4.8)
ALBUMIN/GLOB SERPL: 1.1 RATIO (ref 1.1–2)
ALP SERPL-CCNC: 124 UNIT/L (ref 40–150)
ALT SERPL-CCNC: 84 UNIT/L (ref 0–55)
ANION GAP SERPL CALC-SCNC: 7 MEQ/L
AST SERPL-CCNC: 67 UNIT/L (ref 11–45)
BASOPHILS # BLD AUTO: 0.03 X10(3)/MCL
BASOPHILS NFR BLD AUTO: 0.7 %
BILIRUB SERPL-MCNC: 0.4 MG/DL
BUN SERPL-MCNC: 16.6 MG/DL (ref 9.8–20.1)
CALCIUM SERPL-MCNC: 8.5 MG/DL (ref 8.4–10.2)
CHLORIDE SERPL-SCNC: 106 MMOL/L (ref 98–107)
CO2 SERPL-SCNC: 29 MMOL/L (ref 23–31)
CREAT SERPL-MCNC: 0.58 MG/DL (ref 0.55–1.02)
CREAT/UREA NIT SERPL: 29
EOSINOPHIL # BLD AUTO: 0.26 X10(3)/MCL (ref 0–0.9)
EOSINOPHIL NFR BLD AUTO: 6.1 %
ERYTHROCYTE [DISTWIDTH] IN BLOOD BY AUTOMATED COUNT: 14.2 % (ref 11.5–17)
FERRITIN SERPL-MCNC: 51.44 NG/ML (ref 4.63–204)
GFR SERPLBLD CREATININE-BSD FMLA CKD-EPI: >60 ML/MIN/1.73/M2
GLOBULIN SER-MCNC: 3.1 GM/DL (ref 2.4–3.5)
GLUCOSE SERPL-MCNC: 111 MG/DL (ref 82–115)
HCT VFR BLD AUTO: 36 % (ref 37–47)
HGB BLD-MCNC: 11.9 G/DL (ref 12–16)
IMM GRANULOCYTES # BLD AUTO: 0 X10(3)/MCL (ref 0–0.04)
IMM GRANULOCYTES NFR BLD AUTO: 0 %
INR PPP: 2.6
IRON SERPL-MCNC: 63 UG/DL (ref 50–170)
LYMPHOCYTES # BLD AUTO: 1.86 X10(3)/MCL (ref 0.6–4.6)
LYMPHOCYTES NFR BLD AUTO: 43.9 %
MCH RBC QN AUTO: 28.8 PG (ref 27–31)
MCHC RBC AUTO-ENTMCNC: 33.1 G/DL (ref 33–36)
MCV RBC AUTO: 87.2 FL (ref 80–94)
MONOCYTES # BLD AUTO: 0.47 X10(3)/MCL (ref 0.1–1.3)
MONOCYTES NFR BLD AUTO: 11.1 %
NEUTROPHILS # BLD AUTO: 1.62 X10(3)/MCL (ref 2.1–9.2)
NEUTROPHILS NFR BLD AUTO: 38.2 %
NRBC BLD AUTO-RTO: 0 %
PHOSPHATE SERPL-MCNC: 4.6 MG/DL (ref 2.3–4.7)
PLATELET # BLD AUTO: 130 X10(3)/MCL (ref 130–400)
PMV BLD AUTO: 10.4 FL (ref 7.4–10.4)
POTASSIUM SERPL-SCNC: 4.3 MMOL/L (ref 3.5–5.1)
PROT SERPL-MCNC: 6.4 GM/DL (ref 5.8–7.6)
PROTHROMBIN TIME: 27.7 SECONDS (ref 12.5–14.5)
RBC # BLD AUTO: 4.13 X10(6)/MCL (ref 4.2–5.4)
SODIUM SERPL-SCNC: 142 MMOL/L (ref 136–145)
TRIGL SERPL-MCNC: 55 MG/DL (ref 37–140)
VIT B12 SERPL-MCNC: 463 PG/ML (ref 213–816)
WBC # BLD AUTO: 4.24 X10(3)/MCL (ref 4.5–11.5)

## 2025-08-25 PROCEDURE — 82607 VITAMIN B-12: CPT | Performed by: INTERNAL MEDICINE

## 2025-08-25 PROCEDURE — 83540 ASSAY OF IRON: CPT | Performed by: INTERNAL MEDICINE

## 2025-08-25 PROCEDURE — 85610 PROTHROMBIN TIME: CPT | Performed by: INTERNAL MEDICINE

## 2025-08-25 PROCEDURE — 82180 ASSAY OF ASCORBIC ACID: CPT | Performed by: INTERNAL MEDICINE

## 2025-08-25 PROCEDURE — 84100 ASSAY OF PHOSPHORUS: CPT | Performed by: INTERNAL MEDICINE

## 2025-08-25 PROCEDURE — 84590 ASSAY OF VITAMIN A: CPT | Performed by: INTERNAL MEDICINE

## 2025-08-25 PROCEDURE — 84478 ASSAY OF TRIGLYCERIDES: CPT | Performed by: INTERNAL MEDICINE

## 2025-08-25 PROCEDURE — 82728 ASSAY OF FERRITIN: CPT | Performed by: INTERNAL MEDICINE

## 2025-08-25 PROCEDURE — 85025 COMPLETE CBC W/AUTO DIFF WBC: CPT | Performed by: INTERNAL MEDICINE

## 2025-08-25 PROCEDURE — 80053 COMPREHEN METABOLIC PANEL: CPT | Performed by: INTERNAL MEDICINE

## 2025-08-25 PROCEDURE — 82306 VITAMIN D 25 HYDROXY: CPT | Performed by: INTERNAL MEDICINE

## 2025-08-26 ENCOUNTER — LAB REQUISITION (OUTPATIENT)
Dept: LAB | Facility: HOSPITAL | Age: 80
End: 2025-08-26
Payer: MEDICARE

## 2025-08-26 DIAGNOSIS — I48.91 UNSPECIFIED ATRIAL FIBRILLATION: ICD-10-CM

## 2025-08-26 LAB — FOLATE SERPL-MCNC: 14.2 NG/ML (ref 7–31.4)

## 2025-08-26 PROCEDURE — 82180 ASSAY OF ASCORBIC ACID: CPT | Performed by: FAMILY MEDICINE

## 2025-08-26 PROCEDURE — 82746 ASSAY OF FOLIC ACID SERUM: CPT | Performed by: FAMILY MEDICINE

## 2025-08-27 LAB
IRON SATN MFR SERPL: 21 % (ref 20–50)
IRON SERPL-MCNC: 63 UG/DL (ref 50–170)
TIBC SERPL-MCNC: 234 UG/DL (ref 70–310)
TIBC SERPL-MCNC: 297 UG/DL (ref 250–450)
TRANSFERRIN SERPL-MCNC: 266 MG/DL (ref 173–360)

## 2025-08-28 LAB
ACYLCARNITINE SERPL-SCNC: 5 NMOL/ML (ref 5–30)
ACYLCARNITINE/C0 SERPL-SRTO: 0.2 {RATIO} (ref 0.1–0.8)
CARNITINE FREE SERPL-SCNC: 26 NMOL/ML (ref 25–54)
CARNITINE SERPL-SCNC: 31 NMOL/ML (ref 34–78)
CLINICAL BIOCHEMIST REVIEW: ABNORMAL

## 2025-08-29 LAB
A-TOCOPHEROL VIT E SERPL-MCNC: 12.7 MG/L (ref 5.5–17)
VIT A SERPL-MCNC: 28 MCG/DL (ref 32.5–78)

## 2025-09-02 ENCOUNTER — PATIENT OUTREACH (OUTPATIENT)
Facility: HOSPITAL | Age: 80
End: 2025-09-02
Payer: MEDICARE

## 2025-09-06 ENCOUNTER — OFFICE VISIT (OUTPATIENT)
Dept: URGENT CARE | Facility: CLINIC | Age: 80
End: 2025-09-06
Payer: MEDICARE

## 2025-09-06 VITALS
DIASTOLIC BLOOD PRESSURE: 59 MMHG | WEIGHT: 164 LBS | HEIGHT: 64 IN | OXYGEN SATURATION: 95 % | SYSTOLIC BLOOD PRESSURE: 130 MMHG | HEART RATE: 58 BPM | BODY MASS INDEX: 28 KG/M2 | RESPIRATION RATE: 18 BRPM | TEMPERATURE: 100 F

## 2025-09-06 DIAGNOSIS — R05.9 COUGH, UNSPECIFIED TYPE: Primary | ICD-10-CM

## 2025-09-06 DIAGNOSIS — R50.9 FEVER, UNSPECIFIED FEVER CAUSE: ICD-10-CM

## 2025-09-06 LAB
CTP QC/QA: YES
MOLECULAR STREP A: NEGATIVE
POC MOLECULAR INFLUENZA A AGN: NEGATIVE
POC MOLECULAR INFLUENZA B AGN: NEGATIVE
SARS-COV+SARS-COV-2 AG RESP QL IA.RAPID: NEGATIVE

## 2025-09-06 RX ORDER — BENZONATATE 100 MG/1
100 CAPSULE ORAL 3 TIMES DAILY PRN
Qty: 30 CAPSULE | Refills: 0 | Status: SHIPPED | OUTPATIENT
Start: 2025-09-06 | End: 2025-09-16

## 2025-09-06 RX ORDER — BETAMETHASONE SODIUM PHOSPHATE AND BETAMETHASONE ACETATE 3; 3 MG/ML; MG/ML
6 INJECTION, SUSPENSION INTRA-ARTICULAR; INTRALESIONAL; INTRAMUSCULAR; SOFT TISSUE
Status: COMPLETED | OUTPATIENT
Start: 2025-09-06 | End: 2025-09-06

## 2025-09-06 RX ORDER — DOXYCYCLINE 100 MG/1
100 CAPSULE ORAL 2 TIMES DAILY
Qty: 14 CAPSULE | Refills: 0 | Status: SHIPPED | OUTPATIENT
Start: 2025-09-06 | End: 2025-09-13

## 2025-09-06 RX ADMIN — BETAMETHASONE SODIUM PHOSPHATE AND BETAMETHASONE ACETATE 6 MG: 3; 3 INJECTION, SUSPENSION INTRA-ARTICULAR; INTRALESIONAL; INTRAMUSCULAR; SOFT TISSUE at 01:09

## (undated) DEVICE — SOL NACL .9P 500ML

## (undated) DEVICE — SUT VICRYL PLUS 4-0 FS-2 27IN

## (undated) DEVICE — GLOVE SIGNATURE MICRO LTX 6.5

## (undated) DEVICE — NDL SYR 10ML 18X1.5 LL BLUNT

## (undated) DEVICE — GLOVE SIGNATURE MICRO LTX 7.5

## (undated) DEVICE — CONTAINER SPECIMEN SCREW 4OZ

## (undated) DEVICE — URETEROSCOPE FLX 1.2X650X3.1MM

## (undated) DEVICE — SUT ETHILON 3-0 PS2 18 BLK

## (undated) DEVICE — FORCEP BX LG CAP 2.8MMX240CM

## (undated) DEVICE — MARKER WRITESITE SKIN CHLRAPRP

## (undated) DEVICE — ADHESIVE MASTISOL VIAL 48/BX

## (undated) DEVICE — APPLICATOR CHLORAPREP ORN 26ML

## (undated) DEVICE — SOL NACL IRR 1000ML BTL

## (undated) DEVICE — STRIP MEDI WND CLSR 1/2X4IN

## (undated) DEVICE — GLOVE PROTEXIS LTX MICRO 6.5

## (undated) DEVICE — SUT VICRYL CTD 2-0 GI 27 SH

## (undated) DEVICE — ELECTRODE PATIENT RETURN DISP

## (undated) DEVICE — TUBING O2 FEMALE CONN 13FT

## (undated) DEVICE — GLOVE PROTEXIS HYDROGEL SZ7.5

## (undated) DEVICE — GLOVE PROTEXIS LTX MICRO  7

## (undated) DEVICE — Device

## (undated) DEVICE — KIT SURGICAL TURNOVER

## (undated) DEVICE — SUT VICRYL 3-0 27 SH

## (undated) DEVICE — PENCIL SMOKE EVAC TELSCP 15FT

## (undated) DEVICE — COVER PROBE US 5.5X58L NON LTX

## (undated) DEVICE — NDL 27G X 1 1/4

## (undated) DEVICE — SOL IRR WATER STRL 3000 ML

## (undated) DEVICE — COVER C-ARM STRAP BAND 44X80IN

## (undated) DEVICE — BENZOIN TINCTURE CAPSULET

## (undated) DEVICE — SUT 2/0 30IN PROLENE MONO

## (undated) DEVICE — BLADE SURG STAINLESS STEEL #11

## (undated) DEVICE — BOWL UTILITY BLUE 32OZ

## (undated) DEVICE — SUPPORT ULNA NERVE PROTECTOR

## (undated) DEVICE — BAG DRAIN UROLOGY AND HOSE

## (undated) DEVICE — CATH POLLACK OPEN-END FLEXI-TI

## (undated) DEVICE — BAG LABGUARD BIOHAZARD 6X9IN

## (undated) DEVICE — SOL NORMAL USPCA 0.9%

## (undated) DEVICE — SYR 30CC LUER LOCK

## (undated) DEVICE — BITE BLOCK ADULT

## (undated) DEVICE — BAG MEDI-PLAST DECANTER C-FLOW

## (undated) DEVICE — DRESSING TRANS 4X4 TEGADERM

## (undated) DEVICE — GLOVE PROTEXIS BLUE LATEX 7.5

## (undated) DEVICE — SHEATH URETERAL FLEXOR 12X20CM

## (undated) DEVICE — SOL IRRI STRL WATER 1000ML

## (undated) DEVICE — KIT SURGICAL COLON .25 1.1OZ

## (undated) DEVICE — GAUZE SPONGE 4X4 12PLY

## (undated) DEVICE — GLOVE PROTEXIS BLUE LATEX 8

## (undated) DEVICE — GUIDEWIRE ANGLED .035 150CM

## (undated) DEVICE — COLLECTION SPECIMEN NEPTUNE

## (undated) DEVICE — SPONGE COTTON TRAY 4X4IN

## (undated) DEVICE — SENSOR DUAL FLEX STR 150CM

## (undated) DEVICE — KIT CANIST SUCTION 1200CC

## (undated) DEVICE — NDL HYPO REG 25G X 1 1/2

## (undated) DEVICE — FIBER LASER HOLMIUM 273 MICRON

## (undated) DEVICE — TOWEL OR DISP STRL BLUE 4/PK

## (undated) DEVICE — GLOVE PROTEXIS LTX MICRO  7.5

## (undated) DEVICE — SUT VICRYL PLUS 4-0 P3 18IN

## (undated) DEVICE — PENCIL ELECSURG ROCKER 15FT

## (undated) DEVICE — GLOVE PROTEXIS BLUE LATEX 7

## (undated) DEVICE — TIP SUCTION YANKAUER

## (undated) DEVICE — LUBRICANT SURGILUBE 2 OZ

## (undated) DEVICE — SYR 10CC LUER LOCK

## (undated) DEVICE — POSITIONER HEAD ADULT

## (undated) DEVICE — STOPCOCK IV 4 WAY LG BOR ROT M